# Patient Record
Sex: MALE | Race: WHITE | NOT HISPANIC OR LATINO | Employment: OTHER | URBAN - METROPOLITAN AREA
[De-identification: names, ages, dates, MRNs, and addresses within clinical notes are randomized per-mention and may not be internally consistent; named-entity substitution may affect disease eponyms.]

---

## 2019-05-06 LAB — HBA1C MFR BLD HPLC: 14 %

## 2019-05-13 LAB — HBA1C MFR BLD HPLC: >14 %

## 2019-05-30 ENCOUNTER — CONSULT (OUTPATIENT)
Dept: NEPHROLOGY | Facility: CLINIC | Age: 74
End: 2019-05-30
Payer: MEDICARE

## 2019-05-30 VITALS
WEIGHT: 228 LBS | SYSTOLIC BLOOD PRESSURE: 134 MMHG | DIASTOLIC BLOOD PRESSURE: 70 MMHG | BODY MASS INDEX: 31.92 KG/M2 | HEIGHT: 71 IN

## 2019-05-30 DIAGNOSIS — I12.9 BENIGN HYPERTENSION WITH CHRONIC KIDNEY DISEASE, STAGE III (HCC): ICD-10-CM

## 2019-05-30 DIAGNOSIS — E83.9 CHRONIC KIDNEY DISEASE-MINERAL AND BONE DISORDER: ICD-10-CM

## 2019-05-30 DIAGNOSIS — N18.30 CKD (CHRONIC KIDNEY DISEASE), STAGE III (HCC): Primary | ICD-10-CM

## 2019-05-30 DIAGNOSIS — N18.30 BENIGN HYPERTENSION WITH CHRONIC KIDNEY DISEASE, STAGE III (HCC): ICD-10-CM

## 2019-05-30 DIAGNOSIS — N18.9 CHRONIC KIDNEY DISEASE-MINERAL AND BONE DISORDER: ICD-10-CM

## 2019-05-30 DIAGNOSIS — M89.9 CHRONIC KIDNEY DISEASE-MINERAL AND BONE DISORDER: ICD-10-CM

## 2019-05-30 PROCEDURE — 99204 OFFICE O/P NEW MOD 45 MIN: CPT | Performed by: INTERNAL MEDICINE

## 2019-05-30 RX ORDER — FENOFIBRIC ACID 135 MG/1
135 CAPSULE, DELAYED RELEASE ORAL DAILY
COMMUNITY
End: 2019-10-21

## 2019-05-30 RX ORDER — MULTIVIT WITH MINERALS/LUTEIN
1000 TABLET ORAL DAILY
COMMUNITY

## 2019-05-30 RX ORDER — CANAGLIFLOZIN 300 MG/1
300 TABLET, FILM COATED ORAL DAILY
COMMUNITY
End: 2019-06-25 | Stop reason: HOSPADM

## 2019-05-30 RX ORDER — DOXEPIN HYDROCHLORIDE 6 MG/1
10 TABLET ORAL AS NEEDED
COMMUNITY
End: 2021-03-22 | Stop reason: ALTCHOICE

## 2019-05-30 RX ORDER — GINKGO BILOBA 60 MG
60 TABLET ORAL
COMMUNITY
End: 2019-06-25 | Stop reason: HOSPADM

## 2019-05-30 RX ORDER — PIOGLITAZONEHYDROCHLORIDE 45 MG/1
45 TABLET ORAL DAILY
COMMUNITY
End: 2019-06-25 | Stop reason: HOSPADM

## 2019-05-30 RX ORDER — DIPHENOXYLATE HYDROCHLORIDE AND ATROPINE SULFATE 2.5; .025 MG/1; MG/1
1 TABLET ORAL DAILY
COMMUNITY
End: 2019-06-25 | Stop reason: HOSPADM

## 2019-05-30 RX ORDER — AMLODIPINE BESYLATE 10 MG/1
10 TABLET ORAL DAILY
Qty: 90 TABLET | Refills: 0 | Status: SHIPPED | OUTPATIENT
Start: 2019-05-30 | End: 2019-06-25 | Stop reason: HOSPADM

## 2019-05-30 RX ORDER — SIMVASTATIN 20 MG
20 TABLET ORAL
COMMUNITY
End: 2019-06-25 | Stop reason: HOSPADM

## 2019-05-30 RX ORDER — FINASTERIDE 5 MG/1
5 TABLET, FILM COATED ORAL DAILY
COMMUNITY
End: 2020-06-08 | Stop reason: SDUPTHER

## 2019-05-30 RX ORDER — LOSARTAN POTASSIUM AND HYDROCHLOROTHIAZIDE 25; 100 MG/1; MG/1
1 TABLET ORAL DAILY
COMMUNITY
End: 2019-05-30

## 2019-05-30 RX ORDER — CHOLECALCIFEROL (VITAMIN D3) 125 MCG
500 CAPSULE ORAL DAILY
COMMUNITY

## 2019-05-31 ENCOUNTER — DOCUMENTATION (OUTPATIENT)
Dept: NEPHROLOGY | Facility: CLINIC | Age: 74
End: 2019-05-31

## 2019-05-31 RX ORDER — OMEPRAZOLE 40 MG/1
40 CAPSULE, DELAYED RELEASE ORAL DAILY
COMMUNITY
End: 2021-12-21 | Stop reason: SDUPTHER

## 2019-06-03 ENCOUNTER — HOSPITAL ENCOUNTER (OUTPATIENT)
Dept: ULTRASOUND IMAGING | Facility: HOSPITAL | Age: 74
Discharge: HOME | End: 2019-06-03
Attending: INTERNAL MEDICINE
Payer: MEDICARE

## 2019-06-03 DIAGNOSIS — N18.30 CKD (CHRONIC KIDNEY DISEASE), STAGE III (HCC): ICD-10-CM

## 2019-06-03 PROCEDURE — 51798 US URINE CAPACITY MEASURE: CPT

## 2019-06-12 LAB
APPEARANCE UR: CLEAR
BACTERIA UR QL AUTO: ABNORMAL /HPF
BILIRUB UR QL STRIP: NEGATIVE
COLOR UR: YELLOW
CREAT UR-MCNC: 88 MG/DL (ref 20–320)
GLUCOSE UR QL STRIP: NEGATIVE
HGB UR QL STRIP: NEGATIVE
HYALINE CASTS #/AREA URNS LPF: ABNORMAL /LPF
KETONES UR QL STRIP: NEGATIVE
LEUKOCYTE ESTERASE UR QL STRIP: NEGATIVE
NITRITE UR QL STRIP: NEGATIVE
PH UR STRIP: 6 [PH] (ref 5–8)
PROT UR QL STRIP: ABNORMAL
PROT UR-MCNC: 77 MG/DL (ref 5–25)
PROT/CREAT UR: 875 MG/G CREAT (ref 22–128)
RBC #/AREA URNS HPF: ABNORMAL /HPF
SP GR UR STRIP: 1.02 (ref 1–1.03)
SQUAMOUS #/AREA URNS HPF: ABNORMAL /HPF
WBC #/AREA URNS HPF: ABNORMAL /HPF

## 2019-06-15 ENCOUNTER — APPOINTMENT (EMERGENCY)
Dept: RADIOLOGY | Facility: HOSPITAL | Age: 74
DRG: 280 | End: 2019-06-15
Payer: MEDICARE

## 2019-06-15 ENCOUNTER — HOSPITAL ENCOUNTER (INPATIENT)
Facility: HOSPITAL | Age: 74
LOS: 2 days | DRG: 280 | End: 2019-06-17
Attending: EMERGENCY MEDICINE | Admitting: FAMILY MEDICINE
Payer: MEDICARE

## 2019-06-15 DIAGNOSIS — R77.8 ELEVATED TROPONIN: ICD-10-CM

## 2019-06-15 DIAGNOSIS — N18.9 CHRONIC KIDNEY DISEASE, UNSPECIFIED CKD STAGE: ICD-10-CM

## 2019-06-15 DIAGNOSIS — I50.9 ACUTE CONGESTIVE HEART FAILURE, UNSPECIFIED HEART FAILURE TYPE (HCC): Primary | ICD-10-CM

## 2019-06-15 PROBLEM — Z79.4 TYPE 2 DIABETES MELLITUS WITH CHRONIC KIDNEY DISEASE, WITH LONG-TERM CURRENT USE OF INSULIN (HCC): Status: ACTIVE | Noted: 2019-06-15

## 2019-06-15 PROBLEM — R79.89 ELEVATED TROPONIN: Status: ACTIVE | Noted: 2019-06-15

## 2019-06-15 PROBLEM — J96.01 ACUTE RESPIRATORY FAILURE WITH HYPOXIA (HCC): Status: ACTIVE | Noted: 2019-06-15

## 2019-06-15 PROBLEM — E11.22 TYPE 2 DIABETES MELLITUS WITH CHRONIC KIDNEY DISEASE, WITH LONG-TERM CURRENT USE OF INSULIN (HCC): Status: ACTIVE | Noted: 2019-06-15

## 2019-06-15 PROBLEM — R60.0 BILATERAL LOWER EXTREMITY EDEMA: Status: ACTIVE | Noted: 2019-06-15

## 2019-06-15 PROBLEM — G47.33 OSA (OBSTRUCTIVE SLEEP APNEA): Status: ACTIVE | Noted: 2019-06-15

## 2019-06-15 LAB
ALBUMIN SERPL BCP-MCNC: 3.8 G/DL (ref 3.5–5)
ALP SERPL-CCNC: 54 U/L (ref 46–116)
ALT SERPL W P-5'-P-CCNC: 37 U/L (ref 12–78)
ANION GAP SERPL CALCULATED.3IONS-SCNC: 9 MMOL/L (ref 4–13)
APTT PPP: 27 SECONDS (ref 26–38)
AST SERPL W P-5'-P-CCNC: 29 U/L (ref 5–45)
BASOPHILS # BLD AUTO: 0.03 THOUSANDS/ΜL (ref 0–0.1)
BASOPHILS NFR BLD AUTO: 1 % (ref 0–1)
BILIRUB SERPL-MCNC: 0.4 MG/DL (ref 0.2–1)
BUN SERPL-MCNC: 34 MG/DL (ref 5–25)
CALCIUM SERPL-MCNC: 9.8 MG/DL (ref 8.3–10.1)
CHLORIDE SERPL-SCNC: 105 MMOL/L (ref 100–108)
CO2 SERPL-SCNC: 28 MMOL/L (ref 21–32)
CREAT SERPL-MCNC: 2.39 MG/DL (ref 0.6–1.3)
DEPRECATED D DIMER PPP: 1171 NG/ML (FEU)
EOSINOPHIL # BLD AUTO: 0.32 THOUSAND/ΜL (ref 0–0.61)
EOSINOPHIL NFR BLD AUTO: 5 % (ref 0–6)
ERYTHROCYTE [DISTWIDTH] IN BLOOD BY AUTOMATED COUNT: 14.5 % (ref 11.6–15.1)
ERYTHROCYTE [DISTWIDTH] IN BLOOD BY AUTOMATED COUNT: 14.6 % (ref 11.6–15.1)
GFR SERPL CREATININE-BSD FRML MDRD: 26 ML/MIN/1.73SQ M
GLUCOSE SERPL-MCNC: 215 MG/DL (ref 65–140)
GLUCOSE SERPL-MCNC: 269 MG/DL (ref 65–140)
GLUCOSE SERPL-MCNC: 305 MG/DL (ref 65–140)
HCT VFR BLD AUTO: 39.5 % (ref 36.5–49.3)
HCT VFR BLD AUTO: 39.5 % (ref 36.5–49.3)
HGB BLD-MCNC: 12.2 G/DL (ref 12–17)
HGB BLD-MCNC: 12.3 G/DL (ref 12–17)
IMM GRANULOCYTES # BLD AUTO: 0.03 THOUSAND/UL (ref 0–0.2)
IMM GRANULOCYTES NFR BLD AUTO: 1 % (ref 0–2)
INR PPP: 1.07 (ref 0.86–1.17)
LYMPHOCYTES # BLD AUTO: 0.67 THOUSANDS/ΜL (ref 0.6–4.47)
LYMPHOCYTES NFR BLD AUTO: 10 % (ref 14–44)
MCH RBC QN AUTO: 29.2 PG (ref 26.8–34.3)
MCH RBC QN AUTO: 29.6 PG (ref 26.8–34.3)
MCHC RBC AUTO-ENTMCNC: 30.9 G/DL (ref 31.4–37.4)
MCHC RBC AUTO-ENTMCNC: 31.1 G/DL (ref 31.4–37.4)
MCV RBC AUTO: 95 FL (ref 82–98)
MCV RBC AUTO: 95 FL (ref 82–98)
MONOCYTES # BLD AUTO: 0.58 THOUSAND/ΜL (ref 0.17–1.22)
MONOCYTES NFR BLD AUTO: 9 % (ref 4–12)
NEUTROPHILS # BLD AUTO: 4.92 THOUSANDS/ΜL (ref 1.85–7.62)
NEUTS SEG NFR BLD AUTO: 74 % (ref 43–75)
NRBC BLD AUTO-RTO: 0 /100 WBCS
NT-PROBNP SERPL-MCNC: 818 PG/ML
PLATELET # BLD AUTO: 304 THOUSANDS/UL (ref 149–390)
PLATELET # BLD AUTO: 311 THOUSANDS/UL (ref 149–390)
PMV BLD AUTO: 10.4 FL (ref 8.9–12.7)
PMV BLD AUTO: 10.4 FL (ref 8.9–12.7)
POTASSIUM SERPL-SCNC: 4.7 MMOL/L (ref 3.5–5.3)
PROT SERPL-MCNC: 7.9 G/DL (ref 6.4–8.2)
PROTHROMBIN TIME: 13.6 SECONDS (ref 11.8–14.2)
RBC # BLD AUTO: 4.15 MILLION/UL (ref 3.88–5.62)
RBC # BLD AUTO: 4.18 MILLION/UL (ref 3.88–5.62)
SODIUM SERPL-SCNC: 142 MMOL/L (ref 136–145)
TROPONIN I SERPL-MCNC: 0.8 NG/ML
TROPONIN I SERPL-MCNC: 9.09 NG/ML
WBC # BLD AUTO: 6.55 THOUSAND/UL (ref 4.31–10.16)
WBC # BLD AUTO: 8 THOUSAND/UL (ref 4.31–10.16)

## 2019-06-15 PROCEDURE — 80053 COMPREHEN METABOLIC PANEL: CPT | Performed by: EMERGENCY MEDICINE

## 2019-06-15 PROCEDURE — 85027 COMPLETE CBC AUTOMATED: CPT | Performed by: PHYSICIAN ASSISTANT

## 2019-06-15 PROCEDURE — 82948 REAGENT STRIP/BLOOD GLUCOSE: CPT

## 2019-06-15 PROCEDURE — 84484 ASSAY OF TROPONIN QUANT: CPT | Performed by: PHYSICIAN ASSISTANT

## 2019-06-15 PROCEDURE — 99223 1ST HOSP IP/OBS HIGH 75: CPT | Performed by: PHYSICIAN ASSISTANT

## 2019-06-15 PROCEDURE — 85379 FIBRIN DEGRADATION QUANT: CPT | Performed by: PHYSICIAN ASSISTANT

## 2019-06-15 PROCEDURE — 84484 ASSAY OF TROPONIN QUANT: CPT | Performed by: EMERGENCY MEDICINE

## 2019-06-15 PROCEDURE — 85610 PROTHROMBIN TIME: CPT | Performed by: PHYSICIAN ASSISTANT

## 2019-06-15 PROCEDURE — 99285 EMERGENCY DEPT VISIT HI MDM: CPT | Performed by: EMERGENCY MEDICINE

## 2019-06-15 PROCEDURE — 84484 ASSAY OF TROPONIN QUANT: CPT | Performed by: FAMILY MEDICINE

## 2019-06-15 PROCEDURE — 93005 ELECTROCARDIOGRAM TRACING: CPT

## 2019-06-15 PROCEDURE — 85025 COMPLETE CBC W/AUTO DIFF WBC: CPT | Performed by: EMERGENCY MEDICINE

## 2019-06-15 PROCEDURE — 83880 ASSAY OF NATRIURETIC PEPTIDE: CPT | Performed by: EMERGENCY MEDICINE

## 2019-06-15 PROCEDURE — 96374 THER/PROPH/DIAG INJ IV PUSH: CPT

## 2019-06-15 PROCEDURE — 36415 COLL VENOUS BLD VENIPUNCTURE: CPT | Performed by: EMERGENCY MEDICINE

## 2019-06-15 PROCEDURE — 85730 THROMBOPLASTIN TIME PARTIAL: CPT | Performed by: PHYSICIAN ASSISTANT

## 2019-06-15 PROCEDURE — 71046 X-RAY EXAM CHEST 2 VIEWS: CPT

## 2019-06-15 PROCEDURE — 99285 EMERGENCY DEPT VISIT HI MDM: CPT

## 2019-06-15 RX ORDER — ASCORBIC ACID 500 MG
1000 TABLET ORAL DAILY
Status: DISCONTINUED | OUTPATIENT
Start: 2019-06-16 | End: 2019-06-17 | Stop reason: HOSPADM

## 2019-06-15 RX ORDER — HEPARIN SODIUM 1000 [USP'U]/ML
8400 INJECTION, SOLUTION INTRAVENOUS; SUBCUTANEOUS ONCE
Status: COMPLETED | OUTPATIENT
Start: 2019-06-15 | End: 2019-06-15

## 2019-06-15 RX ORDER — POLYETHYLENE GLYCOL 3350 17 G/17G
17 POWDER, FOR SOLUTION ORAL DAILY PRN
Status: DISCONTINUED | OUTPATIENT
Start: 2019-06-15 | End: 2019-06-17 | Stop reason: HOSPADM

## 2019-06-15 RX ORDER — FUROSEMIDE 10 MG/ML
40 INJECTION INTRAMUSCULAR; INTRAVENOUS
Status: DISCONTINUED | OUTPATIENT
Start: 2019-06-15 | End: 2019-06-16

## 2019-06-15 RX ORDER — HEPARIN SODIUM 10000 [USP'U]/100ML
3-30 INJECTION, SOLUTION INTRAVENOUS
Status: DISCONTINUED | OUTPATIENT
Start: 2019-06-15 | End: 2019-06-17 | Stop reason: HOSPADM

## 2019-06-15 RX ORDER — INSULIN GLARGINE 100 [IU]/ML
75 INJECTION, SOLUTION SUBCUTANEOUS EVERY MORNING
Status: DISCONTINUED | OUTPATIENT
Start: 2019-06-16 | End: 2019-06-17 | Stop reason: HOSPADM

## 2019-06-15 RX ORDER — FUROSEMIDE 10 MG/ML
40 INJECTION INTRAMUSCULAR; INTRAVENOUS ONCE
Status: COMPLETED | OUTPATIENT
Start: 2019-06-15 | End: 2019-06-15

## 2019-06-15 RX ORDER — ASPIRIN 325 MG
325 TABLET ORAL ONCE
Status: COMPLETED | OUTPATIENT
Start: 2019-06-15 | End: 2019-06-15

## 2019-06-15 RX ORDER — FINASTERIDE 5 MG/1
5 TABLET, FILM COATED ORAL DAILY
Status: DISCONTINUED | OUTPATIENT
Start: 2019-06-16 | End: 2019-06-17 | Stop reason: HOSPADM

## 2019-06-15 RX ORDER — AMLODIPINE BESYLATE 10 MG/1
10 TABLET ORAL DAILY
Status: DISCONTINUED | OUTPATIENT
Start: 2019-06-16 | End: 2019-06-17 | Stop reason: HOSPADM

## 2019-06-15 RX ORDER — PANTOPRAZOLE SODIUM 40 MG/1
40 TABLET, DELAYED RELEASE ORAL
Status: DISCONTINUED | OUTPATIENT
Start: 2019-06-16 | End: 2019-06-17 | Stop reason: HOSPADM

## 2019-06-15 RX ORDER — CALCIUM CARBONATE 200(500)MG
1000 TABLET,CHEWABLE ORAL DAILY PRN
Status: DISCONTINUED | OUTPATIENT
Start: 2019-06-15 | End: 2019-06-17 | Stop reason: HOSPADM

## 2019-06-15 RX ORDER — LATANOPROST 50 UG/ML
1 SOLUTION/ DROPS OPHTHALMIC
Status: DISCONTINUED | OUTPATIENT
Start: 2019-06-15 | End: 2019-06-17 | Stop reason: HOSPADM

## 2019-06-15 RX ORDER — HEPARIN SODIUM 1000 [USP'U]/ML
8400 INJECTION, SOLUTION INTRAVENOUS; SUBCUTANEOUS AS NEEDED
Status: DISCONTINUED | OUTPATIENT
Start: 2019-06-15 | End: 2019-06-17 | Stop reason: HOSPADM

## 2019-06-15 RX ORDER — HEPARIN SODIUM 1000 [USP'U]/ML
4200 INJECTION, SOLUTION INTRAVENOUS; SUBCUTANEOUS AS NEEDED
Status: DISCONTINUED | OUTPATIENT
Start: 2019-06-15 | End: 2019-06-17 | Stop reason: HOSPADM

## 2019-06-15 RX ORDER — DOXEPIN HYDROCHLORIDE 6 MG/1
6 TABLET ORAL DAILY
Status: DISCONTINUED | OUTPATIENT
Start: 2019-06-16 | End: 2019-06-17 | Stop reason: HOSPADM

## 2019-06-15 RX ORDER — ONDANSETRON 2 MG/ML
4 INJECTION INTRAMUSCULAR; INTRAVENOUS EVERY 6 HOURS PRN
Status: DISCONTINUED | OUTPATIENT
Start: 2019-06-15 | End: 2019-06-17 | Stop reason: HOSPADM

## 2019-06-15 RX ORDER — ACETAMINOPHEN 325 MG/1
650 TABLET ORAL EVERY 6 HOURS PRN
Status: DISCONTINUED | OUTPATIENT
Start: 2019-06-15 | End: 2019-06-17 | Stop reason: HOSPADM

## 2019-06-15 RX ORDER — PRAVASTATIN SODIUM 40 MG
40 TABLET ORAL
Status: DISCONTINUED | OUTPATIENT
Start: 2019-06-16 | End: 2019-06-17 | Stop reason: HOSPADM

## 2019-06-15 RX ADMIN — FUROSEMIDE 40 MG: 10 INJECTION, SOLUTION INTRAMUSCULAR; INTRAVENOUS at 21:09

## 2019-06-15 RX ADMIN — FUROSEMIDE 40 MG: 10 INJECTION, SOLUTION INTRAMUSCULAR; INTRAVENOUS at 17:12

## 2019-06-15 RX ADMIN — HEPARIN SODIUM 8400 UNITS: 1000 INJECTION, SOLUTION INTRAVENOUS; SUBCUTANEOUS at 20:40

## 2019-06-15 RX ADMIN — HEPARIN SODIUM AND DEXTROSE 18 UNITS/KG/HR: 10000; 5 INJECTION INTRAVENOUS at 20:44

## 2019-06-15 RX ADMIN — INSULIN LISPRO 1 UNITS: 100 INJECTION, SOLUTION INTRAVENOUS; SUBCUTANEOUS at 22:26

## 2019-06-15 RX ADMIN — INSULIN LISPRO 8 UNITS: 100 INJECTION, SOLUTION INTRAVENOUS; SUBCUTANEOUS at 20:35

## 2019-06-15 RX ADMIN — ASPIRIN 325 MG ORAL TABLET 325 MG: 325 PILL ORAL at 17:32

## 2019-06-16 ENCOUNTER — APPOINTMENT (INPATIENT)
Dept: NON INVASIVE DIAGNOSTICS | Facility: HOSPITAL | Age: 74
DRG: 280 | End: 2019-06-16
Payer: MEDICARE

## 2019-06-16 LAB
ANION GAP SERPL CALCULATED.3IONS-SCNC: 10 MMOL/L (ref 4–13)
APTT PPP: 64 SECONDS (ref 26–38)
APTT PPP: 86 SECONDS (ref 26–38)
BASOPHILS # BLD AUTO: 0.04 THOUSANDS/ΜL (ref 0–0.1)
BASOPHILS NFR BLD AUTO: 1 % (ref 0–1)
BUN SERPL-MCNC: 35 MG/DL (ref 5–25)
CALCIUM SERPL-MCNC: 9.2 MG/DL (ref 8.3–10.1)
CHLORIDE SERPL-SCNC: 104 MMOL/L (ref 100–108)
CHOLEST SERPL-MCNC: 139 MG/DL (ref 50–200)
CO2 SERPL-SCNC: 28 MMOL/L (ref 21–32)
CREAT SERPL-MCNC: 2.45 MG/DL (ref 0.6–1.3)
EOSINOPHIL # BLD AUTO: 0.37 THOUSAND/ΜL (ref 0–0.61)
EOSINOPHIL NFR BLD AUTO: 5 % (ref 0–6)
ERYTHROCYTE [DISTWIDTH] IN BLOOD BY AUTOMATED COUNT: 14.6 % (ref 11.6–15.1)
GFR SERPL CREATININE-BSD FRML MDRD: 25 ML/MIN/1.73SQ M
GLUCOSE SERPL-MCNC: 131 MG/DL (ref 65–140)
GLUCOSE SERPL-MCNC: 143 MG/DL (ref 65–140)
GLUCOSE SERPL-MCNC: 156 MG/DL (ref 65–140)
GLUCOSE SERPL-MCNC: 177 MG/DL (ref 65–140)
GLUCOSE SERPL-MCNC: 199 MG/DL (ref 65–140)
HCT VFR BLD AUTO: 36.4 % (ref 36.5–49.3)
HDLC SERPL-MCNC: 84 MG/DL (ref 40–60)
HGB BLD-MCNC: 11.3 G/DL (ref 12–17)
IMM GRANULOCYTES # BLD AUTO: 0.02 THOUSAND/UL (ref 0–0.2)
IMM GRANULOCYTES NFR BLD AUTO: 0 % (ref 0–2)
LDLC SERPL CALC-MCNC: 49 MG/DL (ref 0–100)
LYMPHOCYTES # BLD AUTO: 1.24 THOUSANDS/ΜL (ref 0.6–4.47)
LYMPHOCYTES NFR BLD AUTO: 18 % (ref 14–44)
MCH RBC QN AUTO: 29 PG (ref 26.8–34.3)
MCHC RBC AUTO-ENTMCNC: 31 G/DL (ref 31.4–37.4)
MCV RBC AUTO: 94 FL (ref 82–98)
MONOCYTES # BLD AUTO: 0.8 THOUSAND/ΜL (ref 0.17–1.22)
MONOCYTES NFR BLD AUTO: 12 % (ref 4–12)
NEUTROPHILS # BLD AUTO: 4.39 THOUSANDS/ΜL (ref 1.85–7.62)
NEUTS SEG NFR BLD AUTO: 64 % (ref 43–75)
NRBC BLD AUTO-RTO: 0 /100 WBCS
PLATELET # BLD AUTO: 275 THOUSANDS/UL (ref 149–390)
PMV BLD AUTO: 10.4 FL (ref 8.9–12.7)
POTASSIUM SERPL-SCNC: 3.8 MMOL/L (ref 3.5–5.3)
RBC # BLD AUTO: 3.89 MILLION/UL (ref 3.88–5.62)
SODIUM SERPL-SCNC: 142 MMOL/L (ref 136–145)
TRIGL SERPL-MCNC: 30 MG/DL
TROPONIN I SERPL-MCNC: 10.77 NG/ML
TROPONIN I SERPL-MCNC: 13.19 NG/ML
TROPONIN I SERPL-MCNC: 14.44 NG/ML
TROPONIN I SERPL-MCNC: 17.09 NG/ML
WBC # BLD AUTO: 6.86 THOUSAND/UL (ref 4.31–10.16)

## 2019-06-16 PROCEDURE — 84484 ASSAY OF TROPONIN QUANT: CPT | Performed by: NURSE PRACTITIONER

## 2019-06-16 PROCEDURE — 80061 LIPID PANEL: CPT | Performed by: PHYSICIAN ASSISTANT

## 2019-06-16 PROCEDURE — 80048 BASIC METABOLIC PNL TOTAL CA: CPT | Performed by: PHYSICIAN ASSISTANT

## 2019-06-16 PROCEDURE — 99232 SBSQ HOSP IP/OBS MODERATE 35: CPT | Performed by: INTERNAL MEDICINE

## 2019-06-16 PROCEDURE — 85025 COMPLETE CBC W/AUTO DIFF WBC: CPT | Performed by: PHYSICIAN ASSISTANT

## 2019-06-16 PROCEDURE — 85730 THROMBOPLASTIN TIME PARTIAL: CPT | Performed by: FAMILY MEDICINE

## 2019-06-16 PROCEDURE — 93306 TTE W/DOPPLER COMPLETE: CPT

## 2019-06-16 PROCEDURE — 99222 1ST HOSP IP/OBS MODERATE 55: CPT | Performed by: INTERNAL MEDICINE

## 2019-06-16 PROCEDURE — 84484 ASSAY OF TROPONIN QUANT: CPT | Performed by: PHYSICIAN ASSISTANT

## 2019-06-16 PROCEDURE — 93306 TTE W/DOPPLER COMPLETE: CPT | Performed by: INTERNAL MEDICINE

## 2019-06-16 PROCEDURE — 93005 ELECTROCARDIOGRAM TRACING: CPT

## 2019-06-16 PROCEDURE — 99223 1ST HOSP IP/OBS HIGH 75: CPT | Performed by: INTERNAL MEDICINE

## 2019-06-16 PROCEDURE — 82948 REAGENT STRIP/BLOOD GLUCOSE: CPT

## 2019-06-16 RX ORDER — TAMSULOSIN HYDROCHLORIDE 0.4 MG/1
0.4 CAPSULE ORAL
Status: DISCONTINUED | OUTPATIENT
Start: 2019-06-16 | End: 2019-06-17 | Stop reason: HOSPADM

## 2019-06-16 RX ORDER — CLOPIDOGREL BISULFATE 75 MG/1
300 TABLET ORAL ONCE
Status: COMPLETED | OUTPATIENT
Start: 2019-06-16 | End: 2019-06-16

## 2019-06-16 RX ORDER — SODIUM CHLORIDE 9 MG/ML
60 INJECTION, SOLUTION INTRAVENOUS CONTINUOUS
Status: DISCONTINUED | OUTPATIENT
Start: 2019-06-16 | End: 2019-06-17

## 2019-06-16 RX ORDER — ASPIRIN 81 MG/1
81 TABLET ORAL DAILY
Status: DISCONTINUED | OUTPATIENT
Start: 2019-06-16 | End: 2019-06-17 | Stop reason: HOSPADM

## 2019-06-16 RX ADMIN — OXYCODONE HYDROCHLORIDE AND ACETAMINOPHEN 1000 MG: 500 TABLET ORAL at 09:51

## 2019-06-16 RX ADMIN — HEPARIN SODIUM AND DEXTROSE 18 UNITS/KG/HR: 10000; 5 INJECTION INTRAVENOUS at 23:27

## 2019-06-16 RX ADMIN — LATANOPROST 1 DROP: 50 SOLUTION OPHTHALMIC at 21:28

## 2019-06-16 RX ADMIN — AMLODIPINE BESYLATE 10 MG: 10 TABLET ORAL at 09:51

## 2019-06-16 RX ADMIN — INSULIN GLARGINE 75 UNITS: 100 INJECTION, SOLUTION SUBCUTANEOUS at 09:53

## 2019-06-16 RX ADMIN — PRAVASTATIN SODIUM 40 MG: 40 TABLET ORAL at 17:08

## 2019-06-16 RX ADMIN — SODIUM CHLORIDE 60 ML/HR: 0.9 INJECTION, SOLUTION INTRAVENOUS at 21:28

## 2019-06-16 RX ADMIN — METOPROLOL TARTRATE 25 MG: 25 TABLET ORAL at 21:27

## 2019-06-16 RX ADMIN — METOPROLOL TARTRATE 25 MG: 25 TABLET ORAL at 11:37

## 2019-06-16 RX ADMIN — HEPARIN SODIUM AND DEXTROSE 18 UNITS/KG/HR: 10000; 5 INJECTION INTRAVENOUS at 09:49

## 2019-06-16 RX ADMIN — INSULIN LISPRO 1 UNITS: 100 INJECTION, SOLUTION INTRAVENOUS; SUBCUTANEOUS at 11:41

## 2019-06-16 RX ADMIN — FUROSEMIDE 40 MG: 10 INJECTION, SOLUTION INTRAMUSCULAR; INTRAVENOUS at 09:51

## 2019-06-16 RX ADMIN — CLOPIDOGREL BISULFATE 300 MG: 75 TABLET ORAL at 09:51

## 2019-06-16 RX ADMIN — ASPIRIN 81 MG: 81 TABLET, COATED ORAL at 11:37

## 2019-06-16 RX ADMIN — PANTOPRAZOLE SODIUM 40 MG: 40 TABLET, DELAYED RELEASE ORAL at 05:50

## 2019-06-16 RX ADMIN — INSULIN LISPRO 8 UNITS: 100 INJECTION, SOLUTION INTRAVENOUS; SUBCUTANEOUS at 11:41

## 2019-06-16 RX ADMIN — FINASTERIDE 5 MG: 5 TABLET, FILM COATED ORAL at 09:51

## 2019-06-16 RX ADMIN — TAMSULOSIN HYDROCHLORIDE 0.4 MG: 0.4 CAPSULE ORAL at 17:08

## 2019-06-16 RX ADMIN — LATANOPROST 1 DROP: 50 SOLUTION OPHTHALMIC at 00:48

## 2019-06-17 ENCOUNTER — HOSPITAL ENCOUNTER (INPATIENT)
Facility: HOSPITAL | Age: 74
LOS: 8 days | Discharge: HOME HEALTH | DRG: 235 | End: 2019-06-25
Attending: INTERNAL MEDICINE | Admitting: THORACIC SURGERY (CARDIOTHORACIC VASCULAR SURGERY)
Payer: MEDICARE

## 2019-06-17 ENCOUNTER — APPOINTMENT (INPATIENT)
Dept: NON INVASIVE DIAGNOSTICS | Facility: HOSPITAL | Age: 74
DRG: 280 | End: 2019-06-17
Payer: MEDICARE

## 2019-06-17 VITALS
BODY MASS INDEX: 33.33 KG/M2 | WEIGHT: 238.1 LBS | DIASTOLIC BLOOD PRESSURE: 69 MMHG | HEIGHT: 71 IN | RESPIRATION RATE: 16 BRPM | SYSTOLIC BLOOD PRESSURE: 138 MMHG | HEART RATE: 76 BPM | TEMPERATURE: 97.7 F | OXYGEN SATURATION: 98 %

## 2019-06-17 DIAGNOSIS — E11.22 TYPE 2 DIABETES MELLITUS WITH CHRONIC KIDNEY DISEASE, WITH LONG-TERM CURRENT USE OF INSULIN, UNSPECIFIED CKD STAGE (HCC): ICD-10-CM

## 2019-06-17 DIAGNOSIS — E11.22 TYPE 2 DIABETES MELLITUS WITH STAGE 3 CHRONIC KIDNEY DISEASE, WITH LONG-TERM CURRENT USE OF INSULIN (HCC): ICD-10-CM

## 2019-06-17 DIAGNOSIS — I25.10 TRIPLE VESSEL CORONARY ARTERY DISEASE: Primary | ICD-10-CM

## 2019-06-17 DIAGNOSIS — N18.30 CKD (CHRONIC KIDNEY DISEASE), STAGE III (HCC): ICD-10-CM

## 2019-06-17 DIAGNOSIS — Z01.818 PRE-OP EVALUATION: ICD-10-CM

## 2019-06-17 DIAGNOSIS — Z79.4 TYPE 2 DIABETES MELLITUS WITH STAGE 3 CHRONIC KIDNEY DISEASE, WITH LONG-TERM CURRENT USE OF INSULIN (HCC): ICD-10-CM

## 2019-06-17 DIAGNOSIS — Z95.1 S/P CABG (CORONARY ARTERY BYPASS GRAFT): ICD-10-CM

## 2019-06-17 DIAGNOSIS — N18.30 TYPE 2 DIABETES MELLITUS WITH STAGE 3 CHRONIC KIDNEY DISEASE, WITH LONG-TERM CURRENT USE OF INSULIN (HCC): ICD-10-CM

## 2019-06-17 DIAGNOSIS — Z79.4 TYPE 2 DIABETES MELLITUS WITH CHRONIC KIDNEY DISEASE, WITH LONG-TERM CURRENT USE OF INSULIN, UNSPECIFIED CKD STAGE (HCC): ICD-10-CM

## 2019-06-17 PROBLEM — J96.01 ACUTE RESPIRATORY FAILURE WITH HYPOXIA (HCC): Status: RESOLVED | Noted: 2019-06-15 | Resolved: 2019-06-17

## 2019-06-17 PROBLEM — R07.9 CHEST PAIN, CARDIAC: Status: ACTIVE | Noted: 2019-06-17

## 2019-06-17 PROBLEM — E78.5 HYPERLIPIDEMIA: Status: ACTIVE | Noted: 2019-06-17

## 2019-06-17 LAB
ALBUMIN SERPL BCP-MCNC: 3.3 G/DL (ref 3.5–5)
ALP SERPL-CCNC: 43 U/L (ref 46–116)
ALT SERPL W P-5'-P-CCNC: 32 U/L (ref 12–78)
ANION GAP SERPL CALCULATED.3IONS-SCNC: 7 MMOL/L (ref 4–13)
ANION GAP SERPL CALCULATED.3IONS-SCNC: 7 MMOL/L (ref 4–13)
APTT PPP: 63 SECONDS (ref 26–38)
AST SERPL W P-5'-P-CCNC: 31 U/L (ref 5–45)
BACTERIA UR QL AUTO: ABNORMAL /HPF
BASOPHILS # BLD AUTO: 0.03 THOUSANDS/ΜL (ref 0–0.1)
BASOPHILS NFR BLD AUTO: 1 % (ref 0–1)
BILIRUB SERPL-MCNC: 0.3 MG/DL (ref 0.2–1)
BILIRUB UR QL STRIP: NEGATIVE
BUN SERPL-MCNC: 35 MG/DL (ref 5–25)
BUN SERPL-MCNC: 35 MG/DL (ref 5–25)
CALCIUM SERPL-MCNC: 9.2 MG/DL (ref 8.3–10.1)
CALCIUM SERPL-MCNC: 9.2 MG/DL (ref 8.3–10.1)
CHLORIDE SERPL-SCNC: 102 MMOL/L (ref 100–108)
CHLORIDE SERPL-SCNC: 102 MMOL/L (ref 100–108)
CLARITY UR: CLEAR
CO2 SERPL-SCNC: 29 MMOL/L (ref 21–32)
CO2 SERPL-SCNC: 29 MMOL/L (ref 21–32)
COLOR UR: YELLOW
CREAT SERPL-MCNC: 2.43 MG/DL (ref 0.6–1.3)
CREAT SERPL-MCNC: 2.43 MG/DL (ref 0.6–1.3)
CREAT UR-MCNC: 51.1 MG/DL
EOSINOPHIL # BLD AUTO: 0.34 THOUSAND/ΜL (ref 0–0.61)
EOSINOPHIL NFR BLD AUTO: 6 % (ref 0–6)
ERYTHROCYTE [DISTWIDTH] IN BLOOD BY AUTOMATED COUNT: 14.5 % (ref 11.6–15.1)
ERYTHROCYTE [DISTWIDTH] IN BLOOD BY AUTOMATED COUNT: 14.5 % (ref 11.6–15.1)
EST. AVERAGE GLUCOSE BLD GHB EST-MCNC: 249 MG/DL
GFR SERPL CREATININE-BSD FRML MDRD: 26 ML/MIN/1.73SQ M
GFR SERPL CREATININE-BSD FRML MDRD: 26 ML/MIN/1.73SQ M
GLUCOSE SERPL-MCNC: 128 MG/DL (ref 65–140)
GLUCOSE SERPL-MCNC: 145 MG/DL (ref 65–140)
GLUCOSE SERPL-MCNC: 145 MG/DL (ref 65–140)
GLUCOSE SERPL-MCNC: 182 MG/DL (ref 65–140)
GLUCOSE SERPL-MCNC: 195 MG/DL (ref 65–140)
GLUCOSE SERPL-MCNC: 75 MG/DL (ref 65–140)
GLUCOSE UR STRIP-MCNC: NEGATIVE MG/DL
HBA1C MFR BLD: 10.3 % (ref 4.2–6.3)
HCT VFR BLD AUTO: 37.1 % (ref 36.5–49.3)
HCT VFR BLD AUTO: 37.1 % (ref 36.5–49.3)
HGB BLD-MCNC: 11.8 G/DL (ref 12–17)
HGB BLD-MCNC: 11.8 G/DL (ref 12–17)
HGB UR QL STRIP.AUTO: NEGATIVE
IMM GRANULOCYTES # BLD AUTO: 0.02 THOUSAND/UL (ref 0–0.2)
IMM GRANULOCYTES NFR BLD AUTO: 0 % (ref 0–2)
KETONES UR STRIP-MCNC: NEGATIVE MG/DL
LEUKOCYTE ESTERASE UR QL STRIP: NEGATIVE
LYMPHOCYTES # BLD AUTO: 1.64 THOUSANDS/ΜL (ref 0.6–4.47)
LYMPHOCYTES NFR BLD AUTO: 29 % (ref 14–44)
MCH RBC QN AUTO: 29.6 PG (ref 26.8–34.3)
MCH RBC QN AUTO: 29.6 PG (ref 26.8–34.3)
MCHC RBC AUTO-ENTMCNC: 31.8 G/DL (ref 31.4–37.4)
MCHC RBC AUTO-ENTMCNC: 31.8 G/DL (ref 31.4–37.4)
MCV RBC AUTO: 93 FL (ref 82–98)
MCV RBC AUTO: 93 FL (ref 82–98)
MONOCYTES # BLD AUTO: 0.67 THOUSAND/ΜL (ref 0.17–1.22)
MONOCYTES NFR BLD AUTO: 12 % (ref 4–12)
NEUTROPHILS # BLD AUTO: 2.9 THOUSANDS/ΜL (ref 1.85–7.62)
NEUTS SEG NFR BLD AUTO: 52 % (ref 43–75)
NITRITE UR QL STRIP: NEGATIVE
NON-SQ EPI CELLS URNS QL MICRO: ABNORMAL /HPF
NRBC BLD AUTO-RTO: 0 /100 WBCS
PH UR STRIP.AUTO: 6 [PH]
PLATELET # BLD AUTO: 268 THOUSANDS/UL (ref 149–390)
PLATELET # BLD AUTO: 268 THOUSANDS/UL (ref 149–390)
PMV BLD AUTO: 10.4 FL (ref 8.9–12.7)
PMV BLD AUTO: 10.4 FL (ref 8.9–12.7)
POTASSIUM SERPL-SCNC: 3.7 MMOL/L (ref 3.5–5.3)
POTASSIUM SERPL-SCNC: 3.7 MMOL/L (ref 3.5–5.3)
PROT SERPL-MCNC: 7.1 G/DL (ref 6.4–8.2)
PROT UR STRIP-MCNC: NEGATIVE MG/DL
PROT UR-MCNC: 22 MG/DL
PROT/CREAT UR: 0.43 MG/G{CREAT} (ref 0–0.1)
RBC # BLD AUTO: 3.99 MILLION/UL (ref 3.88–5.62)
RBC # BLD AUTO: 3.99 MILLION/UL (ref 3.88–5.62)
RBC #/AREA URNS AUTO: ABNORMAL /HPF
SODIUM SERPL-SCNC: 138 MMOL/L (ref 136–145)
SODIUM SERPL-SCNC: 138 MMOL/L (ref 136–145)
SP GR UR STRIP.AUTO: <=1.005 (ref 1–1.03)
UROBILINOGEN UR QL STRIP.AUTO: 0.2 E.U./DL
WBC # BLD AUTO: 5.6 THOUSAND/UL (ref 4.31–10.16)
WBC # BLD AUTO: 5.6 THOUSAND/UL (ref 4.31–10.16)
WBC #/AREA URNS AUTO: ABNORMAL /HPF

## 2019-06-17 PROCEDURE — C1769 GUIDE WIRE: HCPCS | Performed by: NURSE PRACTITIONER

## 2019-06-17 PROCEDURE — 85027 COMPLETE CBC AUTOMATED: CPT | Performed by: NURSE PRACTITIONER

## 2019-06-17 PROCEDURE — B2111ZZ FLUOROSCOPY OF MULTIPLE CORONARY ARTERIES USING LOW OSMOLAR CONTRAST: ICD-10-PCS | Performed by: INTERNAL MEDICINE

## 2019-06-17 PROCEDURE — 93454 CORONARY ARTERY ANGIO S&I: CPT | Performed by: INTERNAL MEDICINE

## 2019-06-17 PROCEDURE — 80053 COMPREHEN METABOLIC PANEL: CPT | Performed by: INTERNAL MEDICINE

## 2019-06-17 PROCEDURE — 99152 MOD SED SAME PHYS/QHP 5/>YRS: CPT | Performed by: NURSE PRACTITIONER

## 2019-06-17 PROCEDURE — 99223 1ST HOSP IP/OBS HIGH 75: CPT | Performed by: PHYSICIAN ASSISTANT

## 2019-06-17 PROCEDURE — 82948 REAGENT STRIP/BLOOD GLUCOSE: CPT

## 2019-06-17 PROCEDURE — 93005 ELECTROCARDIOGRAM TRACING: CPT

## 2019-06-17 PROCEDURE — 85025 COMPLETE CBC W/AUTO DIFF WBC: CPT | Performed by: INTERNAL MEDICINE

## 2019-06-17 PROCEDURE — 99232 SBSQ HOSP IP/OBS MODERATE 35: CPT | Performed by: INTERNAL MEDICINE

## 2019-06-17 PROCEDURE — 80048 BASIC METABOLIC PNL TOTAL CA: CPT | Performed by: NURSE PRACTITIONER

## 2019-06-17 PROCEDURE — 84156 ASSAY OF PROTEIN URINE: CPT | Performed by: INTERNAL MEDICINE

## 2019-06-17 PROCEDURE — B2161ZZ FLUOROSCOPY OF RIGHT AND LEFT HEART USING LOW OSMOLAR CONTRAST: ICD-10-PCS | Performed by: INTERNAL MEDICINE

## 2019-06-17 PROCEDURE — 99239 HOSP IP/OBS DSCHRG MGMT >30: CPT | Performed by: INTERNAL MEDICINE

## 2019-06-17 PROCEDURE — 4A023N7 MEASUREMENT OF CARDIAC SAMPLING AND PRESSURE, LEFT HEART, PERCUTANEOUS APPROACH: ICD-10-PCS | Performed by: INTERNAL MEDICINE

## 2019-06-17 PROCEDURE — C1894 INTRO/SHEATH, NON-LASER: HCPCS | Performed by: NURSE PRACTITIONER

## 2019-06-17 PROCEDURE — 93458 L HRT ARTERY/VENTRICLE ANGIO: CPT | Performed by: NURSE PRACTITIONER

## 2019-06-17 PROCEDURE — 81001 URINALYSIS AUTO W/SCOPE: CPT | Performed by: INTERNAL MEDICINE

## 2019-06-17 PROCEDURE — 99152 MOD SED SAME PHYS/QHP 5/>YRS: CPT | Performed by: INTERNAL MEDICINE

## 2019-06-17 PROCEDURE — 83036 HEMOGLOBIN GLYCOSYLATED A1C: CPT | Performed by: PHYSICIAN ASSISTANT

## 2019-06-17 PROCEDURE — 85730 THROMBOPLASTIN TIME PARTIAL: CPT | Performed by: INTERNAL MEDICINE

## 2019-06-17 PROCEDURE — 82570 ASSAY OF URINE CREATININE: CPT | Performed by: INTERNAL MEDICINE

## 2019-06-17 RX ORDER — PANTOPRAZOLE SODIUM 40 MG/1
40 TABLET, DELAYED RELEASE ORAL
Status: DISCONTINUED | OUTPATIENT
Start: 2019-06-18 | End: 2019-06-21 | Stop reason: HOSPADM

## 2019-06-17 RX ORDER — ONDANSETRON 2 MG/ML
4 INJECTION INTRAMUSCULAR; INTRAVENOUS EVERY 6 HOURS PRN
Status: CANCELLED | OUTPATIENT
Start: 2019-06-17

## 2019-06-17 RX ORDER — ACETAMINOPHEN 325 MG/1
650 TABLET ORAL EVERY 6 HOURS PRN
Status: DISCONTINUED | OUTPATIENT
Start: 2019-06-17 | End: 2019-06-21 | Stop reason: HOSPADM

## 2019-06-17 RX ORDER — HEPARIN SODIUM 1000 [USP'U]/ML
8400 INJECTION, SOLUTION INTRAVENOUS; SUBCUTANEOUS AS NEEDED
Status: CANCELLED | OUTPATIENT
Start: 2019-06-17

## 2019-06-17 RX ORDER — LIDOCAINE HYDROCHLORIDE 10 MG/ML
INJECTION, SOLUTION INFILTRATION; PERINEURAL CODE/TRAUMA/SEDATION MEDICATION
Status: COMPLETED | OUTPATIENT
Start: 2019-06-17 | End: 2019-06-17

## 2019-06-17 RX ORDER — MIDAZOLAM HYDROCHLORIDE 1 MG/ML
INJECTION INTRAMUSCULAR; INTRAVENOUS CODE/TRAUMA/SEDATION MEDICATION
Status: COMPLETED | OUTPATIENT
Start: 2019-06-17 | End: 2019-06-17

## 2019-06-17 RX ORDER — ACETAMINOPHEN 325 MG/1
650 TABLET ORAL EVERY 6 HOURS PRN
Status: CANCELLED | OUTPATIENT
Start: 2019-06-17

## 2019-06-17 RX ORDER — AMLODIPINE BESYLATE 10 MG/1
10 TABLET ORAL DAILY
Status: CANCELLED | OUTPATIENT
Start: 2019-06-18

## 2019-06-17 RX ORDER — ASPIRIN 81 MG/1
81 TABLET ORAL DAILY
Status: CANCELLED | OUTPATIENT
Start: 2019-06-18

## 2019-06-17 RX ORDER — SODIUM CHLORIDE 9 MG/ML
125 INJECTION, SOLUTION INTRAVENOUS CONTINUOUS
Status: DISCONTINUED | OUTPATIENT
Start: 2019-06-17 | End: 2019-06-17

## 2019-06-17 RX ORDER — HEPARIN SODIUM 1000 [USP'U]/ML
4200 INJECTION, SOLUTION INTRAVENOUS; SUBCUTANEOUS AS NEEDED
Status: DISCONTINUED | OUTPATIENT
Start: 2019-06-17 | End: 2019-06-18

## 2019-06-17 RX ORDER — LATANOPROST 50 UG/ML
1 SOLUTION/ DROPS OPHTHALMIC
COMMUNITY

## 2019-06-17 RX ORDER — INSULIN GLARGINE 100 [IU]/ML
75 INJECTION, SOLUTION SUBCUTANEOUS EVERY MORNING
Status: DISCONTINUED | OUTPATIENT
Start: 2019-06-18 | End: 2019-06-21 | Stop reason: HOSPADM

## 2019-06-17 RX ORDER — SODIUM CHLORIDE 9 MG/ML
100 INJECTION, SOLUTION INTRAVENOUS CONTINUOUS
Status: DISCONTINUED | OUTPATIENT
Start: 2019-06-17 | End: 2019-06-18

## 2019-06-17 RX ORDER — PRAVASTATIN SODIUM 40 MG
40 TABLET ORAL
Status: DISCONTINUED | OUTPATIENT
Start: 2019-06-18 | End: 2019-06-21 | Stop reason: HOSPADM

## 2019-06-17 RX ORDER — HEPARIN SODIUM 1000 [USP'U]/ML
8400 INJECTION, SOLUTION INTRAVENOUS; SUBCUTANEOUS AS NEEDED
Status: DISCONTINUED | OUTPATIENT
Start: 2019-06-17 | End: 2019-06-18

## 2019-06-17 RX ORDER — SODIUM CHLORIDE 9 MG/ML
100 INJECTION, SOLUTION INTRAVENOUS CONTINUOUS
Status: DISCONTINUED | OUTPATIENT
Start: 2019-06-17 | End: 2019-06-17 | Stop reason: HOSPADM

## 2019-06-17 RX ORDER — TAMSULOSIN HYDROCHLORIDE 0.4 MG/1
0.4 CAPSULE ORAL
Status: CANCELLED | OUTPATIENT
Start: 2019-06-17

## 2019-06-17 RX ORDER — TAMSULOSIN HYDROCHLORIDE 0.4 MG/1
0.4 CAPSULE ORAL
Status: DISCONTINUED | OUTPATIENT
Start: 2019-06-18 | End: 2019-06-25 | Stop reason: HOSPADM

## 2019-06-17 RX ORDER — LATANOPROST 50 UG/ML
1 SOLUTION/ DROPS OPHTHALMIC
Status: DISCONTINUED | OUTPATIENT
Start: 2019-06-17 | End: 2019-06-25 | Stop reason: HOSPADM

## 2019-06-17 RX ORDER — PRAVASTATIN SODIUM 40 MG
40 TABLET ORAL
Status: CANCELLED | OUTPATIENT
Start: 2019-06-17

## 2019-06-17 RX ORDER — FINASTERIDE 5 MG/1
5 TABLET, FILM COATED ORAL DAILY
Status: DISCONTINUED | OUTPATIENT
Start: 2019-06-18 | End: 2019-06-25 | Stop reason: HOSPADM

## 2019-06-17 RX ORDER — SODIUM CHLORIDE 9 MG/ML
125 INJECTION, SOLUTION INTRAVENOUS CONTINUOUS
Status: DISCONTINUED | OUTPATIENT
Start: 2019-06-17 | End: 2019-06-17 | Stop reason: HOSPADM

## 2019-06-17 RX ORDER — SODIUM CHLORIDE 9 MG/ML
100 INJECTION, SOLUTION INTRAVENOUS CONTINUOUS
Status: CANCELLED | OUTPATIENT
Start: 2019-06-17

## 2019-06-17 RX ORDER — CALCIUM CARBONATE 200(500)MG
1000 TABLET,CHEWABLE ORAL DAILY PRN
Status: CANCELLED | OUTPATIENT
Start: 2019-06-17

## 2019-06-17 RX ORDER — DOXEPIN HYDROCHLORIDE 6 MG/1
6 TABLET ORAL DAILY
Status: DISCONTINUED | OUTPATIENT
Start: 2019-06-18 | End: 2019-06-17

## 2019-06-17 RX ORDER — CALCIUM CARBONATE 200(500)MG
1000 TABLET,CHEWABLE ORAL DAILY PRN
Status: DISCONTINUED | OUTPATIENT
Start: 2019-06-17 | End: 2019-06-21 | Stop reason: HOSPADM

## 2019-06-17 RX ORDER — HEPARIN SODIUM 10000 [USP'U]/100ML
3-30 INJECTION, SOLUTION INTRAVENOUS
Status: CANCELLED | OUTPATIENT
Start: 2019-06-17

## 2019-06-17 RX ORDER — POLYETHYLENE GLYCOL 3350 17 G/17G
17 POWDER, FOR SOLUTION ORAL DAILY PRN
Status: CANCELLED | OUTPATIENT
Start: 2019-06-17

## 2019-06-17 RX ORDER — POLYETHYLENE GLYCOL 3350 17 G/17G
17 POWDER, FOR SOLUTION ORAL DAILY PRN
Status: DISCONTINUED | OUTPATIENT
Start: 2019-06-17 | End: 2019-06-21 | Stop reason: HOSPADM

## 2019-06-17 RX ORDER — CLOPIDOGREL BISULFATE 75 MG/1
75 TABLET ORAL ONCE
Status: COMPLETED | OUTPATIENT
Start: 2019-06-17 | End: 2019-06-17

## 2019-06-17 RX ORDER — HEPARIN SODIUM 1000 [USP'U]/ML
INJECTION, SOLUTION INTRAVENOUS; SUBCUTANEOUS CODE/TRAUMA/SEDATION MEDICATION
Status: COMPLETED | OUTPATIENT
Start: 2019-06-17 | End: 2019-06-17

## 2019-06-17 RX ORDER — DOXEPIN HYDROCHLORIDE 6 MG/1
6 TABLET ORAL DAILY
Status: CANCELLED | OUTPATIENT
Start: 2019-06-18

## 2019-06-17 RX ORDER — HEPARIN SODIUM 1000 [USP'U]/ML
4200 INJECTION, SOLUTION INTRAVENOUS; SUBCUTANEOUS AS NEEDED
Status: CANCELLED | OUTPATIENT
Start: 2019-06-17

## 2019-06-17 RX ORDER — INSULIN GLARGINE 100 [IU]/ML
75 INJECTION, SOLUTION SUBCUTANEOUS EVERY MORNING
Status: CANCELLED | OUTPATIENT
Start: 2019-06-18

## 2019-06-17 RX ORDER — FINASTERIDE 5 MG/1
5 TABLET, FILM COATED ORAL DAILY
Status: CANCELLED | OUTPATIENT
Start: 2019-06-18

## 2019-06-17 RX ORDER — LATANOPROST 50 UG/ML
1 SOLUTION/ DROPS OPHTHALMIC
Status: CANCELLED | OUTPATIENT
Start: 2019-06-17

## 2019-06-17 RX ORDER — PANTOPRAZOLE SODIUM 40 MG/1
40 TABLET, DELAYED RELEASE ORAL
Status: CANCELLED | OUTPATIENT
Start: 2019-06-18

## 2019-06-17 RX ORDER — ASCORBIC ACID 500 MG
1000 TABLET ORAL DAILY
Status: CANCELLED | OUTPATIENT
Start: 2019-06-18

## 2019-06-17 RX ORDER — NITROGLYCERIN 20 MG/100ML
INJECTION INTRAVENOUS CODE/TRAUMA/SEDATION MEDICATION
Status: COMPLETED | OUTPATIENT
Start: 2019-06-17 | End: 2019-06-17

## 2019-06-17 RX ORDER — SODIUM CHLORIDE 9 MG/ML
125 INJECTION, SOLUTION INTRAVENOUS CONTINUOUS
Status: CANCELLED | OUTPATIENT
Start: 2019-06-17 | End: 2019-06-22

## 2019-06-17 RX ORDER — FENTANYL CITRATE 50 UG/ML
INJECTION, SOLUTION INTRAMUSCULAR; INTRAVENOUS CODE/TRAUMA/SEDATION MEDICATION
Status: COMPLETED | OUTPATIENT
Start: 2019-06-17 | End: 2019-06-17

## 2019-06-17 RX ORDER — HEPARIN SODIUM 10000 [USP'U]/100ML
3-30 INJECTION, SOLUTION INTRAVENOUS
Status: DISCONTINUED | OUTPATIENT
Start: 2019-06-17 | End: 2019-06-18

## 2019-06-17 RX ORDER — VERAPAMIL HYDROCHLORIDE 2.5 MG/ML
INJECTION, SOLUTION INTRAVENOUS CODE/TRAUMA/SEDATION MEDICATION
Status: COMPLETED | OUTPATIENT
Start: 2019-06-17 | End: 2019-06-17

## 2019-06-17 RX ORDER — ASCORBIC ACID 500 MG
1000 TABLET ORAL DAILY
Status: DISCONTINUED | OUTPATIENT
Start: 2019-06-18 | End: 2019-06-25 | Stop reason: HOSPADM

## 2019-06-17 RX ORDER — ASPIRIN 81 MG/1
81 TABLET ORAL DAILY
Status: DISCONTINUED | OUTPATIENT
Start: 2019-06-18 | End: 2019-06-21 | Stop reason: HOSPADM

## 2019-06-17 RX ORDER — AMLODIPINE BESYLATE 10 MG/1
10 TABLET ORAL DAILY
Status: DISCONTINUED | OUTPATIENT
Start: 2019-06-18 | End: 2019-06-20

## 2019-06-17 RX ORDER — ONDANSETRON 2 MG/ML
4 INJECTION INTRAMUSCULAR; INTRAVENOUS EVERY 6 HOURS PRN
Status: DISCONTINUED | OUTPATIENT
Start: 2019-06-17 | End: 2019-06-21 | Stop reason: HOSPADM

## 2019-06-17 RX ORDER — ASPIRIN 81 MG/1
324 TABLET, CHEWABLE ORAL ONCE
Status: COMPLETED | OUTPATIENT
Start: 2019-06-17 | End: 2019-06-17

## 2019-06-17 RX ADMIN — PRAVASTATIN SODIUM 40 MG: 40 TABLET ORAL at 18:26

## 2019-06-17 RX ADMIN — HEPARIN SODIUM AND DEXTROSE 18 UNITS/KG/HR: 10000; 5 INJECTION INTRAVENOUS at 18:31

## 2019-06-17 RX ADMIN — TAMSULOSIN HYDROCHLORIDE 0.4 MG: 0.4 CAPSULE ORAL at 18:28

## 2019-06-17 RX ADMIN — LATANOPROST 1 DROP: 50 SOLUTION OPHTHALMIC at 22:19

## 2019-06-17 RX ADMIN — METOPROLOL TARTRATE 25 MG: 25 TABLET ORAL at 22:19

## 2019-06-17 RX ADMIN — ASPIRIN 81 MG 324 MG: 81 TABLET ORAL at 08:22

## 2019-06-17 RX ADMIN — MIDAZOLAM HYDROCHLORIDE 2 MG: 1 INJECTION, SOLUTION INTRAMUSCULAR; INTRAVENOUS at 11:30

## 2019-06-17 RX ADMIN — NITROGLYCERIN 200 MCG: 20 INJECTION INTRAVENOUS at 11:39

## 2019-06-17 RX ADMIN — HEPARIN SODIUM 4000 UNITS: 1000 INJECTION INTRAVENOUS; SUBCUTANEOUS at 11:40

## 2019-06-17 RX ADMIN — INSULIN LISPRO 1 UNITS: 100 INJECTION, SOLUTION INTRAVENOUS; SUBCUTANEOUS at 18:25

## 2019-06-17 RX ADMIN — FENTANYL CITRATE 50 MCG: 50 INJECTION INTRAMUSCULAR; INTRAVENOUS at 11:30

## 2019-06-17 RX ADMIN — IODIXANOL 30 ML: 320 INJECTION, SOLUTION INTRAVASCULAR at 11:56

## 2019-06-17 RX ADMIN — INSULIN GLARGINE 75 UNITS: 100 INJECTION, SOLUTION SUBCUTANEOUS at 08:22

## 2019-06-17 RX ADMIN — SODIUM CHLORIDE 100 ML/HR: 0.9 INJECTION, SOLUTION INTRAVENOUS at 21:57

## 2019-06-17 RX ADMIN — SODIUM CHLORIDE 125 ML/HR: 0.9 INJECTION, SOLUTION INTRAVENOUS at 18:31

## 2019-06-17 RX ADMIN — OXYCODONE HYDROCHLORIDE AND ACETAMINOPHEN 1000 MG: 500 TABLET ORAL at 08:24

## 2019-06-17 RX ADMIN — ASPIRIN 81 MG: 81 TABLET, COATED ORAL at 08:23

## 2019-06-17 RX ADMIN — LIDOCAINE HYDROCHLORIDE ANHYDROUS 1 ML: 10 INJECTION, SOLUTION INFILTRATION at 11:37

## 2019-06-17 RX ADMIN — HEPARIN SODIUM 18 UNITS/KG/HR: 10000 INJECTION, SOLUTION INTRAVENOUS at 21:41

## 2019-06-17 RX ADMIN — FINASTERIDE 5 MG: 5 TABLET, FILM COATED ORAL at 08:22

## 2019-06-17 RX ADMIN — CLOPIDOGREL BISULFATE 75 MG: 75 TABLET ORAL at 08:23

## 2019-06-17 RX ADMIN — METOPROLOL TARTRATE 25 MG: 25 TABLET ORAL at 08:24

## 2019-06-17 RX ADMIN — AMLODIPINE BESYLATE 10 MG: 10 TABLET ORAL at 08:23

## 2019-06-17 RX ADMIN — PANTOPRAZOLE SODIUM 40 MG: 40 TABLET, DELAYED RELEASE ORAL at 05:40

## 2019-06-17 RX ADMIN — VERAPAMIL HYDROCHLORIDE 2.5 MG: 2.5 INJECTION, SOLUTION INTRAVENOUS at 11:40

## 2019-06-17 RX ADMIN — SODIUM CHLORIDE 125 ML/HR: 0.9 INJECTION, SOLUTION INTRAVENOUS at 12:22

## 2019-06-17 RX ADMIN — INSULIN LISPRO 1 UNITS: 100 INJECTION, SOLUTION INTRAVENOUS; SUBCUTANEOUS at 22:19

## 2019-06-18 ENCOUNTER — APPOINTMENT (INPATIENT)
Dept: RADIOLOGY | Facility: HOSPITAL | Age: 74
DRG: 235 | End: 2019-06-18
Payer: MEDICARE

## 2019-06-18 ENCOUNTER — APPOINTMENT (INPATIENT)
Dept: NON INVASIVE DIAGNOSTICS | Facility: HOSPITAL | Age: 74
DRG: 235 | End: 2019-06-18
Payer: MEDICARE

## 2019-06-18 LAB
ANION GAP SERPL CALCULATED.3IONS-SCNC: 3 MMOL/L (ref 4–13)
APTT PPP: 118 SECONDS (ref 26–38)
APTT PPP: 57 SECONDS (ref 26–38)
APTT PPP: 57 SECONDS (ref 26–38)
APTT PPP: 59 SECONDS (ref 26–38)
ATRIAL RATE: 100 BPM
ATRIAL RATE: 69 BPM
ATRIAL RATE: 86 BPM
ATRIAL RATE: 89 BPM
ATRIAL RATE: 92 BPM
BUN SERPL-MCNC: 29 MG/DL (ref 5–25)
CALCIUM SERPL-MCNC: 8.7 MG/DL (ref 8.3–10.1)
CHLORIDE SERPL-SCNC: 107 MMOL/L (ref 100–108)
CO2 SERPL-SCNC: 29 MMOL/L (ref 21–32)
CREAT SERPL-MCNC: 2.14 MG/DL (ref 0.6–1.3)
GFR SERPL CREATININE-BSD FRML MDRD: 30 ML/MIN/1.73SQ M
GLUCOSE SERPL-MCNC: 101 MG/DL (ref 65–140)
GLUCOSE SERPL-MCNC: 161 MG/DL (ref 65–140)
GLUCOSE SERPL-MCNC: 191 MG/DL (ref 65–140)
GLUCOSE SERPL-MCNC: 68 MG/DL (ref 65–140)
GLUCOSE SERPL-MCNC: 84 MG/DL (ref 65–140)
GLUCOSE SERPL-MCNC: 88 MG/DL (ref 65–140)
INR PPP: 1.15 (ref 0.86–1.17)
P AXIS: 38 DEGREES
P AXIS: 52 DEGREES
P AXIS: 55 DEGREES
P AXIS: 61 DEGREES
P AXIS: 61 DEGREES
PHOSPHATE SERPL-MCNC: 3.2 MG/DL (ref 2.3–4.1)
POTASSIUM SERPL-SCNC: 3.7 MMOL/L (ref 3.5–5.3)
PR INTERVAL: 196 MS
PR INTERVAL: 210 MS
PR INTERVAL: 214 MS
PROTHROMBIN TIME: 14.9 SECONDS (ref 11.8–14.2)
QRS AXIS: 10 DEGREES
QRS AXIS: 12 DEGREES
QRS AXIS: 20 DEGREES
QRS AXIS: 21 DEGREES
QRS AXIS: 9 DEGREES
QRSD INTERVAL: 110 MS
QRSD INTERVAL: 110 MS
QRSD INTERVAL: 112 MS
QRSD INTERVAL: 112 MS
QRSD INTERVAL: 114 MS
QT INTERVAL: 358 MS
QT INTERVAL: 386 MS
QT INTERVAL: 388 MS
QT INTERVAL: 422 MS
QT INTERVAL: 448 MS
QTC INTERVAL: 461 MS
QTC INTERVAL: 472 MS
QTC INTERVAL: 477 MS
QTC INTERVAL: 480 MS
QTC INTERVAL: 504 MS
SODIUM SERPL-SCNC: 139 MMOL/L (ref 136–145)
T WAVE AXIS: 111 DEGREES
T WAVE AXIS: 72 DEGREES
T WAVE AXIS: 77 DEGREES
T WAVE AXIS: 79 DEGREES
T WAVE AXIS: 90 DEGREES
VENTRICULAR RATE: 100 BPM
VENTRICULAR RATE: 69 BPM
VENTRICULAR RATE: 86 BPM
VENTRICULAR RATE: 89 BPM
VENTRICULAR RATE: 92 BPM

## 2019-06-18 PROCEDURE — 85730 THROMBOPLASTIN TIME PARTIAL: CPT | Performed by: INTERNAL MEDICINE

## 2019-06-18 PROCEDURE — 71250 CT THORAX DX C-: CPT

## 2019-06-18 PROCEDURE — 99222 1ST HOSP IP/OBS MODERATE 55: CPT | Performed by: INTERNAL MEDICINE

## 2019-06-18 PROCEDURE — 99232 SBSQ HOSP IP/OBS MODERATE 35: CPT | Performed by: PHYSICIAN ASSISTANT

## 2019-06-18 PROCEDURE — 82948 REAGENT STRIP/BLOOD GLUCOSE: CPT

## 2019-06-18 PROCEDURE — 99232 SBSQ HOSP IP/OBS MODERATE 35: CPT | Performed by: INTERNAL MEDICINE

## 2019-06-18 PROCEDURE — 93971 EXTREMITY STUDY: CPT

## 2019-06-18 PROCEDURE — 87081 CULTURE SCREEN ONLY: CPT | Performed by: PHYSICIAN ASSISTANT

## 2019-06-18 PROCEDURE — 84100 ASSAY OF PHOSPHORUS: CPT | Performed by: INTERNAL MEDICINE

## 2019-06-18 PROCEDURE — 85730 THROMBOPLASTIN TIME PARTIAL: CPT | Performed by: PHYSICIAN ASSISTANT

## 2019-06-18 PROCEDURE — 93880 EXTRACRANIAL BILAT STUDY: CPT | Performed by: SURGERY

## 2019-06-18 PROCEDURE — 93880 EXTRACRANIAL BILAT STUDY: CPT

## 2019-06-18 PROCEDURE — 93010 ELECTROCARDIOGRAM REPORT: CPT | Performed by: INTERNAL MEDICINE

## 2019-06-18 PROCEDURE — 99223 1ST HOSP IP/OBS HIGH 75: CPT | Performed by: PHYSICIAN ASSISTANT

## 2019-06-18 PROCEDURE — 80048 BASIC METABOLIC PNL TOTAL CA: CPT | Performed by: INTERNAL MEDICINE

## 2019-06-18 PROCEDURE — 85610 PROTHROMBIN TIME: CPT | Performed by: PHYSICIAN ASSISTANT

## 2019-06-18 RX ORDER — CHLORHEXIDINE GLUCONATE 0.12 MG/ML
15 RINSE ORAL ONCE
Status: DISCONTINUED | OUTPATIENT
Start: 2019-06-18 | End: 2019-06-19

## 2019-06-18 RX ORDER — HEPARIN SODIUM 10000 [USP'U]/100ML
3-20 INJECTION, SOLUTION INTRAVENOUS
Status: DISCONTINUED | OUTPATIENT
Start: 2019-06-18 | End: 2019-06-21 | Stop reason: HOSPADM

## 2019-06-18 RX ORDER — SODIUM CHLORIDE, SODIUM GLUCONATE, SODIUM ACETATE, POTASSIUM CHLORIDE, MAGNESIUM CHLORIDE, SODIUM PHOSPHATE, DIBASIC, AND POTASSIUM PHOSPHATE .53; .5; .37; .037; .03; .012; .00082 G/100ML; G/100ML; G/100ML; G/100ML; G/100ML; G/100ML; G/100ML
50 INJECTION, SOLUTION INTRAVENOUS CONTINUOUS
Status: DISCONTINUED | OUTPATIENT
Start: 2019-06-18 | End: 2019-06-19

## 2019-06-18 RX ORDER — HEPARIN SODIUM 1000 [USP'U]/ML
2000 INJECTION, SOLUTION INTRAVENOUS; SUBCUTANEOUS AS NEEDED
Status: DISCONTINUED | OUTPATIENT
Start: 2019-06-18 | End: 2019-06-21 | Stop reason: HOSPADM

## 2019-06-18 RX ORDER — HEPARIN SODIUM 1000 [USP'U]/ML
4000 INJECTION, SOLUTION INTRAVENOUS; SUBCUTANEOUS AS NEEDED
Status: DISCONTINUED | OUTPATIENT
Start: 2019-06-18 | End: 2019-06-21 | Stop reason: HOSPADM

## 2019-06-18 RX ORDER — HEPARIN SODIUM 10000 [USP'U]/100ML
3-20 INJECTION, SOLUTION INTRAVENOUS
Status: DISCONTINUED | OUTPATIENT
Start: 2019-06-18 | End: 2019-06-18

## 2019-06-18 RX ADMIN — HEPARIN SODIUM 4200 UNITS: 1000 INJECTION INTRAVENOUS; SUBCUTANEOUS at 01:10

## 2019-06-18 RX ADMIN — TAMSULOSIN HYDROCHLORIDE 0.4 MG: 0.4 CAPSULE ORAL at 16:39

## 2019-06-18 RX ADMIN — PRAVASTATIN SODIUM 40 MG: 40 TABLET ORAL at 16:39

## 2019-06-18 RX ADMIN — INSULIN LISPRO 1 UNITS: 100 INJECTION, SOLUTION INTRAVENOUS; SUBCUTANEOUS at 21:00

## 2019-06-18 RX ADMIN — ASPIRIN 81 MG: 81 TABLET, COATED ORAL at 08:24

## 2019-06-18 RX ADMIN — METOPROLOL TARTRATE 25 MG: 25 TABLET ORAL at 08:25

## 2019-06-18 RX ADMIN — INSULIN LISPRO 8 UNITS: 100 INJECTION, SOLUTION INTRAVENOUS; SUBCUTANEOUS at 11:46

## 2019-06-18 RX ADMIN — PANTOPRAZOLE SODIUM 40 MG: 40 TABLET, DELAYED RELEASE ORAL at 06:54

## 2019-06-18 RX ADMIN — METOPROLOL TARTRATE 25 MG: 25 TABLET ORAL at 20:58

## 2019-06-18 RX ADMIN — AMLODIPINE BESYLATE 10 MG: 10 TABLET ORAL at 08:24

## 2019-06-18 RX ADMIN — POLYETHYLENE GLYCOL 3350 17 G: 17 POWDER, FOR SOLUTION ORAL at 19:54

## 2019-06-18 RX ADMIN — OXYCODONE HYDROCHLORIDE AND ACETAMINOPHEN 1000 MG: 500 TABLET ORAL at 08:24

## 2019-06-18 RX ADMIN — SODIUM CHLORIDE, SODIUM GLUCONATE, SODIUM ACETATE, POTASSIUM CHLORIDE, MAGNESIUM CHLORIDE, SODIUM PHOSPHATE, DIBASIC, AND POTASSIUM PHOSPHATE 50 ML/HR: .53; .5; .37; .037; .03; .012; .00082 INJECTION, SOLUTION INTRAVENOUS at 13:36

## 2019-06-18 RX ADMIN — HEPARIN SODIUM 20 UNITS/KG/HR: 10000 INJECTION, SOLUTION INTRAVENOUS at 08:24

## 2019-06-18 RX ADMIN — HEPARIN SODIUM 2000 UNITS: 1000 INJECTION INTRAVENOUS; SUBCUTANEOUS at 20:58

## 2019-06-18 RX ADMIN — INSULIN LISPRO 1 UNITS: 100 INJECTION, SOLUTION INTRAVENOUS; SUBCUTANEOUS at 11:46

## 2019-06-18 RX ADMIN — HEPARIN SODIUM 2000 UNITS: 1000 INJECTION INTRAVENOUS; SUBCUTANEOUS at 13:50

## 2019-06-18 RX ADMIN — FINASTERIDE 5 MG: 5 TABLET, FILM COATED ORAL at 08:24

## 2019-06-18 RX ADMIN — LATANOPROST 1 DROP: 50 SOLUTION OPHTHALMIC at 21:00

## 2019-06-18 RX ADMIN — INSULIN GLARGINE 75 UNITS: 100 INJECTION, SOLUTION SUBCUTANEOUS at 08:24

## 2019-06-19 PROBLEM — I25.10 DISEASE OF CARDIOVASCULAR SYSTEM: Status: ACTIVE | Noted: 2019-06-17

## 2019-06-19 LAB
ANION GAP SERPL CALCULATED.3IONS-SCNC: 6 MMOL/L (ref 4–13)
APTT PPP: 70 SECONDS (ref 26–38)
APTT PPP: 86 SECONDS (ref 26–38)
APTT PPP: 93 SECONDS (ref 26–38)
BUN SERPL-MCNC: 31 MG/DL (ref 5–25)
CALCIUM SERPL-MCNC: 8.6 MG/DL (ref 8.3–10.1)
CHLORIDE SERPL-SCNC: 108 MMOL/L (ref 100–108)
CO2 SERPL-SCNC: 27 MMOL/L (ref 21–32)
CREAT SERPL-MCNC: 2.12 MG/DL (ref 0.6–1.3)
ERYTHROCYTE [DISTWIDTH] IN BLOOD BY AUTOMATED COUNT: 14.6 % (ref 11.6–15.1)
GFR SERPL CREATININE-BSD FRML MDRD: 30 ML/MIN/1.73SQ M
GLUCOSE SERPL-MCNC: 130 MG/DL (ref 65–140)
GLUCOSE SERPL-MCNC: 162 MG/DL (ref 65–140)
GLUCOSE SERPL-MCNC: 174 MG/DL (ref 65–140)
GLUCOSE SERPL-MCNC: 210 MG/DL (ref 65–140)
GLUCOSE SERPL-MCNC: 216 MG/DL (ref 65–140)
HCT VFR BLD AUTO: 33.2 % (ref 36.5–49.3)
HGB BLD-MCNC: 10.3 G/DL (ref 12–17)
MCH RBC QN AUTO: 29.3 PG (ref 26.8–34.3)
MCHC RBC AUTO-ENTMCNC: 31 G/DL (ref 31.4–37.4)
MCV RBC AUTO: 95 FL (ref 82–98)
MRSA NOSE QL CULT: NORMAL
PLATELET # BLD AUTO: 242 THOUSANDS/UL (ref 149–390)
PMV BLD AUTO: 10.7 FL (ref 8.9–12.7)
POTASSIUM SERPL-SCNC: 3.9 MMOL/L (ref 3.5–5.3)
RBC # BLD AUTO: 3.51 MILLION/UL (ref 3.88–5.62)
SODIUM SERPL-SCNC: 141 MMOL/L (ref 136–145)
WBC # BLD AUTO: 6.04 THOUSAND/UL (ref 4.31–10.16)

## 2019-06-19 PROCEDURE — 85730 THROMBOPLASTIN TIME PARTIAL: CPT | Performed by: INTERNAL MEDICINE

## 2019-06-19 PROCEDURE — 99232 SBSQ HOSP IP/OBS MODERATE 35: CPT | Performed by: INTERNAL MEDICINE

## 2019-06-19 PROCEDURE — 82948 REAGENT STRIP/BLOOD GLUCOSE: CPT

## 2019-06-19 PROCEDURE — 93971 EXTREMITY STUDY: CPT | Performed by: SURGERY

## 2019-06-19 PROCEDURE — 85027 COMPLETE CBC AUTOMATED: CPT | Performed by: INTERNAL MEDICINE

## 2019-06-19 PROCEDURE — 99231 SBSQ HOSP IP/OBS SF/LOW 25: CPT | Performed by: PHYSICIAN ASSISTANT

## 2019-06-19 PROCEDURE — 80048 BASIC METABOLIC PNL TOTAL CA: CPT | Performed by: INTERNAL MEDICINE

## 2019-06-19 RX ORDER — CHLORHEXIDINE GLUCONATE 0.12 MG/ML
15 RINSE ORAL EVERY 12 HOURS SCHEDULED
Status: DISCONTINUED | OUTPATIENT
Start: 2019-06-19 | End: 2019-06-21 | Stop reason: HOSPADM

## 2019-06-19 RX ADMIN — HEPARIN SODIUM 21 UNITS/KG/HR: 10000 INJECTION, SOLUTION INTRAVENOUS at 00:30

## 2019-06-19 RX ADMIN — CHLORHEXIDINE GLUCONATE 0.12% ORAL RINSE 15 ML: 1.2 LIQUID ORAL at 21:21

## 2019-06-19 RX ADMIN — OXYCODONE HYDROCHLORIDE AND ACETAMINOPHEN 1000 MG: 500 TABLET ORAL at 08:26

## 2019-06-19 RX ADMIN — LATANOPROST 1 DROP: 50 SOLUTION OPHTHALMIC at 21:21

## 2019-06-19 RX ADMIN — INSULIN LISPRO 1 UNITS: 100 INJECTION, SOLUTION INTRAVENOUS; SUBCUTANEOUS at 21:20

## 2019-06-19 RX ADMIN — METOPROLOL TARTRATE 25 MG: 25 TABLET ORAL at 08:26

## 2019-06-19 RX ADMIN — PANTOPRAZOLE SODIUM 40 MG: 40 TABLET, DELAYED RELEASE ORAL at 06:25

## 2019-06-19 RX ADMIN — Medication: at 21:19

## 2019-06-19 RX ADMIN — CHLORHEXIDINE GLUCONATE 0.12% ORAL RINSE 15 ML: 1.2 LIQUID ORAL at 12:04

## 2019-06-19 RX ADMIN — Medication 1 APPLICATION: at 12:04

## 2019-06-19 RX ADMIN — FINASTERIDE 5 MG: 5 TABLET, FILM COATED ORAL at 08:26

## 2019-06-19 RX ADMIN — METOPROLOL TARTRATE 25 MG: 25 TABLET ORAL at 21:18

## 2019-06-19 RX ADMIN — INSULIN LISPRO 2 UNITS: 100 INJECTION, SOLUTION INTRAVENOUS; SUBCUTANEOUS at 16:18

## 2019-06-19 RX ADMIN — PRAVASTATIN SODIUM 40 MG: 40 TABLET ORAL at 16:18

## 2019-06-19 RX ADMIN — ASPIRIN 81 MG: 81 TABLET, COATED ORAL at 08:26

## 2019-06-19 RX ADMIN — AMLODIPINE BESYLATE 10 MG: 10 TABLET ORAL at 08:27

## 2019-06-19 RX ADMIN — INSULIN LISPRO 1 UNITS: 100 INJECTION, SOLUTION INTRAVENOUS; SUBCUTANEOUS at 06:26

## 2019-06-19 RX ADMIN — TAMSULOSIN HYDROCHLORIDE 0.4 MG: 0.4 CAPSULE ORAL at 16:18

## 2019-06-19 RX ADMIN — HEPARIN SODIUM 19 UNITS/KG/HR: 10000 INJECTION, SOLUTION INTRAVENOUS at 14:35

## 2019-06-19 RX ADMIN — INSULIN GLARGINE 75 UNITS: 100 INJECTION, SOLUTION SUBCUTANEOUS at 09:24

## 2019-06-20 ENCOUNTER — APPOINTMENT (OUTPATIENT)
Dept: PULMONOLOGY | Facility: HOSPITAL | Age: 74
DRG: 235 | End: 2019-06-20
Payer: MEDICARE

## 2019-06-20 ENCOUNTER — ANESTHESIA EVENT (INPATIENT)
Dept: PERIOP | Facility: HOSPITAL | Age: 74
DRG: 235 | End: 2019-06-20
Payer: MEDICARE

## 2019-06-20 PROBLEM — J96.01 ACUTE RESPIRATORY FAILURE WITH HYPOXIA (HCC): Status: RESOLVED | Noted: 2019-06-15 | Resolved: 2019-06-20

## 2019-06-20 LAB
ABO GROUP BLD: NORMAL
APTT PPP: 64 SECONDS (ref 26–38)
APTT PPP: 64 SECONDS (ref 26–38)
BLD GP AB SCN SERPL QL: NEGATIVE
GLUCOSE SERPL-MCNC: 162 MG/DL (ref 65–140)
GLUCOSE SERPL-MCNC: 172 MG/DL (ref 65–140)
GLUCOSE SERPL-MCNC: 229 MG/DL (ref 65–140)
GLUCOSE SERPL-MCNC: 258 MG/DL (ref 65–140)
RH BLD: POSITIVE
SPECIMEN EXPIRATION DATE: NORMAL

## 2019-06-20 PROCEDURE — 86850 RBC ANTIBODY SCREEN: CPT | Performed by: THORACIC SURGERY (CARDIOTHORACIC VASCULAR SURGERY)

## 2019-06-20 PROCEDURE — 82948 REAGENT STRIP/BLOOD GLUCOSE: CPT

## 2019-06-20 PROCEDURE — 94010 BREATHING CAPACITY TEST: CPT | Performed by: INTERNAL MEDICINE

## 2019-06-20 PROCEDURE — NC001 PR NO CHARGE: Performed by: PHYSICIAN ASSISTANT

## 2019-06-20 PROCEDURE — 99232 SBSQ HOSP IP/OBS MODERATE 35: CPT | Performed by: INTERNAL MEDICINE

## 2019-06-20 PROCEDURE — 94726 PLETHYSMOGRAPHY LUNG VOLUMES: CPT

## 2019-06-20 PROCEDURE — 86920 COMPATIBILITY TEST SPIN: CPT

## 2019-06-20 PROCEDURE — 85730 THROMBOPLASTIN TIME PARTIAL: CPT | Performed by: INTERNAL MEDICINE

## 2019-06-20 PROCEDURE — 94726 PLETHYSMOGRAPHY LUNG VOLUMES: CPT | Performed by: INTERNAL MEDICINE

## 2019-06-20 PROCEDURE — 94729 DIFFUSING CAPACITY: CPT

## 2019-06-20 PROCEDURE — 86900 BLOOD TYPING SEROLOGIC ABO: CPT | Performed by: THORACIC SURGERY (CARDIOTHORACIC VASCULAR SURGERY)

## 2019-06-20 PROCEDURE — 94760 N-INVAS EAR/PLS OXIMETRY 1: CPT

## 2019-06-20 PROCEDURE — 86901 BLOOD TYPING SEROLOGIC RH(D): CPT | Performed by: THORACIC SURGERY (CARDIOTHORACIC VASCULAR SURGERY)

## 2019-06-20 PROCEDURE — 94729 DIFFUSING CAPACITY: CPT | Performed by: INTERNAL MEDICINE

## 2019-06-20 PROCEDURE — 94010 BREATHING CAPACITY TEST: CPT

## 2019-06-20 RX ORDER — SODIUM CHLORIDE, SODIUM GLUCONATE, SODIUM ACETATE, POTASSIUM CHLORIDE, MAGNESIUM CHLORIDE, SODIUM PHOSPHATE, DIBASIC, AND POTASSIUM PHOSPHATE .53; .5; .37; .037; .03; .012; .00082 G/100ML; G/100ML; G/100ML; G/100ML; G/100ML; G/100ML; G/100ML
50 INJECTION, SOLUTION INTRAVENOUS CONTINUOUS
Status: DISCONTINUED | OUTPATIENT
Start: 2019-06-20 | End: 2019-06-21 | Stop reason: HOSPADM

## 2019-06-20 RX ORDER — HEPARIN SODIUM 1000 [USP'U]/ML
400 INJECTION, SOLUTION INTRAVENOUS; SUBCUTANEOUS ONCE
Status: CANCELLED | OUTPATIENT
Start: 2019-06-21

## 2019-06-20 RX ORDER — HEPARIN SODIUM 1000 [USP'U]/ML
10000 INJECTION, SOLUTION INTRAVENOUS; SUBCUTANEOUS ONCE
Status: CANCELLED | OUTPATIENT
Start: 2019-06-21

## 2019-06-20 RX ORDER — AMLODIPINE BESYLATE 10 MG/1
10 TABLET ORAL DAILY
Status: DISCONTINUED | OUTPATIENT
Start: 2019-06-21 | End: 2019-06-21 | Stop reason: HOSPADM

## 2019-06-20 RX ORDER — CHLORHEXIDINE GLUCONATE 0.12 MG/ML
15 RINSE ORAL ONCE
Status: DISCONTINUED | OUTPATIENT
Start: 2019-06-20 | End: 2019-06-21

## 2019-06-20 RX ORDER — CHLORHEXIDINE GLUCONATE 0.12 MG/ML
15 RINSE ORAL ONCE
Status: CANCELLED | OUTPATIENT
Start: 2019-06-21

## 2019-06-20 RX ADMIN — INSULIN LISPRO 1 UNITS: 100 INJECTION, SOLUTION INTRAVENOUS; SUBCUTANEOUS at 11:53

## 2019-06-20 RX ADMIN — CHLORHEXIDINE GLUCONATE 0.12% ORAL RINSE 15 ML: 1.2 LIQUID ORAL at 21:45

## 2019-06-20 RX ADMIN — INSULIN GLARGINE 75 UNITS: 100 INJECTION, SOLUTION SUBCUTANEOUS at 08:45

## 2019-06-20 RX ADMIN — Medication 1 APPLICATION: at 21:46

## 2019-06-20 RX ADMIN — OXYCODONE HYDROCHLORIDE AND ACETAMINOPHEN 1000 MG: 500 TABLET ORAL at 08:42

## 2019-06-20 RX ADMIN — HEPARIN SODIUM 19 UNITS/KG/HR: 10000 INJECTION, SOLUTION INTRAVENOUS at 21:00

## 2019-06-20 RX ADMIN — Medication 1 APPLICATION: at 08:43

## 2019-06-20 RX ADMIN — TAMSULOSIN HYDROCHLORIDE 0.4 MG: 0.4 CAPSULE ORAL at 16:31

## 2019-06-20 RX ADMIN — INSULIN LISPRO 1 UNITS: 100 INJECTION, SOLUTION INTRAVENOUS; SUBCUTANEOUS at 06:02

## 2019-06-20 RX ADMIN — PANTOPRAZOLE SODIUM 40 MG: 40 TABLET, DELAYED RELEASE ORAL at 05:16

## 2019-06-20 RX ADMIN — METOPROLOL TARTRATE 25 MG: 25 TABLET ORAL at 21:46

## 2019-06-20 RX ADMIN — ASPIRIN 81 MG: 81 TABLET, COATED ORAL at 08:42

## 2019-06-20 RX ADMIN — CHLORHEXIDINE GLUCONATE 0.12% ORAL RINSE 15 ML: 1.2 LIQUID ORAL at 08:43

## 2019-06-20 RX ADMIN — LATANOPROST 1 DROP: 50 SOLUTION OPHTHALMIC at 21:48

## 2019-06-20 RX ADMIN — FINASTERIDE 5 MG: 5 TABLET, FILM COATED ORAL at 08:42

## 2019-06-20 RX ADMIN — INSULIN LISPRO 2 UNITS: 100 INJECTION, SOLUTION INTRAVENOUS; SUBCUTANEOUS at 16:31

## 2019-06-20 RX ADMIN — SODIUM CHLORIDE, SODIUM GLUCONATE, SODIUM ACETATE, POTASSIUM CHLORIDE, MAGNESIUM CHLORIDE, SODIUM PHOSPHATE, DIBASIC, AND POTASSIUM PHOSPHATE 50 ML/HR: .53; .5; .37; .037; .03; .012; .00082 INJECTION, SOLUTION INTRAVENOUS at 23:59

## 2019-06-20 RX ADMIN — INSULIN LISPRO 2 UNITS: 100 INJECTION, SOLUTION INTRAVENOUS; SUBCUTANEOUS at 21:48

## 2019-06-20 RX ADMIN — PRAVASTATIN SODIUM 40 MG: 40 TABLET ORAL at 16:31

## 2019-06-20 RX ADMIN — METOPROLOL TARTRATE 25 MG: 25 TABLET ORAL at 08:42

## 2019-06-20 RX ADMIN — HEPARIN SODIUM 19 UNITS/KG/HR: 10000 INJECTION, SOLUTION INTRAVENOUS at 05:14

## 2019-06-20 RX ADMIN — AMLODIPINE BESYLATE 10 MG: 10 TABLET ORAL at 08:42

## 2019-06-21 ENCOUNTER — APPOINTMENT (INPATIENT)
Dept: RADIOLOGY | Facility: HOSPITAL | Age: 74
DRG: 235 | End: 2019-06-21
Payer: MEDICARE

## 2019-06-21 ENCOUNTER — APPOINTMENT (INPATIENT)
Dept: NON INVASIVE DIAGNOSTICS | Facility: HOSPITAL | Age: 74
DRG: 235 | End: 2019-06-21
Payer: MEDICARE

## 2019-06-21 ENCOUNTER — ANESTHESIA (INPATIENT)
Dept: PERIOP | Facility: HOSPITAL | Age: 74
DRG: 235 | End: 2019-06-21
Payer: MEDICARE

## 2019-06-21 PROBLEM — R07.9 CHEST PAIN, CARDIAC: Status: RESOLVED | Noted: 2019-06-17 | Resolved: 2019-06-21

## 2019-06-21 PROBLEM — J95.2 ACUTE POSTOPERATIVE PULMONARY INSUFFICIENCY (HCC): Status: ACTIVE | Noted: 2019-06-21

## 2019-06-21 PROBLEM — R77.8 ELEVATED TROPONIN: Status: RESOLVED | Noted: 2019-06-15 | Resolved: 2019-06-21

## 2019-06-21 PROBLEM — Z95.1 S/P CABG (CORONARY ARTERY BYPASS GRAFT): Status: ACTIVE | Noted: 2019-06-21

## 2019-06-21 PROBLEM — E87.8 HYPERCHLOREMIA: Status: ACTIVE | Noted: 2019-06-21

## 2019-06-21 PROBLEM — R79.89 ELEVATED TROPONIN: Status: RESOLVED | Noted: 2019-06-15 | Resolved: 2019-06-21

## 2019-06-21 PROBLEM — D50.0 BLOOD LOSS ANEMIA: Status: ACTIVE | Noted: 2019-06-21

## 2019-06-21 LAB
ANCILLARY VALUES: ABNORMAL
ANION GAP SERPL CALCULATED.3IONS-SCNC: 4 MMOL/L (ref 4–13)
ANION GAP SERPL CALCULATED.3IONS-SCNC: 6 MMOL/L (ref 4–13)
APTT PPP: 28 SECONDS (ref 26–38)
APTT PPP: 64 SECONDS (ref 26–38)
BASE EXCESS BLDA CALC-SCNC: -1 MMOL/L (ref -2–3)
BASE EXCESS BLDA CALC-SCNC: -2 MMOL/L (ref -2–3)
BASE EXCESS BLDA CALC-SCNC: -4 MMOL/L (ref -2–3)
BASE EXCESS BLDA CALC-SCNC: 0 MMOL/L (ref -2–3)
BASE EXCESS BLDA CALC-SCNC: 2 MMOL/L (ref -2–3)
BASE EXCESS BLDA CALC-SCNC: 2 MMOL/L (ref -2–3)
BASE EXCESS BLDA CALC-SCNC: 3 MMOL/L (ref -2–3)
BASE EXCESS BLDA CALC-SCNC: 4 MMOL/L (ref -2–3)
BUN SERPL-MCNC: 30 MG/DL (ref 5–25)
BUN SERPL-MCNC: 35 MG/DL (ref 5–25)
CA-I BLD-SCNC: 1.08 MMOL/L (ref 1.12–1.32)
CA-I BLD-SCNC: 1.11 MMOL/L (ref 1.12–1.32)
CA-I BLD-SCNC: 1.12 MMOL/L (ref 1.12–1.32)
CA-I BLD-SCNC: 1.13 MMOL/L (ref 1.12–1.32)
CA-I BLD-SCNC: 1.17 MMOL/L (ref 1.12–1.32)
CA-I BLD-SCNC: 1.18 MMOL/L (ref 1.12–1.32)
CA-I BLD-SCNC: 1.22 MMOL/L (ref 1.12–1.32)
CA-I BLD-SCNC: 1.26 MMOL/L (ref 1.12–1.32)
CALCIUM SERPL-MCNC: 7.9 MG/DL (ref 8.3–10.1)
CALCIUM SERPL-MCNC: 9.3 MG/DL (ref 8.3–10.1)
CHLORIDE SERPL-SCNC: 107 MMOL/L (ref 100–108)
CHLORIDE SERPL-SCNC: 111 MMOL/L (ref 100–108)
CO2 SERPL-SCNC: 24 MMOL/L (ref 21–32)
CO2 SERPL-SCNC: 27 MMOL/L (ref 21–32)
CREAT SERPL-MCNC: 2 MG/DL (ref 0.6–1.3)
CREAT SERPL-MCNC: 2.01 MG/DL (ref 0.6–1.3)
DS:DELIVERY SYSTEM: AC
ERYTHROCYTE [DISTWIDTH] IN BLOOD BY AUTOMATED COUNT: 14.5 % (ref 11.6–15.1)
FIBRINOGEN PPP-MCNC: 245 MG/DL (ref 227–495)
FIO2 GAS DIL.REBREATH: 100 L
GFR SERPL CREATININE-BSD FRML MDRD: 32 ML/MIN/1.73SQ M
GFR SERPL CREATININE-BSD FRML MDRD: 32 ML/MIN/1.73SQ M
GLUCOSE SERPL-MCNC: 118 MG/DL (ref 65–140)
GLUCOSE SERPL-MCNC: 120 MG/DL (ref 65–140)
GLUCOSE SERPL-MCNC: 122 MG/DL (ref 65–140)
GLUCOSE SERPL-MCNC: 141 MG/DL (ref 65–140)
GLUCOSE SERPL-MCNC: 142 MG/DL (ref 65–140)
GLUCOSE SERPL-MCNC: 147 MG/DL (ref 65–140)
GLUCOSE SERPL-MCNC: 150 MG/DL (ref 65–140)
GLUCOSE SERPL-MCNC: 152 MG/DL (ref 65–140)
GLUCOSE SERPL-MCNC: 154 MG/DL (ref 65–140)
GLUCOSE SERPL-MCNC: 155 MG/DL (ref 65–140)
GLUCOSE SERPL-MCNC: 171 MG/DL (ref 65–140)
GLUCOSE SERPL-MCNC: 176 MG/DL (ref 65–140)
GLUCOSE SERPL-MCNC: 200 MG/DL (ref 65–140)
GLUCOSE SERPL-MCNC: 202 MG/DL (ref 65–140)
GLUCOSE SERPL-MCNC: 210 MG/DL (ref 65–140)
HCO3 BLDA-SCNC: 20.7 MMOL/L (ref 22–28)
HCO3 BLDA-SCNC: 22.9 MMOL/L (ref 22–28)
HCO3 BLDA-SCNC: 23.7 MMOL/L (ref 22–28)
HCO3 BLDA-SCNC: 25.2 MMOL/L (ref 22–28)
HCO3 BLDA-SCNC: 26.8 MMOL/L (ref 22–28)
HCO3 BLDA-SCNC: 26.8 MMOL/L (ref 24–30)
HCO3 BLDA-SCNC: 28.5 MMOL/L (ref 22–28)
HCO3 BLDA-SCNC: 29.7 MMOL/L (ref 22–28)
HCT VFR BLD AUTO: 28.7 % (ref 36.5–49.3)
HCT VFR BLD AUTO: 30.2 % (ref 36.5–49.3)
HCT VFR BLD CALC: 26 % (ref 36.5–49.3)
HCT VFR BLD CALC: 27 % (ref 36.5–49.3)
HCT VFR BLD CALC: 28 % (ref 36.5–49.3)
HCT VFR BLD CALC: 29 % (ref 36.5–49.3)
HGB BLD-MCNC: 8.9 G/DL (ref 12–17)
HGB BLD-MCNC: 9.5 G/DL (ref 12–17)
HGB BLDA-MCNC: 8.8 G/DL (ref 12–17)
HGB BLDA-MCNC: 9.2 G/DL (ref 12–17)
HGB BLDA-MCNC: 9.5 G/DL (ref 12–17)
HGB BLDA-MCNC: 9.9 G/DL (ref 12–17)
INR PPP: 1.48 (ref 0.86–1.17)
KCT BLD-ACNC: 104 SEC (ref 89–137)
KCT BLD-ACNC: 104 SEC (ref 89–137)
KCT BLD-ACNC: 115 SEC (ref 89–137)
KCT BLD-ACNC: 412 SEC (ref 89–137)
KCT BLD-ACNC: 437 SEC (ref 89–137)
KCT BLD-ACNC: 491 SEC (ref 89–137)
KCT BLD-ACNC: 497 SEC (ref 89–137)
KCT BLD-ACNC: 520 SEC (ref 89–137)
KCT BLD-ACNC: 537 SEC (ref 89–137)
MCH RBC QN AUTO: 29.5 PG (ref 26.8–34.3)
MCHC RBC AUTO-ENTMCNC: 31 G/DL (ref 31.4–37.4)
MCV RBC AUTO: 95 FL (ref 82–98)
PCO2 BLD: 22 MMOL/L (ref 21–32)
PCO2 BLD: 24 MMOL/L (ref 21–32)
PCO2 BLD: 25 MMOL/L (ref 21–32)
PCO2 BLD: 26 MMOL/L (ref 21–32)
PCO2 BLD: 28 MMOL/L (ref 21–32)
PCO2 BLD: 28 MMOL/L (ref 21–32)
PCO2 BLD: 30 MMOL/L (ref 21–32)
PCO2 BLD: 31 MMOL/L (ref 21–32)
PCO2 BLD: 35.8 MM HG (ref 36–44)
PCO2 BLD: 37 MM HG (ref 36–44)
PCO2 BLD: 38.5 MM HG (ref 36–44)
PCO2 BLD: 38.8 MM HG (ref 36–44)
PCO2 BLD: 40.7 MM HG (ref 36–44)
PCO2 BLD: 44.6 MM HG (ref 42–50)
PCO2 BLD: 50.3 MM HG (ref 36–44)
PCO2 BLD: 52 MM HG (ref 36–44)
PH BLD: 7.35 [PH] (ref 7.35–7.45)
PH BLD: 7.37 [PH] (ref 7.35–7.45)
PH BLD: 7.38 [PH] (ref 7.35–7.45)
PH BLD: 7.38 [PH] (ref 7.35–7.45)
PH BLD: 7.39 [PH] (ref 7.3–7.4)
PH BLD: 7.4 [PH] (ref 7.35–7.45)
PH BLD: 7.42 [PH] (ref 7.35–7.45)
PH BLD: 7.45 [PH] (ref 7.35–7.45)
PLATELET # BLD AUTO: 153 THOUSANDS/UL (ref 149–390)
PLATELET # BLD AUTO: 160 THOUSANDS/UL (ref 149–390)
PLATELET # BLD AUTO: 199 THOUSANDS/UL (ref 149–390)
PMV BLD AUTO: 10.7 FL (ref 8.9–12.7)
PMV BLD AUTO: 11.1 FL (ref 8.9–12.7)
PMV BLD AUTO: 11.2 FL (ref 8.9–12.7)
PO2 BLD: 273 MM HG (ref 75–129)
PO2 BLD: 295 MM HG (ref 75–129)
PO2 BLD: 41 MM HG (ref 35–45)
PO2 BLD: 77 MM HG (ref 75–129)
PO2 BLD: 79 MM HG (ref 75–129)
PO2 BLD: 90 MM HG (ref 75–129)
PO2 BLD: >400 MM HG (ref 75–129)
PO2 BLD: >400 MM HG (ref 75–129)
POTASSIUM BLD-SCNC: 3.7 MMOL/L (ref 3.5–5.3)
POTASSIUM BLD-SCNC: 3.7 MMOL/L (ref 3.5–5.3)
POTASSIUM BLD-SCNC: 3.9 MMOL/L (ref 3.5–5.3)
POTASSIUM BLD-SCNC: 4.3 MMOL/L (ref 3.5–5.3)
POTASSIUM BLD-SCNC: 4.3 MMOL/L (ref 3.5–5.3)
POTASSIUM BLD-SCNC: 4.4 MMOL/L (ref 3.5–5.3)
POTASSIUM BLD-SCNC: 4.6 MMOL/L (ref 3.5–5.3)
POTASSIUM BLD-SCNC: 4.8 MMOL/L (ref 3.5–5.3)
POTASSIUM SERPL-SCNC: 3.7 MMOL/L (ref 3.5–5.3)
POTASSIUM SERPL-SCNC: 4.1 MMOL/L (ref 3.5–5.3)
POTASSIUM SERPL-SCNC: 4.3 MMOL/L (ref 3.5–5.3)
PRESSURE SETTING: 5
PROTHROMBIN TIME: 18 SECONDS (ref 11.8–14.2)
RBC # BLD AUTO: 3.02 MILLION/UL (ref 3.88–5.62)
RESPIRATORY RATE: 16
SAO2 % BLD FROM PO2: 100 % (ref 95–98)
SAO2 % BLD FROM PO2: 100 % (ref 95–98)
SAO2 % BLD FROM PO2: 75 % (ref 95–98)
SAO2 % BLD FROM PO2: 95 % (ref 95–98)
SAO2 % BLD FROM PO2: 96 % (ref 95–98)
SAO2 % BLD FROM PO2: 97 % (ref 95–98)
SODIUM BLD-SCNC: 138 MMOL/L (ref 136–145)
SODIUM BLD-SCNC: 139 MMOL/L (ref 136–145)
SODIUM BLD-SCNC: 140 MMOL/L (ref 136–145)
SODIUM BLD-SCNC: 141 MMOL/L (ref 136–145)
SODIUM BLD-SCNC: 141 MMOL/L (ref 136–145)
SODIUM BLD-SCNC: 142 MMOL/L (ref 136–145)
SODIUM SERPL-SCNC: 138 MMOL/L (ref 136–145)
SODIUM SERPL-SCNC: 141 MMOL/L (ref 136–145)
SPECIMEN SOURCE: ABNORMAL
SPECIMEN SOURCE: NORMAL
VENT TYPE: ABNORMAL
VENTILATION VALUE: 550
WBC # BLD AUTO: 14.46 THOUSAND/UL (ref 4.31–10.16)

## 2019-06-21 PROCEDURE — 82948 REAGENT STRIP/BLOOD GLUCOSE: CPT

## 2019-06-21 PROCEDURE — 84132 ASSAY OF SERUM POTASSIUM: CPT | Performed by: PHYSICIAN ASSISTANT

## 2019-06-21 PROCEDURE — 93005 ELECTROCARDIOGRAM TRACING: CPT

## 2019-06-21 PROCEDURE — 94002 VENT MGMT INPAT INIT DAY: CPT

## 2019-06-21 PROCEDURE — 06BP4ZZ EXCISION OF RIGHT SAPHENOUS VEIN, PERCUTANEOUS ENDOSCOPIC APPROACH: ICD-10-PCS | Performed by: THORACIC SURGERY (CARDIOTHORACIC VASCULAR SURGERY)

## 2019-06-21 PROCEDURE — 85384 FIBRINOGEN ACTIVITY: CPT | Performed by: THORACIC SURGERY (CARDIOTHORACIC VASCULAR SURGERY)

## 2019-06-21 PROCEDURE — 85730 THROMBOPLASTIN TIME PARTIAL: CPT | Performed by: INTERNAL MEDICINE

## 2019-06-21 PROCEDURE — 85347 COAGULATION TIME ACTIVATED: CPT

## 2019-06-21 PROCEDURE — 85018 HEMOGLOBIN: CPT | Performed by: PHYSICIAN ASSISTANT

## 2019-06-21 PROCEDURE — 33519 CABG ARTERY-VEIN THREE: CPT | Performed by: THORACIC SURGERY (CARDIOTHORACIC VASCULAR SURGERY)

## 2019-06-21 PROCEDURE — 94760 N-INVAS EAR/PLS OXIMETRY 1: CPT

## 2019-06-21 PROCEDURE — 85027 COMPLETE CBC AUTOMATED: CPT | Performed by: THORACIC SURGERY (CARDIOTHORACIC VASCULAR SURGERY)

## 2019-06-21 PROCEDURE — 85014 HEMATOCRIT: CPT

## 2019-06-21 PROCEDURE — 33519 CABG ARTERY-VEIN THREE: CPT | Performed by: PHYSICIAN ASSISTANT

## 2019-06-21 PROCEDURE — 33533 CABG ARTERIAL SINGLE: CPT | Performed by: PHYSICIAN ASSISTANT

## 2019-06-21 PROCEDURE — 80048 BASIC METABOLIC PNL TOTAL CA: CPT | Performed by: INTERNAL MEDICINE

## 2019-06-21 PROCEDURE — 02HV33Z INSERTION OF INFUSION DEVICE INTO SUPERIOR VENA CAVA, PERCUTANEOUS APPROACH: ICD-10-PCS | Performed by: ANESTHESIOLOGY

## 2019-06-21 PROCEDURE — 33508 ENDOSCOPIC VEIN HARVEST: CPT | Performed by: THORACIC SURGERY (CARDIOTHORACIC VASCULAR SURGERY)

## 2019-06-21 PROCEDURE — 85610 PROTHROMBIN TIME: CPT | Performed by: THORACIC SURGERY (CARDIOTHORACIC VASCULAR SURGERY)

## 2019-06-21 PROCEDURE — 84132 ASSAY OF SERUM POTASSIUM: CPT

## 2019-06-21 PROCEDURE — 80048 BASIC METABOLIC PNL TOTAL CA: CPT | Performed by: PHYSICIAN ASSISTANT

## 2019-06-21 PROCEDURE — 82330 ASSAY OF CALCIUM: CPT

## 2019-06-21 PROCEDURE — 33533 CABG ARTERIAL SINGLE: CPT | Performed by: THORACIC SURGERY (CARDIOTHORACIC VASCULAR SURGERY)

## 2019-06-21 PROCEDURE — 93312 ECHO TRANSESOPHAGEAL: CPT

## 2019-06-21 PROCEDURE — 82803 BLOOD GASES ANY COMBINATION: CPT

## 2019-06-21 PROCEDURE — 71045 X-RAY EXAM CHEST 1 VIEW: CPT

## 2019-06-21 PROCEDURE — 85049 AUTOMATED PLATELET COUNT: CPT | Performed by: THORACIC SURGERY (CARDIOTHORACIC VASCULAR SURGERY)

## 2019-06-21 PROCEDURE — 82947 ASSAY GLUCOSE BLOOD QUANT: CPT

## 2019-06-21 PROCEDURE — 99233 SBSQ HOSP IP/OBS HIGH 50: CPT | Performed by: ANESTHESIOLOGY

## 2019-06-21 PROCEDURE — 85730 THROMBOPLASTIN TIME PARTIAL: CPT | Performed by: THORACIC SURGERY (CARDIOTHORACIC VASCULAR SURGERY)

## 2019-06-21 PROCEDURE — 85014 HEMATOCRIT: CPT | Performed by: PHYSICIAN ASSISTANT

## 2019-06-21 PROCEDURE — 5A1221Z PERFORMANCE OF CARDIAC OUTPUT, CONTINUOUS: ICD-10-PCS | Performed by: THORACIC SURGERY (CARDIOTHORACIC VASCULAR SURGERY)

## 2019-06-21 PROCEDURE — 33508 ENDOSCOPIC VEIN HARVEST: CPT | Performed by: PHYSICIAN ASSISTANT

## 2019-06-21 PROCEDURE — 76376 3D RENDER W/INTRP POSTPROCES: CPT

## 2019-06-21 PROCEDURE — 021209W BYPASS CORONARY ARTERY, THREE ARTERIES FROM AORTA WITH AUTOLOGOUS VENOUS TISSUE, OPEN APPROACH: ICD-10-PCS | Performed by: THORACIC SURGERY (CARDIOTHORACIC VASCULAR SURGERY)

## 2019-06-21 PROCEDURE — 30233N0 TRANSFUSION OF AUTOLOGOUS RED BLOOD CELLS INTO PERIPHERAL VEIN, PERCUTANEOUS APPROACH: ICD-10-PCS | Performed by: THORACIC SURGERY (CARDIOTHORACIC VASCULAR SURGERY)

## 2019-06-21 PROCEDURE — 02100Z9 BYPASS CORONARY ARTERY, ONE ARTERY FROM LEFT INTERNAL MAMMARY, OPEN APPROACH: ICD-10-PCS | Performed by: THORACIC SURGERY (CARDIOTHORACIC VASCULAR SURGERY)

## 2019-06-21 PROCEDURE — 84295 ASSAY OF SERUM SODIUM: CPT

## 2019-06-21 PROCEDURE — 5A1223Z PERFORMANCE OF CARDIAC PACING, CONTINUOUS: ICD-10-PCS | Performed by: THORACIC SURGERY (CARDIOTHORACIC VASCULAR SURGERY)

## 2019-06-21 PROCEDURE — 85049 AUTOMATED PLATELET COUNT: CPT | Performed by: PHYSICIAN ASSISTANT

## 2019-06-21 DEVICE — MARKER CORONARY BYPASS VOSS GRAFT: Type: IMPLANTABLE DEVICE | Site: AORTA | Status: FUNCTIONAL

## 2019-06-21 RX ORDER — ROCURONIUM BROMIDE 10 MG/ML
INJECTION, SOLUTION INTRAVENOUS AS NEEDED
Status: DISCONTINUED | OUTPATIENT
Start: 2019-06-21 | End: 2019-06-21 | Stop reason: SURG

## 2019-06-21 RX ORDER — PROPOFOL 10 MG/ML
INJECTION, EMULSION INTRAVENOUS AS NEEDED
Status: DISCONTINUED | OUTPATIENT
Start: 2019-06-21 | End: 2019-06-21 | Stop reason: SURG

## 2019-06-21 RX ORDER — PANTOPRAZOLE SODIUM 40 MG/1
40 TABLET, DELAYED RELEASE ORAL
Status: DISCONTINUED | OUTPATIENT
Start: 2019-06-22 | End: 2019-06-25 | Stop reason: HOSPADM

## 2019-06-21 RX ORDER — HEPARIN SODIUM 1000 [USP'U]/ML
INJECTION, SOLUTION INTRAVENOUS; SUBCUTANEOUS AS NEEDED
Status: DISCONTINUED | OUTPATIENT
Start: 2019-06-21 | End: 2019-06-21 | Stop reason: SURG

## 2019-06-21 RX ORDER — AMIODARONE HYDROCHLORIDE 200 MG/1
200 TABLET ORAL EVERY 8 HOURS SCHEDULED
Status: DISCONTINUED | OUTPATIENT
Start: 2019-06-21 | End: 2019-06-25 | Stop reason: HOSPADM

## 2019-06-21 RX ORDER — HYDROMORPHONE HCL/PF 1 MG/ML
1 SYRINGE (ML) INJECTION
Status: DISCONTINUED | OUTPATIENT
Start: 2019-06-21 | End: 2019-06-22

## 2019-06-21 RX ORDER — ASPIRIN 325 MG
325 TABLET ORAL DAILY
Status: DISCONTINUED | OUTPATIENT
Start: 2019-06-21 | End: 2019-06-25 | Stop reason: HOSPADM

## 2019-06-21 RX ORDER — MAGNESIUM SULFATE HEPTAHYDRATE 40 MG/ML
2 INJECTION, SOLUTION INTRAVENOUS ONCE
Status: COMPLETED | OUTPATIENT
Start: 2019-06-21 | End: 2019-06-21

## 2019-06-21 RX ORDER — MILRINONE LACTATE 0.2 MG/ML
0.25 INJECTION, SOLUTION INTRAVENOUS CONTINUOUS
Status: DISCONTINUED | OUTPATIENT
Start: 2019-06-21 | End: 2019-06-22

## 2019-06-21 RX ORDER — BISACODYL 10 MG
10 SUPPOSITORY, RECTAL RECTAL DAILY PRN
Status: DISCONTINUED | OUTPATIENT
Start: 2019-06-21 | End: 2019-06-25 | Stop reason: HOSPADM

## 2019-06-21 RX ORDER — SODIUM CHLORIDE, SODIUM LACTATE, POTASSIUM CHLORIDE, CALCIUM CHLORIDE 600; 310; 30; 20 MG/100ML; MG/100ML; MG/100ML; MG/100ML
INJECTION, SOLUTION INTRAVENOUS CONTINUOUS PRN
Status: DISCONTINUED | OUTPATIENT
Start: 2019-06-21 | End: 2019-06-21 | Stop reason: SURG

## 2019-06-21 RX ORDER — FENTANYL CITRATE 50 UG/ML
50 INJECTION, SOLUTION INTRAMUSCULAR; INTRAVENOUS ONCE
Status: DISCONTINUED | OUTPATIENT
Start: 2019-06-21 | End: 2019-06-22

## 2019-06-21 RX ORDER — ACETAMINOPHEN 325 MG/1
650 TABLET ORAL EVERY 4 HOURS PRN
Status: DISCONTINUED | OUTPATIENT
Start: 2019-06-21 | End: 2019-06-25 | Stop reason: HOSPADM

## 2019-06-21 RX ORDER — GLYCOPYRROLATE 0.2 MG/ML
INJECTION INTRAMUSCULAR; INTRAVENOUS AS NEEDED
Status: DISCONTINUED | OUTPATIENT
Start: 2019-06-21 | End: 2019-06-21 | Stop reason: SURG

## 2019-06-21 RX ORDER — VANCOMYCIN HYDROCHLORIDE 1 G/20ML
INJECTION, POWDER, LYOPHILIZED, FOR SOLUTION INTRAVENOUS AS NEEDED
Status: DISCONTINUED | OUTPATIENT
Start: 2019-06-21 | End: 2019-06-21 | Stop reason: HOSPADM

## 2019-06-21 RX ORDER — CALCIUM CHLORIDE 100 MG/ML
1 INJECTION INTRAVENOUS; INTRAVENTRICULAR ONCE
Status: DISCONTINUED | OUTPATIENT
Start: 2019-06-21 | End: 2019-06-22

## 2019-06-21 RX ORDER — ATORVASTATIN CALCIUM 80 MG/1
80 TABLET, FILM COATED ORAL
Status: DISCONTINUED | OUTPATIENT
Start: 2019-06-21 | End: 2019-06-25 | Stop reason: HOSPADM

## 2019-06-21 RX ORDER — FENTANYL CITRATE 50 UG/ML
50 INJECTION, SOLUTION INTRAMUSCULAR; INTRAVENOUS
Status: DISCONTINUED | OUTPATIENT
Start: 2019-06-21 | End: 2019-06-22

## 2019-06-21 RX ORDER — POTASSIUM CHLORIDE 14.9 MG/ML
20 INJECTION INTRAVENOUS
Status: DISCONTINUED | OUTPATIENT
Start: 2019-06-21 | End: 2019-06-22

## 2019-06-21 RX ORDER — FUROSEMIDE 10 MG/ML
40 INJECTION INTRAMUSCULAR; INTRAVENOUS EVERY 6 HOURS PRN
Status: DISCONTINUED | OUTPATIENT
Start: 2019-06-21 | End: 2019-06-22

## 2019-06-21 RX ORDER — CHLORHEXIDINE GLUCONATE 0.12 MG/ML
15 RINSE ORAL 2 TIMES DAILY
Status: DISCONTINUED | OUTPATIENT
Start: 2019-06-21 | End: 2019-06-22

## 2019-06-21 RX ORDER — ONDANSETRON 2 MG/ML
4 INJECTION INTRAMUSCULAR; INTRAVENOUS EVERY 6 HOURS PRN
Status: DISCONTINUED | OUTPATIENT
Start: 2019-06-21 | End: 2019-06-25 | Stop reason: HOSPADM

## 2019-06-21 RX ORDER — SODIUM CHLORIDE 9 MG/ML
INJECTION, SOLUTION INTRAVENOUS CONTINUOUS PRN
Status: DISCONTINUED | OUTPATIENT
Start: 2019-06-21 | End: 2019-06-21 | Stop reason: SURG

## 2019-06-21 RX ORDER — OXYCODONE HYDROCHLORIDE AND ACETAMINOPHEN 5; 325 MG/1; MG/1
1 TABLET ORAL EVERY 4 HOURS PRN
Status: DISCONTINUED | OUTPATIENT
Start: 2019-06-21 | End: 2019-06-25 | Stop reason: HOSPADM

## 2019-06-21 RX ORDER — MIDAZOLAM HYDROCHLORIDE 1 MG/ML
INJECTION INTRAMUSCULAR; INTRAVENOUS AS NEEDED
Status: DISCONTINUED | OUTPATIENT
Start: 2019-06-21 | End: 2019-06-21 | Stop reason: SURG

## 2019-06-21 RX ORDER — OXYCODONE HYDROCHLORIDE AND ACETAMINOPHEN 5; 325 MG/1; MG/1
2 TABLET ORAL EVERY 6 HOURS PRN
Status: DISCONTINUED | OUTPATIENT
Start: 2019-06-21 | End: 2019-06-25 | Stop reason: HOSPADM

## 2019-06-21 RX ORDER — NEOSTIGMINE METHYLSULFATE 1 MG/ML
INJECTION INTRAVENOUS AS NEEDED
Status: DISCONTINUED | OUTPATIENT
Start: 2019-06-21 | End: 2019-06-21 | Stop reason: SURG

## 2019-06-21 RX ORDER — POLYETHYLENE GLYCOL 3350 17 G/17G
17 POWDER, FOR SOLUTION ORAL DAILY
Status: DISCONTINUED | OUTPATIENT
Start: 2019-06-21 | End: 2019-06-25 | Stop reason: HOSPADM

## 2019-06-21 RX ORDER — PROTAMINE SULFATE 10 MG/ML
INJECTION, SOLUTION INTRAVENOUS AS NEEDED
Status: DISCONTINUED | OUTPATIENT
Start: 2019-06-21 | End: 2019-06-21 | Stop reason: SURG

## 2019-06-21 RX ORDER — CEFAZOLIN SODIUM 2 G/50ML
2000 SOLUTION INTRAVENOUS EVERY 8 HOURS
Status: COMPLETED | OUTPATIENT
Start: 2019-06-21 | End: 2019-06-22

## 2019-06-21 RX ORDER — FONDAPARINUX SODIUM 2.5 MG/.5ML
2.5 INJECTION SUBCUTANEOUS DAILY
Status: DISCONTINUED | OUTPATIENT
Start: 2019-06-22 | End: 2019-06-22

## 2019-06-21 RX ORDER — MILRINONE LACTATE 0.2 MG/ML
INJECTION, SOLUTION INTRAVENOUS CONTINUOUS PRN
Status: DISCONTINUED | OUTPATIENT
Start: 2019-06-21 | End: 2019-06-21 | Stop reason: SURG

## 2019-06-21 RX ORDER — MAGNESIUM HYDROXIDE 1200 MG/15ML
LIQUID ORAL AS NEEDED
Status: DISCONTINUED | OUTPATIENT
Start: 2019-06-21 | End: 2019-06-21 | Stop reason: HOSPADM

## 2019-06-21 RX ORDER — CHLORHEXIDINE GLUCONATE 0.12 MG/ML
15 RINSE ORAL ONCE
Status: DISCONTINUED | OUTPATIENT
Start: 2019-06-21 | End: 2019-06-21

## 2019-06-21 RX ORDER — FENTANYL CITRATE 50 UG/ML
INJECTION, SOLUTION INTRAMUSCULAR; INTRAVENOUS AS NEEDED
Status: DISCONTINUED | OUTPATIENT
Start: 2019-06-21 | End: 2019-06-21 | Stop reason: SURG

## 2019-06-21 RX ORDER — SODIUM CHLORIDE 450 MG/100ML
20 INJECTION, SOLUTION INTRAVENOUS CONTINUOUS
Status: DISCONTINUED | OUTPATIENT
Start: 2019-06-21 | End: 2019-06-24

## 2019-06-21 RX ORDER — ALBUMIN, HUMAN INJ 5% 5 %
SOLUTION INTRAVENOUS CONTINUOUS PRN
Status: DISCONTINUED | OUTPATIENT
Start: 2019-06-21 | End: 2019-06-21 | Stop reason: SURG

## 2019-06-21 RX ORDER — POTASSIUM CHLORIDE 14.9 MG/ML
20 INJECTION INTRAVENOUS ONCE AS NEEDED
Status: DISCONTINUED | OUTPATIENT
Start: 2019-06-21 | End: 2019-06-22

## 2019-06-21 RX ORDER — CEFAZOLIN SODIUM 2 G/50ML
2000 SOLUTION INTRAVENOUS ONCE
Status: COMPLETED | OUTPATIENT
Start: 2019-06-21 | End: 2019-06-21

## 2019-06-21 RX ORDER — HYDROMORPHONE HCL/PF 1 MG/ML
0.5 SYRINGE (ML) INJECTION
Status: DISCONTINUED | OUTPATIENT
Start: 2019-06-21 | End: 2019-06-22

## 2019-06-21 RX ADMIN — EPINEPHRINE 2 MCG/MIN: 1 INJECTION, SOLUTION, CONCENTRATE INTRAVENOUS at 15:30

## 2019-06-21 RX ADMIN — AMINOCAPROIC ACID 5 G: 250 INJECTION, SOLUTION INTRAVENOUS at 11:06

## 2019-06-21 RX ADMIN — VASOPRESSIN 0.04 UNITS/MIN: 20 INJECTION INTRAVENOUS at 14:25

## 2019-06-21 RX ADMIN — AMIODARONE HYDROCHLORIDE 200 MG: 200 TABLET ORAL at 22:29

## 2019-06-21 RX ADMIN — HEPARIN SODIUM 10000 UNITS: 1000 INJECTION INTRAVENOUS; SUBCUTANEOUS at 12:30

## 2019-06-21 RX ADMIN — PROPOFOL 50 MG: 10 INJECTION, EMULSION INTRAVENOUS at 11:29

## 2019-06-21 RX ADMIN — MAGNESIUM SULFATE HEPTAHYDRATE 2 G: 40 INJECTION, SOLUTION INTRAVENOUS at 16:00

## 2019-06-21 RX ADMIN — SODIUM CHLORIDE 5 UNITS/HR: 9 INJECTION, SOLUTION INTRAVENOUS at 13:25

## 2019-06-21 RX ADMIN — PHENYLEPHRINE HYDROCHLORIDE 30 MCG/MIN: 10 INJECTION INTRAVENOUS at 22:00

## 2019-06-21 RX ADMIN — AMINOCAPROIC ACID 1000 MG/HR: 250 INJECTION, SOLUTION INTRAVENOUS at 11:06

## 2019-06-21 RX ADMIN — FENTANYL CITRATE 250 MCG: 50 INJECTION, SOLUTION INTRAMUSCULAR; INTRAVENOUS at 11:28

## 2019-06-21 RX ADMIN — HEPARIN SODIUM 5000 UNITS: 1000 INJECTION INTRAVENOUS; SUBCUTANEOUS at 12:07

## 2019-06-21 RX ADMIN — OXYCODONE HYDROCHLORIDE AND ACETAMINOPHEN 2 TABLET: 5; 325 TABLET ORAL at 16:45

## 2019-06-21 RX ADMIN — ROCURONIUM BROMIDE 100 MG: 10 INJECTION, SOLUTION INTRAVENOUS at 12:36

## 2019-06-21 RX ADMIN — FENTANYL CITRATE 500 MCG: 50 INJECTION, SOLUTION INTRAMUSCULAR; INTRAVENOUS at 10:36

## 2019-06-21 RX ADMIN — SODIUM CHLORIDE 3 UNITS/HR: 9 INJECTION, SOLUTION INTRAVENOUS at 15:30

## 2019-06-21 RX ADMIN — HEPARIN SODIUM 37800 UNITS: 1000 INJECTION INTRAVENOUS; SUBCUTANEOUS at 12:20

## 2019-06-21 RX ADMIN — FENTANYL CITRATE 250 MCG: 50 INJECTION, SOLUTION INTRAMUSCULAR; INTRAVENOUS at 12:37

## 2019-06-21 RX ADMIN — ATORVASTATIN CALCIUM 80 MG: 80 TABLET, FILM COATED ORAL at 22:33

## 2019-06-21 RX ADMIN — NEOSTIGMINE METHYLSULFATE 4 MG: 1 INJECTION, SOLUTION INTRAVENOUS at 15:19

## 2019-06-21 RX ADMIN — LATANOPROST 1 DROP: 50 SOLUTION OPHTHALMIC at 22:30

## 2019-06-21 RX ADMIN — Medication 1 APPLICATION: at 20:04

## 2019-06-21 RX ADMIN — Medication 2 MCG/MIN: at 14:00

## 2019-06-21 RX ADMIN — Medication 1 APPLICATION: at 09:21

## 2019-06-21 RX ADMIN — PHENYLEPHRINE HYDROCHLORIDE 50 MCG/MIN: 10 INJECTION INTRAVENOUS at 14:26

## 2019-06-21 RX ADMIN — SODIUM CHLORIDE, SODIUM LACTATE, POTASSIUM CHLORIDE, AND CALCIUM CHLORIDE: .6; .31; .03; .02 INJECTION, SOLUTION INTRAVENOUS at 10:49

## 2019-06-21 RX ADMIN — CEFAZOLIN SODIUM 2000 MG: 2 SOLUTION INTRAVENOUS at 22:30

## 2019-06-21 RX ADMIN — CEFAZOLIN SODIUM 2000 MG: 2 SOLUTION INTRAVENOUS at 11:00

## 2019-06-21 RX ADMIN — SODIUM CHLORIDE 10 MG/HR: 0.9 INJECTION, SOLUTION INTRAVENOUS at 11:29

## 2019-06-21 RX ADMIN — SODIUM BICARBONATE 50 MEQ: 84 INJECTION, SOLUTION INTRAVENOUS at 15:18

## 2019-06-21 RX ADMIN — GLYCOPYRROLATE 0.8 MG: 0.2 INJECTION, SOLUTION INTRAMUSCULAR; INTRAVENOUS at 15:19

## 2019-06-21 RX ADMIN — POTASSIUM CHLORIDE 20 MEQ: 200 INJECTION, SOLUTION INTRAVENOUS at 16:18

## 2019-06-21 RX ADMIN — ALBUMIN (HUMAN): 12.5 SOLUTION INTRAVENOUS at 14:29

## 2019-06-21 RX ADMIN — HYDROMORPHONE HYDROCHLORIDE 1 MG: 1 INJECTION, SOLUTION INTRAMUSCULAR; INTRAVENOUS; SUBCUTANEOUS at 20:06

## 2019-06-21 RX ADMIN — INSULIN LISPRO 2 UNITS: 100 INJECTION, SOLUTION INTRAVENOUS; SUBCUTANEOUS at 06:25

## 2019-06-21 RX ADMIN — SODIUM CHLORIDE: 0.9 INJECTION, SOLUTION INTRAVENOUS at 10:20

## 2019-06-21 RX ADMIN — CHLORHEXIDINE GLUCONATE 0.12% ORAL RINSE 15 ML: 1.2 LIQUID ORAL at 17:44

## 2019-06-21 RX ADMIN — PHENYLEPHRINE HYDROCHLORIDE 120 MCG/MIN: 10 INJECTION INTRAVENOUS at 15:30

## 2019-06-21 RX ADMIN — PANTOPRAZOLE SODIUM 40 MG: 40 TABLET, DELAYED RELEASE ORAL at 06:24

## 2019-06-21 RX ADMIN — CEFAZOLIN SODIUM 2000 MG: 2 SOLUTION INTRAVENOUS at 14:20

## 2019-06-21 RX ADMIN — ONDANSETRON 4 MG: 2 INJECTION INTRAMUSCULAR; INTRAVENOUS at 20:16

## 2019-06-21 RX ADMIN — MILRINONE LACTATE IN DEXTROSE 0.13 MCG/KG/MIN: 200 INJECTION, SOLUTION INTRAVENOUS at 15:45

## 2019-06-21 RX ADMIN — VASOPRESSIN 0.04 UNITS/MIN: 20 INJECTION INTRAVENOUS at 15:30

## 2019-06-21 RX ADMIN — METOPROLOL TARTRATE 12.5 MG: 25 TABLET, FILM COATED ORAL at 09:21

## 2019-06-21 RX ADMIN — PROTAMINE SULFATE 520 MG: 10 INJECTION, SOLUTION INTRAVENOUS at 14:13

## 2019-06-21 RX ADMIN — CHLORHEXIDINE GLUCONATE 0.12% ORAL RINSE 15 ML: 1.2 LIQUID ORAL at 09:22

## 2019-06-21 RX ADMIN — ROCURONIUM BROMIDE 100 MG: 10 INJECTION, SOLUTION INTRAVENOUS at 10:31

## 2019-06-21 RX ADMIN — MIDAZOLAM 8 MG: 1 INJECTION INTRAMUSCULAR; INTRAVENOUS at 10:36

## 2019-06-21 RX ADMIN — VASOPRESSIN 0.04 UNITS/MIN: 20 INJECTION INTRAVENOUS at 19:30

## 2019-06-21 RX ADMIN — MILRINONE LACTATE IN DEXTROSE 0.25 MCG/KG/MIN: 200 INJECTION, SOLUTION INTRAVENOUS at 14:00

## 2019-06-21 RX ADMIN — SODIUM CHLORIDE 20 ML/HR: 0.45 INJECTION, SOLUTION INTRAVENOUS at 16:01

## 2019-06-21 RX ADMIN — MIDAZOLAM 2 MG: 1 INJECTION INTRAMUSCULAR; INTRAVENOUS at 09:59

## 2019-06-22 ENCOUNTER — APPOINTMENT (INPATIENT)
Dept: RADIOLOGY | Facility: HOSPITAL | Age: 74
DRG: 235 | End: 2019-06-22
Payer: MEDICARE

## 2019-06-22 LAB
ABO GROUP BLD BPU: NORMAL
ANION GAP SERPL CALCULATED.3IONS-SCNC: 4 MMOL/L (ref 4–13)
BPU ID: NORMAL
BUN SERPL-MCNC: 36 MG/DL (ref 5–25)
CALCIUM SERPL-MCNC: 8.3 MG/DL (ref 8.3–10.1)
CHLORIDE SERPL-SCNC: 111 MMOL/L (ref 100–108)
CO2 SERPL-SCNC: 25 MMOL/L (ref 21–32)
CREAT SERPL-MCNC: 2.13 MG/DL (ref 0.6–1.3)
CROSSMATCH: NORMAL
ERYTHROCYTE [DISTWIDTH] IN BLOOD BY AUTOMATED COUNT: 14.6 % (ref 11.6–15.1)
GFR SERPL CREATININE-BSD FRML MDRD: 30 ML/MIN/1.73SQ M
GLUCOSE SERPL-MCNC: 116 MG/DL (ref 65–140)
GLUCOSE SERPL-MCNC: 134 MG/DL (ref 65–140)
GLUCOSE SERPL-MCNC: 139 MG/DL (ref 65–140)
GLUCOSE SERPL-MCNC: 147 MG/DL (ref 65–140)
GLUCOSE SERPL-MCNC: 155 MG/DL (ref 65–140)
GLUCOSE SERPL-MCNC: 155 MG/DL (ref 65–140)
GLUCOSE SERPL-MCNC: 166 MG/DL (ref 65–140)
GLUCOSE SERPL-MCNC: 166 MG/DL (ref 65–140)
GLUCOSE SERPL-MCNC: 170 MG/DL (ref 65–140)
GLUCOSE SERPL-MCNC: 182 MG/DL (ref 65–140)
GLUCOSE SERPL-MCNC: 185 MG/DL (ref 65–140)
GLUCOSE SERPL-MCNC: 203 MG/DL (ref 65–140)
GLUCOSE SERPL-MCNC: 228 MG/DL (ref 65–140)
HCT VFR BLD AUTO: 30 % (ref 36.5–49.3)
HCT VFR BLD AUTO: 30.1 % (ref 36.5–49.3)
HGB BLD-MCNC: 9.4 G/DL (ref 12–17)
HGB BLD-MCNC: 9.6 G/DL (ref 12–17)
MAGNESIUM SERPL-MCNC: 3.2 MG/DL (ref 1.6–2.6)
MCH RBC QN AUTO: 29.5 PG (ref 26.8–34.3)
MCHC RBC AUTO-ENTMCNC: 31.3 G/DL (ref 31.4–37.4)
MCV RBC AUTO: 94 FL (ref 82–98)
PLATELET # BLD AUTO: 168 THOUSANDS/UL (ref 149–390)
PMV BLD AUTO: 11 FL (ref 8.9–12.7)
POTASSIUM SERPL-SCNC: 5.1 MMOL/L (ref 3.5–5.3)
POTASSIUM SERPL-SCNC: 5.3 MMOL/L (ref 3.5–5.3)
RBC # BLD AUTO: 3.19 MILLION/UL (ref 3.88–5.62)
SODIUM SERPL-SCNC: 140 MMOL/L (ref 136–145)
UNIT DISPENSE STATUS: NORMAL
UNIT PRODUCT CODE: NORMAL
UNIT RH: NORMAL
WBC # BLD AUTO: 9.95 THOUSAND/UL (ref 4.31–10.16)

## 2019-06-22 PROCEDURE — 83735 ASSAY OF MAGNESIUM: CPT | Performed by: THORACIC SURGERY (CARDIOTHORACIC VASCULAR SURGERY)

## 2019-06-22 PROCEDURE — 99233 SBSQ HOSP IP/OBS HIGH 50: CPT | Performed by: ANESTHESIOLOGY

## 2019-06-22 PROCEDURE — 85027 COMPLETE CBC AUTOMATED: CPT | Performed by: THORACIC SURGERY (CARDIOTHORACIC VASCULAR SURGERY)

## 2019-06-22 PROCEDURE — 99232 SBSQ HOSP IP/OBS MODERATE 35: CPT | Performed by: INTERNAL MEDICINE

## 2019-06-22 PROCEDURE — 82948 REAGENT STRIP/BLOOD GLUCOSE: CPT

## 2019-06-22 PROCEDURE — 94760 N-INVAS EAR/PLS OXIMETRY 1: CPT

## 2019-06-22 PROCEDURE — 93005 ELECTROCARDIOGRAM TRACING: CPT

## 2019-06-22 PROCEDURE — 99223 1ST HOSP IP/OBS HIGH 75: CPT | Performed by: INTERNAL MEDICINE

## 2019-06-22 PROCEDURE — 80048 BASIC METABOLIC PNL TOTAL CA: CPT | Performed by: THORACIC SURGERY (CARDIOTHORACIC VASCULAR SURGERY)

## 2019-06-22 PROCEDURE — 71045 X-RAY EXAM CHEST 1 VIEW: CPT

## 2019-06-22 RX ORDER — HEPARIN SODIUM 5000 [USP'U]/ML
5000 INJECTION, SOLUTION INTRAVENOUS; SUBCUTANEOUS EVERY 8 HOURS SCHEDULED
Status: DISCONTINUED | OUTPATIENT
Start: 2019-06-22 | End: 2019-06-25 | Stop reason: HOSPADM

## 2019-06-22 RX ORDER — POTASSIUM CHLORIDE 20 MEQ/1
20 TABLET, EXTENDED RELEASE ORAL DAILY
Status: DISCONTINUED | OUTPATIENT
Start: 2019-06-22 | End: 2019-06-22

## 2019-06-22 RX ORDER — TEMAZEPAM 15 MG/1
15 CAPSULE ORAL
Status: DISCONTINUED | OUTPATIENT
Start: 2019-06-22 | End: 2019-06-25 | Stop reason: HOSPADM

## 2019-06-22 RX ORDER — FUROSEMIDE 10 MG/ML
40 INJECTION INTRAMUSCULAR; INTRAVENOUS DAILY
Status: COMPLETED | OUTPATIENT
Start: 2019-06-22 | End: 2019-06-23

## 2019-06-22 RX ORDER — DOCUSATE SODIUM 100 MG/1
100 CAPSULE, LIQUID FILLED ORAL 2 TIMES DAILY
Status: DISCONTINUED | OUTPATIENT
Start: 2019-06-22 | End: 2019-06-24

## 2019-06-22 RX ADMIN — ATORVASTATIN CALCIUM 80 MG: 80 TABLET, FILM COATED ORAL at 16:24

## 2019-06-22 RX ADMIN — FINASTERIDE 5 MG: 5 TABLET, FILM COATED ORAL at 08:44

## 2019-06-22 RX ADMIN — FUROSEMIDE 40 MG: 10 INJECTION, SOLUTION INTRAMUSCULAR; INTRAVENOUS at 08:40

## 2019-06-22 RX ADMIN — AMIODARONE HYDROCHLORIDE 200 MG: 200 TABLET ORAL at 14:06

## 2019-06-22 RX ADMIN — SODIUM CHLORIDE 2 UNITS/HR: 9 INJECTION, SOLUTION INTRAVENOUS at 11:36

## 2019-06-22 RX ADMIN — DOCUSATE SODIUM 100 MG: 100 CAPSULE, LIQUID FILLED ORAL at 18:00

## 2019-06-22 RX ADMIN — PANTOPRAZOLE SODIUM 40 MG: 40 TABLET, DELAYED RELEASE ORAL at 05:45

## 2019-06-22 RX ADMIN — HEPARIN SODIUM 5000 UNITS: 5000 INJECTION INTRAVENOUS; SUBCUTANEOUS at 05:45

## 2019-06-22 RX ADMIN — HEPARIN SODIUM 5000 UNITS: 5000 INJECTION INTRAVENOUS; SUBCUTANEOUS at 14:07

## 2019-06-22 RX ADMIN — DOCUSATE SODIUM 100 MG: 100 CAPSULE, LIQUID FILLED ORAL at 08:40

## 2019-06-22 RX ADMIN — TAMSULOSIN HYDROCHLORIDE 0.4 MG: 0.4 CAPSULE ORAL at 16:24

## 2019-06-22 RX ADMIN — OXYCODONE HYDROCHLORIDE AND ACETAMINOPHEN 1000 MG: 500 TABLET ORAL at 08:40

## 2019-06-22 RX ADMIN — ASPIRIN 325 MG ORAL TABLET 325 MG: 325 PILL ORAL at 08:40

## 2019-06-22 RX ADMIN — OXYCODONE HYDROCHLORIDE AND ACETAMINOPHEN 2 TABLET: 5; 325 TABLET ORAL at 13:39

## 2019-06-22 RX ADMIN — OXYCODONE HYDROCHLORIDE AND ACETAMINOPHEN 2 TABLET: 5; 325 TABLET ORAL at 04:10

## 2019-06-22 RX ADMIN — Medication 1 APPLICATION: at 22:26

## 2019-06-22 RX ADMIN — CEFAZOLIN SODIUM 2000 MG: 2 SOLUTION INTRAVENOUS at 14:07

## 2019-06-22 RX ADMIN — POLYETHYLENE GLYCOL 3350 17 G: 17 POWDER, FOR SOLUTION ORAL at 08:40

## 2019-06-22 RX ADMIN — Medication 1 APPLICATION: at 08:40

## 2019-06-22 RX ADMIN — AMIODARONE HYDROCHLORIDE 200 MG: 200 TABLET ORAL at 05:45

## 2019-06-22 RX ADMIN — LATANOPROST 1 DROP: 50 SOLUTION OPHTHALMIC at 22:27

## 2019-06-22 RX ADMIN — CEFAZOLIN SODIUM 2000 MG: 2 SOLUTION INTRAVENOUS at 05:52

## 2019-06-22 RX ADMIN — AMIODARONE HYDROCHLORIDE 200 MG: 200 TABLET ORAL at 22:26

## 2019-06-22 RX ADMIN — HEPARIN SODIUM 5000 UNITS: 5000 INJECTION INTRAVENOUS; SUBCUTANEOUS at 22:26

## 2019-06-22 RX ADMIN — METOPROLOL TARTRATE 25 MG: 25 TABLET ORAL at 08:40

## 2019-06-23 PROBLEM — D62 POSTOPERATIVE ANEMIA DUE TO ACUTE BLOOD LOSS: Status: ACTIVE | Noted: 2019-06-23

## 2019-06-23 LAB
ANION GAP SERPL CALCULATED.3IONS-SCNC: 5 MMOL/L (ref 4–13)
ATRIAL RATE: 91 BPM
BUN SERPL-MCNC: 40 MG/DL (ref 5–25)
CALCIUM SERPL-MCNC: 8.5 MG/DL (ref 8.3–10.1)
CHLORIDE SERPL-SCNC: 105 MMOL/L (ref 100–108)
CO2 SERPL-SCNC: 27 MMOL/L (ref 21–32)
CREAT SERPL-MCNC: 2.21 MG/DL (ref 0.6–1.3)
ERYTHROCYTE [DISTWIDTH] IN BLOOD BY AUTOMATED COUNT: 14.8 % (ref 11.6–15.1)
GFR SERPL CREATININE-BSD FRML MDRD: 29 ML/MIN/1.73SQ M
GLUCOSE SERPL-MCNC: 102 MG/DL (ref 65–140)
GLUCOSE SERPL-MCNC: 121 MG/DL (ref 65–140)
GLUCOSE SERPL-MCNC: 123 MG/DL (ref 65–140)
GLUCOSE SERPL-MCNC: 138 MG/DL (ref 65–140)
GLUCOSE SERPL-MCNC: 144 MG/DL (ref 65–140)
GLUCOSE SERPL-MCNC: 145 MG/DL (ref 65–140)
GLUCOSE SERPL-MCNC: 146 MG/DL (ref 65–140)
GLUCOSE SERPL-MCNC: 147 MG/DL (ref 65–140)
GLUCOSE SERPL-MCNC: 154 MG/DL (ref 65–140)
GLUCOSE SERPL-MCNC: 154 MG/DL (ref 65–140)
GLUCOSE SERPL-MCNC: 163 MG/DL (ref 65–140)
GLUCOSE SERPL-MCNC: 168 MG/DL (ref 65–140)
GLUCOSE SERPL-MCNC: 179 MG/DL (ref 65–140)
GLUCOSE SERPL-MCNC: 71 MG/DL (ref 65–140)
GLUCOSE SERPL-MCNC: 77 MG/DL (ref 65–140)
HCT VFR BLD AUTO: 27.2 % (ref 36.5–49.3)
HGB BLD-MCNC: 8.5 G/DL (ref 12–17)
MAGNESIUM SERPL-MCNC: 2.6 MG/DL (ref 1.6–2.6)
MCH RBC QN AUTO: 29.7 PG (ref 26.8–34.3)
MCHC RBC AUTO-ENTMCNC: 31.3 G/DL (ref 31.4–37.4)
MCV RBC AUTO: 95 FL (ref 82–98)
P AXIS: 55 DEGREES
PLATELET # BLD AUTO: 181 THOUSANDS/UL (ref 149–390)
PMV BLD AUTO: 12.2 FL (ref 8.9–12.7)
POTASSIUM SERPL-SCNC: 4.5 MMOL/L (ref 3.5–5.3)
PR INTERVAL: 210 MS
QRS AXIS: 15 DEGREES
QRSD INTERVAL: 110 MS
QT INTERVAL: 380 MS
QTC INTERVAL: 467 MS
RBC # BLD AUTO: 2.86 MILLION/UL (ref 3.88–5.62)
SODIUM SERPL-SCNC: 137 MMOL/L (ref 136–145)
T WAVE AXIS: 56 DEGREES
VENTRICULAR RATE: 91 BPM
WBC # BLD AUTO: 11.59 THOUSAND/UL (ref 4.31–10.16)

## 2019-06-23 PROCEDURE — G8987 SELF CARE CURRENT STATUS: HCPCS

## 2019-06-23 PROCEDURE — 93005 ELECTROCARDIOGRAM TRACING: CPT

## 2019-06-23 PROCEDURE — G8988 SELF CARE GOAL STATUS: HCPCS

## 2019-06-23 PROCEDURE — 93010 ELECTROCARDIOGRAM REPORT: CPT | Performed by: INTERNAL MEDICINE

## 2019-06-23 PROCEDURE — 97167 OT EVAL HIGH COMPLEX 60 MIN: CPT

## 2019-06-23 PROCEDURE — 80048 BASIC METABOLIC PNL TOTAL CA: CPT | Performed by: NURSE PRACTITIONER

## 2019-06-23 PROCEDURE — 83735 ASSAY OF MAGNESIUM: CPT | Performed by: NURSE PRACTITIONER

## 2019-06-23 PROCEDURE — 99232 SBSQ HOSP IP/OBS MODERATE 35: CPT | Performed by: INTERNAL MEDICINE

## 2019-06-23 PROCEDURE — 85027 COMPLETE CBC AUTOMATED: CPT | Performed by: NURSE PRACTITIONER

## 2019-06-23 PROCEDURE — 82948 REAGENT STRIP/BLOOD GLUCOSE: CPT

## 2019-06-23 PROCEDURE — 97535 SELF CARE MNGMENT TRAINING: CPT

## 2019-06-23 PROCEDURE — 94760 N-INVAS EAR/PLS OXIMETRY 1: CPT

## 2019-06-23 PROCEDURE — 99024 POSTOP FOLLOW-UP VISIT: CPT | Performed by: PHYSICIAN ASSISTANT

## 2019-06-23 RX ORDER — INSULIN GLARGINE 100 [IU]/ML
60 INJECTION, SOLUTION SUBCUTANEOUS
Status: DISCONTINUED | OUTPATIENT
Start: 2019-06-23 | End: 2019-06-25 | Stop reason: HOSPADM

## 2019-06-23 RX ORDER — FUROSEMIDE 10 MG/ML
40 INJECTION INTRAMUSCULAR; INTRAVENOUS
Status: DISCONTINUED | OUTPATIENT
Start: 2019-06-23 | End: 2019-06-24

## 2019-06-23 RX ORDER — FERROUS SULFATE 325(65) MG
325 TABLET ORAL
Status: DISCONTINUED | OUTPATIENT
Start: 2019-06-23 | End: 2019-06-25 | Stop reason: HOSPADM

## 2019-06-23 RX ORDER — TORSEMIDE 20 MG/1
20 TABLET ORAL DAILY
Status: DISCONTINUED | OUTPATIENT
Start: 2019-06-24 | End: 2019-06-23

## 2019-06-23 RX ADMIN — METOPROLOL TARTRATE 25 MG: 25 TABLET ORAL at 20:59

## 2019-06-23 RX ADMIN — HEPARIN SODIUM 5000 UNITS: 5000 INJECTION INTRAVENOUS; SUBCUTANEOUS at 05:44

## 2019-06-23 RX ADMIN — POLYETHYLENE GLYCOL 3350 17 G: 17 POWDER, FOR SOLUTION ORAL at 08:55

## 2019-06-23 RX ADMIN — ASPIRIN 325 MG ORAL TABLET 325 MG: 325 PILL ORAL at 08:56

## 2019-06-23 RX ADMIN — LATANOPROST 1 DROP: 50 SOLUTION OPHTHALMIC at 22:07

## 2019-06-23 RX ADMIN — OXYCODONE HYDROCHLORIDE AND ACETAMINOPHEN 2 TABLET: 5; 325 TABLET ORAL at 01:13

## 2019-06-23 RX ADMIN — Medication 1 APPLICATION: at 20:59

## 2019-06-23 RX ADMIN — FUROSEMIDE 40 MG: 10 INJECTION, SOLUTION INTRAMUSCULAR; INTRAVENOUS at 08:56

## 2019-06-23 RX ADMIN — DOCUSATE SODIUM 100 MG: 100 CAPSULE, LIQUID FILLED ORAL at 08:56

## 2019-06-23 RX ADMIN — TAMSULOSIN HYDROCHLORIDE 0.4 MG: 0.4 CAPSULE ORAL at 17:11

## 2019-06-23 RX ADMIN — FUROSEMIDE 40 MG: 10 INJECTION, SOLUTION INTRAMUSCULAR; INTRAVENOUS at 17:11

## 2019-06-23 RX ADMIN — HEPARIN SODIUM 5000 UNITS: 5000 INJECTION INTRAVENOUS; SUBCUTANEOUS at 21:02

## 2019-06-23 RX ADMIN — METOPROLOL TARTRATE 25 MG: 25 TABLET ORAL at 08:56

## 2019-06-23 RX ADMIN — PANTOPRAZOLE SODIUM 40 MG: 40 TABLET, DELAYED RELEASE ORAL at 05:44

## 2019-06-23 RX ADMIN — OXYCODONE HYDROCHLORIDE AND ACETAMINOPHEN 2 TABLET: 5; 325 TABLET ORAL at 20:52

## 2019-06-23 RX ADMIN — OXYCODONE HYDROCHLORIDE AND ACETAMINOPHEN 1000 MG: 500 TABLET ORAL at 08:56

## 2019-06-23 RX ADMIN — HEPARIN SODIUM 5000 UNITS: 5000 INJECTION INTRAVENOUS; SUBCUTANEOUS at 14:20

## 2019-06-23 RX ADMIN — AMIODARONE HYDROCHLORIDE 200 MG: 200 TABLET ORAL at 05:44

## 2019-06-23 RX ADMIN — SODIUM CHLORIDE 3 UNITS/HR: 9 INJECTION, SOLUTION INTRAVENOUS at 05:44

## 2019-06-23 RX ADMIN — FERROUS SULFATE TAB 325 MG (65 MG ELEMENTAL FE) 325 MG: 325 (65 FE) TAB at 08:56

## 2019-06-23 RX ADMIN — Medication 1 APPLICATION: at 08:56

## 2019-06-23 RX ADMIN — AMIODARONE HYDROCHLORIDE 200 MG: 200 TABLET ORAL at 21:01

## 2019-06-23 RX ADMIN — INSULIN GLARGINE 60 UNITS: 100 INJECTION, SOLUTION SUBCUTANEOUS at 21:10

## 2019-06-23 RX ADMIN — ATORVASTATIN CALCIUM 80 MG: 80 TABLET, FILM COATED ORAL at 17:11

## 2019-06-23 RX ADMIN — AMIODARONE HYDROCHLORIDE 200 MG: 200 TABLET ORAL at 14:21

## 2019-06-23 RX ADMIN — FINASTERIDE 5 MG: 5 TABLET, FILM COATED ORAL at 08:56

## 2019-06-23 RX ADMIN — DOCUSATE SODIUM 100 MG: 100 CAPSULE, LIQUID FILLED ORAL at 17:11

## 2019-06-24 LAB
ANION GAP SERPL CALCULATED.3IONS-SCNC: 6 MMOL/L (ref 4–13)
ATRIAL RATE: 84 BPM
ATRIAL RATE: 85 BPM
BUN SERPL-MCNC: 48 MG/DL (ref 5–25)
CALCIUM SERPL-MCNC: 8.6 MG/DL (ref 8.3–10.1)
CHLORIDE SERPL-SCNC: 101 MMOL/L (ref 100–108)
CO2 SERPL-SCNC: 27 MMOL/L (ref 21–32)
CREAT SERPL-MCNC: 2.33 MG/DL (ref 0.6–1.3)
ERYTHROCYTE [DISTWIDTH] IN BLOOD BY AUTOMATED COUNT: 14.6 % (ref 11.6–15.1)
GFR SERPL CREATININE-BSD FRML MDRD: 27 ML/MIN/1.73SQ M
GLUCOSE SERPL-MCNC: 138 MG/DL (ref 65–140)
GLUCOSE SERPL-MCNC: 184 MG/DL (ref 65–140)
GLUCOSE SERPL-MCNC: 184 MG/DL (ref 65–140)
GLUCOSE SERPL-MCNC: 201 MG/DL (ref 65–140)
GLUCOSE SERPL-MCNC: 278 MG/DL (ref 65–140)
HCT VFR BLD AUTO: 26.3 % (ref 36.5–49.3)
HGB BLD-MCNC: 8.4 G/DL (ref 12–17)
MCH RBC QN AUTO: 30 PG (ref 26.8–34.3)
MCHC RBC AUTO-ENTMCNC: 31.9 G/DL (ref 31.4–37.4)
MCV RBC AUTO: 94 FL (ref 82–98)
P AXIS: 63 DEGREES
P AXIS: 73 DEGREES
PLATELET # BLD AUTO: 171 THOUSANDS/UL (ref 149–390)
PMV BLD AUTO: 11.7 FL (ref 8.9–12.7)
POTASSIUM SERPL-SCNC: 4.5 MMOL/L (ref 3.5–5.3)
PR INTERVAL: 233 MS
PR INTERVAL: 233 MS
QRS AXIS: 15 DEGREES
QRS AXIS: 54 DEGREES
QRSD INTERVAL: 113 MS
QRSD INTERVAL: 142 MS
QT INTERVAL: 400 MS
QT INTERVAL: 463 MS
QTC INTERVAL: 476 MS
QTC INTERVAL: 548 MS
RBC # BLD AUTO: 2.8 MILLION/UL (ref 3.88–5.62)
SODIUM SERPL-SCNC: 134 MMOL/L (ref 136–145)
T WAVE AXIS: 44 DEGREES
T WAVE AXIS: 46 DEGREES
VENTRICULAR RATE: 84 BPM
VENTRICULAR RATE: 85 BPM
WBC # BLD AUTO: 9.1 THOUSAND/UL (ref 4.31–10.16)

## 2019-06-24 PROCEDURE — 97163 PT EVAL HIGH COMPLEX 45 MIN: CPT

## 2019-06-24 PROCEDURE — G8979 MOBILITY GOAL STATUS: HCPCS

## 2019-06-24 PROCEDURE — 85027 COMPLETE CBC AUTOMATED: CPT | Performed by: PHYSICIAN ASSISTANT

## 2019-06-24 PROCEDURE — G8978 MOBILITY CURRENT STATUS: HCPCS

## 2019-06-24 PROCEDURE — 82948 REAGENT STRIP/BLOOD GLUCOSE: CPT

## 2019-06-24 PROCEDURE — 93010 ELECTROCARDIOGRAM REPORT: CPT | Performed by: INTERNAL MEDICINE

## 2019-06-24 PROCEDURE — 80048 BASIC METABOLIC PNL TOTAL CA: CPT | Performed by: PHYSICIAN ASSISTANT

## 2019-06-24 PROCEDURE — 99232 SBSQ HOSP IP/OBS MODERATE 35: CPT | Performed by: INTERNAL MEDICINE

## 2019-06-24 PROCEDURE — 99024 POSTOP FOLLOW-UP VISIT: CPT | Performed by: THORACIC SURGERY (CARDIOTHORACIC VASCULAR SURGERY)

## 2019-06-24 PROCEDURE — 97535 SELF CARE MNGMENT TRAINING: CPT

## 2019-06-24 RX ORDER — AMOXICILLIN 250 MG
1 CAPSULE ORAL 2 TIMES DAILY
Status: DISCONTINUED | OUTPATIENT
Start: 2019-06-24 | End: 2019-06-25 | Stop reason: HOSPADM

## 2019-06-24 RX ORDER — TORSEMIDE 20 MG/1
20 TABLET ORAL 2 TIMES DAILY
Status: DISCONTINUED | OUTPATIENT
Start: 2019-06-24 | End: 2019-06-25 | Stop reason: HOSPADM

## 2019-06-24 RX ADMIN — DOCUSATE SODIUM 100 MG: 100 CAPSULE, LIQUID FILLED ORAL at 08:17

## 2019-06-24 RX ADMIN — PANTOPRAZOLE SODIUM 40 MG: 40 TABLET, DELAYED RELEASE ORAL at 05:11

## 2019-06-24 RX ADMIN — INSULIN LISPRO 6 UNITS: 100 INJECTION, SOLUTION INTRAVENOUS; SUBCUTANEOUS at 11:53

## 2019-06-24 RX ADMIN — HEPARIN SODIUM 5000 UNITS: 5000 INJECTION INTRAVENOUS; SUBCUTANEOUS at 21:40

## 2019-06-24 RX ADMIN — FINASTERIDE 5 MG: 5 TABLET, FILM COATED ORAL at 08:16

## 2019-06-24 RX ADMIN — TORSEMIDE 20 MG: 20 TABLET ORAL at 18:00

## 2019-06-24 RX ADMIN — METOPROLOL TARTRATE 25 MG: 25 TABLET ORAL at 21:41

## 2019-06-24 RX ADMIN — INSULIN GLARGINE 60 UNITS: 100 INJECTION, SOLUTION SUBCUTANEOUS at 21:41

## 2019-06-24 RX ADMIN — INSULIN LISPRO 20 UNITS: 100 INJECTION, SOLUTION INTRAVENOUS; SUBCUTANEOUS at 11:54

## 2019-06-24 RX ADMIN — FERROUS SULFATE TAB 325 MG (65 MG ELEMENTAL FE) 325 MG: 325 (65 FE) TAB at 08:17

## 2019-06-24 RX ADMIN — INSULIN LISPRO 20 UNITS: 100 INJECTION, SOLUTION INTRAVENOUS; SUBCUTANEOUS at 18:01

## 2019-06-24 RX ADMIN — SENNOSIDES AND DOCUSATE SODIUM 1 TABLET: 8.6; 5 TABLET ORAL at 18:00

## 2019-06-24 RX ADMIN — INSULIN LISPRO 4 UNITS: 100 INJECTION, SOLUTION INTRAVENOUS; SUBCUTANEOUS at 08:17

## 2019-06-24 RX ADMIN — HEPARIN SODIUM 5000 UNITS: 5000 INJECTION INTRAVENOUS; SUBCUTANEOUS at 05:11

## 2019-06-24 RX ADMIN — Medication 1 APPLICATION: at 21:41

## 2019-06-24 RX ADMIN — HEPARIN SODIUM 5000 UNITS: 5000 INJECTION INTRAVENOUS; SUBCUTANEOUS at 14:39

## 2019-06-24 RX ADMIN — METOPROLOL TARTRATE 25 MG: 25 TABLET ORAL at 08:16

## 2019-06-24 RX ADMIN — AMIODARONE HYDROCHLORIDE 200 MG: 200 TABLET ORAL at 05:11

## 2019-06-24 RX ADMIN — INSULIN LISPRO 2 UNITS: 100 INJECTION, SOLUTION INTRAVENOUS; SUBCUTANEOUS at 18:01

## 2019-06-24 RX ADMIN — OXYCODONE HYDROCHLORIDE AND ACETAMINOPHEN 1000 MG: 500 TABLET ORAL at 08:16

## 2019-06-24 RX ADMIN — FUROSEMIDE 40 MG: 10 INJECTION, SOLUTION INTRAMUSCULAR; INTRAVENOUS at 08:17

## 2019-06-24 RX ADMIN — POLYETHYLENE GLYCOL 3350 17 G: 17 POWDER, FOR SOLUTION ORAL at 08:17

## 2019-06-24 RX ADMIN — TAMSULOSIN HYDROCHLORIDE 0.4 MG: 0.4 CAPSULE ORAL at 18:00

## 2019-06-24 RX ADMIN — INSULIN LISPRO 20 UNITS: 100 INJECTION, SOLUTION INTRAVENOUS; SUBCUTANEOUS at 08:17

## 2019-06-24 RX ADMIN — ASPIRIN 325 MG ORAL TABLET 325 MG: 325 PILL ORAL at 08:17

## 2019-06-24 RX ADMIN — ATORVASTATIN CALCIUM 80 MG: 80 TABLET, FILM COATED ORAL at 18:00

## 2019-06-24 RX ADMIN — SENNOSIDES AND DOCUSATE SODIUM 1 TABLET: 8.6; 5 TABLET ORAL at 11:54

## 2019-06-24 RX ADMIN — AMIODARONE HYDROCHLORIDE 200 MG: 200 TABLET ORAL at 21:41

## 2019-06-24 RX ADMIN — LATANOPROST 1 DROP: 50 SOLUTION OPHTHALMIC at 21:41

## 2019-06-24 RX ADMIN — Medication 1 APPLICATION: at 08:17

## 2019-06-24 RX ADMIN — AMIODARONE HYDROCHLORIDE 200 MG: 200 TABLET ORAL at 14:39

## 2019-06-25 ENCOUNTER — EPISODE CHANGES (OUTPATIENT)
Dept: CASE MANAGEMENT | Facility: HOSPITAL | Age: 74
End: 2019-06-25

## 2019-06-25 VITALS
OXYGEN SATURATION: 97 % | TEMPERATURE: 98.1 F | RESPIRATION RATE: 18 BRPM | BODY MASS INDEX: 32.38 KG/M2 | HEIGHT: 71 IN | WEIGHT: 231.26 LBS | DIASTOLIC BLOOD PRESSURE: 59 MMHG | SYSTOLIC BLOOD PRESSURE: 108 MMHG | HEART RATE: 67 BPM

## 2019-06-25 LAB
ANION GAP SERPL CALCULATED.3IONS-SCNC: 7 MMOL/L (ref 4–13)
BUN SERPL-MCNC: 55 MG/DL (ref 5–25)
CALCIUM SERPL-MCNC: 9 MG/DL (ref 8.3–10.1)
CHLORIDE SERPL-SCNC: 101 MMOL/L (ref 100–108)
CO2 SERPL-SCNC: 30 MMOL/L (ref 21–32)
CREAT SERPL-MCNC: 2.53 MG/DL (ref 0.6–1.3)
GFR SERPL CREATININE-BSD FRML MDRD: 24 ML/MIN/1.73SQ M
GLUCOSE SERPL-MCNC: 144 MG/DL (ref 65–140)
GLUCOSE SERPL-MCNC: 149 MG/DL (ref 65–140)
GLUCOSE SERPL-MCNC: 190 MG/DL (ref 65–140)
POTASSIUM SERPL-SCNC: 4 MMOL/L (ref 3.5–5.3)
SODIUM SERPL-SCNC: 138 MMOL/L (ref 136–145)

## 2019-06-25 PROCEDURE — 99024 POSTOP FOLLOW-UP VISIT: CPT | Performed by: THORACIC SURGERY (CARDIOTHORACIC VASCULAR SURGERY)

## 2019-06-25 PROCEDURE — 82948 REAGENT STRIP/BLOOD GLUCOSE: CPT

## 2019-06-25 PROCEDURE — 97116 GAIT TRAINING THERAPY: CPT

## 2019-06-25 PROCEDURE — NC001 PR NO CHARGE: Performed by: PHYSICIAN ASSISTANT

## 2019-06-25 PROCEDURE — 80048 BASIC METABOLIC PNL TOTAL CA: CPT | Performed by: PHYSICIAN ASSISTANT

## 2019-06-25 PROCEDURE — 99232 SBSQ HOSP IP/OBS MODERATE 35: CPT | Performed by: INTERNAL MEDICINE

## 2019-06-25 RX ORDER — FERROUS SULFATE 325(65) MG
325 TABLET ORAL
Qty: 90 TABLET | Refills: 0 | Status: SHIPPED | OUTPATIENT
Start: 2019-06-26 | End: 2020-12-29

## 2019-06-25 RX ORDER — OXYCODONE HYDROCHLORIDE AND ACETAMINOPHEN 5; 325 MG/1; MG/1
TABLET ORAL
Qty: 30 TABLET | Refills: 0 | Status: SHIPPED | OUTPATIENT
Start: 2019-06-25 | End: 2019-09-05

## 2019-06-25 RX ORDER — ATORVASTATIN CALCIUM 80 MG/1
80 TABLET, FILM COATED ORAL
Qty: 30 TABLET | Refills: 2 | Status: SHIPPED | OUTPATIENT
Start: 2019-06-25 | End: 2019-09-05 | Stop reason: SDUPTHER

## 2019-06-25 RX ORDER — POLYETHYLENE GLYCOL 3350 17 G/17G
17 POWDER, FOR SOLUTION ORAL DAILY PRN
Qty: 14 EACH | Refills: 0 | Status: SHIPPED | OUTPATIENT
Start: 2019-06-25 | End: 2019-10-23 | Stop reason: HOSPADM

## 2019-06-25 RX ORDER — TAMSULOSIN HYDROCHLORIDE 0.4 MG/1
0.4 CAPSULE ORAL
Qty: 30 CAPSULE | Refills: 1 | Status: SHIPPED | OUTPATIENT
Start: 2019-06-25 | End: 2019-08-19 | Stop reason: SDUPTHER

## 2019-06-25 RX ORDER — TORSEMIDE 20 MG/1
20 TABLET ORAL 2 TIMES DAILY
Qty: 60 TABLET | Refills: 1 | Status: SHIPPED | OUTPATIENT
Start: 2019-06-25 | End: 2019-07-10 | Stop reason: SDUPTHER

## 2019-06-25 RX ORDER — ACETAMINOPHEN 325 MG/1
650 TABLET ORAL EVERY 6 HOURS PRN
Qty: 30 TABLET | Refills: 0 | Status: SHIPPED | OUTPATIENT
Start: 2019-06-25 | End: 2019-10-23 | Stop reason: HOSPADM

## 2019-06-25 RX ORDER — ASPIRIN 325 MG
325 TABLET ORAL DAILY
Qty: 100 TABLET | Refills: 0 | Status: SHIPPED | OUTPATIENT
Start: 2019-06-26 | End: 2021-03-22 | Stop reason: ALTCHOICE

## 2019-06-25 RX ADMIN — OXYCODONE HYDROCHLORIDE AND ACETAMINOPHEN 1000 MG: 500 TABLET ORAL at 08:36

## 2019-06-25 RX ADMIN — INSULIN LISPRO 2 UNITS: 100 INJECTION, SOLUTION INTRAVENOUS; SUBCUTANEOUS at 11:57

## 2019-06-25 RX ADMIN — TORSEMIDE 20 MG: 20 TABLET ORAL at 08:36

## 2019-06-25 RX ADMIN — METOPROLOL TARTRATE 25 MG: 25 TABLET ORAL at 08:36

## 2019-06-25 RX ADMIN — FINASTERIDE 5 MG: 5 TABLET, FILM COATED ORAL at 08:36

## 2019-06-25 RX ADMIN — HEPARIN SODIUM 5000 UNITS: 5000 INJECTION INTRAVENOUS; SUBCUTANEOUS at 05:35

## 2019-06-25 RX ADMIN — SENNOSIDES AND DOCUSATE SODIUM 1 TABLET: 8.6; 5 TABLET ORAL at 08:36

## 2019-06-25 RX ADMIN — Medication 1 APPLICATION: at 08:37

## 2019-06-25 RX ADMIN — AMIODARONE HYDROCHLORIDE 200 MG: 200 TABLET ORAL at 13:48

## 2019-06-25 RX ADMIN — HEPARIN SODIUM 5000 UNITS: 5000 INJECTION INTRAVENOUS; SUBCUTANEOUS at 13:48

## 2019-06-25 RX ADMIN — INSULIN LISPRO 20 UNITS: 100 INJECTION, SOLUTION INTRAVENOUS; SUBCUTANEOUS at 08:36

## 2019-06-25 RX ADMIN — INSULIN LISPRO 20 UNITS: 100 INJECTION, SOLUTION INTRAVENOUS; SUBCUTANEOUS at 11:57

## 2019-06-25 RX ADMIN — OXYCODONE HYDROCHLORIDE AND ACETAMINOPHEN 2 TABLET: 5; 325 TABLET ORAL at 15:05

## 2019-06-25 RX ADMIN — PANTOPRAZOLE SODIUM 40 MG: 40 TABLET, DELAYED RELEASE ORAL at 05:35

## 2019-06-25 RX ADMIN — FERROUS SULFATE TAB 325 MG (65 MG ELEMENTAL FE) 325 MG: 325 (65 FE) TAB at 08:36

## 2019-06-25 RX ADMIN — AMIODARONE HYDROCHLORIDE 200 MG: 200 TABLET ORAL at 05:35

## 2019-06-25 RX ADMIN — ASPIRIN 325 MG ORAL TABLET 325 MG: 325 PILL ORAL at 08:36

## 2019-06-25 RX ADMIN — POLYETHYLENE GLYCOL 3350 17 G: 17 POWDER, FOR SOLUTION ORAL at 08:36

## 2019-06-26 ENCOUNTER — TELEPHONE (OUTPATIENT)
Dept: CARDIAC SURGERY | Facility: CLINIC | Age: 74
End: 2019-06-26

## 2019-06-26 ENCOUNTER — EPISODE CHANGES (OUTPATIENT)
Dept: CASE MANAGEMENT | Facility: OTHER | Age: 74
End: 2019-06-26

## 2019-06-26 DIAGNOSIS — K59.09 OTHER CONSTIPATION: Primary | ICD-10-CM

## 2019-06-26 RX ORDER — DOCUSATE SODIUM 100 MG/1
100 CAPSULE, LIQUID FILLED ORAL 2 TIMES DAILY
Qty: 60 CAPSULE | Refills: 1 | Status: SHIPPED | OUTPATIENT
Start: 2019-06-26 | End: 2021-09-21

## 2019-06-28 ENCOUNTER — LAB REQUISITION (OUTPATIENT)
Dept: LAB | Facility: HOSPITAL | Age: 74
End: 2019-06-28
Payer: MEDICARE

## 2019-06-28 ENCOUNTER — TELEPHONE (OUTPATIENT)
Dept: CARDIAC SURGERY | Facility: CLINIC | Age: 74
End: 2019-06-28

## 2019-06-28 ENCOUNTER — TELEPHONE (OUTPATIENT)
Dept: NEPHROLOGY | Facility: CLINIC | Age: 74
End: 2019-06-28

## 2019-06-28 DIAGNOSIS — I12.9 HYPERTENSIVE CHRONIC KIDNEY DISEASE WITH STAGE 1 THROUGH STAGE 4 CHRONIC KIDNEY DISEASE, OR UNSPECIFIED CHRONIC KIDNEY DISEASE: ICD-10-CM

## 2019-06-28 DIAGNOSIS — N18.30 BENIGN HYPERTENSION WITH CHRONIC KIDNEY DISEASE, STAGE III (HCC): ICD-10-CM

## 2019-06-28 DIAGNOSIS — N18.9 CHRONIC KIDNEY DISEASE-MINERAL AND BONE DISORDER: ICD-10-CM

## 2019-06-28 DIAGNOSIS — N18.30 CHRONIC KIDNEY DISEASE, STAGE III (MODERATE) (HCC): ICD-10-CM

## 2019-06-28 DIAGNOSIS — I12.9 BENIGN HYPERTENSION WITH CHRONIC KIDNEY DISEASE, STAGE III (HCC): ICD-10-CM

## 2019-06-28 DIAGNOSIS — M89.9 CHRONIC KIDNEY DISEASE-MINERAL AND BONE DISORDER: ICD-10-CM

## 2019-06-28 DIAGNOSIS — E83.9 CHRONIC KIDNEY DISEASE-MINERAL AND BONE DISORDER: ICD-10-CM

## 2019-06-28 DIAGNOSIS — N18.30 CKD (CHRONIC KIDNEY DISEASE), STAGE III (HCC): Primary | ICD-10-CM

## 2019-06-28 LAB
ANION GAP SERPL CALCULATED.3IONS-SCNC: 8 MMOL/L (ref 4–13)
BUN SERPL-MCNC: 47 MG/DL (ref 5–25)
CALCIUM SERPL-MCNC: 8.6 MG/DL (ref 8.3–10.1)
CHLORIDE SERPL-SCNC: 101 MMOL/L (ref 100–108)
CO2 SERPL-SCNC: 32 MMOL/L (ref 21–32)
CREAT SERPL-MCNC: 2.56 MG/DL (ref 0.6–1.3)
ERYTHROCYTE [DISTWIDTH] IN BLOOD BY AUTOMATED COUNT: 14.3 % (ref 11.6–15.1)
GFR SERPL CREATININE-BSD FRML MDRD: 24 ML/MIN/1.73SQ M
GLUCOSE SERPL-MCNC: 157 MG/DL (ref 65–140)
HCT VFR BLD AUTO: 27.4 % (ref 36.5–49.3)
HGB BLD-MCNC: 8.7 G/DL (ref 12–17)
MCH RBC QN AUTO: 29.7 PG (ref 26.8–34.3)
MCHC RBC AUTO-ENTMCNC: 31.8 G/DL (ref 31.4–37.4)
MCV RBC AUTO: 94 FL (ref 82–98)
PLATELET # BLD AUTO: 340 THOUSANDS/UL (ref 149–390)
PMV BLD AUTO: 10.7 FL (ref 8.9–12.7)
POTASSIUM SERPL-SCNC: 4.1 MMOL/L (ref 3.5–5.3)
RBC # BLD AUTO: 2.93 MILLION/UL (ref 3.88–5.62)
SODIUM SERPL-SCNC: 141 MMOL/L (ref 136–145)
WBC # BLD AUTO: 7.07 THOUSAND/UL (ref 4.31–10.16)

## 2019-06-28 PROCEDURE — 85027 COMPLETE CBC AUTOMATED: CPT | Performed by: INTERNAL MEDICINE

## 2019-06-28 PROCEDURE — 80048 BASIC METABOLIC PNL TOTAL CA: CPT | Performed by: INTERNAL MEDICINE

## 2019-06-28 NOTE — TELEPHONE ENCOUNTER
I left a message for pt stating that hemoglobin remained low but stable  Dr Martin would like the patient to go for iron studies and occult blood x3 I will place orders in system and mail out     ----- Message from Praveena Shea MD sent at 6/28/2019  9:57 AM EDT -----  The patient's hemoglobin remains low but stable  He needs iron studies at this time  If he has not done so in the recent past he needs stool for occult blood x3 as well

## 2019-06-28 NOTE — TELEPHONE ENCOUNTER
----- Message from Julio Alfaro MD sent at 6/28/2019 11:51 AM EDT -----  Please obtain a basic metabolic profile in 1 week to reassure stability nonfasting

## 2019-07-03 ENCOUNTER — OFFICE VISIT (OUTPATIENT)
Dept: ENDOCRINOLOGY | Facility: CLINIC | Age: 74
End: 2019-07-03
Payer: MEDICARE

## 2019-07-03 VITALS
SYSTOLIC BLOOD PRESSURE: 118 MMHG | BODY MASS INDEX: 31.22 KG/M2 | WEIGHT: 223 LBS | DIASTOLIC BLOOD PRESSURE: 60 MMHG | HEIGHT: 71 IN

## 2019-07-03 DIAGNOSIS — I25.10 TRIPLE VESSEL CORONARY ARTERY DISEASE: ICD-10-CM

## 2019-07-03 DIAGNOSIS — Z95.1 S/P CABG (CORONARY ARTERY BYPASS GRAFT): ICD-10-CM

## 2019-07-03 DIAGNOSIS — E11.22 TYPE 2 DIABETES MELLITUS WITH STAGE 4 CHRONIC KIDNEY DISEASE, WITH LONG-TERM CURRENT USE OF INSULIN (HCC): Primary | ICD-10-CM

## 2019-07-03 DIAGNOSIS — N18.4 BENIGN HYPERTENSION WITH CHRONIC KIDNEY DISEASE, STAGE IV (HCC): ICD-10-CM

## 2019-07-03 DIAGNOSIS — N18.4 TYPE 2 DIABETES MELLITUS WITH STAGE 4 CHRONIC KIDNEY DISEASE, WITH LONG-TERM CURRENT USE OF INSULIN (HCC): Primary | ICD-10-CM

## 2019-07-03 DIAGNOSIS — Z79.4 TYPE 2 DIABETES MELLITUS WITH STAGE 4 CHRONIC KIDNEY DISEASE, WITH LONG-TERM CURRENT USE OF INSULIN (HCC): Primary | ICD-10-CM

## 2019-07-03 DIAGNOSIS — E78.2 MIXED HYPERLIPIDEMIA: ICD-10-CM

## 2019-07-03 DIAGNOSIS — Z79.4 TYPE 2 DIABETES MELLITUS WITH CHRONIC KIDNEY DISEASE, WITH LONG-TERM CURRENT USE OF INSULIN, UNSPECIFIED CKD STAGE (HCC): ICD-10-CM

## 2019-07-03 DIAGNOSIS — I12.9 BENIGN HYPERTENSION WITH CHRONIC KIDNEY DISEASE, STAGE IV (HCC): ICD-10-CM

## 2019-07-03 DIAGNOSIS — E11.22 TYPE 2 DIABETES MELLITUS WITH CHRONIC KIDNEY DISEASE, WITH LONG-TERM CURRENT USE OF INSULIN, UNSPECIFIED CKD STAGE (HCC): ICD-10-CM

## 2019-07-03 PROCEDURE — 99495 TRANSJ CARE MGMT MOD F2F 14D: CPT | Performed by: PHYSICIAN ASSISTANT

## 2019-07-03 RX ORDER — FLASH GLUCOSE SENSOR
KIT MISCELLANEOUS
Qty: 1 DEVICE | Refills: 0 | Status: SHIPPED | OUTPATIENT
Start: 2019-07-03 | End: 2021-12-06

## 2019-07-03 RX ORDER — FLASH GLUCOSE SENSOR
KIT MISCELLANEOUS
Qty: 6 EACH | Refills: 1 | Status: SHIPPED | OUTPATIENT
Start: 2019-07-03 | End: 2019-10-22 | Stop reason: SDUPTHER

## 2019-07-03 NOTE — PROGRESS NOTES
Patient Progress Note      CC: DM2, hospital follow-up      Referring Provider  Jesse Jain Do  [address] [address] [facility], [address] [place] [address]     History of Present Illness:   Anabela Hernandez is a 68 y o  male with a history of type 2 diabetes with long term use of insulin  Diabetes course has been stable  Complications of DM: CAD, CKD  Recently hospitalized for CABG  Denies recent severe hypoglycemic or severe hyperglycemic episodes  Denies any issues with his current regimen  Home glucose monitoring: are performed regularly    Home blood glucose readings:   Before breakfast: 122-251 mg/dl, most readings over 160 mg/dl  Before lunch: 117-286 mg/dl, most readings elevated  Before dinner:  mg/dl, variable readings  Bedtime: 132-211 mg/dl, most readings in 100s range    Current regimen: Toujeo 60 units QHS, Humalog 20 units TID with meals   compliant most of the time, denies any side effects from medications  Injects in: abdomen  Rotates sites: Yes  Hypoglycemic episodes: No, rare  H/o of hypoglycemia causing hospitalization or Intervention such as glucagon injection  or ambulance call :  No  Hypoglycemia symptoms: jitteriness  Treatment of hypoglycemia: orange juice     Medic alert tag: recommended: Yes    Diabetes education: No  Diet: 3 meals per day, 0-1 snacks per day  Timing of meals is predictable  Diabetic diet compliance:  compliant most of the time  Activity: Daily activity is predictable: Yes  The patient engages in little, if any physical activity  Further diabetic ROS: no polyuria or polydipsia, no chest pain, dyspnea or TIAs    Ophthamology: has appointment in 2 weeks  Podiatry: had appointment last week    Not on ACE inhibitor/ARB  Has hyperlipidemia: on statin - tolerating well, no myalgias  compliant most of the time, denies any side effects from medications    Thyroid disorders: No  History of pancreatitis: No    Patient Active Problem List   Diagnosis    CKD (chronic kidney disease), stage III (HCC)    Benign hypertension with chronic kidney disease, stage IV (HCC)    Chronic kidney disease-mineral and bone disorder    Type 2 diabetes mellitus with chronic kidney disease, with long-term current use of insulin (HCC)    Bilateral lower extremity edema    HARJIT (obstructive sleep apnea)    Chronic kidney disease    Triple vessel coronary artery disease    Hyperlipidemia    S/P CABG (coronary artery bypass graft)    Acute postoperative pulmonary insufficiency (HCC)    Blood loss anemia    Hyperchloremia    Postoperative anemia due to acute blood loss    Other constipation      Past Medical History:   Diagnosis Date    Diabetes mellitus ([facility] [address] )     Hyperlipidemia     Hypertension     Renal disorder       Past Surgical History:   Procedure Laterality Date    BACK SURGERY      GALLBLADDER SURGERY      HERNIA REPAIR      MO CABG, ARTERY-VEIN, FOUR N/A 6/21/2019    Procedure: CORONARY ARTERY BYPASS GRAFT (CABG) 4 VESSELS WITH SVG TO PDA, OM, DIAGONAL AND LIMA TO LAD; RIGHT LEG EVH;  Surgeon: Herman Louis MD;  Location: [facility];  Service: Cardiac Surgery      Family History   Problem Relation Age of Onset    Cancer Mother     Cancer Father      Social History     Tobacco Use    Smoking status: Former Smoker     Packs/day: 3 00     Years: 30 00     Pack years: 90 00    Smokeless tobacco: Never Used    Tobacco comment: Quit 1990   Substance Use Topics    Alcohol use: Not Currently     Comment: 1 drink every 6 months       No Known Allergies      Current Outpatient Medications:     acetaminophen (TYLENOL) 325 mg tablet, Take 2 tablets (650 mg total) by mouth every 6 (six) hours as needed for mild pain, headaches or fever, Disp: 30 tablet, Rfl: 0    Ascorbic Acid (VITAMIN C) 1000 MG tablet, Take 1,000 mg by mouth daily, Disp: , Rfl:     aspirin 325 mg tablet, Take 1 tablet (325 mg total) by mouth daily, Disp: 100 tablet, Rfl: 0    atorvastatin (LIPITOR) 80 mg tablet, Take 1 tablet (80 mg total) by mouth daily with dinner, Disp: 30 tablet, Rfl: 2    Choline Fenofibrate (FENOFIBRIC ACID) 135 MG CPDR, Take 135 mg by mouth daily, Disp: , Rfl:     cyanocobalamin (VITAMIN B-12) 500 mcg tablet, Take 500 mcg by mouth daily, Disp: , Rfl:     docusate sodium (COLACE) 100 mg capsule, Take 1 capsule (100 mg total) by mouth 2 (two) times a day, Disp: 60 capsule, Rfl: 1    Doxepin HCl (SILENOR) 6 MG TABS, Take 6 mg by mouth daily, Disp: , Rfl:     ferrous sulfate 325 (65 Fe) mg tablet, Take 1 tablet (325 mg total) by mouth daily with breakfast for 90 days, Disp: 90 tablet, Rfl: 0    finasteride (PROSCAR) 5 mg tablet, Take 5 mg by mouth daily, Disp: , Rfl:     Insulin Glargine (TOUJEO MAX SOLOSTAR) 300 units/mL CONCETRATED U-300 injection pen, Inject 64 Units under the skin daily at bedtime, Disp: 3 pen, Rfl: 0    insulin lispro (HUMALOG KWIKPEN) 100 units/mL injection pen, Inject 22-20-20 units TID with meals plus scale (using up to 80 units a day), Disp: 30 pen, Rfl: 1    latanoprost (XALATAN) 0 005 % ophthalmic solution, 1 drop daily at bedtime, Disp: , Rfl:     metoprolol tartrate (LOPRESSOR) 25 mg tablet, Take 1 tablet (25 mg total) by mouth every 12 (twelve) hours, Disp: 60 tablet, Rfl: 2    omeprazole (PriLOSEC) 40 MG capsule, Take 40 mg by mouth daily, Disp: , Rfl:     polyethylene glycol (MIRALAX) 17 g packet, Take 17 g by mouth daily as needed (as needed for constipation), Disp: 14 each, Rfl: 0    tamsulosin (FLOMAX) 0 4 mg, Take 1 capsule (0 4 mg total) by mouth daily with dinner, Disp: 30 capsule, Rfl: 1    torsemide (DEMADEX) 20 mg tablet, Take 1 tablet (20 mg total) by mouth 2 (two) times a day, Disp: 60 tablet, Rfl: 1    Continuous Blood Gluc  (FREESTYLE JOHANNE READER) MAMI, Use device to scan blood glucose at least 4 times a day, Disp: 1 Device, Rfl: 0    Continuous Blood Gluc Sensor (FREESTYLE JOHANNE SENSOR SYSTEM) MISC, Apply sensor every 14 days to check blood glucose at least 4 times a day, Disp: 6 each, Rfl: 1    oxyCODONE-acetaminophen (PERCOCET) 5-325 mg per tablet, Take 1 tablet every 4 hours as needed for moderate pain or take 2 tablets every 6 hours as needed for severe pain  (Patient not taking: Reported on 7/3/2019), Disp: 30 tablet, Rfl: 0  Review of Systems   Constitutional: Negative for activity change, appetite change, fatigue (improving daily) and unexpected weight change  HENT: Negative for trouble swallowing  Eyes: Negative for visual disturbance  Respiratory: Negative for shortness of breath  Cardiovascular: Negative for chest pain and palpitations  Gastrointestinal: Negative for constipation and diarrhea  Endocrine: Negative for polydipsia and polyuria  Musculoskeletal: Negative  Skin: Negative for wound  Neurological: Negative for numbness  Psychiatric/Behavioral: Negative  Physical Exam:  Body mass index is 31 1 kg/m²  /60   Ht 5' 11" (1 803 m)   Wt 101 kg (223 lb)   BMI 31 10 kg/m²    Wt Readings from Last 3 Encounters:   07/03/19 101 kg (223 lb)   06/25/19 105 kg (231 lb 4 2 oz)   06/17/19 108 kg (238 lb 1 6 oz)       Physical Exam   Constitutional: He appears well-developed and well-nourished  HENT:   Head: Normocephalic  Eyes: Pupils are equal, round, and reactive to light  EOM are normal  No scleral icterus  Neck: Neck supple  No thyromegaly present  Cardiovascular: Normal rate and regular rhythm  No murmur heard  Pulses:       Radial pulses are 2+ on the right side, and 2+ on the left side  Pulmonary/Chest: Effort normal and breath sounds normal  No respiratory distress  He has no wheezes  Neurological: He is alert  He has normal reflexes  Skin: Skin is warm and dry  Psychiatric: He has a normal mood and affect  Nursing note and vitals reviewed  Patient's shoes and socks were not removed  Labs:    Ref   Range 6/28/2019 09:00   Sodium Latest Ref Range: 136 - 145 mmol/L 141   Potassium Latest Ref Range: 3 5 - 5 3 mmol/L 4 1   Chloride Latest Ref Range: 100 - 108 mmol/L 101   CO2 Latest Ref Range: 21 - 32 mmol/L 32   Anion Gap Latest Ref Range: 4 - 13 mmol/L 8   BUN Latest Ref Range: 5 - 25 mg/dL 47 (H)   Creatinine Latest Ref Range: 0 60 - 1 30 mg/dL 2 56 (H)   Glucose, Random Latest Ref Range: 65 - 140 mg/dL 157 (H)   Calcium Latest Ref Range: 8 3 - 10 1 mg/dL 8 6   eGFR Latest Units: ml/min/1 73sq m 24      Ref  Range 6/16/2019 06:03   Cholesterol Latest Ref Range: 50 - 200 mg/dL 139   Triglycerides Latest Ref Range: <=150 mg/dL 30   HDL Latest Ref Range: 40 - 60 mg/dL 84 (H)   LDL Direct Latest Ref Range: 0 - 100 mg/dL 49      Ref  Range 5/6/2019 00:00 5/13/2019 00:00 6/17/2019 05:57   Hemoglobin A1C Latest Ref Range: 4 2 - 6 3 % 14 0 >14 0 10 3 (H)      Ref  Range 6/17/2019 11:32   EXT Creatinine Urine Latest Units: mg/dL 51 1   Protein Urine Random Latest Units: mg/dL 22   Prot/Creat Ratio, Ur Latest Ref Range: 0 00 - 0 10  0 43 (H)       Plan:    Diagnoses and all orders for this visit:    Type 2 diabetes mellitus with stage 4 chronic kidney disease, with long-term current use of insulin ([facility])  HGA1C 10 3%  Improved  Previously over 14%  Treatment regimen: Increase Humalog at breakfast to 22 units as blood glucose at lunch is often elevated  Increase Toujeo to 64 units QHS as morning blood glucose is elevated  Start scale  Send log in 1 week for review and further adjustments  Discussed intensive insulin regimen does increase risk for hypoglycemia  Episodes of hypoglycemia can lead to permanent disability and death  Discussed risks/complications associated with uncontrolled diabetes  Advised to adhere to diabetic diet  Keep carbohydrates consistent to limit blood glucose fluctuations  Advised to call if blood sugars less than 70 mg/dl or over 300 mg/dl  Check blood glucose 4+ times a day  Discussed symptoms and treatment of hypoglycemia  Discussed use of CGM to collect additional blood glucose data to reveal trends and patterns that can be used to optimize treatment plan  Prescribed Freestyle Celia  Discussed Celia vs  Dexcom  Referred to diabetes/nutrition education  Recommended routine follow-up with podiatry and ophthalmology  Send log/download sensor in 1 week  Ordered blood work to complete prior to next visit  -     Hemoglobin A1C; Future  -     Basic metabolic panel; Future  -     Ambulatory referral to medical nutrition therapy for diabetes; Future  -     Ambulatory referral to Diabetic Education; Future  -     insulin lispro (HUMALOG KWIKPEN) 100 units/mL injection pen; Inject 22-20-20 units TID with meals plus scale (using up to 80 units a day)  -     Continuous Blood Gluc  (FREESTYLE CELIA READER) MAMI; Use device to scan blood glucose at least 4 times a day  -     Continuous Blood Gluc Sensor ([address] [place] [address]) MISC; Apply sensor every 14 days to check blood glucose at least 4 times a day    Benign hypertension with chronic kidney disease, stage IV (HCC)  Blood pressure well controlled, continue current treatment  Managed by nephrology  -     Basic metabolic panel; Future    Mixed hyperlipidemia  LDL 49, at goal  Continue statin therapy  Managed by cardiology      Discussed with the patient diagnosis and treatment and all questions fully answered  He will call me if any problems arise  Counseled patient on diagnostic results, prognosis, risk and benefit of treatment options, instruction for management, importance of treatment compliance, risk factor reduction and impressions        Farnaz Crooks PA-C

## 2019-07-03 NOTE — PATIENT INSTRUCTIONS
INSULIN DOSAGE INSTRUCTIONS    Name: Tao Redding                        : 1945  MRN #: [id number]    Your Current Insulin  and dose is: Before Breakfast Before Lunch Before Evening Meal Bedtime     Humalog Insulin     22 units     20 units   20 units    Regular, Apidra, Humalog orNovolog Sliding Scale:   <80              151-200 +2  +2 +2    201-250 +4 +4 +4 +   251-300 +6 +6 +6 +   301-350 +8 +8 +8 +   >350 +10 +10 +10 +     Toujeo    64 units     Additional Instructions:   Please test your blood sugar:  _4_ Times per day  X_ Before Breakfast                _ Alternate Testing  X_ Before Lunch                _ 2 Hours After  Meal  X_ Before Evening Meal               _ 3 a m   X_ Before Bedtime Snack     Target Blood sugar range _80_to _180__  Call if your blood sugar is less than _70_ or greater than _350__  Today's Date: 7/3/2019    Hypoglycemia instructions   Tao Redding  7/3/2019  [id number]    Low Blood Sugar    Steps to treat low blood sugar  1  Test blood sugar if you have symptoms of low blood sugar:   Low Blood Sugar Symptoms:  o Sweaty  o Dizzy  o Rapid heartbeat  o Shaky  o Bad mood  o Hungry      2  Treat blood sugar less than 70 with 15 grams of fast-acting carbohydrate:   Examples of 15 grams Fast-Acting Carbohydrate:  o 4 oz juice  o 4 oz regular soda  o 3-4 glucose tablets (chew)  o 3-4 hard candies (chew)          3  Wait 15 minutes and test your blood sugar again     4   If blood sugar is less than 100, repeat steps 2-3     5  When your blood sugar is 100 or more, eat a snack if it will be longer than one hour until your next meal  The snack should be 15 grams of carbohydrate and a protein:   Examples of snacks:  o ½ sandwich  o 6 crackers with cheese  o Piece of fruit with cheese or peanut butter  o 6 crackers with peanut butter

## 2019-07-05 NOTE — PROGRESS NOTES
Hospital Follow Up Office Visit Note -     Stefan Eduardo   68 y o    male   MRN: [id number]  [address]  [address]  [place] [address] [place] [id number] [phone]    PCP: Corry Anand DO  Cardiologist :  Will be Dr Cyndi Graham        Impression/plan  CAD ,S/P CABG x 4, LIMA-->LAD, SVG--> D1,OM1, rPDA 6/21/19;   adm 6/16-- 6/25/19  NSTEMI, peak troponin 17 619/19  --EKG today: NSR  Chronic diastolic HF  --diuretic:  Torsemide decreased to 20 daily July 9, per Nephrology  Previously 20 b i d   --beta-blocker:  Metoprolol tartrate 25 b i d  Hypertension  /74 On BB/ loop diuretic  Hyperlipidemia  On atorvastatin 40 mg/d in fenofibrate 135 mg daily  Labs 6/16/19:  dLDL 49, total cholesterol 139, HDL 84, triglycerides 30  DM2, uncontrolled  HgA1c 10 2, improved from 14  --Consider adding an SGLT2 inhibitor-Empagliflozin or Canagliflozin with proven CV benefits ( PAULA et al   2018 [facility] expert consensus decision pathway on novel therapies for cardiovascular risk reduction in patients with type 2 diabetes and atherosclerotic cardiovascular disease: a report of the [employer] of Cardiology)   HARJIT, Rx with CPAP  CKDlll, previous baseline 1 6-1 8, now 2 3--2 7, per nephr  Obesity   PVD  --Left carotid artery stenosis   Cardiac testing  --TTE 6/16/19:  Ejection fraction 60%  NO RWMA  Grade 1 DD  Moderate concentric hypertrophy  GERRI; L > R  --cardiac cath 6/17: pLAD 75% stenosis, 1st diag 80% stenosis, pLCx 100% , pRCA 100%            Summary of Recommendations  Continue current cardiac medications  A heart healthy diet recommended  He has an appointment with a diabetic dietitian in the near future  A BMP has been requested in a week by Nephrology  Follow-up with CT surgery 7/25  Follow up will be scheduled with Dr Cyndi Graham            HPI:   Stefan Eduardo is a 68y o  year old male who has a history of uncontrolled type 2 diabetes, hypertension, CKD, and obstructive sleep apnea; no previously known coronary artery disease heart failure nor arrhythmias  He has a prior 60 pack year tobacco habit quitting in 1990 after 3 packs per day for 30 years  Recently, he presented to [facility] room June 15th with shortness of breath and difficulty swallowing  He was found to have a type 1 non STEMI; cardiac catheterization showed multivessel disease  He was transferred to [facility] and underwent coronary bypass grafting x4 June 21st   He is being followed closely for his chronic kidney disease    7/11/19 He presents for cardiology follow-up  He feels well, no chest pain or shortness of breath  No lightheadedness no dizziness  Compliant with medications                Assessment  Diagnoses and all orders for this visit:    S/P CABG (coronary artery bypass graft)    Mixed hyperlipidemia    CKD (chronic kidney disease), stage III ([facility] )    Benign hypertension with chronic kidney disease, stage IV ([facility])    Type 2 diabetes mellitus with stage 4 chronic kidney disease, with long-term current use of insulin ([facility])    HARJIT (obstructive sleep apnea)        Past Medical History:   Diagnosis Date    Diabetes mellitus ([place] [address] )     Hyperlipidemia     Hypertension     Renal disorder        Review of Systems   Constitution: Negative for chills  Cardiovascular: Negative for chest pain, claudication, cyanosis, dyspnea on exertion, irregular heartbeat, leg swelling, near-syncope, orthopnea, palpitations, paroxysmal nocturnal dyspnea and syncope  Respiratory: Negative for cough and shortness of breath  Gastrointestinal: Negative for heartburn and nausea  Neurological: Negative for dizziness, focal weakness, headaches, light-headedness and weakness  All other systems reviewed and are negative  No Known Allergies        Current Outpatient Medications:     acetaminophen (TYLENOL) 325 mg tablet, Take 2 tablets (650 mg total) by mouth every 6 (six) hours as needed for mild pain, headaches or fever, Disp: 30 tablet, Rfl: 0    Ascorbic Acid (VITAMIN C) 1000 MG tablet, Take 1,000 mg by mouth daily, Disp: , Rfl:     aspirin 325 mg tablet, Take 1 tablet (325 mg total) by mouth daily, Disp: 100 tablet, Rfl: 0    atorvastatin (LIPITOR) 80 mg tablet, Take 1 tablet (80 mg total) by mouth daily with dinner, Disp: 30 tablet, Rfl: 2    Choline Fenofibrate (FENOFIBRIC ACID) 135 MG CPDR, Take 135 mg by mouth daily, Disp: , Rfl:     Continuous Blood Gluc  (FREESTYLE JOHANNE READER) MAMI, Use device to scan blood glucose at least 4 times a day, Disp: 1 Device, Rfl: 0    Continuous Blood Gluc Sensor ([address] [place] [address]) [facility], Apply sensor every 14 days to check blood glucose at least 4 times a day, Disp: 6 each, Rfl: 1    cyanocobalamin (VITAMIN B-12) 500 mcg tablet, Take 500 mcg by mouth daily, Disp: , Rfl:     docusate sodium (COLACE) 100 mg capsule, Take 1 capsule (100 mg total) by mouth 2 (two) times a day, Disp: 60 capsule, Rfl: 1    Doxepin HCl (SILENOR) 6 MG TABS, Take 6 mg by mouth daily, Disp: , Rfl:     ferrous sulfate 325 (65 Fe) mg tablet, Take 1 tablet (325 mg total) by mouth daily with breakfast for 90 days, Disp: 90 tablet, Rfl: 0    finasteride (PROSCAR) 5 mg tablet, Take 5 mg by mouth daily, Disp: , Rfl:     Insulin Glargine (TOUJEO MAX SOLOSTAR) 300 units/mL CONCETRATED U-300 injection pen, Inject 54 Units under the skin daily at bedtime, Disp: 3 pen, Rfl: 0    insulin lispro (HUMALOG KWIKPEN) 100 units/mL injection pen, Inject 18 Units under the skin 3 (three) times a day with meals plus scale (using up to 80 units a day), Disp: 30 pen, Rfl: 1    latanoprost (XALATAN) 0 005 % ophthalmic solution, 1 drop daily at bedtime, Disp: , Rfl:     metoprolol tartrate (LOPRESSOR) 25 mg tablet, Take 1 tablet (25 mg total) by mouth every 12 (twelve) hours, Disp: 60 tablet, Rfl: 2    omeprazole (PriLOSEC) 40 MG capsule, Take 40 mg by mouth daily, Disp: , Rfl:     oxyCODONE-acetaminophen (PERCOCET) 5-325 mg per tablet, Take 1 tablet every 4 hours as needed for moderate pain or take 2 tablets every 6 hours as needed for severe pain  (Patient not taking: Reported on 7/3/2019), Disp: 30 tablet, Rfl: 0    polyethylene glycol (MIRALAX) 17 g packet, Take 17 g by mouth daily as needed (as needed for constipation), Disp: 14 each, Rfl: 0    tamsulosin (FLOMAX) 0 4 mg, Take 1 capsule (0 4 mg total) by mouth daily with dinner, Disp: 30 capsule, Rfl: 1    torsemide (DEMADEX) 20 mg tablet, Take 1 tablet (20 mg total) by mouth 2 (two) times a day, Disp: 60 tablet, Rfl: 1    Social History     Socioeconomic History    Marital status: /Civil Union     Spouse name: Not on file    Number of children: Not on file    Years of education: Not on file    Highest education level: Not on file   Occupational History    Not on file   Social Needs    Financial resource strain: Not on file    Food insecurity:     Worry: Not on file     Inability: Not on file    Transportation needs:     Medical: Not on file     Non-medical: Not on file   Tobacco Use    Smoking status: Former Smoker     Packs/day: 3 00     Years: 30 00     Pack years: 90 00    Smokeless tobacco: Never Used    Tobacco comment: Quit 1990   Substance and Sexual Activity    Alcohol use: Not Currently     Comment: 1 drink every 6 months      Drug use: Never    Sexual activity: Not on file   Lifestyle    Physical activity:     Days per week: Not on file     Minutes per session: Not on file    Stress: Not on file   Relationships    Social connections:     Talks on phone: Not on file     Gets together: Not on file     Attends Gnosticism service: Not on file     Active member of club or organization: Not on file     Attends meetings of clubs or organizations: Not on file     Relationship status: Not on file    Intimate partner violence:     Fear of current or ex partner: Not on file Emotionally abused: Not on file     Physically abused: Not on file     Forced sexual activity: Not on file   Other Topics Concern    Not on file   Social History Narrative    Not on file       Family History   Problem Relation Age of Onset    Cancer Mother     Cancer Father        Physical Exam   Constitutional: He is oriented to person, place, and time  No distress  HENT:   Head: Normocephalic and atraumatic  Eyes: Conjunctivae and EOM are normal    Neck: Normal range of motion  Neck supple  Cardiovascular: Normal rate, regular rhythm, normal heart sounds and intact distal pulses  Pulmonary/Chest: Effort normal and breath sounds normal    Abdominal: Soft  Bowel sounds are normal    Musculoskeletal: Normal range of motion  Neurological: He is alert and oriented to person, place, and time  Skin: Skin is warm and dry  Incisions clean dry with skin edges well-approximated  No erythema or drainage   Psychiatric: He has a normal mood and affect  Nursing note and vitals reviewed  Vitals: There were no vitals taken for this visit     Wt Readings from Last 3 Encounters:   19 101 kg (222 lb 12 8 oz)   19 101 kg (223 lb)   19 105 kg (231 lb 4 2 oz)         Labs & Results:  Lab Results   Component Value Date    WBC 7 07 2019    HGB 8 7 (L) 2019    HCT 27 4 (L) 2019    MCV 94 2019     2019     No results found for: BNP  No components found for: CHEM    Results for orders placed during the hospital encounter of 06/15/19   Echo complete with contrast if indicated    Narrative [facility], [address] [facility]  [phone]    Transthoracic Echocardiogram  2D, M-mode, Doppler, and Color Doppler    Study date:  2019    Patient: Joao Olson  MR number: [id number]  Account number: [de-identified]  : 1945  Age: 68 years  Gender: Male  Status: Inpatient  Location: Bedside  Height: 71 in  Weight: 237 6 lb  BP: 119/ 63 mmHg    Indications: Pulmonary embolism  Diagnoses: I26 99 - Other pulmonary embolism without acute cor pulmonale    Sonographer:  Kilo Mohan RDCS  Primary Physician:  Herman Bob DO  Referring Physician:  Josefina Mcknight PA-C  Group:  [id number]  Interpreting Physician:  Silvia Duenas MD    SUMMARY    LEFT VENTRICLE:  Systolic function was normal  Ejection fraction was estimated to be 60 %  There were no regional wall motion abnormalities  Wall thickness was moderately increased  There was moderate concentric hypertrophy  Doppler parameters were consistent with abnormal left ventricular relaxation (grade 1 diastolic dysfunction)  Doppler parameters were consistent with high ventricular filling pressure  RIGHT VENTRICLE:  The size was normal   Systolic function was normal     LEFT ATRIUM:  The atrium was moderately dilated  RIGHT ATRIUM:  The atrium was mildly dilated  HISTORY: PRIOR HISTORY: CKD3  Hypertension  DM2  HARJIT  PROCEDURE: The procedure was performed at the bedside  This was a routine study  The transthoracic approach was used  The study included complete 2D imaging, M-mode, complete spectral Doppler, and color Doppler  The heart rate was 71 bpm,  at the start of the study  Image quality was adequate  LEFT VENTRICLE: Size was normal  Systolic function was normal  Ejection fraction was estimated to be 60 %  There were no regional wall motion abnormalities  Wall thickness was moderately increased  There was moderate concentric  hypertrophy  DOPPLER: Doppler parameters were consistent with abnormal left ventricular relaxation (grade 1 diastolic dysfunction)  Doppler parameters were consistent with high ventricular filling pressure  RIGHT VENTRICLE: The size was normal  Systolic function was normal  Wall thickness was normal     LEFT ATRIUM: The atrium was moderately dilated  RIGHT ATRIUM: The atrium was mildly dilated      MITRAL VALVE: Valve structure was normal  There was normal leaflet separation  DOPPLER: The transmitral velocity was within the normal range  There was no evidence for stenosis  There was no significant regurgitation  AORTIC VALVE: The valve was trileaflet  Leaflets exhibited mildly increased thickness, normal cuspal separation, and sclerosis  DOPPLER: Transaortic velocity was within the normal range  There was no evidence for stenosis  There was no  significant regurgitation  TRICUSPID VALVE: The valve structure was normal  There was normal leaflet separation  DOPPLER: The transtricuspid velocity was within the normal range  There was no evidence for stenosis  There was no significant regurgitation  PULMONIC VALVE: Leaflets exhibited normal thickness, no calcification, and normal cuspal separation  DOPPLER: The transpulmonic velocity was within the normal range  There was no significant regurgitation  PERICARDIUM: There was no pericardial effusion  The pericardium was normal in appearance  AORTA: The root exhibited normal size  SYSTEMIC VEINS: IVC: The inferior vena cava was normal in size   Respirophasic changes were normal     MEASUREMENT TABLES    OTHER ECHO MEASUREMENTS  (Reference normals)  Estimated CVP   5 mmHg   (--)    SYSTEM MEASUREMENT TABLES    2D  %FS: 28 52 %  Ao Diam: 3 53 cm  EDV(Teich): 94 27 ml  EF(Teich): 55 1 %  ESV(Teich): 42 33 ml  IVSd: 1 37 cm  LA Area: 25 6 cm2  LA Diam: 3 77 cm  LVEDV MOD A4C: 79 22 ml  LVEF MOD A4C: 58 43 %  LVESV MOD A4C: 32 93 ml  LVIDd: 4 54 cm  LVIDs: 3 24 cm  LVLd A4C: 9 47 cm  LVLs A4C: 8 61 cm  LVOT Diam: 2 13 cm  LVPWd: 1 29 cm  RA Area: 18 18 cm2  RVIDd: 3 18 cm  SV MOD A4C: 46 29 ml  SV(Teich): 51 94 ml    MM  TAPSE: 2 13 cm    PW  E': 0 05 m/s  E/E': 18 25  MV A Nick: 1 19 m/s  MV Dec [place]: 4 16 m/s2  MV DecT: 240 11 ms  MV E Nick: 1 m/s  MV E/A Ratio: 0 84  MV PHT: 69 63 ms  MVA By PHT: 3 16 cm2    [facility]    Prepared and electronically signed by    Avril East MD  Signed 17-Jun-2019 14:56:09       No results found for this or any previous visit  This note was completed in part utilizing m-[employer]ftware fluency direct voice recognition software  Grammatical errors, random word insertion, spelling mistakes, and incomplete sentences may be an occasional consequence of the system secondary to software limitations, ambient noise and hardware issues  At the time of dictation, efforts were made to edit, clarify and /or correct errors  Please read the chart carefully and recognize, using context, where substitutions have occurred    If you have any questions or concerns about the context, text or information contained within the body of this dictation, please contact myself, the provider, for further clarification

## 2019-07-06 ENCOUNTER — LAB REQUISITION (OUTPATIENT)
Dept: LAB | Facility: HOSPITAL | Age: 74
End: 2019-07-06
Payer: MEDICARE

## 2019-07-06 DIAGNOSIS — I12.9 HYPERTENSIVE CHRONIC KIDNEY DISEASE WITH STAGE 1 THROUGH STAGE 4 CHRONIC KIDNEY DISEASE, OR UNSPECIFIED CHRONIC KIDNEY DISEASE: ICD-10-CM

## 2019-07-06 LAB
ANION GAP SERPL CALCULATED.3IONS-SCNC: 10 MMOL/L (ref 4–13)
BUN SERPL-MCNC: 46 MG/DL (ref 5–25)
CALCIUM SERPL-MCNC: 9 MG/DL (ref 8.3–10.1)
CHLORIDE SERPL-SCNC: 102 MMOL/L (ref 100–108)
CO2 SERPL-SCNC: 31 MMOL/L (ref 21–32)
CREAT SERPL-MCNC: 2.78 MG/DL (ref 0.6–1.3)
FERRITIN SERPL-MCNC: 183 NG/ML (ref 8–388)
GFR SERPL CREATININE-BSD FRML MDRD: 22 ML/MIN/1.73SQ M
GLUCOSE SERPL-MCNC: 76 MG/DL (ref 65–140)
IRON SATN MFR SERPL: 10 %
IRON SERPL-MCNC: 34 UG/DL (ref 65–175)
IRON SERPL-MCNC: 34 UG/DL (ref 65–175)
POTASSIUM SERPL-SCNC: 3.8 MMOL/L (ref 3.5–5.3)
SODIUM SERPL-SCNC: 143 MMOL/L (ref 136–145)
TIBC SERPL-MCNC: 335 UG/DL (ref 250–450)

## 2019-07-06 PROCEDURE — 83540 ASSAY OF IRON: CPT | Performed by: THORACIC SURGERY (CARDIOTHORACIC VASCULAR SURGERY)

## 2019-07-06 PROCEDURE — 82728 ASSAY OF FERRITIN: CPT | Performed by: THORACIC SURGERY (CARDIOTHORACIC VASCULAR SURGERY)

## 2019-07-06 PROCEDURE — 83550 IRON BINDING TEST: CPT | Performed by: THORACIC SURGERY (CARDIOTHORACIC VASCULAR SURGERY)

## 2019-07-06 PROCEDURE — 87086 URINE CULTURE/COLONY COUNT: CPT | Performed by: INTERNAL MEDICINE

## 2019-07-06 PROCEDURE — 80048 BASIC METABOLIC PNL TOTAL CA: CPT | Performed by: THORACIC SURGERY (CARDIOTHORACIC VASCULAR SURGERY)

## 2019-07-07 LAB — BACTERIA UR CULT: NORMAL

## 2019-07-08 ENCOUNTER — TRANSCRIBE ORDERS (OUTPATIENT)
Dept: LAB | Facility: CLINIC | Age: 74
End: 2019-07-08

## 2019-07-08 ENCOUNTER — APPOINTMENT (OUTPATIENT)
Dept: LAB | Facility: CLINIC | Age: 74
End: 2019-07-08
Payer: MEDICARE

## 2019-07-08 DIAGNOSIS — I12.9 BENIGN HYPERTENSION WITH CHRONIC KIDNEY DISEASE, STAGE III (HCC): ICD-10-CM

## 2019-07-08 DIAGNOSIS — N18.30 CKD (CHRONIC KIDNEY DISEASE), STAGE III (HCC): ICD-10-CM

## 2019-07-08 DIAGNOSIS — M89.9 CHRONIC KIDNEY DISEASE-MINERAL AND BONE DISORDER: ICD-10-CM

## 2019-07-08 DIAGNOSIS — N18.9 CHRONIC KIDNEY DISEASE-MINERAL AND BONE DISORDER: ICD-10-CM

## 2019-07-08 DIAGNOSIS — E83.9 CHRONIC KIDNEY DISEASE-MINERAL AND BONE DISORDER: ICD-10-CM

## 2019-07-08 DIAGNOSIS — N18.30 BENIGN HYPERTENSION WITH CHRONIC KIDNEY DISEASE, STAGE III (HCC): ICD-10-CM

## 2019-07-08 LAB
DATE SPECIMEN #1: NORMAL
DATE SPECIMEN #2: NORMAL
DATE SPECIMEN #3: NORMAL
HEMOCCULT SP1 STL QL: NEGATIVE
HEMOCCULT SP2 STL QL: NEGATIVE
HEMOCCULT SP3 STL QL: NEGATIVE

## 2019-07-08 PROCEDURE — 82270 OCCULT BLOOD FECES: CPT

## 2019-07-09 ENCOUNTER — OFFICE VISIT (OUTPATIENT)
Dept: NEPHROLOGY | Facility: CLINIC | Age: 74
End: 2019-07-09
Payer: MEDICARE

## 2019-07-09 VITALS
WEIGHT: 222.8 LBS | BODY MASS INDEX: 31.19 KG/M2 | HEIGHT: 71 IN | SYSTOLIC BLOOD PRESSURE: 100 MMHG | DIASTOLIC BLOOD PRESSURE: 56 MMHG

## 2019-07-09 DIAGNOSIS — N18.9 CHRONIC KIDNEY DISEASE-MINERAL AND BONE DISORDER: ICD-10-CM

## 2019-07-09 DIAGNOSIS — N18.4 BENIGN HYPERTENSION WITH CHRONIC KIDNEY DISEASE, STAGE IV (HCC): ICD-10-CM

## 2019-07-09 DIAGNOSIS — E11.22 TYPE 2 DIABETES MELLITUS WITH STAGE 4 CHRONIC KIDNEY DISEASE, WITH LONG-TERM CURRENT USE OF INSULIN (HCC): ICD-10-CM

## 2019-07-09 DIAGNOSIS — E83.9 CHRONIC KIDNEY DISEASE-MINERAL AND BONE DISORDER: ICD-10-CM

## 2019-07-09 DIAGNOSIS — E11.22 TYPE 2 DIABETES MELLITUS WITH CHRONIC KIDNEY DISEASE, WITH LONG-TERM CURRENT USE OF INSULIN, UNSPECIFIED CKD STAGE (HCC): ICD-10-CM

## 2019-07-09 DIAGNOSIS — M89.9 CHRONIC KIDNEY DISEASE-MINERAL AND BONE DISORDER: ICD-10-CM

## 2019-07-09 DIAGNOSIS — Z79.4 TYPE 2 DIABETES MELLITUS WITH STAGE 4 CHRONIC KIDNEY DISEASE, WITH LONG-TERM CURRENT USE OF INSULIN (HCC): ICD-10-CM

## 2019-07-09 DIAGNOSIS — I12.9 BENIGN HYPERTENSION WITH CHRONIC KIDNEY DISEASE, STAGE IV (HCC): ICD-10-CM

## 2019-07-09 DIAGNOSIS — N18.30 CKD (CHRONIC KIDNEY DISEASE), STAGE III (HCC): Primary | ICD-10-CM

## 2019-07-09 DIAGNOSIS — I25.10 TRIPLE VESSEL CORONARY ARTERY DISEASE: ICD-10-CM

## 2019-07-09 DIAGNOSIS — Z79.4 TYPE 2 DIABETES MELLITUS WITH CHRONIC KIDNEY DISEASE, WITH LONG-TERM CURRENT USE OF INSULIN, UNSPECIFIED CKD STAGE (HCC): ICD-10-CM

## 2019-07-09 DIAGNOSIS — N18.4 TYPE 2 DIABETES MELLITUS WITH STAGE 4 CHRONIC KIDNEY DISEASE, WITH LONG-TERM CURRENT USE OF INSULIN (HCC): ICD-10-CM

## 2019-07-09 DIAGNOSIS — Z95.1 S/P CABG (CORONARY ARTERY BYPASS GRAFT): ICD-10-CM

## 2019-07-09 PROBLEM — D50.0 IRON DEFICIENCY ANEMIA DUE TO CHRONIC BLOOD LOSS: Status: ACTIVE | Noted: 2019-07-09

## 2019-07-09 PROCEDURE — 99214 OFFICE O/P EST MOD 30 MIN: CPT | Performed by: INTERNAL MEDICINE

## 2019-07-09 RX ORDER — SODIUM CHLORIDE 9 MG/ML
20 INJECTION, SOLUTION INTRAVENOUS ONCE
Status: CANCELLED | OUTPATIENT
Start: 2019-07-25

## 2019-07-09 NOTE — LETTER
July 9, 2019     Raven Stoner, [address]    Patient: Kisha Perera   YOB: 1945   Date of Visit: 7/9/2019       Dear Dr Jonn Goodell: Thank you for referring Sahil Hayes to me for evaluation  Below are my notes for this consultation  If you have questions, please do not hesitate to call me  I look forward to following your patient along with you  Sincerely,        Tyler Liriano MD        CC: No Recipients  Tyler Liriano MD  7/9/2019  1:33 PM  Sign at close encounter  [address]   Viacor Iss[place]ena 68 y o  male MRN: [id number]  DATE: 07/09/19  Reason for visit: Continued evaluation of CKD    ASSESSMENT & PLAN:  1  Chronic kidney disease, stage III  · Baseline creatinine was 1 6 to 1 8 since 2017 but appears to have settled in the mid 2s  · New baseline creatinine possibly around 2 3 to 2 7  · For now, no symptoms or signs of fluid overload  Will decrease Torsemide to 20 mg daily and repeat BMP in 2 weeks       3  Hypertension  · BP is controlled with Torsemide and Metoprolol 25 mg BID  4  Mineral bone disease  · Check PTH, phos, Vitamin D levels       5  Rising serum sodium: Relax fluid restriction to 2000 cc/24 hours  6  Urinary retention: on Flomax  7  Anemia:  · Iron deficient by labs - Will arrange for Feraheme 510 mg IV x 2 doses  Patient Instructions   Decrease torsemide to 20 mg once a day  Relax fluid restriction to 2000 cc per 24 hours  Check BMP in 14 days  We will arrange for IV iron infusions x 2 doses  Follow up in 3 months with repeat labs  SUBJECTIVE / INTERVAL HISTORY:  Konrad Velasco was seen in the office on initial consult on 5/30/19  Unfortunately, he was admitted to [facility] between Sweta 15 and June 25, 2019   He had some shortness of breath and difficulty swallowing and presented to [facility] where a cardiac work up was done and he was found to have a NSTEMI  On 6/17/19, he had a cardiac catheterization done at [facility] which revealed multivessel disease and he was transferred to [facility] and underwent a CABG on 6/21/19  Since being discharged, he has been feeling well  He is following up with Endocrinology and cardiothoracic surgery  Home BP 120s/60s  Glycemic control is better  No new complaints  No edema  PMH/PSH: HTN, DM, HLP, CKD, CAD s/p CABG, HARJIT, BPH, obesity, DDD s/p fusion surgery, cholecystectomy, hernia repair, tonsillectomy  Previous work up:   6/3/19 Renal US: R 10 6 cm, L 11 cm,  cc    ALLERGIES: No Known Allergies    REVIEW OF SYSTEMS:  Review of Systems   Constitutional: Negative for appetite change, fatigue and fever  Respiratory: Negative for cough and shortness of breath  Cardiovascular: Negative for chest pain and leg swelling  Gastrointestinal: Negative for abdominal pain, nausea and vomiting  Genitourinary: Negative for dysuria and hematuria  Musculoskeletal: Negative for arthralgias  Allergic/Immunologic: Negative for immunocompromised state  Neurological: Negative for dizziness and light-headedness  OBJECTIVE:  /56   Ht 5' 11" (1 803 m)   Wt 101 kg (222 lb 12 8 oz)   BMI 31 07 kg/m²    Current Weight:   Body mass index is 31 1 kg/m²  Physical Exam   Constitutional: He is oriented to person, place, and time  He appears well-developed and well-nourished  No distress  HENT:   Head: Normocephalic and atraumatic  Mouth/Throat: Mucous membranes are normal    Eyes: No scleral icterus  Neck: Neck supple  No JVD present  Cardiovascular: Normal rate, regular rhythm and normal heart sounds  Pulmonary/Chest: Effort normal and breath sounds normal  No respiratory distress  Abdominal: Soft  Bowel sounds are normal    Musculoskeletal: He exhibits no edema  Neurological: He is alert and oriented to person, place, and time  Skin: Skin is warm and dry     Psychiatric: He has a normal mood and affect   His behavior is normal      Medications:  Current Outpatient Medications:     acetaminophen (TYLENOL) 325 mg tablet, Take 2 tablets (650 mg total) by mouth every 6 (six) hours as needed for mild pain, headaches or fever, Disp: 30 tablet, Rfl: 0    Ascorbic Acid (VITAMIN C) 1000 MG tablet, Take 1,000 mg by mouth daily, Disp: , Rfl:     aspirin 325 mg tablet, Take 1 tablet (325 mg total) by mouth daily, Disp: 100 tablet, Rfl: 0    atorvastatin (LIPITOR) 80 mg tablet, Take 1 tablet (80 mg total) by mouth daily with dinner, Disp: 30 tablet, Rfl: 2    Choline Fenofibrate (FENOFIBRIC ACID) 135 MG CPDR, Take 135 mg by mouth daily, Disp: , Rfl:     Continuous Blood Gluc  (FREESTYLE JOHANNE READER) MAMI, Use device to scan blood glucose at least 4 times a day, Disp: 1 Device, Rfl: 0    Continuous Blood Gluc Sensor ([address] [place] [address]) [facility], Apply sensor every 14 days to check blood glucose at least 4 times a day, Disp: 6 each, Rfl: 1    cyanocobalamin (VITAMIN B-12) 500 mcg tablet, Take 500 mcg by mouth daily, Disp: , Rfl:     docusate sodium (COLACE) 100 mg capsule, Take 1 capsule (100 mg total) by mouth 2 (two) times a day, Disp: 60 capsule, Rfl: 1    Doxepin HCl (SILENOR) 6 MG TABS, Take 6 mg by mouth daily, Disp: , Rfl:     ferrous sulfate 325 (65 Fe) mg tablet, Take 1 tablet (325 mg total) by mouth daily with breakfast for 90 days, Disp: 90 tablet, Rfl: 0    finasteride (PROSCAR) 5 mg tablet, Take 5 mg by mouth daily, Disp: , Rfl:     Insulin Glargine (TOUJEO MAX SOLOSTAR) 300 units/mL CONCETRATED U-300 injection pen, Inject 64 Units under the skin daily at bedtime, Disp: 3 pen, Rfl: 0    insulin lispro (HUMALOG KWIKPEN) 100 units/mL injection pen, Inject 22-20-20 units TID with meals plus scale (using up to 80 units a day), Disp: 30 pen, Rfl: 1    latanoprost (XALATAN) 0 005 % ophthalmic solution, 1 drop daily at bedtime, Disp: , Rfl:     metoprolol tartrate (LOPRESSOR) 25 mg tablet, Take 1 tablet (25 mg total) by mouth every 12 (twelve) hours, Disp: 60 tablet, Rfl: 2    omeprazole (PriLOSEC) 40 MG capsule, Take 40 mg by mouth daily, Disp: , Rfl:     oxyCODONE-acetaminophen (PERCOCET) 5-325 mg per tablet, Take 1 tablet every 4 hours as needed for moderate pain or take 2 tablets every 6 hours as needed for severe pain   (Patient not taking: Reported on 7/3/2019), Disp: 30 tablet, Rfl: 0    polyethylene glycol (MIRALAX) 17 g packet, Take 17 g by mouth daily as needed (as needed for constipation), Disp: 14 each, Rfl: 0    tamsulosin (FLOMAX) 0 4 mg, Take 1 capsule (0 4 mg total) by mouth daily with dinner, Disp: 30 capsule, Rfl: 1    torsemide (DEMADEX) 20 mg tablet, Take 1 tablet (20 mg total) by mouth 2 (two) times a day, Disp: 60 tablet, Rfl: 1    Laboratory Results:  Lab Results   Component Value Date    SODIUM 143 07/06/2019    K 3 8 07/06/2019     07/06/2019    CO2 31 07/06/2019    BUN 46 (H) 07/06/2019    CREATININE 2 78 (H) 07/06/2019    GLUC 76 07/06/2019    CALCIUM 9 0 07/06/2019     Lab Results   Component Value Date    WBC 7 07 06/28/2019    HGB 8 7 (L) 06/28/2019    HCT 27 4 (L) 06/28/2019    MCV 94 06/28/2019     06/28/2019     Lab Results   Component Value Date    IRON 34 (L) 07/06/2019    IRON 34 (L) 07/06/2019    TIBC 335 07/06/2019    FERRITIN 183 07/06/2019

## 2019-07-09 NOTE — TELEPHONE ENCOUNTER
Reviewed blood glucose log   Fasting glucose -  mg/dl   During the day blood sugars 88- 160 mg/dl   Current meds     Humalog 22-20-20 units   Toujeo 64 units at bedtime     Please inform pt to reduce toujeo to 54 units at bedtime   Reduce Humalog to 18 units with meals     Thanks     Ernesto Isaac MD

## 2019-07-09 NOTE — PROGRESS NOTES
NEPHROLOGY OFFICE PROGRESS NOTE   Nicola Sharp 68 y o  male MRN: [id number]  DATE: 07/09/19  Reason for visit: Continued evaluation of CKD    ASSESSMENT & PLAN:  1  Chronic kidney disease, stage III  · Baseline creatinine was 1 6 to 1 8 since 2017 but appears to have settled in the mid 2s  · New baseline creatinine possibly around 2 3 to 2 7  · For now, no symptoms or signs of fluid overload  Will decrease Torsemide to 20 mg daily and repeat BMP in 2 weeks       3  Hypertension  · BP is controlled with Torsemide and Metoprolol 25 mg BID  4  Mineral bone disease  · Check PTH, phos, Vitamin D levels       5  Rising serum sodium: Relax fluid restriction to 2000 cc/24 hours  6  Urinary retention: on Flomax  7  Anemia:  · Iron deficient by labs - Will arrange for Feraheme 510 mg IV x 2 doses  Patient Instructions   Decrease torsemide to 20 mg once a day  Relax fluid restriction to 2000 cc per 24 hours  Check BMP in 14 days  We will arrange for IV iron infusions x 2 doses  Follow up in 3 months with repeat labs  SUBJECTIVE / INTERVAL HISTORY:  Edmundo Gavin was seen in the office on initial consult on 5/30/19  Unfortunately, he was admitted to [facility] between Sweta 15 and June 25, 2019  He had some shortness of breath and difficulty swallowing and presented to [address] [place] [address] where a cardiac work up was done and he was found to have a NSTEMI  On 6/17/19, he had a cardiac catheterization done at [facility] which revealed multivessel disease and he was transferred to [facility] and underwent a CABG on 6/21/19  Since being discharged, he has been feeling well  He is following up with Endocrinology and cardiothoracic surgery  Home BP 120s/60s  Glycemic control is better  No new complaints  No edema  PMH/PSH: HTN, DM, HLP, CKD, CAD s/p CABG, HARJIT, BPH, obesity, DDD s/p fusion surgery, cholecystectomy, hernia repair, tonsillectomy       Previous work up:   6/3/19 Renal US: R 10 6 cm, L 11 cm,  cc    ALLERGIES: No Known Allergies    REVIEW OF SYSTEMS:  Review of Systems   Constitutional: Negative for appetite change, fatigue and fever  Respiratory: Negative for cough and shortness of breath  Cardiovascular: Negative for chest pain and leg swelling  Gastrointestinal: Negative for abdominal pain, nausea and vomiting  Genitourinary: Negative for dysuria and hematuria  Musculoskeletal: Negative for arthralgias  Allergic/Immunologic: Negative for immunocompromised state  Neurological: Negative for dizziness and light-headedness  OBJECTIVE:  /56   Ht 5' 11" (1 803 m)   Wt 101 kg (222 lb 12 8 oz)   BMI 31 07 kg/m²   Current Weight:   Body mass index is 31 1 kg/m²  Physical Exam   Constitutional: He is oriented to person, place, and time  He appears well-developed and well-nourished  No distress  HENT:   Head: Normocephalic and atraumatic  Mouth/Throat: Mucous membranes are normal    Eyes: No scleral icterus  Neck: Neck supple  No JVD present  Cardiovascular: Normal rate, regular rhythm and normal heart sounds  Pulmonary/Chest: Effort normal and breath sounds normal  No respiratory distress  Abdominal: Soft  Bowel sounds are normal    Musculoskeletal: He exhibits no edema  Neurological: He is alert and oriented to person, place, and time  Skin: Skin is warm and dry  Psychiatric: He has a normal mood and affect   His behavior is normal      Medications:  Current Outpatient Medications:     acetaminophen (TYLENOL) 325 mg tablet, Take 2 tablets (650 mg total) by mouth every 6 (six) hours as needed for mild pain, headaches or fever, Disp: 30 tablet, Rfl: 0    Ascorbic Acid (VITAMIN C) 1000 MG tablet, Take 1,000 mg by mouth daily, Disp: , Rfl:     aspirin 325 mg tablet, Take 1 tablet (325 mg total) by mouth daily, Disp: 100 tablet, Rfl: 0    atorvastatin (LIPITOR) 80 mg tablet, Take 1 tablet (80 mg total) by mouth daily with dinner, Disp: 30 tablet, Rfl: 2    Choline Fenofibrate (FENOFIBRIC ACID) 135 MG CPDR, Take 135 mg by mouth daily, Disp: , Rfl:     Continuous Blood Gluc  (FREESTYLE JOHANNE READER) MAMI, Use device to scan blood glucose at least 4 times a day, Disp: 1 Device, Rfl: 0    Continuous Blood Gluc Sensor ([address] [place] [address]) [facility], Apply sensor every 14 days to check blood glucose at least 4 times a day, Disp: 6 each, Rfl: 1    cyanocobalamin (VITAMIN B-12) 500 mcg tablet, Take 500 mcg by mouth daily, Disp: , Rfl:     docusate sodium (COLACE) 100 mg capsule, Take 1 capsule (100 mg total) by mouth 2 (two) times a day, Disp: 60 capsule, Rfl: 1    Doxepin HCl (SILENOR) 6 MG TABS, Take 6 mg by mouth daily, Disp: , Rfl:     ferrous sulfate 325 (65 Fe) mg tablet, Take 1 tablet (325 mg total) by mouth daily with breakfast for 90 days, Disp: 90 tablet, Rfl: 0    finasteride (PROSCAR) 5 mg tablet, Take 5 mg by mouth daily, Disp: , Rfl:     Insulin Glargine (TOUJEO MAX SOLOSTAR) 300 units/mL CONCETRATED U-300 injection pen, Inject 64 Units under the skin daily at bedtime, Disp: 3 pen, Rfl: 0    insulin lispro (HUMALOG KWIKPEN) 100 units/mL injection pen, Inject 22-20-20 units TID with meals plus scale (using up to 80 units a day), Disp: 30 pen, Rfl: 1    latanoprost (XALATAN) 0 005 % ophthalmic solution, 1 drop daily at bedtime, Disp: , Rfl:     metoprolol tartrate (LOPRESSOR) 25 mg tablet, Take 1 tablet (25 mg total) by mouth every 12 (twelve) hours, Disp: 60 tablet, Rfl: 2    omeprazole (PriLOSEC) 40 MG capsule, Take 40 mg by mouth daily, Disp: , Rfl:     oxyCODONE-acetaminophen (PERCOCET) 5-325 mg per tablet, Take 1 tablet every 4 hours as needed for moderate pain or take 2 tablets every 6 hours as needed for severe pain   (Patient not taking: Reported on 7/3/2019), Disp: 30 tablet, Rfl: 0    polyethylene glycol (MIRALAX) 17 g packet, Take 17 g by mouth daily as needed (as needed for constipation), Disp: 14 each, Rfl: 0    tamsulosin (FLOMAX) 0 4 mg, Take 1 capsule (0 4 mg total) by mouth daily with dinner, Disp: 30 capsule, Rfl: 1    torsemide (DEMADEX) 20 mg tablet, Take 1 tablet (20 mg total) by mouth 2 (two) times a day, Disp: 60 tablet, Rfl: 1    Laboratory Results:  Lab Results   Component Value Date    SODIUM 143 07/06/2019    K 3 8 07/06/2019     07/06/2019    CO2 31 07/06/2019    BUN 46 (H) 07/06/2019    CREATININE 2 78 (H) 07/06/2019    GLUC 76 07/06/2019    CALCIUM 9 0 07/06/2019     Lab Results   Component Value Date    WBC 7 07 06/28/2019    HGB 8 7 (L) 06/28/2019    HCT 27 4 (L) 06/28/2019    MCV 94 06/28/2019     06/28/2019     Lab Results   Component Value Date    IRON 34 (L) 07/06/2019    IRON 34 (L) 07/06/2019    TIBC 335 07/06/2019    FERRITIN 183 07/06/2019

## 2019-07-09 NOTE — TELEPHONE ENCOUNTER
Zahida Alcocer from [facility] called said pts BS was low this morning- 65  Dose was change 7 3 19 and started 7-4  humalog 22-20-20+scale  toujeo 64u qhs

## 2019-07-09 NOTE — PATIENT INSTRUCTIONS
Decrease torsemide to 20 mg once a day  Relax fluid restriction to 2000 cc per 24 hours  Check BMP in 14 days  We will arrange for IV iron infusions x 2 doses  Follow up in 3 months with repeat labs

## 2019-07-10 ENCOUNTER — OFFICE VISIT (OUTPATIENT)
Dept: CARDIOLOGY CLINIC | Facility: CLINIC | Age: 74
End: 2019-07-10
Payer: MEDICARE

## 2019-07-10 VITALS
HEART RATE: 64 BPM | BODY MASS INDEX: 31.5 KG/M2 | OXYGEN SATURATION: 98 % | HEIGHT: 71 IN | WEIGHT: 225 LBS | SYSTOLIC BLOOD PRESSURE: 134 MMHG | DIASTOLIC BLOOD PRESSURE: 74 MMHG

## 2019-07-10 DIAGNOSIS — N18.4 TYPE 2 DIABETES MELLITUS WITH STAGE 4 CHRONIC KIDNEY DISEASE, WITH LONG-TERM CURRENT USE OF INSULIN (HCC): ICD-10-CM

## 2019-07-10 DIAGNOSIS — Z95.1 S/P CABG (CORONARY ARTERY BYPASS GRAFT): Primary | ICD-10-CM

## 2019-07-10 DIAGNOSIS — N18.4 BENIGN HYPERTENSION WITH CHRONIC KIDNEY DISEASE, STAGE IV (HCC): ICD-10-CM

## 2019-07-10 DIAGNOSIS — N18.30 CKD (CHRONIC KIDNEY DISEASE), STAGE III (HCC): ICD-10-CM

## 2019-07-10 DIAGNOSIS — Z79.4 TYPE 2 DIABETES MELLITUS WITH STAGE 4 CHRONIC KIDNEY DISEASE, WITH LONG-TERM CURRENT USE OF INSULIN (HCC): ICD-10-CM

## 2019-07-10 DIAGNOSIS — G47.33 OSA (OBSTRUCTIVE SLEEP APNEA): ICD-10-CM

## 2019-07-10 DIAGNOSIS — E78.2 MIXED HYPERLIPIDEMIA: ICD-10-CM

## 2019-07-10 DIAGNOSIS — I25.10 TRIPLE VESSEL CORONARY ARTERY DISEASE: ICD-10-CM

## 2019-07-10 DIAGNOSIS — E11.22 TYPE 2 DIABETES MELLITUS WITH STAGE 4 CHRONIC KIDNEY DISEASE, WITH LONG-TERM CURRENT USE OF INSULIN (HCC): ICD-10-CM

## 2019-07-10 DIAGNOSIS — I12.9 BENIGN HYPERTENSION WITH CHRONIC KIDNEY DISEASE, STAGE IV (HCC): ICD-10-CM

## 2019-07-10 PROCEDURE — 93000 ELECTROCARDIOGRAM COMPLETE: CPT | Performed by: NURSE PRACTITIONER

## 2019-07-10 PROCEDURE — 99214 OFFICE O/P EST MOD 30 MIN: CPT | Performed by: NURSE PRACTITIONER

## 2019-07-10 RX ORDER — TORSEMIDE 20 MG/1
20 TABLET ORAL DAILY
Qty: 60 TABLET | Refills: 1
Start: 2019-07-10 | End: 2019-09-05 | Stop reason: SDUPTHER

## 2019-07-17 ENCOUNTER — TELEPHONE (OUTPATIENT)
Dept: ENDOCRINOLOGY | Facility: CLINIC | Age: 74
End: 2019-07-17

## 2019-07-17 NOTE — TELEPHONE ENCOUNTER
Blood glucose levels in variable range, but improved  Average glucose 150 mg/dl with SD of 49 mg/dl  In range 70%, above range 28%, below range 2%    Continue current treatment  Keep carb intake consistent to limit variations in blood glucose  Call if blood glucose persistently less than 70 over 300 mg/dl       Farnaz Crooks PA-C

## 2019-07-25 ENCOUNTER — HOSPITAL ENCOUNTER (OUTPATIENT)
Dept: INFUSION CENTER | Facility: CLINIC | Age: 74
Discharge: HOME | End: 2019-07-25
Payer: MEDICARE

## 2019-07-25 ENCOUNTER — OFFICE VISIT (OUTPATIENT)
Dept: CARDIAC SURGERY | Facility: CLINIC | Age: 74
End: 2019-07-25

## 2019-07-25 VITALS
TEMPERATURE: 97.9 F | DIASTOLIC BLOOD PRESSURE: 70 MMHG | RESPIRATION RATE: 18 BRPM | HEIGHT: 71 IN | WEIGHT: 228 LBS | HEART RATE: 68 BPM | SYSTOLIC BLOOD PRESSURE: 112 MMHG | BODY MASS INDEX: 31.92 KG/M2

## 2019-07-25 VITALS
SYSTOLIC BLOOD PRESSURE: 108 MMHG | DIASTOLIC BLOOD PRESSURE: 58 MMHG | TEMPERATURE: 97.1 F | OXYGEN SATURATION: 98 % | RESPIRATION RATE: 18 BRPM | HEART RATE: 73 BPM

## 2019-07-25 DIAGNOSIS — Z48.89 ENCOUNTER FOR POST SURGICAL WOUND CHECK: Primary | ICD-10-CM

## 2019-07-25 DIAGNOSIS — D50.0 IRON DEFICIENCY ANEMIA DUE TO CHRONIC BLOOD LOSS: Primary | ICD-10-CM

## 2019-07-25 PROCEDURE — 99024 POSTOP FOLLOW-UP VISIT: CPT | Performed by: PHYSICIAN ASSISTANT

## 2019-07-25 PROCEDURE — 96365 THER/PROPH/DIAG IV INF INIT: CPT

## 2019-07-25 RX ORDER — SODIUM CHLORIDE 9 MG/ML
20 INJECTION, SOLUTION INTRAVENOUS ONCE
Status: CANCELLED | OUTPATIENT
Start: 2019-08-01

## 2019-07-25 RX ORDER — SODIUM CHLORIDE 9 MG/ML
20 INJECTION, SOLUTION INTRAVENOUS ONCE
Status: COMPLETED | OUTPATIENT
Start: 2019-07-25 | End: 2019-07-25

## 2019-07-25 RX ADMIN — SODIUM CHLORIDE 20 ML/HR: 0.9 INJECTION, SOLUTION INTRAVENOUS at 10:49

## 2019-07-25 RX ADMIN — FERUMOXYTOL 510 MG: 510 INJECTION INTRAVENOUS at 11:11

## 2019-07-25 NOTE — PLAN OF CARE
Problem: Potential for Falls  Goal: Patient will remain free of falls  Description  INTERVENTIONS:  - Assess patient frequently for physical needs  -  Identify cognitive and physical deficits and behaviors that affect risk of falls  -  Burna fall precautions as indicated by assessment   - Educate patient/family on patient safety including physical limitations  - Instruct patient to call for assistance with activity based on assessment  - Modify environment to reduce risk of injury  - Consider OT/PT consult to assist with strengthening/mobility  Outcome: Progressing     Problem: Knowledge Deficit  Goal: Patient/family/caregiver demonstrates understanding of disease process, treatment plan, medications, and discharge instructions  Description  Complete learning assessment and assess knowledge base    Interventions:  - Provide teaching at level of understanding  - Provide teaching via preferred learning methods  Outcome: Progressing

## 2019-07-25 NOTE — PROGRESS NOTES
Pt to clinic for first dose of feraheme infusion, pt tolerated infusion without complications, pt and wife aware of next appointment next week, declined avs

## 2019-07-25 NOTE — PROGRESS NOTES
Procedure: S/P coronary artery bypass grafting, performed on 6/21    History: Margarita Jackman is a 68y o  year old male who presents to our office today for routine follow up care from coronary artery bypass grafting  He was discharged on 6/25  He had a post-operative period complicated by acute on chronic kidney disease  He has a history of CKD III with a baseline Cr of 1 6-1 8  Post-operatively, Cr new baseline is 2 3-2 7  He is asymptomatic in that regard and has followed up with his nephrologist  He suffered from uncontrolled diabetes, pre-operative HA1c was 14, then 10  He has been following with Endocrinology post-op and has kept his blood sugars at an acceptable level  He complains of muscular chest wall soreness  He denies CP, SOB, or sternal instability  He is compliant with his medication  He has not yet made an appointment to see a PCP  He remains actively throughout the day, participating in house hold chores  Vital Signs:   Vitals:    07/25/19 1600   Weight: 103 kg (228 lb)       Home Medications:   Prior to Admission medications    Medication Sig Start Date End Date Taking?  Authorizing Provider   acetaminophen (TYLENOL) 325 mg tablet Take 2 tablets (650 mg total) by mouth every 6 (six) hours as needed for mild pain, headaches or fever 6/25/19   Rhesa Cesia, PA-C   Ascorbic Acid (VITAMIN C) 1000 MG tablet Take 1,000 mg by mouth daily    Historical Provider, MD   aspirin 325 mg tablet Take 1 tablet (325 mg total) by mouth daily 6/26/19   Alexa Cesia PA-C   atorvastatin (LIPITOR) 80 mg tablet Take 1 tablet (80 mg total) by mouth daily with dinner 6/25/19   Alexa Callaway PA-C   Choline Fenofibrate (FENOFIBRIC ACID) 135 MG CPDR Take 135 mg by mouth daily    Historical Provider, MD   Continuous Blood Gluc  (FREESTYLE JOHANNE READER) MAMI Use device to scan blood glucose at least 4 times a day 7/3/19   Farnaz Crooks PA-C   Continuous Blood Gluc Sensor ([address] [place] [address]) [facility]ISC Apply sensor every 14 days to check blood glucose at least 4 times a day 7/3/19   Farnaz Crooks PA-C   cyanocobalamin (VITAMIN B-12) 500 mcg tablet Take 500 mcg by mouth daily    Historical Provider, MD   docusate sodium (COLACE) 100 mg capsule Take 1 capsule (100 mg total) by mouth 2 (two) times a day 6/26/19   ELEAZAR Ramos   Doxepin HCl (SILENOR) 6 MG TABS Take 6 mg by mouth daily    Historical Provider, MD   ferrous sulfate 325 (65 Fe) mg tablet Take 1 tablet (325 mg total) by mouth daily with breakfast for 90 days 6/26/19 9/24/19  Wilton Cosby PA-C   finasteride (PROSCAR) 5 mg tablet Take 5 mg by mouth daily    Historical Provider, MD   Insulin Glargine (TOUJEO MAX SOLOSTAR) 300 units/mL CONCETRATED U-300 injection pen Inject 54 Units under the skin daily at bedtime 7/9/19   Janet Baldwin MD   insulin lispro (HUMALOG KWIKPEN) 100 units/mL injection pen Inject 18 Units under the skin 3 (three) times a day with meals plus scale (using up to 80 units a day) 7/9/19   Janet Baldwin MD   latanoprost (XALATAN) 0 005 % ophthalmic solution 1 drop daily at bedtime    Historical Provider, MD   metoprolol tartrate (LOPRESSOR) 25 mg tablet Take 1 tablet (25 mg total) by mouth every 12 (twelve) hours 6/25/19   Wilton Cosby PA-C   omeprazole (PriLOSEC) 40 MG capsule Take 40 mg by mouth daily    Historical Provider, MD   oxyCODONE-acetaminophen (PERCOCET) 5-325 mg per tablet Take 1 tablet every 4 hours as needed for moderate pain or take 2 tablets every 6 hours as needed for severe pain   6/25/19   Wilton Cosby PA-C   polyethylene glycol (MIRALAX) 17 g packet Take 17 g by mouth daily as needed (as needed for constipation) 6/25/19   Wilton Cosby PA-C   tamsulosin [facility]) 0 4 mg Take 1 capsule (0 4 mg total) by mouth daily with dinner 6/25/19   Wilton Cosby PA-C   torsemide [facility]) 20 mg tablet Take 1 tablet (20 mg total) by mouth daily 7/10/19   ELEAZAR Orosco       Physical Exam:    HEENT/NECK:  PERRLA  No jugular venous distention  Cardiac:Regular rate and rhythm, No Murmur  Pulmonary:Breath sounds clear bilaterally  Abdomen:  Non-tender, Non-distended  Positive bowel sounds  Lower extremities: Extremities warm/dry  Radial/PT/DP pules 2+ bilaterally  No edema B/L  Incisions: Sternum is stable  Incision is clean, dry, and intact  Saphenectomy incision is clean, dry, and intact  Neuro: Alert and oriented X 3  Sensation is grossly intact  No focal deficits  Skin: Warm/Dry, without rashes or lesions  Lab Results:               Invalid input(s): LABGLOM      Lab Results   Component Value Date    HGBA1C 10 3 (H) 06/17/2019     Lab Results   Component Value Date    TROPONINI 10 77 (H) 06/16/2019           Assessment: Coronary artery disease  S/P coronary artery bypass grafting;    Plan:     Dusty Granados continues to recover well following coronary artery bypass grafting  To date they have made progressive improvements physical rehabilitation  At this point I have cleared them to begin outpatient cardiac rehabilitation and have encouraged them to to do so  Dusty Granados has also been cleared to resume driving at this point  I asked that them do so in progressive increments  I did remind them of their ongoing lifting restrictions of 25 pounds for an additional 2 months  Dusty Granados has already been evaluated by their primary care physician cardiologist for ongoing medical care  At this point I have not scheduled them for routine followup care with our office  I have advised them to call with any new concerns that may arise  Dusty Granados was comfortable with our recommendations and their questions were answered to their satisfaction  The patient was not referred to GI for colonoscopy screening because he had a colonoscopy performed this year      Manjinder Siddiqui PA-C  07/25/19

## 2019-07-25 NOTE — LETTER
July 25, 2019     Gwen Zuñiga, [address]    Patient: Wojciech Murguia   YOB: 1945   Date of Visit: 7/25/2019       Dear Dr Jody Benites: Thank you for referring Radha Jordan to me for evaluation  Below are my notes for this consultation  If you have questions, please do not hesitate to call me  I look forward to following your patient along with you  Sincerely,        Eneida Martin MD        CC: No Recipients  Samantha Colon  7/25/2019  4:27 PM  Attested  Procedure: S/P coronary artery bypass grafting, performed on 6/21    History: Wojciech Murguia is a 68y o  year old male who presents to our office today for routine follow up care from coronary artery bypass grafting  He was discharged on 6/25  He had a post-operative period complicated by acute on chronic kidney disease  He has a history of CKD III with a baseline Cr of 1 6-1 8  Post-operatively, Cr new baseline is 2 3-2 7  He is asymptomatic in that regard and has followed up with his nephrologist  He suffered from uncontrolled diabetes, pre-operative HA1c was 14, then 10  He has been following with Endocrinology post-op and has kept his blood sugars at an acceptable level  He complains of muscular chest wall soreness  He denies CP, SOB, or sternal instability  He is compliant with his medication  He has not yet made an appointment to see a PCP  He remains actively throughout the day, participating in house hold chores  Vital Signs:   Vitals:    07/25/19 1600   Weight: 103 kg (228 lb)       Home Medications:   Prior to Admission medications    Medication Sig Start Date End Date Taking?  Authorizing Provider   acetaminophen (TYLENOL) 325 mg tablet Take 2 tablets (650 mg total) by mouth every 6 (six) hours as needed for mild pain, headaches or fever 6/25/19   Earnestine Gimenez PA-C   Ascorbic Acid (VITAMIN C) 1000 MG tablet Take 1,000 mg by mouth daily Historical Provider, MD   aspirin 325 mg tablet Take 1 tablet (325 mg total) by mouth daily 6/26/19   Priyanka Phillips PA-C   atorvastatin (LIPITOR) 80 mg tablet Take 1 tablet (80 mg total) by mouth daily with dinner 6/25/19   Priyanka Phillips PA-C   Choline Fenofibrate (FENOFIBRIC ACID) 135 MG CPDR Take 135 mg by mouth daily    Historical Provider, MD   Continuous Blood Gluc  (FREESTYLE JOHANNE READER) MAMI Use device to scan blood glucose at least 4 times a day 7/3/19   Farnaz Crooks PA-C   Continuous Blood Gluc Sensor ([address] [place] [address]) [facility] Apply sensor every 14 days to check blood glucose at least 4 times a day 7/3/19   Farnaz Crooks PA-C   cyanocobalamin (VITAMIN B-12) 500 mcg tablet Take 500 mcg by mouth daily    Historical Provider, MD   docusate sodium (COLACE) 100 mg capsule Take 1 capsule (100 mg total) by mouth 2 (two) times a day 6/26/19   ELEAZAR Ro   Doxepin HCl (SILENOR) 6 MG TABS Take 6 mg by mouth daily    Historical Provider, MD   ferrous sulfate 325 (65 Fe) mg tablet Take 1 tablet (325 mg total) by mouth daily with breakfast for 90 days 6/26/19 9/24/19  Priyanka Phillips PA-C   finasteride (PROSCAR) 5 mg tablet Take 5 mg by mouth daily    Historical Provider, MD   Insulin Glargine (TOUJEO MAX SOLOSTAR) 300 units/mL CONCETRATED U-300 injection pen Inject 54 Units under the skin daily at bedtime 7/9/19   Silke Kraus MD   insulin lispro (HUMALOG KWIKPEN) 100 units/mL injection pen Inject 18 Units under the skin 3 (three) times a day with meals plus scale (using up to 80 units a day) 7/9/19   Silke Kraus MD   latanoprost (XALATAN) 0 005 % ophthalmic solution 1 drop daily at bedtime    Historical Provider, MD   metoprolol tartrate (LOPRESSOR) 25 mg tablet Take 1 tablet (25 mg total) by mouth every 12 (twelve) hours 6/25/19   Priyanka Phillips PA-C   omeprazole (PriLOSEC) 40 MG capsule Take 40 mg by mouth daily    Historical Provider, MD oxyCODONE-acetaminophen (PERCOCET) 5-325 mg per tablet Take 1 tablet every 4 hours as needed for moderate pain or take 2 tablets every 6 hours as needed for severe pain  6/25/19   Manuel Marsh PA-C   polyethylene glycol (MIRALAX) 17 g packet Take 17 g by mouth daily as needed (as needed for constipation) 6/25/19   Manuel Marsh PA-C   tamsulosin (FLOMAX) 0 4 mg Take 1 capsule (0 4 mg total) by mouth daily with dinner 6/25/19   Manuel Marsh PA-C   torsemide [facility]) 20 mg tablet Take 1 tablet (20 mg total) by mouth daily 7/10/19   ELEAZAR Espinoza       Physical Exam:    HEENT/NECK:  PERRLA  No jugular venous distention  Cardiac:Regular rate and rhythm, No Murmur  Pulmonary:Breath sounds clear bilaterally  Abdomen:  Non-tender, Non-distended  Positive bowel sounds  Lower extremities: Extremities warm/dry  Radial/PT/DP pules 2+ bilaterally  No edema B/L  Incisions: Sternum is stable  Incision is clean, dry, and intact  Saphenectomy incision is clean, dry, and intact  Neuro: Alert and oriented X 3  Sensation is grossly intact  No focal deficits  Skin: Warm/Dry, without rashes or lesions  Lab Results:               Invalid input(s): LABGLOM      Lab Results   Component Value Date    HGBA1C 10 3 (H) 06/17/2019     Lab Results   Component Value Date    TROPONINI 10 77 (H) 06/16/2019           Assessment: Coronary artery disease  S/P coronary artery bypass grafting;    Plan:     Viridiana Grimm continues to recover well following coronary artery bypass grafting  To date they have made progressive improvements physical rehabilitation  At this point I have cleared them to begin outpatient cardiac rehabilitation and have encouraged them to to do so  Viridiana Grimm has also been cleared to resume driving at this point  I asked that them do so in progressive increments  I did remind them of their ongoing lifting restrictions of 25 pounds for an additional 2 months      Viridiana Grimm has already been evaluated by their primary care physician cardiologist for ongoing medical care  At this point I have not scheduled them for routine followup care with our office  I have advised them to call with any new concerns that may arise  Alex Brizuela was comfortable with our recommendations and their questions were answered to their satisfaction  The patient was not referred to GI for colonoscopy screening because he had a colonoscopy performed this year  Valeria Dickerson PA-C  07/25/19  Attestation signed by Seth Hood MD at 7/25/2019  4:35 PM:  Patient seen and evaluated with [place] / KG  I agree with the above assessment and plan with the following additions       No complications  Normal postop    Plan:  Follow-up with cardiology and PCP  Cardiac rehab

## 2019-07-30 ENCOUNTER — OFFICE VISIT (OUTPATIENT)
Dept: DIABETES SERVICES | Facility: CLINIC | Age: 74
End: 2019-07-30
Payer: MEDICARE

## 2019-07-30 VITALS — WEIGHT: 229.6 LBS | BODY MASS INDEX: 32.02 KG/M2

## 2019-07-30 DIAGNOSIS — N18.4 TYPE 2 DIABETES MELLITUS WITH STAGE 4 CHRONIC KIDNEY DISEASE, WITH LONG-TERM CURRENT USE OF INSULIN (HCC): Primary | ICD-10-CM

## 2019-07-30 DIAGNOSIS — Z79.4 TYPE 2 DIABETES MELLITUS WITH STAGE 4 CHRONIC KIDNEY DISEASE, WITH LONG-TERM CURRENT USE OF INSULIN (HCC): Primary | ICD-10-CM

## 2019-07-30 DIAGNOSIS — Z95.1 S/P CABG (CORONARY ARTERY BYPASS GRAFT): ICD-10-CM

## 2019-07-30 DIAGNOSIS — E11.22 TYPE 2 DIABETES MELLITUS WITH STAGE 4 CHRONIC KIDNEY DISEASE, WITH LONG-TERM CURRENT USE OF INSULIN (HCC): Primary | ICD-10-CM

## 2019-07-30 DIAGNOSIS — I25.10 TRIPLE VESSEL CORONARY ARTERY DISEASE: ICD-10-CM

## 2019-07-30 DIAGNOSIS — Z79.4 TYPE 2 DIABETES MELLITUS WITH CHRONIC KIDNEY DISEASE, WITH LONG-TERM CURRENT USE OF INSULIN, UNSPECIFIED CKD STAGE (HCC): ICD-10-CM

## 2019-07-30 DIAGNOSIS — E11.22 TYPE 2 DIABETES MELLITUS WITH CHRONIC KIDNEY DISEASE, WITH LONG-TERM CURRENT USE OF INSULIN, UNSPECIFIED CKD STAGE (HCC): ICD-10-CM

## 2019-07-30 PROCEDURE — 97802 MEDICAL NUTRITION INDIV IN: CPT | Performed by: DIETITIAN, REGISTERED

## 2019-07-30 NOTE — TELEPHONE ENCOUNTER
Celia download shows occasional low blood sugars in the morning  Decrease dose of Toujeo to 52 units       Call if blood sugar persistently less than 70 or over 300 mg/dL    Farnaz Crooks PA-C

## 2019-07-30 NOTE — PROGRESS NOTES
Medical Nutrition Therapy        Assessment    Visit Type: Initial visit    Chief complaint T2DM    HPI: Tom Calle has type 2 diabetes, most recent HbA1c has improved to 10 3%  Tom Calle had CABG surgery approximately a month and a half ago and also has stage 3 - 4  CKD  His wife accompanied him today and states that she has been doing a lot of cooking since he's been home from the hospital but last couple of weeks he has been going out more to eat on his own and making unhealthy choices  Yonas diet history reveals inconsistent carbohydrate intake, excess saturated fat intake, and inadequate intake of fruit and low-fat dairy  Currently meals range from 2 to 125 grams of carbohydrate  Explained basic pathophysiology of diabetes and impact of diet on blood glucose levels  Together we discussed what foods contain CHO, reading a food label, timing of meals, serving sizes, and importance of consistent carbohydrate intake  Used the portion booklet to teach Tom Calle and his wife more about food groups and basic carbohydrate counting  Created an individualized meal plan for Tom Calle with 3 meals and 2 snacks providing 45 - 60  g carb per meal and 15 g carb per snack  Put together sample meals for Michael's reference  During this visit today Michael's wife appeared to be frustrated that Tom Calle is not taking more of an interest in managing diabetes  She made statements such as " You need to understand this too" in response to Bjornlin Coffee routinely referring to her to answer questions during the diet recall  We discussed Michael's motivation level and both short and long-term effects of uncontrolled diabetes  Tom Coffee demonstrated fair understanding and will call with any questions prior to follow-up appointment in 3 months       Ht Readings from Last 1 Encounters:   07/25/19 5' 11" (1 803 m)     Wt Readings from Last 2 Encounters:   07/30/19 104 kg (229 lb 9 6 oz)   07/25/19 103 kg (228 lb)     Weight Change: Yes gained about 8 lbs since June 25th    Medical Diagnosis/ICD10: E11 22, N18 4, Z79 4 (ICD-10-CM) - Type 2 diabetes mellitus with stage 4 chronic kidney disease, with long-term current use of insulin    Barriers to Learning: no barriers, but states that if he is given a lot of paperwork then he has trouble    Do you follow any special diet presently?: No, wife states that she has been doing a lot of cooking since he's been home from the hospital but last couple of weeks he has been going out more to eat on his own  Who shops: spouse  Who cooks: spouse    Food Log: Completed via the method of food recall    Breakfast:wakes 6 - 9 am  Eats half an hour after waking  Scrambled eggs (2), turkey sausage links (2) Coffee 3 8 oz cups ( a little milk "whatever kind is there") wife says "not skim"  OR Raisin Bran 1 5 cups with "skim plus" (fat free) milk 1/2 cup sometime might have low sodium tomato juice 4 - 8 oz  Morning Snack:n/a   Lunch:12 pm  Jaffrey on whole wheat lite [employer], lettuce tomato and lite lozada tuna fish or turkey with swiss, low sodium  With fruit (1 small mandarin 4 oz)  Water or unsweetened iced tea  Afternoon Snack:3 pm  occasionally  Nahid crackers 2 rectangles with peanut butter  Dinner:6 pm  Cheeseburger from the [facility] (says bun was normal grocery store size) white american cheese and a strawberry milkshake (12 oz)  Evening Snack:  9 pm 2 cupspopcorn or a dairy queen dilly bar (sugar free)  Beverages: water, unsweetened iced tea tomato juice, coffee  Eating out/Take out:twice per week  Exercise most days has exercises from the visiting nurse to do at home akes him 10 minutes, supposed to go to cardiac rehab evaluation is august 14th      Calorie needs 2,000 kcals/day Carbs: 45 - 60 g/meal, 15 g/snack         Nutrition Diagnosis:  Inconsistent carbohydrate intake  intake related to Food and nutrition related knowledge deficit concerning appropriate amount and timing of carbohydrate intake as evidenced by  Estimated carbohydrate intake that is different from recommended types or ingested on an irregular basis    Intervention: plate method, increased fiber intake, label reading, carbohydrate counting, increased plant based foods, meal planning, individualized meal plan, monitoring portion control and food diary     Treatment Goals: Patient understands education and recommendations, Patient will monitor food intake daily with tracker, Patient will monitor portion control, Patient will increase their intake of plant based foods and Patient will count carbohydrates    Monitoring and evaluation:    Term code indicator  FH 1 6 3 Carbohydrate Intake Criteria: 45 - 60 grams of carbohydrate per meal, 15 grams of carbohydrate per snack  Term code indicator  FH 4 4 Mealtime Behavior Criteria: 3 meals per day, 4 - 5 hours apart  2 snacks per day, at least a 2 hour buffer away from meals if the snacks contain carbohydrates  Patients Response to Instruction:  Samara Duenas  Expected Compliancefair    Thank you for coming to the [facility] for education today  Please feel free to call with any questions or concerns      Kathryn Evans, [address]  [address] [facility] [id number]

## 2019-07-30 NOTE — PATIENT INSTRUCTIONS
3 meals per day, about 4 - 5 hours apart  2 snacks per day, at least a 2 hour buffer away from meals if the snacks contain carbohydrates    45 - 60 grams of carbohydrates per meal  15 grams of carbohydrate per snack

## 2019-08-01 ENCOUNTER — HOSPITAL ENCOUNTER (OUTPATIENT)
Dept: INFUSION CENTER | Facility: CLINIC | Age: 74
Discharge: HOME | End: 2019-08-01
Payer: MEDICARE

## 2019-08-01 VITALS
OXYGEN SATURATION: 96 % | DIASTOLIC BLOOD PRESSURE: 66 MMHG | HEART RATE: 66 BPM | RESPIRATION RATE: 18 BRPM | SYSTOLIC BLOOD PRESSURE: 122 MMHG | TEMPERATURE: 97.9 F

## 2019-08-01 DIAGNOSIS — D50.0 IRON DEFICIENCY ANEMIA DUE TO CHRONIC BLOOD LOSS: Primary | ICD-10-CM

## 2019-08-01 PROCEDURE — 96365 THER/PROPH/DIAG IV INF INIT: CPT

## 2019-08-01 RX ORDER — SODIUM CHLORIDE 9 MG/ML
20 INJECTION, SOLUTION INTRAVENOUS ONCE
Status: CANCELLED | OUTPATIENT
Start: 2019-08-01

## 2019-08-01 RX ORDER — SODIUM CHLORIDE 9 MG/ML
20 INJECTION, SOLUTION INTRAVENOUS ONCE
Status: COMPLETED | OUTPATIENT
Start: 2019-08-01 | End: 2019-08-01

## 2019-08-01 RX ADMIN — SODIUM CHLORIDE 20 ML/HR: 0.9 INJECTION, SOLUTION INTRAVENOUS at 11:22

## 2019-08-01 RX ADMIN — FERUMOXYTOL 510 MG: 510 INJECTION INTRAVENOUS at 11:40

## 2019-08-12 ENCOUNTER — TELEPHONE (OUTPATIENT)
Dept: NEPHROLOGY | Facility: CLINIC | Age: 74
End: 2019-08-12

## 2019-08-12 NOTE — TELEPHONE ENCOUNTER
I called and left message for patient to call back to schedule October follow up appt with Dr Amrita Brothers

## 2019-08-14 ENCOUNTER — CLINICAL SUPPORT (OUTPATIENT)
Dept: CARDIAC REHAB | Facility: CLINIC | Age: 74
End: 2019-08-14
Payer: MEDICARE

## 2019-08-14 DIAGNOSIS — I25.10 TRIPLE VESSEL CORONARY ARTERY DISEASE: ICD-10-CM

## 2019-08-14 DIAGNOSIS — Z95.1 S/P CABG (CORONARY ARTERY BYPASS GRAFT): ICD-10-CM

## 2019-08-14 PROCEDURE — 93797 PHYS/QHP OP CAR RHAB WO ECG: CPT

## 2019-08-14 NOTE — PROGRESS NOTES
CARDIAC REHAB ASSESSMENT    Today's date: 2019  Patient name: Zi Parkinson     : 1945       MRN: [id number]  PCP: Hermann Proctor DO  Referring Physician: Gordo Thomas MD  Cardiologist: Rosy Ochoa MD  Surgeon:   Dx:   Encounter Diagnoses   Name Primary?  Triple vessel coronary artery disease     S/P CABG (coronary artery bypass graft)        Date of onset: 19  Cultural needs:     Height:    Wt Readings from Last 1 Encounters:   19 104 kg (229 lb 9 6 oz)      Weight:   Ht Readings from Last 1 Encounters:   19 5' 11" (1 803 m)     Medical History:   Past Medical History:   Diagnosis Date    Diabetes mellitus ([facility] [address] )     Hyperlipidemia     Hypertension     Renal disorder          Physical Limitations: back (he cant bend over)    Risk Factors   Cholesterol: Yes  Smoking: Former user  HTN: Yes  DM: Type 2   average am BG Over 200s  Obesity: Yes   Inactivity: Yes  Stress:  perceived  stress: 4/10   Stressors:frusterated with no being able to do things    Goals for Stress Management:get on structured exercise program and improve his QOL    Family History:  Family History   Problem Relation Age of Onset    Cancer Mother     Cancer Father        Allergies: Patient has no known allergies  ETOH:   Social History     Substance and Sexual Activity   Alcohol Use Not Currently    Comment: 1 drink every 6 months           Current Medications:   Current Outpatient Medications   Medication Sig Dispense Refill    acetaminophen (TYLENOL) 325 mg tablet Take 2 tablets (650 mg total) by mouth every 6 (six) hours as needed for mild pain, headaches or fever (Patient not taking: Reported on 2019) 30 tablet 0    Ascorbic Acid (VITAMIN C) 1000 MG tablet Take 1,000 mg by mouth daily      aspirin 325 mg tablet Take 1 tablet (325 mg total) by mouth daily 100 tablet 0    atorvastatin (LIPITOR) 80 mg tablet Take 1 tablet (80 mg total) by mouth daily with dinner 30 tablet 2    Choline Fenofibrate (FENOFIBRIC ACID) 135 MG CPDR Take 135 mg by mouth daily      Continuous Blood Gluc  (FREESTYLE JOHANNE READER) MAMI Use device to scan blood glucose at least 4 times a day 1 Device 0    Continuous Blood Gluc Sensor ([address] [place] [address]) [facility] sensor every 14 days to check blood glucose at least 4 times a day 6 each 1    cyanocobalamin (VITAMIN B-12) 500 mcg tablet Take 500 mcg by mouth daily      docusate sodium (COLACE) 100 mg capsule Take 1 capsule (100 mg total) by mouth 2 (two) times a day (Patient taking differently: Take 100 mg by mouth daily ) 60 capsule 1    Doxepin HCl (SILENOR) 6 MG TABS Take 6 mg by mouth as needed       ferrous sulfate 325 (65 Fe) mg tablet Take 1 tablet (325 mg total) by mouth daily with breakfast for 90 days 90 tablet 0    finasteride (PROSCAR) 5 mg tablet Take 5 mg by mouth daily      Insulin Glargine (TOUJEO MAX SOLOSTAR) 300 units/mL CONCETRATED U-300 injection pen Inject 52 Units under the skin daily at bedtime 3 pen 0    insulin lispro (HUMALOG KWIKPEN) 100 units/mL injection pen Inject 18 Units under the skin 3 (three) times a day with meals plus scale (using up to 80 units a day) 30 pen 1    latanoprost (XALATAN) 0 005 % ophthalmic solution 1 drop daily at bedtime      metoprolol tartrate (LOPRESSOR) 25 mg tablet Take 1 tablet (25 mg total) by mouth every 12 (twelve) hours 60 tablet 2    omeprazole (PriLOSEC) 40 MG capsule Take 40 mg by mouth daily      oxyCODONE-acetaminophen (PERCOCET) 5-325 mg per tablet Take 1 tablet every 4 hours as needed for moderate pain or take 2 tablets every 6 hours as needed for severe pain   (Patient not taking: Reported on 7/25/2019) 30 tablet 0    polyethylene glycol (MIRALAX) 17 g packet Take 17 g by mouth daily as needed (as needed for constipation) (Patient not taking: Reported on 7/25/2019) 14 each 0    tamsulosin (FLOMAX) 0 4 mg Take 1 capsule (0 4 mg total) by mouth daily with dinner 30 capsule 1    torsemide (DEMADEX) 20 mg tablet Take 1 tablet (20 mg total) by mouth daily 60 tablet 1     No current facility-administered medications for this visit  Functional Status Prior to Diagnosis for Treatment   Occupation: retired  Recreation: antique cars and toys  ADLs: resumed all ADLs  [place]: No limitations  Exercise: none  Other:     Current Functional Status  Occupation: retired  Recreation:  antique cars and toys  ADLs:resumed all ADLs within sternal precautions  [place]: No limitations  Exercise: only doing his physical therapy exercises  Other:     Patient Specific Goals:  Get rid of pain in his chest     Short Term Program Goals: dietary modifications increased strength improved energy/stamina with ADLs exercise 120-150 mins/wk improved BG control wt loss 1-2 ppw    Long Term Goals: increased maximal walking duration  increased intial training workload  Improved [facility] Activity Status score  Improved functional capacity  Improved Quality of Life - [facility] score reduced  Improved lipid profile  Reduced body fat%  Reduced waist circumference  Improved A1c  Improved fasting glucose  improved Rate Your Plate Score    Ability to reach goals/rehabilitation potential:  Very Good     Projected return to function: 12 weeks  Objective tests: sub-max TM ETT      Nutritional   Reviewed details of Rate your Plate  Discussed key elements of heart healthy eating  Reviewed patient goals for dietary modifications and their clinical implications  Reviewed most recent lipid profile       Goals for dietary modification: choose lean cuts of meat  poultry without the skin  low fat ground meat and poultry  reduce portions of meat to 3 oz  more meatless meals  low fat dairy   reduced fat cheese  increase fruits and vegetables  eliminate butter  low sodium  improved snack choices  heathier choices while dining out      Emotional/Social  Patient reports he/she is coping well with good social support and denies depression or anxiety    SOCIAL SUPPORT NETWORK    Marital status:     Rate 1-5:    Marriage: 5   Family: 5   Financial: 5   Relationships: 5   Spirituality: 5   Intellectual: 3      Domestic Violence Screening: No    Comments: Pt reports that [place] [address] when he started to profusely sweet and couldn't catch his breath, he pulled over wiped his head with a towel felt better and start driving again  Pt then continued to sweat ended up going to get gas and spoke to his wife about getting to the hospital  He arrived at the hospital to find out that he did a heart attack and would need to get transferred to [facility] to get CABG x4 6/21/19  Pt reports that he is a Type 2 diabetic and it it uncontrolled  Pt also reports to have some limitations due to his back and can not bend forward anymore  Pt will begin cardiac rehab 8/22

## 2019-08-14 NOTE — PROGRESS NOTES
Cardiac Rehabilitation Plan of Care   Care Plan       Today's date: 2019   Visits: 1  Patient name: Nicola Sharp      : 1945  Age: 68 y o  MRN: [id number]  Referring Physician: Mark Berry MD  Provider: [facility]   Clinician: Karen Diaz MS, CEP     Dx:   Encounter Diagnoses   Name Primary?  Triple vessel coronary artery disease     S/P CABG (coronary artery bypass graft)      Date of onset: 19      SUMMARY OF PROGRESS:  Today was Michael's initial evaluation for cardiac rehab  Sandra Hackett completed his initial sub-maximal treadmill test walking a total of 6 minutes at 3 1 METs  Sandra Hackett had normal hemodynamic response to exercise and his telemetry read NSR with ST depression and occ PVCs  Moisesmomo aHckett reports to not have any cardiac complications during exercise  Moisesmomo Hackett is a Type 2 diabetic and reports that it is uncontrolled, he will be monitoring his blood sugar here in rehab  Jesúsmadan Hackett reports that he is not following the heart healthy diet and does not watch his sugar for his diabetics  Sandra Hackett is worried about not having his diabetes under control and would like to learn more about diet  Moisesmomo Hackett also reports that he still feels like there is a 5 lb weight on his chest all the time an dis hoping that rehab can help strengthen his muscles  Moisesmomo Hackett reports to have excellent social support from friends and family  Moisesmomo Hackett reports to have some added stress with the frustration that this had happened to him and now he is not able to do everything that he use to be able too  I expressed to Jesúsmadan Hackett that if he comes consistently to rehab he will feel a lot better and be able to start to do more  Jesúsmadan Hackett is excited to start rehab and will begin          Medication compliance: Yes   Comments:   Fall Risk: Low   Comments:     EKG changes: NSR with ST depression and occ PVCs      EXERCISE ASSESSMENT and PLAN    Current Exercise Program in Rehab:       Frequency: 3 days/week        Minutes: 30-40         METS: 3-5            HR:    RPE: 4-6         Modalities: Treadmill, UBE, NuStep and Recumbent bike      Exercise Progression 30 Day Goals :    Frequency: 4 days/week   Minutes: 35-45   METS: 3 5-5 5   HR:     RPE: 4-6   Modalities: Treadmill, UBE and Recumbent bike    Strength training: Will be added following at least 8 weeks post surgery and 8-10 monitored sessions   Modalities: Leg Press, Chest Press, Pull Downs, Arm Curl and Seated Row    Progressing: In Progress will begin     Home Exercise: Currently only doing his physical therapy exercises at home    Goals: 10% improvement in functional capacity, improved DASI score by 10%, Increase in peak CR METs by 40%, Resume ADLs with increased strength and Exercise 5 days/wk, >150mins/wk  Education: Benefit of exercise for CAD risk factors, home exercise guidelines, signs and sxs, RPE scale and class: Risk Factors for Heart Disease   Plan:home exercise 30+ mins 2 days opposite CR  Readiness to change: Preparation      NUTRITION ASSESSMENT AND PLAN    Weight control:    Starting weight: 231 2 lbs   Current weight:     Waist circumference:    Startin in   Current:    Diabetes: Patient reported fasting BS over 200s (Type 2)  Lipid management: Discussed diet and lipid management and Last lipid profile 19  Chol 139  TRG 30  HDL 84  LDL 49, A1c%: 10 3, Fasting B  Goals:reduced BMI to < 25, decreased body fat% <25%   , reduced waist circumference <40 inches, fasting BG , improved A1c  < 6 5%, Improved Rate Your Plate score  >61 and 2 5-5%  wt loss  Education: heart healthy eating  low sodium diet  hydration  diet and lipid management  diabetes management and exercise  wt  loss  healthy choices while dining out  portion control  class: Heart Healthy Eating  class:  Label Reading  Progressing: In Progress will begin   Plan: Increase PUFA and MUFA, Decrease SFA, Increase whole grains, increase fruits/vegs, eliminate processed meats, reduce red meat 1x/wk, swtich to low fat dairy and Reduce added sugars <25g/day  Readiness to change: Preparation      PSYCHOSOCIAL ASSESSMENT AND PLAN    Emotional: Patient reports he/she is coping well with good social support and denies depression or anxiety  PHQ-9 =  0    0 =No Depression  Self-reported stress level: 4   Social support: Excellent  Goals:  Physical Fitness in [facility] Score < 3, Social Support in [place] Score < 3 and Pain in [facility] Score < 3  Education: class:  Stress and Your Health  and class:  Relaxation  Progressing: In Progress will begin   Plan: Practice relaxation techniques  Readiness to change: Preparation      OTHER CORE COMPONENTS     Tobacco:   Social History     Tobacco Use   Smoking Status Former Smoker    Packs/day: 3 00    Years: 30 00    Pack years: 90 00   Smokeless Tobacco Never Used   Tobacco Comment    Quit        Tobacco Use Intervention: Referral to tobacco expert:   Brief counseling by cardiac rehab professional  Date: 19    Blood pressure:    Restin/82   Exercise: 148/60    Goals: consistent BP < 130/80, reduced dietary sodium <2300mg and consistent exercise >150 mins/wk  Education:  understanding HTN and CAD, low sodium diet and HTN, Education class:  Common Heart Medications and Education class: Understanding Heart Disease  Progressing: In Progress will begin   Plan: Class: Understanding Heart Disease, Class: Common Heart Medications and Monitor BP daily  Readiness to change: Preparation

## 2019-08-19 DIAGNOSIS — Z95.1 S/P CABG (CORONARY ARTERY BYPASS GRAFT): ICD-10-CM

## 2019-08-19 DIAGNOSIS — I25.10 TRIPLE VESSEL CORONARY ARTERY DISEASE: ICD-10-CM

## 2019-08-19 RX ORDER — TAMSULOSIN HYDROCHLORIDE 0.4 MG/1
0.4 CAPSULE ORAL
Qty: 30 CAPSULE | Refills: 1 | Status: SHIPPED | OUTPATIENT
Start: 2019-08-19 | End: 2019-09-12 | Stop reason: SDUPTHER

## 2019-08-22 ENCOUNTER — CLINICAL SUPPORT (OUTPATIENT)
Dept: CARDIAC REHAB | Facility: CLINIC | Age: 74
End: 2019-08-22
Payer: MEDICARE

## 2019-08-22 DIAGNOSIS — Z95.1 S/P CABG (CORONARY ARTERY BYPASS GRAFT): ICD-10-CM

## 2019-08-22 DIAGNOSIS — E11.22 TYPE 2 DIABETES MELLITUS WITH CHRONIC KIDNEY DISEASE, WITH LONG-TERM CURRENT USE OF INSULIN, UNSPECIFIED CKD STAGE (HCC): ICD-10-CM

## 2019-08-22 DIAGNOSIS — I25.10 TRIPLE VESSEL CORONARY ARTERY DISEASE: ICD-10-CM

## 2019-08-22 DIAGNOSIS — N18.4 TYPE 2 DIABETES MELLITUS WITH STAGE 4 CHRONIC KIDNEY DISEASE, WITH LONG-TERM CURRENT USE OF INSULIN (HCC): ICD-10-CM

## 2019-08-22 DIAGNOSIS — Z79.4 TYPE 2 DIABETES MELLITUS WITH CHRONIC KIDNEY DISEASE, WITH LONG-TERM CURRENT USE OF INSULIN, UNSPECIFIED CKD STAGE (HCC): ICD-10-CM

## 2019-08-22 DIAGNOSIS — E11.22 TYPE 2 DIABETES MELLITUS WITH STAGE 4 CHRONIC KIDNEY DISEASE, WITH LONG-TERM CURRENT USE OF INSULIN (HCC): ICD-10-CM

## 2019-08-22 DIAGNOSIS — Z79.4 TYPE 2 DIABETES MELLITUS WITH STAGE 4 CHRONIC KIDNEY DISEASE, WITH LONG-TERM CURRENT USE OF INSULIN (HCC): ICD-10-CM

## 2019-08-22 PROCEDURE — 93798 PHYS/QHP OP CAR RHAB W/ECG: CPT

## 2019-08-23 ENCOUNTER — APPOINTMENT (OUTPATIENT)
Dept: CARDIAC REHAB | Facility: CLINIC | Age: 74
End: 2019-08-23
Payer: MEDICARE

## 2019-08-23 NOTE — TELEPHONE ENCOUNTER
bs log     toujeo 52u qhs  humalog 18u tid +scale    Fasting blood sugars is 130-200  During the day blood sugars are 100-240 range  Please inform patient to increase toujeo to 56 units   And increase Humalog to 20 units with meals +Scale     Kimberley Krishna MD

## 2019-08-25 DIAGNOSIS — N18.30 BENIGN HYPERTENSION WITH CHRONIC KIDNEY DISEASE, STAGE III (HCC): ICD-10-CM

## 2019-08-25 DIAGNOSIS — I12.9 BENIGN HYPERTENSION WITH CHRONIC KIDNEY DISEASE, STAGE III (HCC): ICD-10-CM

## 2019-08-26 ENCOUNTER — TELEPHONE (OUTPATIENT)
Dept: ENDOCRINOLOGY | Facility: CLINIC | Age: 74
End: 2019-08-26

## 2019-08-26 RX ORDER — AMLODIPINE BESYLATE 10 MG/1
TABLET ORAL
Qty: 90 TABLET | Refills: 0 | OUTPATIENT
Start: 2019-08-26

## 2019-08-27 ENCOUNTER — OFFICE VISIT (OUTPATIENT)
Dept: URGENT CARE | Facility: CLINIC | Age: 74
End: 2019-08-27
Payer: MEDICARE

## 2019-08-27 ENCOUNTER — CLINICAL SUPPORT (OUTPATIENT)
Dept: CARDIAC REHAB | Facility: CLINIC | Age: 74
End: 2019-08-27
Payer: MEDICARE

## 2019-08-27 VITALS
OXYGEN SATURATION: 98 % | TEMPERATURE: 97.9 F | DIASTOLIC BLOOD PRESSURE: 59 MMHG | SYSTOLIC BLOOD PRESSURE: 120 MMHG | RESPIRATION RATE: 18 BRPM | HEART RATE: 74 BPM | BODY MASS INDEX: 33.18 KG/M2 | WEIGHT: 237 LBS | HEIGHT: 71 IN

## 2019-08-27 DIAGNOSIS — G58.8 INTERCOSTAL NEURALGIA: Primary | ICD-10-CM

## 2019-08-27 DIAGNOSIS — Z95.1 S/P CABG (CORONARY ARTERY BYPASS GRAFT): ICD-10-CM

## 2019-08-27 PROCEDURE — 99213 OFFICE O/P EST LOW 20 MIN: CPT | Performed by: PHYSICIAN ASSISTANT

## 2019-08-27 PROCEDURE — G0463 HOSPITAL OUTPT CLINIC VISIT: HCPCS | Performed by: PHYSICIAN ASSISTANT

## 2019-08-27 PROCEDURE — 93798 PHYS/QHP OP CAR RHAB W/ECG: CPT

## 2019-08-27 NOTE — PROGRESS NOTES
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]  1  Intercostal neuralgia       Discussed that pt's symptoms are likely nerve related  Less likely it is shingles as sx have been ongoing for over 1 week with no rash present  Advised to return if he does see a rash  He may try Benadryl to see if it offers relief, he is unable to take NSAIDs as he is post CABG  Instructed to f/u with his PCP for continued sx  Patient Instructions     May try Benadryl for itching  Follow up with PCP in 3-5 days  Proceed to  ER if symptoms worsen  Chief Complaint     Chief Complaint   Patient presents with    Rash     Pt states that he has had this for about 1 5 weeks and he says it is very irritating and itchy however there is no visible rash present but her feels it  History of Present Illness       Pt is a 68 yr old male presenting for itching under his right arm x 1 5 weeks  He reports the skin feels itchy and sensitive to touch, especially clothes touching it  He denies ever having a rash to the area  He denies any trauma or it feeling muscular  He has been applying hydrocortisone cream which only offers a cooling effect  Review of Systems   Review of Systems   Constitutional: Negative for chills and fever  Respiratory: Negative for shortness of breath  Skin: Negative for rash          itching         Current Medications       Current Outpatient Medications:     acetaminophen (TYLENOL) 325 mg tablet, Take 2 tablets (650 mg total) by mouth every 6 (six) hours as needed for mild pain, headaches or fever, Disp: 30 tablet, Rfl: 0    Ascorbic Acid (VITAMIN C) 1000 MG tablet, Take 1,000 mg by mouth daily, Disp: , Rfl:     aspirin 325 mg tablet, Take 1 tablet (325 mg total) by mouth daily, Disp: 100 tablet, Rfl: 0    atorvastatin (LIPITOR) 80 mg tablet, Take 1 tablet (80 mg total) by mouth daily with dinner, Disp: 30 tablet, Rfl: 2    Choline Fenofibrate (FENOFIBRIC ACID) 135 MG CPDR, Take 135 mg by mouth daily, Disp: , Rfl:     Continuous Blood Gluc  (FREESTYLE JOHANNE READER) MAMI, Use device to scan blood glucose at least 4 times a day, Disp: 1 Device, Rfl: 0    Continuous Blood Gluc Sensor ([address] [place] [address]) [facility] [place], Apply sensor every 14 days to check blood glucose at least 4 times a day, Disp: 6 each, Rfl: 1    cyanocobalamin (VITAMIN B-12) 500 mcg tablet, Take 500 mcg by mouth daily, Disp: , Rfl:     docusate sodium (COLACE) 100 mg capsule, Take 1 capsule (100 mg total) by mouth 2 (two) times a day (Patient taking differently: Take 100 mg by mouth daily ), Disp: 60 capsule, Rfl: 1    Doxepin HCl (SILENOR) 6 MG TABS, Take 6 mg by mouth as needed , Disp: , Rfl:     ferrous sulfate 325 (65 Fe) mg tablet, Take 1 tablet (325 mg total) by mouth daily with breakfast for 90 days, Disp: 90 tablet, Rfl: 0    finasteride (PROSCAR) 5 mg tablet, Take 5 mg by mouth daily, Disp: , Rfl:     Insulin Glargine (TOUJEO MAX SOLOSTAR) 300 units/mL CONCETRATED U-300 injection pen, Inject 56 Units under the skin daily at bedtime, Disp: 3 pen, Rfl: 0    insulin lispro (HUMALOG KWIKPEN) 100 units/mL injection pen, Inject 20 Units under the skin 3 (three) times a day with meals plus scale (using up to 80 units a day), Disp: 30 pen, Rfl: 1    latanoprost (XALATAN) 0 005 % ophthalmic solution, 1 drop daily at bedtime, Disp: , Rfl:     metoprolol tartrate (LOPRESSOR) 25 mg tablet, Take 1 tablet (25 mg total) by mouth every 12 (twelve) hours, Disp: 60 tablet, Rfl: 2    omeprazole (PriLOSEC) 40 MG capsule, Take 40 mg by mouth daily, Disp: , Rfl:     oxyCODONE-acetaminophen (PERCOCET) 5-325 mg per tablet, Take 1 tablet every 4 hours as needed for moderate pain or take 2 tablets every 6 hours as needed for severe pain , Disp: 30 tablet, Rfl: 0    polyethylene glycol (MIRALAX) 17 g packet, Take 17 g by mouth daily as needed (as needed for constipation), Disp: 14 each, Rfl: 0    tamsulosin (FLOMAX) 0 4 mg, Take 1 capsule (0 4 mg total) by mouth daily with dinner, Disp: 30 capsule, Rfl: 1    torsemide (DEMADEX) 20 mg tablet, Take 1 tablet (20 mg total) by mouth daily, Disp: 60 tablet, Rfl: 1    Current Allergies     Allergies as of 08/27/2019    (No Known Allergies)            The following portions of the patient's history were reviewed and updated as appropriate: allergies, current medications, past family history, past medical history, past social history, past surgical history and problem list      Past Medical History:   Diagnosis Date    Diabetes mellitus ([address] )     Hyperlipidemia     Hypertension     Renal disorder        Past Surgical History:   Procedure Laterality Date    BACK SURGERY      CORONARY ARTERY BYPASS GRAFT      quad    GALLBLADDER SURGERY      HERNIA REPAIR      [place] CABG, ARTERY-VEIN, FOUR N/A 6/21/2019    Procedure: CORONARY ARTERY BYPASS GRAFT (CABG) 4 VESSELS WITH SVG TO PDA, OM, DIAGONAL AND LIMA TO LAD; RIGHT LEG EVH;  Surgeon: Geo Costa MD;  [facility];  Service: Cardiac Surgery       Family History   Problem Relation Age of Onset    Cancer Mother     Cancer Father          Medications have been verified  Objective   /59   Pulse 74   Temp 97 9 °F (36 6 °C)   Resp 18   Ht 5' 11" (1 803 m)   Wt 108 kg (237 lb)   SpO2 98%   BMI 33 05 kg/m²        Physical Exam     Physical Exam   Constitutional: He is oriented to person, place, and time  He appears well-developed and well-nourished  No distress  HENT:   Head: Normocephalic and atraumatic  Eyes: Conjunctivae are normal    Pulmonary/Chest: Effort normal    Neurological: He is oriented to person, place, and time  Skin: He is not diaphoretic  Itching and sensitivity present to right side of upper thorax and extends slightly to the front and the back  There is no visible rash, redness/dryness, swelling, bruising  Area is sensitive and tender to touch  Psychiatric: He has a normal mood and affect

## 2019-08-29 ENCOUNTER — CLINICAL SUPPORT (OUTPATIENT)
Dept: CARDIAC REHAB | Facility: CLINIC | Age: 74
End: 2019-08-29
Payer: MEDICARE

## 2019-08-29 DIAGNOSIS — Z95.1 S/P CABG (CORONARY ARTERY BYPASS GRAFT): ICD-10-CM

## 2019-08-29 PROCEDURE — 93798 PHYS/QHP OP CAR RHAB W/ECG: CPT

## 2019-08-30 ENCOUNTER — CLINICAL SUPPORT (OUTPATIENT)
Dept: CARDIAC REHAB | Facility: CLINIC | Age: 74
End: 2019-08-30
Payer: MEDICARE

## 2019-08-30 DIAGNOSIS — Z95.1 S/P CABG (CORONARY ARTERY BYPASS GRAFT): ICD-10-CM

## 2019-08-30 PROCEDURE — 93798 PHYS/QHP OP CAR RHAB W/ECG: CPT

## 2019-09-03 ENCOUNTER — CLINICAL SUPPORT (OUTPATIENT)
Dept: CARDIAC REHAB | Facility: CLINIC | Age: 74
End: 2019-09-03
Payer: MEDICARE

## 2019-09-03 DIAGNOSIS — Z95.1 S/P CABG (CORONARY ARTERY BYPASS GRAFT): ICD-10-CM

## 2019-09-03 PROCEDURE — 93798 PHYS/QHP OP CAR RHAB W/ECG: CPT

## 2019-09-05 ENCOUNTER — OFFICE VISIT (OUTPATIENT)
Dept: CARDIOLOGY CLINIC | Facility: CLINIC | Age: 74
End: 2019-09-05
Payer: MEDICARE

## 2019-09-05 ENCOUNTER — OFFICE VISIT (OUTPATIENT)
Dept: FAMILY MEDICINE CLINIC | Facility: OTHER | Age: 74
End: 2019-09-05
Payer: MEDICARE

## 2019-09-05 ENCOUNTER — CLINICAL SUPPORT (OUTPATIENT)
Dept: CARDIAC REHAB | Facility: CLINIC | Age: 74
End: 2019-09-05
Payer: MEDICARE

## 2019-09-05 VITALS
HEIGHT: 70 IN | WEIGHT: 237.5 LBS | TEMPERATURE: 98.6 F | BODY MASS INDEX: 34 KG/M2 | OXYGEN SATURATION: 95 % | HEART RATE: 76 BPM

## 2019-09-05 VITALS
SYSTOLIC BLOOD PRESSURE: 106 MMHG | OXYGEN SATURATION: 98 % | BODY MASS INDEX: 33.64 KG/M2 | HEART RATE: 73 BPM | HEIGHT: 70 IN | WEIGHT: 235 LBS | DIASTOLIC BLOOD PRESSURE: 66 MMHG

## 2019-09-05 DIAGNOSIS — Z11.59 NEED FOR HEPATITIS C SCREENING TEST: ICD-10-CM

## 2019-09-05 DIAGNOSIS — E11.22 TYPE 2 DIABETES MELLITUS WITH STAGE 4 CHRONIC KIDNEY DISEASE, WITH LONG-TERM CURRENT USE OF INSULIN (HCC): Primary | ICD-10-CM

## 2019-09-05 DIAGNOSIS — N18.4 TYPE 2 DIABETES MELLITUS WITH STAGE 4 CHRONIC KIDNEY DISEASE, WITH LONG-TERM CURRENT USE OF INSULIN (HCC): Primary | ICD-10-CM

## 2019-09-05 DIAGNOSIS — Z95.1 S/P CABG (CORONARY ARTERY BYPASS GRAFT): ICD-10-CM

## 2019-09-05 DIAGNOSIS — I25.10 TRIPLE VESSEL CORONARY ARTERY DISEASE: ICD-10-CM

## 2019-09-05 DIAGNOSIS — L20.89 FLEXURAL ATOPIC DERMATITIS: ICD-10-CM

## 2019-09-05 DIAGNOSIS — Z79.4 TYPE 2 DIABETES MELLITUS WITH STAGE 4 CHRONIC KIDNEY DISEASE, WITH LONG-TERM CURRENT USE OF INSULIN (HCC): Primary | ICD-10-CM

## 2019-09-05 DIAGNOSIS — Z23 ENCOUNTER FOR IMMUNIZATION: ICD-10-CM

## 2019-09-05 PROBLEM — E66.9 OBESITY, UNSPECIFIED: Status: ACTIVE | Noted: 2019-09-05

## 2019-09-05 PROBLEM — E83.9 CHRONIC KIDNEY DISEASE-MINERAL AND BONE DISORDER: Status: RESOLVED | Noted: 2019-05-30 | Resolved: 2019-09-05

## 2019-09-05 PROBLEM — Z86.010 HISTORY OF COLONIC POLYPS: Status: ACTIVE | Noted: 2019-02-14

## 2019-09-05 PROBLEM — IMO0001 TYPE II OR UNSPECIFIED TYPE DIABETES MELLITUS WITHOUT MENTION OF COMPLICATION, UNCONTROLLED: Status: ACTIVE | Noted: 2019-06-15

## 2019-09-05 PROBLEM — N25.0 RENAL OSTEODYSTROPHY: Status: ACTIVE | Noted: 2017-08-18

## 2019-09-05 PROBLEM — M65.30 TRIGGER FINGER OF LEFT HAND: Status: ACTIVE | Noted: 2017-04-05

## 2019-09-05 PROBLEM — G47.30 SLEEP APNEA: Status: ACTIVE | Noted: 2019-06-15

## 2019-09-05 PROBLEM — I10 ESSENTIAL HYPERTENSION: Status: ACTIVE | Noted: 2019-05-30

## 2019-09-05 PROBLEM — N18.9 CHRONIC KIDNEY DISEASE-MINERAL AND BONE DISORDER: Status: RESOLVED | Noted: 2019-05-30 | Resolved: 2019-09-05

## 2019-09-05 PROBLEM — N18.30 CKD (CHRONIC KIDNEY DISEASE), STAGE III (HCC): Status: ACTIVE | Noted: 2017-08-18

## 2019-09-05 PROBLEM — M89.9 CHRONIC KIDNEY DISEASE-MINERAL AND BONE DISORDER: Status: RESOLVED | Noted: 2019-05-30 | Resolved: 2019-09-05

## 2019-09-05 PROBLEM — R13.19 ESOPHAGEAL DYSPHAGIA: Status: ACTIVE | Noted: 2019-02-14

## 2019-09-05 PROBLEM — R80.9 ALBUMINURIA: Status: ACTIVE | Noted: 2017-08-18

## 2019-09-05 LAB — SL AMB POCT HEMOGLOBIN AIC: 7.9 (ref ?–6.5)

## 2019-09-05 PROCEDURE — 1124F ACP DISCUSS-NO DSCNMKR DOCD: CPT | Performed by: FAMILY MEDICINE

## 2019-09-05 PROCEDURE — 82570 ASSAY OF URINE CREATININE: CPT | Performed by: FAMILY MEDICINE

## 2019-09-05 PROCEDURE — 90670 PCV13 VACCINE IM: CPT | Performed by: FAMILY MEDICINE

## 2019-09-05 PROCEDURE — 99214 OFFICE O/P EST MOD 30 MIN: CPT | Performed by: INTERNAL MEDICINE

## 2019-09-05 PROCEDURE — 99213 OFFICE O/P EST LOW 20 MIN: CPT | Performed by: FAMILY MEDICINE

## 2019-09-05 PROCEDURE — 82043 UR ALBUMIN QUANTITATIVE: CPT | Performed by: FAMILY MEDICINE

## 2019-09-05 PROCEDURE — 83036 HEMOGLOBIN GLYCOSYLATED A1C: CPT | Performed by: FAMILY MEDICINE

## 2019-09-05 PROCEDURE — G0009 ADMIN PNEUMOCOCCAL VACCINE: HCPCS | Performed by: FAMILY MEDICINE

## 2019-09-05 PROCEDURE — 93798 PHYS/QHP OP CAR RHAB W/ECG: CPT

## 2019-09-05 RX ORDER — TORSEMIDE 20 MG/1
20 TABLET ORAL DAILY
Qty: 90 TABLET | Refills: 3
Start: 2019-09-05 | End: 2019-10-09 | Stop reason: SDUPTHER

## 2019-09-05 RX ORDER — ATORVASTATIN CALCIUM 80 MG/1
80 TABLET, FILM COATED ORAL
Qty: 90 TABLET | Refills: 3 | Status: SHIPPED | OUTPATIENT
Start: 2019-09-05 | End: 2020-03-24 | Stop reason: SDUPTHER

## 2019-09-05 RX ORDER — TRIAMCINOLONE ACETONIDE 0.25 MG/G
CREAM TOPICAL 2 TIMES DAILY
Qty: 80 G | Refills: 0 | Status: SHIPPED | OUTPATIENT
Start: 2019-09-05 | End: 2019-10-23 | Stop reason: HOSPADM

## 2019-09-05 NOTE — PROGRESS NOTES
Cardiology   Zi Parkinson 68 y o  male MRN: [id number]        Impression:  1  Diastolic heart failure - compensated at this time  2  CAD s/p CABG x 4 - 6/19  3  HTN - controlled  4  Dyslipidemia - on statin  5  CKD - follows with nephrology  6  Chest pain - unclear etiology  No cardiac etiology  Possibly shingles vs fungal vs neurologic  Seeing PCP today  Recommendations:  1  Continue current medications  2  Follow up in 6 months  HPI: Zi Parkinson is a 68y o  year old male with a history of coronary artery disease s/p CABG 6/19, hypertension, and dyslipidemia who returns for follow up  Does have a tightness on chest - usually occurs later in the day  Resolves in morning  No issues with cardiac rehab  No palpitations  Review of Systems   Constitutional: Negative  HENT: Negative  Eyes: Negative  Respiratory: Positive for chest tightness  Negative for shortness of breath  Cardiovascular: Positive for chest pain  Negative for palpitations and leg swelling  Gastrointestinal: Negative  Endocrine: Negative  Genitourinary: Negative  Musculoskeletal: Negative  Skin: Negative  Allergic/Immunologic: Negative  Neurological: Negative  Hematological: Negative  Psychiatric/Behavioral: Negative  All other systems reviewed and are negative          Past Medical History:   Diagnosis Date    Diabetes mellitus ([address] )     Hyperlipidemia     Hypertension     Renal disorder      Past Surgical History:   Procedure Laterality Date    BACK SURGERY      CORONARY ARTERY BYPASS GRAFT      quad    GALLBLADDER SURGERY      HERNIA REPAIR      [place] CABG, ARTERY-VEIN, FOUR N/A 6/21/2019    Procedure: CORONARY ARTERY BYPASS GRAFT (CABG) 4 VESSELS WITH SVG TO PDA, OM, DIAGONAL AND LIMA TO LAD; RIGHT LEG EVH;  Surgeon: Khurram Arzola MD;  [facility];  Service: Cardiac Surgery     Social History     Substance and Sexual Activity   Alcohol Use Not Currently Comment: 1 drink every 6 months       Social History     Substance and Sexual Activity   Drug Use Never     Social History     Tobacco Use   Smoking Status Former Smoker    Packs/day: 3 00    Years: 30 00    Pack years: 90 00   Smokeless Tobacco Never Used   Tobacco Comment    Quit 1991     Family History   Problem Relation Age of Onset    Cancer Mother     Cancer Father        Allergies:  No Known Allergies    Medications:     Current Outpatient Medications:     Ascorbic Acid (VITAMIN C) 1000 MG tablet, Take 1,000 mg by mouth daily, Disp: , Rfl:     aspirin 325 mg tablet, Take 1 tablet (325 mg total) by mouth daily, Disp: 100 tablet, Rfl: 0    atorvastatin (LIPITOR) 80 mg tablet, Take 1 tablet (80 mg total) by mouth daily with dinner, Disp: 30 tablet, Rfl: 2    Choline Fenofibrate (FENOFIBRIC ACID) 135 MG CPDR, Take 135 mg by mouth daily, Disp: , Rfl:     Continuous Blood Gluc  (FREESTYLE JOHANNE READER) MAMI, Use device to scan blood glucose at least 4 times a day, Disp: 1 Device, Rfl: 0    Continuous Blood Gluc Sensor ([address] [place] [address]) [facility] [place], Apply sensor every 14 days to check blood glucose at least 4 times a day, Disp: 6 each, Rfl: 1    cyanocobalamin (VITAMIN B-12) 500 mcg tablet, Take 500 mcg by mouth daily, Disp: , Rfl:     docusate sodium (COLACE) 100 mg capsule, Take 1 capsule (100 mg total) by mouth 2 (two) times a day (Patient taking differently: Take 100 mg by mouth daily ), Disp: 60 capsule, Rfl: 1    Doxepin HCl (SILENOR) 6 MG TABS, Take 6 mg by mouth as needed , Disp: , Rfl:     ferrous sulfate 325 (65 Fe) mg tablet, Take 1 tablet (325 mg total) by mouth daily with breakfast for 90 days, Disp: 90 tablet, Rfl: 0    finasteride (PROSCAR) 5 mg tablet, Take 5 mg by mouth daily, Disp: , Rfl:     Insulin Glargine (TOUJEO MAX SOLOSTAR) 300 units/mL CONCETRATED U-300 injection pen, Inject 56 Units under the skin daily at bedtime, Disp: 3 pen, Rfl: 0    insulin lispro (HUMALOG KWIKPEN) 100 units/mL injection pen, Inject 20 Units under the skin 3 (three) times a day with meals plus scale (using up to 80 units a day), Disp: 30 pen, Rfl: 1    latanoprost (XALATAN) 0 005 % ophthalmic solution, 1 drop daily at bedtime, Disp: , Rfl:     metoprolol tartrate (LOPRESSOR) 25 mg tablet, Take 1 tablet (25 mg total) by mouth every 12 (twelve) hours, Disp: 60 tablet, Rfl: 2    omeprazole (PriLOSEC) 40 MG capsule, Take 40 mg by mouth daily, Disp: , Rfl:     tamsulosin (FLOMAX) 0 4 mg, Take 1 capsule (0 4 mg total) by mouth daily with dinner, Disp: 30 capsule, Rfl: 1    torsemide (DEMADEX) 20 mg tablet, Take 1 tablet (20 mg total) by mouth daily, Disp: 60 tablet, Rfl: 1    acetaminophen (TYLENOL) 325 mg tablet, Take 2 tablets (650 mg total) by mouth every 6 (six) hours as needed for mild pain, headaches or fever (Patient not taking: Reported on 9/5/2019), Disp: 30 tablet, Rfl: 0    oxyCODONE-acetaminophen (PERCOCET) 5-325 mg per tablet, Take 1 tablet every 4 hours as needed for moderate pain or take 2 tablets every 6 hours as needed for severe pain  (Patient not taking: Reported on 9/5/2019), Disp: 30 tablet, Rfl: 0    polyethylene glycol (MIRALAX) 17 g packet, Take 17 g by mouth daily as needed (as needed for constipation) (Patient not taking: Reported on 9/5/2019), Disp: 14 each, Rfl: 0      Wt Readings from Last 3 Encounters:   09/05/19 107 kg (235 lb)   08/27/19 108 kg (237 lb)   07/30/19 104 kg (229 lb 9 6 oz)     Temp Readings from Last 3 Encounters:   08/27/19 97 9 °F (36 6 °C)   08/01/19 97 9 °F (36 6 °C) (Oral)   07/25/19 (!) 97 1 °F (36 2 °C) (Temporal)     BP Readings from Last 3 Encounters:   09/05/19 106/66   08/27/19 120/59   08/01/19 122/66     Pulse Readings from Last 3 Encounters:   09/05/19 73   08/27/19 74   08/01/19 66         Physical Exam   Constitutional: He is oriented to person, place, and time  He appears well-developed  HENT:   Head: Atraumatic     Eyes: EOM are normal    Neck: Normal range of motion  Cardiovascular: Normal rate, regular rhythm and normal heart sounds  Exam reveals no gallop and no friction rub  No murmur heard  Pulmonary/Chest: Effort normal and breath sounds normal  No respiratory distress  Abdominal: Soft  Musculoskeletal: Normal range of motion  Neurological: He is alert and oriented to person, place, and time  Skin: Skin is warm and dry  Psychiatric: He has a normal mood and affect  Laboratory Studies:  CMP:  Lab Results   Component Value Date    K 3 8 2019     2019    CO2 31 2019    BUN 46 (H) 2019    CREATININE 2 78 (H) 2019    GLUCOSE 141 (H) 2019    AST 31 2019    ALT 32 2019    EGFR 22 2019       Lipid Profile:   No results found for: CHOL  Lab Results   Component Value Date    HDL 84 (H) 2019     Lab Results   Component Value Date    LDLCALC 49 2019     Lab Results   Component Value Date    TRIG 30 2019       Cardiac testing:     Results for orders placed during the hospital encounter of 06/15/19   Echo complete with contrast if indicated    [address]  [phone]    Transthoracic Echocardiogram  2D, M-mode, Doppler, and Color Doppler    Study date:  2019    Patient: Jacques Santos  MR number: [id number]  Account number: [de-identified]  : 1945  Age: 68 years  Gender: Male  Status: Inpatient  Location: Bedside  Height: 71 in  Weight: 237 6 lb  BP: 119/ 63 mmHg    Indications: Pulmonary embolism      Diagnoses: I26 99 - Other pulmonary embolism without acute cor pulmonale    Sonographer:  Kilo Mohan RDCS  Primary Physician:  Herman Bob DO  Referring Physician:  Josefina Mcknight PA-C  Group:  [id number]  Interpreting Physician:  Silvia Duenas MD    SUMMARY    LEFT VENTRICLE:  Systolic function was normal  Ejection fraction was estimated to be 60 %  There were no regional wall motion abnormalities  Wall thickness was moderately increased  There was moderate concentric hypertrophy  Doppler parameters were consistent with abnormal left ventricular relaxation (grade 1 diastolic dysfunction)  Doppler parameters were consistent with high ventricular filling pressure  RIGHT VENTRICLE:  The size was normal   Systolic function was normal     LEFT ATRIUM:  The atrium was moderately dilated  RIGHT ATRIUM:  The atrium was mildly dilated  HISTORY: PRIOR HISTORY: CKD3  Hypertension  DM2  HARJIT  PROCEDURE: The procedure was performed at the bedside  This was a routine study  The transthoracic approach was used  The study included complete 2D imaging, M-mode, complete spectral Doppler, and color Doppler  The heart rate was 71 bpm,  at the start of the study  Image quality was adequate  LEFT VENTRICLE: Size was normal  Systolic function was normal  Ejection fraction was estimated to be 60 %  There were no regional wall motion abnormalities  Wall thickness was moderately increased  There was moderate concentric  hypertrophy  DOPPLER: Doppler parameters were consistent with abnormal left ventricular relaxation (grade 1 diastolic dysfunction)  Doppler parameters were consistent with high ventricular filling pressure  RIGHT VENTRICLE: The size was normal  Systolic function was normal  Wall thickness was normal     LEFT ATRIUM: The atrium was moderately dilated  RIGHT ATRIUM: The atrium was mildly dilated  MITRAL VALVE: Valve structure was normal  There was normal leaflet separation  DOPPLER: The transmitral velocity was within the normal range  There was no evidence for stenosis  There was no significant regurgitation  AORTIC VALVE: The valve was trileaflet  Leaflets exhibited mildly increased thickness, normal cuspal separation, and sclerosis  DOPPLER: Transaortic velocity was within the normal range  There was no evidence for stenosis  There was no  significant regurgitation  TRICUSPID VALVE: The valve structure was normal  There was normal leaflet separation  DOPPLER: The transtricuspid velocity was within the normal range  There was no evidence for stenosis  There was no significant regurgitation  PULMONIC VALVE: Leaflets exhibited normal thickness, no calcification, and normal cuspal separation  DOPPLER: The transpulmonic velocity was within the normal range  There was no significant regurgitation  PERICARDIUM: There was no pericardial effusion  The pericardium was normal in appearance  AORTA: The root exhibited normal size  SYSTEMIC VEINS: IVC: The inferior vena cava was normal in size  Respirophasic changes were normal     MEASUREMENT TABLES    OTHER ECHO MEASUREMENTS  (Reference normals)  Estimated CVP   5 mmHg   (--)    SYSTEM MEASUREMENT TABLES    2D  %FS: 28 52 %  Ao Diam: 3 53 cm  EDV(Teich): 94 27 ml  EF(Teich): 55 1 %  ESV(Teich): 42 33 ml  IVSd: 1 37 cm  LA Area: 25 6 cm2  LA Diam: 3 77 cm  LVEDV MOD A4C: 79 22 ml  LVEF MOD A4C: 58 43 %  LVESV MOD A4C: 32 93 ml  LVIDd: 4 54 cm  LVIDs: 3 24 cm  LVLd A4C: 9 47 cm  LVLs A4C: 8 61 cm  LVOT Diam: 2 13 cm  LVPWd: 1 29 cm  RA Area: 18 18 cm2  RVIDd: 3 18 cm  SV MOD A4C: 46 29 ml  SV(Teich): 51 94 ml    MM  TAPSE: 2 13 cm    PW  E': 0 05 m/s  E/E': 18 25  MV A Nick: 1 19 m/s  MV Dec Medina: 4 16 m/s2  MV DecT: 240 11 ms  MV E Nick: 1 m/s  MV E/A Ratio: 0 84  MV PHT: 69 63 ms  MVA By PHT: 3 16 cm2    [employer] Accredited Echocardiography Laboratory    Prepared and electronically signed by    Fernanda Torres MD  Signed 17-Jun-2019 14:56:09       No results found for this or any previous visit    Results for orders placed during the hospital encounter of 06/15/19   Cardiac catheterization    Narrative [address] [facility]  [phone]    [facility]    Invasive Cardiovascular Lab Complete Report    Patient: Ravi WILKINS number: [id number]  Account number: [de-identified]  Study date: 2019  Gender: Male  : 1945  Height: 70 9 in  Weight: 237 6 lb  BSA: 2 27 mï¾²    Allergies: NO KNOWN ALLERGIES    Diagnostic Cardiologist:  Della Montelongo MD  Primary Physician:  DO SEVERINO Tobin    CORONARY CIRCULATION:  Proximal LAD: There was a diffuse 75 % stenosis  It appears amenable to percutaneous intervention  1st diagonal: There was a 80 % stenosis  Proximal circumflex: There was a 100 % stenosis  This lesion is a chronic total occlusion  Proximal RCA: There was a 100 % stenosis  There was JEFFRY grade 1 flow through the vessel (slow flow without perfusion)  This lesion is a chronic total occlusion  INDICATIONS:  --  Possible CAD: myocardial infarction without ST elevation (NSTEMI)  --  Congestive heart failure with dyspnea  PROCEDURES PERFORMED    --  Left coronary angiography  --  Right coronary angiography  --  Inpatient  --  Mod Sedation Same Physician Initial 15min  --  Coronary Catheterization (w/ LHC)  PROCEDURE: The risks and alternatives of the procedures and conscious sedation were explained to the patient and informed consent was obtained  The patient was brought to the cath lab and placed on the table  The planned puncture sites  were prepped and draped in the usual sterile fashion  --  Right radial artery access  After performing an Clint's test to verify adequate ulnar artery supply to the hand, the radial site was prepped  The puncture site was infiltrated with local anesthetic  The vessel was accessed using the  modified Seldinger technique, a wire was advanced into the vessel, and a sheath was advanced over the wire into the vessel  --  Left coronary artery angiography  A catheter was advanced over a guidewire into the aorta and positioned in the left coronary artery ostium under fluoroscopic guidance  Angiography was performed  --  Right coronary artery angiography   A catheter was advanced over a guidewire into the aorta and positioned in the right coronary artery ostium under fluoroscopic guidance  Angiography was performed  --  Inpatient  --  Mod Sedation Same Physician Initial 15min  --  Coronary Catheterization (/ [facility])  PROCEDURE COMPLETION: The patient tolerated the procedure well and was discharged from the cath lab  TIMING: Test started at 11:31  Test concluded at 11:52  HEMOSTASIS: The sheath was removed  The site was compressed with a Hemoband  device  Hemostasis was obtained  MEDICATIONS GIVEN: Fentanyl (1OOmcg/2 ml), 50 mcg, IV, at 11:37  Versed (2mg/2ml), 2 mg, IV, at 11:37  1% Lidocaine, 1 ml, subcutaneously, at 11:37  Verapamil (5mg/2ml), 2 5 mg, IV, at 11:39  Heparin 1000  units/ml, 4,000 units, IV, at 11:39  Nitroglycerin (200mcg/ml), 200 mcg, at 11:39  CONTRAST GIVEN: 30 ml Visipaque 320mgl/ml  RADIATION EXPOSURE: Fluoroscopy time: 2 4 min  CORONARY VESSELS:   --  The coronary circulation is right dominant  --  Left main: The vessel was medium to large sized and heavily calcified  Angiography showed moderate atherosclerosis  --  LAD: The vessel was medium to large sized and heavily calcified  Angiography showed the vessel to wrap around the cardiac apex and moderate atherosclerosis  There was one major diagonal branch  --  Proximal LAD: There was a diffuse 75 % stenosis  It appears amenable to percutaneous intervention  --  1st diagonal: The vessel was small to medium sized  Angiography showed moderate atherosclerosis  There was a 80 % stenosis  --  Proximal circumflex: There was a 100 % stenosis  This lesion is a chronic total occlusion  --  1st obtuse marginal: The vessel was small to medium sized  The artery was supplied by collaterals  --  RCA: The vessel was medium sized and heavily calcified  Angiography showed severe atherosclerosis  --  Proximal RCA: There was a 100 % stenosis   There was JEFFRY grade 1 flow through the vessel (slow flow without perfusion)  This lesion is a chronic total occlusion  --  Right PDA: The vessel was medium sized  The artery was supplied by collaterals  IMPRESSIONS:  There is significant triple vessel coronary artery disease  RECOMMENDATIONS  Consultation be obtained for coronary artery bypass grafting  DISPOSITION:  The patient left the catheterization laboratory in stable condition  Prepared and signed by    Melissa Flynn MD  Signed 2019 11:55:29    Study diagram    Angiographic findings  Native coronary lesions:  ï¾·Proximal LAD: Lesion 1: diffuse, 75 % stenosis  ï¾·D1: Lesion 1: 80 % stenosis  ï¾·Proximal circumflex: Lesion 1: 100 % stenosis  ï¾·Proximal RCA: Lesion 1: 100 % stenosis      Hemodynamic tables    Pressures:  Baseline  Pressures:  - HR: 53  Pressures:  - Rhythm:  Pressures:  -- Aortic Pressure (S/D/M): 111/58/71  Pressures:  -- Left Ventricle (s/edp): 97/17/--    Outputs:  Baseline  Outputs:  -- CALCULATIONS: [age]  Outputs:  -- CALCULATIONS: Body Surface Area: 2 27  Outputs:  -- CALCULATIONS: Height in cm: 180 00  Outputs:  -- CALCULATIONS: Sex: Male  Outputs:  -- CALCULATIONS: Weight in k 00

## 2019-09-05 NOTE — PATIENT INSTRUCTIONS
Eczema   WHAT YOU NEED TO KNOW:   Eczema, or atopic dermatitis, is an itchy, red skin rash  It is a long-term condition that may cause flare-ups for the rest of your life  DISCHARGE INSTRUCTIONS:   Return to the emergency department if:   · You develop a fever or have red streaks going up your arm or leg  · Your rash gets more swollen, red, or hot  Contact your healthcare provider if:   · Most of your skin is red, swollen, painful, and covered with scales  · You develop bloody, red, painful crusts  · Your skin blisters and oozes white or yellow pus  · You have questions about your condition or care  Medicines:   · Medicines , such as immunosuppressants, help reduce itching, redness, pain, and swelling  They may be given as a cream or pill  You may also receive antihistamines to reduce itching, or antibiotics if you have a skin infection  · Take your medicine as directed  Contact your healthcare provider if you think your medicine is not helping or if you have side effects  Tell him of her if you are allergic to any medicine  Keep a list of the medicines, vitamins, and herbs you take  Include the amounts, and when and why you take them  Bring the list or the pill bottles to follow-up visits  Carry your medicine list with you in case of an emergency  Manage eczema:   · Do not scratch  Pat or press on your skin for relief from itching  Your symptoms will get worse if you scratch  Keep your fingernails short so you do not tear your skin if you do scratch  · Keep your skin moist   Rub lotion, cream or ointment into your skin right after a bath or shower when your skin is still damp  Ask your healthcare provider what to use and how often to use it  · Take baths or showers  with warm water for 10 minutes or less  Use mild bar soap  Ask your healthcare provider for the best soap for you to use  · Wear cotton clothes  Wear loose-fitting clothes made from cotton or cotton blends  Avoid wool  · Use a humidifier  to add moisture to the air in your home  · Avoid changes in temperature , especially activities that cause you to sweat a lot because this can cause itching  Remove blankets from your bed if you get hot while you sleep  · Avoid allergens, dust, and skin irritants  Do not let pets inside your home  Do not use perfume, fabric softener, or makeup that burns or itches  Follow up with your healthcare provider as directed:  Write down your questions so you remember to ask them during your visits  © 2017 [employer] Information is for End User's use only and may not be sold, redistributed or otherwise used for commercial purposes  All illustrations and images included in CareNotes® are the copyrighted property of [employer] , [employer]  or Micah Perez  The above information is an  only  It is not intended as medical advice for individual conditions or treatments  Talk to your doctor, nurse or pharmacist before following any medical regimen to see if it is safe and effective for you

## 2019-09-05 NOTE — PROGRESS NOTES
BMI Counseling: Body mass index is 34 08 kg/m²  Discussed the patient's BMI with him  The BMI is above average  BMI counseling and education was provided to the patient  Nutrition recommendations include 3-5 servings of fruits/vegetables daily, reducing fast food intake, decreasing soda and/or juice intake, moderation in carbohydrate intake, reducing intake of saturated fat and trans fat and reducing intake of cholesterol  Assessment/Plan:    No problem-specific Assessment & Plan notes found for this encounter  Problem List Items Addressed This Visit        Endocrine    Type II or unspecified type diabetes mellitus without mention of complication, uncontrolled - Primary  He was advised to follow with endo and myself regularly  He doesn't want labs ordered today  Has labs to do for endo  Will order after his follow up if needed  continue with medication and low carb diet and regular exercise  Other Visit Diagnoses     Need for hepatitis C screening test        Relevant Orders    Hepatitis C antibody    Flexural atopic dermatitis        Relevant Medications    triamcinolone (KENALOG) 0 025 % cream  Advised to use with precautions bid prn and avoid using for more than a week at a time  Keep skin clean and can continue to use the barrier cream  Fu prn     Encounter for immunization        Relevant Orders    PNEUMOCOCCAL CONJUGATE VACCINE 13-VALENT GREATER THAN 6 MONTHS (Completed)            Subjective:      Patient ID: Avinash Xavier is a 68 y o  male  Patient is here today to establish care with new PCP  Past medical history is significant for HTN DM type 2 hyperlipidemia and anemia along with CKD stage 3  He feels ok today and doesn't have any complaints or symptoms  He hasn't had a recent A1C or microalbumin and agrees to get this done in the office today    Foot exam was conducted today and was normal   He was advised to take medication and wear protective foot wear as well as keep routine visits with endo and PCP  He will see me in 3 months for routine follow up  Labs are ordered by endo he states and he doesn't want to have labs ordered today  He has been strictly on insulin regimen and it seems to be getting his sugars in better control  He has a rash of right axilla that has been there for 2 weeks and its sore and itchy and appears as small nodules  No pustules or abscess noted  He has no fever or chills  He does have sesaonal allergies that are worse in winter usually  He is also due to have the Prevnar vaccine today  Discussed this and he agrees to get it updated  Last vaccine was pneumovax when he was 73  He also is due to get the flu shot but will get it at the pharmacy  The following portions of the patient's history were reviewed and updated as appropriate:   He  has a past medical history of Diabetes mellitus ([address] ), Hyperlipidemia, Hypertension, and Renal disorder    He   Patient Active Problem List    Diagnosis Date Noted    Obesity, unspecified 09/05/2019    Iron deficiency anemia due to chronic blood loss 07/09/2019    Other constipation 06/26/2019    Postoperative anemia due to acute blood loss 06/23/2019    S/P CABG (coronary artery bypass graft) 06/21/2019    Acute postoperative pulmonary insufficiency ([address] ) 06/21/2019    Blood loss anemia 06/21/2019    Hyperchloremia 06/21/2019    Triple vessel coronary artery disease 06/17/2019    Hyperlipidemia 06/17/2019    Type II or unspecified type diabetes mellitus without mention of complication, uncontrolled 06/15/2019    Bilateral lower extremity edema 06/15/2019    Sleep apnea 06/15/2019    Essential hypertension 05/30/2019    Esophageal dysphagia 02/14/2019    History of colonic polyps 02/14/2019    CKD (chronic kidney disease), stage III ([address] ) 08/18/2017    Albuminuria 08/18/2017    Renal osteodystrophy 08/18/2017    Trigger finger of left hand 04/05/2017    Lumbar back pain 02/07/2012    Lumbar discogenic pain syndrome 02/07/2012    Pain of lower extremity 02/07/2012    Spondylolisthesis 02/07/2012    Spinal stenosis 02/07/2012     He  has a past surgical history that includes Gallbladder surgery; Back surgery; Hernia repair; pr cabg, artery-vein, four (N/A, 6/21/2019); and Coronary artery bypass graft  His family history includes Cancer in his father and mother  He  reports that he has quit smoking  He has a 90 00 pack-year smoking history  He has never used smokeless tobacco  He reports that he drank alcohol  He reports that he does not use drugs    Current Outpatient Medications   Medication Sig Dispense Refill    acetaminophen (TYLENOL) 325 mg tablet Take 2 tablets (650 mg total) by mouth every 6 (six) hours as needed for mild pain, headaches or fever 30 tablet 0    Ascorbic Acid (VITAMIN C) 1000 MG tablet Take 1,000 mg by mouth daily      aspirin 325 mg tablet Take 1 tablet (325 mg total) by mouth daily 100 tablet 0    atorvastatin (LIPITOR) 80 mg tablet Take 1 tablet (80 mg total) by mouth daily with dinner 90 tablet 3    Choline Fenofibrate (FENOFIBRIC ACID) 135 MG CPDR Take 135 mg by mouth daily      Continuous Blood Gluc  (FREESTYLE JOHANNE READER) MAMI Use device to scan blood glucose at least 4 times a day 1 Device 0    Continuous Blood Gluc Sensor ([address] [place] [address]) MISC Apply sensor every 14 days to check blood glucose at least 4 times a day 6 each 1    cyanocobalamin (VITAMIN B-12) 500 mcg tablet Take 500 mcg by mouth daily      docusate sodium (COLACE) 100 mg capsule Take 1 capsule (100 mg total) by mouth 2 (two) times a day (Patient taking differently: Take 100 mg by mouth daily ) 60 capsule 1    Doxepin HCl (SILENOR) 6 MG TABS Take 6 mg by mouth as needed       ferrous sulfate 325 (65 Fe) mg tablet Take 1 tablet (325 mg total) by mouth daily with breakfast for 90 days 90 tablet 0    finasteride (PROSCAR) 5 mg tablet Take 5 mg by mouth daily      Insulin Glargine (TOUJEO MAX SOLOSTAR) 300 units/mL CONCETRATED U-300 injection pen Inject 56 Units under the skin daily at bedtime 3 pen 0    insulin lispro (HUMALOG KWIKPEN) 100 units/mL injection pen Inject 20 Units under the skin 3 (three) times a day with meals plus scale (using up to 80 units a day) 30 pen 1    latanoprost (XALATAN) 0 005 % ophthalmic solution 1 drop daily at bedtime      metoprolol tartrate (LOPRESSOR) 25 mg tablet Take 1 tablet (25 mg total) by mouth every 12 (twelve) hours 180 tablet 3    omeprazole (PriLOSEC) 40 MG capsule Take 40 mg by mouth daily      polyethylene glycol (MIRALAX) 17 g packet Take 17 g by mouth daily as needed (as needed for constipation) 14 each 0    tamsulosin (FLOMAX) 0 4 mg Take 1 capsule (0 4 mg total) by mouth daily with dinner 30 capsule 1    torsemide (DEMADEX) 20 mg tablet Take 1 tablet (20 mg total) by mouth daily 90 tablet 3    triamcinolone (KENALOG) 0 025 % cream Apply topically 2 (two) times a day 80 g 0     No current facility-administered medications for this visit        Current Outpatient Medications on File Prior to Visit   Medication Sig    acetaminophen (TYLENOL) 325 mg tablet Take 2 tablets (650 mg total) by mouth every 6 (six) hours as needed for mild pain, headaches or fever    Ascorbic Acid (VITAMIN C) 1000 MG tablet Take 1,000 mg by mouth daily    aspirin 325 mg tablet Take 1 tablet (325 mg total) by mouth daily    atorvastatin (LIPITOR) 80 mg tablet Take 1 tablet (80 mg total) by mouth daily with dinner    Choline Fenofibrate (FENOFIBRIC ACID) 135 MG CPDR Take 135 mg by mouth daily    Continuous Blood Gluc  (FREESTYLE JOHANNE READER) MAMI Use device to scan blood glucose at least 4 times a day    Continuous Blood Gluc Sensor (FREESTYLE JOHANNE SENSOR SYSTEM) MISC Apply sensor every 14 days to check blood glucose at least 4 times a day    cyanocobalamin (VITAMIN B-12) 500 mcg tablet Take 500 mcg by mouth daily    docusate sodium (COLACE) 100 mg capsule Take 1 capsule (100 mg total) by mouth 2 (two) times a day (Patient taking differently: Take 100 mg by mouth daily )    Doxepin HCl (SILENOR) 6 MG TABS Take 6 mg by mouth as needed     ferrous sulfate 325 (65 Fe) mg tablet Take 1 tablet (325 mg total) by mouth daily with breakfast for 90 days    finasteride (PROSCAR) 5 mg tablet Take 5 mg by mouth daily    Insulin Glargine (TOUJEO MAX SOLOSTAR) 300 units/mL CONCETRATED U-300 injection pen Inject 56 Units under the skin daily at bedtime    insulin lispro (HUMALOG KWIKPEN) 100 units/mL injection pen Inject 20 Units under the skin 3 (three) times a day with meals plus scale (using up to 80 units a day)    latanoprost (XALATAN) 0 005 % ophthalmic solution 1 drop daily at bedtime    metoprolol tartrate (LOPRESSOR) 25 mg tablet Take 1 tablet (25 mg total) by mouth every 12 (twelve) hours    omeprazole (PriLOSEC) 40 MG capsule Take 40 mg by mouth daily    polyethylene glycol (MIRALAX) 17 g packet Take 17 g by mouth daily as needed (as needed for constipation)    tamsulosin (FLOMAX) 0 4 mg Take 1 capsule (0 4 mg total) by mouth daily with dinner    torsemide (DEMADEX) 20 mg tablet Take 1 tablet (20 mg total) by mouth daily     No current facility-administered medications on file prior to visit  He has No Known Allergies       Review of Systems   Constitutional: Negative for appetite change, fatigue and fever  HENT: Negative for congestion  Respiratory: Negative for cough and shortness of breath  Cardiovascular: Negative for chest pain  Gastrointestinal: Negative for abdominal pain, nausea and vomiting  Skin: Positive for rash  Negative for pallor  Neurological: Negative for headaches  Objective:      Pulse 76   Temp 98 6 °F (37 °C) (Tympanic)   Ht 5' 10" (1 778 m)   Wt 108 kg (237 lb 8 oz)   SpO2 95%   BMI 34 08 kg/m²          Physical Exam   Constitutional: He is oriented to person, place, and time  He appears well-developed and well-nourished  No distress  HENT:   Head: Normocephalic and atraumatic  Neck: Normal range of motion  Neck supple  Cardiovascular: Normal rate, regular rhythm, normal heart sounds and intact distal pulses  Pulses are no weak pulses  No murmur heard  Pulses:       Dorsalis pedis pulses are 2+ on the right side, and 2+ on the left side  Posterior tibial pulses are 2+ on the right side, and 2+ on the left side  Pulmonary/Chest: Effort normal and breath sounds normal  No respiratory distress  He has no wheezes  Abdominal: Soft  Bowel sounds are normal  He exhibits no distension  There is no tenderness  Feet:   Right Foot:   Skin Integrity: Negative for ulcer, skin breakdown, erythema, warmth, callus or dry skin  Left Foot:   Skin Integrity: Negative for ulcer, skin breakdown, erythema, warmth, callus or dry skin  Neurological: He is alert and oriented to person, place, and time  Skin: Skin is warm and dry  Rash noted  He is not diaphoretic  No erythema  No pallor  Right axilla area with very mild flesh color papules with no pustules or vesicles  No erythema noted  He is using Desitin cream    Psychiatric: He has a normal mood and affect  His behavior is normal    Nursing note and vitals reviewed  Patient's shoes and socks removed  Right Foot/Ankle   Right Foot Inspection  Skin Exam: skin normal and skin intact no dry skin, no warmth, no callus, no erythema, no maceration, no abnormal color, no pre-ulcer, no ulcer and no callus                          Toe Exam: ROM and strength within normal limitsno swelling  Sensory       Monofilament testing: intact  Vascular  Capillary refills: < 3 seconds  The right DP pulse is 2+  The right PT pulse is 2+       Left Foot/Ankle  Left Foot Inspection  Skin Exam: skin normal and skin intactno dry skin, no warmth, no erythema, no maceration, normal color, no pre-ulcer, no ulcer and no callus                         Toe Exam: ROM and strength within normal limitsno swelling                   Sensory       Monofilament: intact  Vascular  Capillary refills: < 3 seconds  The left DP pulse is 2+  The left PT pulse is 2+  Assign Risk Category:  No deformity present; No loss of protective sensation;  No weak pulses       Risk: 0

## 2019-09-06 ENCOUNTER — CLINICAL SUPPORT (OUTPATIENT)
Dept: CARDIAC REHAB | Facility: CLINIC | Age: 74
End: 2019-09-06
Payer: MEDICARE

## 2019-09-06 DIAGNOSIS — Z95.1 S/P CABG (CORONARY ARTERY BYPASS GRAFT): ICD-10-CM

## 2019-09-06 LAB
CREAT UR-MCNC: 35.7 MG/DL
MICROALBUMIN UR-MCNC: 115 MG/L (ref 0–20)
MICROALBUMIN/CREAT 24H UR: 322 MG/G CREATININE (ref 0–30)

## 2019-09-06 PROCEDURE — 93798 PHYS/QHP OP CAR RHAB W/ECG: CPT

## 2019-09-10 ENCOUNTER — CLINICAL SUPPORT (OUTPATIENT)
Dept: CARDIAC REHAB | Facility: CLINIC | Age: 74
End: 2019-09-10
Payer: MEDICARE

## 2019-09-10 DIAGNOSIS — I25.10 TRIPLE VESSEL CORONARY ARTERY DISEASE: ICD-10-CM

## 2019-09-10 DIAGNOSIS — Z95.1 S/P CABG (CORONARY ARTERY BYPASS GRAFT): ICD-10-CM

## 2019-09-10 DIAGNOSIS — Z79.4 TYPE 2 DIABETES MELLITUS WITH CHRONIC KIDNEY DISEASE, WITH LONG-TERM CURRENT USE OF INSULIN, UNSPECIFIED CKD STAGE (HCC): ICD-10-CM

## 2019-09-10 DIAGNOSIS — E11.22 TYPE 2 DIABETES MELLITUS WITH CHRONIC KIDNEY DISEASE, WITH LONG-TERM CURRENT USE OF INSULIN, UNSPECIFIED CKD STAGE (HCC): ICD-10-CM

## 2019-09-10 PROCEDURE — 93798 PHYS/QHP OP CAR RHAB W/ECG: CPT

## 2019-09-10 NOTE — TELEPHONE ENCOUNTER
Morning blood sugars often elevated  Increase Toujeo to 60 units QHS  Continue Humalog 20 units TID with meals  Keep carbohydrate intake consistent to limit fluctuations in blood glucose    Insulin is unlikely to cause a rash  However, he should continue the treatment that was prescribed by his PCP for rash       Farnaz Crooks PA-C

## 2019-09-10 NOTE — TELEPHONE ENCOUNTER
bs log    toujeo 56u daily  humalog 20u tid    Pt said he is having itchy skin under arms   pcp thinks it may be his insulin, on cream for a week

## 2019-09-10 NOTE — PROGRESS NOTES
Cardiac Rehabilitation Plan of Care   30 day       Today's date: 9/10/2019   Visits: 9  Patient name: Tao Redding      : 1945  Age: 68 y o  MRN: [id number]  Referring Physician: Gabriel Jenkins MD  Provider: [facility]   Clinician: Ruthie Sandhoff, MS, [facility] [place]    Dx:   Encounter Diagnosis   Name Primary?  S/P CABG (coronary artery bypass graft)      Date of onset: 19      SUMMARY OF PROGRESS:  This is Michael's 30 day note for cardiac rehab, he attended 9 sessions including his initial evaluation  Lilly Jara has progressed to 40-45 min of cardio at 2 5-3 METs  He has normal hemodynamic response to exercise and has complained of no cardiac complications  He is currently doing PT exercises at home daily but no added cardio  On telemetry he is NSR with occ PVCs and ventricular couplets  Lilly Jara is diabetic and reports his BG to range from 100-200  He is not consistent with his BG letting himself get off his diet frequently  We enforced that he needs to stay on a track to get his BG under control  Lilly Jara reports to have excellent social support from friends and family  He reports to have some added stress with the frustration with his health and taking care of his rentals  Lilly Jara will continue to progress as tolerable  Medication compliance: Yes   Comments:   Fall Risk: Low   Comments:     EKG changes: NSR with ST depression and occ PVCs      EXERCISE ASSESSMENT and PLAN    Current Exercise Program in Rehab:       Frequency: 3 days/week        Minutes: 40-45        METS: 2 5-3            HR:    RPE: 4-6         Modalities: Treadmill, UBE, NuStep and Recumbent bike      Exercise Progression 60 Day Goals :    Frequency: 4 days/week   Minutes: 40-45   METS: 3-4   HR:     RPE: 4-6   Modalities: Treadmill, UBE, NuStep and Recumbent bike    Strength training:   Will be added following at least 8 weeks post surgery and 8-10 monitored sessions   Modalities: Leg Press, Chest Press, Pull Downs, Arm Curl and Seated Row    Progressing: Yes - increased duration, was not able to keep up intensities with what was programmed       Home Exercise: Currently only doing his physical therapy exercises at home    Goals: 10% improvement in functional capacity, improved DASI score by 10%, Increase in peak CR METs by 40%, Resume ADLs with increased strength and Exercise 5 days/wk, >150mins/wk  Education: Benefit of exercise for CAD risk factors, home exercise guidelines, signs and sxs, RPE scale and class: Risk Factors for Heart Disease   Plan:home exercise 30+ mins 2 days opposite CR  Readiness to change: Action      NUTRITION ASSESSMENT AND PLAN    Weight control:    Starting weight: 231 2 lbs   Current weight:   239 0 lbs   Waist circumference:    Startin in   Current:    Diabetes: Patient reported fasting BS over 200s (Type 2)  Lipid management: Discussed diet and lipid management and Last lipid profile 19  Chol 139  TRG 30  HDL 84  LDL 49, A1c%: 10 3, Fasting B  Goals:reduced BMI to < 25, decreased body fat% <25%   , reduced waist circumference <40 inches, fasting BG , improved A1c  < 6 5%, Improved Rate Your Plate score  >65 and 2 5-5%  wt loss  Education: heart healthy eating  low sodium diet  hydration  diet and lipid management  diabetes management and exercise  wt  loss  healthy choices while dining out  portion control  class: Heart Healthy Eating  class:  Label Reading  Progressing: No - not following a heart healthy diet or a diet beneficial for a diabetic   Plan: Increase PUFA and MUFA, Decrease SFA, Increase whole grains, increase fruits/vegs, eliminate processed meats, reduce red meat 1x/wk, swtich to low fat dairy and Reduce added sugars <25g/day  Readiness to change: Action      PSYCHOSOCIAL ASSESSMENT AND PLAN    Emotional: Patient reports he/she is coping well with good social support and denies depression or anxiety  PHQ-9 =  0    0 =No Depression  Self-reported stress level: 7   Social support: Excellent  Goals:  Physical Fitness in [facility] Score < 3, Social Support in [place] Score < 3 and Pain in [place] Score < 3  Education: class:  Stress and Your Health  and class:  Relaxation  Progressing: No - stress level increased   Plan: Practice relaxation techniques  Readiness to change: Action      OTHER CORE COMPONENTS     Tobacco:   Social History     Tobacco Use   Smoking Status Former Smoker    Packs/day: 3 00    Years: 30 00    Pack years: 90 00   Smokeless Tobacco Never Used   Tobacco Comment    Quit        Tobacco Use Intervention: Referral to tobacco expert:   Brief counseling by cardiac rehab professional  Date: 19    Blood pressure:    Restin/70   Exercise: 136/68    Goals: consistent BP < 130/80, reduced dietary sodium <2300mg and consistent exercise >150 mins/wk  Education:  understanding HTN and CAD, low sodium diet and HTN, Education class:  Common Heart Medications and Education class: Understanding Heart Disease  Progressing: Yes- BP under control, will monitor at home   Plan: Class: Understanding Heart Disease, Class: Common Heart Medications and Monitor BP daily  Readiness to change: Action

## 2019-09-11 NOTE — PROGRESS NOTES
There are no diagnoses linked to this encounter  Assessment and plan:       1  Urinary retention with continued incomplete bladder emptying  - Patient is treated on maximum medical therapy including tamsulosin and finasteride at this time  - We discussed potential risks with continued to have incomplete bladder emptying including urinary tract infections, bladder stones, and kidney damage   - We discussed possible procedural treatment options for bladder obstruction including a transurethral resection of the prostate and Urolift  Patient is provided with patient education regarding the Urolift procedure  We discussed the risks and benefits of both of these procedures  He is not currently interested in procedural treatment  - We will plan to follow up for symptom reassessment, uroflow, and repeat PVR in approximately 6 months  2  History of elevated PSA  - Patient reports history of elevated PSA for which he previously underwent biopsy which was negative  He reports he has not had a PSA in quite some time  - He should have PSA drawn prior to follow-up visit  Digital rectal exam will be performed at that time  Hailey Hanna PA-C      Chief Complaint     Chief Complaint   Patient presents with    Urinary Retention         History of Present Illness     Dylan Scott is a 68 y o  male patient establishing care with [address] [place] [address] for Urology for urinary retention  Patient underwent history coronary artery bypass grafting in June of this year  At that time he suffered from postoperative urinary retention  He was started on tamsulosin and subsequently passed a voiding trial   He reports today for followup for this  A copy of today's note be sent to the patient's primary care provider in the name of continuity of care  Patient reports that he previously saw an outside urologist, he is unsure of who, many years ago    He has a history of elevated PSA for which he underwent a prostate biopsy which thankfully had negative result  He also takes finasteride daily and we have to start on this in March of this year  AUA symptom score today is 9 with an overall bother score of 4  Primary complaint is the feeling of incomplete bladder emptying  Demonstrates incomplete bladder emptying with a PVR of 160 mL at today's visit  Laboratory     Lab Results   Component Value Date    CREATININE 2 78 (H) 07/06/2019       No results found for: PSA    Recent Results (from the past 1 hour(s))   POCT Measure PVR    Collection Time: 09/12/19 11:14 AM   Result Value Ref Range    POST-VOID RESIDUAL VOLUME, ML  mL         Review of Systems     Review of Systems   Constitutional: Negative for chills and fever  HENT: Negative  Eyes: Negative  Respiratory: Negative for shortness of breath  Cardiovascular: Negative for chest pain  Currently in cardiac rehab  He was unable to complete his rehab exercises today due to hypoglycemia checked prior to visit  Gastrointestinal: Negative for constipation, diarrhea, nausea and vomiting  Genitourinary: Negative for difficulty urinating, dysuria, enuresis, flank pain, frequency, hematuria and urgency  Sensation of incomplete bladder emptying   Musculoskeletal: Positive for back pain (Reports that this is due to sleeping last night  Does have prior history of back surgery  )  AUA SYMPTOM SCORE      Most Recent Value   AUA SYMPTOM SCORE   How often have you had a sensation of not emptying your bladder completely after you finished urinating? 3   How often have you had to urinate again less than two hours after you finished urinating? 2   How often have you found you stopped and started again several times when you urinate? 1   How often have you found it difficult to postpone urination? 1   How often have you had a weak urinary stream?  0   How often have you had to push or strain to begin urination?   0   How many times did you most typically get up to urinate from the time you went to bed at night until the time you got up in the morning? 2   Quality of Life: If you were to spend the rest of your life with your urinary condition just the way it is now, how would you feel about that?  4   AUA SYMPTOM SCORE  9                Allergies     No Known Allergies    Physical Exam     Physical Exam   Constitutional: He is oriented to person, place, and time  He appears well-developed and well-nourished  No distress  HENT:   Head: Normocephalic and atraumatic  Right Ear: External ear normal    Left Ear: External ear normal    Nose: Nose normal    Eyes: Right eye exhibits no discharge  Left eye exhibits no discharge  No scleral icterus  Cardiovascular: Normal rate  Pulmonary/Chest: Effort normal    Genitourinary:   Genitourinary Comments: Negative for CVA tenderness bilaterally   Musculoskeletal:   Ambulate independently  Does have significant difficulty moving from sitting on the exam table to a standing  Also appears to have pain with ambulation  Neurological: He is alert and oriented to person, place, and time  Skin: Skin is warm and dry  He is not diaphoretic  Psychiatric: He has a normal mood and affect  His behavior is normal  Judgment and thought content normal    Nursing note and vitals reviewed          Vital Signs     Vitals:    09/12/19 1106   BP: 134/62   BP Location: Left arm   Patient Position: Sitting   Cuff Size: Standard   Weight: 108 kg (237 lb)   Height: 5' 10" (1 778 m)         Current Medications       Current Outpatient Medications:     Ascorbic Acid (VITAMIN C) 1000 MG tablet, Take 1,000 mg by mouth daily, Disp: , Rfl:     aspirin 325 mg tablet, Take 1 tablet (325 mg total) by mouth daily, Disp: 100 tablet, Rfl: 0    atorvastatin (LIPITOR) 80 mg tablet, Take 1 tablet (80 mg total) by mouth daily with dinner, Disp: 90 tablet, Rfl: 3    Choline Fenofibrate (FENOFIBRIC ACID) 135 MG CPDR, Take 135 mg by mouth daily, Disp: , Rfl:     Continuous Blood Gluc  (FREESTYLE JOHANNE READER) MAMI, Use device to scan blood glucose at least 4 times a day, Disp: 1 Device, Rfl: 0    Continuous Blood Gluc Sensor ([address] [place] [address]) [facility], Apply sensor every 14 days to check blood glucose at least 4 times a day, Disp: 6 each, Rfl: 1    cyanocobalamin (VITAMIN B-12) 500 mcg tablet, Take 500 mcg by mouth daily, Disp: , Rfl:     docusate sodium (COLACE) 100 mg capsule, Take 1 capsule (100 mg total) by mouth 2 (two) times a day, Disp: 60 capsule, Rfl: 1    Doxepin HCl (SILENOR) 6 MG TABS, Take 6 mg by mouth as needed , Disp: , Rfl:     ferrous sulfate 325 (65 Fe) mg tablet, Take 1 tablet (325 mg total) by mouth daily with breakfast for 90 days, Disp: 90 tablet, Rfl: 0    finasteride (PROSCAR) 5 mg tablet, Take 5 mg by mouth daily, Disp: , Rfl:     Insulin Glargine (TOUJEO MAX SOLOSTAR) 300 units/mL CONCETRATED U-300 injection pen, Inject 60 Units under the skin daily at bedtime, Disp: 3 pen, Rfl: 0    insulin lispro (HUMALOG KWIKPEN) 100 units/mL injection pen, Inject 20 Units under the skin 3 (three) times a day with meals plus scale (using up to 80 units a day), Disp: 30 pen, Rfl: 1    latanoprost (XALATAN) 0 005 % ophthalmic solution, 1 drop daily at bedtime, Disp: , Rfl:     metoprolol tartrate (LOPRESSOR) 25 mg tablet, Take 1 tablet (25 mg total) by mouth every 12 (twelve) hours, Disp: 180 tablet, Rfl: 3    omeprazole (PriLOSEC) 40 MG capsule, Take 40 mg by mouth daily, Disp: , Rfl:     tamsulosin (FLOMAX) 0 4 mg, Take 1 capsule (0 4 mg total) by mouth daily with dinner, Disp: 30 capsule, Rfl: 1    torsemide (DEMADEX) 20 mg tablet, Take 1 tablet (20 mg total) by mouth daily, Disp: 90 tablet, Rfl: 3    triamcinolone (KENALOG) 0 025 % cream, Apply topically 2 (two) times a day, Disp: 80 g, Rfl: 0    acetaminophen (TYLENOL) 325 mg tablet, Take 2 tablets (650 mg total) by mouth every 6 (six) hours as needed for mild pain, headaches or fever (Patient not taking: Reported on 9/12/2019), Disp: 30 tablet, Rfl: 0    polyethylene glycol (MIRALAX) 17 g packet, Take 17 g by mouth daily as needed (as needed for constipation) (Patient not taking: Reported on 9/12/2019), Disp: 14 each, Rfl: 0      Active Problems     Patient Active Problem List   Diagnosis    CKD (chronic kidney disease), stage III ([facility] )    Essential hypertension    Type II or unspecified type diabetes mellitus without mention of complication, uncontrolled    Bilateral lower extremity edema    Sleep apnea    Triple vessel coronary artery disease    Hyperlipidemia    S/P CABG (coronary artery bypass graft)    Acute postoperative pulmonary insufficiency (HCC)    Blood loss anemia    Hyperchloremia    Postoperative anemia due to acute blood loss    Other constipation    Iron deficiency anemia due to chronic blood loss    Albuminuria    Esophageal dysphagia    History of colonic polyps    Lumbar back pain    Lumbar discogenic pain syndrome    Obesity, unspecified    Pain of lower extremity    Trigger finger of left hand    Spondylolisthesis    Spinal stenosis    Renal osteodystrophy         Past Medical History     Past Medical History:   Diagnosis Date    Diabetes mellitus ([facility] )     Hyperlipidemia     Hypertension     Renal disorder          Surgical History     Past Surgical History:   Procedure Laterality Date    BACK SURGERY      CORONARY ARTERY BYPASS GRAFT      quad    GALLBLADDER SURGERY      HERNIA REPAIR      [place] CABG, ARTERY-VEIN, FOUR N/A 6/21/2019    Procedure: CORONARY ARTERY BYPASS GRAFT (CABG) 4 VESSELS WITH SVG TO PDA, OM, DIAGONAL AND LIMA TO LAD; RIGHT LEG EVH;  Surgeon: Seth Hood MD;  [facility];  Service: Cardiac Surgery         Family History     Family History   Problem Relation Age of Onset    Cancer Mother     Cancer Father          Social History     Social History       Radiology

## 2019-09-12 ENCOUNTER — APPOINTMENT (OUTPATIENT)
Dept: CARDIAC REHAB | Facility: CLINIC | Age: 74
End: 2019-09-12
Payer: MEDICARE

## 2019-09-12 ENCOUNTER — TELEPHONE (OUTPATIENT)
Dept: UROLOGY | Facility: CLINIC | Age: 74
End: 2019-09-12

## 2019-09-12 ENCOUNTER — OFFICE VISIT (OUTPATIENT)
Dept: UROLOGY | Facility: CLINIC | Age: 74
End: 2019-09-12
Payer: MEDICARE

## 2019-09-12 ENCOUNTER — CLINICAL SUPPORT (OUTPATIENT)
Dept: CARDIAC REHAB | Facility: CLINIC | Age: 74
End: 2019-09-12
Payer: MEDICARE

## 2019-09-12 VITALS
DIASTOLIC BLOOD PRESSURE: 62 MMHG | WEIGHT: 237 LBS | HEIGHT: 70 IN | SYSTOLIC BLOOD PRESSURE: 134 MMHG | BODY MASS INDEX: 33.93 KG/M2

## 2019-09-12 DIAGNOSIS — Z87.898 HISTORY OF ELEVATED PSA: ICD-10-CM

## 2019-09-12 DIAGNOSIS — Z95.1 S/P CABG (CORONARY ARTERY BYPASS GRAFT): Primary | ICD-10-CM

## 2019-09-12 DIAGNOSIS — R33.9 URINARY RETENTION: Primary | ICD-10-CM

## 2019-09-12 DIAGNOSIS — Z95.1 S/P CABG (CORONARY ARTERY BYPASS GRAFT): ICD-10-CM

## 2019-09-12 DIAGNOSIS — I25.10 TRIPLE VESSEL CORONARY ARTERY DISEASE: ICD-10-CM

## 2019-09-12 LAB — POST-VOID RESIDUAL VOLUME, ML POC: 160 ML

## 2019-09-12 PROCEDURE — 51798 US URINE CAPACITY MEASURE: CPT | Performed by: PHYSICIAN ASSISTANT

## 2019-09-12 PROCEDURE — 99204 OFFICE O/P NEW MOD 45 MIN: CPT | Performed by: PHYSICIAN ASSISTANT

## 2019-09-12 RX ORDER — TAMSULOSIN HYDROCHLORIDE 0.4 MG/1
0.4 CAPSULE ORAL
Qty: 90 CAPSULE | Refills: 3 | Status: SHIPPED | OUTPATIENT
Start: 2019-09-12 | End: 2020-06-08 | Stop reason: SDUPTHER

## 2019-09-12 NOTE — PROGRESS NOTES
Exercise Session Detail    Pt was unable to exercise at cardiac rehab today due to his sugar being low  He will not be charged for this sessions and will complete 37 total sessions

## 2019-09-12 NOTE — TELEPHONE ENCOUNTER
He please let the patient know that he should have a PSA drawn prior to his follow-up appointment  I have entered orders for this into our system  He has a history of elevated PSA and subsequently underwent a prostate biopsy which had negative result but was unable to recall when the last time he had his PSA checked  Will plan for a prostate exam at his follow-up appointment as well

## 2019-09-12 NOTE — TELEPHONE ENCOUNTER
Pt called back in and is aware of the psa to get done prior to their follow up appt  They would also like a refill for the Tamsulosin [facility] will run out before next appt

## 2019-09-13 ENCOUNTER — CLINICAL SUPPORT (OUTPATIENT)
Dept: CARDIAC REHAB | Facility: CLINIC | Age: 74
End: 2019-09-13
Payer: MEDICARE

## 2019-09-13 ENCOUNTER — TELEPHONE (OUTPATIENT)
Dept: FAMILY MEDICINE CLINIC | Facility: OTHER | Age: 74
End: 2019-09-13

## 2019-09-13 ENCOUNTER — OFFICE VISIT (OUTPATIENT)
Dept: URGENT CARE | Facility: CLINIC | Age: 74
End: 2019-09-13
Payer: MEDICARE

## 2019-09-13 VITALS
TEMPERATURE: 98.7 F | BODY MASS INDEX: 33.88 KG/M2 | HEART RATE: 75 BPM | DIASTOLIC BLOOD PRESSURE: 84 MMHG | WEIGHT: 242 LBS | RESPIRATION RATE: 20 BRPM | HEIGHT: 71 IN | SYSTOLIC BLOOD PRESSURE: 147 MMHG

## 2019-09-13 DIAGNOSIS — Z95.1 S/P CABG (CORONARY ARTERY BYPASS GRAFT): ICD-10-CM

## 2019-09-13 DIAGNOSIS — L29.9 PRURITUS: Primary | ICD-10-CM

## 2019-09-13 DIAGNOSIS — R21 RASH: Primary | ICD-10-CM

## 2019-09-13 PROCEDURE — 93798 PHYS/QHP OP CAR RHAB W/ECG: CPT

## 2019-09-13 PROCEDURE — 99213 OFFICE O/P EST LOW 20 MIN: CPT | Performed by: PHYSICIAN ASSISTANT

## 2019-09-13 PROCEDURE — G0463 HOSPITAL OUTPT CLINIC VISIT: HCPCS | Performed by: PHYSICIAN ASSISTANT

## 2019-09-13 RX ORDER — HYDROXYZINE HYDROCHLORIDE 10 MG/1
10 TABLET, FILM COATED ORAL EVERY 6 HOURS PRN
Qty: 30 TABLET | Refills: 0 | Status: SHIPPED | OUTPATIENT
Start: 2019-09-13 | End: 2019-10-23 | Stop reason: HOSPADM

## 2019-09-13 RX ORDER — METHYLPREDNISOLONE 4 MG/1
TABLET ORAL
Qty: 21 TABLET | Refills: 0 | Status: SHIPPED | OUTPATIENT
Start: 2019-09-13 | End: 2019-10-23 | Stop reason: HOSPADM

## 2019-09-13 NOTE — PROGRESS NOTES
[address] [facility]        NAME: Kisha Perera is a 68 y o  male  : 1945    MRN: [id number]  DATE: 2019  TIME: 4:30 PM    Assessment and Plan   Pruritus [L29 9]  1  Pruritus  methylPREDNISolone 4 MG tablet therapy pack    hydrOXYzine HCL (ATARAX) 10 mg tablet         Patient Instructions     Steroid as directed  Hydroxyzine as directed for itching  This may make you drowsy  Follow up with Dermatology  Proceed to  ER if symptoms worsen  Chief Complaint     Chief Complaint   Patient presents with    Itching     started with itching in both axillascouple weeks ago  went to PCP and given triamcinolone  Not helping  Went back to PCP who would not see him because he had no appointment  History of Present Illness       Patient is a 80-year-old male presenting for itching and sensitivity to right axilla  This has been ongoing for several weeks  He was seen by his PCP last week who prescribed him Kenalog ointment  He has been using it as directed for 1 week but has not offered any relief  He went back to PCP office today to see what he can do and they suggested he follow up in urgent care as he does not have an appointment  Review of Systems   Review of Systems   Constitutional: Negative for chills and fever  Skin: Negative for rash          itching         Current Medications       Current Outpatient Medications:     Ascorbic Acid (VITAMIN C) 1000 MG tablet, Take 1,000 mg by mouth daily, Disp: , Rfl:     aspirin 325 mg tablet, Take 1 tablet (325 mg total) by mouth daily, Disp: 100 tablet, Rfl: 0    atorvastatin (LIPITOR) 80 mg tablet, Take 1 tablet (80 mg total) by mouth daily with dinner, Disp: 90 tablet, Rfl: 3    Choline Fenofibrate (FENOFIBRIC ACID) 135 MG CPDR, Take 135 mg by mouth daily, Disp: , Rfl:     Continuous Blood Gluc  (FREESTYLE JOHANNE READER) MAMI, Use device to scan blood glucose at least 4 times a day, Disp: 1 Device, Rfl: 0    Continuous Blood Gluc Sensor ([address] [place] [address]) [facility] [place], Apply sensor every 14 days to check blood glucose at least 4 times a day, Disp: 6 each, Rfl: 1    cyanocobalamin (VITAMIN B-12) 500 mcg tablet, Take 500 mcg by mouth daily, Disp: , Rfl:     docusate sodium (COLACE) 100 mg capsule, Take 1 capsule (100 mg total) by mouth 2 (two) times a day, Disp: 60 capsule, Rfl: 1    Doxepin HCl (SILENOR) 6 MG TABS, Take 6 mg by mouth as needed , Disp: , Rfl:     ferrous sulfate 325 (65 Fe) mg tablet, Take 1 tablet (325 mg total) by mouth daily with breakfast for 90 days, Disp: 90 tablet, Rfl: 0    finasteride (PROSCAR) 5 mg tablet, Take 5 mg by mouth daily, Disp: , Rfl:     Insulin Glargine (TOUJEO MAX SOLOSTAR) 300 units/mL CONCETRATED U-300 injection pen, Inject 60 Units under the skin daily at bedtime, Disp: 3 pen, Rfl: 0    insulin lispro (HUMALOG KWIKPEN) 100 units/mL injection pen, Inject 20 Units under the skin 3 (three) times a day with meals plus scale (using up to 80 units a day), Disp: 30 pen, Rfl: 1    latanoprost (XALATAN) 0 005 % ophthalmic solution, 1 drop daily at bedtime, Disp: , Rfl:     metoprolol tartrate (LOPRESSOR) 25 mg tablet, Take 1 tablet (25 mg total) by mouth every 12 (twelve) hours, Disp: 180 tablet, Rfl: 3    omeprazole (PriLOSEC) 40 MG capsule, Take 40 mg by mouth daily, Disp: , Rfl:     polyethylene glycol (MIRALAX) 17 g packet, Take 17 g by mouth daily as needed (as needed for constipation), Disp: 14 each, Rfl: 0    tamsulosin (FLOMAX) 0 4 mg, Take 1 capsule (0 4 mg total) by mouth daily with dinner, Disp: 90 capsule, Rfl: 3    torsemide (DEMADEX) 20 mg tablet, Take 1 tablet (20 mg total) by mouth daily, Disp: 90 tablet, Rfl: 3    triamcinolone (KENALOG) 0 025 % cream, Apply topically 2 (two) times a day, Disp: 80 g, Rfl: 0    acetaminophen (TYLENOL) 325 mg tablet, Take 2 tablets (650 mg total) by mouth every 6 (six) hours as needed for mild pain, headaches or fever (Patient not taking: Reported on 9/12/2019), Disp: 30 tablet, Rfl: 0    hydrOXYzine HCL (ATARAX) 10 mg tablet, Take 1 tablet (10 mg total) by mouth every 6 (six) hours as needed for itching, Disp: 30 tablet, Rfl: 0    methylPREDNISolone 4 MG tablet therapy pack, Use as directed on package, Disp: 21 tablet, Rfl: 0    Current Allergies     Allergies as of 09/13/2019    (No Known Allergies)            The following portions of the patient's history were reviewed and updated as appropriate: allergies, current medications, past family history, past medical history, past social history, past surgical history and problem list      Past Medical History:   Diagnosis Date    Diabetes mellitus ([address] )     Hyperlipidemia     Hypertension     Renal disorder        Past Surgical History:   Procedure Laterality Date    BACK SURGERY      CORONARY ARTERY BYPASS GRAFT      quad    GALLBLADDER SURGERY      HERNIA REPAIR      [place] CABG, ARTERY-VEIN, FOUR N/A 6/21/2019    Procedure: CORONARY ARTERY BYPASS GRAFT (CABG) 4 VESSELS WITH SVG TO PDA, OM, DIAGONAL AND LIMA TO LAD; RIGHT LEG EVH;  Surgeon: Kaci Ramirez MD;  [facility];  Service: Cardiac Surgery       Family History   Problem Relation Age of Onset    Cancer Mother     Cancer Father          Medications have been verified  Objective   /84 (Patient Position: Sitting)   Pulse 75   Temp 98 7 °F (37 1 °C) (Temporal)   Resp 20   Ht 5' 11" (1 803 m)   Wt 110 kg (242 lb)   BMI 33 75 kg/m²        Physical Exam     Physical Exam   Constitutional: He is oriented to person, place, and time  He appears well-developed and well-nourished  No distress  HENT:   Head: Normocephalic and atraumatic  Eyes: Conjunctivae are normal    Pulmonary/Chest: Effort normal    Neurological: He is alert and oriented to person, place, and time  Skin: Skin is warm and dry  He is not diaphoretic  Itching and sensitivity present to around right axilla  Rash is not visible   No erythema, swelling, bruising

## 2019-09-13 NOTE — TELEPHONE ENCOUNTER
He can see me next week if he prefers or can see dermatology  Referral is made today so please provide him with the contact for dermatology  Thanks

## 2019-09-13 NOTE — TELEPHONE ENCOUNTER
Patient walked into our office this afternoon stating that the cream that Dr Emiliana Ricardo put him on is not helping  he was seen in our office on Sept 5 for a rash and was given Kenalog  He was told that he is going to be referred to see a Dermatologist and to continue the creme  He is not satisfied with that and is going to [facility] urgent care in [facility] and will decide over the weekend on who he will go see for a dermatologist   I gave him a list of doctors

## 2019-09-16 ENCOUNTER — CLINICAL SUPPORT (OUTPATIENT)
Dept: CARDIAC REHAB | Facility: CLINIC | Age: 74
End: 2019-09-16
Payer: MEDICARE

## 2019-09-16 DIAGNOSIS — Z95.1 S/P CABG (CORONARY ARTERY BYPASS GRAFT): ICD-10-CM

## 2019-09-16 PROCEDURE — 93798 PHYS/QHP OP CAR RHAB W/ECG: CPT

## 2019-09-17 ENCOUNTER — CLINICAL SUPPORT (OUTPATIENT)
Dept: CARDIAC REHAB | Facility: CLINIC | Age: 74
End: 2019-09-17
Payer: MEDICARE

## 2019-09-17 DIAGNOSIS — Z95.1 S/P CABG (CORONARY ARTERY BYPASS GRAFT): ICD-10-CM

## 2019-09-17 PROCEDURE — 93798 PHYS/QHP OP CAR RHAB W/ECG: CPT

## 2019-09-20 ENCOUNTER — CLINICAL SUPPORT (OUTPATIENT)
Dept: CARDIAC REHAB | Facility: CLINIC | Age: 74
End: 2019-09-20
Payer: MEDICARE

## 2019-09-20 DIAGNOSIS — Z95.1 S/P CABG (CORONARY ARTERY BYPASS GRAFT): ICD-10-CM

## 2019-09-20 PROCEDURE — 93798 PHYS/QHP OP CAR RHAB W/ECG: CPT

## 2019-09-24 ENCOUNTER — CLINICAL SUPPORT (OUTPATIENT)
Dept: CARDIAC REHAB | Facility: CLINIC | Age: 74
End: 2019-09-24
Payer: MEDICARE

## 2019-09-24 DIAGNOSIS — Z95.1 S/P CABG (CORONARY ARTERY BYPASS GRAFT): ICD-10-CM

## 2019-09-24 PROCEDURE — 93798 PHYS/QHP OP CAR RHAB W/ECG: CPT

## 2019-09-26 ENCOUNTER — CLINICAL SUPPORT (OUTPATIENT)
Dept: CARDIAC REHAB | Facility: CLINIC | Age: 74
End: 2019-09-26
Payer: MEDICARE

## 2019-09-26 DIAGNOSIS — Z95.1 S/P CABG (CORONARY ARTERY BYPASS GRAFT): ICD-10-CM

## 2019-09-26 PROCEDURE — 93798 PHYS/QHP OP CAR RHAB W/ECG: CPT

## 2019-09-27 ENCOUNTER — CLINICAL SUPPORT (OUTPATIENT)
Dept: CARDIAC REHAB | Facility: CLINIC | Age: 74
End: 2019-09-27
Payer: MEDICARE

## 2019-09-27 DIAGNOSIS — Z95.1 S/P CABG (CORONARY ARTERY BYPASS GRAFT): ICD-10-CM

## 2019-09-27 PROCEDURE — 93798 PHYS/QHP OP CAR RHAB W/ECG: CPT

## 2019-10-01 ENCOUNTER — CLINICAL SUPPORT (OUTPATIENT)
Dept: CARDIAC REHAB | Facility: CLINIC | Age: 74
End: 2019-10-01
Payer: MEDICARE

## 2019-10-01 DIAGNOSIS — Z95.1 S/P CABG (CORONARY ARTERY BYPASS GRAFT): ICD-10-CM

## 2019-10-01 PROCEDURE — 93798 PHYS/QHP OP CAR RHAB W/ECG: CPT

## 2019-10-03 ENCOUNTER — CLINICAL SUPPORT (OUTPATIENT)
Dept: CARDIAC REHAB | Facility: CLINIC | Age: 74
End: 2019-10-03
Payer: MEDICARE

## 2019-10-03 DIAGNOSIS — Z95.1 S/P CABG (CORONARY ARTERY BYPASS GRAFT): ICD-10-CM

## 2019-10-03 PROCEDURE — 93798 PHYS/QHP OP CAR RHAB W/ECG: CPT

## 2019-10-04 ENCOUNTER — APPOINTMENT (OUTPATIENT)
Dept: CARDIAC REHAB | Facility: CLINIC | Age: 74
End: 2019-10-04
Payer: MEDICARE

## 2019-10-08 ENCOUNTER — TELEPHONE (OUTPATIENT)
Dept: NEPHROLOGY | Facility: CLINIC | Age: 74
End: 2019-10-08

## 2019-10-08 ENCOUNTER — CLINICAL SUPPORT (OUTPATIENT)
Dept: CARDIAC REHAB | Facility: CLINIC | Age: 74
End: 2019-10-08
Payer: MEDICARE

## 2019-10-08 DIAGNOSIS — Z95.1 S/P CABG (CORONARY ARTERY BYPASS GRAFT): ICD-10-CM

## 2019-10-08 LAB
25(OH)D3 SERPL-MCNC: 26 NG/ML (ref 30–100)
ALBUMIN SERPL-MCNC: 4 G/DL (ref 3.6–5.1)
BASOPHILS # BLD AUTO: 32 CELLS/UL (ref 0–200)
BASOPHILS NFR BLD AUTO: 0.5 %
BUN SERPL-MCNC: 48 MG/DL (ref 7–25)
BUN/CREAT SERPL: 18 (CALC) (ref 6–22)
CALCIUM SERPL-MCNC: 9.7 MG/DL (ref 8.6–10.3)
CALCIUM SERPL-MCNC: 9.7 MG/DL (ref 8.6–10.3)
CHLORIDE SERPL-SCNC: 104 MMOL/L (ref 98–110)
CO2 SERPL-SCNC: 30 MMOL/L (ref 20–32)
CREAT SERPL-MCNC: 2.7 MG/DL (ref 0.7–1.18)
CREAT UR-MCNC: 68 MG/DL (ref 20–320)
EOSINOPHIL # BLD AUTO: 296 CELLS/UL (ref 15–500)
EOSINOPHIL NFR BLD AUTO: 4.7 %
ERYTHROCYTE [DISTWIDTH] IN BLOOD BY AUTOMATED COUNT: 15.1 % (ref 11–15)
GLUCOSE SERPL-MCNC: 132 MG/DL (ref 65–99)
HCT VFR BLD AUTO: 36.4 % (ref 38.5–50)
HGB BLD-MCNC: 11.8 G/DL (ref 13.2–17.1)
LYMPHOCYTES # BLD AUTO: 1065 CELLS/UL (ref 850–3900)
LYMPHOCYTES NFR BLD AUTO: 16.9 %
MAGNESIUM SERPL-MCNC: 2.1 MG/DL (ref 1.5–2.5)
MCH RBC QN AUTO: 28.9 PG (ref 27–33)
MCHC RBC AUTO-ENTMCNC: 32.4 G/DL (ref 32–36)
MCV RBC AUTO: 89.2 FL (ref 80–100)
MONOCYTES # BLD AUTO: 510 CELLS/UL (ref 200–950)
MONOCYTES NFR BLD AUTO: 8.1 %
NEUTROPHILS # BLD AUTO: 4397 CELLS/UL (ref 1500–7800)
NEUTROPHILS NFR BLD AUTO: 69.8 %
PHOSPHATE SERPL-MCNC: 4.2 MG/DL (ref 2.1–4.3)
PLATELET # BLD AUTO: 296 THOUSAND/UL (ref 140–400)
PMV BLD REES-ECKER: 11.5 FL (ref 7.5–12.5)
POTASSIUM SERPL-SCNC: 4.2 MMOL/L (ref 3.5–5.3)
PROT UR-MCNC: 24 MG/DL (ref 5–25)
PROT/CREAT UR: 353 MG/G CREAT (ref 22–128)
PTH-INTACT SERPL-MCNC: 57 PG/ML (ref 14–64)
RBC # BLD AUTO: 4.08 MILLION/UL (ref 4.2–5.8)
SL AMB EGFR AFRICAN AMERICAN: 26 ML/MIN/1.73M2
SL AMB EGFR NON AFRICAN AMERICAN: 23 ML/MIN/1.73M2
SODIUM SERPL-SCNC: 142 MMOL/L (ref 135–146)
WBC # BLD AUTO: 6.3 THOUSAND/UL (ref 3.8–10.8)

## 2019-10-08 PROCEDURE — 93798 PHYS/QHP OP CAR RHAB W/ECG: CPT

## 2019-10-08 NOTE — TELEPHONE ENCOUNTER
----- Message from Sandra Woo MD sent at 10/8/2019  3:11 PM EDT -----  Please call patient to inform him that kidney function is stable

## 2019-10-09 DIAGNOSIS — R60.0 BILATERAL LOWER EXTREMITY EDEMA: Primary | ICD-10-CM

## 2019-10-09 DIAGNOSIS — Z95.1 S/P CABG (CORONARY ARTERY BYPASS GRAFT): ICD-10-CM

## 2019-10-09 DIAGNOSIS — I25.10 TRIPLE VESSEL CORONARY ARTERY DISEASE: ICD-10-CM

## 2019-10-09 RX ORDER — TORSEMIDE 20 MG/1
20 TABLET ORAL DAILY
Qty: 90 TABLET | Refills: 3 | Status: SHIPPED | OUTPATIENT
Start: 2019-10-09 | End: 2020-10-12

## 2019-10-09 RX ORDER — TORSEMIDE 20 MG/1
20 TABLET ORAL DAILY
Qty: 15 TABLET | Refills: 0 | Status: SHIPPED | OUTPATIENT
Start: 2019-10-09 | End: 2019-10-21 | Stop reason: SDUPTHER

## 2019-10-09 NOTE — TELEPHONE ENCOUNTER
Patient is currently on Torsemide 20 mg daily and the wife wanted to know if you would like for him to stay on Torsemide? He recently had blood work on 10/7/19  Please advise          Emilio Woods MA

## 2019-10-09 NOTE — TELEPHONE ENCOUNTER
Spoke with the patient's wife and she is aware for him to stay on same dose of Torsemide          Varsha Monsalve MA

## 2019-10-10 ENCOUNTER — CLINICAL SUPPORT (OUTPATIENT)
Dept: CARDIAC REHAB | Facility: CLINIC | Age: 74
End: 2019-10-10
Payer: MEDICARE

## 2019-10-10 DIAGNOSIS — Z95.1 S/P CABG (CORONARY ARTERY BYPASS GRAFT): ICD-10-CM

## 2019-10-10 PROCEDURE — 93798 PHYS/QHP OP CAR RHAB W/ECG: CPT

## 2019-10-10 NOTE — PROGRESS NOTES
Cardiac Rehabilitation Plan of Care   60 day      Today's date: 10/10/2019   Visits: 21  Patient name: Tiago Montano      : 1945  Age: 68 y o  MRN: [id number]  Referring Physician: Alfonso Jenkins MD   Cardiologist: Ag Singh MD   Provider: [facility]   Clinician: Gonzalo Morales, MS, [facility], [facility]    Dx:   Encounter Diagnosis   Name Primary?  S/P CABG (coronary artery bypass graft)      Date of onset: 19      SUMMARY OF PROGRESS:  This is Michael's 60 day note for cardiac rehab, he attended 21 sessions including his initial evaluation  Moi Kline has progressed to 40-45 min of cardio at 2 5-3 METs plus weight lifting  He has normal hemodynamic response to exercise however his BP swings often, there is no consistency in it on a day to day basis  Michael's  SBP runs  and DBP 50-80  He does express some chest tightness 5/10 when he finishes up exercise at CR however no other complications aside from that  He is currently doing PT exercises at home daily but no added cardio  On telemetry he is NSR with frequent PVCs, occ ventricular couplets, and some runs of PVC bigeminy/trigeminy  Moi Kline is diabetic however he also does not have this controlled, his fasting BP is anywhere from  and drops with exercise  Moi Kline has also been sent home prior to exercise due to his BG being too low  After exercise his BG does drop although not consistent  We enforced that he needs to stay on a track to get his BG under control and work on getting his diet under control as well  Moi Kline states he has been cutting back on his foods but still needs to work on eating less food rich in cholesterol, watch his sodium intake, and work on having less high glycemic foods  Moi Kline reports to have excellent social support from friends and family however 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  This added stress may also be affecting Michael's BG control   Moi Kline will continue to progress as tolerable          Medication compliance: Yes   Comments:   Fall Risk: Low   Comments:     EKG changes: NSR with ST depression, frequent PVCs, noted Bigeminy/trigeminy       EXERCISE ASSESSMENT and PLAN    Current Exercise Program in Rehab:       Frequency: 3 days/week        Minutes: 40-45        METS: 2 5-3            HR:    RPE: 4-6         Modalities: Treadmill, UBE, NuStep and Recumbent bike      Exercise Progression 60 Day Goals :    Frequency: 4 days/week   Minutes: 40-45   METS: 3-4   HR:     RPE: 4-6   Modalities: Treadmill, UBE, NuStep and Recumbent bike    Strength trainin-3 days / week  12-15 repetitions  1-2 sets per modality    Modalities: Chest Press, Pull Downs, Arm Curl, Seated Row and Sit to [employer]    Progressing: No - requires constant reinforcement to maintain speeds and has not increased intensity levels due to lack of drive    Home Exercise: Currently only doing his physical therapy exercises at home    Goals: 10% improvement in functional capacity, improved DASI score by 10%, Increase in peak CR METs by 40%, Resume ADLs with increased strength and Exercise 5 days/wk, >150mins/wk  Education: Benefit of exercise for CAD risk factors, home exercise guidelines, signs and sxs, RPE scale and class: Risk Factors for Heart Disease   Plan:home exercise 30+ mins 2 days opposite CR  Readiness to change: Action      NUTRITION ASSESSMENT AND PLAN    Weight control:    Starting weight: 231 2 lbs   Current weight:   240 0 lbs   Waist circumference:    Startin in   Current:    Diabetes: Patient reported fasting BS   Lipid management: Discussed diet and lipid management and Last lipid profile 19  Chol 139  TRG 30  HDL 84  LDL 49, A1c%: 10 3, Fasting B  Goals:reduced BMI to < 25, decreased body fat% <25%   , reduced waist circumference <40 inches, fasting BG , improved A1c  < 6 5%, Improved Rate Your Plate score  >14 and 2 5-5%  wt loss  Education: heart healthy eating  low sodium diet  hydration  diet and lipid management  diabetes management and exercise  wt  loss  healthy choices while dining out  portion control  class: Heart Healthy Eating  class:  Label Reading  Progressing: No - not following a heart healthy diet or a diet beneficial for a diabetic   Plan: Increase PUFA and MUFA, Decrease SFA, Increase whole grains, increase fruits/vegs, eliminate processed meats, reduce red meat 1x/wk, swtich to low fat dairy and Reduce added sugars <25g/day  Readiness to change: Action      PSYCHOSOCIAL ASSESSMENT AND PLAN    Emotional: Patient reports he/she is coping well with good social support and denies depression or anxiety  PHQ-9 =  0    0 =No Depression  Self-reported stress level: 5   Social support: Excellent  Goals:  Physical Fitness in [place] Score < 3, Social Support in [place] Score < 3 and Pain in [place] Score < 3  Education: class:  Stress and Your Health  and class:  Relaxation  Progressing: No - stress not managed well     Plan: Practice relaxation techniques  Readiness to change: Action      OTHER CORE COMPONENTS     Tobacco:   Social History     Tobacco Use   Smoking Status Former Smoker    Packs/day: 3 00    Years: 30 00    Pack years: 90 00   Smokeless Tobacco Never Used   Tobacco Comment    Quit        Tobacco Use Intervention: Referral to tobacco expert:   Brief counseling by cardiac rehab professional  Date: 19    Blood pressure:    Restin/62  -  134/62   Exercise: 130/74  -  150/62 ranges frequently     Goals: consistent BP < 130/80, reduced dietary sodium <2300mg and consistent exercise >150 mins/wk  Education:  understanding HTN and CAD, low sodium diet and HTN, Education class:  Common Heart Medications and Education class: Understanding Heart Disease  Progressing: No - BP not well controlled, still swings   Plan: Class: Understanding Heart Disease, Class: Common Heart Medications and Monitor BP daily  Readiness to change: Action

## 2019-10-11 ENCOUNTER — CLINICAL SUPPORT (OUTPATIENT)
Dept: CARDIAC REHAB | Facility: CLINIC | Age: 74
End: 2019-10-11
Payer: MEDICARE

## 2019-10-11 DIAGNOSIS — Z95.1 S/P CABG (CORONARY ARTERY BYPASS GRAFT): ICD-10-CM

## 2019-10-11 PROCEDURE — 93798 PHYS/QHP OP CAR RHAB W/ECG: CPT

## 2019-10-15 ENCOUNTER — CLINICAL SUPPORT (OUTPATIENT)
Dept: CARDIAC REHAB | Facility: CLINIC | Age: 74
End: 2019-10-15
Payer: MEDICARE

## 2019-10-15 ENCOUNTER — TELEPHONE (OUTPATIENT)
Dept: FAMILY MEDICINE CLINIC | Facility: OTHER | Age: 74
End: 2019-10-15

## 2019-10-15 ENCOUNTER — TELEPHONE (OUTPATIENT)
Dept: SURGICAL ONCOLOGY | Facility: CLINIC | Age: 74
End: 2019-10-15

## 2019-10-15 DIAGNOSIS — Z95.1 S/P CABG (CORONARY ARTERY BYPASS GRAFT): ICD-10-CM

## 2019-10-15 DIAGNOSIS — I12.9 BENIGN HYPERTENSION WITH CHRONIC KIDNEY DISEASE, STAGE IV (HCC): ICD-10-CM

## 2019-10-15 DIAGNOSIS — N18.4 BENIGN HYPERTENSION WITH CHRONIC KIDNEY DISEASE, STAGE IV (HCC): ICD-10-CM

## 2019-10-15 DIAGNOSIS — R59.1 LYMPHADENOPATHY: ICD-10-CM

## 2019-10-15 DIAGNOSIS — E55.9 VITAMIN D DEFICIENCY: ICD-10-CM

## 2019-10-15 DIAGNOSIS — N18.4 CKD (CHRONIC KIDNEY DISEASE), STAGE IV (HCC): Primary | ICD-10-CM

## 2019-10-15 DIAGNOSIS — R21 RASH AND NONSPECIFIC SKIN ERUPTION: Primary | ICD-10-CM

## 2019-10-15 LAB
BUN SERPL-MCNC: 50 MG/DL (ref 7–25)
BUN/CREAT SERPL: 18 (CALC) (ref 6–22)
CALCIUM SERPL-MCNC: 9.7 MG/DL (ref 8.6–10.3)
CHLORIDE SERPL-SCNC: 104 MMOL/L (ref 98–110)
CO2 SERPL-SCNC: 29 MMOL/L (ref 20–32)
CREAT SERPL-MCNC: 2.71 MG/DL (ref 0.7–1.18)
GLUCOSE SERPL-MCNC: 159 MG/DL (ref 65–99)
HBA1C MFR BLD: 8.8 % OF TOTAL HGB
POTASSIUM SERPL-SCNC: 4 MMOL/L (ref 3.5–5.3)
SL AMB EGFR AFRICAN AMERICAN: 26 ML/MIN/1.73M2
SL AMB EGFR NON AFRICAN AMERICAN: 22 ML/MIN/1.73M2
SODIUM SERPL-SCNC: 142 MMOL/L (ref 135–146)

## 2019-10-15 PROCEDURE — 93798 PHYS/QHP OP CAR RHAB W/ECG: CPT

## 2019-10-15 NOTE — TELEPHONE ENCOUNTER
I noticed he is due to have a medicare wellness and overdue  Please check on this and schedule the patient in 2-4 weeks  Thanks

## 2019-10-15 NOTE — TELEPHONE ENCOUNTER
New Patient Encounter    New Patient Intake Form   Patient Details:  Basilia Hanson  1945  [id number]    Background Information:  [address] starts by opening a telephone encounter and gathering the following information   Who is calling to schedule? If not self, relationship to patient? Self   Referring Provider Seth Prince   What is the diagnosis? Lymphadenopathy   When was the diagnosis? 9/2019   Is patient aware of diagnosis? Yes   Reason for visit? NP DX   Have you had any testing done? If so: when, where? No   Are records in EPIC? yes   Was the patient told to bring a disk? no   Scheduling Information:   Preferred [place]:  Any     Requesting Specific Provider? no   Are there any dates/time the patient cannot be seen? no   Counseling Pre-Screen:  If the patient answers YES to any of the below questions, please route to the appropriate location specific counselor    Have you felt anxious or worried about cancer and the treatment you are receiving? No   Has your diagnosis caused physical, emotional, or financial hardship for you? No   Note: Do not ask the patient about transportation issues/needs  Please notate if the patient brings it up and the counselor will schedule accordingly  Miscellaneous: na    After completing the above information, please route to Financial Counselor and the appropriate Nurse Navigator for review

## 2019-10-15 NOTE — TELEPHONE ENCOUNTER
Hayden Doctor from [facility] called and to stated that patient may have possible Lymphoma  She is suggesting that patient may need to see Hematology/Oncology for a biopsy    She is also asking if we could fax over lab results ([phone])

## 2019-10-17 ENCOUNTER — APPOINTMENT (OUTPATIENT)
Dept: CARDIAC REHAB | Facility: CLINIC | Age: 74
End: 2019-10-17
Payer: MEDICARE

## 2019-10-18 ENCOUNTER — TELEPHONE (OUTPATIENT)
Dept: ENDOCRINOLOGY | Facility: CLINIC | Age: 74
End: 2019-10-18

## 2019-10-18 ENCOUNTER — CLINICAL SUPPORT (OUTPATIENT)
Dept: CARDIAC REHAB | Facility: CLINIC | Age: 74
End: 2019-10-18
Payer: MEDICARE

## 2019-10-18 DIAGNOSIS — Z95.1 S/P CABG (CORONARY ARTERY BYPASS GRAFT): ICD-10-CM

## 2019-10-18 PROCEDURE — 93798 PHYS/QHP OP CAR RHAB W/ECG: CPT

## 2019-10-18 NOTE — TELEPHONE ENCOUNTER
bs log to review                meds-  humalog- 20u TID  Toujeo- 60u    Reviewed blood sugar log of blood sugars are elevated in the morning and also they are elevated, sometimes at bedtime  Overall blood sugars are variable in 100-300 range  Please inform patient to keep carbohydrate consistent with meals, he has not seen dietician recently we can refer him to nutrition therapy for consistent carbs,  He should increase toujeo to 65 units at bedtime     Sedrick Powell MD

## 2019-10-21 ENCOUNTER — OFFICE VISIT (OUTPATIENT)
Dept: NEPHROLOGY | Facility: CLINIC | Age: 74
End: 2019-10-21
Payer: MEDICARE

## 2019-10-21 VITALS
HEIGHT: 71 IN | BODY MASS INDEX: 33.88 KG/M2 | SYSTOLIC BLOOD PRESSURE: 110 MMHG | DIASTOLIC BLOOD PRESSURE: 62 MMHG | WEIGHT: 242 LBS

## 2019-10-21 DIAGNOSIS — M89.9 CHRONIC KIDNEY DISEASE-MINERAL AND BONE DISORDER: ICD-10-CM

## 2019-10-21 DIAGNOSIS — E83.9 CHRONIC KIDNEY DISEASE-MINERAL AND BONE DISORDER: ICD-10-CM

## 2019-10-21 DIAGNOSIS — N18.4 BENIGN HYPERTENSION WITH CHRONIC KIDNEY DISEASE, STAGE IV (HCC): ICD-10-CM

## 2019-10-21 DIAGNOSIS — E55.9 VITAMIN D DEFICIENCY: ICD-10-CM

## 2019-10-21 DIAGNOSIS — I12.9 BENIGN HYPERTENSION WITH CHRONIC KIDNEY DISEASE, STAGE IV (HCC): ICD-10-CM

## 2019-10-21 DIAGNOSIS — N18.9 CHRONIC KIDNEY DISEASE-MINERAL AND BONE DISORDER: ICD-10-CM

## 2019-10-21 DIAGNOSIS — N18.4 CKD (CHRONIC KIDNEY DISEASE), STAGE IV (HCC): Primary | ICD-10-CM

## 2019-10-21 PROBLEM — E87.8 HYPERCHLOREMIA: Status: RESOLVED | Noted: 2019-06-21 | Resolved: 2019-10-21

## 2019-10-21 PROCEDURE — 99214 OFFICE O/P EST MOD 30 MIN: CPT | Performed by: INTERNAL MEDICINE

## 2019-10-21 RX ORDER — MELATONIN
1000 DAILY
Qty: 1 TABLET | Refills: 1
Start: 2019-10-21

## 2019-10-21 NOTE — LETTER
October 21, 2019     Benson Scott [address]    Patient: Shellie Matt   YOB: 1945   Date of Visit: 10/21/2019       Dear Dr Aron Manuel: Thank you for referring Doy Ours to me for evaluation  Below are my notes for this consultation  If you have questions, please do not hesitate to call me  I look forward to following your patient along with you  Sincerely,        Jose J Newton MD        CC: Sg Lyman, [address] [place] [address], MD  10/21/2019 11:04 AM  Sign at close encounter  Adair Matt 68 y o  male MRN: [id number]  DATE: 10/21/19  Reason for visit: Continued evaluation of CKD    ASSESSMENT & PLAN:  1  Chronic kidney disease, stage III  · Johan's new baseline creatinine is probably around 2 5 to 2 7  · The etiology of his renal disease is likely due to diabetic nephropathy + sequelae from post op ATN from CABG  · His renal function is stable  SCr 2 71       3  Hypertension  · BP is controlled with Torsemide 20 mg daily and Metoprolol 25 mg BID  · No changes  4  Mineral bone disease  · PTH is at goal - 57 on 10/7/19  · Ca and phos are at goal    · Vitamin D level is low - 26 on 10/7/19 - start Vitamin D3 1000 units daily  5  Proteinuria:  · Stable at this time  · If renal function stable over the following months, will plan to add back ARB  6  Urinary retention: on Flomax  7  Medications: Stop Fenofibric acid given CKD IV  8  Anemia:  · Was iron deficient and received Feraheme x 2    · Hgb improved and now >11  Patient Instructions   Start Vitamin D3 1000 units daily  Do not take Fenofibric acid  Follow up in 3 months  SUBJECTIVE / INTERVAL HISTORY:  Britt Wheeler was last seen in the office back in July 2019  Since then, he has been continuing with cardiac rehab  He reports not liking it but is compliant with it  He is not compliant with dietary restrictions and he is gaining weight  "I don't want to live until I am 22 years old"  He has been found to have possible adenopathy on the R axilla and is seeing Dr Nikkie Roth in 2 days  He has chest discomfort which is unchanged since surgery  No SOB  PMH/PSH: HTN, DM, HLP, CKD, CAD s/p CABG, HARJIT, BPH, obesity, DDD s/p fusion surgery, cholecystectomy, hernia repair, tonsillectomy  Previous work up:   6/3/19 Renal US: R 10 6 cm, L 11 cm,  cc    ALLERGIES: No Known Allergies    REVIEW OF SYSTEMS:  Review of Systems   Constitutional: Positive for unexpected weight change  Negative for appetite change, chills, fatigue and fever  Respiratory: Negative for cough and shortness of breath  Cardiovascular: Negative for chest pain and leg swelling  Chest discomfort (unchanged since CABG)   Gastrointestinal: Negative for abdominal pain, diarrhea, nausea and vomiting  Genitourinary: Negative for dysuria and hematuria  Musculoskeletal: Negative for arthralgias  Allergic/Immunologic: Negative for immunocompromised state  Neurological: Negative for dizziness and light-headedness  Hematological: Positive for adenopathy  OBJECTIVE:  /62   Ht 5' 11" (1 803 m)   Wt 110 kg (242 lb)   BMI 33 75 kg/m²    Current Weight:   Body mass index is 33 75 kg/m²  Physical Exam   Constitutional: He is oriented to person, place, and time  He appears well-developed and well-nourished  No distress  HENT:   Head: Normocephalic and atraumatic  Mouth/Throat: Mucous membranes are normal    Eyes: Conjunctivae are normal  No scleral icterus  Neck: Neck supple  No JVD present  Cardiovascular: Normal rate and regular rhythm  Murmur heard  Systolic murmur is present with a grade of 2/6  Pulmonary/Chest: Effort normal and breath sounds normal    Abdominal: Soft  Bowel sounds are normal    Musculoskeletal: He exhibits edema (mild edema of L leg (unchanged))  Neurological: He is alert and oriented to person, place, and time  Skin: Skin is warm and dry  Psychiatric: He has a normal mood and affect   His behavior is normal  Judgment normal      Medications:  Current Outpatient Medications:     acetaminophen (TYLENOL) 325 mg tablet, Take 2 tablets (650 mg total) by mouth every 6 (six) hours as needed for mild pain, headaches or fever (Patient not taking: Reported on 9/12/2019), Disp: 30 tablet, Rfl: 0    Ascorbic Acid (VITAMIN C) 1000 MG tablet, Take 1,000 mg by mouth daily, Disp: , Rfl:     aspirin 325 mg tablet, Take 1 tablet (325 mg total) by mouth daily, Disp: 100 tablet, Rfl: 0    atorvastatin (LIPITOR) 80 mg tablet, Take 1 tablet (80 mg total) by mouth daily with dinner, Disp: 90 tablet, Rfl: 3    Choline Fenofibrate (FENOFIBRIC ACID) 135 MG CPDR, Take 135 mg by mouth daily, Disp: , Rfl:     Continuous Blood Gluc  (FREESTYLE JOHANNE READER) MAMI, Use device to scan blood glucose at least 4 times a day, Disp: 1 Device, Rfl: 0    Continuous Blood Gluc Sensor ([address] [place] [address]) [facility], Apply sensor every 14 days to check blood glucose at least 4 times a day, Disp: 6 each, Rfl: 1    cyanocobalamin (VITAMIN B-12) 500 mcg tablet, Take 500 mcg by mouth daily, Disp: , Rfl:     docusate sodium (COLACE) 100 mg capsule, Take 1 capsule (100 mg total) by mouth 2 (two) times a day, Disp: 60 capsule, Rfl: 1    Doxepin HCl (SILENOR) 6 MG TABS, Take 6 mg by mouth as needed , Disp: , Rfl:     ferrous sulfate 325 (65 Fe) mg tablet, Take 1 tablet (325 mg total) by mouth daily with breakfast for 90 days, Disp: 90 tablet, Rfl: 0    finasteride (PROSCAR) 5 mg tablet, Take 5 mg by mouth daily, Disp: , Rfl:     hydrOXYzine HCL (ATARAX) 10 mg tablet, Take 1 tablet (10 mg total) by mouth every 6 (six) hours as needed for itching, Disp: 30 tablet, Rfl: 0    Insulin Glargine (TOUJEO MAX SOLOSTAR) 300 units/mL CONCETRATED U-300 injection pen, Inject 60 Units under the skin daily at bedtime, Disp: 3 pen, Rfl: 0    insulin lispro (HUMALOG KWIKPEN) 100 units/mL injection pen, Inject 20 Units under the skin 3 (three) times a day with meals plus scale (using up to 80 units a day), Disp: 30 pen, Rfl: 1    latanoprost (XALATAN) 0 005 % ophthalmic solution, 1 drop daily at bedtime, Disp: , Rfl:     methylPREDNISolone 4 MG tablet therapy pack, Use as directed on package, Disp: 21 tablet, Rfl: 0    metoprolol tartrate (LOPRESSOR) 25 mg tablet, Take 1 tablet (25 mg total) by mouth every 12 (twelve) hours, Disp: 180 tablet, Rfl: 3    omeprazole (PriLOSEC) 40 MG capsule, Take 40 mg by mouth daily, Disp: , Rfl:     polyethylene glycol (MIRALAX) 17 g packet, Take 17 g by mouth daily as needed (as needed for constipation), Disp: 14 each, Rfl: 0    tamsulosin (FLOMAX) 0 4 mg, Take 1 capsule (0 4 mg total) by mouth daily with dinner, Disp: 90 capsule, Rfl: 3    torsemide (DEMADEX) 20 mg tablet, Take 1 tablet (20 mg total) by mouth daily, Disp: 15 tablet, Rfl: 0    torsemide (DEMADEX) 20 mg tablet, Take 1 tablet (20 mg total) by mouth daily, Disp: 90 tablet, Rfl: 3    triamcinolone (KENALOG) 0 025 % cream, Apply topically 2 (two) times a day, Disp: 80 g, Rfl: 0    Laboratory Results:  Results for orders placed or performed in visit on 17/17/38   Basic metabolic panel   Result Value Ref Range    Glucose, Random 159 (H) 65 - 99 mg/dL    BUN 50 (H) 7 - 25 mg/dL    Creatinine 2 71 (H) 0 70 - 1 18 mg/dL    eGFR Non  22 (L) > OR = 60 mL/min/1 73m2    eGFR  26 (L) > OR = 60 mL/min/1 73m2    SL AMB BUN/CREATININE RATIO 18 6 - 22 (calc)    Sodium 142 135 - 146 mmol/L    Potassium 4 0 3 5 - 5 3 mmol/L    Chloride 104 98 - 110 mmol/L    CO2 29 20 - 32 mmol/L    SL AMB CALCIUM 9 7 8 6 - 10 3 mg/dL   Hemoglobin A1c (w/out EAG)   Result Value Ref Range    Hemoglobin A1C 8 8 (H) <5 7 % of total Hgb     Lab Results   Component Value Date    WBC 6 3 10/07/2019    HGB 11 8 (L) 10/07/2019    HCT 36 4 (L) 10/07/2019    MCV 89 2 10/07/2019     10/07/2019

## 2019-10-21 NOTE — PROGRESS NOTES
NEPHROLOGY OFFICE PROGRESS NOTE   Lisa Cortez 68 y o  male MRN: [id number]  DATE: 10/21/19  Reason for visit: Continued evaluation of CKD    ASSESSMENT & PLAN:  1  Chronic kidney disease, stage III  · Johan's new baseline creatinine is probably around 2 5 to 2 7  · The etiology of his renal disease is likely due to diabetic nephropathy + sequelae from post op ATN from CABG  · His renal function is stable  SCr 2 71       3  Hypertension  · BP is controlled with Torsemide 20 mg daily and Metoprolol 25 mg BID  · No changes  4  Mineral bone disease  · PTH is at goal - 57 on 10/7/19  · Ca and phos are at goal    · Vitamin D level is low - 26 on 10/7/19 - start Vitamin D3 1000 units daily  5  Proteinuria:  · Stable at this time  · If renal function stable over the following months, will plan to add back ARB  6  Urinary retention: on Flomax  7  Medications: Stop Fenofibric acid given CKD IV  8  Anemia:  · Was iron deficient and received Feraheme x 2    · Hgb improved and now >11  Patient Instructions   Start Vitamin D3 1000 units daily  Do not take Fenofibric acid  Follow up in 3 months  SUBJECTIVE / INTERVAL HISTORY:  Tyler Hamilton was last seen in the office back in July 2019  Since then, he has been continuing with cardiac rehab  He reports not liking it but is compliant with it  He is not compliant with dietary restrictions and he is gaining weight    "I don't want to live until I am 22 years old"  He has been found to have possible adenopathy on the R axilla and is seeing Dr Tanya Carter in 2 days  He has chest discomfort which is unchanged since surgery  No SOB  PMH/PSH: HTN, DM, HLP, CKD, CAD s/p CABG, HARJIT, BPH, obesity, DDD s/p fusion surgery, cholecystectomy, hernia repair, tonsillectomy       Previous work up:   6/3/19 Renal US: R 10 6 cm, L 11 cm,  cc    ALLERGIES: No Known Allergies    REVIEW OF SYSTEMS:  Review of Systems   Constitutional: Positive for unexpected weight change  Negative for appetite change, chills, fatigue and fever  Respiratory: Negative for cough and shortness of breath  Cardiovascular: Negative for chest pain and leg swelling  Chest discomfort (unchanged since CABG)   Gastrointestinal: Negative for abdominal pain, diarrhea, nausea and vomiting  Genitourinary: Negative for dysuria and hematuria  Musculoskeletal: Negative for arthralgias  Allergic/Immunologic: Negative for immunocompromised state  Neurological: Negative for dizziness and light-headedness  Hematological: Positive for adenopathy  OBJECTIVE:  /62   Ht 5' 11" (1 803 m)   Wt 110 kg (242 lb)   BMI 33 75 kg/m²   Current Weight:   Body mass index is 33 75 kg/m²  Physical Exam   Constitutional: He is oriented to person, place, and time  He appears well-developed and well-nourished  No distress  HENT:   Head: Normocephalic and atraumatic  Mouth/Throat: Mucous membranes are normal    Eyes: Conjunctivae are normal  No scleral icterus  Neck: Neck supple  No JVD present  Cardiovascular: Normal rate and regular rhythm  Murmur heard  Systolic murmur is present with a grade of 2/6  Pulmonary/Chest: Effort normal and breath sounds normal    Abdominal: Soft  Bowel sounds are normal    Musculoskeletal: He exhibits edema (mild edema of L leg (unchanged))  Neurological: He is alert and oriented to person, place, and time  Skin: Skin is warm and dry  Psychiatric: He has a normal mood and affect   His behavior is normal  Judgment normal      Medications:  Current Outpatient Medications:     acetaminophen (TYLENOL) 325 mg tablet, Take 2 tablets (650 mg total) by mouth every 6 (six) hours as needed for mild pain, headaches or fever (Patient not taking: Reported on 9/12/2019), Disp: 30 tablet, Rfl: 0    Ascorbic Acid (VITAMIN C) 1000 MG tablet, Take 1,000 mg by mouth daily, Disp: , Rfl:     aspirin 325 mg tablet, Take 1 tablet (325 mg total) by mouth daily, Disp: 100 tablet, Rfl: 0    atorvastatin (LIPITOR) 80 mg tablet, Take 1 tablet (80 mg total) by mouth daily with dinner, Disp: 90 tablet, Rfl: 3    Choline Fenofibrate (FENOFIBRIC ACID) 135 MG CPDR, Take 135 mg by mouth daily, Disp: , Rfl:     Continuous Blood Gluc  (FREESTYLE JOHANNE READER) MAMI, Use device to scan blood glucose at least 4 times a day, Disp: 1 Device, Rfl: 0    Continuous Blood Gluc Sensor ([address] [place] [address]) [facility], Apply sensor every 14 days to check blood glucose at least 4 times a day, Disp: 6 each, Rfl: 1    cyanocobalamin (VITAMIN B-12) 500 mcg tablet, Take 500 mcg by mouth daily, Disp: , Rfl:     docusate sodium (COLACE) 100 mg capsule, Take 1 capsule (100 mg total) by mouth 2 (two) times a day, Disp: 60 capsule, Rfl: 1    Doxepin HCl (SILENOR) 6 MG TABS, Take 6 mg by mouth as needed , Disp: , Rfl:     ferrous sulfate 325 (65 Fe) mg tablet, Take 1 tablet (325 mg total) by mouth daily with breakfast for 90 days, Disp: 90 tablet, Rfl: 0    finasteride (PROSCAR) 5 mg tablet, Take 5 mg by mouth daily, Disp: , Rfl:     hydrOXYzine HCL (ATARAX) 10 mg tablet, Take 1 tablet (10 mg total) by mouth every 6 (six) hours as needed for itching, Disp: 30 tablet, Rfl: 0    Insulin Glargine (TOUJEO MAX SOLOSTAR) 300 units/mL CONCETRATED U-300 injection pen, Inject 60 Units under the skin daily at bedtime, Disp: 3 pen, Rfl: 0    insulin lispro (HUMALOG KWIKPEN) 100 units/mL injection pen, Inject 20 Units under the skin 3 (three) times a day with meals plus scale (using up to 80 units a day), Disp: 30 pen, Rfl: 1    latanoprost (XALATAN) 0 005 % ophthalmic solution, 1 drop daily at bedtime, Disp: , Rfl:     methylPREDNISolone 4 MG tablet therapy pack, Use as directed on package, Disp: 21 tablet, Rfl: 0    metoprolol tartrate (LOPRESSOR) 25 mg tablet, Take 1 tablet (25 mg total) by mouth every 12 (twelve) hours, Disp: 180 tablet, Rfl: 3    omeprazole (PriLOSEC) 40 MG capsule, Take 40 mg by mouth daily, Disp: , Rfl:     polyethylene glycol (MIRALAX) 17 g packet, Take 17 g by mouth daily as needed (as needed for constipation), Disp: 14 each, Rfl: 0    tamsulosin (FLOMAX) 0 4 mg, Take 1 capsule (0 4 mg total) by mouth daily with dinner, Disp: 90 capsule, Rfl: 3    torsemide (DEMADEX) 20 mg tablet, Take 1 tablet (20 mg total) by mouth daily, Disp: 15 tablet, Rfl: 0    torsemide (DEMADEX) 20 mg tablet, Take 1 tablet (20 mg total) by mouth daily, Disp: 90 tablet, Rfl: 3    triamcinolone (KENALOG) 0 025 % cream, Apply topically 2 (two) times a day, Disp: 80 g, Rfl: 0    Laboratory Results:  Results for orders placed or performed in visit on 60/21/79   Basic metabolic panel   Result Value Ref Range    Glucose, Random 159 (H) 65 - 99 mg/dL    BUN 50 (H) 7 - 25 mg/dL    Creatinine 2 71 (H) 0 70 - 1 18 mg/dL    eGFR Non  22 (L) > OR = 60 mL/min/1 73m2    eGFR  26 (L) > OR = 60 mL/min/1 73m2    SL AMB BUN/CREATININE RATIO 18 6 - 22 (calc)    Sodium 142 135 - 146 mmol/L    Potassium 4 0 3 5 - 5 3 mmol/L    Chloride 104 98 - 110 mmol/L    CO2 29 20 - 32 mmol/L    SL AMB CALCIUM 9 7 8 6 - 10 3 mg/dL   Hemoglobin A1c (w/out EAG)   Result Value Ref Range    Hemoglobin A1C 8 8 (H) <5 7 % of total Hgb     Lab Results   Component Value Date    WBC 6 3 10/07/2019    HGB 11 8 (L) 10/07/2019    HCT 36 4 (L) 10/07/2019    MCV 89 2 10/07/2019     10/07/2019

## 2019-10-22 ENCOUNTER — OFFICE VISIT (OUTPATIENT)
Dept: ENDOCRINOLOGY | Facility: CLINIC | Age: 74
End: 2019-10-22
Payer: MEDICARE

## 2019-10-22 ENCOUNTER — CLINICAL SUPPORT (OUTPATIENT)
Dept: CARDIAC REHAB | Facility: CLINIC | Age: 74
End: 2019-10-22
Payer: MEDICARE

## 2019-10-22 VITALS
HEART RATE: 68 BPM | SYSTOLIC BLOOD PRESSURE: 108 MMHG | HEIGHT: 71 IN | BODY MASS INDEX: 33.74 KG/M2 | WEIGHT: 241 LBS | DIASTOLIC BLOOD PRESSURE: 72 MMHG

## 2019-10-22 DIAGNOSIS — Z79.4 TYPE 2 DIABETES MELLITUS WITH STAGE 4 CHRONIC KIDNEY DISEASE, WITH LONG-TERM CURRENT USE OF INSULIN (HCC): ICD-10-CM

## 2019-10-22 DIAGNOSIS — I12.9 BENIGN HYPERTENSION WITH CHRONIC KIDNEY DISEASE, STAGE IV (HCC): ICD-10-CM

## 2019-10-22 DIAGNOSIS — N18.4 BENIGN HYPERTENSION WITH CHRONIC KIDNEY DISEASE, STAGE IV (HCC): ICD-10-CM

## 2019-10-22 DIAGNOSIS — E11.22 TYPE 2 DIABETES MELLITUS WITH STAGE 4 CHRONIC KIDNEY DISEASE, WITH LONG-TERM CURRENT USE OF INSULIN (HCC): ICD-10-CM

## 2019-10-22 DIAGNOSIS — E11.22 TYPE 2 DIABETES MELLITUS WITH CHRONIC KIDNEY DISEASE, WITH LONG-TERM CURRENT USE OF INSULIN, UNSPECIFIED CKD STAGE (HCC): ICD-10-CM

## 2019-10-22 DIAGNOSIS — Z79.4 TYPE 2 DIABETES MELLITUS WITH CHRONIC KIDNEY DISEASE, WITH LONG-TERM CURRENT USE OF INSULIN, UNSPECIFIED CKD STAGE (HCC): ICD-10-CM

## 2019-10-22 DIAGNOSIS — Z95.1 S/P CABG (CORONARY ARTERY BYPASS GRAFT): ICD-10-CM

## 2019-10-22 DIAGNOSIS — E55.9 VITAMIN D DEFICIENCY: ICD-10-CM

## 2019-10-22 DIAGNOSIS — N18.4 TYPE 2 DIABETES MELLITUS WITH STAGE 4 CHRONIC KIDNEY DISEASE, WITH LONG-TERM CURRENT USE OF INSULIN (HCC): ICD-10-CM

## 2019-10-22 DIAGNOSIS — E78.2 MIXED HYPERLIPIDEMIA: ICD-10-CM

## 2019-10-22 DIAGNOSIS — I25.10 TRIPLE VESSEL CORONARY ARTERY DISEASE: ICD-10-CM

## 2019-10-22 DIAGNOSIS — Z79.4 TYPE 2 DIABETES MELLITUS WITH HYPERGLYCEMIA, WITH LONG-TERM CURRENT USE OF INSULIN (HCC): Primary | ICD-10-CM

## 2019-10-22 DIAGNOSIS — E11.65 TYPE 2 DIABETES MELLITUS WITH HYPERGLYCEMIA, WITH LONG-TERM CURRENT USE OF INSULIN (HCC): Primary | ICD-10-CM

## 2019-10-22 PROBLEM — R59.1 LYMPHADENOPATHY: Status: ACTIVE | Noted: 2019-10-22

## 2019-10-22 PROCEDURE — 99214 OFFICE O/P EST MOD 30 MIN: CPT | Performed by: INTERNAL MEDICINE

## 2019-10-22 PROCEDURE — 93798 PHYS/QHP OP CAR RHAB W/ECG: CPT

## 2019-10-22 RX ORDER — FLASH GLUCOSE SENSOR
KIT MISCELLANEOUS
Qty: 6 EACH | Refills: 1 | Status: SHIPPED | OUTPATIENT
Start: 2019-10-22 | End: 2020-05-06 | Stop reason: SDUPTHER

## 2019-10-22 NOTE — PROGRESS NOTES
Charis Le 68 y o  male MRN: [id number]    Encounter: [id number]      Assessment/Plan     Assessment:   This is a 68y o -year-old male with   Patient Active Problem List   Diagnosis    CKD (chronic kidney disease), stage IV ([facility] [address] )    Benign hypertension with chronic kidney disease, stage IV ([facility]    Chronic kidney disease-mineral and bone disorder    Type II or unspecified type diabetes mellitus without mention of complication, uncontrolled    Bilateral lower extremity edema    Sleep apnea    Triple vessel coronary artery disease    Hyperlipidemia    S/P CABG (coronary artery bypass graft)    Acute postoperative pulmonary insufficiency ([facility])    Blood loss anemia    Postoperative anemia due to acute blood loss    Other constipation    Iron deficiency anemia due to chronic blood loss    Albuminuria    Esophageal dysphagia    History of colonic polyps    Lumbar back pain    Lumbar discogenic pain syndrome    Obesity, unspecified    Pain of lower extremity    Trigger finger of left hand    Spondylolisthesis    Spinal stenosis    Vitamin D deficiency    Lymphadenopathy       Plan:  Diagnoses and all orders for this visit:    Type 2 diabetes mellitus with hyperglycemia, with long-term current use of insulin ([facility])      Lab Results   Component Value Date    HGBA1C 8 8 (H) 10/14/2019    A1c is uncontrolled 8 8%  Goal for A1c is 7-7 5%  Based on his sensor download he is having lot of fluctuation in blood sugars which could be related from inconsistency in carbohydrate intake, and fixed dose of Humalog  Will refer to nutrition therapy for flexible insulin therapy, if he is not able to follow flexible insulin therapy then will suggest carbohydrate consistent diet to prevent hyperglycemia and hypoglycemia  Will put the new order in   Also discussed to download continues glucose monitor sensor every 2 weeks, to make adjustment  Discussed importance of controlling diabetes at goal to prevent worsening of coronary artery disease, risk of stroke and worsening of CKD  Patient needs continues glucose monitor sensor as he is injecting 4 times daily, and risk of fluctuation in blood sugars causing hyperglycemia and hypoglycemia, he definitely benefits from sensor use, will do prior authorization for the [facility] PADILL[facility] sensor       Type 2 diabetes mellitus with stage 4 chronic kidney disease, with long-term current use of insulin ([address] )  Considering patient has CKD and risk of hypoglycemia, with multiple daily insulin injection, he would benefit from continues glucose monitor sensor   Please fill out the paper work for [employer]   -     Continuous Blood Gluc Sensor ([address] [facility]) [address] [facility]; Apply sensor every 14 days to check blood glucose at least 4 times a day  -     Basic metabolic panel; Future  -     Hemoglobin A1C; Future    S/P CABG (coronary artery bypass graft)   followed by Cardiology  Currently stable  -     Insulin Glargine (TOUJEO MAX SOLOSTAR) 300 units/mL CONCETRATED U-300 injection pen; Inject 64 Units under the skin daily at bedtime    Benign hypertension with chronic kidney disease, stage IV (HCC)  BP Readings from Last 3 Encounters:   10/22/19 108/72   10/21/19 110/62   09/13/19 147/84    Blood pressure controlled  Continue current management  Mixed hyperlipidemia  Continue statins    Triple vessel coronary artery disease  -     Insulin Glargine (TOUJEO MAX SOLOSTAR) 300 units/mL CONCETRATED U-300 injection pen; Inject 64 Units under the skin daily at bedtime    Type 2 diabetes mellitus with chronic kidney disease, with long-term current use of insulin, unspecified CKD stage (HCC)  Continue to follow up with Nephrology  Discussed importance of diabetes control to prevent worsening of CKD  -     Insulin Glargine (TOUJEO MAX SOLOSTAR) 300 units/mL CONCETRATED U-300 injection pen;  Inject 64 Units under the skin daily at bedtime    Vitamin D deficiency  Take vitamin D3 supplementation 2000 International Units daily  Repeat vitamin-D before next appointment  -     Vitamin D 25 hydroxy; Future        CC: Diabetes    History of Present Illness     HPI:  Yan Simeon is 69-year-old gentleman with past medical history of type 2 diabetes with long-term use of insulin, with unstable Diabetes course is here for follow-up  He has complication of diabetes such as coronary artery disease, CKD stage 4 followed by Nephrology  He was recently admitted in the hospital for CABG  He checks blood sugars 4 times daily, currently is using continues glucose monitor sensor Pola Slate), he is paying out of pocket  His also checking blood sugar 2-3 times daily with glucometer, in order to ensure the blood sugars from [place]  are matching with fingersticks  He has blood sugars in the range of   Blood sugars are variable, there is no clear-cut pattern, which is suggestive of inconsistent carbohydrate intake  He has seen dietician, however his not following consistent carbohydrates with meals  As per wife he is noncompliant to diet, and goes out with his friends for breakfast and lunch every day      His current insulin regimen is toujeo 64 units at bedtime, Humalog 20 units with meals plus scale  He admits compliance with medication  He is rotating his injection sites  He eats 3 meals per day however admits noncompliance to carbohydrate intake    He has seen Ophthalmology  He follows with Podiatry regularly    He has history of hyperlipidemia and currently taking statin, fenofibrate was stopped by Nephrology because of CKD  He also has history of hypertension for which she is taking Lopressor 25 mg twice a day        Lab Results   Component Value Date    HGBA1C 8 8 (H) 10/14/2019     Wt Readings from Last 3 Encounters:   10/22/19 109 kg (241 lb)   10/21/19 110 kg (242 lb)   09/13/19 110 kg (242 lb)        Review of Systems   Constitutional: Negative for activity change, diaphoresis, fatigue, fever and unexpected weight change  HENT: Negative  Eyes: Negative for visual disturbance  Respiratory: Negative for cough, chest tightness and shortness of breath  Cardiovascular: Negative for chest pain, palpitations and leg swelling  Gastrointestinal: Negative for abdominal pain, blood in stool, constipation, diarrhea, nausea and vomiting  Endocrine: Negative for cold intolerance, heat intolerance, polydipsia, polyphagia and polyuria  Genitourinary: Negative for dysuria, enuresis, frequency and urgency  Musculoskeletal: Negative for arthralgias and myalgias  Skin: Negative for pallor, rash and wound  Allergic/Immunologic: Negative  Neurological: Negative for dizziness, tremors, weakness and numbness  Hematological: Negative  Psychiatric/Behavioral: Negative  Historical Information   Past Medical History:   Diagnosis Date    Diabetes mellitus ([facility] [address] )     Hyperlipidemia     Hypertension     Renal disorder      Past Surgical History:   Procedure Laterality Date    BACK SURGERY      CORONARY ARTERY BYPASS GRAFT      quad    GALLBLADDER SURGERY      HERNIA REPAIR      HI CABG, ARTERY-VEIN, FOUR N/A 6/21/2019    Procedure: CORONARY ARTERY BYPASS GRAFT (CABG) 4 VESSELS WITH SVG TO PDA, OM, DIAGONAL AND LIMA TO LAD; RIGHT LEG EVH;  Surgeon: Caden Wagner MD;  Location: [facility];  Service: Cardiac Surgery     Social History   Social History     Substance and Sexual Activity   Alcohol Use Not Currently    Comment: 1 drink every 6 months       Social History     Substance and Sexual Activity   Drug Use Never     Social History     Tobacco Use   Smoking Status Former Smoker    Packs/day: 3 00    Years: 30 00    Pack years: 90 00   Smokeless Tobacco Never Used   Tobacco Comment    Quit 1991     Family History:   Family History   Problem Relation Age of Onset    Cancer Mother     Cancer Father        Meds/Allergies   Current Outpatient Medications   Medication Sig Dispense Refill    Ascorbic Acid (VITAMIN C) 1000 MG tablet Take 1,000 mg by mouth daily      aspirin 325 mg tablet Take 1 tablet (325 mg total) by mouth daily 100 tablet 0    atorvastatin (LIPITOR) 80 mg tablet Take 1 tablet (80 mg total) by mouth daily with dinner 90 tablet 3    cholecalciferol (VITAMIN D3) 1,000 units tablet Take 1 tablet (1,000 Units total) by mouth daily 1 tablet 1    Continuous Blood Gluc  (FREESTYLE JOHANNE READER) MAIM Use device to scan blood glucose at least 4 times a day 1 Device 0    Continuous Blood Gluc Sensor ([address] [place] [address]) [facility] Apply sensor every 14 days to check blood glucose at least 4 times a day 6 each 1    cyanocobalamin (VITAMIN B-12) 500 mcg tablet Take 500 mcg by mouth daily      docusate sodium (COLACE) 100 mg capsule Take 1 capsule (100 mg total) by mouth 2 (two) times a day 60 capsule 1    Doxepin HCl (SILENOR) 6 MG TABS Take 6 mg by mouth as needed       finasteride (PROSCAR) 5 mg tablet Take 5 mg by mouth daily      Insulin Glargine (TOUJEO MAX SOLOSTAR) 300 units/mL CONCETRATED U-300 injection pen Inject 64 Units under the skin daily at bedtime      insulin lispro (HUMALOG KWIKPEN) 100 units/mL injection pen Inject 20 Units under the skin 3 (three) times a day with meals plus scale (using up to 80 units a day) 30 pen 1    latanoprost (XALATAN) 0 005 % ophthalmic solution 1 drop daily at bedtime      metoprolol tartrate (LOPRESSOR) 25 mg tablet Take 1 tablet (25 mg total) by mouth every 12 (twelve) hours 180 tablet 3    omeprazole (PriLOSEC) 40 MG capsule Take 40 mg by mouth daily      tamsulosin (FLOMAX) 0 4 mg Take 1 capsule (0 4 mg total) by mouth daily with dinner 90 capsule 3    torsemide (DEMADEX) 20 mg tablet Take 1 tablet (20 mg total) by mouth daily 90 tablet 3    acetaminophen (TYLENOL) 325 mg tablet Take 2 tablets (650 mg total) by mouth every 6 (six) hours as needed for mild pain, headaches or fever (Patient not taking: Reported on 9/12/2019) 30 tablet 0    ferrous sulfate 325 (65 Fe) mg tablet Take 1 tablet (325 mg total) by mouth daily with breakfast for 90 days (Patient not taking: Reported on 10/21/2019) 90 tablet 0    hydrOXYzine HCL (ATARAX) 10 mg tablet Take 1 tablet (10 mg total) by mouth every 6 (six) hours as needed for itching (Patient not taking: Reported on 10/21/2019) 30 tablet 0    methylPREDNISolone 4 MG tablet therapy pack Use as directed on package (Patient not taking: Reported on 10/21/2019) 21 tablet 0    polyethylene glycol (MIRALAX) 17 g packet Take 17 g by mouth daily as needed (as needed for constipation) (Patient not taking: Reported on 10/22/2019) 14 each 0    triamcinolone (KENALOG) 0 025 % cream Apply topically 2 (two) times a day (Patient not taking: Reported on 10/22/2019) 80 g 0     No current facility-administered medications for this visit  No Known Allergies    Objective   Vitals: Blood pressure 108/72, pulse 68, height 5' 11" (1 803 m), weight 109 kg (241 lb)  Physical Exam   Constitutional: He is oriented to person, place, and time  He appears well-developed and well-nourished  No distress  HENT:   Head: Normocephalic and atraumatic  Eyes: Pupils are equal, round, and reactive to light  EOM are normal    Neck: Normal range of motion  Neck supple  No thyromegaly present  Cardiovascular: Normal rate, regular rhythm and normal heart sounds  Pulmonary/Chest: Effort normal and breath sounds normal  No respiratory distress  Musculoskeletal: Normal range of motion  He exhibits no edema or deformity  Neurological: He is alert and oriented to person, place, and time  Skin: Skin is warm and dry  No erythema  Psychiatric: He has a normal mood and affect  Vitals reviewed  The history was obtained from the review of the chart, patient      Lab Results:   Lab Results   Component Value Date/Time    Hemoglobin A1C 8 8 (H) 10/14/2019 07:27 AM    Hemoglobin A1C 7 9 (A) 09/05/2019 01:16 PM    Hemoglobin A1C 10 3 (H) 06/17/2019 05:57 AM    Hemoglobin A1C >14 0 05/13/2019    Hemoglobin A1C 14 0 05/06/2019    WBC 7 07 06/28/2019 09:00 AM    WBC 9 10 06/24/2019 05:10 AM    WBC 11 59 (H) 06/23/2019 04:50 AM    White Blood Cell Count 6 3 10/07/2019 06:47 AM    Hemoglobin 11 8 (L) 10/07/2019 06:47 AM    Hemoglobin 8 7 (L) 06/28/2019 09:00 AM    Hemoglobin 8 4 (L) 06/24/2019 05:10 AM    Hemoglobin 8 5 (L) 06/23/2019 04:50 AM    HCT 36 4 (L) 10/07/2019 06:47 AM    Hematocrit 27 4 (L) 06/28/2019 09:00 AM    Hematocrit 26 3 (L) 06/24/2019 05:10 AM    Hematocrit 27 2 (L) 06/23/2019 04:50 AM    MCV 89 2 10/07/2019 06:47 AM    MCV 94 06/28/2019 09:00 AM    MCV 94 06/24/2019 05:10 AM    MCV 95 06/23/2019 04:50 AM    Platelet Count 329 22/59/1247 06:47 AM    Platelets 529 57/46/5619 09:00 AM    Platelets 593 24/26/7461 05:10 AM    Platelets 906 07/18/9297 04:50 AM    BUN 50 (H) 10/14/2019 07:27 AM    BUN 48 (H) 10/07/2019 06:47 AM    BUN 46 (H) 07/06/2019 08:40 AM    BUN 47 (H) 06/28/2019 09:00 AM    BUN 55 (H) 06/25/2019 05:36 AM    Potassium 4 0 10/14/2019 07:27 AM    Potassium 4 2 10/07/2019 06:47 AM    Potassium 3 8 07/06/2019 08:40 AM    Potassium 4 1 06/28/2019 09:00 AM    Potassium 4 0 06/25/2019 05:36 AM    Chloride 104 10/14/2019 07:27 AM    Chloride 104 10/07/2019 06:47 AM    Chloride 102 07/06/2019 08:40 AM    Chloride 101 06/28/2019 09:00 AM    Chloride 101 06/25/2019 05:36 AM    CO2 29 10/14/2019 07:27 AM    CO2 30 10/07/2019 06:47 AM    CO2 31 07/06/2019 08:40 AM    CO2 32 06/28/2019 09:00 AM    CO2 30 06/25/2019 05:36 AM    CO2, i-STAT 24 06/21/2019 03:59 PM    CO2, i-STAT 22 06/21/2019 03:14 PM    CO2, i-STAT 25 06/21/2019 02:20 PM    Creatinine 2 71 (H) 10/14/2019 07:27 AM    Creatinine 2 70 (H) 10/07/2019 06:47 AM    Creatinine 2 78 (H) 07/06/2019 08:40 AM    Creatinine 2 56 (H) 06/28/2019 09:00 AM    Creatinine 2 53 (H) 06/25/2019 05:36 AM    AST 31 06/17/2019 05:57 AM    AST 29 06/15/2019 04:49 PM    ALT 32 06/17/2019 05:57 AM    ALT 37 06/15/2019 04:49 PM    Albumin 4 0 10/07/2019 06:47 AM    Albumin 3 3 (L) 06/17/2019 05:57 AM    Albumin 3 8 06/15/2019 04:49 PM    HDL, Direct 84 (H) 06/16/2019 06:03 AM    Triglycerides 30 06/16/2019 06:03 AM           Imaging Studies: I have personally reviewed pertinent reports  Portions of the record may have been created with voice recognition software  Occasional wrong word or "sound a like" substitutions may have occurred due to the inherent limitations of voice recognition software  Read the chart carefully and recognize, using context, where substitutions have occurred

## 2019-10-22 NOTE — PATIENT INSTRUCTIONS
INSULIN DOSAGE INSTRUCTIONS    Name: Marco Justin                        : 1945  MRN #: [id number]    Your Current Insulin  and dose is: Before Breakfast Before Lunch Before Evening Meal Bedtime     Novolog Insulin   20 units      20 units    20 units     Regular, Apidra, Humalog orNovolog Sliding Scale:   <80              151-200 + 2 +2 +2    201-250 + 4 +4 +4 +   251-300 + 6 +6 +6 +   301-350 +8 +8 +8 +   >350 +10 +10 +10 +              NPH Insulin         toujeo         64  units      Additional Instructions:   Please test your blood sugar:  _4_ Times per day  X_ Before Breakfast                _ Alternate Testing  X_ Before Lunch                _ 2 Hours After  Meal  X_ Before Evening Meal               _ 3 a m   x_ Before Bedtime Snack     Target Blood sugar range _80- 140 mg/dl __  Call if your   blood sugar is less than _60_ or greater than _400__  Today's Date: 10/22/2019      Hypoglycemia instructions   [place] Look  10/22/2019  [id number]    Low Blood Sugar    Steps to treat low blood sugar  1  Test blood sugar if you have symptoms of low blood sugar:   Low Blood Sugar Symptoms:  o Sweaty  o Dizzy  o Rapid heartbeat  o Shaky    o Bad mood  o Hungry      2  Treat blood sugar less than 70 with 15 grams of fast-acting carbohydrate:   Examples of 15 grams Fast-Acting Carbohydrate:  o 4 oz juice  o 4 oz regular soda  o 3-4 glucose tablets (chew)  o 3-4 hard candies (chew)              3    Wait 15 minutes and test your blood sugar again           4   If blood sugar is less than 100, repeat steps 2-3       5  When your blood sugar is 100 or more, eat a snack if it will be longer than one hour until your next meal  The snack should be 15 grams of carbohydrate and a protein:   Examples of snacks:  o ½ sandwich  o 6 crackers with cheese  o Piece of fruit with cheese or peanut butter  o 6 crackers with peanut butter

## 2019-10-23 ENCOUNTER — CONSULT (OUTPATIENT)
Dept: SURGICAL ONCOLOGY | Facility: CLINIC | Age: 74
End: 2019-10-23
Payer: MEDICARE

## 2019-10-23 ENCOUNTER — DOCUMENTATION (OUTPATIENT)
Dept: RADIATION ONCOLOGY | Facility: HOSPITAL | Age: 74
End: 2019-10-23

## 2019-10-23 VITALS
BODY MASS INDEX: 33.88 KG/M2 | HEIGHT: 71 IN | WEIGHT: 242 LBS | RESPIRATION RATE: 16 BRPM | HEART RATE: 60 BPM | SYSTOLIC BLOOD PRESSURE: 138 MMHG | DIASTOLIC BLOOD PRESSURE: 80 MMHG | TEMPERATURE: 98 F

## 2019-10-23 DIAGNOSIS — R59.1 LYMPHADENOPATHY: Primary | ICD-10-CM

## 2019-10-23 PROCEDURE — 99203 OFFICE O/P NEW LOW 30 MIN: CPT | Performed by: SURGERY

## 2019-10-23 NOTE — PROGRESS NOTES
Met with the pt and his wife Farheen Linder at their appt to see Dr Fransisco Hahn for right axilla lymphadenopathy  Pt seemed a bit frustrated at times which he tried to mask with an offbeat sense of humor  He also tried to minimize some of the recent happenings though he did admit the itching he experiences have been problematic in terms of not receiving lasting relief of the itching  Farheen Linder accepted my contact info and indicated they would be in contact depending upon the results of today's visit  Offered ongoing support to the pt and spouse

## 2019-10-23 NOTE — LETTER
October 23, 2019     B[address]    Patient: Parminder Da Silva   YOB: 1945   Date of Visit: 10/23/2019       Dear Dr Yemi Humphreys: Thank you for referring Cat Soto to me for evaluation  Below are my notes for this consultation  If you have questions, please do not hesitate to call me  I look forward to following your patient along with you  Sincerely,        Daniele Multani MD        CC: MD Alejandra Stuart MD Tura Dear, DO  [facility]  ELEAZAR Jules MD Rich Gamma, MD  10/23/2019  2:13 PM  Sign at close encounter               Surgical Oncology consult       [address] [address] [address] [place] [address] [facility]  1945  [id number]  [address] [place] [address],[address]  [facility]  [address] [place] [address]    Chief Complaint   Patient presents with    Consult     Lymphadenopathy of right axilla  Assessment/Plan:    No problem-specific Assessment & Plan notes found for this encounter  Diagnoses and all orders for this visit:    Lymphadenopathy  -     US extremity soft tissue; Future  -     US head neck lymph node mapping; Future        Advance Care Planning/Advance Directives:  Discussed disease status, cancer treatment plans and/or cancer treatment goals with the patient  No history exists  History of Present Illness:  Patient is a 71-year-old man who over last 4 weeks has reported bilateral axillary vault pruritus and what he reports as swelling at the site  He has no fever, chills, unintended weight loss, or other B type symptoms  He has taken steroid cream as well as Benadryl for this, though this affords him only temporary relief  Review of Systems   Constitutional: Negative  Negative for fever  HENT: Negative      Eyes: Negative  Respiratory: Negative  Cardiovascular: Negative  Gastrointestinal: Negative  Endocrine: Negative  Genitourinary: Negative  Musculoskeletal: Negative  Skin:        Pruritus, bilateral axillary vaults   Allergic/Immunologic: Negative  Neurological: Negative  Hematological: Positive for adenopathy  Psychiatric/Behavioral: Negative            Patient Active Problem List   Diagnosis    CKD (chronic kidney disease), stage IV (HCC)    Benign hypertension with chronic kidney disease, stage IV (HCC)    Chronic kidney disease-mineral and bone disorder    Type II or unspecified type diabetes mellitus without mention of complication, uncontrolled    Bilateral lower extremity edema    Sleep apnea    Triple vessel coronary artery disease    Hyperlipidemia    S/P CABG (coronary artery bypass graft)    Acute postoperative pulmonary insufficiency (HCC)    Blood loss anemia    Postoperative anemia due to acute blood loss    Other constipation    Iron deficiency anemia due to chronic blood loss    Albuminuria    Esophageal dysphagia    History of colonic polyps    Lumbar back pain    Lumbar discogenic pain syndrome    Obesity, unspecified    Pain of lower extremity    Trigger finger of left hand    Spondylolisthesis    Spinal stenosis    Vitamin D deficiency    Lymphadenopathy     Past Medical History:   Diagnosis Date    Diabetes mellitus ([facility] [address] )     Hyperlipidemia     Hypertension     Renal disorder      Past Surgical History:   Procedure Laterality Date    BACK SURGERY      CORONARY ARTERY BYPASS GRAFT      quad    GALLBLADDER SURGERY      HERNIA REPAIR      [place] CABG, ARTERY-VEIN, FOUR N/A 6/21/2019    Procedure: CORONARY ARTERY BYPASS GRAFT (CABG) 4 VESSELS WITH SVG TO PDA, OM, DIAGONAL AND LIMA TO LAD; RIGHT LEG EVH;  Surgeon: Kelvin Smith MD;  [facility];  Service: Cardiac Surgery     Family History   Problem Relation Age of Onset    Cancer Mother     Cancer Father      Social History     Socioeconomic History    Marital status: /Civil Union     Spouse name: Not on file    Number of children: Not on file    Years of education: Not on file    Highest education level: Not on file   Occupational History    Not on file   Social Needs    Financial resource strain: Not on file    Food insecurity:     Worry: Not on file     Inability: Not on file    Transportation needs:     Medical: Not on file     Non-medical: Not on file   Tobacco Use    Smoking status: Former Smoker     Packs/day: 3 00     Years: 30 00     Pack years: 90 00    Smokeless tobacco: Never Used    Tobacco comment: Quit 1991   Substance and Sexual Activity    Alcohol use: Not Currently     Comment: 1 drink every 6 months      Drug use: Never    Sexual activity: Not on file   Lifestyle    Physical activity:     Days per week: Not on file     Minutes per session: Not on file    Stress: Not on file   Relationships    Social connections:     Talks on phone: Not on file     Gets together: Not on file     Attends Mandaen service: Not on file     Active member of club or organization: Not on file     Attends meetings of clubs or organizations: Not on file     Relationship status: Not on file    Intimate partner violence:     Fear of current or ex partner: Not on file     Emotionally abused: Not on file     Physically abused: Not on file     Forced sexual activity: Not on file   Other Topics Concern    Not on file   Social History Narrative    Not on file       Current Outpatient Medications:     Ascorbic Acid (VITAMIN C) 1000 MG tablet, Take 1,000 mg by mouth daily, Disp: , Rfl:     aspirin 325 mg tablet, Take 1 tablet (325 mg total) by mouth daily, Disp: 100 tablet, Rfl: 0    atorvastatin (LIPITOR) 80 mg tablet, Take 1 tablet (80 mg total) by mouth daily with dinner, Disp: 90 tablet, Rfl: 3    cholecalciferol (VITAMIN D3) 1,000 units tablet, Take 1 tablet (1,000 Units total) by mouth daily, Disp: 1 tablet, Rfl: 1    Continuous Blood Gluc  (FREESTYLE JOHANNE READER) MAMI, Use device to scan blood glucose at least 4 times a day, Disp: 1 Device, Rfl: 0    Continuous Blood Gluc Sensor ([address] [place] [address]) [facility], Apply sensor every 14 days to check blood glucose at least 4 times a day, Disp: 6 each, Rfl: 1    cyanocobalamin (VITAMIN B-12) 500 mcg tablet, Take 500 mcg by mouth daily, Disp: , Rfl:     docusate sodium (COLACE) 100 mg capsule, Take 1 capsule (100 mg total) by mouth 2 (two) times a day, Disp: 60 capsule, Rfl: 1    Doxepin HCl (SILENOR) 6 MG TABS, Take 6 mg by mouth as needed , Disp: , Rfl:     finasteride (PROSCAR) 5 mg tablet, Take 5 mg by mouth daily, Disp: , Rfl:     Insulin Glargine (TOUJEO MAX SOLOSTAR) 300 units/mL CONCETRATED U-300 injection pen, Inject 64 Units under the skin daily at bedtime, Disp: , Rfl:     insulin lispro (HUMALOG KWIKPEN) 100 units/mL injection pen, Inject 20 Units under the skin 3 (three) times a day with meals plus scale (using up to 80 units a day), Disp: 30 pen, Rfl: 1    latanoprost (XALATAN) 0 005 % ophthalmic solution, 1 drop daily at bedtime, Disp: , Rfl:     metoprolol tartrate (LOPRESSOR) 25 mg tablet, Take 1 tablet (25 mg total) by mouth every 12 (twelve) hours, Disp: 180 tablet, Rfl: 3    omeprazole (PriLOSEC) 40 MG capsule, Take 40 mg by mouth daily, Disp: , Rfl:     tamsulosin (FLOMAX) 0 4 mg, Take 1 capsule (0 4 mg total) by mouth daily with dinner, Disp: 90 capsule, Rfl: 3    torsemide (DEMADEX) 20 mg tablet, Take 1 tablet (20 mg total) by mouth daily, Disp: 90 tablet, Rfl: 3    ferrous sulfate 325 (65 Fe) mg tablet, Take 1 tablet (325 mg total) by mouth daily with breakfast for 90 days (Patient not taking: Reported on 10/21/2019), Disp: 90 tablet, Rfl: 0  No Known Allergies  Vitals:    10/23/19 1348   BP: 138/80   Pulse: 60   Resp: 16   Temp: 98 °F (36 7 °C)       Physical Exam   Constitutional: He is oriented to person, place, and time  He appears well-developed and well-nourished  HENT:   Head: Normocephalic and atraumatic  Right Ear: External ear normal    Left Ear: External ear normal    Eyes: EOM are normal    Neck: Normal range of motion  Neck supple  Bilateral palpable jugular chain nodes  Palpable left posterior chain lymph nodes mid neck   Cardiovascular: Normal rate, regular rhythm and normal heart sounds  Pulmonary/Chest: Effort normal and breath sounds normal    Abdominal: Soft  Bowel sounds are normal  He exhibits no distension and no mass  There is no tenderness  There is no rebound and no guarding  Lymphadenopathy:     He has cervical adenopathy  Neurological: He is alert and oriented to person, place, and time  Skin: Skin is warm and dry  No obvious skin abnormalities are rash bilateral axillae    Psychiatric: He has a normal mood and affect  His behavior is normal  Judgment and thought content normal        Pathology:  none    Labs:  Lab Results   Component Value Date    SODIUM 142 10/14/2019    K 4 0 10/14/2019     10/14/2019    CO2 29 10/14/2019    AGAP 10 07/06/2019    BUN 50 (H) 10/14/2019    CREATININE 2 71 (H) 10/14/2019    GLUC 159 (H) 10/14/2019    CALCIUM 9 7 10/14/2019    AST 31 06/17/2019    ALT 32 06/17/2019    ALKPHOS 43 (L) 06/17/2019    TP 7 1 06/17/2019    TBILI 0 30 06/17/2019    EGFR 22 07/06/2019         Imaging  No results found  I reviewed the above laboratory and imaging data  Discussion/Summary:  Palpable cervical lymph nodes, axillary pruritus  Will obtain imaging studies of axillary and cervical lymph nodes look for enlargement/abnormalities  If the near found, would consider biopsy either via needle versus excision

## 2019-10-23 NOTE — PROGRESS NOTES
Surgical Oncology consult       [address] [place] [address]  [facility]  [address]  [place] [address]    Reanna Mahoney [facility]  1945  [id number]  [facility]  [facility]  [address] [place] [address]    Chief Complaint   Patient presents with    Consult     Lymphadenopathy of right axilla  Assessment/Plan:    No problem-specific Assessment & Plan notes found for this encounter  Diagnoses and all orders for this visit:    Lymphadenopathy  -     US extremity soft tissue; Future  -     US head neck lymph node mapping; Future        Advance Care Planning/Advance Directives:  Discussed disease status, cancer treatment plans and/or cancer treatment goals with the patient  No history exists  History of Present Illness:  Patient is a 76-year-old man who over last 4 weeks has reported bilateral axillary vault pruritus and what he reports as swelling at the site  He has no fever, chills, unintended weight loss, or other B type symptoms  He has taken steroid cream as well as Benadryl for this, though this affords him only temporary relief  Review of Systems   Constitutional: Negative  Negative for fever  HENT: Negative  Eyes: Negative  Respiratory: Negative  Cardiovascular: Negative  Gastrointestinal: Negative  Endocrine: Negative  Genitourinary: Negative  Musculoskeletal: Negative  Skin:        Pruritus, bilateral axillary vaults   Allergic/Immunologic: Negative  Neurological: Negative  Hematological: Positive for adenopathy  Psychiatric/Behavioral: Negative            Patient Active Problem List   Diagnosis    CKD (chronic kidney disease), stage IV (HCC)    Benign hypertension with chronic kidney disease, stage IV (HCC)    Chronic kidney disease-mineral and bone disorder    Type II or unspecified type diabetes mellitus without mention of complication, uncontrolled    Bilateral lower extremity edema    Sleep apnea    Triple vessel coronary artery disease    Hyperlipidemia    S/P CABG (coronary artery bypass graft)    Acute postoperative pulmonary insufficiency (HCC)    Blood loss anemia    Postoperative anemia due to acute blood loss    Other constipation    Iron deficiency anemia due to chronic blood loss    Albuminuria    Esophageal dysphagia    History of colonic polyps    Lumbar back pain    Lumbar discogenic pain syndrome    Obesity, unspecified    Pain of lower extremity    Trigger finger of left hand    Spondylolisthesis    Spinal stenosis    Vitamin D deficiency    Lymphadenopathy     Past Medical History:   Diagnosis Date    Diabetes mellitus ([address] )     Hyperlipidemia     Hypertension     Renal disorder      Past Surgical History:   Procedure Laterality Date    BACK SURGERY      CORONARY ARTERY BYPASS GRAFT      quad    GALLBLADDER SURGERY      HERNIA REPAIR      [place] CABG, ARTERY-VEIN, FOUR N/A 6/21/2019    Procedure: CORONARY ARTERY BYPASS GRAFT (CABG) 4 VESSELS WITH SVG TO PDA, OM, DIAGONAL AND LIMA TO LAD; RIGHT LEG EVH;  Surgeon: Ari Santos MD;  [facility];  Service: Cardiac Surgery     Family History   Problem Relation Age of Onset    Cancer Mother     Cancer Father      Social History     Socioeconomic History    Marital status: /Civil Union     Spouse name: Not on file    Number of children: Not on file    Years of education: Not on file    Highest education level: Not on file   Occupational History    Not on file   Social Needs    Financial resource strain: Not on file    Food insecurity:     Worry: Not on file     Inability: Not on file    Transportation needs:     Medical: Not on file     Non-medical: Not on file   Tobacco Use    Smoking status: Former Smoker     Packs/day: 3 00     Years: 30 00     Pack years: 90 00    Smokeless tobacco: Never Used    Tobacco comment: Quit 1991   Substance and Sexual Activity    Alcohol use: Not Currently     Comment: 1 drink every 6 months      Drug use: Never    Sexual activity: Not on file   Lifestyle    Physical activity:     Days per week: Not on file     Minutes per session: Not on file    Stress: Not on file   Relationships    Social connections:     Talks on phone: Not on file     Gets together: Not on file     Attends Sikh service: Not on file     Active member of club or organization: Not on file     Attends meetings of clubs or organizations: Not on file     Relationship status: Not on file    Intimate partner violence:     Fear of current or ex partner: Not on file     Emotionally abused: Not on file     Physically abused: Not on file     Forced sexual activity: Not on file   Other Topics Concern    Not on file   Social History Narrative    Not on file       Current Outpatient Medications:     Ascorbic Acid (VITAMIN C) 1000 MG tablet, Take 1,000 mg by mouth daily, Disp: , Rfl:     aspirin 325 mg tablet, Take 1 tablet (325 mg total) by mouth daily, Disp: 100 tablet, Rfl: 0    atorvastatin (LIPITOR) 80 mg tablet, Take 1 tablet (80 mg total) by mouth daily with dinner, Disp: 90 tablet, Rfl: 3    cholecalciferol (VITAMIN D3) 1,000 units tablet, Take 1 tablet (1,000 Units total) by mouth daily, Disp: 1 tablet, Rfl: 1    Continuous Blood Gluc  (FREESTYLE JOHANNE READER) MAMI, Use device to scan blood glucose at least 4 times a day, Disp: 1 Device, Rfl: 0    Continuous Blood Gluc Sensor ([facility]) [facility], Apply sensor every 14 days to check blood glucose at least 4 times a day, Disp: 6 each, Rfl: 1    cyanocobalamin (VITAMIN B-12) 500 mcg tablet, Take 500 mcg by mouth daily, Disp: , Rfl:     docusate sodium (COLACE) 100 mg capsule, Take 1 capsule (100 mg total) by mouth 2 (two) times a day, Disp: 60 capsule, Rfl: 1    Doxepin HCl (SILENOR) 6 MG TABS, Take 6 mg by mouth as needed , Disp: , Rfl:   finasteride (PROSCAR) 5 mg tablet, Take 5 mg by mouth daily, Disp: , Rfl:     Insulin Glargine (TOUJEO MAX SOLOSTAR) 300 units/mL CONCETRATED U-300 injection pen, Inject 64 Units under the skin daily at bedtime, Disp: , Rfl:     insulin lispro (HUMALOG KWIKPEN) 100 units/mL injection pen, Inject 20 Units under the skin 3 (three) times a day with meals plus scale (using up to 80 units a day), Disp: 30 pen, Rfl: 1    latanoprost (XALATAN) 0 005 % ophthalmic solution, 1 drop daily at bedtime, Disp: , Rfl:     metoprolol tartrate (LOPRESSOR) 25 mg tablet, Take 1 tablet (25 mg total) by mouth every 12 (twelve) hours, Disp: 180 tablet, Rfl: 3    omeprazole (PriLOSEC) 40 MG capsule, Take 40 mg by mouth daily, Disp: , Rfl:     tamsulosin (FLOMAX) 0 4 mg, Take 1 capsule (0 4 mg total) by mouth daily with dinner, Disp: 90 capsule, Rfl: 3    torsemide (DEMADEX) 20 mg tablet, Take 1 tablet (20 mg total) by mouth daily, Disp: 90 tablet, Rfl: 3    ferrous sulfate 325 (65 Fe) mg tablet, Take 1 tablet (325 mg total) by mouth daily with breakfast for 90 days (Patient not taking: Reported on 10/21/2019), Disp: 90 tablet, Rfl: 0  No Known Allergies  Vitals:    10/23/19 1348   BP: 138/80   Pulse: 60   Resp: 16   Temp: 98 °F (36 7 °C)       Physical Exam   Constitutional: He is oriented to person, place, and time  He appears well-developed and well-nourished  HENT:   Head: Normocephalic and atraumatic  Right Ear: External ear normal    Left Ear: External ear normal    Eyes: EOM are normal    Neck: Normal range of motion  Neck supple  Bilateral palpable jugular chain nodes  Palpable left posterior chain lymph nodes mid neck   Cardiovascular: Normal rate, regular rhythm and normal heart sounds  Pulmonary/Chest: Effort normal and breath sounds normal    Abdominal: Soft  Bowel sounds are normal  He exhibits no distension and no mass  There is no tenderness  There is no rebound and no guarding     Lymphadenopathy:     He has cervical adenopathy  Neurological: He is alert and oriented to person, place, and time  Skin: Skin is warm and dry  No obvious skin abnormalities are rash bilateral axillae    Psychiatric: He has a normal mood and affect  His behavior is normal  Judgment and thought content normal        Pathology:  none    Labs:  Lab Results   Component Value Date    SODIUM 142 10/14/2019    K 4 0 10/14/2019     10/14/2019    CO2 29 10/14/2019    AGAP 10 07/06/2019    BUN 50 (H) 10/14/2019    CREATININE 2 71 (H) 10/14/2019    GLUC 159 (H) 10/14/2019    CALCIUM 9 7 10/14/2019    AST 31 06/17/2019    ALT 32 06/17/2019    ALKPHOS 43 (L) 06/17/2019    TP 7 1 06/17/2019    TBILI 0 30 06/17/2019    EGFR 22 07/06/2019         Imaging  No results found  I reviewed the above laboratory and imaging data  Discussion/Summary:  Palpable cervical lymph nodes, axillary pruritus  Will obtain imaging studies of axillary and cervical lymph nodes look for enlargement/abnormalities  If the near found, would consider biopsy either via needle versus excision

## 2019-10-24 ENCOUNTER — CLINICAL SUPPORT (OUTPATIENT)
Dept: CARDIAC REHAB | Facility: CLINIC | Age: 74
End: 2019-10-24
Payer: MEDICARE

## 2019-10-24 DIAGNOSIS — Z95.1 S/P CABG (CORONARY ARTERY BYPASS GRAFT): ICD-10-CM

## 2019-10-24 PROCEDURE — 93798 PHYS/QHP OP CAR RHAB W/ECG: CPT

## 2019-10-25 ENCOUNTER — CLINICAL SUPPORT (OUTPATIENT)
Dept: CARDIAC REHAB | Facility: CLINIC | Age: 74
End: 2019-10-25
Payer: MEDICARE

## 2019-10-25 DIAGNOSIS — Z95.1 S/P CABG (CORONARY ARTERY BYPASS GRAFT): ICD-10-CM

## 2019-10-25 PROCEDURE — 93798 PHYS/QHP OP CAR RHAB W/ECG: CPT

## 2019-10-29 ENCOUNTER — CLINICAL SUPPORT (OUTPATIENT)
Dept: CARDIAC REHAB | Facility: CLINIC | Age: 74
End: 2019-10-29
Payer: MEDICARE

## 2019-10-29 DIAGNOSIS — Z95.1 S/P CABG (CORONARY ARTERY BYPASS GRAFT): ICD-10-CM

## 2019-10-29 PROCEDURE — 93798 PHYS/QHP OP CAR RHAB W/ECG: CPT

## 2019-10-31 ENCOUNTER — CLINICAL SUPPORT (OUTPATIENT)
Dept: CARDIAC REHAB | Facility: CLINIC | Age: 74
End: 2019-10-31
Payer: MEDICARE

## 2019-10-31 ENCOUNTER — HOSPITAL ENCOUNTER (OUTPATIENT)
Dept: ULTRASOUND IMAGING | Facility: HOSPITAL | Age: 74
Discharge: HOME | End: 2019-10-31
Attending: SURGERY
Payer: MEDICARE

## 2019-10-31 DIAGNOSIS — R59.1 LYMPHADENOPATHY: ICD-10-CM

## 2019-10-31 DIAGNOSIS — Z95.1 S/P CABG (CORONARY ARTERY BYPASS GRAFT): ICD-10-CM

## 2019-10-31 PROCEDURE — 76882 US LMTD JT/FCL EVL NVASC XTR: CPT

## 2019-10-31 PROCEDURE — 93798 PHYS/QHP OP CAR RHAB W/ECG: CPT

## 2019-10-31 PROCEDURE — 76536 US EXAM OF HEAD AND NECK: CPT

## 2019-11-01 ENCOUNTER — OFFICE VISIT (OUTPATIENT)
Dept: DIABETES SERVICES | Facility: CLINIC | Age: 74
End: 2019-11-01
Payer: MEDICARE

## 2019-11-01 ENCOUNTER — CLINICAL SUPPORT (OUTPATIENT)
Dept: CARDIAC REHAB | Facility: CLINIC | Age: 74
End: 2019-11-01
Payer: MEDICARE

## 2019-11-01 VITALS — BODY MASS INDEX: 33.81 KG/M2 | WEIGHT: 242.4 LBS

## 2019-11-01 DIAGNOSIS — E11.22 TYPE 2 DIABETES MELLITUS WITH STAGE 4 CHRONIC KIDNEY DISEASE, WITH LONG-TERM CURRENT USE OF INSULIN (HCC): Primary | ICD-10-CM

## 2019-11-01 DIAGNOSIS — Z79.4 TYPE 2 DIABETES MELLITUS WITH STAGE 4 CHRONIC KIDNEY DISEASE, WITH LONG-TERM CURRENT USE OF INSULIN (HCC): Primary | ICD-10-CM

## 2019-11-01 DIAGNOSIS — Z95.1 S/P CABG (CORONARY ARTERY BYPASS GRAFT): ICD-10-CM

## 2019-11-01 DIAGNOSIS — N18.4 TYPE 2 DIABETES MELLITUS WITH STAGE 4 CHRONIC KIDNEY DISEASE, WITH LONG-TERM CURRENT USE OF INSULIN (HCC): Primary | ICD-10-CM

## 2019-11-01 PROCEDURE — G0108 DIAB MANAGE TRN  PER INDIV: HCPCS | Performed by: DIETITIAN, REGISTERED

## 2019-11-01 PROCEDURE — 93798 PHYS/QHP OP CAR RHAB W/ECG: CPT

## 2019-11-01 NOTE — PROGRESS NOTES
Carbohydrate Counting Instruction    Met with Wen Smith for carbohydrate counting  Wen Smith is currently on the following insulin regimen: Toujeo 64 units qhs; Humalog 20 units before meals plus scale  Present at Session: patient     After this visit began, it was explained to Wen Jordanamadan that Dr Reema Wade MD thought it would be beneficial for him to transition to a flexible insulin regimen if he was able to count carbohydrates accurately and that the purpose of today's visit was to help him develop the skills to count carbohydrates accurately  Explained to Wen Smith the premise of flexible insulin and the benefits of better blood glucose control when carbohydrates are counted accurately and flexible insulin is calculated correctly  Wen Luis responded by stating "That means that I'm going to be sitting at home counting carbohydrates and taking my insulin at a certain time before I eat, and I don't want to do that " Explained to Wen Smith that Humalog should always be taken 15 minutes before eating meals regardless of being on a flexible insulin regimen or not and that carbohydrate counting becomes easier the more it is practiced  Discussed means of counting carbohydrates while dining out such as using [employer] juan jose or looking at [employer] nutrition information online or asking   Wen Smith reports that he dines out often and does not want to bring his meter, insulin, pen, and log with him every time he goes out  Zulma Bloom states that when dining out he will either inject Humalog before he leaves the house or when he gets home after eating  Reiterated to Wen Smith that Humalog is to be injected 15 minutes before meals and that longer than 15 minutes increases risk of hypoglycemia and that driving after injecting without eating endangers the lives of others as well  Further explained that taking Humalog when he gets home after eating is also dangerous due to the timing and action of fast acting insulins   Wen Smith responded "So I have to inject myself in front of everyone in the entire restaurant?"  Explained to Alexis Melgar that going to the restroom to inject Humalog is an option or going out to the car then coming back in  When asked if he he wishes to proceed with carbohydrate counting appointment Alexis Melgar answered "I think it's going to be difficult for me because of the way it has to go down " Alexis Melgar did acknowledge that he does not want to endanger others and that he will no longer inject Humalog and then drive without eating  When asked if he still has his meal plan and Portion Book from his medical nutrition therapy appointment Alexis Melgar answered that he still has his Portion Book but in regards to his meal plan "I probably do but I don't know where it is and I don't carry it with me " Reprinted Michael's original meal plan for him and encouraged him to utilize his Portion Book to help follow his original meal plan of 45-60 grams of carbohydrate per meal Appointment today concluded by letting Alexis Melgar know that we are happy to help him with any changes that he wishes to make in his lifestyle and management of diabetes when he is ready  At this time, Alexis Melgar does not demonstrate the math skills necessary for successful carbohydrate counting and following a flexible insulin regimen  Diabetes Education Record  Alexis Melgar received the following handouts: original meal plan from 7/30/2019      Patient response to instruction    Comprehensionfair  Motivationpoor  Expected Compliancefair     Start- Stop: 2:15 pm - 2:45 pm  Total Minutes: 30 Minutes  Group or Individual Instruction: DSMT - I  Other: Seven Meek MD    Thank you for referring your patient to [facility], it was a pleasure working with them today  Please feel free to call with any questions or concerns      Jan Portillo, Sabrina Cancino [address]  [place] [phone]

## 2019-11-01 NOTE — PATIENT INSTRUCTIONS
Take Humalog 15 minutes before meals  Waiting longer than 15 minutes increases risk of low blood sugar  Per original meal plan, each meal should contain 45-60 grams of carbohydrate

## 2019-11-04 ENCOUNTER — OFFICE VISIT (OUTPATIENT)
Dept: SURGICAL ONCOLOGY | Facility: CLINIC | Age: 74
End: 2019-11-04
Payer: MEDICARE

## 2019-11-04 VITALS
WEIGHT: 243 LBS | HEART RATE: 68 BPM | BODY MASS INDEX: 34.02 KG/M2 | RESPIRATION RATE: 16 BRPM | DIASTOLIC BLOOD PRESSURE: 70 MMHG | SYSTOLIC BLOOD PRESSURE: 118 MMHG | TEMPERATURE: 97.9 F | HEIGHT: 71 IN

## 2019-11-04 DIAGNOSIS — R59.1 LYMPHADENOPATHY: Primary | ICD-10-CM

## 2019-11-04 PROCEDURE — 99213 OFFICE O/P EST LOW 20 MIN: CPT | Performed by: SURGERY

## 2019-11-04 NOTE — PROGRESS NOTES
Surgical Oncology Follow Up       [address] [facility] [place]  Tammie Robles [address]  [facility] [id number] [phone]    Nikkie Archer  1945  [id number]  [address] [facility] [address] [address] [phone]    Chief Complaint   Patient presents with    Follow-up     F/U after ultrasound  Assessment/Plan:    No problem-specific Assessment & Plan notes found for this encounter  Diagnoses and all orders for this visit:    Lymphadenopathy        Advance Care Planning/Advance Directives:  Discussed disease status, cancer treatment plans and/or cancer treatment goals with the patient  No history exists  History of Present Illness:  Patient is here for follow-up status post ultrasound of the neck and axilla for itchy axilla  -Interval History:Still complains of itchiness of the axillae  Review of Systems:  Review of Systems   Constitutional: Negative  HENT: Negative  Eyes: Negative  Respiratory: Negative  Cardiovascular: Negative  Gastrointestinal: Negative  Endocrine: Negative  Genitourinary: Negative  Musculoskeletal: Negative  Skin: Negative  Itchy bilateral armpits   Allergic/Immunologic: Negative  Neurological: Negative  Hematological: Negative  Psychiatric/Behavioral: Negative          Patient Active Problem List   Diagnosis    CKD (chronic kidney disease), stage IV (HCC)    Benign hypertension with chronic kidney disease, stage IV (HCC)    Chronic kidney disease-mineral and bone disorder    Type II or unspecified type diabetes mellitus without mention of complication, uncontrolled    Bilateral lower extremity edema    Sleep apnea    Triple vessel coronary artery disease    Hyperlipidemia    S/P CABG (coronary artery bypass graft)    Acute postoperative pulmonary insufficiency ([address] )    Blood loss anemia    Postoperative anemia due to acute blood loss    Other constipation    Iron deficiency anemia due to chronic blood loss    Albuminuria    Esophageal dysphagia    History of colonic polyps    Lumbar back pain    Lumbar discogenic pain syndrome    Obesity, unspecified    Pain of lower extremity    Trigger finger of left hand    Spondylolisthesis    Spinal stenosis    Vitamin D deficiency    Lymphadenopathy     Past Medical History:   Diagnosis Date    Diabetes mellitus ([address] )     Hyperlipidemia     Hypertension     Renal disorder      Past Surgical History:   Procedure Laterality Date    BACK SURGERY      CORONARY ARTERY BYPASS GRAFT      quad    GALLBLADDER SURGERY      HERNIA REPAIR      [place] CABG, ARTERY-VEIN, FOUR N/A 6/21/2019    Procedure: CORONARY ARTERY BYPASS GRAFT (CABG) 4 VESSELS WITH SVG TO PDA, OM, DIAGONAL AND LIMA TO LAD; RIGHT LEG EVH;  Surgeon: Caridad Mota MD;  [facility];  Service: Cardiac Surgery     Family History   Problem Relation Age of Onset    Cancer Mother     Cancer Father      Social History     Socioeconomic History    Marital status: /Civil Union     Spouse name: Not on file    Number of children: Not on file    Years of education: Not on file    Highest education level: Not on file   Occupational History    Not on file   Social Needs    Financial resource strain: Not on file    Food insecurity:     Worry: Not on file     Inability: Not on file    Transportation needs:     Medical: Not on file     Non-medical: Not on file   Tobacco Use    Smoking status: Former Smoker     Packs/day: 3 00     Years: 30 00     Pack years: 90 00    Smokeless tobacco: Never Used    Tobacco comment: Quit 1991   Substance and Sexual Activity    Alcohol use: Not Currently     Comment: 1 drink every 6 months      Drug use: Never    Sexual activity: Not on file   Lifestyle    Physical activity:     Days per week: Not on file     Minutes per session: Not on file    Stress: Not on file   Relationships    Social connections:     Talks on phone: Not on file     Gets together: Not on file     Attends Protestant service: Not on file     Active member of club or organization: Not on file     Attends meetings of clubs or organizations: Not on file     Relationship status: Not on file    Intimate partner violence:     Fear of current or ex partner: Not on file     Emotionally abused: Not on file     Physically abused: Not on file     Forced sexual activity: Not on file   Other Topics Concern    Not on file   Social History Narrative    Not on file       Current Outpatient Medications:     Ascorbic Acid (VITAMIN C) 1000 MG tablet, Take 1,000 mg by mouth daily, Disp: , Rfl:     aspirin 325 mg tablet, Take 1 tablet (325 mg total) by mouth daily, Disp: 100 tablet, Rfl: 0    atorvastatin (LIPITOR) 80 mg tablet, Take 1 tablet (80 mg total) by mouth daily with dinner, Disp: 90 tablet, Rfl: 3    cholecalciferol (VITAMIN D3) 1,000 units tablet, Take 1 tablet (1,000 Units total) by mouth daily, Disp: 1 tablet, Rfl: 1    Continuous Blood Gluc  (FREESTYLE JOHANNE READER) [facility], Use device to scan blood glucose at least 4 times a day, Disp: 1 Device, Rfl: 0    Continuous Blood Gluc Sensor ([address] [place] [address]) [facility], Apply sensor every 14 days to check blood glucose at least 4 times a day, Disp: 6 each, Rfl: 1    cyanocobalamin (VITAMIN B-12) 500 mcg tablet, Take 500 mcg by mouth daily, Disp: , Rfl:     docusate sodium (COLACE) 100 mg capsule, Take 1 capsule (100 mg total) by mouth 2 (two) times a day, Disp: 60 capsule, Rfl: 1    Doxepin HCl (SILENOR) 6 MG TABS, Take 6 mg by mouth as needed , Disp: , Rfl:     finasteride (PROSCAR) 5 mg tablet, Take 5 mg by mouth daily, Disp: , Rfl:     Insulin Glargine (TOUJEO MAX SOLOSTAR) 300 units/mL CONCETRATED U-300 injection pen, Inject 64 Units under the skin daily at bedtime, Disp: , Rfl:    insulin lispro (HUMALOG KWIKPEN) 100 units/mL injection pen, Inject 20 Units under the skin 3 (three) times a day with meals plus scale (using up to 80 units a day), Disp: 30 pen, Rfl: 1    latanoprost (XALATAN) 0 005 % ophthalmic solution, 1 drop daily at bedtime, Disp: , Rfl:     metoprolol tartrate (LOPRESSOR) 25 mg tablet, Take 1 tablet (25 mg total) by mouth every 12 (twelve) hours, Disp: 180 tablet, Rfl: 3    omeprazole (PriLOSEC) 40 MG capsule, Take 40 mg by mouth daily, Disp: , Rfl:     tamsulosin (FLOMAX) 0 4 mg, Take 1 capsule (0 4 mg total) by mouth daily with dinner, Disp: 90 capsule, Rfl: 3    torsemide (DEMADEX) 20 mg tablet, Take 1 tablet (20 mg total) by mouth daily, Disp: 90 tablet, Rfl: 3    ferrous sulfate 325 (65 Fe) mg tablet, Take 1 tablet (325 mg total) by mouth daily with breakfast for 90 days (Patient not taking: Reported on 10/21/2019), Disp: 90 tablet, Rfl: 0  No Known Allergies  Vitals:    11/04/19 1138   BP: 118/70   Pulse: 68   Resp: 16   Temp: 97 9 °F (36 6 °C)       Physical Exam   Constitutional: He appears well-developed and well-nourished  HENT:   Head: Normocephalic and atraumatic  Right Ear: External ear normal    Left Ear: External ear normal    Eyes: Pupils are equal, round, and reactive to light  Conjunctivae and EOM are normal    Neck: Normal range of motion  Cardiovascular: Normal rate, regular rhythm and normal heart sounds  Abdominal: Soft  Bowel sounds are normal    Lymphadenopathy:     He has no cervical adenopathy  Skin: Skin is warm and dry  No rash noted  No erythema  No pallor  Bilateral axilla by normal to inspection and palpation  No obvious masses felt  Psychiatric: He has a normal mood and affect  His behavior is normal          Results:  Labs:  none    Imaging  Us Head Neck Lymph Node Mapping    Result Date: 10/31/2019  Narrative: NECK ULTRASOUND INDICATION:  R59 1: Generalized enlarged lymph nodes   COMPARISON: None FINDINGS: Ultrasound of the bilateral cervical lymph node chains was performed with a high frequency linear transducer  Please note the thyroid was not imaged as part of this exam  There is a mildly prominent left anterior inferior jugular chain lymph node measuring 2 2 x 0 9 x 1 9 cm  It otherwise demonstrates a normal appearance with preservation of normal fatty hilum  Remaining visualized lymph nodes maintain normal morphologic contour, echogenicity and short axis dimensions of less than 0 7 cm  No evidence for microcalcification or focal nodularity  Impression: Mildly prominent left anterior inferior jugular chain lymph node as above with normal intrinsic appearance  Otherwise, no pathologic adenopathy detected  Follow-up if any may be based on clinical grounds  Workstation performed: [id number]     Us Extremity Soft Tissue    Result Date: 10/31/2019  Narrative: AXILLARY ULTRASOUND INDICATION:  R59 1: Generalized enlarged lymph nodes  Soft lump in axilla  COMPARISON:  CT chest 6/18/2019 TECHNIQUE:   Real-time ultrasound of the right axilla was performed with a linear transducer with both volumetric sweeps and still imaging techniques  Survey images of the left axilla were also provided for comparison  FINDINGS:  No solid or cystic masses or pathologic adenopathy seen  There is no skin thickening  Impression: Unremarkable study  Workstation performed: [id number]     I reviewed the above laboratory and imaging data  Discussion/Summary:Bilateral axilla I and cervical node basins clinically negative  No obvious rash  I suspect his symptoms may be due to some sort of irritation or allergy  Alternatively, he has neuropathy of the lower extremities due to his diabetes  His possible that his symptoms may be related to blood sugar control  I will follow him on a p r n  Basis  I recommended that he go see an allergist for further workup

## 2019-11-04 NOTE — LETTER
November 4, 2019     [address]    Patient: Seth Servin   YOB: 1945   Date of Visit: 11/4/2019       Dear Dr Thalia Matthews: Thank you for referring Dania Villa to me for evaluation  Below are my notes for this consultation  If you have questions, please do not hesitate to call me  I look forward to following your patient along with you  Sincerely,        Jon Fontanez MD        CC: MD Mary Brandt MD Harvis Proctor, DO  [facility]  MD Jon Connelly MD  11/4/2019 12:02 PM  Sign at close encounter     Surgical Oncology Follow Up       [address] [address] [phone]    Seth Servin  1945  [id number]  [address] [facility] [address]  [phone]    Chief Complaint   Patient presents with    Follow-up     F/U after ultrasound  Assessment/Plan:    No problem-specific Assessment & Plan notes found for this encounter  Diagnoses and all orders for this visit:    Lymphadenopathy        Advance Care Planning/Advance Directives:  Discussed disease status, cancer treatment plans and/or cancer treatment goals with the patient  No history exists  History of Present Illness:  Patient is here for follow-up status post ultrasound of the neck and axilla for itchy axilla  -Interval History:***    Review of Systems:  Review of Systems   Constitutional: Negative  HENT: Negative  Eyes: Negative  Respiratory: Negative  Cardiovascular: Negative  Gastrointestinal: Negative  Endocrine: Negative  Genitourinary: Negative  Musculoskeletal: Negative  Skin: Negative  Itchy bilateral armpits   Allergic/Immunologic: Negative  Neurological: Negative  Hematological: Negative  Psychiatric/Behavioral: Negative          Patient Active Problem List   Diagnosis    CKD (chronic kidney disease), stage IV (HCC)    Benign hypertension with chronic kidney disease, stage IV (HCC)    Chronic kidney disease-mineral and bone disorder    Type II or unspecified type diabetes mellitus without mention of complication, uncontrolled    Bilateral lower extremity edema    Sleep apnea    Triple vessel coronary artery disease    Hyperlipidemia    S/P CABG (coronary artery bypass graft)    Acute postoperative pulmonary insufficiency (HCC)    Blood loss anemia    Postoperative anemia due to acute blood loss    Other constipation    Iron deficiency anemia due to chronic blood loss    Albuminuria    Esophageal dysphagia    History of colonic polyps    Lumbar back pain    Lumbar discogenic pain syndrome    Obesity, unspecified    Pain of lower extremity    Trigger finger of left hand    Spondylolisthesis    Spinal stenosis    Vitamin D deficiency    Lymphadenopathy     Past Medical History:   Diagnosis Date    Diabetes mellitus [address]     Hyperlipidemia     Hypertension     Renal disorder      Past Surgical History:   Procedure Laterality Date    BACK SURGERY      CORONARY ARTERY BYPASS GRAFT      quad    GALLBLADDER SURGERY      HERNIA REPAIR      [place] CABG, ARTERY-VEIN, FOUR N/A 6/21/2019    Procedure: CORONARY ARTERY BYPASS GRAFT (CABG) 4 VESSELS WITH SVG TO PDA, OM, DIAGONAL AND LIMA TO LAD; RIGHT LEG EVH;  Surgeon: Franci Iniguez MD;  [facility];  Service: Cardiac Surgery     Family History   Problem Relation Age of Onset    Cancer Mother     Cancer Father      Social History     Socioeconomic History    Marital status: /Civil Union     Spouse name: Not on file    Number of children: Not on file    Years of education: Not on file    Highest education level: Not on file   Occupational History    Not on file   Social Needs    Financial resource strain: Not on file    Food insecurity:     Worry: Not on file     Inability: Not on file    Transportation needs:     Medical: Not on file     Non-medical: Not on file   Tobacco Use    Smoking status: Former Smoker     Packs/day: 3 00     Years: 30 00     Pack years: 90 00    Smokeless tobacco: Never Used    Tobacco comment: Quit 1991   Substance and Sexual Activity    Alcohol use: Not Currently     Comment: 1 drink every 6 months      Drug use: Never    Sexual activity: Not on file   Lifestyle    Physical activity:     Days per week: Not on file     Minutes per session: Not on file    Stress: Not on file   Relationships    Social connections:     Talks on phone: Not on file     Gets together: Not on file     Attends Sabianist service: Not on file     Active member of club or organization: Not on file     Attends meetings of clubs or organizations: Not on file     Relationship status: Not on file    Intimate partner violence:     Fear of current or ex partner: Not on file     Emotionally abused: Not on file     Physically abused: Not on file     Forced sexual activity: Not on file   Other Topics Concern    Not on file   Social History Narrative    Not on file       Current Outpatient Medications:     Ascorbic Acid (VITAMIN C) 1000 MG tablet, Take 1,000 mg by mouth daily, Disp: , Rfl:     aspirin 325 mg tablet, Take 1 tablet (325 mg total) by mouth daily, Disp: 100 tablet, Rfl: 0    atorvastatin (LIPITOR) 80 mg tablet, Take 1 tablet (80 mg total) by mouth daily with dinner, Disp: 90 tablet, Rfl: 3    cholecalciferol (VITAMIN D3) 1,000 units tablet, Take 1 tablet (1,000 Units total) by mouth daily, Disp: 1 tablet, Rfl: 1    Continuous Blood Gluc  (FREESTYLE JOHANNE READER) [facility], Use device to scan blood glucose at least 4 times a day, Disp: 1 Device, Rfl: 0    Continuous Blood Gluc Sensor ([facility]) [facility], Apply sensor every 14 days to check blood glucose at least 4 times a day, Disp: 6 each, Rfl: 1    cyanocobalamin (VITAMIN B-12) 500 mcg tablet, Take 500 mcg by mouth daily, Disp: , Rfl:     docusate sodium (COLACE) 100 mg capsule, Take 1 capsule (100 mg total) by mouth 2 (two) times a day, Disp: 60 capsule, Rfl: 1    Doxepin HCl (SILENOR) 6 MG TABS, Take 6 mg by mouth as needed , Disp: , Rfl:     finasteride (PROSCAR) 5 mg tablet, Take 5 mg by mouth daily, Disp: , Rfl:     Insulin Glargine (TOUJEO MAX SOLOSTAR) 300 units/mL CONCETRATED U-300 injection pen, Inject 64 Units under the skin daily at bedtime, Disp: , Rfl:     insulin lispro (HUMALOG KWIKPEN) 100 units/mL injection pen, Inject 20 Units under the skin 3 (three) times a day with meals plus scale (using up to 80 units a day), Disp: 30 pen, Rfl: 1    latanoprost (XALATAN) 0 005 % ophthalmic solution, 1 drop daily at bedtime, Disp: , Rfl:     metoprolol tartrate (LOPRESSOR) 25 mg tablet, Take 1 tablet (25 mg total) by mouth every 12 (twelve) hours, Disp: 180 tablet, Rfl: 3    omeprazole (PriLOSEC) 40 MG capsule, Take 40 mg by mouth daily, Disp: , Rfl:     tamsulosin (FLOMAX) 0 4 mg, Take 1 capsule (0 4 mg total) by mouth daily with dinner, Disp: 90 capsule, Rfl: 3    torsemide (DEMADEX) 20 mg tablet, Take 1 tablet (20 mg total) by mouth daily, Disp: 90 tablet, Rfl: 3    ferrous sulfate 325 (65 Fe) mg tablet, Take 1 tablet (325 mg total) by mouth daily with breakfast for 90 days (Patient not taking: Reported on 10/21/2019), Disp: 90 tablet, Rfl: 0  No Known Allergies  Vitals:    11/04/19 1138   BP: 118/70   Pulse: 68   Resp: 16   Temp: 97 9 °F (36 6 °C)       Physical Exam   Constitutional: He appears well-developed and well-nourished  HENT:   Head: Normocephalic and atraumatic  Right Ear: External ear normal    Left Ear: External ear normal    Eyes: Pupils are equal, round, and reactive to light  Conjunctivae and EOM are normal    Neck: Normal range of motion  Cardiovascular: Normal rate, regular rhythm and normal heart sounds  Abdominal: Soft  Bowel sounds are normal    Lymphadenopathy:     He has no cervical adenopathy  Skin: Skin is warm and dry  No rash noted  No erythema  No pallor  Bilateral axilla by normal to inspection and palpation  No obvious masses felt  Psychiatric: He has a normal mood and affect  His behavior is normal          Results:  Labs:  none    Imaging  Us Head Neck Lymph Node Mapping    Result Date: 10/31/2019  Narrative: NECK ULTRASOUND INDICATION:  R59 1: Generalized enlarged lymph nodes  COMPARISON: None FINDINGS: Ultrasound of the bilateral cervical lymph node chains was performed with a high frequency linear transducer  Please note the thyroid was not imaged as part of this exam  There is a mildly prominent left anterior inferior jugular chain lymph node measuring 2 2 x 0 9 x 1 9 cm  It otherwise demonstrates a normal appearance with preservation of normal fatty hilum  Remaining visualized lymph nodes maintain normal morphologic contour, echogenicity and short axis dimensions of less than 0 7 cm  No evidence for microcalcification or focal nodularity  Impression: Mildly prominent left anterior inferior jugular chain lymph node as above with normal intrinsic appearance  Otherwise, no pathologic adenopathy detected  Follow-up if any may be based on clinical grounds  Workstation performed: [id number]     Us Extremity Soft Tissue    Result Date: 10/31/2019  Narrative: AXILLARY ULTRASOUND INDICATION:  R59 1: Generalized enlarged lymph nodes  Soft lump in axilla  COMPARISON:  CT chest 6/18/2019 TECHNIQUE:   Real-time ultrasound of the right axilla was performed with a linear transducer with both volumetric sweeps and still imaging techniques  Survey images of the left axilla were also provided for comparison  FINDINGS:  No solid or cystic masses or pathologic adenopathy seen  There is no skin thickening          Impression: Unremarkable study  Workstation performed: [id number]     I reviewed the above laboratory and imaging data  Discussion/Summary:Bilateral axilla I and cervical node basins clinically negative  No obvious rash  I suspect his symptoms may be due to some sort of irritation or allergy  Alternatively, he has neuropathy of the lower extremities due to his diabetes  His possible that his symptoms may be related to blood sugar control  I will follow him on a p r n  Basis  I recommended that he go see an allergist for further workup

## 2019-11-04 NOTE — LETTER
November 4, 2019     Benson Overton 12    Patient: Manus Look   YOB: 1945   Date of Visit: 11/4/2019       Dear Dr Johnnie Pike: Thank you for referring Ken Ca to me for evaluation  Below are my notes for this consultation  If you have questions, please do not hesitate to call me  I look forward to following your patient along with you  Sincerely,        Royal Tania MD        CC: MD Mark May MD Alben Cullens, DO  [facility]  MD Royal Tania Lauren MD  11/4/2019 12:04 PM  Sign at close encounter     Surgical Oncology Follow Up       [address] [id number] [phone]    Manus Look  1945  [id number]  [address] [facility] SURGICAL ONCOLOGY [address]  [phone]    Chief Complaint   Patient presents with    Follow-up     F/U after ultrasound  Assessment/Plan:    No problem-specific Assessment & Plan notes found for this encounter  Diagnoses and all orders for this visit:    Lymphadenopathy        Advance Care Planning/Advance Directives:  Discussed disease status, cancer treatment plans and/or cancer treatment goals with the patient  No history exists  History of Present Illness:  Patient is here for follow-up status post ultrasound of the neck and axilla for itchy axilla  -Interval History:Still complains of itchiness of the axillae  Review of Systems:  Review of Systems   Constitutional: Negative  HENT: Negative  Eyes: Negative  Respiratory: Negative  Cardiovascular: Negative  Gastrointestinal: Negative  Endocrine: Negative  Genitourinary: Negative  Musculoskeletal: Negative  Skin: Negative           Itchy bilateral armpits   Allergic/Immunologic: Negative  Neurological: Negative  Hematological: Negative  Psychiatric/Behavioral: Negative          Patient Active Problem List   Diagnosis    CKD (chronic kidney disease), stage IV (HCC)    Benign hypertension with chronic kidney disease, stage IV (HCC)    Chronic kidney disease-mineral and bone disorder    Type II or unspecified type diabetes mellitus without mention of complication, uncontrolled    Bilateral lower extremity edema    Sleep apnea    Triple vessel coronary artery disease    Hyperlipidemia    S/P CABG (coronary artery bypass graft)    Acute postoperative pulmonary insufficiency (HCC)    Blood loss anemia    Postoperative anemia due to acute blood loss    Other constipation    Iron deficiency anemia due to chronic blood loss    Albuminuria    Esophageal dysphagia    History of colonic polyps    Lumbar back pain    Lumbar discogenic pain syndrome    Obesity, unspecified    Pain of lower extremity    Trigger finger of left hand    Spondylolisthesis    Spinal stenosis    Vitamin D deficiency    Lymphadenopathy     Past Medical History:   Diagnosis Date    [facility]     Hyperlipidemia     Hypertension     Renal disorder      Past Surgical History:   Procedure Laterality Date    BACK SURGERY      CORONARY ARTERY BYPASS GRAFT      quad    GALLBLADDER SURGERY      HERNIA REPAIR      CO CABG, ARTERY-VEIN, FOUR N/A 6/21/2019    Procedure: CORONARY ARTERY BYPASS GRAFT (CABG) 4 VESSELS WITH SVG TO PDA, OM, DIAGONAL AND LIMA TO LAD; RIGHT LEG EVH;  Surgeon: Caridad Mota MD;  Location: [facility];  Service: Cardiac Surgery     Family History   Problem Relation Age of Onset    Cancer Mother     Cancer Father      Social History     Socioeconomic History    Marital status: /Civil Union     Spouse name: Not on file    Number of children: Not on file    Years of education: Not on file    Highest education level: Not on file   Occupational History    Not on file   Social Needs    Financial resource strain: Not on file    Food insecurity:     Worry: Not on file     Inability: Not on file    Transportation needs:     Medical: Not on file     Non-medical: Not on file   Tobacco Use    Smoking status: Former Smoker     Packs/day: 3 00     Years: 30 00     Pack years: 90 00    Smokeless tobacco: Never Used    Tobacco comment: Quit 1991   Substance and Sexual Activity    Alcohol use: Not Currently     Comment: 1 drink every 6 months      Drug use: Never    Sexual activity: Not on file   Lifestyle    Physical activity:     Days per week: Not on file     Minutes per session: Not on file    Stress: Not on file   Relationships    Social connections:     Talks on phone: Not on file     Gets together: Not on file     Attends [employer] service: Not on file     Active member of club or organization: Not on file     Attends meetings of clubs or organizations: Not on file     Relationship status: Not on file    Intimate partner violence:     Fear of current or ex partner: Not on file     Emotionally abused: Not on file     Physically abused: Not on file     Forced sexual activity: Not on file   Other Topics Concern    Not on file   Social History Narrative    Not on file       Current Outpatient Medications:     Ascorbic Acid (VITAMIN C) 1000 MG tablet, Take 1,000 mg by mouth daily, Disp: , Rfl:     aspirin 325 mg tablet, Take 1 tablet (325 mg total) by mouth daily, Disp: 100 tablet, Rfl: 0    atorvastatin (LIPITOR) 80 mg tablet, Take 1 tablet (80 mg total) by mouth daily with dinner, Disp: 90 tablet, Rfl: 3    cholecalciferol (VITAMIN D3) 1,000 units tablet, Take 1 tablet (1,000 Units total) by mouth daily, Disp: 1 tablet, Rfl: 1    Continuous Blood Gluc  (FREESTYLE JOHANNE READER) MAMI, Use device to scan blood glucose at least 4 times a day, Disp: 1 Device, Rfl: 0    Continuous Blood Gluc Sensor (FREESTYLE JOHANNE SENSOR SYSTEM) MISC, Apply sensor every 14 days to check blood glucose at least 4 times a day, Disp: 6 each, Rfl: 1    cyanocobalamin (VITAMIN B-12) 500 mcg tablet, Take 500 mcg by mouth daily, Disp: , Rfl:     docusate sodium (COLACE) 100 mg capsule, Take 1 capsule (100 mg total) by mouth 2 (two) times a day, Disp: 60 capsule, Rfl: 1    Doxepin HCl (SILENOR) 6 MG TABS, Take 6 mg by mouth as needed , Disp: , Rfl:     finasteride (PROSCAR) 5 mg tablet, Take 5 mg by mouth daily, Disp: , Rfl:     Insulin Glargine (TOUJEO MAX SOLOSTAR) 300 units/mL CONCETRATED U-300 injection pen, Inject 64 Units under the skin daily at bedtime, Disp: , Rfl:     insulin lispro (HUMALOG KWIKPEN) 100 units/mL injection pen, Inject 20 Units under the skin 3 (three) times a day with meals plus scale (using up to 80 units a day), Disp: 30 pen, Rfl: 1    latanoprost (XALATAN) 0 005 % ophthalmic solution, 1 drop daily at bedtime, Disp: , Rfl:     metoprolol tartrate (LOPRESSOR) 25 mg tablet, Take 1 tablet (25 mg total) by mouth every 12 (twelve) hours, Disp: 180 tablet, Rfl: 3    omeprazole (PriLOSEC) 40 MG capsule, Take 40 mg by mouth daily, Disp: , Rfl:     tamsulosin (FLOMAX) 0 4 mg, Take 1 capsule (0 4 mg total) by mouth daily with dinner, Disp: 90 capsule, Rfl: 3    torsemide (DEMADEX) 20 mg tablet, Take 1 tablet (20 mg total) by mouth daily, Disp: 90 tablet, Rfl: 3    ferrous sulfate 325 (65 Fe) mg tablet, Take 1 tablet (325 mg total) by mouth daily with breakfast for 90 days (Patient not taking: Reported on 10/21/2019), Disp: 90 tablet, Rfl: 0  No Known Allergies  Vitals:    11/04/19 1138   BP: 118/70   Pulse: 68   Resp: 16   Temp: 97 9 °F (36 6 °C)       Physical Exam   Constitutional: He appears well-developed and well-nourished  HENT:   Head: Normocephalic and atraumatic  Right Ear: External ear normal    Left Ear: External ear normal    Eyes: Pupils are equal, round, and reactive to light   Conjunctivae and EOM are normal    Neck: Normal range of motion  Cardiovascular: Normal rate, regular rhythm and normal heart sounds  Abdominal: Soft  Bowel sounds are normal    Lymphadenopathy:     He has no cervical adenopathy  Skin: Skin is warm and dry  No rash noted  No erythema  No pallor  Bilateral axilla by normal to inspection and palpation  No obvious masses felt  Psychiatric: He has a normal mood and affect  His behavior is normal          Results:  Labs:  none    Imaging  Us Head Neck Lymph Node Mapping    Result Date: 10/31/2019  Narrative: NECK ULTRASOUND INDICATION:  R59 1: Generalized enlarged lymph nodes  COMPARISON: None FINDINGS: Ultrasound of the bilateral cervical lymph node chains was performed with a high frequency linear transducer  Please note the thyroid was not imaged as part of this exam  There is a mildly prominent left anterior inferior jugular chain lymph node measuring 2 2 x 0 9 x 1 9 cm  It otherwise demonstrates a normal appearance with preservation of normal fatty hilum  Remaining visualized lymph nodes maintain normal morphologic contour, echogenicity and short axis dimensions of less than 0 7 cm  No evidence for microcalcification or focal nodularity  Impression: Mildly prominent left anterior inferior jugular chain lymph node as above with normal intrinsic appearance  Otherwise, no pathologic adenopathy detected  Follow-up if any may be based on clinical grounds  Workstation performed: [id number]     Us Extremity Soft Tissue    Result Date: 10/31/2019  Narrative: AXILLARY ULTRASOUND INDICATION:  R59 1: Generalized enlarged lymph nodes  Soft lump in axilla  COMPARISON:  CT chest 6/18/2019 TECHNIQUE:   Real-time ultrasound of the right axilla was performed with a linear transducer with both volumetric sweeps and still imaging techniques  Survey images of the left axilla were also provided for comparison  FINDINGS:  No solid or cystic masses or pathologic adenopathy seen    There is no skin thickening  Impression: Unremarkable study  Workstation performed: [id number]     I reviewed the above laboratory and imaging data  Discussion/Summary:Bilateral axilla I and cervical node basins clinically negative  No obvious rash  I suspect his symptoms may be due to some sort of irritation or allergy  Alternatively, he has neuropathy of the lower extremities due to his diabetes  His possible that his symptoms may be related to blood sugar control  I will follow him on a p r n  Basis  I recommended that he go see an allergist for further workup

## 2019-11-05 ENCOUNTER — CLINICAL SUPPORT (OUTPATIENT)
Dept: CARDIAC REHAB | Facility: CLINIC | Age: 74
End: 2019-11-05
Payer: MEDICARE

## 2019-11-05 DIAGNOSIS — Z95.1 S/P CABG (CORONARY ARTERY BYPASS GRAFT): ICD-10-CM

## 2019-11-05 PROCEDURE — 93798 PHYS/QHP OP CAR RHAB W/ECG: CPT

## 2019-11-07 ENCOUNTER — CLINICAL SUPPORT (OUTPATIENT)
Dept: CARDIAC REHAB | Facility: CLINIC | Age: 74
End: 2019-11-07
Payer: MEDICARE

## 2019-11-07 DIAGNOSIS — Z95.1 S/P CABG (CORONARY ARTERY BYPASS GRAFT): ICD-10-CM

## 2019-11-07 PROCEDURE — 93798 PHYS/QHP OP CAR RHAB W/ECG: CPT

## 2019-11-07 NOTE — PROGRESS NOTES
Cardiac Rehabilitation Plan of Care   90 day       Today's date: 2019   Visits: 32  Patient name: iMkey Brantley      : 1945  Age: 68 y o  MRN: [id number]  Referring Physician: Paola Devine MD   Cardiologist: Venkatesh Abreu MD   Provider: [facility]   Clinician: Deana Chávez MS, CEP    Dx:   Encounter Diagnosis   Name Primary?  S/P CABG (coronary artery bypass graft)      Date of onset: 19    SUMMARY OF PROGRESS: Marianne Knowles has completed 32 exercise sessions in cardiac rehab with 4 to go  He completes 40-45 min of exercise at 2 6-3 METs plus weight training without cardiac complaints  Babitas resting BP is typically normal with variable response to exercise  NSR on telemetry with frequent PVCs at rest that become less frequent with exercise; ventricular couplets noted, as well as bigeminy and trigeminy PVCs  Marianne Knowles has not started any home exercise and does not seem interested in continuing exercise after cardiac rehab; but  he is doing his daily PT exercises  Babitas blood sugar continues to be widely varied with reports of  pre-exercise although his blood sugar does tend to drop with exercise  He states that he has been cutting back on the refined sugar that he eats despite his uncontrolled BS  Marianne Knowles reports to have excellent social support from friends and family, although 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  This stress may contribute to Michael's varied blood sugar and blood pressure  We will continue to monitor Babitas blood sugar, blood pressure, and ectopy as he finishes his cardiac rehab sessions and try to motivate him to continue his exercise at least 3 days/week          Medication compliance: Yes   Comments:   Fall Risk: Low   Comments:     EKG changes: NSR, PACs, frequent PVCs, noted Bigeminy/trigeminy       EXERCISE ASSESSMENT and PLAN    Current Exercise Program in Rehab:       Frequency: 3 days/week        Minutes: 40-45        METS: 2 6-3            HR:    RPE: 5-6         Modalities: Treadmill, UBE and Recumbent bike      Exercise Progression 60 Day Goals :    Frequency: 3 days/week + home exercise on opposite CR days   Minutes: 40-45   METS: 3-3 5   HR:     RPE: 4-6   Modalities: Treadmill, UBE, NuStep and Recumbent bike    Strength trainin-3 days / week  12-15 repetitions  1-2 sets per modality    Modalities: Chest Press, Pull Downs, Arm Curl, Seated Row and Sit to [employer]    Progressing: No - requires constant reinforcement to maintain speeds and has not increased intensity levels due to lack of drive    Home Exercise: Currently only doing his physical therapy exercises at home    Goals: 10% improvement in functional capacity, improved DASI score by 10%, Increase in peak CR METs by 40%, Resume ADLs with increased strength and Exercise 5 days/wk, >150mins/wk  Education: Benefit of exercise for CAD risk factors, home exercise guidelines, signs and sxs, RPE scale and class: Risk Factors for Heart Disease   Plan:home exercise 30+ mins 2 days opposite CR  Readiness to change: Action      NUTRITION ASSESSMENT AND PLAN    Weight control:    Starting weight: 231 2 lbs   Current weight:   244 0 lbs   Waist circumference:    Startin in   Current:    Diabetes: Patient reported fasting BS  - wide range  Lipid management: Discussed diet and lipid management and Last lipid profile 19  Chol 139  TRG 30  HDL 84  LDL 49, A1c%: 8 8, Fasting B  Goals:reduced BMI to < 25, decreased body fat% <25%   , reduced waist circumference <40 inches, fasting BG , improved A1c  < 6 5%, Improved Rate Your Plate score  >02 and 2 5-5%  wt loss  Education: heart healthy eating  low sodium diet  hydration  diet and lipid management  diabetes management and exercise  wt  loss  healthy choices while dining out  portion control  class: Heart Healthy Eating  class:  Label Reading  Progressing: No - gained 4 lbs; Yes - decreased his A1c to 8 8% from 10 3% in   Plan: Increase PUFA and MUFA, Decrease SFA, Increase whole grains, increase fruits/vegs, eliminate processed meats, reduce red meat 1x/wk, swtich to low fat dairy and Reduce added sugars <25g/day  Readiness to change: Action      PSYCHOSOCIAL ASSESSMENT AND PLAN    Emotional: Patient reports he/she is coping well with good social support and denies depression or anxiety  PHQ-9 =  0    0 =No Depression  Self-reported stress level: 7 - family stress   Social support: Excellent  Goals:  Physical Fitness in [facility] Score < 3, Social Support in [place] Score < 3 and Pain in [place] Score < 3  Education: class:  Stress and Your Health  and class:  Relaxation  Progressing: No - stress not managed well     Plan: Practice relaxation techniques  Readiness to change: Action      OTHER CORE COMPONENTS     Tobacco:   Social History     Tobacco Use   Smoking Status Former Smoker    Packs/day: 3 00    Years: 30 00    Pack years: 90 00   Smokeless Tobacco Never Used   Tobacco Comment    Quit        Tobacco Use Intervention: Referral to tobacco expert:   Brief counseling by cardiac rehab professional  Date: 19    Blood pressure:    Restin/72 - 144/80   Exercise: 128/66 - 190/70 -variable response to exercise    Goals: consistent BP < 130/80, reduced dietary sodium <2300mg and consistent exercise >150 mins/wk  Education:  understanding HTN and CAD, low sodium diet and HTN, Education class:  Common Heart Medications and Education class: Understanding Heart Disease  Progressing: No - BP not well controlled, still swings   Plan: Monitor BP daily  Readiness to change: Action

## 2019-11-08 ENCOUNTER — CLINICAL SUPPORT (OUTPATIENT)
Dept: CARDIAC REHAB | Facility: CLINIC | Age: 74
End: 2019-11-08
Payer: MEDICARE

## 2019-11-08 DIAGNOSIS — Z95.1 S/P CABG (CORONARY ARTERY BYPASS GRAFT): ICD-10-CM

## 2019-11-08 PROCEDURE — 93798 PHYS/QHP OP CAR RHAB W/ECG: CPT

## 2019-11-11 ENCOUNTER — TELEPHONE (OUTPATIENT)
Dept: FAMILY MEDICINE CLINIC | Facility: OTHER | Age: 74
End: 2019-11-11

## 2019-11-11 NOTE — TELEPHONE ENCOUNTER
Patient saw Dr Mary Jimenez and he wants patient to go on Gabapentin to help relieve the burning itch of his chest and axillae  Patient would like it sent in today due to the itching and pain he has going on with it    Letter is scanned in from Dr Mary Jimenez regarding this     Please advise   Thank you

## 2019-11-12 ENCOUNTER — CLINICAL SUPPORT (OUTPATIENT)
Dept: CARDIAC REHAB | Facility: CLINIC | Age: 74
End: 2019-11-12
Payer: MEDICARE

## 2019-11-12 DIAGNOSIS — G62.9 NEUROPATHY: Primary | ICD-10-CM

## 2019-11-12 DIAGNOSIS — Z95.1 S/P CABG (CORONARY ARTERY BYPASS GRAFT): ICD-10-CM

## 2019-11-12 PROCEDURE — 93798 PHYS/QHP OP CAR RHAB W/ECG: CPT

## 2019-11-12 RX ORDER — GABAPENTIN 100 MG/1
100 CAPSULE ORAL
Qty: 30 CAPSULE | Refills: 0 | Status: SHIPPED | OUTPATIENT
Start: 2019-11-12 | End: 2019-12-04 | Stop reason: SDUPTHER

## 2019-11-12 NOTE — TELEPHONE ENCOUNTER
Please call and inform the patient I have faxed the Rx for Neurontin to take at bedtime daily  I will see him in 4 weeks as scheduled to see how he is doing  Thanks

## 2019-11-13 ENCOUNTER — TELEPHONE (OUTPATIENT)
Dept: FAMILY MEDICINE CLINIC | Facility: OTHER | Age: 74
End: 2019-11-13

## 2019-11-14 ENCOUNTER — CLINICAL SUPPORT (OUTPATIENT)
Dept: CARDIAC REHAB | Facility: CLINIC | Age: 74
End: 2019-11-14
Payer: MEDICARE

## 2019-11-14 DIAGNOSIS — Z95.1 S/P CABG (CORONARY ARTERY BYPASS GRAFT): ICD-10-CM

## 2019-11-14 PROCEDURE — 93798 PHYS/QHP OP CAR RHAB W/ECG: CPT

## 2019-11-15 ENCOUNTER — CLINICAL SUPPORT (OUTPATIENT)
Dept: CARDIAC REHAB | Facility: CLINIC | Age: 74
End: 2019-11-15
Payer: MEDICARE

## 2019-11-15 DIAGNOSIS — Z95.1 S/P CABG (CORONARY ARTERY BYPASS GRAFT): ICD-10-CM

## 2019-11-15 PROCEDURE — 93798 PHYS/QHP OP CAR RHAB W/ECG: CPT

## 2019-11-19 ENCOUNTER — APPOINTMENT (OUTPATIENT)
Dept: CARDIAC REHAB | Facility: CLINIC | Age: 74
End: 2019-11-19
Payer: MEDICARE

## 2019-11-19 NOTE — PROGRESS NOTES
Cardiac Rehabilitation Plan of Care   Dsicharge Note      Today's date: 2019   Visits: 36  Patient name: Marga Ramirez      : 1945  Age: 68 y o  MRN: [id number]  Referring Physician: Adeline Alegria MD   Cardiologist: Nilson Palacio MD   Provider: [facility]   Clinician: Jaquelin Murphy, MS, CEP     Dx:   Encounter Diagnosis   Name Primary?  S/P CABG (coronary artery bypass graft)      Date of onset: 19    SUMMARY OF PROGRESS: Today is Michael's final note for cardiac rehab and he has completed all 36 session of cardiac rehab  Preston Carter had normal hemodynamic responses to exercise and his telemetry read NSR with frequent PVCs, occ ventricular couplets, bigeminy, trigeminy  Preston Carter completed all the educated required for cardiac rehab  Preston Cartre completed his final sub-maximal treadmill test doing the exact same as his initial testing 3 1 METs for 6 minutes  Preston Carter reports that he did not have any cardiac complications  Preston Carter did well in cardiac rehab but was unable to progress to where we wanted him due to lack of motivation in exercise  Preston Carter does state that he is going to continue exercise at home either walking 2 miles per a day, riding his recumbent bike for 30-40 minutes or walking on his treadmill  I expressed to Preston Carter that it would be in his best interest to complete >150 minutes of cardiovascular exercise a week plus continuing with some weight lifting  Preston Carter reports his understanding  Preston Carter is able to complete 40-50 minutes of cardio at 2 9-3 5 METs plus weight training  Preston Carter has been monitoring his blood sugar here in cardiac rehab and it has been extremely uncontrolled, Preston Carter was even sent home a couple of time for having a sugar over 300 and had to also sit for 30-40 minutes eating snacks due to having a sugar under 100 before exercise   Preston Carter reports that he tries to follow the heart healthy diet but states that him and his wife eat out almost every day  Andrew Jeronimo reports that when he goes out to eat he tries to get healthier choices but he has not been doing well lately  Andrew Jeronimo did gain 18 lbs since starting rehab and his BMI and body fat percentage both increased by 2  I expressed to Andrew Jeronimo the importance of trying to keep his weight down for his diabetic control and following the heart healthy diet for his heart  Andrew Jeronimo reports his understanding and states to be doing the best he can  Andrew Jeronimo reports that he does have excellent social support from his friends  Andrew Jeronimo also reports that 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  Andrew Jeronimo denies any depression or anxiety  I educated Andrew Jeronimo on ways to try to cope with stress and went over stress management and CAD  Andrew Jeronimo is hoping that he can continue on his own and get back on track with his diet         Medication compliance: Yes   Comments:   Fall Risk: Low   Comments:     EKG changes: NSR, PACs, frequent PVCs, Bigeminy/trigeminy       EXERCISE ASSESSMENT and PLAN    Current Exercise Program in Rehab:       Frequency: 3 days/week        Minutes: 40-50       METS: 2 9-3 5            HR:    RPE: 5-6         Modalities: Treadmill, UBE and Recumbent bike      Exercise Progression 60 Day Goals :    Frequency: 5 days/week    Minutes: 40-45   METS: 3-3 5   HR:     RPE: 4-6   Modalities: Treadmill, UBE, NuStep and Recumbent bike    Strength trainin-3 days / week  12-15 repetitions  1-2 sets per modality    Modalities: Chest Press, Pull Downs, Arm Curl, Seated Row and Sit to [employer]    Progressing: No - requires constant reinforcement to maintain speeds and has not increased intensity levels due to lack of drive    Home Exercise: Currently only doing his physical therapy exercises at home    Goals: 10% improvement in functional capacity, improved DASI score by 10%, Increase in peak CR METs by 40% and Exercise 5 days/wk, >150mins/wk  Education: Benefit of exercise for CAD risk factors, home exercise guidelines, signs and sxs, RPE scale and class: Risk Factors for Heart Disease   Plan:education on home exercise guidelines, Education class: Risk Factors for Heart Disease and Continue to exercise 3-5 days per a week at home  Readiness to change: Maintenance      NUTRITION ASSESSMENT AND PLAN    Weight control:    Starting weight: 231 2 lbs   Current weight:   249 2 lbs   Waist circumference:    Startin in   Current: 45 5 in  Diabetes: Patient reported fasting BS  - wide range and extremely uncontrolled   Lipid management: Discussed diet and lipid management and Last lipid profile 19  Chol 139  TRG 30  HDL 84  LDL 49, A1c%: 8 8, Fasting B  Goals:reduced BMI to < 25, decreased body fat% <25%   , reduced waist circumference <40 inches, fasting BG , improved A1c  < 6 5%, Improved Rate Your Plate score  >90 and 2 5-5%  wt loss  Education: heart healthy eating  low sodium diet  hydration  diet and lipid management  diabetes management and exercise  wt  loss  healthy choices while dining out  portion control  class: Heart Healthy Eating  class:  Label Reading  Progressing: No - gained 18 lbs; Yes - decreased his A1c to 8 8% from 10 3% in   Plan: Increase PUFA and MUFA, Decrease SFA, Increase whole grains, increase fruits/vegs, eliminate processed meats, reduce red meat 1x/wk, swtich to low fat dairy and Reduce added sugars <25g/day  Readiness to change: Preparation      PSYCHOSOCIAL ASSESSMENT AND PLAN    Emotional: Patient reports he/she is coping well with good social support and denies depression or anxiety  PHQ-9 =  2    1-4 =Minimal  Depression  Self-reported stress level: 7 - family stress and work stress   Social support: Excellent  Goals:  Physical Fitness in [place] Score < 3, Social Support in [facility] Score < 3 and Pain in [facility] Score < 3  Education: class:  Stress and Your Health  and class:  Relaxation  Progressing: No - stress not managed well     Plan: Practice relaxation techniques  Readiness to change: Preparation      OTHER CORE COMPONENTS     Tobacco:   Social History     Tobacco Use   Smoking Status Former Smoker    Packs/day: 3 00    Years: 30 00    Pack years: 90 00   Smokeless Tobacco Never Used   Tobacco Comment    Quit        Tobacco Use Intervention: Referral to tobacco expert:   Brief counseling by cardiac rehab professional  Date: 19    Blood pressure:    Restin/72 - 144/80   Exercise: 128/66 - 190/70 -variable response to exercise    Goals: consistent BP < 130/80, reduced dietary sodium <2300mg and consistent exercise >150 mins/wk  Education:  understanding HTN and CAD, low sodium diet and HTN, Education class:  Common Heart Medications and Education class: Understanding Heart Disease  Progressing: No - BP not well controlled, still swings   Plan: Monitor BP daily  Readiness to change: Preparation

## 2019-11-21 ENCOUNTER — APPOINTMENT (OUTPATIENT)
Dept: CARDIAC REHAB | Facility: CLINIC | Age: 74
End: 2019-11-21
Payer: MEDICARE

## 2019-11-29 DIAGNOSIS — Z79.4 TYPE 2 DIABETES MELLITUS WITH CHRONIC KIDNEY DISEASE, WITH LONG-TERM CURRENT USE OF INSULIN, UNSPECIFIED CKD STAGE (HCC): ICD-10-CM

## 2019-11-29 DIAGNOSIS — Z95.1 S/P CABG (CORONARY ARTERY BYPASS GRAFT): ICD-10-CM

## 2019-11-29 DIAGNOSIS — E11.22 TYPE 2 DIABETES MELLITUS WITH CHRONIC KIDNEY DISEASE, WITH LONG-TERM CURRENT USE OF INSULIN, UNSPECIFIED CKD STAGE (HCC): ICD-10-CM

## 2019-11-29 DIAGNOSIS — I25.10 TRIPLE VESSEL CORONARY ARTERY DISEASE: ICD-10-CM

## 2019-11-29 NOTE — TELEPHONE ENCOUNTER
Reviewed blood sugar log, blood sugars are in usually 150-200 range  Please inform patient to increase toujeo to 66 units   Continue Humalog 20 units with meals +Scale  He should keep carbohydrate consistent with meals  For itching he should make appointment with his primary care physician    Andrew Gavin MD

## 2019-11-29 NOTE — TELEPHONE ENCOUNTER
Patient stopped by to drop off Bs log and to let you know he has had some itching across his chest for last 3 weeks  Asked if he had started any new meds? He said no   I referred him to PCP but said I would let you know     Blood sugar log    toujeo 64u qhs  humalog 20u tid +scale

## 2019-12-03 PROBLEM — D62 POSTOPERATIVE ANEMIA DUE TO ACUTE BLOOD LOSS: Status: RESOLVED | Noted: 2019-06-23 | Resolved: 2019-12-03

## 2019-12-04 ENCOUNTER — OFFICE VISIT (OUTPATIENT)
Dept: FAMILY MEDICINE CLINIC | Facility: OTHER | Age: 74
End: 2019-12-04
Payer: MEDICARE

## 2019-12-04 VITALS
HEIGHT: 71 IN | TEMPERATURE: 98.6 F | SYSTOLIC BLOOD PRESSURE: 128 MMHG | WEIGHT: 246.5 LBS | OXYGEN SATURATION: 98 % | BODY MASS INDEX: 34.51 KG/M2 | DIASTOLIC BLOOD PRESSURE: 68 MMHG | HEART RATE: 67 BPM

## 2019-12-04 DIAGNOSIS — R13.19 ESOPHAGEAL DYSPHAGIA: ICD-10-CM

## 2019-12-04 DIAGNOSIS — Z79.4 TYPE 2 DIABETES MELLITUS WITH STAGE 4 CHRONIC KIDNEY DISEASE, WITH LONG-TERM CURRENT USE OF INSULIN (HCC): ICD-10-CM

## 2019-12-04 DIAGNOSIS — N18.4 TYPE 2 DIABETES MELLITUS WITH STAGE 4 CHRONIC KIDNEY DISEASE, WITH LONG-TERM CURRENT USE OF INSULIN (HCC): ICD-10-CM

## 2019-12-04 DIAGNOSIS — G62.9 NEUROPATHY: Primary | ICD-10-CM

## 2019-12-04 DIAGNOSIS — E11.22 TYPE 2 DIABETES MELLITUS WITH STAGE 4 CHRONIC KIDNEY DISEASE, WITH LONG-TERM CURRENT USE OF INSULIN (HCC): ICD-10-CM

## 2019-12-04 PROCEDURE — 99213 OFFICE O/P EST LOW 20 MIN: CPT | Performed by: FAMILY MEDICINE

## 2019-12-04 RX ORDER — GABAPENTIN 100 MG/1
300 CAPSULE ORAL
Qty: 90 CAPSULE | Refills: 0 | Status: SHIPPED | OUTPATIENT
Start: 2019-12-04 | End: 2020-01-02 | Stop reason: SDUPTHER

## 2019-12-04 NOTE — PROGRESS NOTES
Assessment/Plan:     Diagnoses and all orders for this visit:    Type 2 diabetes mellitus with stage 4 chronic kidney disease, with long-term current use of insulin ([facility])  -      Diabetes Eye Exam    Neuropathy  -     gabapentin (NEURONTIN) 100 mg capsule; Take 3 capsules (300 mg total) by mouth daily at bedtime  -     CK (with reflex to MB); Future  -     Sedimentation rate, automated; Future  -     Ambulatory referral to Neurology; Future        -     Discussed with patient that pain may be neuropathic, hence will increase gabapentin to 200mg x 1 week and then up to 300mg  Will obtain further lab work to r/o myopathy  Will refer to Neurology, for further management of symptoms  Subjective:      Patient ID: Ely Peñaloza is a 68 y o  male  Patient notes sharps pain in the precordial area, which has been present for the past 4 months  Patient describes the pain as an "internal burning under his skin" and rates the pain a  9/10  The patient notes the pain radiates up the axilla bilaterally, and down to just above the umbilicus  The patient states the pain episodes sometimes last up to an hour but does eventually subside  Patient has been evaluated extensively by different specialties such as Oncology, Dermatology and Allergy/Immunology  Patient was prescribed gabapentin 100mg however notes that after 1 week of using this medication, he has not experienced any relief  Of note, patient underwent a quadruple bypass surgery in 06/2019  Consulting specialists noting that patient may be experiencing paresthesias secondary to recent surgery  The following portions of the patient's history were reviewed and updated as appropriate: allergies, current medications, past family history, past medical history, past social history, past surgical history and problem list     Review of Systems   Constitutional: Negative for chills and fever  Eyes: Negative for visual disturbance     Respiratory: Negative for chest tightness and shortness of breath  Gastrointestinal: Negative for nausea and vomiting  Musculoskeletal: Negative for myalgias  Skin: Negative for rash  Allergic/Immunologic: Negative for environmental allergies and food allergies  Objective:      /68 (BP Location: Left arm, Patient Position: Sitting, Cuff Size: Large)   Pulse 67   Temp 98 6 °F (37 °C) (Tympanic)   Ht 5' 11" (1 803 m)   Wt 112 kg (246 lb 8 oz)   SpO2 98%   BMI 34 38 kg/m²          Physical Exam   Constitutional: He is oriented to person, place, and time  He appears well-developed and well-nourished  HENT:   Head: Normocephalic and atraumatic  Nose: Nose normal    Eyes: Conjunctivae are normal    Neck: Normal range of motion  Neck supple  Cardiovascular: Normal rate, regular rhythm and normal heart sounds  Pulmonary/Chest: Effort normal and breath sounds normal    Abdominal: Soft  Bowel sounds are normal    Musculoskeletal: Normal range of motion  Arms:  Neurological: He is alert and oriented to person, place, and time  Skin: Skin is warm and dry  Psychiatric: He has a normal mood and affect  The patient indicates understanding of these issues and agrees with the plan    Clemente Da Silva MD

## 2019-12-06 ENCOUNTER — TRANSCRIBE ORDERS (OUTPATIENT)
Dept: LAB | Facility: CLINIC | Age: 74
End: 2019-12-06

## 2019-12-06 ENCOUNTER — APPOINTMENT (OUTPATIENT)
Dept: LAB | Facility: CLINIC | Age: 74
End: 2019-12-06
Payer: MEDICARE

## 2019-12-06 DIAGNOSIS — E11.22 TYPE 2 DIABETES MELLITUS WITH STAGE 4 CHRONIC KIDNEY DISEASE, WITH LONG-TERM CURRENT USE OF INSULIN (HCC): Primary | ICD-10-CM

## 2019-12-06 DIAGNOSIS — Z79.4 TYPE 2 DIABETES MELLITUS WITH STAGE 4 CHRONIC KIDNEY DISEASE, WITH LONG-TERM CURRENT USE OF INSULIN (HCC): Primary | ICD-10-CM

## 2019-12-06 DIAGNOSIS — I10 ESSENTIAL HYPERTENSION: ICD-10-CM

## 2019-12-06 DIAGNOSIS — N18.4 TYPE 2 DIABETES MELLITUS WITH STAGE 4 CHRONIC KIDNEY DISEASE, WITH LONG-TERM CURRENT USE OF INSULIN (HCC): ICD-10-CM

## 2019-12-06 DIAGNOSIS — G62.9 NEUROPATHY: ICD-10-CM

## 2019-12-06 DIAGNOSIS — Z11.59 NEED FOR HEPATITIS C SCREENING TEST: ICD-10-CM

## 2019-12-06 DIAGNOSIS — N18.30 CHRONIC KIDNEY DISEASE, STAGE III (MODERATE) (HCC): ICD-10-CM

## 2019-12-06 DIAGNOSIS — E78.2 MIXED HYPERLIPIDEMIA: ICD-10-CM

## 2019-12-06 DIAGNOSIS — N18.4 TYPE 2 DIABETES MELLITUS WITH STAGE 4 CHRONIC KIDNEY DISEASE, WITH LONG-TERM CURRENT USE OF INSULIN (HCC): Primary | ICD-10-CM

## 2019-12-06 DIAGNOSIS — E11.22 TYPE 2 DIABETES MELLITUS WITH STAGE 4 CHRONIC KIDNEY DISEASE, WITH LONG-TERM CURRENT USE OF INSULIN (HCC): ICD-10-CM

## 2019-12-06 DIAGNOSIS — Z79.4 TYPE 2 DIABETES MELLITUS WITH STAGE 4 CHRONIC KIDNEY DISEASE, WITH LONG-TERM CURRENT USE OF INSULIN (HCC): ICD-10-CM

## 2019-12-06 LAB
ALBUMIN SERPL BCP-MCNC: 3.3 G/DL (ref 3.5–5)
ALP SERPL-CCNC: 97 U/L (ref 46–116)
ALT SERPL W P-5'-P-CCNC: 21 U/L (ref 12–78)
ANION GAP SERPL CALCULATED.3IONS-SCNC: 8 MMOL/L (ref 4–13)
AST SERPL W P-5'-P-CCNC: 9 U/L (ref 5–45)
BASOPHILS # BLD AUTO: 0.04 THOUSANDS/ΜL (ref 0–0.1)
BASOPHILS NFR BLD AUTO: 1 % (ref 0–1)
BILIRUB SERPL-MCNC: 0.55 MG/DL (ref 0.2–1)
BUN SERPL-MCNC: 41 MG/DL (ref 5–25)
CALCIUM SERPL-MCNC: 8.8 MG/DL (ref 8.3–10.1)
CHLORIDE SERPL-SCNC: 104 MMOL/L (ref 100–108)
CHOLEST SERPL-MCNC: 132 MG/DL (ref 50–200)
CK MB SERPL-MCNC: 1.6 % (ref 0–2.5)
CK MB SERPL-MCNC: 3.7 NG/ML (ref 0–5)
CK SERPL-CCNC: 234 U/L (ref 39–308)
CO2 SERPL-SCNC: 28 MMOL/L (ref 21–32)
CREAT SERPL-MCNC: 1.87 MG/DL (ref 0.6–1.3)
EOSINOPHIL # BLD AUTO: 0.34 THOUSAND/ΜL (ref 0–0.61)
EOSINOPHIL NFR BLD AUTO: 4 % (ref 0–6)
ERYTHROCYTE [DISTWIDTH] IN BLOOD BY AUTOMATED COUNT: 13.7 % (ref 11.6–15.1)
ERYTHROCYTE [SEDIMENTATION RATE] IN BLOOD: 20 MM/HOUR (ref 0–10)
GFR SERPL CREATININE-BSD FRML MDRD: 35 ML/MIN/1.73SQ M
GLUCOSE P FAST SERPL-MCNC: 187 MG/DL (ref 65–99)
HCT VFR BLD AUTO: 37 % (ref 36.5–49.3)
HCV AB SER QL: NORMAL
HDLC SERPL-MCNC: 48 MG/DL
HGB BLD-MCNC: 11.8 G/DL (ref 12–17)
IMM GRANULOCYTES # BLD AUTO: 0.04 THOUSAND/UL (ref 0–0.2)
IMM GRANULOCYTES NFR BLD AUTO: 1 % (ref 0–2)
LDLC SERPL CALC-MCNC: 69 MG/DL (ref 0–100)
LYMPHOCYTES # BLD AUTO: 1.09 THOUSANDS/ΜL (ref 0.6–4.47)
LYMPHOCYTES NFR BLD AUTO: 14 % (ref 14–44)
MCH RBC QN AUTO: 29.5 PG (ref 26.8–34.3)
MCHC RBC AUTO-ENTMCNC: 31.9 G/DL (ref 31.4–37.4)
MCV RBC AUTO: 93 FL (ref 82–98)
MONOCYTES # BLD AUTO: 0.73 THOUSAND/ΜL (ref 0.17–1.22)
MONOCYTES NFR BLD AUTO: 9 % (ref 4–12)
NEUTROPHILS # BLD AUTO: 5.73 THOUSANDS/ΜL (ref 1.85–7.62)
NEUTS SEG NFR BLD AUTO: 71 % (ref 43–75)
NONHDLC SERPL-MCNC: 84 MG/DL
NRBC BLD AUTO-RTO: 0 /100 WBCS
PLATELET # BLD AUTO: 209 THOUSANDS/UL (ref 149–390)
PMV BLD AUTO: 11.2 FL (ref 8.9–12.7)
POTASSIUM SERPL-SCNC: 3.9 MMOL/L (ref 3.5–5.3)
PROT SERPL-MCNC: 7 G/DL (ref 6.4–8.2)
RBC # BLD AUTO: 4 MILLION/UL (ref 3.88–5.62)
SODIUM SERPL-SCNC: 140 MMOL/L (ref 136–145)
T4 FREE SERPL-MCNC: 0.91 NG/DL (ref 0.76–1.46)
TRIGL SERPL-MCNC: 75 MG/DL
TSH SERPL DL<=0.05 MIU/L-ACNC: 3.95 UIU/ML (ref 0.36–3.74)
WBC # BLD AUTO: 7.97 THOUSAND/UL (ref 4.31–10.16)

## 2019-12-06 PROCEDURE — 84443 ASSAY THYROID STIM HORMONE: CPT

## 2019-12-06 PROCEDURE — 85025 COMPLETE CBC W/AUTO DIFF WBC: CPT

## 2019-12-06 PROCEDURE — 85652 RBC SED RATE AUTOMATED: CPT

## 2019-12-06 PROCEDURE — 80061 LIPID PANEL: CPT

## 2019-12-06 PROCEDURE — 84439 ASSAY OF FREE THYROXINE: CPT

## 2019-12-06 PROCEDURE — 36415 COLL VENOUS BLD VENIPUNCTURE: CPT

## 2019-12-06 PROCEDURE — 80053 COMPREHEN METABOLIC PANEL: CPT

## 2019-12-06 PROCEDURE — 82553 CREATINE MB FRACTION: CPT

## 2019-12-06 PROCEDURE — 82550 ASSAY OF CK (CPK): CPT

## 2019-12-06 PROCEDURE — 86803 HEPATITIS C AB TEST: CPT

## 2019-12-09 LAB
LEFT EYE DIABETIC RETINOPATHY: NORMAL
RIGHT EYE DIABETIC RETINOPATHY: NORMAL

## 2019-12-10 ENCOUNTER — OFFICE VISIT (OUTPATIENT)
Dept: FAMILY MEDICINE CLINIC | Facility: OTHER | Age: 74
End: 2019-12-10
Payer: MEDICARE

## 2019-12-10 VITALS
TEMPERATURE: 99 F | WEIGHT: 251 LBS | OXYGEN SATURATION: 98 % | HEIGHT: 71 IN | DIASTOLIC BLOOD PRESSURE: 64 MMHG | SYSTOLIC BLOOD PRESSURE: 112 MMHG | BODY MASS INDEX: 35.14 KG/M2 | HEART RATE: 64 BPM

## 2019-12-10 DIAGNOSIS — Z11.4 SCREENING FOR HIV (HUMAN IMMUNODEFICIENCY VIRUS): ICD-10-CM

## 2019-12-10 DIAGNOSIS — Z12.5 SCREENING FOR PROSTATE CANCER: ICD-10-CM

## 2019-12-10 DIAGNOSIS — R79.89 ABNORMAL THYROID BLOOD TEST: Primary | ICD-10-CM

## 2019-12-10 DIAGNOSIS — E08.21 DIABETES MELLITUS DUE TO UNDERLYING CONDITION WITH DIABETIC NEPHROPATHY, WITH LONG-TERM CURRENT USE OF INSULIN (HCC): ICD-10-CM

## 2019-12-10 DIAGNOSIS — Z13.6 SCREENING FOR AAA (ABDOMINAL AORTIC ANEURYSM): ICD-10-CM

## 2019-12-10 DIAGNOSIS — Z00.00 MEDICARE ANNUAL WELLNESS VISIT, SUBSEQUENT: ICD-10-CM

## 2019-12-10 DIAGNOSIS — Z79.4 DIABETES MELLITUS DUE TO UNDERLYING CONDITION WITH DIABETIC NEPHROPATHY, WITH LONG-TERM CURRENT USE OF INSULIN (HCC): ICD-10-CM

## 2019-12-10 PROCEDURE — G0439 PPPS, SUBSEQ VISIT: HCPCS | Performed by: FAMILY MEDICINE

## 2019-12-10 NOTE — PROGRESS NOTES
Assessment and Plan:     Problem List Items Addressed This Visit     None      Visit Diagnoses     Abnormal thyroid blood test    -  Primary    Relevant Orders: Will recheck labs and determine the course of therapy  Follow up in 3 months  TSH, 3rd generation with Free T4 reflex    Comprehensive metabolic panel    CBC and differential    Medicare annual wellness visit, subsequent        Screening for HIV (human immunodeficiency virus)        Screening for prostate cancer        Relevant Orders    PSA, Total Screen    Screening for AAA (abdominal aortic aneurysm)        Relevant Orders    US abdominal aorta screening aaa    Diabetes mellitus due to underlying condition with diabetic nephropathy, with long-term current use of insulin ([address] )         Relevant Orders :  Labs in chart for A1C and diabetes related to be done in few months  Medication is changed to 20 units novolog + ISS with meals and Toujeo 66 units at bedtime  He is advised to cut down on simple carbs and to eat more fresh vegetables and exercise regularly  FU routine      TSH, 3rd generation with Free T4 reflex           Preventive health issues were discussed with patient, and age appropriate screening tests were ordered as noted in patient's After Visit Summary  Personalized health advice and appropriate referrals for health education or preventive services given if needed, as noted in patient's After Visit Summary       History of Present Illness:     Patient presents for Medicare Annual Wellness visit    Patient Care Team:  Reva Dick MD as PCP - General (Family Medicine)  Levy Valentin MD as PCP - Endocrinology (Endocrinology)  Lacey Bassett MD (Nephrology)  Jeffery Contreras DO (Internal Medicine)  [facility] (Dermatology)  ELEAZAR Caballero (Dermatology)  Jamila Fraire MD as Surgeon (Surgical Oncology)  Reuben Curtis MD (Cardiology)     Problem List:     Patient Active Problem List   Diagnosis    CKD (chronic kidney disease), stage IV (HCC)    Benign hypertension with chronic kidney disease, stage IV (HCC)    Chronic kidney disease-mineral and bone disorder    Type II or unspecified type diabetes mellitus without mention of complication, uncontrolled    Bilateral lower extremity edema    Sleep apnea    Triple vessel coronary artery disease    Hyperlipidemia    S/P CABG (coronary artery bypass graft)    Acute postoperative pulmonary insufficiency (HCC)    Blood loss anemia    Other constipation    Iron deficiency anemia due to chronic blood loss    Albuminuria    Esophageal dysphagia    History of colonic polyps    Lumbar back pain    Lumbar discogenic pain syndrome    Obesity, unspecified    Pain of lower extremity    Trigger finger of left hand    Spondylolisthesis    Spinal stenosis    Vitamin D deficiency    Lymphadenopathy      Past Medical and Surgical History:     Past Medical History:   Diagnosis Date    Diabetes mellitus ([facility] [address] )     Hyperlipidemia     Hypertension     Renal disorder      Past Surgical History:   Procedure Laterality Date    BACK SURGERY      CORONARY ARTERY BYPASS GRAFT      quad    GALLBLADDER SURGERY      HERNIA REPAIR      [place] CABG, ARTERY-VEIN, FOUR N/A 6/21/2019    Procedure: CORONARY ARTERY BYPASS GRAFT (CABG) 4 VESSELS WITH SVG TO PDA, OM, DIAGONAL AND LIMA TO LAD; RIGHT LEG EVH;  Surgeon: Asim Acuna MD;  Location: [facility];  Service: Cardiac Surgery      Family History:     Family History   Problem Relation Age of Onset    Cancer Mother     Cancer Father       Social History:     Social History     Socioeconomic History    Marital status: /Civil Union     Spouse name: None    Number of children: None    Years of education: None    Highest education level: None   Occupational History    None   Social Needs    Financial resource strain: None    Food insecurity:     Worry: None     Inability: None    Transportation needs: Medical: None     Non-medical: None   Tobacco Use    Smoking status: Former Smoker     Packs/day: 3 00     Years: 30 00     Pack years: 90 00    Smokeless tobacco: Never Used    Tobacco comment: Quit 1991   Substance and Sexual Activity    Alcohol use: Not Currently     Comment: 1 drink every 6 months      Drug use: Never    Sexual activity: None   Lifestyle    Physical activity:     Days per week: None     Minutes per session: None    Stress: None   Relationships    Social connections:     Talks on phone: None     Gets together: None     Attends Christian service: None     Active member of club or organization: None     Attends meetings of clubs or organizations: None     Relationship status: None    Intimate partner violence:     Fear of current or ex partner: None     Emotionally abused: None     Physically abused: None     Forced sexual activity: None   Other Topics Concern    None   Social History Narrative    None       Medications and Allergies:     Current Outpatient Medications   Medication Sig Dispense Refill    Ascorbic Acid (VITAMIN C) 1000 MG tablet Take 1,000 mg by mouth daily      aspirin 325 mg tablet Take 1 tablet (325 mg total) by mouth daily 100 tablet 0    atorvastatin (LIPITOR) 80 mg tablet Take 1 tablet (80 mg total) by mouth daily with dinner 90 tablet 3    cholecalciferol (VITAMIN D3) 1,000 units tablet Take 1 tablet (1,000 Units total) by mouth daily 1 tablet 1    Continuous Blood Gluc  (FREESTYLE JOHANNE READER) MAMI Use device to scan blood glucose at least 4 times a day 1 Device 0    Continuous Blood Gluc Sensor (FREESTYLE JOHANNE SENSOR SYSTEM) MISC Apply sensor every 14 days to check blood glucose at least 4 times a day 6 each 1    cyanocobalamin (VITAMIN B-12) 500 mcg tablet Take 500 mcg by mouth daily      docusate sodium (COLACE) 100 mg capsule Take 1 capsule (100 mg total) by mouth 2 (two) times a day 60 capsule 1    Doxepin HCl (SILENOR) 6 MG TABS Take 6 mg by mouth as needed       finasteride (PROSCAR) 5 mg tablet Take 5 mg by mouth daily      gabapentin (NEURONTIN) 100 mg capsule Take 3 capsules (300 mg total) by mouth daily at bedtime 90 capsule 0    Insulin Glargine (TOUJEO) 300 units/mL CONCETRATED U-300 injection pen Inject 66 Units under the skin daily at bedtime 3 pen     insulin lispro (HUMALOG KWIKPEN) 100 units/mL injection pen Inject 20 Units under the skin 3 (three) times a day with meals plus scale (using up to 80 units a day) 30 pen 1    latanoprost (XALATAN) 0 005 % ophthalmic solution 1 drop daily at bedtime      metoprolol tartrate (LOPRESSOR) 25 mg tablet Take 1 tablet (25 mg total) by mouth every 12 (twelve) hours 180 tablet 3    omeprazole (PriLOSEC) 40 MG capsule Take 40 mg by mouth daily      tamsulosin (FLOMAX) 0 4 mg Take 1 capsule (0 4 mg total) by mouth daily with dinner 90 capsule 3    torsemide (DEMADEX) 20 mg tablet Take 1 tablet (20 mg total) by mouth daily 90 tablet 3    ferrous sulfate 325 (65 Fe) mg tablet Take 1 tablet (325 mg total) by mouth daily with breakfast for 90 days (Patient not taking: Reported on 10/21/2019) 90 tablet 0     No current facility-administered medications for this visit  No Known Allergies   Immunizations:     Immunization History   Administered Date(s) Administered    INFLUENZA 09/18/2018    Influenza Split High Dose Preservative Free IM 09/18/2018, 10/18/2019    Pneumococcal Conjugate 13-Valent 09/05/2019, 10/18/2019    Pneumococcal Polysaccharide PPV23 09/27/2012    Tdap 05/29/2013      Health Maintenance:         Topic Date Due    CRC Screening: Colonoscopy  03/12/2029    Hepatitis C Screening  Completed     There are no preventive care reminders to display for this patient     Medicare Health Risk Assessment:     /64 (BP Location: Left arm, Patient Position: Sitting, Cuff Size: Large)   Pulse 64   Temp 99 °F (37 2 °C) (Tympanic)   Ht 5' 11" (1 803 m)   Wt 114 kg (251 lb) SpO2 98%   BMI 35 01 kg/m²      Vamshi Davis is here for his Subsequent Wellness visit  Health Risk Assessment:   Patient rates overall health as very good  Patient feels that their physical health rating is much better  Eyesight was rated as same  Hearing was rated as same  Patient feels that their emotional and mental health rating is slightly better  Pain experienced in the last 7 days has been some  Patient's pain rating has been 6/10  Depression Screening:   PHQ-2 Score: 0      Fall Risk Screening: In the past year, patient has experienced: no history of falling in past year      Home Safety:  Patient does not have trouble with stairs inside or outside of their home  Patient has working smoke alarms and has working carbon monoxide detector  Home safety hazards include: none  Nutrition:   Current diet is Diabetic and No Added Salt  Medications:   Patient is currently taking over-the-counter supplements  OTC medications include: see medication list  Patient is able to manage medications  Activities of Daily Living (ADLs)/Instrumental Activities of Daily Living (IADLs):   Walk and transfer into and out of bed and chair?: Yes  Dress and groom yourself?: Yes    Bathe or shower yourself?: Yes    Feed yourself? Yes  Do your laundry/housekeeping?: Yes  Manage your money, pay your bills and track your expenses?: Yes  Make your own meals?: Yes    Do your own shopping?: Yes    Previous Hospitalizations:   Any hospitalizations or ED visits within the last 12 months?: Yes    How many hospitalizations have you had in the last year?: 1-2    Advance Care Planning:   Living will: Yes    Durable POA for healthcare:  Yes    Advanced directive: Yes      PREVENTIVE SCREENINGS      Cardiovascular Screening:    General: Screening Not Indicated and History Lipid Disorder      Diabetes Screening:     General: Screening Not Indicated and History Diabetes      Colorectal Cancer Screening:     General: Screening Current Abdominal Aortic Aneurysm (AAA) Screening:    Risk factors include: age between 73-69 yo and tobacco use        Lung Cancer Screening:     General: Screening Current      Hepatitis C Screening:    General: Screening Current  Physical Exam   Constitutional: he  is oriented to person, place, and time  he   appears well-developed and well-nourished  No distress  HENT:   Head: Normocephalic and atraumatic  Neck: Normal range of motion  Neck supple  Cardiovascular: Normal rate, regular rhythm and normal heart sounds  No murmur heard  Pulmonary/Chest: Effort normal and breath sounds normal  No respiratory distress  She has no wheezes  Abdominal: Soft  Bowel sounds are normal  he  exhibits no distension  There is no tenderness  Musculoskeletal: Normal range of motion  he exhibits no edema or tenderness  Neurological: he   is alert and oriented to person, place, and time  he  has normal reflexes  Skin: Skin is warm and dry  No rash noted  he  is not diaphoretic  No pallor  Psychiatric: he  has a normal mood and affect  him  behavior is normal    Nursing note and vitals reviewed    Lab Results   Component Value Date    WBC 7 97 12/06/2019    HGB 11 8 (L) 12/06/2019    HCT 37 0 12/06/2019     12/06/2019    TRIG 75 12/06/2019    HDL 48 12/06/2019    ALT 21 12/06/2019    AST 9 12/06/2019    K 3 9 12/06/2019     12/06/2019    CREATININE 1 87 (H) 12/06/2019    BUN 41 (H) 12/06/2019    CO2 28 12/06/2019    INR 1 48 (H) 06/21/2019    GLUF 187 (H) 12/06/2019    HGBA1C 8 8 (H) 10/14/2019     Lab Results   Component Value Date    FNS4MPEUQGMN 3 946 (H) 12/06/2019           Magdalena Smiley MD

## 2019-12-10 NOTE — PATIENT INSTRUCTIONS
Medicare Preventive Visit Patient Instructions  Thank you for completing your Welcome to Medicare Visit or Medicare Annual Wellness Visit today  Your next wellness visit will be due in one year (12/10/2020)  The screening/preventive services that you may require over the next 5-10 years are detailed below  Some tests may not apply to you based off risk factors and/or age  Screening tests ordered at today's visit but not completed yet may show as past due  Also, please note that scanned in results may not display below  Preventive Screenings:  Service Recommendations Previous Testing/Comments   Colorectal Cancer Screening  · Colonoscopy    · Fecal Occult Blood Test (FOBT)/Fecal Immunochemical Test (FIT)  · Fecal DNA/Cologuard Test  · Flexible Sigmoidoscopy Age: 54-65 years old   Colonoscopy: every 10 years (May be performed more frequently if at higher risk)  OR  FOBT/FIT: every 1 year  OR  Cologuard: every 3 years  OR  Sigmoidoscopy: every 5 years  Screening may be recommended earlier than age 48 if at higher risk for colorectal cancer  Also, an individualized decision between you and your healthcare provider will decide whether screening between the ages of 74-80 would be appropriate   Colonoscopy: 03/12/2019  FOBT/FIT: 07/08/2019  Cologuard: Not on file  Sigmoidoscopy: Not on file    Screening Current     Prostate Cancer Screening Individualized decision between patient and health care provider in men between ages of 53-78   Medicare will cover every 12 months beginning on the day after your 50th birthday PSA: No results in last 5 years          Hepatitis C Screening Once for adults born between 1945 and 1965  More frequently in patients at high risk for Hepatitis C Hep C Antibody: 12/06/2019    Screening Current   Diabetes Screening 1-2 times per year if you're at risk for diabetes or have pre-diabetes Fasting glucose: 187 mg/dL   A1C: 8 8 % of total Hgb    Screening Not Indicated  History Diabetes Cholesterol Screening Once every 5 years if you don't have a lipid disorder  May order more often based on risk factors  Lipid panel: 12/06/2019    Screening Not Indicated  History Lipid Disorder      Other Preventive Screenings Covered by Medicare:  1  Abdominal Aortic Aneurysm (AAA) Screening: covered once if your at risk  You're considered to be at risk if you have a family history of AAA or a male between the age of 73-68 who smoking at least 100 cigarettes in your lifetime  2  Lung Cancer Screening: covers low dose CT scan once per year if you meet all of the following conditions: (1) Age 50-69; (2) No signs or symptoms of lung cancer; (3) Current smoker or have quit smoking within the last 15 years; (4) You have a tobacco smoking history of at least 30 pack years (packs per day x number of years you smoked); (5) You get a written order from a healthcare provider  3  Glaucoma Screening: covered annually if you're considered high risk: (1) You have diabetes OR (2) Family history of glaucoma OR (3)  aged 48 and older OR (3)  American aged 72 and older  3  Osteoporosis Screening: covered every 2 years if you meet one of the following conditions: (1) Have a vertebral abnormality; (2) On glucocorticoid therapy for more than 3 months; (3) Have primary hyperparathyroidism; (4) On osteoporosis medications and need to assess response to drug therapy  5  HIV Screening: covered annually if you're between the age of 12-76  Also covered annually if you are younger than 13 and older than 72 with risk factors for HIV infection  For pregnant patients, it is covered up to 3 times per pregnancy      Immunizations:  Immunization Recommendations   Influenza Vaccine Annual influenza vaccination during flu season is recommended for all persons aged >= 6 months who do not have contraindications   Pneumococcal Vaccine (Prevnar and Pneumovax)  * Prevnar = PCV13  * Pneumovax = PPSV23 Adults 25-60 years old: 1-3 doses may be recommended based on certain risk factors  Adults 72 years old: Prevnar (PCV13) vaccine recommended followed by Pneumovax (PPSV23) vaccine  If already received PPSV23 since turning 65, then PCV13 recommended at least one year after PPSV23 dose  Hepatitis B Vaccine 3 dose series if at intermediate or high risk (ex: diabetes, end stage renal disease, liver disease)   Tetanus (Td) Vaccine - COST NOT COVERED BY MEDICARE PART B Following completion of primary series, a booster dose should be given every 10 years to maintain immunity against tetanus  Td may also be given as tetanus wound prophylaxis  Tdap Vaccine - COST NOT COVERED BY MEDICARE PART B Recommended at least once for all adults  For pregnant patients, recommended with each pregnancy  Shingles Vaccine (Shingrix) - COST NOT COVERED BY MEDICARE PART B  2 shot series recommended in those aged 48 and above     Health Maintenance Due:      Topic Date Due    CRC Screening: Colonoscopy  03/12/2029    Hepatitis C Screening  Completed     Immunizations Due:  There are no preventive care reminders to display for this patient  Advance Directives   What are advance directives? Advance directives are legal documents that state your wishes and plans for medical care  These plans are made ahead of time in case you lose your ability to make decisions for yourself  Advance directives can apply to any medical decision, such as the treatments you want, and if you want to donate organs  What are the types of advance directives? There are many types of advance directives, and each state has rules about how to use them  You may choose a combination of any of the following:  · Living will: This is a written record of the treatment you want  You can also choose which treatments you do not want, which to limit, and which to stop at a certain time  This includes surgery, medicine, IV fluid, and tube feedings     · Durable power of  for healthcare [facility]): This is a written record that states who you want to make healthcare choices for you when you are unable to make them for yourself  This person, called a proxy, is usually a family member or a friend  You may choose more than 1 proxy  · Do not resuscitate (DNR) order:  A DNR order is used in case your heart stops beating or you stop breathing  It is a request not to have certain forms of treatment, such as CPR  A DNR order may be included in other types of advance directives  · Medical directive: This covers the care that you want if you are in a coma, near death, or unable to make decisions for yourself  You can list the treatments you want for each condition  Treatment may include pain medicine, surgery, blood transfusions, dialysis, IV or tube feedings, and a ventilator (breathing machine)  · Values history: This document has questions about your views, beliefs, and how you feel and think about life  This information can help others choose the care that you would choose  Why are advance directives important? An advance directive helps you control your care  Although spoken wishes may be used, it is better to have your wishes written down  Spoken wishes can be misunderstood, or not followed  Treatments may be given even if you do not want them  An advance directive may make it easier for your family to make difficult choices about your care  Weight Management   Why it is important to manage your weight:  Being overweight increases your risk of health conditions such as heart disease, high blood pressure, type 2 diabetes, and certain types of cancer  It can also increase your risk for osteoarthritis, sleep apnea, and other respiratory problems  Aim for a slow, steady weight loss  Even a small amount of weight loss can lower your risk of health problems  How to lose weight safely:  A safe and healthy way to lose weight is to eat fewer calories and get regular exercise   You can lose up about 1 pound a week by decreasing the number of calories you eat by 500 calories each day  Healthy meal plan for weight management:  A healthy meal plan includes a variety of foods, contains fewer calories, and helps you stay healthy  A healthy meal plan includes the following:  · Eat whole-grain foods more often  A healthy meal plan should contain fiber  Fiber is the part of grains, fruits, and vegetables that is not broken down by your body  Whole-grain foods are healthy and provide extra fiber in your diet  Some examples of whole-grain foods are whole-wheat breads and pastas, oatmeal, brown rice, and bulgur  · Eat a variety of vegetables every day  Include dark, leafy greens such as spinach, kale, saturnino greens, and mustard greens  Eat yellow and orange vegetables such as carrots, sweet potatoes, and winter squash  · Eat a variety of fruits every day  Choose fresh or canned fruit (canned in its own juice or light syrup) instead of juice  Fruit juice has very little or no fiber  · Eat low-fat dairy foods  Drink fat-free (skim) milk or 1% milk  Eat fat-free yogurt and low-fat cottage cheese  Try low-fat cheeses such as mozzarella and other reduced-fat cheeses  · Choose meat and other protein foods that are low in fat  Choose beans or other legumes such as split peas or lentils  Choose fish, skinless poultry (chicken or turkey), or lean cuts of red meat (beef or pork)  Before you cook meat or poultry, cut off any visible fat  · Use less fat and oil  Try baking foods instead of frying them  Add less fat, such as margarine, sour cream, regular salad dressing and mayonnaise to foods  Eat fewer high-fat foods  Some examples of high-fat foods include french fries, doughnuts, ice cream, and cakes  · Eat fewer sweets  Limit foods and drinks that are high in sugar  This includes candy, cookies, regular soda, and sweetened drinks  Exercise:  Exercise at least 30 minutes per day on most days of the week   Some examples of exercise include walking, biking, dancing, and swimming  You can also fit in more physical activity by taking the stairs instead of the elevator or parking farther away from stores  Ask your healthcare provider about the best exercise plan for you  © Copyright [employer] 2018 Information is for End User's use only and may not be sold, redistributed or otherwise used for commercial purposes  All illustrations and images included in CareNotes® are the copyrighted property of [employer]  or [facility] Preventive Visit Patient Instructions  Thank you for completing your Welcome to Medicare Visit or Medicare Annual Wellness Visit today  Your next wellness visit will be due in one year (12/10/2020)  The screening/preventive services that you may require over the next 5-10 years are detailed below  Some tests may not apply to you based off risk factors and/or age  Screening tests ordered at today's visit but not completed yet may show as past due  Also, please note that scanned in results may not display below  Preventive Screenings:  Service Recommendations Previous Testing/Comments   Colorectal Cancer Screening  · Colonoscopy    · Fecal Occult Blood Test (FOBT)/Fecal Immunochemical Test (FIT)  · Fecal DNA/Cologuard Test  · Flexible Sigmoidoscopy Age: 54-65 years old   Colonoscopy: every 10 years (May be performed more frequently if at higher risk)  OR  FOBT/FIT: every 1 year  OR  Cologuard: every 3 years  OR  Sigmoidoscopy: every 5 years  Screening may be recommended earlier than age 48 if at higher risk for colorectal cancer  Also, an individualized decision between you and your healthcare provider will decide whether screening between the ages of 74-80 would be appropriate   Colonoscopy: 03/12/2019  FOBT/FIT: 07/08/2019  Cologuard: Not on file  Sigmoidoscopy: Not on file    Screening Current     Prostate Cancer Screening Individualized decision between patient and health care provider in men between ages of 53-78   Medicare will cover every 12 months beginning on the day after your 50th birthday PSA: No results in last 5 years          Hepatitis C Screening Once for adults born between 1945 and 1965  More frequently in patients at high risk for Hepatitis C Hep C Antibody: 12/06/2019    Screening Current   Diabetes Screening 1-2 times per year if you're at risk for diabetes or have pre-diabetes Fasting glucose: 187 mg/dL   A1C: 8 8 % of total Hgb    Screening Not Indicated  History Diabetes   Cholesterol Screening Once every 5 years if you don't have a lipid disorder  May order more often based on risk factors  Lipid panel: 12/06/2019    Screening Not Indicated  History Lipid Disorder      Other Preventive Screenings Covered by Medicare:  6  Abdominal Aortic Aneurysm (AAA) Screening: covered once if your at risk  You're considered to be at risk if you have a family history of AAA or a male between the age of 73-68 who smoking at least 100 cigarettes in your lifetime  7  Lung Cancer Screening: covers low dose CT scan once per year if you meet all of the following conditions: (1) Age 50-69; (2) No signs or symptoms of lung cancer; (3) Current smoker or have quit smoking within the last 15 years; (4) You have a tobacco smoking history of at least 30 pack years (packs per day x number of years you smoked); (5) You get a written order from a healthcare provider  8  Glaucoma Screening: covered annually if you're considered high risk: (1) You have diabetes OR (2) Family history of glaucoma OR (3)  aged 48 and older OR (3)  American aged 72 and older  5  Osteoporosis Screening: covered every 2 years if you meet one of the following conditions: (1) Have a vertebral abnormality; (2) On glucocorticoid therapy for more than 3 months; (3) Have primary hyperparathyroidism; (4) On osteoporosis medications and need to assess response to drug therapy    10  HIV Screening: covered annually if you're between the age of 12-76  Also covered annually if you are younger than 13 and older than 72 with risk factors for HIV infection  For pregnant patients, it is covered up to 3 times per pregnancy  Immunizations:  Immunization Recommendations   Influenza Vaccine Annual influenza vaccination during flu season is recommended for all persons aged >= 6 months who do not have contraindications   Pneumococcal Vaccine (Prevnar and Pneumovax)  * Prevnar = PCV13  * Pneumovax = PPSV23 Adults 25-60 years old: 1-3 doses may be recommended based on certain risk factors  Adults 72 years old: Prevnar (PCV13) vaccine recommended followed by Pneumovax (PPSV23) vaccine  If already received PPSV23 since turning 65, then PCV13 recommended at least one year after PPSV23 dose  Hepatitis B Vaccine 3 dose series if at intermediate or high risk (ex: diabetes, end stage renal disease, liver disease)   Tetanus (Td) Vaccine - COST NOT COVERED BY MEDICARE PART B Following completion of primary series, a booster dose should be given every 10 years to maintain immunity against tetanus  Td may also be given as tetanus wound prophylaxis  Tdap Vaccine - COST NOT COVERED BY MEDICARE PART B Recommended at least once for all adults  For pregnant patients, recommended with each pregnancy  Shingles Vaccine (Shingrix) - COST NOT COVERED BY MEDICARE PART B  2 shot series recommended in those aged 48 and above     Health Maintenance Due:      Topic Date Due    CRC Screening: Colonoscopy  03/12/2029    Hepatitis C Screening  Completed     Immunizations Due:  There are no preventive care reminders to display for this patient  Advance Directives   What are advance directives? Advance directives are legal documents that state your wishes and plans for medical care  These plans are made ahead of time in case you lose your ability to make decisions for yourself   Advance directives can apply to any medical decision, such as the treatments you want, and if you want to donate organs  What are the types of advance directives? There are many types of advance directives, and each state has rules about how to use them  You may choose a combination of any of the following:  · Living will: This is a written record of the treatment you want  You can also choose which treatments you do not want, which to limit, and which to stop at a certain time  This includes surgery, medicine, IV fluid, and tube feedings  · Durable power of  for healthcare [facility] [place]): This is a written record that states who you want to make healthcare choices for you when you are unable to make them for yourself  This person, called a proxy, is usually a family member or a friend  You may choose more than 1 proxy  · Do not resuscitate (DNR) order:  A DNR order is used in case your heart stops beating or you stop breathing  It is a request not to have certain forms of treatment, such as CPR  A DNR order may be included in other types of advance directives  · Medical directive: This covers the care that you want if you are in a coma, near death, or unable to make decisions for yourself  You can list the treatments you want for each condition  Treatment may include pain medicine, surgery, blood transfusions, dialysis, IV or tube feedings, and a ventilator (breathing machine)  · Values history: This document has questions about your views, beliefs, and how you feel and think about life  This information can help others choose the care that you would choose  Why are advance directives important? An advance directive helps you control your care  Although spoken wishes may be used, it is better to have your wishes written down  Spoken wishes can be misunderstood, or not followed  Treatments may be given even if you do not want them  An advance directive may make it easier for your family to make difficult choices about your care     Weight Management   Why it is important to manage your weight:  Being overweight increases your risk of health conditions such as heart disease, high blood pressure, type 2 diabetes, and certain types of cancer  It can also increase your risk for osteoarthritis, sleep apnea, and other respiratory problems  Aim for a slow, steady weight loss  Even a small amount of weight loss can lower your risk of health problems  How to lose weight safely:  A safe and healthy way to lose weight is to eat fewer calories and get regular exercise  You can lose up about 1 pound a week by decreasing the number of calories you eat by 500 calories each day  Healthy meal plan for weight management:  A healthy meal plan includes a variety of foods, contains fewer calories, and helps you stay healthy  A healthy meal plan includes the following:  · Eat whole-grain foods more often  A healthy meal plan should contain fiber  Fiber is the part of grains, fruits, and vegetables that is not broken down by your body  Whole-grain foods are healthy and provide extra fiber in your diet  Some examples of whole-grain foods are whole-wheat breads and pastas, oatmeal, brown rice, and bulgur  · Eat a variety of vegetables every day  Include dark, leafy greens such as spinach, kale, saturnino greens, and mustard greens  Eat yellow and orange vegetables such as carrots, sweet potatoes, and winter squash  · Eat a variety of fruits every day  Choose fresh or canned fruit (canned in its own juice or light syrup) instead of juice  Fruit juice has very little or no fiber  · Eat low-fat dairy foods  Drink fat-free (skim) milk or 1% milk  Eat fat-free yogurt and low-fat cottage cheese  Try low-fat cheeses such as mozzarella and other reduced-fat cheeses  · Choose meat and other protein foods that are low in fat  Choose beans or other legumes such as split peas or lentils  Choose fish, skinless poultry (chicken or turkey), or lean cuts of red meat (beef or pork)   Before you cook meat or poultry, cut off any visible fat  · Use less fat and oil  Try baking foods instead of frying them  Add less fat, such as margarine, sour cream, regular salad dressing and mayonnaise to foods  Eat fewer high-fat foods  Some examples of high-fat foods include french fries, doughnuts, ice cream, and cakes  · Eat fewer sweets  Limit foods and drinks that are high in sugar  This includes candy, cookies, regular soda, and sweetened drinks  Exercise:  Exercise at least 30 minutes per day on most days of the week  Some examples of exercise include walking, biking, dancing, and swimming  You can also fit in more physical activity by taking the stairs instead of the elevator or parking farther away from stores  Ask your healthcare provider about the best exercise plan for you  © Copyright [employer] 2018 Information is for End User's use only and may not be sold, redistributed or otherwise used for commercial purposes  All illustrations and images included in CareNotes® are the copyrighted property of [employer]  or [facility] Preventive Visit Patient Instructions  Thank you for completing your Welcome to Medicare Visit or Medicare Annual Wellness Visit today  Your next wellness visit will be due in one year (12/10/2020)  The screening/preventive services that you may require over the next 5-10 years are detailed below  Some tests may not apply to you based off risk factors and/or age  Screening tests ordered at today's visit but not completed yet may show as past due  Also, please note that scanned in results may not display below    Preventive Screenings:  Service Recommendations Previous Testing/Comments   Colorectal Cancer Screening  · Colonoscopy    · Fecal Occult Blood Test (FOBT)/Fecal Immunochemical Test (FIT)  · Fecal DNA/Cologuard Test  · Flexible Sigmoidoscopy Age: 54-65 years old   Colonoscopy: every 10 years (May be performed more frequently if at higher risk)  OR  FOBT/FIT: every 1 year  OR  Cologuard: every 3 years  OR  Sigmoidoscopy: every 5 years  Screening may be recommended earlier than age 48 if at higher risk for colorectal cancer  Also, an individualized decision between you and your healthcare provider will decide whether screening between the ages of 74-80 would be appropriate  Colonoscopy: 03/12/2019  FOBT/FIT: 07/08/2019  Cologuard: Not on file  Sigmoidoscopy: Not on file    Screening Current     Prostate Cancer Screening Individualized decision between patient and health care provider in men between ages of 53-78   Medicare will cover every 12 months beginning on the day after your 50th birthday PSA: No results in last 5 years          Hepatitis C Screening Once for adults born between 1945 and 1965  More frequently in patients at high risk for Hepatitis C Hep C Antibody: 12/06/2019    Screening Current   Diabetes Screening 1-2 times per year if you're at risk for diabetes or have pre-diabetes Fasting glucose: 187 mg/dL   A1C: 8 8 % of total Hgb    Screening Not Indicated  History Diabetes   Cholesterol Screening Once every 5 years if you don't have a lipid disorder  May order more often based on risk factors  Lipid panel: 12/06/2019    Screening Not Indicated  History Lipid Disorder      Other Preventive Screenings Covered by Medicare:  11  Abdominal Aortic Aneurysm (AAA) Screening: covered once if your at risk  You're considered to be at risk if you have a family history of AAA or a male between the age of 73-68 who smoking at least 100 cigarettes in your lifetime  12  Lung Cancer Screening: covers low dose CT scan once per year if you meet all of the following conditions: (1) Age 50-69; (2) No signs or symptoms of lung cancer; (3) Current smoker or have quit smoking within the last 15 years; (4) You have a tobacco smoking history of at least 30 pack years (packs per day x number of years you smoked); (5) You get a written order from a healthcare provider    13  Glaucoma Screening: covered annually if you're considered high risk: (1) You have diabetes OR (2) Family history of glaucoma OR (3)  aged 48 and older OR (3)  American aged 72 and older  15  Osteoporosis Screening: covered every 2 years if you meet one of the following conditions: (1) Have a vertebral abnormality; (2) On glucocorticoid therapy for more than 3 months; (3) Have primary hyperparathyroidism; (4) On osteoporosis medications and need to assess response to drug therapy  15  HIV Screening: covered annually if you're between the age of 12-76  Also covered annually if you are younger than 13 and older than 72 with risk factors for HIV infection  For pregnant patients, it is covered up to 3 times per pregnancy  Immunizations:  Immunization Recommendations   Influenza Vaccine Annual influenza vaccination during flu season is recommended for all persons aged >= 6 months who do not have contraindications   Pneumococcal Vaccine (Prevnar and Pneumovax)  * Prevnar = PCV13  * Pneumovax = PPSV23 Adults 25-60 years old: 1-3 doses may be recommended based on certain risk factors  Adults 72 years old: Prevnar (PCV13) vaccine recommended followed by Pneumovax (PPSV23) vaccine  If already received PPSV23 since turning 65, then PCV13 recommended at least one year after PPSV23 dose  Hepatitis B Vaccine 3 dose series if at intermediate or high risk (ex: diabetes, end stage renal disease, liver disease)   Tetanus (Td) Vaccine - COST NOT COVERED BY MEDICARE PART B Following completion of primary series, a booster dose should be given every 10 years to maintain immunity against tetanus  Td may also be given as tetanus wound prophylaxis  Tdap Vaccine - COST NOT COVERED BY MEDICARE PART B Recommended at least once for all adults  For pregnant patients, recommended with each pregnancy     Shingles Vaccine (Shingrix) - COST NOT COVERED BY MEDICARE PART B  2 shot series recommended in those aged 48 and above Health Maintenance Due:      Topic Date Due    CRC Screening: Colonoscopy  03/12/2029    Hepatitis C Screening  Completed     Immunizations Due:  There are no preventive care reminders to display for this patient  Advance Directives   What are advance directives? Advance directives are legal documents that state your wishes and plans for medical care  These plans are made ahead of time in case you lose your ability to make decisions for yourself  Advance directives can apply to any medical decision, such as the treatments you want, and if you want to donate organs  What are the types of advance directives? There are many types of advance directives, and each state has rules about how to use them  You may choose a combination of any of the following:  · Living will: This is a written record of the treatment you want  You can also choose which treatments you do not want, which to limit, and which to stop at a certain time  This includes surgery, medicine, IV fluid, and tube feedings  · Durable power of  for healthcare [facility]): This is a written record that states who you want to make healthcare choices for you when you are unable to make them for yourself  This person, called a proxy, is usually a family member or a friend  You may choose more than 1 proxy  · Do not resuscitate (DNR) order:  A DNR order is used in case your heart stops beating or you stop breathing  It is a request not to have certain forms of treatment, such as CPR  A DNR order may be included in other types of advance directives  · Medical directive: This covers the care that you want if you are in a coma, near death, or unable to make decisions for yourself  You can list the treatments you want for each condition  Treatment may include pain medicine, surgery, blood transfusions, dialysis, IV or tube feedings, and a ventilator (breathing machine)  · Values history:   This document has questions about your views, beliefs, and how you feel and think about life  This information can help others choose the care that you would choose  Why are advance directives important? An advance directive helps you control your care  Although spoken wishes may be used, it is better to have your wishes written down  Spoken wishes can be misunderstood, or not followed  Treatments may be given even if you do not want them  An advance directive may make it easier for your family to make difficult choices about your care  Weight Management   Why it is important to manage your weight:  Being overweight increases your risk of health conditions such as heart disease, high blood pressure, type 2 diabetes, and certain types of cancer  It can also increase your risk for osteoarthritis, sleep apnea, and other respiratory problems  Aim for a slow, steady weight loss  Even a small amount of weight loss can lower your risk of health problems  How to lose weight safely:  A safe and healthy way to lose weight is to eat fewer calories and get regular exercise  You can lose up about 1 pound a week by decreasing the number of calories you eat by 500 calories each day  Healthy meal plan for weight management:  A healthy meal plan includes a variety of foods, contains fewer calories, and helps you stay healthy  A healthy meal plan includes the following:  · Eat whole-grain foods more often  A healthy meal plan should contain fiber  Fiber is the part of grains, fruits, and vegetables that is not broken down by your body  Whole-grain foods are healthy and provide extra fiber in your diet  Some examples of whole-grain foods are whole-wheat breads and pastas, oatmeal, brown rice, and bulgur  · Eat a variety of vegetables every day  Include dark, leafy greens such as spinach, kale, saturnino greens, and mustard greens  Eat yellow and orange vegetables such as carrots, sweet potatoes, and winter squash  · Eat a variety of fruits every day    Choose fresh or canned fruit (canned in its own juice or light syrup) instead of juice  Fruit juice has very little or no fiber  · Eat low-fat dairy foods  Drink fat-free (skim) milk or 1% milk  Eat fat-free yogurt and low-fat cottage cheese  Try low-fat cheeses such as mozzarella and other reduced-fat cheeses  · Choose meat and other protein foods that are low in fat  Choose beans or other legumes such as split peas or lentils  Choose fish, skinless poultry (chicken or turkey), or lean cuts of red meat (beef or pork)  Before you cook meat or poultry, cut off any visible fat  · Use less fat and oil  Try baking foods instead of frying them  Add less fat, such as margarine, sour cream, regular salad dressing and mayonnaise to foods  Eat fewer high-fat foods  Some examples of high-fat foods include french fries, doughnuts, ice cream, and cakes  · Eat fewer sweets  Limit foods and drinks that are high in sugar  This includes candy, cookies, regular soda, and sweetened drinks  Exercise:  Exercise at least 30 minutes per day on most days of the week  Some examples of exercise include walking, biking, dancing, and swimming  You can also fit in more physical activity by taking the stairs instead of the elevator or parking farther away from stores  Ask your healthcare provider about the best exercise plan for you  © Copyright [employer] 2018 Information is for End User's use only and may not be sold, redistributed or otherwise used for commercial purposes   All illustrations and images included in CareNotes® are the copyrighted property of A D A M , Inc  or [address] [place] [address] [facility]

## 2019-12-30 DIAGNOSIS — E11.65 UNCONTROLLED TYPE 2 DIABETES MELLITUS WITH HYPERGLYCEMIA (HCC): Primary | ICD-10-CM

## 2019-12-30 RX ORDER — INSULIN LISPRO 100 [IU]/ML
INJECTION, SOLUTION INTRAVENOUS; SUBCUTANEOUS
Qty: 25 PEN | Refills: 1 | Status: SHIPPED | OUTPATIENT
Start: 2019-12-30 | End: 2020-01-09 | Stop reason: DRUGHIGH

## 2019-12-30 NOTE — TELEPHONE ENCOUNTER
Pt called he is running low on his humalog pens uses 20 and the sliding scale( he said) and also his tradjenta using 66 units  Pt needs a 90 day and [facility]

## 2020-01-02 DIAGNOSIS — G62.9 NEUROPATHY: ICD-10-CM

## 2020-01-02 RX ORDER — GABAPENTIN 100 MG/1
300 CAPSULE ORAL
Qty: 90 CAPSULE | Refills: 0 | Status: SHIPPED | OUTPATIENT
Start: 2020-01-02 | End: 2020-01-02 | Stop reason: SDUPTHER

## 2020-01-03 RX ORDER — GABAPENTIN 100 MG/1
300 CAPSULE ORAL
Qty: 90 CAPSULE | Refills: 1 | Status: SHIPPED | OUTPATIENT
Start: 2020-01-03 | End: 2020-04-30 | Stop reason: SDUPTHER

## 2020-01-08 DIAGNOSIS — E11.65 UNCONTROLLED TYPE 2 DIABETES MELLITUS WITH HYPERGLYCEMIA (HCC): ICD-10-CM

## 2020-01-08 NOTE — TELEPHONE ENCOUNTER
Blood sugars are overall in 180-250 range  meds-  toujeo 64u HS  Humalog 20u TID + scale      Blood sugars are elevated after lunch and after dinner usually 75% times  Please inform patient to increase toujeo to 66 units   Increase Humalog to 24 units with lunch and 24 units with dinner plus scale  Continue Humalog 20 units with breakfast    Dmitriy Lentz MD

## 2020-01-09 RX ORDER — INSULIN LISPRO 100 [IU]/ML
INJECTION, SOLUTION INTRAVENOUS; SUBCUTANEOUS
Qty: 25 PEN | Refills: 1
Start: 2020-01-09 | End: 2020-01-30 | Stop reason: SDUPTHER

## 2020-01-09 NOTE — TELEPHONE ENCOUNTER
Spoke to pt  Pt said he is on toujeo 66u, not 64  Per Dr Whitten Slot he is to increase it to 68u     Humalog 20-24-24+scale  Med list updated  Pt understood changes

## 2020-01-14 DIAGNOSIS — E66.9 DIABETES MELLITUS TYPE 2 IN OBESE (HCC): Primary | ICD-10-CM

## 2020-01-14 DIAGNOSIS — E11.69 DIABETES MELLITUS TYPE 2 IN OBESE (HCC): Primary | ICD-10-CM

## 2020-01-14 NOTE — TELEPHONE ENCOUNTER
Pt called   He needs a new rx sent to [address] for his BD ultra fine  Short pen needles  Pt using 6 daily  And needs a 90 day rx

## 2020-01-21 ENCOUNTER — LAB (OUTPATIENT)
Dept: LAB | Facility: CLINIC | Age: 75
End: 2020-01-21
Payer: MEDICARE

## 2020-01-21 DIAGNOSIS — E55.9 VITAMIN D DEFICIENCY: ICD-10-CM

## 2020-01-21 DIAGNOSIS — N18.4 TYPE 2 DIABETES MELLITUS WITH STAGE 4 CHRONIC KIDNEY DISEASE, WITH LONG-TERM CURRENT USE OF INSULIN (HCC): ICD-10-CM

## 2020-01-21 DIAGNOSIS — Z79.4 TYPE 2 DIABETES MELLITUS WITH STAGE 4 CHRONIC KIDNEY DISEASE, WITH LONG-TERM CURRENT USE OF INSULIN (HCC): ICD-10-CM

## 2020-01-21 DIAGNOSIS — E11.22 TYPE 2 DIABETES MELLITUS WITH STAGE 4 CHRONIC KIDNEY DISEASE, WITH LONG-TERM CURRENT USE OF INSULIN (HCC): ICD-10-CM

## 2020-01-21 LAB
25(OH)D3 SERPL-MCNC: 23.6 NG/ML (ref 30–100)
ANION GAP SERPL CALCULATED.3IONS-SCNC: 9 MMOL/L (ref 4–13)
BUN SERPL-MCNC: 37 MG/DL (ref 5–25)
CALCIUM SERPL-MCNC: 9.3 MG/DL (ref 8.3–10.1)
CHLORIDE SERPL-SCNC: 105 MMOL/L (ref 100–108)
CO2 SERPL-SCNC: 29 MMOL/L (ref 21–32)
CREAT SERPL-MCNC: 1.83 MG/DL (ref 0.6–1.3)
EST. AVERAGE GLUCOSE BLD GHB EST-MCNC: 212 MG/DL
GFR SERPL CREATININE-BSD FRML MDRD: 36 ML/MIN/1.73SQ M
GLUCOSE P FAST SERPL-MCNC: 174 MG/DL (ref 65–99)
HBA1C MFR BLD: 9 % (ref 4.2–6.3)
POTASSIUM SERPL-SCNC: 4.3 MMOL/L (ref 3.5–5.3)
SODIUM SERPL-SCNC: 143 MMOL/L (ref 136–145)

## 2020-01-21 PROCEDURE — 82306 VITAMIN D 25 HYDROXY: CPT

## 2020-01-21 PROCEDURE — 80048 BASIC METABOLIC PNL TOTAL CA: CPT

## 2020-01-21 PROCEDURE — 83036 HEMOGLOBIN GLYCOSYLATED A1C: CPT

## 2020-01-21 PROCEDURE — 36415 COLL VENOUS BLD VENIPUNCTURE: CPT

## 2020-01-22 ENCOUNTER — TELEPHONE (OUTPATIENT)
Dept: NEUROLOGY | Facility: CLINIC | Age: 75
End: 2020-01-22

## 2020-01-27 ENCOUNTER — CONSULT (OUTPATIENT)
Dept: NEUROLOGY | Facility: CLINIC | Age: 75
End: 2020-01-27
Payer: MEDICARE

## 2020-01-27 VITALS
DIASTOLIC BLOOD PRESSURE: 62 MMHG | BODY MASS INDEX: 35.7 KG/M2 | HEIGHT: 71 IN | WEIGHT: 255 LBS | SYSTOLIC BLOOD PRESSURE: 138 MMHG

## 2020-01-27 DIAGNOSIS — M51.26 LUMBAR DISCOGENIC PAIN SYNDROME: ICD-10-CM

## 2020-01-27 DIAGNOSIS — N18.9 CHRONIC KIDNEY DISEASE-MINERAL AND BONE DISORDER: ICD-10-CM

## 2020-01-27 DIAGNOSIS — E11.59 TYPE 2 DIABETES MELLITUS WITH OTHER CIRCULATORY COMPLICATION, UNSPECIFIED WHETHER LONG TERM INSULIN USE (HCC): ICD-10-CM

## 2020-01-27 DIAGNOSIS — G47.30 SLEEP APNEA, UNSPECIFIED TYPE: ICD-10-CM

## 2020-01-27 DIAGNOSIS — E83.9 CHRONIC KIDNEY DISEASE-MINERAL AND BONE DISORDER: ICD-10-CM

## 2020-01-27 DIAGNOSIS — M79.606 PAIN OF LOWER EXTREMITY, UNSPECIFIED LATERALITY: ICD-10-CM

## 2020-01-27 DIAGNOSIS — E11.42 DIABETIC POLYNEUROPATHY ASSOCIATED WITH TYPE 2 DIABETES MELLITUS (HCC): ICD-10-CM

## 2020-01-27 DIAGNOSIS — G62.9 NEUROPATHY: ICD-10-CM

## 2020-01-27 DIAGNOSIS — R20.0 NUMBNESS: ICD-10-CM

## 2020-01-27 DIAGNOSIS — J95.2 ACUTE POSTOPERATIVE PULMONARY INSUFFICIENCY (HCC): Primary | ICD-10-CM

## 2020-01-27 DIAGNOSIS — N18.4 BENIGN HYPERTENSION WITH CHRONIC KIDNEY DISEASE, STAGE IV (HCC): ICD-10-CM

## 2020-01-27 DIAGNOSIS — I12.9 BENIGN HYPERTENSION WITH CHRONIC KIDNEY DISEASE, STAGE IV (HCC): ICD-10-CM

## 2020-01-27 DIAGNOSIS — M89.9 CHRONIC KIDNEY DISEASE-MINERAL AND BONE DISORDER: ICD-10-CM

## 2020-01-27 PROBLEM — E11.9 DIABETES MELLITUS (HCC): Status: ACTIVE | Noted: 2020-01-27

## 2020-01-27 PROCEDURE — 99215 OFFICE O/P EST HI 40 MIN: CPT | Performed by: PSYCHIATRY & NEUROLOGY

## 2020-01-27 RX ORDER — LORAZEPAM 1 MG/1
TABLET ORAL
Qty: 2 TABLET | Refills: 0 | Status: SHIPPED | OUTPATIENT
Start: 2020-01-27 | End: 2020-04-30 | Stop reason: ALTCHOICE

## 2020-01-27 RX ORDER — GABAPENTIN 100 MG/1
CAPSULE ORAL
Qty: 1080 CAPSULE | Refills: 2 | Status: SHIPPED | OUTPATIENT
Start: 2020-01-27 | End: 2020-03-16

## 2020-01-27 NOTE — PROGRESS NOTES
[facility] MULTIPLE [facility]CLER[facility]  PATIENT:  Ely Peñaloza  MRN:  [id number]  :  1945  DATE OF SERVICE:  2020    Assessment/Plan:     Problem List Items Addressed This Visit        Endocrine    Diabetes mellitus ([facility] )    Relevant Medications    gabapentin (NEURONTIN) 100 mg capsule       Respiratory    Sleep apnea    Acute postoperative pulmonary insufficiency ([facility] ) - Primary       Cardiovascular and Mediastinum    Benign hypertension with chronic kidney disease, stage IV (HCC)       Genitourinary    Chronic kidney disease-mineral and bone disorder       Other    Lumbar discogenic pain syndrome    Pain of lower extremity      Other Visit Diagnoses     Neuropathy        Relevant Medications    LORazepam (ATIVAN) 1 mg tablet    Other Relevant Orders    KASSIE and PE,Serum    MRI brain without contrast    MRI thoracic spine without contrast    EMG 1 Upper/1 Lower Neuropathy    Diabetic polyneuropathy associated with type 2 diabetes mellitus ([facility] )        Relevant Orders    EMG 1 Upper/1 Lower Neuropathy    Numbness        Relevant Orders    MRI brain without contrast    MRI thoracic spine without contrast           Mr  Ifeoma Nvaarro has presented for evaluation of sensory dysfunction around his chest  Patient has known CAD with CKD in the setting of uncontrolled diabetes for sometime now  We extensively discussed pathophysiology of peripheral nerve and vessels damage by diabetes, including large vessels in his heart  Kidneys and brain  Patient has diabetic polyneuropathy for at least 10 years, with additional uremic neuropathy might be considered due to Uremia  Patient was advised to gradually titrate gabapentin up to Gabapentin 300 mg am and noon with 600 mg HS- patient will titrate by 1 capsule every 3rd day as tolerated       Patient complaint was numbness and stiffness across his chest 1 months after his CABG - we discussed that his sensory dysfunction may not related to surgery at all, due to timing of his symptoms, by spinal cord stroke may be seeing in postoperative due to anterior artery thrombosis, less likely thrombosis of  artery of [place] as it supplies T9-T12 area, as patient reports numbness at T4-T9 distribution, based on his exam today  MRI brain and MRI thoracic spine with NO contrast will be offered for stroke work up  Patient has longstanding neuropathy with muscle wasting noted in his hands - RUE/RLE EMG was advised  Patient is to follow with neuromuscular team to discuss his findings  KASSIE was added to evaluation - multiple family members with cancers  Greater than 50% of the 60 minutes evaluation was a face-to-face discussion regarding  the pathophysiology of his current symptoms and further plan, as well as counseling, educating, and coordinating the patient's care  Subjective:numbness in the chest, itchiness    HPI History of Present Illness  Mr Anne Braun is a 69years old male was referred to [address] [place] [address] for evaluation of sensory disturbances  No prior history of Multiple sclerosis or other CNS demyelination described  Patient has known uncontrolled DM type 2, thyroid nodule, CAD s/p CABG, lymphadenopathy, DM with CKD stage 4 and long term insulin use, HTN, peripheral polyneuropathy  Patient was seeing 5 different physicians- itchiness and numbness in his chest has been reported; No new focal weakness in upper or lower extremities, no facial asymmetry, no double vision or vision loss, no vertigo noted  Patient has longstanding sensory ataxia, which is not new for the patient; Patient describing tight ness in his chest and itchiness only during the day when he is moving, no sesnory dysfunction at night time, may bring concern for  nature of his thoracic spine nerves irritation  No lower back pain; Patient has known diabetic neuropathy and patient has been using homeopathic cream for foot     Colonoscopy and X-ray lung normal             The following portions of the patient's history were reviewed and updated as appropriate: He  has a past medical history of Diabetes mellitus ([facility] ), Hyperlipidemia, Hypertension, and Renal disorder  He   Patient Active Problem List    Diagnosis Date Noted    Diabetes mellitus ([facility] ) 01/27/2020    Lymphadenopathy 10/22/2019    Vitamin D deficiency 10/21/2019    Obesity, unspecified 09/05/2019    Iron deficiency anemia due to chronic blood loss 07/09/2019    Other constipation 06/26/2019    S/P CABG (coronary artery bypass graft) 06/21/2019    Acute postoperative pulmonary insufficiency ([facility] ) 06/21/2019    Blood loss anemia 06/21/2019    Triple vessel coronary artery disease 06/17/2019    Hyperlipidemia 06/17/2019    Type II or unspecified type diabetes mellitus without mention of complication, uncontrolled 06/15/2019    Bilateral lower extremity edema 06/15/2019    Sleep apnea 06/15/2019    Benign hypertension with chronic kidney disease, [facility] ) 05/30/2019    Chronic kidney disease-mineral and bone disorder 05/30/2019    Esophageal dysphagia 02/14/2019    History of colonic polyps 02/14/2019    CKD (chronic [facility] disease[facility] IV [facility] ) 08/18/2017    Albuminuria 08/18/2017    Trigger finger of left hand 04/05/2017    Lumbar back pain 02/07/2012    Lumbar discogenic pain syndrome 02/07/2012    Pain of lower extremity 02/07/2012    Spondylolisthesis 02/07/2012    Spinal stenosis 02/07/2012     He  has a past surgical history that includes Gallbladder surgery; Back surgery; Hernia repair; pr cabg, artery-vein, four (N/A, 6/21/2019); and Coronary artery bypass graft  His family history includes Cancer in his father and mother  He  reports that he has quit smoking  He has a 90 00 pack-year smoking history  He has never used smokeless tobacco  He reports that he drank alcohol  He reports that he does not use drugs    Current Outpatient Medications   Medication Sig Dispense Refill    Ascorbic Acid (VITAMIN C) 1000 MG tablet Take 1,000 mg by mouth daily      aspirin 325 mg tablet Take 1 tablet (325 mg total) by mouth daily 100 tablet 0    atorvastatin (LIPITOR) 80 mg tablet Take 1 tablet (80 mg total) by mouth daily with dinner 90 tablet 3    cholecalciferol (VITAMIN D3) 1,000 units tablet Take 1 tablet (1,000 Units total) by mouth daily 1 tablet 1    Continuous Blood Gluc  (FREESTYLE JOHANNE READER) MAMI Use device to scan blood glucose at least 4 times a day 1 Device 0    Continuous Blood Gluc Sensor ([address] [place] [address]) [facility] Apply sensor every 14 days to check blood glucose at least 4 times a day 6 each 1    cyanocobalamin (VITAMIN B-12) 500 mcg tablet Take 500 mcg by mouth daily      docusate sodium (COLACE) 100 mg capsule Take 1 capsule (100 mg total) by mouth 2 (two) times a day 60 capsule 1    Doxepin HCl (SILENOR) 6 MG TABS Take 6 mg by mouth as needed       finasteride (PROSCAR) 5 mg tablet Take 5 mg by mouth daily      gabapentin (NEURONTIN) 100 mg capsule Take 3 capsules (300 mg total) by mouth daily at bedtime 90 capsule 1    insulin glargine (TOUJEO SOLOSTAR) 300 units/mL CONCETRATED U-300 injection pen (1-unit dial) Inject 68 Units under the skin daily at bedtime 15 pen 1    insulin lispro (HUMALOG KWIKPEN) 100 units/mL injection pen Inject 20-24-24 with meals plus scale (using up to 90 units a day) 25 pen 1    Insulin Pen Needle (BD PEN NEEDLE RON U/F) 32G X 4 MM MISC Use to inject insulin up to 6 times daily 600 each 1    latanoprost (XALATAN) 0 005 % ophthalmic solution 1 drop daily at bedtime      metoprolol tartrate (LOPRESSOR) 25 mg tablet Take 1 tablet (25 mg total) by mouth every 12 (twelve) hours 180 tablet 3    omeprazole (PriLOSEC) 40 MG capsule Take 40 mg by mouth daily      tamsulosin (FLOMAX) 0 4 mg Take 1 capsule (0 4 mg total) by mouth daily with dinner 90 capsule 3    torsemide (DEMADEX) 20 mg tablet Take 1 tablet (20 mg total) by mouth daily 90 tablet 3    ferrous sulfate 325 (65 Fe) mg tablet Take 1 tablet (325 mg total) by mouth daily with breakfast for 90 days (Patient not taking: Reported on 10/21/2019) 90 tablet 0    gabapentin (NEURONTIN) 100 mg capsule Take 3 capsule in am and noon and 6 capsules evening  1080 capsule 2    LORazepam (ATIVAN) 1 mg tablet Take 1 tab 40 min prior to MRI with a second tab 10 min prior to imaging 2 tablet 0     No current facility-administered medications for this visit        Current Outpatient Medications on File Prior to Visit   Medication Sig    Ascorbic Acid (VITAMIN C) 1000 MG tablet Take 1,000 mg by mouth daily    aspirin 325 mg tablet Take 1 tablet (325 mg total) by mouth daily    atorvastatin (LIPITOR) 80 mg tablet Take 1 tablet (80 mg total) by mouth daily with dinner    cholecalciferol (VITAMIN D3) 1,000 units tablet Take 1 tablet (1,000 Units total) by mouth daily    Continuous Blood Gluc  (FREESTYLE JOHANNE READER) MAMI Use device to scan blood glucose at least 4 times a day    Continuous Blood Gluc Sensor ([address] [place] [address]) [facility] Apply sensor every 14 days to check blood glucose at least 4 times a day    cyanocobalamin (VITAMIN B-12) 500 mcg tablet Take 500 mcg by mouth daily    docusate sodium (COLACE) 100 mg capsule Take 1 capsule (100 mg total) by mouth 2 (two) times a day    Doxepin HCl (SILENOR) 6 MG TABS Take 6 mg by mouth as needed     finasteride (PROSCAR) 5 mg tablet Take 5 mg by mouth daily    gabapentin (NEURONTIN) 100 mg capsule Take 3 capsules (300 mg total) by mouth daily at bedtime    insulin glargine (TOUJEO SOLOSTAR) 300 units/mL CONCETRATED U-300 injection pen (1-unit dial) Inject 68 Units under the skin daily at bedtime    insulin lispro (HUMALOG KWIKPEN) 100 units/mL injection pen Inject 20-24-24 with meals plus scale (using up to 90 units a day)    Insulin Pen Needle (BD PEN NEEDLE RON U/F) 32G X 4 MM MISC Use to inject insulin up to 6 times daily  latanoprost (XALATAN) 0 005 % ophthalmic solution 1 drop daily at bedtime    metoprolol tartrate (LOPRESSOR) 25 mg tablet Take 1 tablet (25 mg total) by mouth every 12 (twelve) hours    omeprazole (PriLOSEC) 40 MG capsule Take 40 mg by mouth daily    tamsulosin (FLOMAX) 0 4 mg Take 1 capsule (0 4 mg total) by mouth daily with dinner    torsemide (DEMADEX) 20 mg tablet Take 1 tablet (20 mg total) by mouth daily    ferrous sulfate 325 (65 Fe) mg tablet Take 1 tablet (325 mg total) by mouth daily with breakfast for 90 days (Patient not taking: Reported on 10/21/2019)     No current facility-administered medications on file prior to visit  He has No Known Allergies            Objective:    Blood pressure 138/62, height 5' 11" (1 803 m), weight 116 kg (255 lb)  Physical Exam/Neurological Exam  CONSTITUTIONAL: NAD, pleasant  NECK: supple, no lymphadenopathy, no thyromegaly, no JVD  CARDIOVASCULAR: RRR, normal S1S2, no murmurs, no rubs  RESP: clear to auscultation bilaterally, no wheezes/rhonchi/rales  ABDOMEN: soft, non tender, non distended  SKIN: no rash or skin lesions  EXTREMITIES: no edema, pulses 2+bilaterally  PSYCH: appropriate mood and affect  NEUROLOGIC COMPREHENSIVE EXAM: Patient is oriented to person, place and time, NAD; appropriate affect  CN II, III, IV, V, VI, VII,VIII,IX,X,XI-XII intact with EOMI, PERRLA, OKN intact, VF grossly intact, fundi poorly visualized secondary to pupillary constriction; symmetric face noted  Motor: 5/5 UE/LE bilateral symmetric; Sensory: decreased to light touch and pinprick feet bilaterally, sensory level T4-T9 to pinprick; diminished vibration sensation feet bilaterally, right worse than left; Coordination within normal limits on FTN and FRAN testing; DTR: 1/4 through, no Babinski, no clonus  Tandem gait is abnormal  Romberg: negative        ROS:  12 points of review of system was reviewed with the patient and was unremarkable with exception: see HPI  Review of Systems   Constitutional: Negative  Negative for appetite change and fever  HENT: Negative  Negative for hearing loss, tinnitus, trouble swallowing and voice change  Eyes: Negative  Negative for photophobia and pain  Respiratory: Negative  Negative for shortness of breath  Cardiovascular: Negative  Negative for palpitations  Gastrointestinal: Negative  Negative for nausea and vomiting  Endocrine: Negative  Negative for cold intolerance and heat intolerance  Genitourinary: Negative  Negative for dysuria, frequency and urgency  Musculoskeletal: Negative  Negative for myalgias and neck pain  Skin: Negative  Negative for rash  Neurological: Positive for numbness (chest )  Negative for dizziness, tremors, seizures, syncope, facial asymmetry, speech difficulty, weakness, light-headedness and headaches  Serve itching    Hematological: Negative  Does not bruise/bleed easily  Psychiatric/Behavioral: Negative  Negative for confusion, hallucinations and sleep disturbance

## 2020-01-28 ENCOUNTER — TELEPHONE (OUTPATIENT)
Dept: NEUROLOGY | Facility: CLINIC | Age: 75
End: 2020-01-28

## 2020-01-28 DIAGNOSIS — G62.9 NEUROPATHY: Primary | ICD-10-CM

## 2020-01-28 RX ORDER — GABAPENTIN 100 MG/1
100 CAPSULE ORAL 3 TIMES DAILY
Qty: 120 CAPSULE | Refills: 0 | Status: SHIPPED | OUTPATIENT
Start: 2020-01-28 | End: 2020-03-16

## 2020-01-28 NOTE — TELEPHONE ENCOUNTER
pt's wife called and states that they will not receive gabapentin for 7-10 days and they were advised to titrate up on dose  they will be running out of meds this week end   she is requesting that a 10 day supply be sent to local pharm   currently taking 100mg taking 3 tabs hs and per chart-Patient was advised to gradually titrate gabapentin up to Gabapentin 300 mg am and noon with 600 mg HS- patient will titrate by 1 capsule every 3rd day as tolerated     please send 7 day refill as appropriate

## 2020-01-28 NOTE — TELEPHONE ENCOUNTER
Gabapentin 10 days supply was sent to [facility] pharmacy in [place]    Please let the patient to  the medication

## 2020-01-28 NOTE — TELEPHONE ENCOUNTER
Received PA request for l[facility]azepam from [facility]  Lorazepam is prn for mri  Form completed and emailed to you  Please have dr miranda sign and please [facility] back to [facility] and please scan into chart

## 2020-01-29 ENCOUNTER — HOSPITAL ENCOUNTER (OUTPATIENT)
Dept: ULTRASOUND IMAGING | Facility: HOSPITAL | Age: 75
Discharge: HOME | End: 2020-01-29
Attending: FAMILY MEDICINE
Payer: MEDICARE

## 2020-01-29 DIAGNOSIS — Z13.6 SCREENING FOR AAA (ABDOMINAL AORTIC ANEURYSM): ICD-10-CM

## 2020-01-29 PROCEDURE — 76706 US ABDL AORTA SCREEN AAA: CPT

## 2020-01-30 ENCOUNTER — OFFICE VISIT (OUTPATIENT)
Dept: ENDOCRINOLOGY | Facility: CLINIC | Age: 75
End: 2020-01-30
Payer: MEDICARE

## 2020-01-30 ENCOUNTER — TELEPHONE (OUTPATIENT)
Dept: FAMILY MEDICINE CLINIC | Facility: OTHER | Age: 75
End: 2020-01-30

## 2020-01-30 VITALS
SYSTOLIC BLOOD PRESSURE: 124 MMHG | WEIGHT: 259 LBS | DIASTOLIC BLOOD PRESSURE: 84 MMHG | HEIGHT: 71 IN | BODY MASS INDEX: 36.26 KG/M2 | HEART RATE: 74 BPM

## 2020-01-30 DIAGNOSIS — Z95.1 S/P CABG (CORONARY ARTERY BYPASS GRAFT): ICD-10-CM

## 2020-01-30 DIAGNOSIS — E55.9 VITAMIN D DEFICIENCY: ICD-10-CM

## 2020-01-30 DIAGNOSIS — E11.65 UNCONTROLLED TYPE 2 DIABETES MELLITUS WITH HYPERGLYCEMIA (HCC): Primary | ICD-10-CM

## 2020-01-30 DIAGNOSIS — I12.9 BENIGN HYPERTENSION WITH CHRONIC KIDNEY DISEASE, STAGE IV (HCC): ICD-10-CM

## 2020-01-30 DIAGNOSIS — N18.4 BENIGN HYPERTENSION WITH CHRONIC KIDNEY DISEASE, STAGE IV (HCC): ICD-10-CM

## 2020-01-30 DIAGNOSIS — E78.2 MIXED HYPERLIPIDEMIA: ICD-10-CM

## 2020-01-30 PROCEDURE — 1036F TOBACCO NON-USER: CPT | Performed by: INTERNAL MEDICINE

## 2020-01-30 PROCEDURE — 3079F DIAST BP 80-89 MM HG: CPT | Performed by: INTERNAL MEDICINE

## 2020-01-30 PROCEDURE — 99214 OFFICE O/P EST MOD 30 MIN: CPT | Performed by: INTERNAL MEDICINE

## 2020-01-30 PROCEDURE — 4040F PNEUMOC VAC/ADMIN/RCVD: CPT | Performed by: INTERNAL MEDICINE

## 2020-01-30 PROCEDURE — 1160F RVW MEDS BY RX/DR IN RCRD: CPT | Performed by: INTERNAL MEDICINE

## 2020-01-30 PROCEDURE — 3074F SYST BP LT 130 MM HG: CPT | Performed by: INTERNAL MEDICINE

## 2020-01-30 PROCEDURE — 95251 CONT GLUC MNTR ANALYSIS I&R: CPT | Performed by: INTERNAL MEDICINE

## 2020-01-30 RX ORDER — INSULIN LISPRO 100 [IU]/ML
INJECTION, SOLUTION INTRAVENOUS; SUBCUTANEOUS
Qty: 25 PEN | Refills: 1
Start: 2020-01-30 | End: 2020-02-13 | Stop reason: DRUGHIGH

## 2020-01-30 NOTE — PATIENT INSTRUCTIONS
Hypoglycemia instructions   Anjum Michael  1/30/2020  [id number]    Low Blood Sugar    Steps to treat low blood sugar  1  Test blood sugar if you have symptoms of low blood sugar:   Low Blood Sugar Symptoms:  o Sweaty  o Dizzy  o Rapid heartbeat  o Shaky    o Bad mood  o Hungry      2  Treat blood sugar less than 70 with 15 grams of fast-acting carbohydrate:   Examples of 15 grams Fast-Acting Carbohydrate:  o 4 oz juice  o 4 oz regular soda  o 3-4 glucose tablets (chew)  o 3-4 hard candies (chew)              3    Wait 15 minutes and test your blood sugar again           4   If blood sugar is less than 100, repeat steps 2-3       5  When your blood sugar is 100 or more, eat a snack if it will be longer than one hour until your next meal  The snack should be 15 grams of carbohydrate and a protein:   Examples of snacks:  o ½ sandwich  o 6 crackers with cheese  o Piece of fruit with cheese or peanut butter  o 6 crackers with peanut butter

## 2020-01-30 NOTE — PROGRESS NOTES
Tran Alicia 69 y o  male MRN: [id number]    Encounter: [id number]      Assessment/Plan     Assessment: This is a 69y o -year-old male with diabetes with hyperglycemia  Plan:    Diagnoses and all orders for this visit:    Uncontrolled type 2 diabetes mellitus with hyperglycemia ([address] )  Lab Results   Component Value Date    HGBA1C 9 0 (H) 01/21/2020    A1c is uncontrolled suggestive of severe hyperglycemia  Fasting blood sugars are elevated  Will increase toujeo to 72 units at bedtime   Increase Humalog to 28 units with breakfast 25 units with lunch and 25 units with dinner plus scale  Continue Victoza injection daily  Discussed to download the continues glucose monitor sensor in 2 weeks to make further adjustment  Discussed long-term complication of uncontrolled diabetes      -     insulin glargine (TOUJEO SOLOSTAR) 300 units/mL CONCETRATED U-300 injection pen (1-unit dial); Inject 72  Units under the skin daily at bedtime  -     insulin lispro (HUMALOG KWIKPEN) 100 units/mL injection pen;  Inject 28 units with breakfast, 25 units with lunch and dinner +scale ( approx daily dose 100 unit)    S/P CABG (coronary artery bypass graft)  Discussed the need of controlling diabetes to 7%      Mixed hyperlipidemia  Continue statins    Benign hypertension with chronic kidney disease, stage IV (HCC)  Continue current management blood pressure well controlled  Vitamin D deficiency  Continue vitamin-D supplementation 2000 International Units daily    CC: Diabetes    History of Present Illness     HPI:    Tran Alicia is 30-year-old gentleman with past medical history of insulin-dependent diabetes, obesity, hypertension, hyperlipidemia is here for follow-up  He uses continues glucose monitor sensor by Joe   His overall blood sugars are in 180-250 range  Blood sugars are elevated after lunch and after dinner 75% times  87% time Diaz was worn   Average glucose Glucose 194 mg/dL   60% times blood sugars are above 180 mg/dL  40% time blood sugars are in  mg/dL range  0% time blood sugars are below 70 mg/dL  Co-efficient of variation is 25 7%  Standard deviation  50    Insulin regimen - Toujeo 68 units at bedtime ,Humalog 20 units with breakfast 24 units with lunch and dinner   Checking blood sugars 4 times daily   Overall blood sugars are in 200-300 range   Fasting blood sugars - 170-260 mg/dl      For hyperlipidemia his currently taking Lipitor 80 mg daily with dinner  For hypertension his taking Lopressor 25 mg twice a day    Eye appt - Dec 2019 ( no retinopathy as per pt )   Podiatry - last week , every 9 weeks   Component      Latest Ref Rng & Units 12/6/2019 1/21/2020   Sodium      136 - 145 mmol/L 140 143   Potassium      3 5 - 5 3 mmol/L 3 9 4 3   Chloride      100 - 108 mmol/L 104 105   CO2      21 - 32 mmol/L 28 29   Anion Gap      4 - 13 mmol/L 8 9   BUN      5 - 25 mg/dL 41 (H) 37 (H)   Creatinine      0 60 - 1 30 mg/dL 1 87 (H) 1 83 (H)   GLUCOSE FASTING      65 - 99 mg/dL 187 (H) 174 (H)   Calcium      8 3 - 10 1 mg/dL 8 8 9 3   AST      5 - 45 U/L 9    ALT      12 - 78 U/L 21    Alkaline Phosphatase      46 - 116 U/L 97    Total Protein      6 4 - 8 2 g/dL 7 0    Albumin      3 5 - 5 0 g/dL 3 3 (L)    TOTAL BILIRUBIN      0 20 - 1 00 mg/dL 0 55    eGFR      ml/min/1 73sq m 35 36   Cholesterol      50 - 200 mg/dL 132    Triglycerides      <=150 mg/dL 75    HDL      >=40 mg/dL 48    LDL Direct      0 - 100 mg/dL 69    Non-HDL Cholesterol      mg/dl 84    Hemoglobin A1C      4 2 - 6 3 %  9 0 (H)   EAG      mg/dl  212   TSH 3RD GENERATON      0 358 - 3 740 uIU/mL 3 946 (H)    Vit D, 25-Hydroxy      30 0 - 100 0 ng/mL  23 6 (L)     Review of Systems   Constitutional: Negative for activity change, diaphoresis, fatigue, fever and unexpected weight change  HENT: Negative  Eyes: Negative for visual disturbance  Respiratory: Negative for cough, chest tightness and shortness of breath      Cardiovascular: Negative for chest pain, palpitations and leg swelling  Gastrointestinal: Negative for abdominal pain, blood in stool, constipation, diarrhea, nausea and vomiting  Endocrine: Negative for cold intolerance, heat intolerance, polydipsia, polyphagia and polyuria  Genitourinary: Negative for dysuria, enuresis, frequency and urgency  Musculoskeletal: Negative for arthralgias and myalgias  Skin: Negative for pallor, rash and wound  Allergic/Immunologic: Negative  Neurological: Negative for dizziness, tremors, weakness and numbness  Hematological: Negative  Psychiatric/Behavioral: Negative  Historical Information   Past Medical History:   Diagnosis Date    Diabetes mellitus ([address] )     Hyperlipidemia     Hypertension     Renal disorder      Past Surgical History:   Procedure Laterality Date    BACK SURGERY      CORONARY ARTERY BYPASS GRAFT      quad    GALLBLADDER SURGERY      HERNIA REPAIR      DE CABG, ARTERY-VEIN, FOUR N/A 6/21/2019    Procedure: CORONARY ARTERY BYPASS GRAFT (CABG) 4 VESSELS WITH SVG TO PDA, OM, DIAGONAL AND LIMA TO LAD; RIGHT LEG EVH;  Surgeon: Cam Brown MD;  Location: [facility];  Service: Cardiac Surgery     Social History   Social History     Substance and Sexual Activity   Alcohol Use Not Currently    Comment: 1 drink every 6 months       Social History     Substance and Sexual Activity   Drug Use Never     Social History     Tobacco Use   Smoking Status Former Smoker    Packs/day: 3 00    Years: 30 00    Pack years: 90 00   Smokeless Tobacco Never Used   Tobacco Comment    Quit 1991     Family History:   Family History   Problem Relation Age of Onset    Cancer Mother     Cancer Father        Meds/Allergies   Current Outpatient Medications   Medication Sig Dispense Refill    Ascorbic Acid (VITAMIN C) 1000 MG tablet Take 1,000 mg by mouth daily      aspirin 325 mg tablet Take 1 tablet (325 mg total) by mouth daily 100 tablet 0    atorvastatin (LIPITOR) 80 mg tablet Take 1 tablet (80 mg total) by mouth daily with dinner 90 tablet 3    cholecalciferol (VITAMIN D3) 1,000 units tablet Take 1 tablet (1,000 Units total) by mouth daily 1 tablet 1    Continuous Blood Gluc  (FREESTYLE JOHANNE READER) MAMI Use device to scan blood glucose at least 4 times a day 1 Device 0    Continuous Blood Gluc Sensor ([address] [place] [address]) [facility] Apply sensor every 14 days to check blood glucose at least 4 times a day 6 each 1    cyanocobalamin (VITAMIN B-12) 500 mcg tablet Take 500 mcg by mouth daily      docusate sodium (COLACE) 100 mg capsule Take 1 capsule (100 mg total) by mouth 2 (two) times a day 60 capsule 1    Doxepin HCl (SILENOR) 6 MG TABS Take 6 mg by mouth as needed       finasteride (PROSCAR) 5 mg tablet Take 5 mg by mouth daily      gabapentin (NEURONTIN) 100 mg capsule Take 3 capsules (300 mg total) by mouth daily at bedtime 90 capsule 1    insulin glargine (TOUJEO SOLOSTAR) 300 units/mL CONCETRATED U-300 injection pen (1-unit dial) Inject 72  Units under the skin daily at bedtime 15 pen 1    insulin lispro (HUMALOG KWIKPEN) 100 units/mL injection pen Inject 28 units with breakfast, 25 units with lunch and dinner +scale ( approx daily dose 100 unit) 25 pen 1    Insulin Pen Needle (BD PEN NEEDLE RON U/F) 32G X 4 MM MISC Use to inject insulin up to 6 times daily 600 each 1    latanoprost (XALATAN) 0 005 % ophthalmic solution 1 drop daily at bedtime      LORazepam (ATIVAN) 1 mg tablet Take 1 tab 40 min prior to MRI with a second tab 10 min prior to imaging 2 tablet 0    metoprolol tartrate (LOPRESSOR) 25 mg tablet Take 1 tablet (25 mg total) by mouth every 12 (twelve) hours 180 tablet 3    omeprazole (PriLOSEC) 40 MG capsule Take 40 mg by mouth daily      tamsulosin (FLOMAX) 0 4 mg Take 1 capsule (0 4 mg total) by mouth daily with dinner 90 capsule 3    torsemide (DEMADEX) 20 mg tablet Take 1 tablet (20 mg total) by mouth daily 90 tablet 3    ferrous sulfate 325 (65 Fe) mg tablet Take 1 tablet (325 mg total) by mouth daily with breakfast for 90 days (Patient not taking: Reported on 10/21/2019) 90 tablet 0    gabapentin (NEURONTIN) 100 mg capsule Take 3 capsule in am and noon and 6 capsules evening  (Patient not taking: Reported on 1/30/2020) 1080 capsule 2    gabapentin (NEURONTIN) 100 mg capsule Take 1 capsule (100 mg total) by mouth 3 (three) times a day (Patient not taking: Reported on 1/30/2020) 120 capsule 0     No current facility-administered medications for this visit  No Known Allergies    Objective   Vitals: Blood pressure 124/84, pulse 74, height 5' 11" (1 803 m), weight 117 kg (259 lb)  Physical Exam   Constitutional: He is oriented to person, place, and time  He appears well-developed and well-nourished  No distress  HENT:   Head: Normocephalic and atraumatic  Eyes: Pupils are equal, round, and reactive to light  EOM are normal    Neck: Normal range of motion  Neck supple  No thyromegaly present  Cardiovascular: Normal rate, regular rhythm and normal heart sounds  Pulmonary/Chest: Effort normal and breath sounds normal  No respiratory distress  Musculoskeletal: Normal range of motion  He exhibits no edema or deformity  Neurological: He is alert and oriented to person, place, and time  He displays normal reflexes  Skin: Skin is warm and dry  No erythema  Psychiatric: He has a normal mood and affect  Vitals reviewed  The history was obtained from the review of the chart, patient      Lab Results:   Lab Results   Component Value Date/Time    Hemoglobin A1C 9 0 (H) 01/21/2020 08:34 AM    Hemoglobin A1C 8 8 (H) 10/14/2019 07:27 AM    Hemoglobin A1C 7 9 (A) 09/05/2019 01:16 PM    Hemoglobin A1C 10 3 (H) 06/17/2019 05:57 AM    Hemoglobin A1C >14 0 05/13/2019    Hemoglobin A1C 14 0 05/06/2019    WBC 7 97 12/06/2019 08:28 AM    WBC 7 07 06/28/2019 09:00 AM    WBC 9 10 06/24/2019 05:10 AM    White Blood Cell Count 6 3 10/07/2019 06:47 AM    Hemoglobin 11 8 (L) 12/06/2019 08:28 AM    Hemoglobin 11 8 (L) 10/07/2019 06:47 AM    Hemoglobin 8 7 (L) 06/28/2019 09:00 AM    Hemoglobin 8 4 (L) 06/24/2019 05:10 AM    HCT 36 4 (L) 10/07/2019 06:47 AM    Hematocrit 37 0 12/06/2019 08:28 AM    Hematocrit 27 4 (L) 06/28/2019 09:00 AM    Hematocrit 26 3 (L) 06/24/2019 05:10 AM    MCV 93 12/06/2019 08:28 AM    MCV 89 2 10/07/2019 06:47 AM    MCV 94 06/28/2019 09:00 AM    MCV 94 06/24/2019 05:10 AM    Platelet Count 799 83/43/6049 06:47 AM    Platelets 956 92/69/9343 08:28 AM    Platelets 025 98/69/0892 09:00 AM    Platelets 479 45/81/5479 05:10 AM    BUN 37 (H) 01/21/2020 08:34 AM    BUN 41 (H) 12/06/2019 08:29 AM    BUN 50 (H) 10/14/2019 07:27 AM    BUN 48 (H) 10/07/2019 06:47 AM    BUN 46 (H) 07/06/2019 08:40 AM    Potassium 4 3 01/21/2020 08:34 AM    Potassium 3 9 12/06/2019 08:29 AM    Potassium 4 0 10/14/2019 07:27 AM    Potassium 4 2 10/07/2019 06:47 AM    Potassium 3 8 07/06/2019 08:40 AM    Chloride 105 01/21/2020 08:34 AM    Chloride 104 12/06/2019 08:29 AM    Chloride 104 10/14/2019 07:27 AM    Chloride 104 10/07/2019 06:47 AM    Chloride 102 07/06/2019 08:40 AM    CO2 29 01/21/2020 08:34 AM    CO2 28 12/06/2019 08:29 AM    CO2 29 10/14/2019 07:27 AM    CO2 30 10/07/2019 06:47 AM    CO2 31 07/06/2019 08:40 AM    CO2, i-STAT 24 06/21/2019 03:59 PM    CO2, i-STAT 22 06/21/2019 03:14 PM    CO2, i-STAT 25 06/21/2019 02:20 PM    Creatinine 1 83 (H) 01/21/2020 08:34 AM    Creatinine 1 87 (H) 12/06/2019 08:29 AM    Creatinine 2 71 (H) 10/14/2019 07:27 AM    Creatinine 2 70 (H) 10/07/2019 06:47 AM    Creatinine 2 78 (H) 07/06/2019 08:40 AM    AST 9 12/06/2019 08:29 AM    AST 31 06/17/2019 05:57 AM    AST 29 06/15/2019 04:49 PM    ALT 21 12/06/2019 08:29 AM    ALT 32 06/17/2019 05:57 AM    ALT 37 06/15/2019 04:49 PM    Albumin 3 3 (L) 12/06/2019 08:29 AM    Albumin 4 0 10/07/2019 06:47 AM    Albumin 3 3 (L) 06/17/2019 05:57 AM    Albumin 3 8 06/15/2019 04:49 PM    HDL, Direct 48 12/06/2019 08:29 AM    HDL, Direct 84 (H) 06/16/2019 06:03 AM    Triglycerides 75 12/06/2019 08:29 AM    Triglycerides 30 06/16/2019 06:03 AM           Imaging Studies: I have personally reviewed pertinent reports  Portions of the record may have been created with voice recognition software  Occasional wrong word or "sound a like" substitutions may have occurred due to the inherent limitations of voice recognition software  Read the chart carefully and recognize, using context, where substitutions have occurred

## 2020-01-30 NOTE — TELEPHONE ENCOUNTER
-Left message to call office back     ---- Message from Rod Blanca MD sent at 1/29/2020 11:21 PM EST -----  The US of abdomen shows no aortic aneurysm

## 2020-02-11 ENCOUNTER — APPOINTMENT (OUTPATIENT)
Dept: LAB | Facility: CLINIC | Age: 75
End: 2020-02-11
Payer: MEDICARE

## 2020-02-11 ENCOUNTER — HOSPITAL ENCOUNTER (OUTPATIENT)
Dept: MRI IMAGING | Facility: HOSPITAL | Age: 75
Discharge: HOME | End: 2020-02-11
Attending: PSYCHIATRY & NEUROLOGY
Payer: MEDICARE

## 2020-02-11 DIAGNOSIS — R20.0 NUMBNESS: ICD-10-CM

## 2020-02-11 DIAGNOSIS — G62.9 NEUROPATHY: ICD-10-CM

## 2020-02-11 DIAGNOSIS — N18.4 CKD (CHRONIC KIDNEY DISEASE), STAGE IV (HCC): ICD-10-CM

## 2020-02-11 LAB
ALBUMIN SERPL BCP-MCNC: 3.2 G/DL (ref 3.5–5)
ANION GAP SERPL CALCULATED.3IONS-SCNC: 9 MMOL/L (ref 4–13)
BUN SERPL-MCNC: 36 MG/DL (ref 5–25)
CALCIUM SERPL-MCNC: 8.7 MG/DL (ref 8.3–10.1)
CHLORIDE SERPL-SCNC: 106 MMOL/L (ref 100–108)
CO2 SERPL-SCNC: 28 MMOL/L (ref 21–32)
CREAT SERPL-MCNC: 2.01 MG/DL (ref 0.6–1.3)
CREAT UR-MCNC: 58 MG/DL
GFR SERPL CREATININE-BSD FRML MDRD: 32 ML/MIN/1.73SQ M
GLUCOSE P FAST SERPL-MCNC: 185 MG/DL (ref 65–99)
MAGNESIUM SERPL-MCNC: 1.9 MG/DL (ref 1.6–2.6)
PHOSPHATE SERPL-MCNC: 4.1 MG/DL (ref 2.3–4.1)
POTASSIUM SERPL-SCNC: 3.8 MMOL/L (ref 3.5–5.3)
PROT UR-MCNC: 33 MG/DL
PROT/CREAT UR: 0.57 MG/G{CREAT} (ref 0–0.1)
SODIUM SERPL-SCNC: 143 MMOL/L (ref 136–145)

## 2020-02-11 PROCEDURE — 36415 COLL VENOUS BLD VENIPUNCTURE: CPT

## 2020-02-11 PROCEDURE — 82570 ASSAY OF URINE CREATININE: CPT

## 2020-02-11 PROCEDURE — 72146 MRI CHEST SPINE W/O DYE: CPT

## 2020-02-11 PROCEDURE — 83735 ASSAY OF MAGNESIUM: CPT

## 2020-02-11 PROCEDURE — 80069 RENAL FUNCTION PANEL: CPT

## 2020-02-11 PROCEDURE — 84156 ASSAY OF PROTEIN URINE: CPT

## 2020-02-11 PROCEDURE — 70551 MRI BRAIN STEM W/O DYE: CPT

## 2020-02-12 ENCOUNTER — TELEPHONE (OUTPATIENT)
Dept: NEUROLOGY | Facility: CLINIC | Age: 75
End: 2020-02-12

## 2020-02-12 DIAGNOSIS — I65.22 CAROTID ARTERY STENOSIS, ASYMPTOMATIC, LEFT: Primary | ICD-10-CM

## 2020-02-12 DIAGNOSIS — G45.0 VERTEBRAL ARTERY SYNDROME: ICD-10-CM

## 2020-02-12 NOTE — TELEPHONE ENCOUNTER
[address] [facility] Radiology calling with significant findings for patient for MRI Brain wo contrast     IMPRESSION:     White matter changes suggestive of chronic microangiopathy  No acute intracranial pathology      Lack of appropriate flow void within the left internal carotid artery, series 6 image 8 and right vertebral artery, images 1 and 5  This may be related to slow flow or atherosclerotic stenosis  Consider further evaluation of the vasculature with MR   angiography or CT angiography  Please advise

## 2020-02-12 NOTE — TELEPHONE ENCOUNTER
Patient has known left ICA stenosis, please offer repeating Carotid Doppler US ( last one done in June 2019)  Patient has right vertebral a   Stenosis - please consider MRA brain WO  - patient is to continue aspirin 325 mg PO    Please help with scheduling and PA

## 2020-02-13 ENCOUNTER — OFFICE VISIT (OUTPATIENT)
Dept: DIABETES SERVICES | Facility: CLINIC | Age: 75
End: 2020-02-13
Payer: MEDICARE

## 2020-02-13 DIAGNOSIS — E11.65 UNCONTROLLED TYPE 2 DIABETES MELLITUS WITH HYPERGLYCEMIA (HCC): Primary | ICD-10-CM

## 2020-02-13 DIAGNOSIS — E11.65 UNCONTROLLED TYPE 2 DIABETES MELLITUS WITH HYPERGLYCEMIA (HCC): ICD-10-CM

## 2020-02-13 PROCEDURE — G0108 DIAB MANAGE TRN  PER INDIV: HCPCS | Performed by: DIETITIAN, REGISTERED

## 2020-02-13 RX ORDER — INSULIN LISPRO 100 [IU]/ML
INJECTION, SOLUTION INTRAVENOUS; SUBCUTANEOUS
Qty: 25 PEN | Refills: 1
Start: 2020-02-13 | End: 2020-03-19 | Stop reason: SDUPTHER

## 2020-02-13 NOTE — PROGRESS NOTES
Insulin Instruction  Met with Pernell Parikh for initial insulin start education  Alan Barfield is currently taking the following diabetes medications: Toujeo 72 units q h s ; Humalog 28-25-20 5+ scale; Victoza 0 6 mg daily  Prescribed Insulin: Toujeo 72 units q h s ; Humalog 28-25-20 5+ scale; Victoza 0 6 mg daily  Patient provided a blood glucose log at today's visit  The log includes his blood sugar readings for fasting, before lunch, before dinner, and bedtime  The log also includes the units of each type of insulin that he injected as well as Victoza injections  Review of log reveals the patient does appear to be utilizing sliding scale correctly and falling prescribed doses  Patient instructed on: Insulin type; timing of insulin; site selection and rotation; proper technique of injection and insulin administration, safe needle disposal; medication storage; side effects and precautions of insulin  Comments:  Prior to this appointment Alan Barfield had not been been checking expiration date or performing priming step for each injection and states that he has noticed a lot of air bubbles in his pens  He also states that occasionally he will notice a bent needle after injection, but followed-up by stating that this is rare  When he was asked to demonstrate proper injection technique with demo pens and injection pillow, it was observed that the pen was at an angle and the needle did become bent  Emphasis was placed on making sure that the pad is level and the needle goes into his abdomen straight  Emphasis was also placed on counting to 10 after dial reaches 0 in the injection window  Alan Barfield has good understanding of insulin usage and demonstrated use of injection technique in the office  Patient instruction completed on Hypoglycemia: definition/risk, causes/symptoms, treatment, prevention of hypoglycemia, when to notify physician and EMS    Comments: Alan Barfield has good understanding of hypoglycemia and it's treatment  Patient instruction completed on Hyperglycemia: definition, causes/symptoms, treatment, prevention of hyperglycemia, when to notify physician and EMS  Comments: David Parr has good understanding of hyperglycemia and treatment  Diabetes Education Record: David Parr was given the following education material: Fast 15 List, Hypoglycemia Fact Sheet, Hyperglycemia Fact Sheet, Giving a Pen Shot      Patient response to instruction  Comprehension: good  Motivation: good  Expected Compliance: good  Readiness: action  Barriers to Learning: none known     Begin Time:  9:26 a m  End Time:  10:06 a m  Referring Provider: Valeria Guerrero MD    Thank you for referring your patient to [facility], it was a pleasure working with them today  Please feel free to call with any questions or concerns      Tomás Schaffer, [address]  [address] [facility] [place] [id number]

## 2020-02-13 NOTE — PROGRESS NOTES
NEPHROLOGY OFFICE PROGRESS NOTE   Yan Simeon 69 y o  male MRN: [id number]  DATE: 02/13/20  Reason for visit: Continued evaluation of CKD    ASSESSMENT & PLAN:  1  Chronic kidney disease, stage III  · Johan's creatinine was around 2 5 to 2 7 back in June to October 2019  · It has now settled at around 1 8 to 2 0 the past 3 months  · The etiology of his renal disease is likely due to diabetic nephropathy + sequelae from post op ATN from CABG  · Renal function is stable - SCr 2 01    · Prior baseline was 1 6 to 1 8 before May 2019  3  Hypertension  · BP is controlled with Torsemide 20 mg daily and Metoprolol 25 mg BID  · Check BP at home x 1 week prior to next visit  4  Mineral bone disease  · PTH is at goal - 57 on 10/7/19  · Ca and phos are at goal    · Vitamin D level remains low - 23 6 on 1/21/20 - increase Vitamin D3 to 2000 units daily  5  Proteinuria:  · Stable at this time  UPC is 0 57  · Will plan to start ARB sometime this year if renal function remains stable  6  Obesity, weight gain:  · I advised him on the hazards of gaining weight  · I encouraged him to do more physical activity in order to lose weight  7  Anemia:  · Hgb stable in Dec 2019  · Was iron deficient and received Feraheme x 2  Patient Instructions   Increase Vitamin D3 to 2000 units daily  No changes to other medications  Please try to exercise at least 30-60 mins a day, 4-5 days a week  Follow up in 6 months  SUBJECTIVE / INTERVAL HISTORY:  Kristy Pleitez was last seen in the office by me in October 2019  He is accompanied by his friend, Chery Bismark  He has been doing well overall  Unfortunately, he has been eating more and has remained quite sedentary  He has gained at least 20 lbs since his last office visit with me  He denies any acute complaints except for itching across his chest    He has no CP, SOB, orthopnea  His leg swelling is the same from last visit     Creatinine readings have improved compared to readings in October 2019  PMH/PSH: HTN, DM, HLP, CKD, CAD s/p CABG, HARJIT, BPH, obesity, DDD s/p fusion surgery, cholecystectomy, hernia repair, tonsillectomy, urinary retention    Previous work up:   6/3/19 Renal US: R 10 6 cm, L 11 cm,  cc    ALLERGIES: No Known Allergies    REVIEW OF SYSTEMS:  Review of Systems   Constitutional: Positive for unexpected weight change  Negative for appetite change, chills, fatigue and fever  Respiratory: Negative for cough and shortness of breath  Cardiovascular: Positive for leg swelling  Negative for chest pain  Gastrointestinal: Negative for abdominal pain, diarrhea, nausea and vomiting  Genitourinary: Negative for dysuria and hematuria  Musculoskeletal: Negative for arthralgias and back pain  Neurological: Negative for dizziness and light-headedness  OBJECTIVE:  /74   Ht 5' 11" (1 803 m)   Wt 119 kg (263 lb)   BMI 36 68 kg/m²   Current Weight:   Body mass index is 36 68 kg/m²  Physical Exam   Constitutional: He is oriented to person, place, and time  He appears well-developed and well-nourished  No distress  HENT:   Head: Normocephalic and atraumatic  Mouth/Throat: Mucous membranes are normal    Eyes: Conjunctivae are normal  No scleral icterus  Neck: Neck supple  No JVD present  Cardiovascular: Normal rate, regular rhythm and normal heart sounds  Pulmonary/Chest: Effort normal and breath sounds normal  No respiratory distress  Abdominal: Soft  Bowel sounds are normal    Musculoskeletal: He exhibits edema (mild)  Neurological: He is alert and oriented to person, place, and time  Skin: Skin is warm and dry  Psychiatric: He has a normal mood and affect   His behavior is normal      Medications:  Current Outpatient Medications:     Ascorbic Acid (VITAMIN C) 1000 MG tablet, Take 1,000 mg by mouth daily, Disp: , Rfl:     aspirin 325 mg tablet, Take 1 tablet (325 mg total) by mouth daily, Disp: 100 tablet, Rfl: 0    atorvastatin (LIPITOR) 80 mg tablet, Take 1 tablet (80 mg total) by mouth daily with dinner, Disp: 90 tablet, Rfl: 3    cholecalciferol (VITAMIN D3) 1,000 units tablet, Take 1 tablet (1,000 Units total) by mouth daily, Disp: 1 tablet, Rfl: 1    Continuous Blood Gluc  (FREESTYLE JOHANNE READER) MAMI, Use device to scan blood glucose at least 4 times a day, Disp: 1 Device, Rfl: 0    Continuous Blood Gluc Sensor ([address] [place] [address]) [facility], Apply sensor every 14 days to check blood glucose at least 4 times a day, Disp: 6 each, Rfl: 1    cyanocobalamin (VITAMIN B-12) 500 mcg tablet, Take 500 mcg by mouth daily, Disp: , Rfl:     docusate sodium (COLACE) 100 mg capsule, Take 1 capsule (100 mg total) by mouth 2 (two) times a day, Disp: 60 capsule, Rfl: 1    Doxepin HCl (SILENOR) 6 MG TABS, Take 6 mg by mouth as needed , Disp: , Rfl:     ferrous sulfate 325 (65 Fe) mg tablet, Take 1 tablet (325 mg total) by mouth daily with breakfast for 90 days (Patient not taking: Reported on 10/21/2019), Disp: 90 tablet, Rfl: 0    finasteride (PROSCAR) 5 mg tablet, Take 5 mg by mouth daily, Disp: , Rfl:     gabapentin (NEURONTIN) 100 mg capsule, Take 3 capsules (300 mg total) by mouth daily at bedtime, Disp: 90 capsule, Rfl: 1    gabapentin (NEURONTIN) 100 mg capsule, Take 3 capsule in am and noon and 6 capsules evening   (Patient not taking: Reported on 1/30/2020), Disp: 1080 capsule, Rfl: 2    gabapentin (NEURONTIN) 100 mg capsule, Take 1 capsule (100 mg total) by mouth 3 (three) times a day (Patient not taking: Reported on 1/30/2020), Disp: 120 capsule, Rfl: 0    insulin glargine (TOUJEO SOLOSTAR) 300 units/mL CONCETRATED U-300 injection pen (1-unit dial), Inject 75  Units under the skin daily at bedtime, Disp: 15 pen, Rfl: 1    insulin lispro (HUMALOG KWIKPEN) 100 units/mL injection pen, 30-30-32 +scale ( approx daily dose 100 unit), Disp: 25 pen, Rfl: 1    Insulin Pen Needle (BD PEN NEEDLE RON U/F) 32G X 4 MM MISC, Use to inject insulin up to 6 times daily, Disp: 600 each, Rfl: 1    latanoprost (XALATAN) 0 005 % ophthalmic solution, 1 drop daily at bedtime, Disp: , Rfl:     liraglutide (VICTOZA) injection, Inject 0 6 mg daily into skin, Disp: 5 pen, Rfl: 3    LORazepam (ATIVAN) 1 mg tablet, Take 1 tab 40 min prior to MRI with a second tab 10 min prior to imaging, Disp: 2 tablet, Rfl: 0    metoprolol tartrate (LOPRESSOR) 25 mg tablet, Take 1 tablet (25 mg total) by mouth every 12 (twelve) hours, Disp: 180 tablet, Rfl: 3    omeprazole (PriLOSEC) 40 MG capsule, Take 40 mg by mouth daily, Disp: , Rfl:     tamsulosin (FLOMAX) 0 4 mg, Take 1 capsule (0 4 mg total) by mouth daily with dinner, Disp: 90 capsule, Rfl: 3    torsemide (DEMADEX) 20 mg tablet, Take 1 tablet (20 mg total) by mouth daily, Disp: 90 tablet, Rfl: 3    Laboratory Results:  Results for orders placed or performed in visit on 02/11/20   Magnesium   Result Value Ref Range    Magnesium 1 9 1 6 - 2 6 mg/dL   Renal function panel   Result Value Ref Range    Albumin 3 2 (L) 3 5 - 5 0 g/dL    Calcium 8 7 8 3 - 10 1 mg/dL    Phosphorus 4 1 2 3 - 4 1 mg/dL    BUN 36 (H) 5 - 25 mg/dL    Creatinine 2 01 (H) 0 60 - 1 30 mg/dL    Sodium 143 136 - 145 mmol/L    Potassium 3 8 3 5 - 5 3 mmol/L    Chloride 106 100 - 108 mmol/L    CO2 28 21 - 32 mmol/L    ANION GAP 9 4 - 13 mmol/L    eGFR 32 ml/min/1 73sq m    Glucose, Fasting 185 (H) 65 - 99 mg/dL   Protein / creatinine ratio, urine   Result Value Ref Range    Creatinine, Ur 58 0 mg/dL    Protein Urine Random 33 mg/dL    Prot/Creat Ratio, Ur 0 57 (H) 0 00 - 0 10

## 2020-02-13 NOTE — TELEPHONE ENCOUNTER
Spoke with wife Ángela Hodges, (on communication consent) and advised of test results  Patient amiable to testing and they will schedule tests  Advised to provide 2 weeks time in order for prior auth team to get referrals if necessary  Provided with scheduling number

## 2020-02-13 NOTE — PATIENT INSTRUCTIONS
Follow injection instructions for Victoza, Hiumalog, and Toujeo  If signs and symptoms of hypoglycemia occur, check blood sugar  If blood sugar is below 70 mg/dL use 15/15 rule for treatment of hypoglycemia

## 2020-02-13 NOTE — TELEPHONE ENCOUNTER
Reviewed blood sugar log  He has appt with Tati Garcia today    his blood sugars are always elevated at bedtime, 200-300 range   fasting blood sugars are also elevated in  200-250 range most of the time   please inform patient to Increase toujeo at 75 units at bedtime   increase Humalog, 30 units with breakfast,  30 units with lunch,  32 units with dinner plus scale,  Scale - 150-200 -2 units, 201-250-4 units, 251-300 -6 units, 301-350-8 units, > 350- 10 units     Sedrick Powell MD

## 2020-02-14 ENCOUNTER — OFFICE VISIT (OUTPATIENT)
Dept: NEPHROLOGY | Facility: CLINIC | Age: 75
End: 2020-02-14
Payer: MEDICARE

## 2020-02-14 VITALS
HEIGHT: 71 IN | SYSTOLIC BLOOD PRESSURE: 134 MMHG | WEIGHT: 263 LBS | DIASTOLIC BLOOD PRESSURE: 74 MMHG | BODY MASS INDEX: 36.82 KG/M2

## 2020-02-14 DIAGNOSIS — R80.9 ALBUMINURIA: ICD-10-CM

## 2020-02-14 DIAGNOSIS — E83.9 CHRONIC KIDNEY DISEASE-MINERAL AND BONE DISORDER: ICD-10-CM

## 2020-02-14 DIAGNOSIS — N18.9 CHRONIC KIDNEY DISEASE-MINERAL AND BONE DISORDER: ICD-10-CM

## 2020-02-14 DIAGNOSIS — M89.9 CHRONIC KIDNEY DISEASE-MINERAL AND BONE DISORDER: ICD-10-CM

## 2020-02-14 DIAGNOSIS — N18.4 CKD (CHRONIC KIDNEY DISEASE), STAGE IV (HCC): Primary | ICD-10-CM

## 2020-02-14 DIAGNOSIS — E55.9 VITAMIN D DEFICIENCY: ICD-10-CM

## 2020-02-14 DIAGNOSIS — N18.30 CKD (CHRONIC KIDNEY DISEASE), STAGE III (HCC): ICD-10-CM

## 2020-02-14 DIAGNOSIS — I12.9 BENIGN HYPERTENSION WITH CHRONIC KIDNEY DISEASE, STAGE III (HCC): ICD-10-CM

## 2020-02-14 DIAGNOSIS — N18.30 BENIGN HYPERTENSION WITH CHRONIC KIDNEY DISEASE, STAGE III (HCC): ICD-10-CM

## 2020-02-14 PROCEDURE — 4040F PNEUMOC VAC/ADMIN/RCVD: CPT | Performed by: INTERNAL MEDICINE

## 2020-02-14 PROCEDURE — 1036F TOBACCO NON-USER: CPT | Performed by: INTERNAL MEDICINE

## 2020-02-14 PROCEDURE — 3008F BODY MASS INDEX DOCD: CPT | Performed by: INTERNAL MEDICINE

## 2020-02-14 PROCEDURE — 1160F RVW MEDS BY RX/DR IN RCRD: CPT | Performed by: INTERNAL MEDICINE

## 2020-02-14 PROCEDURE — 99214 OFFICE O/P EST MOD 30 MIN: CPT | Performed by: INTERNAL MEDICINE

## 2020-02-14 PROCEDURE — 3075F SYST BP GE 130 - 139MM HG: CPT | Performed by: INTERNAL MEDICINE

## 2020-02-14 PROCEDURE — 3078F DIAST BP <80 MM HG: CPT | Performed by: INTERNAL MEDICINE

## 2020-02-14 NOTE — LETTER
February 14, 2020     [address]    Patient: Boom Huynh   YOB: 1945   Date of Visit: 2/14/2020       Dear Dr Fatou Al: Thank you for referring Pee Hanson to me for evaluation  Below are my notes for this consultation  If you have questions, please do not hesitate to call me  I look forward to following your patient along with you  Sincerely,        Marcelina Stanford MD        CC: No Recipients  Marcelina Stanford MD  2/14/2020 10:40 AM  Sign at close encounter  Taurus Kent Schomp 69 y o  male MRN: [id number]  DATE: 02/13/20  Reason for visit: Continued evaluation of CKD    ASSESSMENT & PLAN:  1  Chronic kidney disease, stage III  · Johan's creatinine was around 2 5 to 2 7 back in June to October 2019  · It has now settled at around 1 8 to 2 0 the past 3 months  · The etiology of his renal disease is likely due to diabetic nephropathy + sequelae from post op ATN from CABG  · Renal function is stable - SCr 2 01    · Prior baseline was 1 6 to 1 8 before May 2019  3  Hypertension  · BP is controlled with Torsemide 20 mg daily and Metoprolol 25 mg BID  · Check BP at home x 1 week prior to next visit  4  Mineral bone disease  · PTH is at goal - 57 on 10/7/19  · Ca and phos are at goal    · Vitamin D level remains low - 23 6 on 1/21/20 - increase Vitamin D3 to 2000 units daily  5  Proteinuria:  · Stable at this time  UPC is 0 57  · Will plan to start ARB sometime this year if renal function remains stable  6  Obesity, weight gain:  · I advised him on the hazards of gaining weight  · I encouraged him to do more physical activity in order to lose weight  7  Anemia:  · Hgb stable in Dec 2019  · Was iron deficient and received Feraheme x 2  Patient Instructions   Increase Vitamin D3 to 2000 units daily  No changes to other medications     Please try to exercise at least 30-60 mins a day, 4-5 days a week  Follow up in 6 months  SUBJECTIVE / INTERVAL HISTORY:  Pee Moody was last seen in the office by me in October 2019  He is accompanied by his friend, Gisela Reed  He has been doing well overall  Unfortunately, he has been eating more and has remained quite sedentary  He has gained at least 20 lbs since his last office visit with me  He denies any acute complaints except for itching across his chest    He has no CP, SOB, orthopnea  His leg swelling is the same from last visit  Creatinine readings have improved compared to readings in October 2019  PMH/PSH: HTN, DM, HLP, CKD, CAD s/p CABG, HARJIT, BPH, obesity, DDD s/p fusion surgery, cholecystectomy, hernia repair, tonsillectomy, urinary retention    Previous work up:   6/3/19 Renal US: R 10 6 cm, L 11 cm,  cc    ALLERGIES: No Known Allergies    REVIEW OF SYSTEMS:  Review of Systems   Constitutional: Positive for unexpected weight change  Negative for appetite change, chills, fatigue and fever  Respiratory: Negative for cough and shortness of breath  Cardiovascular: Positive for leg swelling  Negative for chest pain  Gastrointestinal: Negative for abdominal pain, diarrhea, nausea and vomiting  Genitourinary: Negative for dysuria and hematuria  Musculoskeletal: Negative for arthralgias and back pain  Neurological: Negative for dizziness and light-headedness  OBJECTIVE:  /74   Ht 5' 11" (1 803 m)   Wt 119 kg (263 lb)   BMI 36 68 kg/m²    Current Weight:   Body mass index is 36 68 kg/m²  Physical Exam   Constitutional: He is oriented to person, place, and time  He appears well-developed and well-nourished  No distress  HENT:   Head: Normocephalic and atraumatic  Mouth/Throat: Mucous membranes are normal    Eyes: Conjunctivae are normal  No scleral icterus  Neck: Neck supple  No JVD present  Cardiovascular: Normal rate, regular rhythm and normal heart sounds     Pulmonary/Chest: Effort normal and breath sounds normal  No respiratory distress  Abdominal: Soft  Bowel sounds are normal    Musculoskeletal: He exhibits edema (mild)  Neurological: He is alert and oriented to person, place, and time  Skin: Skin is warm and dry  Psychiatric: He has a normal mood and affect  His behavior is normal      Medications:  Current Outpatient Medications:     Ascorbic Acid (VITAMIN C) 1000 MG tablet, Take 1,000 mg by mouth daily, Disp: , Rfl:     aspirin 325 mg tablet, Take 1 tablet (325 mg total) by mouth daily, Disp: 100 tablet, Rfl: 0    atorvastatin (LIPITOR) 80 mg tablet, Take 1 tablet (80 mg total) by mouth daily with dinner, Disp: 90 tablet, Rfl: 3    cholecalciferol (VITAMIN D3) 1,000 units tablet, Take 1 tablet (1,000 Units total) by mouth daily, Disp: 1 tablet, Rfl: 1    Continuous Blood Gluc  (FREESTYLE JOHANNE READER) MAMI, Use device to scan blood glucose at least 4 times a day, Disp: 1 Device, Rfl: 0    Continuous Blood Gluc Sensor ([address] [place] [address]) [facility], Apply sensor every 14 days to check blood glucose at least 4 times a day, Disp: 6 each, Rfl: 1    cyanocobalamin (VITAMIN B-12) 500 mcg tablet, Take 500 mcg by mouth daily, Disp: , Rfl:     docusate sodium (COLACE) 100 mg capsule, Take 1 capsule (100 mg total) by mouth 2 (two) times a day, Disp: 60 capsule, Rfl: 1    Doxepin HCl (SILENOR) 6 MG TABS, Take 6 mg by mouth as needed , Disp: , Rfl:     ferrous sulfate 325 (65 Fe) mg tablet, Take 1 tablet (325 mg total) by mouth daily with breakfast for 90 days (Patient not taking: Reported on 10/21/2019), Disp: 90 tablet, Rfl: 0    finasteride (PROSCAR) 5 mg tablet, Take 5 mg by mouth daily, Disp: , Rfl:     gabapentin (NEURONTIN) 100 mg capsule, Take 3 capsules (300 mg total) by mouth daily at bedtime, Disp: 90 capsule, Rfl: 1    gabapentin (NEURONTIN) 100 mg capsule, Take 3 capsule in am and noon and 6 capsules evening   (Patient not taking: Reported on 1/30/2020), Disp: 1080 capsule, Rfl: 2    gabapentin (NEURONTIN) 100 mg capsule, Take 1 capsule (100 mg total) by mouth 3 (three) times a day (Patient not taking: Reported on 1/30/2020), Disp: 120 capsule, Rfl: 0    insulin glargine (TOUJEO SOLOSTAR) 300 units/mL CONCETRATED U-300 injection pen (1-unit dial), Inject 75  Units under the skin daily at bedtime, Disp: 15 pen, Rfl: 1    insulin lispro (HUMALOG KWIKPEN) 100 units/mL injection pen, 30-30-32 +scale ( approx daily dose 100 unit), Disp: 25 pen, Rfl: 1    Insulin Pen Needle (BD PEN NEEDLE RON U/F) 32G X 4 MM MISC, Use to inject insulin up to 6 times daily, Disp: 600 each, Rfl: 1    latanoprost (XALATAN) 0 005 % ophthalmic solution, 1 drop daily at bedtime, Disp: , Rfl:     liraglutide (VICTOZA) injection, Inject 0 6 mg daily into skin, Disp: 5 pen, Rfl: 3    LORazepam (ATIVAN) 1 mg tablet, Take 1 tab 40 min prior to MRI with a second tab 10 min prior to imaging, Disp: 2 tablet, Rfl: 0    metoprolol tartrate (LOPRESSOR) 25 mg tablet, Take 1 tablet (25 mg total) by mouth every 12 (twelve) hours, Disp: 180 tablet, Rfl: 3    omeprazole (PriLOSEC) 40 MG capsule, Take 40 mg by mouth daily, Disp: , Rfl:     tamsulosin (FLOMAX) 0 4 mg, Take 1 capsule (0 4 mg total) by mouth daily with dinner, Disp: 90 capsule, Rfl: 3    torsemide (DEMADEX) 20 mg tablet, Take 1 tablet (20 mg total) by mouth daily, Disp: 90 tablet, Rfl: 3    Laboratory Results:  Results for orders placed or performed in visit on 02/11/20   Magnesium   Result Value Ref Range    Magnesium 1 9 1 6 - 2 6 mg/dL   Renal function panel   Result Value Ref Range    Albumin 3 2 (L) 3 5 - 5 0 g/dL    Calcium 8 7 8 3 - 10 1 mg/dL    Phosphorus 4 1 2 3 - 4 1 mg/dL    BUN 36 (H) 5 - 25 mg/dL    Creatinine 2 01 (H) 0 60 - 1 30 mg/dL    Sodium 143 136 - 145 mmol/L    Potassium 3 8 3 5 - 5 3 mmol/L    Chloride 106 100 - 108 mmol/L    CO2 28 21 - 32 mmol/L    ANION GAP 9 4 - 13 mmol/L    eGFR 32 ml/min/1 73sq m    Glucose, Fasting 185 (H) 65 - 99 mg/dL   Protein / creatinine ratio, urine   Result Value Ref Range    Creatinine, Ur 58 0 mg/dL    Protein Urine Random 33 mg/dL    Prot/Creat Ratio, Ur 0 57 (H) 0 00 - 0 10

## 2020-02-14 NOTE — PATIENT INSTRUCTIONS
Increase Vitamin D3 to 2000 units daily  No changes to other medications  Please try to exercise at least 30-60 mins a day, 4-5 days a week  Follow up in 6 months

## 2020-02-26 DIAGNOSIS — G62.9 NEUROPATHY: Primary | ICD-10-CM

## 2020-02-26 RX ORDER — GABAPENTIN 300 MG/1
CAPSULE ORAL
Qty: 360 CAPSULE | Refills: 3 | Status: SHIPPED | OUTPATIENT
Start: 2020-02-26 | End: 2020-12-29 | Stop reason: SDUPTHER

## 2020-02-26 NOTE — TELEPHONE ENCOUNTER
Received fax from [address] stating patient's current Gabapentin script exceeds the plans limit of 9 capsules per day  Per 1/27 office note, Patient was advised to gradually titrate gabapentin up to Gabapentin 300 mg am and noon with 600 mg HS  Called and spoke with patient's wife, she confirms patient is taking the above dose  States he is tolerating this well and he appears to be more comfortable  States he is not pulling his shirt away from his body anymore and is no longer complaining  She is asking for new script to be sent asap as medication is sent by mail and she does not want the patient to run out  Please review and sign order for 300mg caps if agreeable

## 2020-03-05 ENCOUNTER — TRANSCRIBE ORDERS (OUTPATIENT)
Dept: LAB | Facility: CLINIC | Age: 75
End: 2020-03-05

## 2020-03-05 ENCOUNTER — HOSPITAL ENCOUNTER (OUTPATIENT)
Dept: MRI IMAGING | Facility: HOSPITAL | Age: 75
Discharge: HOME | End: 2020-03-05
Attending: PSYCHIATRY & NEUROLOGY
Payer: MEDICARE

## 2020-03-05 ENCOUNTER — APPOINTMENT (OUTPATIENT)
Dept: LAB | Facility: CLINIC | Age: 75
End: 2020-03-05
Payer: MEDICARE

## 2020-03-05 ENCOUNTER — APPOINTMENT (OUTPATIENT)
Dept: NON INVASIVE DIAGNOSTICS | Facility: CLINIC | Age: 75
End: 2020-03-05
Attending: PSYCHIATRY & NEUROLOGY
Payer: MEDICARE

## 2020-03-05 DIAGNOSIS — Z79.4 DIABETES MELLITUS DUE TO UNDERLYING CONDITION WITH DIABETIC NEPHROPATHY, WITH LONG-TERM CURRENT USE OF INSULIN (HCC): ICD-10-CM

## 2020-03-05 DIAGNOSIS — Z12.5 SCREENING FOR PROSTATE CANCER: ICD-10-CM

## 2020-03-05 DIAGNOSIS — E08.21 DIABETES MELLITUS DUE TO UNDERLYING CONDITION WITH DIABETIC NEPHROPATHY, WITH LONG-TERM CURRENT USE OF INSULIN (HCC): ICD-10-CM

## 2020-03-05 DIAGNOSIS — N18.4 TYPE 2 DIABETES MELLITUS WITH STAGE 4 CHRONIC KIDNEY DISEASE, WITH LONG-TERM CURRENT USE OF INSULIN (HCC): ICD-10-CM

## 2020-03-05 DIAGNOSIS — Z95.1 S/P CABG (CORONARY ARTERY BYPASS GRAFT): ICD-10-CM

## 2020-03-05 DIAGNOSIS — R79.89 ABNORMAL THYROID BLOOD TEST: ICD-10-CM

## 2020-03-05 DIAGNOSIS — G45.0 VERTEBRAL ARTERY SYNDROME: ICD-10-CM

## 2020-03-05 DIAGNOSIS — N18.9 CHRONIC KIDNEY DISEASE-MINERAL AND BONE DISORDER: ICD-10-CM

## 2020-03-05 DIAGNOSIS — I65.22 CAROTID ARTERY STENOSIS, ASYMPTOMATIC, LEFT: ICD-10-CM

## 2020-03-05 DIAGNOSIS — E83.9 CHRONIC KIDNEY DISEASE-MINERAL AND BONE DISORDER: ICD-10-CM

## 2020-03-05 DIAGNOSIS — Z79.4 TYPE 2 DIABETES MELLITUS WITH STAGE 4 CHRONIC KIDNEY DISEASE, WITH LONG-TERM CURRENT USE OF INSULIN (HCC): ICD-10-CM

## 2020-03-05 DIAGNOSIS — I12.9 BENIGN HYPERTENSION WITH CHRONIC KIDNEY DISEASE, STAGE IV (HCC): ICD-10-CM

## 2020-03-05 DIAGNOSIS — N18.30 BENIGN HYPERTENSION WITH CHRONIC KIDNEY DISEASE, STAGE III (HCC): ICD-10-CM

## 2020-03-05 DIAGNOSIS — G62.9 NEUROPATHY: ICD-10-CM

## 2020-03-05 DIAGNOSIS — I12.9 BENIGN HYPERTENSION WITH CHRONIC KIDNEY DISEASE, STAGE III (HCC): ICD-10-CM

## 2020-03-05 DIAGNOSIS — M89.9 CHRONIC KIDNEY DISEASE-MINERAL AND BONE DISORDER: ICD-10-CM

## 2020-03-05 DIAGNOSIS — E11.22 TYPE 2 DIABETES MELLITUS WITH STAGE 4 CHRONIC KIDNEY DISEASE, WITH LONG-TERM CURRENT USE OF INSULIN (HCC): ICD-10-CM

## 2020-03-05 DIAGNOSIS — N18.30 CKD (CHRONIC KIDNEY DISEASE), STAGE III (HCC): ICD-10-CM

## 2020-03-05 DIAGNOSIS — I25.10 TRIPLE VESSEL CORONARY ARTERY DISEASE: ICD-10-CM

## 2020-03-05 DIAGNOSIS — N18.4 BENIGN HYPERTENSION WITH CHRONIC KIDNEY DISEASE, STAGE IV (HCC): ICD-10-CM

## 2020-03-05 DIAGNOSIS — Z87.898 HISTORY OF ELEVATED PSA: ICD-10-CM

## 2020-03-05 LAB
ALBUMIN SERPL BCP-MCNC: 3.2 G/DL (ref 3.5–5)
ALP SERPL-CCNC: 97 U/L (ref 46–116)
ALT SERPL W P-5'-P-CCNC: 28 U/L (ref 12–78)
ANION GAP SERPL CALCULATED.3IONS-SCNC: 10 MMOL/L (ref 4–13)
AST SERPL W P-5'-P-CCNC: 21 U/L (ref 5–45)
BACTERIA UR QL AUTO: NORMAL /HPF
BASOPHILS # BLD AUTO: 0.03 THOUSANDS/ΜL (ref 0–0.1)
BASOPHILS NFR BLD AUTO: 1 % (ref 0–1)
BILIRUB SERPL-MCNC: 0.4 MG/DL (ref 0.2–1)
BILIRUB UR QL STRIP: NEGATIVE
BUN SERPL-MCNC: 39 MG/DL (ref 5–25)
CALCIUM SERPL-MCNC: 8.8 MG/DL (ref 8.3–10.1)
CHLORIDE SERPL-SCNC: 105 MMOL/L (ref 100–108)
CLARITY UR: CLEAR
CO2 SERPL-SCNC: 28 MMOL/L (ref 21–32)
COLOR UR: YELLOW
CREAT SERPL-MCNC: 2.09 MG/DL (ref 0.6–1.3)
EOSINOPHIL # BLD AUTO: 0.32 THOUSAND/ΜL (ref 0–0.61)
EOSINOPHIL NFR BLD AUTO: 5 % (ref 0–6)
ERYTHROCYTE [DISTWIDTH] IN BLOOD BY AUTOMATED COUNT: 13.9 % (ref 11.6–15.1)
EST. AVERAGE GLUCOSE BLD GHB EST-MCNC: 200 MG/DL
FERRITIN SERPL-MCNC: 205 NG/ML (ref 8–388)
GFR SERPL CREATININE-BSD FRML MDRD: 30 ML/MIN/1.73SQ M
GLUCOSE P FAST SERPL-MCNC: 185 MG/DL (ref 65–99)
GLUCOSE UR STRIP-MCNC: NEGATIVE MG/DL
HBA1C MFR BLD: 8.6 %
HCT VFR BLD AUTO: 38 % (ref 36.5–49.3)
HGB BLD-MCNC: 12.3 G/DL (ref 12–17)
HGB UR QL STRIP.AUTO: NEGATIVE
IMM GRANULOCYTES # BLD AUTO: 0.02 THOUSAND/UL (ref 0–0.2)
IMM GRANULOCYTES NFR BLD AUTO: 0 % (ref 0–2)
IRON SATN MFR SERPL: 28 %
IRON SERPL-MCNC: 70 UG/DL (ref 65–175)
KETONES UR STRIP-MCNC: NEGATIVE MG/DL
LEUKOCYTE ESTERASE UR QL STRIP: NEGATIVE
LYMPHOCYTES # BLD AUTO: 1.29 THOUSANDS/ΜL (ref 0.6–4.47)
LYMPHOCYTES NFR BLD AUTO: 21 % (ref 14–44)
MCH RBC QN AUTO: 30.1 PG (ref 26.8–34.3)
MCHC RBC AUTO-ENTMCNC: 32.4 G/DL (ref 31.4–37.4)
MCV RBC AUTO: 93 FL (ref 82–98)
MONOCYTES # BLD AUTO: 0.59 THOUSAND/ΜL (ref 0.17–1.22)
MONOCYTES NFR BLD AUTO: 10 % (ref 4–12)
NEUTROPHILS # BLD AUTO: 3.94 THOUSANDS/ΜL (ref 1.85–7.62)
NEUTS SEG NFR BLD AUTO: 63 % (ref 43–75)
NITRITE UR QL STRIP: NEGATIVE
NON-SQ EPI CELLS URNS QL MICRO: NORMAL /HPF
NRBC BLD AUTO-RTO: 0 /100 WBCS
PH UR STRIP.AUTO: 5 [PH]
PLATELET # BLD AUTO: 207 THOUSANDS/UL (ref 149–390)
PMV BLD AUTO: 11.3 FL (ref 8.9–12.7)
POTASSIUM SERPL-SCNC: 4.3 MMOL/L (ref 3.5–5.3)
PROT SERPL-MCNC: 7.1 G/DL (ref 6.4–8.2)
PROT UR STRIP-MCNC: NEGATIVE MG/DL
PSA SERPL-MCNC: 0.2 NG/ML (ref 0–4)
PSA SERPL-MCNC: 0.2 NG/ML (ref 0–4)
RBC # BLD AUTO: 4.08 MILLION/UL (ref 3.88–5.62)
RBC #/AREA URNS AUTO: NORMAL /HPF
SODIUM SERPL-SCNC: 143 MMOL/L (ref 136–145)
SP GR UR STRIP.AUTO: 1.01 (ref 1–1.03)
T4 FREE SERPL-MCNC: 0.91 NG/DL (ref 0.76–1.46)
TIBC SERPL-MCNC: 246 UG/DL (ref 250–450)
TSH SERPL DL<=0.05 MIU/L-ACNC: 3.78 UIU/ML (ref 0.36–3.74)
UROBILINOGEN UR QL STRIP.AUTO: 0.2 E.U./DL
WBC # BLD AUTO: 6.19 THOUSAND/UL (ref 4.31–10.16)
WBC #/AREA URNS AUTO: NORMAL /HPF

## 2020-03-05 PROCEDURE — 83540 ASSAY OF IRON: CPT

## 2020-03-05 PROCEDURE — 36415 COLL VENOUS BLD VENIPUNCTURE: CPT

## 2020-03-05 PROCEDURE — 83036 HEMOGLOBIN GLYCOSYLATED A1C: CPT

## 2020-03-05 PROCEDURE — G0103 PSA SCREENING: HCPCS

## 2020-03-05 PROCEDURE — 83550 IRON BINDING TEST: CPT

## 2020-03-05 PROCEDURE — 84443 ASSAY THYROID STIM HORMONE: CPT

## 2020-03-05 PROCEDURE — 85025 COMPLETE CBC W/AUTO DIFF WBC: CPT

## 2020-03-05 PROCEDURE — 82728 ASSAY OF FERRITIN: CPT

## 2020-03-05 PROCEDURE — 84439 ASSAY OF FREE THYROXINE: CPT

## 2020-03-05 PROCEDURE — 81001 URINALYSIS AUTO W/SCOPE: CPT

## 2020-03-05 PROCEDURE — 70544 MR ANGIOGRAPHY HEAD W/O DYE: CPT

## 2020-03-05 PROCEDURE — 80053 COMPREHEN METABOLIC PANEL: CPT

## 2020-03-09 ENCOUNTER — TELEPHONE (OUTPATIENT)
Dept: ENDOCRINOLOGY | Facility: CLINIC | Age: 75
End: 2020-03-09

## 2020-03-09 NOTE — TELEPHONE ENCOUNTER
----- Message from Marget Goltz, PA-C sent at 3/9/2020  4:01 PM EDT -----  Please call the patient regarding his abnormal result  A1C elevated at 8 6%, but improved from 9 0%  Goal A1C is 7%  Patient last saw Dr RAND 1 month ago  Advise patient to send log or download [employer] so adjustments can be made to regimen  For now continue current treatment as instructed

## 2020-03-16 ENCOUNTER — OFFICE VISIT (OUTPATIENT)
Dept: FAMILY MEDICINE CLINIC | Facility: OTHER | Age: 75
End: 2020-03-16
Payer: MEDICARE

## 2020-03-16 VITALS
OXYGEN SATURATION: 97 % | SYSTOLIC BLOOD PRESSURE: 138 MMHG | BODY MASS INDEX: 37.85 KG/M2 | HEIGHT: 71 IN | TEMPERATURE: 99.3 F | HEART RATE: 74 BPM | DIASTOLIC BLOOD PRESSURE: 80 MMHG | WEIGHT: 270.4 LBS

## 2020-03-16 DIAGNOSIS — E78.2 MIXED HYPERLIPIDEMIA: ICD-10-CM

## 2020-03-16 DIAGNOSIS — I25.10 TRIPLE VESSEL CORONARY ARTERY DISEASE: ICD-10-CM

## 2020-03-16 DIAGNOSIS — Z95.1 S/P CABG (CORONARY ARTERY BYPASS GRAFT): ICD-10-CM

## 2020-03-16 DIAGNOSIS — N18.30 CKD (CHRONIC KIDNEY DISEASE), STAGE III (HCC): ICD-10-CM

## 2020-03-16 DIAGNOSIS — E11.59 TYPE 2 DIABETES MELLITUS WITH OTHER CIRCULATORY COMPLICATION, UNSPECIFIED WHETHER LONG TERM INSULIN USE (HCC): Primary | ICD-10-CM

## 2020-03-16 PROCEDURE — 4040F PNEUMOC VAC/ADMIN/RCVD: CPT | Performed by: NURSE PRACTITIONER

## 2020-03-16 PROCEDURE — 1036F TOBACCO NON-USER: CPT | Performed by: NURSE PRACTITIONER

## 2020-03-16 PROCEDURE — 99214 OFFICE O/P EST MOD 30 MIN: CPT | Performed by: NURSE PRACTITIONER

## 2020-03-16 PROCEDURE — 3008F BODY MASS INDEX DOCD: CPT | Performed by: NURSE PRACTITIONER

## 2020-03-16 PROCEDURE — 3079F DIAST BP 80-89 MM HG: CPT | Performed by: NURSE PRACTITIONER

## 2020-03-16 PROCEDURE — 3075F SYST BP GE 130 - 139MM HG: CPT | Performed by: NURSE PRACTITIONER

## 2020-03-16 PROCEDURE — 1160F RVW MEDS BY RX/DR IN RCRD: CPT | Performed by: NURSE PRACTITIONER

## 2020-03-16 NOTE — PATIENT INSTRUCTIONS
Type 2 Diabetes in Adults   AMBULATORY CARE:   Type 2 diabetes  is a disease that affects how your body uses glucose (sugar)  Normally, when the blood sugar level increases, the pancreas makes more insulin  Insulin helps move sugar out of the blood so it can be used for energy  Type 2 diabetes develops because either the body cannot make enough insulin, or it cannot use the insulin correctly  After many years, your pancreas may stop making insulin  Common symptoms include the following:   · More hunger or thirst than usual     · Frequent urination     · Weight loss without trying     · Blurred vision  Call 911 if you have any of the following:   · You have any of the following signs of a stroke:      ¨ Numbness or drooping on one side of your face     ¨ Weakness in an arm or leg    ¨ Confusion or difficulty speaking    ¨ Dizziness, a severe headache, or vision loss    · You have any of the following signs of a heart attack:      ¨ Squeezing, pressure, or pain in your chest that lasts longer than 5 minutes or returns    ¨ Discomfort or pain in your back, neck, jaw, stomach, or arm     ¨ Trouble breathing    ¨ Nausea or vomiting    ¨ Lightheadedness or a sudden cold sweat, especially with chest pain or trouble breathing  Seek care immediately if:   · You have severe abdominal pain, or the pain spreads to your back  You may also be vomiting  · You have trouble staying awake or focusing  · You are shaking or sweating  · You have blurred or double vision  · Your breath has a fruity, sweet smell  · Your breathing is deep and labored, or rapid and shallow  · Your heartbeat is fast and weak  Contact your healthcare provider if:   · You are vomiting or have diarrhea  · You have an upset stomach and cannot eat the foods on your meal plan  · You feel weak or more tired than usual      · You feel dizzy, have headaches, or are easily irritated  · Your skin is red, warm, dry, or swollen  · You have a wound that does not heal      · You have numbness in your arms or legs  · You have trouble coping with your illness, or you feel anxious or depressed  · You have questions or concerns about your condition or care  Treatment for type 2 diabetes  includes keeping your blood sugar at a normal level  You must eat the right foods, and exercise regularly  You may need medicine if you cannot control your blood sugar level with nutrition and exercise  You may also need medicine to prevent heart disease, a complication of type 2 diabetes  You may  need any of the following:  · Hypoglycemic medicines or insulin  may be given to decrease the amount of sugar in your blood  · Blood pressure medicine  may be given to lower your blood pressure  Your blood pressure should be less than 140/90  · Cholesterol lowering medicine  may be given to prevent heart disease  · Antiplatelets , such as aspirin, help prevent blood clots  Take your antiplatelet medicine exactly as directed  These medicines make it more likely for you to bleed or bruise  If you are told to take aspirin, do not take acetaminophen or ibuprofen instead  · Take your medicine as directed  Contact your healthcare provider if you think your medicine is not helping or if you have side effects  Tell him or her if you are allergic to any medicine  Keep a list of the medicines, vitamins, and herbs you take  Include the amounts, and when and why you take them  Bring the list or the pill bottles to follow-up visits  Carry your medicine list with you in case of an emergency  Check your blood sugar level: You will be taught how to check a small drop of blood in a glucose monitor  You will need to check your blood sugar level at least 3 times each day if you are on insulin  Ask your healthcare provider when and how often to check during the day  If you check your blood sugar level before a meal , it should be between 80 and 130 mg/dL   If you check your blood sugar level 1 to 2 hours after a meal , it should be less than 180 mg/dL  Ask your healthcare provider if these are good goals for you  Write down your results, and show them to your healthcare provider  He may use the results to make changes to your medicine, food, and exercise schedules  If your blood sugar level is too low: Your blood sugar level is too low if it goes below 70 mg/dL  If the level is too low, eat or drink 15 grams of fast-acting carbohydrate  These are found naturally in fruits  Fast-acting carbohydrates will raise your blood sugar level quickly  Examples of 15 grams of fast-acting carbohydrate are 4 ounces (½ cup) of fruit juice or 4 ounces of regular soda  Other examples are 2 tablespoons of raisins or 3 to 4 glucose tablets  Check your blood sugar level 15 minutes later  If the level is still low (less than 100 mg/dL), eat another 15 grams of carbohydrate  When the level returns to 100 mg/dL, eat a snack or meal that contains carbohydrates  This will help prevent another drop in blood sugar  Always carefully follow your healthcare provider's instructions on how to treat low blood sugar levels  Check your feet each day for sores:  Wear shoes and socks that fit correctly  Do not trim your toenails  Ask your healthcare provider for more information about foot care  Follow your meal plan:  A dietitian will help you make a meal plan to keep your blood sugar level steady  Do not skip meals  Your blood sugar level may drop too low if you have taken diabetes medicine and do not eat  · Keep track of carbohydrates (sugar and starchy foods)  Your blood sugar level can get too high if you eat too many carbohydrates  Your dietitian will help you plan meals and snacks that have the right amount of carbohydrates  · Eat low-fat foods , such as skinless chicken and low-fat milk  · Eat less sodium (salt)    Limit high-sodium foods, such as soy sauce, potato chips, and soup  Do not add salt to food you cook  Limit your use of table salt  You should have less than 2,300 mg of sodium per day  · Eat high-fiber foods , such as vegetables, whole grain breads, and beans  · Limit alcohol  Alcohol affects your blood sugar level and can make it harder to manage your diabetes  Limit alcohol to 1 drink a day if you are a woman  Limit alcohol to 2 drinks a day if you are a man  A drink of alcohol is 12 ounces of beer, 5 ounces of wine, or 1½ ounces of liquor  Maintain a healthy weight:  Ask your healthcare provider how much you should weigh  A healthy weight can help you control your diabetes  Ask your provider to help you create a weight loss plan if you are overweight  Together you can set manageable weight loss goals  Exercise as directed:  Exercise can help keep your blood sugar level steady, decrease your risk of heart disease, and help you lose weight  Stretch before and after you exercise  Exercise for at least 150 minutes every week  Spread this amount of exercise over at least 3 days a week  Do not skip exercise more than 2 days in a row  Include muscle strengthening activities 2 to 3 days each week  Older adults should include balance training 2 to 3 times each week  Activities that help increase balance include yoga and patricia chi  Work with your healthcare provider to create an exercise plan  · Check your blood sugar level before and after exercise  Healthcare providers may tell you to change the amount of insulin you take or food you eat  If your blood sugar level is high, check your blood or urine for ketones before you exercise  Do not exercise if your blood sugar level is high and you have ketones  · If your blood sugar level is less than 100 mg/dL, have a carbohydrate snack before you exercise  Examples are 4 to 6 crackers, ½ banana, 8 ounces (1 cup) of milk, or 4 ounces (½ cup) of juice   Drink water or liquids that do not contain sugar before, during, and after exercise  Ask your dietitian or healthcare provider which liquids you should drink when you exercise  · Do not sit for longer than 30 minutes  If you cannot walk around, at least stand up  This will help you stay active and keep your blood circulating  Do not smoke:  Nicotine and other chemicals in cigarettes and cigars can cause lung damage and make it more difficult to manage your diabetes  Ask your healthcare provider for information if you currently smoke and need help to quit  Do not use e-cigarettes or smokeless tobacco in place of cigarettes or to help you quit  They still contain nicotine  Check your blood pressure as directed:  Ask your healthcare provider what your blood pressure should be  Most adults with diabetes and high blood pressure should have a systolic blood pressure (first number) less than 140  Your diastolic blood pressure (second number) should be less than 90  Wear medical alert identification:  Wear medical alert jewelry or carry a card that says you have diabetes  Ask your healthcare provider where to get these items  Ask about vaccines: You have a higher risk for serious illness if you get the flu, pneumonia, or hepatitis  Ask your healthcare provider if you should get a flu, pneumonia, or hepatitis B vaccine, and when to get the vaccine  Follow up with your healthcare provider as directed: You may need to return to have your A1c checked every 3 months  You will need to return at least once each year to have your feet checked  You will need an eye exam once a year to check for retinopathy  You will also need urine tests every year to check for kidney problems  You may need tests to monitor for heart disease such as an EKG, stress test, blood pressure monitoring, and blood tests  Write down your questions so you remember to ask them during your visits     © 2017 [employer] Information is for End User's use only and may not be sold, redistributed or otherwise used for commercial purposes  All illustrations and images included in CareNotes® are the copyrighted property of [employer]  or Micah Perez  The above information is an  only  It is not intended as medical advice for individual conditions or treatments  Talk to your doctor, nurse or pharmacist before following any medical regimen to see if it is safe and effective for you

## 2020-03-16 NOTE — PROGRESS NOTES
Assessment/Plan:         Problem List Items Addressed This Visit     Diabetes mellitus ([facility] )   Peripheral neuropathy  --A1C gradually improving, but remains sub-optimal on basal + prandial insulin, as well as GLP-1  Dietary non-compliance and weight are likely primary contributing factors  Encouraged to work on both  Details discussed  Previously saw diabetes educator  --Remains on gabapentin for PN  Followed by neurology  --Foot exam today  --UTD with eye exam       Triple vessel coronary artery disease  Hyperlipidemia   S/P CABG (coronary artery bypass graft)  --Asymptomatic on aspirin, B-blocker, statin  --Followed by cardiology          CKD (chronic kidney disease), stage III ([facility] )  Hypertension  --Stable renal function, borderline high BP on current regimen  --Followed by nephrology  --Continue dietary, weight loss measures  HARJIT  --Controlled on C-pap  --Weight loss  BPH  --Remains minimally symptomatic on Flomax  Recent normal PSA  --Followed by urology          UTD with immunizations, colonoscopy (2019)    RTO 6 months for AWV      Subjective:      Patient ID: Boom Huynh is a 69 y o  male  Patient of Dr Ashlyn Rosario  New to myself  Here for routine follow-up  No complaints or issues  Recent (3/20) lab results reviewed with patient  Notable for continued sub-optimal A1C (8 6), but improved from previous (9 0)  Stable creatinine (2 1), normal TSH, PSA  Home fasting glucose readings have been mid 100's-low 200's lately on 30-30-32 prandial insulin, 75 units basal insulin  No recent hypoglycemic episodes  Admits to not being careful with his diet the way the he should  Tends to eat out a lot  Drinks water, Gingerale, diet soda  Previously saw diabetes educator  Continued peripheral neuropathy, feet > hands  Also notes paraesthesia of skin overlying chest (itching) for which he is currently seeing neurology  EMG scheduled  Remains on gabapentin     Sees podiatrist regularly--upcoming appointment  Eye exam within the past year  Glasses UTD  No recent CP, palpitations, dizziness, dyspnea  Sees cardiologist regularly  HARJIT remains well controlled on C-pap  Denies feeling unrefreshed in the morning  No recent urinary changes  Remains on Flomax  Sees both urologist and nephrologist       No GI issues including C/D, melena, hematochezia  States he's UTD with colonoscopy  UTD with immunizations  The following portions of the patient's history were reviewed and updated as appropriate: current medications, past family history, past medical history, past social history, past surgical history and problem list     Review of Systems   Constitutional: Negative for fatigue and fever  HENT: Negative for sore throat  Eyes: Negative for visual disturbance  Respiratory: Negative for cough and shortness of breath  Cardiovascular: Negative for chest pain and palpitations  Gastrointestinal: Negative for abdominal pain, constipation, diarrhea and vomiting  Genitourinary: Negative for dysuria  Musculoskeletal: Negative for arthralgias and gait problem  Skin: Negative for rash  Neurological: Positive for numbness  Negative for dizziness and headaches  Objective:      /80 (BP Location: Left arm, Patient Position: Sitting, Cuff Size: Large)   Pulse 74   Temp 99 3 °F (37 4 °C) (Tympanic)   Ht 5' 11" (1 803 m)   Wt 123 kg (270 lb 6 4 oz)   SpO2 97%   BMI 37 71 kg/m²          Physical Exam   Constitutional: He is oriented to person, place, and time  He appears well-developed and well-nourished  HENT:   Head: Normocephalic and atraumatic  Right Ear: External ear normal    Left Ear: External ear normal    Nose: Nose normal    Mouth/Throat: Oropharynx is clear and moist    Eyes: Pupils are equal, round, and reactive to light  Conjunctivae are normal    Neck: Normal range of motion  Neck supple     Cardiovascular: Normal rate, regular rhythm, normal heart sounds and intact distal pulses  Pulmonary/Chest: Effort normal and breath sounds normal    Abdominal: Soft  Bowel sounds are normal  There is no tenderness  Musculoskeletal: He exhibits no edema  Lymphadenopathy:     He has no cervical adenopathy  Neurological: He is alert and oriented to person, place, and time  He has normal reflexes  Skin: Skin is warm and dry  Psychiatric: He has a normal mood and affect  Patient's shoes and socks removed

## 2020-03-19 DIAGNOSIS — E11.65 UNCONTROLLED TYPE 2 DIABETES MELLITUS WITH HYPERGLYCEMIA (HCC): ICD-10-CM

## 2020-03-19 RX ORDER — INSULIN LISPRO 100 [IU]/ML
INJECTION, SOLUTION INTRAVENOUS; SUBCUTANEOUS
Qty: 25 PEN | Refills: 1
Start: 2020-03-19 | End: 2020-05-06 | Stop reason: SDUPTHER

## 2020-03-19 NOTE — TELEPHONE ENCOUNTER
Pt stopped in to drop off BG log/ Joe download:    Current meds: Toujeo 75 units @ bedtime  Humalog 30-30-32 + scale  Victoza   6 mg daily

## 2020-03-19 NOTE — TELEPHONE ENCOUNTER
Current meds: Toujeo 75 units @ bedtime  Humalog 30-30-32 + scale  Victoza   6 mg daily    reviewed blood sugar log   Blood sugars are overall elevated in 150-250 range  Please inform patient to increase Humalog to 35 units with meals plus scale  Continue toujeo 75 unit at bedtime   And increase Victoza, 1 2 mg daily  He should call if blood sugar more than 300 or less than 70 persistently    Cecelia Figueroa MD

## 2020-03-23 ENCOUNTER — TELEPHONE (OUTPATIENT)
Dept: CARDIOLOGY CLINIC | Facility: CLINIC | Age: 75
End: 2020-03-23

## 2020-03-23 NOTE — TELEPHONE ENCOUNTER
Due to the current pandemic of COVID-19 patient was given the option to par take in a video/telephone call, which was accepted by the patient  Patient was informed via telephone the following: There is a possibility of a $27 00 fee to participate in this Virtual Visit  This is depending on your insurance company if the will cover that cost     Patients preferred phone number to contact is [phone]_____________________  Video option - Patients preferred email:_______________________________  Patient informed that they will receive an e-mail 15 minutes before their scheduled time as a reminder

## 2020-03-24 ENCOUNTER — TELEPHONE (OUTPATIENT)
Dept: CARDIOLOGY CLINIC | Facility: CLINIC | Age: 75
End: 2020-03-24

## 2020-03-24 ENCOUNTER — TELEMEDICINE (OUTPATIENT)
Dept: CARDIOLOGY CLINIC | Facility: CLINIC | Age: 75
End: 2020-03-24
Payer: MEDICARE

## 2020-03-24 ENCOUNTER — HOSPITAL ENCOUNTER (OUTPATIENT)
Dept: NON INVASIVE DIAGNOSTICS | Facility: CLINIC | Age: 75
Discharge: HOME | End: 2020-03-24
Attending: PSYCHIATRY & NEUROLOGY
Payer: MEDICARE

## 2020-03-24 DIAGNOSIS — E78.2 MIXED HYPERLIPIDEMIA: ICD-10-CM

## 2020-03-24 DIAGNOSIS — I12.9 BENIGN HYPERTENSION WITH CHRONIC KIDNEY DISEASE, STAGE III (HCC): ICD-10-CM

## 2020-03-24 DIAGNOSIS — N18.30 BENIGN HYPERTENSION WITH CHRONIC KIDNEY DISEASE, STAGE III (HCC): ICD-10-CM

## 2020-03-24 DIAGNOSIS — I25.10 TRIPLE VESSEL CORONARY ARTERY DISEASE: Primary | ICD-10-CM

## 2020-03-24 DIAGNOSIS — G45.0 VERTEBRAL ARTERY SYNDROME: ICD-10-CM

## 2020-03-24 DIAGNOSIS — N18.30 CKD (CHRONIC KIDNEY DISEASE), STAGE III (HCC): ICD-10-CM

## 2020-03-24 DIAGNOSIS — I65.22 CAROTID ARTERY STENOSIS, ASYMPTOMATIC, LEFT: ICD-10-CM

## 2020-03-24 DIAGNOSIS — Z95.1 S/P CABG (CORONARY ARTERY BYPASS GRAFT): ICD-10-CM

## 2020-03-24 PROCEDURE — 93880 EXTRACRANIAL BILAT STUDY: CPT | Performed by: SURGERY

## 2020-03-24 PROCEDURE — 99442 PR PHYS/QHP TELEPHONE EVALUATION 11-20 MIN: CPT | Performed by: INTERNAL MEDICINE

## 2020-03-24 PROCEDURE — 93880 EXTRACRANIAL BILAT STUDY: CPT

## 2020-03-24 RX ORDER — ATORVASTATIN CALCIUM 80 MG/1
80 TABLET, FILM COATED ORAL
Qty: 90 TABLET | Refills: 3 | Status: SHIPPED | OUTPATIENT
Start: 2020-03-24 | End: 2021-04-02

## 2020-03-24 NOTE — TELEPHONE ENCOUNTER
Due to the current pandemic of COVID-19 patient was given the option to par take in a video/telephone call, which was accepted by the patient  Patient was informed via telephone the following: There is a possibility of a $27 00 fee to participate in this Virtual Visit  This is depending on your insurance company if the will cover that cost     Patients preferred phone number to contact is [phone]  Video option - Patients preferred email:_______________________________  Patient informed that they will receive an e-mail 15 minutes before their scheduled time as a reminder

## 2020-03-24 NOTE — PROGRESS NOTES
Virtual Cardiology Visit    Problem List Items Addressed This Visit        Cardiovascular and Mediastinum    Benign hypertension with chronic kidney disease, stage III (HCC)    Triple vessel coronary artery disease - Primary       Genitourinary    CKD (chronic kidney disease), stage III (HCC)       Other    Hyperlipidemia    S/P CABG (coronary artery bypass graft)               Reason for visit is follow up coronary artery disease  Encounter provider Venkatesh Abreu MD    Provider located at [address] [place] [id number]      Recent Visits  Date Type Provider Dept   03/23/20 Telephone Kalia Moss, [address] recent visits within past 7 days and meeting all other requirements     Today's Visits  Date Type Provider Dept   03/24/20 Telephone [phone] today's visits and meeting all other requirements     Future Appointments  Date Type Provider Dept   03/24/20 Telephone [address]   Showing future appointments within next 150 days and meeting all other requirements        After connecting through [employer], the patient was identified by name and date of birth  Mikey Brantley was informed that this is a telemedicine visit and that the visit is being conducted through telephone which may not be secure and therefore, might not be HIPAA-compliant  My office door was closed  No one else was in the room  He acknowledged consent and understanding of privacy and security of the video platform  The patient has agreed to participate and understands they can discontinue the visit at any time  Impression:  1  Diastolic heart failure - compensated at this time  2  CAD s/p CABG x 4 - 6/19  3  HTN - controlled  4  Dyslipidemia - on statin  5  CKD - follows with nephrology  6  Chest pain - unclear etiology  Most likely due to nerve regrowth from CABG     Recommendations:  1  Continue current medications  2  Follow up in 6 months  Subjective  Nikita Hanson is a 69 y o  male with coronary artery disease s/p CABG 6/19, hypertension, and dyslipidemia who returns for a virtual follow up  Does have a tightness on chest - usually occurs later in the day  Resolves in morning  No dyspnea or palpitations  Blood pressure 142/80 mmHg      Past Medical History:   Diagnosis Date    Diabetes mellitus ([address] )     Hyperlipidemia     Hypertension     Renal disorder        Past Surgical History:   Procedure Laterality Date    BACK SURGERY      CORONARY ARTERY BYPASS GRAFT      quad    GALLBLADDER SURGERY      HERNIA REPAIR      TX CABG, ARTERY-VEIN, FOUR N/A 6/21/2019    Procedure: CORONARY ARTERY BYPASS GRAFT (CABG) 4 VESSELS WITH SVG TO PDA, OM, DIAGONAL AND LIMA TO LAD; RIGHT LEG EVH;  Surgeon: Moose Watt MD;  Location: [facility];  Service: Cardiac Surgery       Current Outpatient Medications   Medication Sig Dispense Refill    Ascorbic Acid (VITAMIN C) 1000 MG tablet Take 1,000 mg by mouth daily      aspirin 325 mg tablet Take 1 tablet (325 mg total) by mouth daily 100 tablet 0    atorvastatin (LIPITOR) 80 mg tablet Take 1 tablet (80 mg total) by mouth daily with dinner 90 tablet 3    cholecalciferol (VITAMIN D3) 1,000 units tablet Take 1 tablet (1,000 Units total) by mouth daily 1 tablet 1    Continuous Blood Gluc  (FREESTYLE JOHANNE READER) MAMI Use device to scan blood glucose at least 4 times a day 1 Device 0    Continuous Blood Gluc Sensor (FREESTYLE JOHANNE SENSOR SYSTEM) MISC Apply sensor every 14 days to check blood glucose at least 4 times a day 6 each 1    cyanocobalamin (VITAMIN B-12) 500 mcg tablet Take 500 mcg by mouth daily      docusate sodium (COLACE) 100 mg capsule Take 1 capsule (100 mg total) by mouth 2 (two) times a day 60 capsule 1    Doxepin HCl (SILENOR) 6 MG TABS Take 6 mg by mouth as needed       ferrous sulfate 325 (65 Fe) mg tablet Take 1 tablet (325 mg total) by mouth daily with breakfast for 90 days (Patient not taking: Reported on 10/21/2019) 90 tablet 0    finasteride (PROSCAR) 5 mg tablet Take 5 mg by mouth daily      gabapentin (NEURONTIN) 100 mg capsule Take 3 capsules (300 mg total) by mouth daily at bedtime 90 capsule 1    gabapentin (NEURONTIN) 300 mg capsule Take 1 cap (300mg) by mouth in the morning and 1 cap (300mg) at noon, take 2 caps (600mg) at bedtime 360 capsule 3    insulin glargine (TOUJEO SOLOSTAR) 300 units/mL CONCETRATED U-300 injection pen (1-unit dial) Inject 75  Units under the skin daily at bedtime 15 pen 1    insulin lispro (HumaLOG KwikPen) 100 units/mL injection pen 35-35-35 +scale ( approx daily dose 100 unit) 25 pen 1    Insulin Pen Needle (BD PEN NEEDLE RON U/F) 32G X 4 MM MISC Use to inject insulin up to 6 times daily 600 each 1    latanoprost (XALATAN) 0 005 % ophthalmic solution 1 drop daily at bedtime      liraglutide (VICTOZA) injection Inject 1 2 mg daily into skin 5 pen 3    LORazepam (ATIVAN) 1 mg tablet Take 1 tab 40 min prior to MRI with a second tab 10 min prior to imaging (Patient not taking: Reported on 2/14/2020) 2 tablet 0    metoprolol tartrate (LOPRESSOR) 25 mg tablet Take 1 tablet (25 mg total) by mouth every 12 (twelve) hours 180 tablet 3    omeprazole (PriLOSEC) 40 MG capsule Take 40 mg by mouth daily      tamsulosin (FLOMAX) 0 4 mg Take 1 capsule (0 4 mg total) by mouth daily with dinner 90 capsule 3    torsemide (DEMADEX) 20 mg tablet Take 1 tablet (20 mg total) by mouth daily 90 tablet 3     No current facility-administered medications for this visit  No Known Allergies    Review of Systems   Constitutional: Negative  HENT: Negative  Eyes: Negative  Respiratory: Negative for chest tightness and shortness of breath  Cardiovascular: Positive for chest pain  Negative for palpitations and leg swelling  Gastrointestinal: Negative  Endocrine: Negative  Genitourinary: Negative  Musculoskeletal: Negative  Skin: Negative  Allergic/Immunologic: Negative  Neurological: Negative  Hematological: Negative  Psychiatric/Behavioral: Negative  All other systems reviewed and are negative  I spent 20 minutes with the patient during this visit      [id number]

## 2020-04-22 ENCOUNTER — TELEPHONE (OUTPATIENT)
Dept: NEUROLOGY | Facility: CLINIC | Age: 75
End: 2020-04-22

## 2020-04-27 ENCOUNTER — LAB (OUTPATIENT)
Dept: LAB | Facility: CLINIC | Age: 75
End: 2020-04-27
Payer: MEDICARE

## 2020-04-27 DIAGNOSIS — E11.65 UNCONTROLLED TYPE 2 DIABETES MELLITUS WITH HYPERGLYCEMIA (HCC): ICD-10-CM

## 2020-04-27 DIAGNOSIS — E78.2 MIXED HYPERLIPIDEMIA: ICD-10-CM

## 2020-04-27 LAB
ANION GAP SERPL CALCULATED.3IONS-SCNC: 9 MMOL/L (ref 4–13)
BUN SERPL-MCNC: 38 MG/DL (ref 5–25)
CALCIUM SERPL-MCNC: 9.1 MG/DL (ref 8.3–10.1)
CHLORIDE SERPL-SCNC: 106 MMOL/L (ref 100–108)
CHOLEST SERPL-MCNC: 115 MG/DL (ref 50–200)
CO2 SERPL-SCNC: 29 MMOL/L (ref 21–32)
CREAT SERPL-MCNC: 2.02 MG/DL (ref 0.6–1.3)
EST. AVERAGE GLUCOSE BLD GHB EST-MCNC: 177 MG/DL
GFR SERPL CREATININE-BSD FRML MDRD: 32 ML/MIN/1.73SQ M
GLUCOSE P FAST SERPL-MCNC: 159 MG/DL (ref 65–99)
HBA1C MFR BLD: 7.8 %
HDLC SERPL-MCNC: 45 MG/DL
LDLC SERPL CALC-MCNC: 52 MG/DL (ref 0–100)
NONHDLC SERPL-MCNC: 70 MG/DL
POTASSIUM SERPL-SCNC: 3.9 MMOL/L (ref 3.5–5.3)
SODIUM SERPL-SCNC: 144 MMOL/L (ref 136–145)
TRIGL SERPL-MCNC: 89 MG/DL

## 2020-04-27 PROCEDURE — 36415 COLL VENOUS BLD VENIPUNCTURE: CPT

## 2020-04-27 PROCEDURE — 80048 BASIC METABOLIC PNL TOTAL CA: CPT

## 2020-04-27 PROCEDURE — 83036 HEMOGLOBIN GLYCOSYLATED A1C: CPT

## 2020-04-27 PROCEDURE — 80061 LIPID PANEL: CPT

## 2020-04-28 ENCOUNTER — TELEPHONE (OUTPATIENT)
Dept: NEUROLOGY | Facility: CLINIC | Age: 75
End: 2020-04-28

## 2020-04-29 ENCOUNTER — OFFICE VISIT (OUTPATIENT)
Dept: NEUROLOGY | Facility: CLINIC | Age: 75
End: 2020-04-29
Payer: MEDICARE

## 2020-04-29 VITALS
SYSTOLIC BLOOD PRESSURE: 138 MMHG | HEIGHT: 71 IN | BODY MASS INDEX: 37.66 KG/M2 | DIASTOLIC BLOOD PRESSURE: 68 MMHG | RESPIRATION RATE: 18 BRPM | WEIGHT: 269 LBS | HEART RATE: 68 BPM

## 2020-04-29 DIAGNOSIS — Z95.1 S/P CABG (CORONARY ARTERY BYPASS GRAFT): ICD-10-CM

## 2020-04-29 DIAGNOSIS — I65.22 STENOSIS OF LEFT INTERNAL CAROTID ARTERY: ICD-10-CM

## 2020-04-29 DIAGNOSIS — G62.9 NEUROPATHY: Primary | ICD-10-CM

## 2020-04-29 DIAGNOSIS — D50.0 IRON DEFICIENCY ANEMIA DUE TO CHRONIC BLOOD LOSS: ICD-10-CM

## 2020-04-29 DIAGNOSIS — G47.30 SLEEP APNEA, UNSPECIFIED TYPE: ICD-10-CM

## 2020-04-29 PROCEDURE — 1036F TOBACCO NON-USER: CPT | Performed by: PHYSICIAN ASSISTANT

## 2020-04-29 PROCEDURE — 4040F PNEUMOC VAC/ADMIN/RCVD: CPT | Performed by: PHYSICIAN ASSISTANT

## 2020-04-29 PROCEDURE — 3075F SYST BP GE 130 - 139MM HG: CPT | Performed by: PHYSICIAN ASSISTANT

## 2020-04-29 PROCEDURE — 99214 OFFICE O/P EST MOD 30 MIN: CPT | Performed by: PHYSICIAN ASSISTANT

## 2020-04-29 PROCEDURE — 1160F RVW MEDS BY RX/DR IN RCRD: CPT | Performed by: PHYSICIAN ASSISTANT

## 2020-04-29 PROCEDURE — 3078F DIAST BP <80 MM HG: CPT | Performed by: PHYSICIAN ASSISTANT

## 2020-04-29 RX ORDER — PERPHENAZINE 16 MG
TABLET ORAL
Qty: 30 CAPSULE | Refills: 5 | Status: SHIPPED | OUTPATIENT
Start: 2020-04-29 | End: 2020-10-23

## 2020-04-30 PROBLEM — I65.22 STENOSIS OF LEFT INTERNAL CAROTID ARTERY: Status: ACTIVE | Noted: 2020-04-30

## 2020-04-30 PROBLEM — G62.9 NEUROPATHY: Status: ACTIVE | Noted: 2020-04-30

## 2020-05-06 ENCOUNTER — TELEMEDICINE (OUTPATIENT)
Dept: ENDOCRINOLOGY | Facility: CLINIC | Age: 75
End: 2020-05-06
Payer: MEDICARE

## 2020-05-06 DIAGNOSIS — I12.9 BENIGN HYPERTENSION WITH CHRONIC KIDNEY DISEASE, STAGE IV (HCC): ICD-10-CM

## 2020-05-06 DIAGNOSIS — E78.2 MIXED HYPERLIPIDEMIA: ICD-10-CM

## 2020-05-06 DIAGNOSIS — N18.4 BENIGN HYPERTENSION WITH CHRONIC KIDNEY DISEASE, STAGE IV (HCC): ICD-10-CM

## 2020-05-06 DIAGNOSIS — E11.65 TYPE 2 DIABETES MELLITUS WITH HYPERGLYCEMIA, WITH LONG-TERM CURRENT USE OF INSULIN (HCC): Primary | ICD-10-CM

## 2020-05-06 DIAGNOSIS — Z79.4 TYPE 2 DIABETES MELLITUS WITH HYPERGLYCEMIA, WITH LONG-TERM CURRENT USE OF INSULIN (HCC): Primary | ICD-10-CM

## 2020-05-06 PROCEDURE — 99214 OFFICE O/P EST MOD 30 MIN: CPT | Performed by: PHYSICIAN ASSISTANT

## 2020-05-06 RX ORDER — FLASH GLUCOSE SENSOR
KIT MISCELLANEOUS
Qty: 6 EACH | Refills: 1 | Status: SHIPPED | OUTPATIENT
Start: 2020-05-06 | End: 2020-09-30

## 2020-05-06 RX ORDER — INSULIN LISPRO 100 [IU]/ML
INJECTION, SOLUTION INTRAVENOUS; SUBCUTANEOUS
Qty: 36 PEN | Refills: 1 | Status: SHIPPED | OUTPATIENT
Start: 2020-05-06 | End: 2020-05-12 | Stop reason: SDUPTHER

## 2020-05-11 DIAGNOSIS — Z79.4 TYPE 2 DIABETES MELLITUS WITH HYPERGLYCEMIA, WITH LONG-TERM CURRENT USE OF INSULIN (HCC): ICD-10-CM

## 2020-05-11 DIAGNOSIS — E11.65 TYPE 2 DIABETES MELLITUS WITH HYPERGLYCEMIA, WITH LONG-TERM CURRENT USE OF INSULIN (HCC): ICD-10-CM

## 2020-05-11 DIAGNOSIS — E11.65 UNCONTROLLED TYPE 2 DIABETES MELLITUS WITH HYPERGLYCEMIA (HCC): ICD-10-CM

## 2020-05-12 ENCOUNTER — HOSPITAL ENCOUNTER (OUTPATIENT)
Dept: NEUROLOGY | Facility: CLINIC | Age: 75
Discharge: HOME | End: 2020-05-12
Payer: MEDICARE

## 2020-05-12 DIAGNOSIS — E11.42 DIABETIC POLYNEUROPATHY ASSOCIATED WITH TYPE 2 DIABETES MELLITUS (HCC): ICD-10-CM

## 2020-05-12 DIAGNOSIS — G62.9 NEUROPATHY: ICD-10-CM

## 2020-05-12 PROCEDURE — 95911 NRV CNDJ TEST 9-10 STUDIES: CPT | Performed by: PSYCHIATRY & NEUROLOGY

## 2020-05-12 PROCEDURE — 95886 MUSC TEST DONE W/N TEST COMP: CPT | Performed by: PSYCHIATRY & NEUROLOGY

## 2020-05-12 RX ORDER — INSULIN LISPRO 100 [IU]/ML
INJECTION, SOLUTION INTRAVENOUS; SUBCUTANEOUS
Qty: 36 PEN | Refills: 1 | Status: SHIPPED | OUTPATIENT
Start: 2020-05-12 | End: 2020-07-28 | Stop reason: SDUPTHER

## 2020-05-16 DIAGNOSIS — E11.65 UNCONTROLLED TYPE 2 DIABETES MELLITUS WITH HYPERGLYCEMIA (HCC): ICD-10-CM

## 2020-06-03 PROBLEM — Z00.00 ROUTINE GENERAL MEDICAL EXAMINATION AT A HEALTH CARE FACILITY: Status: ACTIVE | Noted: 2020-06-03

## 2020-06-05 ENCOUNTER — TELEPHONE (OUTPATIENT)
Dept: ENDOCRINOLOGY | Facility: CLINIC | Age: 75
End: 2020-06-05

## 2020-06-08 ENCOUNTER — OFFICE VISIT (OUTPATIENT)
Dept: UROLOGY | Facility: CLINIC | Age: 75
End: 2020-06-08
Payer: MEDICARE

## 2020-06-08 VITALS
TEMPERATURE: 98.5 F | WEIGHT: 269 LBS | SYSTOLIC BLOOD PRESSURE: 132 MMHG | BODY MASS INDEX: 37.66 KG/M2 | HEART RATE: 78 BPM | DIASTOLIC BLOOD PRESSURE: 78 MMHG | HEIGHT: 71 IN

## 2020-06-08 DIAGNOSIS — I25.10 TRIPLE VESSEL CORONARY ARTERY DISEASE: ICD-10-CM

## 2020-06-08 DIAGNOSIS — R33.9 URINARY RETENTION: Primary | ICD-10-CM

## 2020-06-08 DIAGNOSIS — Z95.1 S/P CABG (CORONARY ARTERY BYPASS GRAFT): ICD-10-CM

## 2020-06-08 LAB — POST-VOID RESIDUAL VOLUME, ML POC: 136 ML

## 2020-06-08 PROCEDURE — 3075F SYST BP GE 130 - 139MM HG: CPT | Performed by: PHYSICIAN ASSISTANT

## 2020-06-08 PROCEDURE — 4040F PNEUMOC VAC/ADMIN/RCVD: CPT | Performed by: PHYSICIAN ASSISTANT

## 2020-06-08 PROCEDURE — 1036F TOBACCO NON-USER: CPT | Performed by: PHYSICIAN ASSISTANT

## 2020-06-08 PROCEDURE — 51798 US URINE CAPACITY MEASURE: CPT | Performed by: PHYSICIAN ASSISTANT

## 2020-06-08 PROCEDURE — 99213 OFFICE O/P EST LOW 20 MIN: CPT | Performed by: PHYSICIAN ASSISTANT

## 2020-06-08 PROCEDURE — 3078F DIAST BP <80 MM HG: CPT | Performed by: PHYSICIAN ASSISTANT

## 2020-06-08 PROCEDURE — 3008F BODY MASS INDEX DOCD: CPT | Performed by: PHYSICIAN ASSISTANT

## 2020-06-08 PROCEDURE — 1160F RVW MEDS BY RX/DR IN RCRD: CPT | Performed by: PHYSICIAN ASSISTANT

## 2020-06-08 RX ORDER — TAMSULOSIN HYDROCHLORIDE 0.4 MG/1
0.4 CAPSULE ORAL
Qty: 90 CAPSULE | Refills: 3 | Status: SHIPPED | OUTPATIENT
Start: 2020-06-08 | End: 2021-02-18 | Stop reason: SDUPTHER

## 2020-06-08 RX ORDER — FINASTERIDE 5 MG/1
5 TABLET, FILM COATED ORAL DAILY
Qty: 90 TABLET | Refills: 3 | Status: SHIPPED | OUTPATIENT
Start: 2020-06-08 | End: 2021-02-18 | Stop reason: SDUPTHER

## 2020-06-16 ENCOUNTER — TELEPHONE (OUTPATIENT)
Dept: ENDOCRINOLOGY | Facility: CLINIC | Age: 75
End: 2020-06-16

## 2020-07-03 ENCOUNTER — TELEPHONE (OUTPATIENT)
Dept: OTHER | Facility: OTHER | Age: 75
End: 2020-07-03

## 2020-07-03 NOTE — TELEPHONE ENCOUNTER
Patient is calling because he was seen at the the office for a [facility] physical on 6/3 and he needs a call back about his results  Please call

## 2020-07-06 ENCOUNTER — TELEPHONE (OUTPATIENT)
Dept: ENDOCRINOLOGY | Facility: CLINIC | Age: 75
End: 2020-07-06

## 2020-07-06 ENCOUNTER — TELEPHONE (OUTPATIENT)
Dept: FAMILY MEDICINE CLINIC | Facility: CLINIC | Age: 75
End: 2020-07-06

## 2020-07-06 NOTE — TELEPHONE ENCOUNTER
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

## 2020-07-09 ENCOUNTER — TELEPHONE (OUTPATIENT)
Dept: NEUROLOGY | Facility: CLINIC | Age: 75
End: 2020-07-09

## 2020-07-09 NOTE — TELEPHONE ENCOUNTER
Spoke with spouse, Mell Aguayoed - confirmed 7/10/2020 9:30AM [facility]  Registration completed  Covid-screening questions completed for patient and also, spouse  Spouse is coming with the patient to the appt  Aware of all policies

## 2020-07-10 ENCOUNTER — OFFICE VISIT (OUTPATIENT)
Dept: NEUROLOGY | Facility: CLINIC | Age: 75
End: 2020-07-10
Payer: MEDICARE

## 2020-07-10 ENCOUNTER — TELEPHONE (OUTPATIENT)
Dept: NEUROLOGY | Facility: CLINIC | Age: 75
End: 2020-07-10

## 2020-07-10 VITALS
HEART RATE: 74 BPM | SYSTOLIC BLOOD PRESSURE: 107 MMHG | HEIGHT: 71 IN | DIASTOLIC BLOOD PRESSURE: 69 MMHG | TEMPERATURE: 98.6 F | BODY MASS INDEX: 37.72 KG/M2 | WEIGHT: 269.4 LBS

## 2020-07-10 DIAGNOSIS — E11.42 DIABETIC POLYNEUROPATHY ASSOCIATED WITH TYPE 2 DIABETES MELLITUS (HCC): Primary | ICD-10-CM

## 2020-07-10 DIAGNOSIS — I65.22 STENOSIS OF LEFT INTERNAL CAROTID ARTERY: ICD-10-CM

## 2020-07-10 DIAGNOSIS — G62.9 NEUROPATHY: ICD-10-CM

## 2020-07-10 DIAGNOSIS — E74.04 MCARDLE'S DISEASE (HCC): ICD-10-CM

## 2020-07-10 PROCEDURE — 99214 OFFICE O/P EST MOD 30 MIN: CPT | Performed by: PHYSICIAN ASSISTANT

## 2020-07-10 NOTE — TELEPHONE ENCOUNTER
----- Message from Tara Mares MD sent at 7/10/2020 11:36 AM EDT -----  Thanks [address], pls add him in the next 2-3 months, not urgent  Thanks      ----- Message -----  From: Karla Ramos PA-C  Sent: 7/10/2020  11:07 AM EDT  To: Dori Middleton, Tara Mares MD    [facility],  Could you reschedule f/u with Dr Lanette Diaz, new patient for her, 3 months or next available, or whatever time works for Dr Sakina Kwong  The patient would like to see her for ? [place] discussion      Thanks,  Belgica Orellana

## 2020-07-10 NOTE — PROGRESS NOTES
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[facility] )          He was diagnosed with McArdle's disease at [facility], allegedly, which presents as exercise intolerance and easy fatigability  There is no focal weakness  I asked him to follow up with neuromuscular specialist Dr eLandra Buck for more information on this disease, but from my quick review this is overall self-limiting  He should continue to exercise as much as possible, but he may need multiple breaks  We discussed the importance of regular aerobic exercise in controlling his hemoglobin A1c/glucose levels in the setting of diabetes type 2  This will prevent further neuropathy, as present on EMG  The patient is continuing alpha lipoic acid  He continues gabapentin  300 mg q a m , 300 mg q noon and 600 mg q h s  He describes good control of his pain at this time  The patient should not hesitate to call me prior to his follow up with any questions or concerns  The patient was instructed to urgently call 911 or present to the nearest emergency room with any new or worsening neurological deficits  Subjective:    HPI    Since last seen the patient reports some trouble swallowing  He states that he has to have his throat stretched      He is trying to exercise more  He has a stationary bike which he is thinking about using more often, however when he uses that he needs to take multiple breaks because he gets short of breath very quickly  He has a history of 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, had lumbar spinal surgery at [place] [id number] about a decade ago or so      His sometimes difficulty breathing/shortness of breath which is complicated by coronary artery disease, allergies in the spring and fall (allergic to grasses)- he used to see an allergist as a teenager  On questioning the patient thinks he has Mccardles which was a diagnosis made when he first saw [facility] in 1973  States he had a muscle biopsy but no genetic testing  He does not remember the results of the muscle biopsy  States he had trouble with using the legs when he was in [employer] school  States he would be standing for 15 minutes and then fall over  At [facility] he was asked to take raw sugars as an experiment in front of the physicians  This did help his muscle strength momentarily  The initial reason for consultation was an itching sensation in the chest on both sides s/p CABG, but this has since significantly improved  He does take gabapentin but he is not sure if it is reducing the symptoms, or if the symptoms are just getting better on their own  EMG was done 5/12/20:  "This is a severely abnormal study  There is electrophysiologic evidence consistent with a moderate to severe, generalized, active and chronic, axonal with secondary demyelination, motor and sensory peripheral polyneuropathy  Given the severity of the polyneuropathy it is very difficult to determine if there is a superimposed entrapment neuropathy  There was no electrophysiologic evidence of superimposed cervical radiculopathy, lumbar radiculopathy or other entrapment neuropathy in the right upper or lower extremity "    Prior documentation:  He continues to have an itching sensation in his chest bilaterally, and lately the itching sensation has moved into the upper abdomen, superficial only  His wife states that he itches these locations sometimes  He uses a lidocaine spray which relieves the symptoms for up to 1-2 hours  He denies arm weakness, he denies numbness or tingling  He denies imbalance or falls    He denies incontinence or retention of the bowel or bladder      The symptoms began in August   The patient believes that the symptoms began after his iron infusions      He is s/p 4 vessel CABG 6/15/2019      Diagnostics:  Since last seen the patient had a thoracic spine MRI, brain MRI and MRA head  Carotid ultrasound was complete  Findings suggest a 50-69% stenosis in the left ICA and less than 50% stenosis in the right ICA  MRA confirms more severe stenosis in the proximal cavernous segment of the left ICA and mild stenosis at left A1 origin      His brain MRI is unremarkable except for chronic microangiopathy      Thoracic MRI is unremarkable except for mild midthoracic endplate degenerative changes, which would not correlate to his symptoms       The following portions of the patient's history were reviewed and updated as appropriate:   He  has a past medical history of Diabetes mellitus ([facility] ), Hyperlipidemia, Hypertension, Obesity, and Renal disorder    He   Patient Active Problem List    Diagnosis Date Noted    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[facility] ) 07/21/2020    Encounter for  medical examination (CDME) 06/03/2020    ERRONEOUS ENCOUNTER--DISREGARD 06/03/2020    Diabetic polyneuropathy associated with type 2 diabetes mellitus ([address] )     Stenosis of left internal carotid artery 04/30/2020    Neuropathy 04/30/2020    Diabetes mellitus ([address] ) 01/27/2020    Lymphadenopathy 10/22/2019    Vitamin D deficiency 10/21/2019    Obesity, unspecified 09/05/2019    Iron deficiency anemia due to chronic blood loss 07/09/2019    Other constipation 06/26/2019    S/P CABG (coronary artery bypass graft) 06/21/2019    Acute postoperative pulmonary insufficiency ([address] ) 06/21/2019    Blood loss anemia 06/21/2019    Triple vessel coronary artery disease 06/17/2019    Hyperlipidemia 06/17/2019    Type II or unspecified type diabetes mellitus without mention of complication, uncontrolled 06/15/2019    Bilateral leg edema 06/15/2019    Sleep apnea 06/15/2019    Benign hypertension with chronic kidney disease, stage IV ([facility] ) 05/30/2019    Chronic kidney disease-mineral and bone disorder 05/30/2019    Esophageal dysphagia 02/14/2019    History of colonic polyps 02/14/2019    CKD (chronic kidney disease), stage III ([address] ) 08/18/2017    Albuminuria 08/18/2017    Trigger finger of left hand 04/05/2017    Lumbar back pain 02/07/2012    Lumbar discogenic pain syndrome 02/07/2012    Pain of lower extremity 02/07/2012    Spondylolisthesis 02/07/2012    Spinal stenosis 02/07/2012     He  has a past surgical history that includes Gallbladder surgery; Back surgery; Hernia repair; pr cabg, artery-vein, four (N/A, 6/21/2019); Colonoscopy; Rectal surgery; and Coronary artery bypass graft (2019)  His family history includes Cancer in his father and mother; Diabetes in his maternal grandmother and paternal aunt  He  reports that he quit smoking about 30 years ago  He has a 90 00 pack-year smoking history  He has never used smokeless tobacco  He reports previous alcohol use  He reports that he does not use drugs  Current Outpatient Medications   Medication Sig Dispense Refill    Alpha-Lipoic Acid 600 MG CAPS Once daily   30 capsule 5    Ascorbic Acid (VITAMIN C) 1000 MG tablet Take 1,000 mg by mouth daily      aspirin 325 mg tablet Take 1 tablet (325 mg total) by mouth daily 100 tablet 0    atorvastatin (LIPITOR) 80 mg tablet Take 1 tablet (80 mg total) by mouth daily with dinner 90 tablet 3    B-D ULTRAFINE III SHORT PEN 31G X 8 MM MISC USE 4 TIMES DAILY AS DIRECTED      cholecalciferol (VITAMIN D3) 1,000 units tablet Take 1 tablet (1,000 Units total) by mouth daily (Patient taking differently: Take 2,000 Units by mouth daily ) 1 tablet 1    Continuous Blood Gluc  (FREESTYLE JOHANNE READER) MAMI Use device to scan blood glucose at least 4 times a day 1 Device 0    Continuous Blood Gluc Sensor (FREESTYLE JOHANNE SENSOR SYSTEM) MISC Apply sensor every 14 days to check blood glucose at least 4 times a day 6 each 1    cyanocobalamin (VITAMIN B-12) 500 mcg tablet Take 500 mcg by mouth daily      docusate sodium (COLACE) 100 mg capsule Take 1 capsule (100 mg total) by mouth 2 (two) times a day 60 capsule 1    finasteride (PROSCAR) 5 mg tablet Take 1 tablet (5 mg total) by mouth daily 90 tablet 3    gabapentin (NEURONTIN) 300 mg capsule Take 1 cap (300mg) by mouth in the morning and 1 cap (300mg) at noon, take 2 caps (600mg) at bedtime 360 capsule 3    Insulin Pen Needle (BD PEN NEEDLE RON U/F) 32G X 4 MM MISC Use to inject insulin up to 6 times daily 600 each 1    latanoprost (XALATAN) 0 005 % ophthalmic solution 1 drop daily at bedtime      metoprolol tartrate (LOPRESSOR) 25 mg tablet Take 1 tablet (25 mg total) by mouth every 12 (twelve) hours 180 tablet 3    omeprazole (PriLOSEC) 40 MG capsule Take 40 mg by mouth daily      tamsulosin (FLOMAX) 0 4 mg Take 1 capsule (0 4 mg total) by mouth daily with dinner 90 capsule 3    torsemide (DEMADEX) 20 mg tablet Take 1 tablet (20 mg total) by mouth daily 90 tablet 3    Doxepin HCl (SILENOR) 6 MG TABS Take 6 mg by mouth as needed       ferrous sulfate 325 (65 Fe) mg tablet Take 1 tablet (325 mg total) by mouth daily with breakfast for 90 days (Patient not taking: Reported on 10/21/2019) 90 tablet 0    insulin glargine (Toujeo SoloStar) 300 units/mL CONCETRATED U-300 injection pen (1-unit dial) Inject 68 Units under the skin daily at bedtime 15 pen 1    insulin lispro (HumaLOG KwikPen) 100 units/mL injection pen 30-26-30 +scale ( approx daily dose 120 unit) 36 pen 1    liraglutide (VICTOZA) injection Inject 1 8 mg daily into skin 5 pen 1    losartan (COZAAR) 25 mg tablet Take 1 tablet (25 mg total) by mouth daily 90 tablet 2     No current facility-administered medications for this visit  He has No Known Allergies            Objective:    Blood pressure 107/69, pulse 74, temperature 98 6 °F (37 °C), height 5' 11" (1 803 m), weight 122 kg (269 lb 6 4 oz)  Body mass index is 37 57 kg/m²  Physical Exam    Neurological Exam  Vital signs reviewed  Well developed, well nourished  Speech is fluent and articulate  Head: Normocephalic, atraumatic  CN 1-80: intact and symmetric, including EOMs which are normal b/l and PERRL  MSK: 5/5 t/o  ROM normal x all 4 extr  Sensation: Romberg positive  Sensation is intact to light touch in the upper extremities and over the anterior chest and neck  Temp is reduced in stocking distribution from the knees b/l  Reflexes: 2+ and symmetric in the biceps b/l  Trace LEs  Non focal t/o  Coordination: Nml x4 extr  Gait: His gait is slightly wide-based  He has a short stride  There is no shuffling  Posture is adequate  ROS:    Review of Systems   Constitutional: Negative  Negative for appetite change and fever  HENT: Negative  Negative for hearing loss, tinnitus, trouble swallowing and voice change  Eyes: Negative  Negative for photophobia and pain  Respiratory: Negative  Negative for shortness of breath  Cardiovascular: Negative  Negative for palpitations  Gastrointestinal: Negative  Negative for nausea and vomiting  Endocrine: Negative  Negative for cold intolerance  Genitourinary: Negative  Negative for dysuria, frequency and urgency  Musculoskeletal: Negative for myalgias and neck pain  Swelling to lower extremities   Skin: Negative  Negative for rash  Neurological: Negative  Negative for dizziness, tremors, seizures, syncope, facial asymmetry, speech difficulty, weakness, light-headedness, numbness and headaches  Hematological: Negative  Does not bruise/bleed easily  Psychiatric/Behavioral: Positive for sleep disturbance (marcy)  Negative for confusion and hallucinations       The following portions of the patient's history were reviewed and updated as appropriate: allergies, current medications/ medication history, past family history, past medical history, past social history, past surgical history and problem list     Review of systems was reviewed and otherwise unremarkable from a neurological perspective

## 2020-07-21 ENCOUNTER — TELEPHONE (OUTPATIENT)
Dept: ENDOCRINOLOGY | Facility: CLINIC | Age: 75
End: 2020-07-21

## 2020-07-21 ENCOUNTER — OFFICE VISIT (OUTPATIENT)
Dept: NEUROLOGY | Facility: CLINIC | Age: 75
End: 2020-07-21
Payer: MEDICARE

## 2020-07-21 VITALS
SYSTOLIC BLOOD PRESSURE: 136 MMHG | TEMPERATURE: 98.8 F | DIASTOLIC BLOOD PRESSURE: 56 MMHG | BODY MASS INDEX: 37.8 KG/M2 | HEIGHT: 71 IN | WEIGHT: 270 LBS | HEART RATE: 39 BPM

## 2020-07-21 DIAGNOSIS — E11.42 DIABETIC POLYNEUROPATHY ASSOCIATED WITH TYPE 2 DIABETES MELLITUS (HCC): Primary | ICD-10-CM

## 2020-07-21 DIAGNOSIS — R60.0 BILATERAL LOWER EXTREMITY EDEMA: ICD-10-CM

## 2020-07-21 DIAGNOSIS — E74.04 MCARDLE'S DISEASE (HCC): ICD-10-CM

## 2020-07-21 PROCEDURE — 3078F DIAST BP <80 MM HG: CPT | Performed by: PSYCHIATRY & NEUROLOGY

## 2020-07-21 PROCEDURE — 1160F RVW MEDS BY RX/DR IN RCRD: CPT | Performed by: PSYCHIATRY & NEUROLOGY

## 2020-07-21 PROCEDURE — 99215 OFFICE O/P EST HI 40 MIN: CPT | Performed by: PSYCHIATRY & NEUROLOGY

## 2020-07-21 PROCEDURE — 4040F PNEUMOC VAC/ADMIN/RCVD: CPT | Performed by: PSYCHIATRY & NEUROLOGY

## 2020-07-21 PROCEDURE — 3075F SYST BP GE 130 - 139MM HG: CPT | Performed by: PSYCHIATRY & NEUROLOGY

## 2020-07-21 PROCEDURE — 3008F BODY MASS INDEX DOCD: CPT | Performed by: PSYCHIATRY & NEUROLOGY

## 2020-07-21 PROCEDURE — 1036F TOBACCO NON-USER: CPT | Performed by: PSYCHIATRY & NEUROLOGY

## 2020-07-21 NOTE — PROGRESS NOTES
Patient ID: Ira Avendaño is a 69 y o  male  Assessment/Plan:    Diabetic polyneuropathy associated with type 2 diabetes mellitus ([facility] )  This patient clearly has peripheral neuropathy, likely secondary to his longstanding uncontrolled diabetes  Other blood work done in the past has been normal for other reversible causes  Ambulation is further limited because of severe bilateral lower extremity edema, and overall his morbid obesity, with an underlying prior lower back surgery  From an exam standpoint, he does have weakness in bilateral extensor hallucis longus muscles, from from underlying neuropathy, in addition mild asymmetry was noted on ankle everters on the right, likely prior lumbar radiculopathy from his prior lumbar spine surgery  Talked about falls precautions at length today, does not feel the need to hold a cane at all times, knows he should when is on uneven ground, or walking for long distances  He is very cautious going up the steps, getting in the bathroom, knows to take a few seconds when he initially gets up to get his bearings together in order to avoid falls  His wife asked if he is fit to drive because of neuropathy  He had an incident, was driving a trailer, had 3 peddles ( for break, accelator and clutch), where accidentally his foot slipped while backing up, pressing the accelator  No issues with routine driving  Considering nromal proprioception at the ankle, I do not see any contraindications to driving  He has a CDL, 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[facility] )  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 due to symptoms of muscle fatigue and exercise intolerance     We talked about the McArdle's disease in general today, it is a metabolic muscle disease, in which 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, impaired the glycogen metabolism in the muscle itself  Most patients have issues with exercise intolerance as in his case, which usually improves with a period of rest, or muscle cramps  Overall, he has done very well over his life from his muscle disease, without any functional limitations, and I did not appreciate any fixed proximal muscle weakness on his exam today  He denies any muscle cramps at all  At this time, considering his other comorbidities, he is not doing anything exertional and have no concern for his muscle disease to impair his function in any way  I believe his biggest issue right now is the underlying DM, neuropathy and lower extremity edema, which he and his wife understand  Diagnoses and all orders for this visit:    Diabetic polyneuropathy associated with type 2 diabetes mellitus ([facility] )    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[facility] [address] )    Bilateral lower extremity edema       Total time spent with the patient and his wife today was 55 minutes, more than half the time was spent in counseling and discussion regarding his disease, functional limitations and measures to keep his balance and fall precautions  Subjective:    HPI    I had the pleasure of seeing your patient in [facility]rology [facility] for neuromuscular consultation  As you know, he is a 52-year-old man with peripheral neuropathy, secondary to longstanding history of diabetes  In addition, he was referred for evaluation for Mcardle's disease  Please allow me to summarize his history for the record    He reports he has had issues with muscle fatigue growing up, remember it as far as his teen years, mostly in the lines of being tired when he would play sports  He was seen at [facility] [place], had work up done including a muscle biopsy from the left calf, was diagnosed with Mcardle's disease  At the time, he was involved in a research trial, that lost funding and he stopped going for follow up     He and his wife report he had an active life, played sports in his young years  Was able to go and down the stairs very well  Would use some glucose tabs/packets if he is feeling tired that would give him an energy boost      Things started to change 10-15 years ago, when he got with HTN, High cholesterol and eventually DM in 2009-10  He had a fall, led to 3 disc herniations, led to spine surgery in 2012  Was seen at [facility] again right abt this time, a1c was 7 2 at this time  A1c creeped up to 14 in may 2019, had a heart attack in June 2019  Overall, over these years, he started gaining weight, balance started to get somewhat affected, had issues with lower back  He has had neuropathy symptoms in his legs for more than 10 years, mainly numbness in the lower legs and swelling in the feet  Cannot walk any longer distances  Right leg tends to lag behind  Balance is minimally affected, no falls, tends to stumble easily specially when he gets up from a seating position  No major falls  Uses compression stockings for edema in the legs Numbness may be more so in the calves  NO muscle cramps or spasms  Has trouble getting up and down the stairs, holds on to the side rail, goes slowly  No issues getting up from a chair, with minimal assistance  Getting up from a recliner may be somewhat hard  Feels arms are weak as well, endurance is limited  Reports he has been getting tired again over the years, getting sleepy easily  Was seen by Sleep specialist, has been diagnosed with sleep apnea, was recommended CPAP, does feel that helps  Still has stiffness in the lower back, has back pain, stays in lower back, does not radiate down the legs  Does think right leg is stronger than the left, right arm feels stronger as well       Most recent hemoglobin A1c done in April 2020 was 7 8, TSH was normal, CBC, comprehensive metabolic panel were normal   A1c was 8 6 in March 2020, vitamin-D level was 23 6, January 2020  CK was normal in December 2019 (234)  EMG done of the right upper and lower extremities was performed by my colleague Dr Sobeida Vasquez in May, I personally reviewed the raw data  Ulnar sensory response was absent, superficial peroneal sensory responses were absent, sural, radial and median sensory studies showed reduced amplitudes with normal latencies and normal to mild slowing of conduction velocities  Mixed palmar study was minimally abnormal with slightly prolonged median latency compared to the ulnar  Median motor study showed normal distal latency and amplitude, there was significant drop in amplitude between the distal and proximal sides, technical in origin  Ulnar motor response was reduced with slow conduction velocity  Peroneal and tibial responses were absent  Peroneal motor response in tibialis anterior was markedly reduced with normal conduction velocity  Needle examination showed fibrillation potentials and positive sharp waves in tibialis anterior, medial gastrocnemius and extensor digitorum brevis muscle, there was no abnormal spontaneous activity in tibialis posterior muscle  Motor units were reported to be of reduced recruitment, poly phasic and unstable with normal amplitude and duration in all these tested muscles and the others in the right upper extremity  These findings are best interpreted as moderate to severe, predominantly axonal sensory-motor polyneuropathy  There was no definite electrophysiologic evidence to support a superimposed lumbar radiculopathy or focal neuropathy in the right lower or upper extremity  His brain MRI is unremarkable except for chronic microangiopathy    Images were personally reviewed by me as a part of this evaluation      Thoracic MRI is unremarkable except for mild midthoracic endplate degenerative changes, which would not correlate to his symptoms       Carotid ultrasound was complete   Findings suggest a 50-69% stenosis in the left ICA and less than 50% stenosis in the right ICA   MRA confirms more severe stenosis in the proximal cavernous segment of the left ICA and mild stenosis at left A1 origin  The following portions of the patient's history were reviewed and updated as appropriate: allergies, current medications, past family history, past medical history, past social history, past surgical history and problem list          Objective:    Blood pressure 136/56, pulse (!) 39, temperature 98 8 °F (37 1 °C), height 5' 11" (1 803 m), weight 122 kg (270 lb)  Physical Exam  On general examination, he was not in any acute distress  HEENT was unremarkable  Extremities revealed severe bilateral lower extremity edema  Neurological Exam  Neurologically, patient was awake and alert  Speech was fluent without any dysarthria or aphasia  Cranial examination revealed normal extraocular movements, normal strength of eye closure muscles, no ptosis at baseline or with sustained upward gaze  Face was symmetric, with no side-to-side asymmetry, no lower facial weakness, was able to puff out his cheeks well and push his tongue into his cheeks well  Motor examination revealed full strength in neck flexors and extensor muscles, and all muscle groups in both upper extremities  Strength testing in the lower extremities was limited in the hip flexors due to his body habitus and the position in the chair, but was graded at least 4+/5  Knee flexors and knee extensor muscles were full bilaterally  Ankle dorsiflexors were full bilaterally, plantar flexors were 5/5, ankle everters were 4/5 on the right, 5/5 on the left, invertors were 5/5, and extensor hallucis longus was 4-/4- bilaterally  Sensory examination revealed decreased sensation to pinprick and temperature up to lower 1/3 of the legs bilaterally, vibration was absent at the toes, and was moderately reduced at the ankles      Proprioception was limited at the toes, needed larger excursions to be able to appreciate the movements, proprioception was relatively normal at the ankles  Reflexes were 1 in the upper extremities, and absent in the lowers  He needed to support in order to get up from seating position, Romberg was positive  Gait was wide based, and slow  ROS:  I reviewed the below ROS and what is mentioned in HPI, the remainder of ROS was negative  Review of Systems   Constitutional: Negative  Negative for appetite change and fever  HENT: Negative  Negative for hearing loss, tinnitus, trouble swallowing and voice change  Eyes: Negative  Negative for photophobia and pain  Respiratory: Negative  Negative for shortness of breath  Cardiovascular: Negative  Negative for palpitations  Gastrointestinal: Negative  Negative for nausea and vomiting  Endocrine: Negative  Negative for cold intolerance  Genitourinary: Negative  Negative for dysuria, frequency and urgency  Musculoskeletal: Negative  Negative for myalgias and neck pain  Skin: Negative  Negative for rash  Neurological: Negative  Negative for dizziness, tremors, seizures, syncope, facial asymmetry, speech difficulty, weakness, light-headedness, numbness and headaches  Hematological: Negative  Does not bruise/bleed easily  Psychiatric/Behavioral: Negative  Negative for confusion, hallucinations and sleep disturbance

## 2020-07-22 NOTE — ASSESSMENT & PLAN NOTE
This patient clearly has peripheral neuropathy, likely secondary to his longstanding uncontrolled diabetes  Other blood work done in the past has been normal for other reversible causes  Ambulation is further limited because of severe bilateral lower extremity edema, and overall his morbid obesity, with an underlying prior lower back surgery  From an exam standpoint, he does have weakness in bilateral extensor hallucis longus muscles, from from underlying neuropathy, in addition mild asymmetry was noted on ankle everters on the right, likely prior lumbar radiculopathy from his prior lumbar spine surgery  Talked about falls precautions at length today, does not feel the need to hold a cane at all times, knows he should when is on uneven ground, or walking for long distances  He is very cautious going up the steps, getting in the bathroom, knows to take a few seconds when he initially gets up to get his bearings together in order to avoid falls  His wife asked if he is fit to drive because of neuropathy  He had an incident, was driving a trailer, had 3 peddles ( for break, accelator and clutch), where accidentally his foot slipped while backing up, pressing the accelator  No issues with routine driving  Considering nromal proprioception at the ankle, I do not see any contraindications to driving  He has a CDL, 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

## 2020-07-22 NOTE — ASSESSMENT & PLAN NOTE
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 due to symptoms of muscle fatigue and exercise intolerance  We talked about the McArdle's disease in general today, it is a metabolic muscle disease, in which 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, impaired the glycogen metabolism in the muscle itself  Most patients have issues with exercise intolerance as in his case, which usually improves with a period of rest, or muscle cramps  Overall, he has done very well over his life from his muscle disease, without any functional limitations, and I did not appreciate any fixed proximal muscle weakness on his exam today  He denies any muscle cramps at all  At this time, considering his other comorbidities, he is not doing anything exertional and have no concern for his muscle disease to impair his function in any way  I believe his biggest issue right now is the underlying DM, neuropathy and lower extremity edema, which he and his wife understand

## 2020-07-23 ENCOUNTER — TELEPHONE (OUTPATIENT)
Dept: FAMILY MEDICINE CLINIC | Facility: CLINIC | Age: 75
End: 2020-07-23

## 2020-07-23 NOTE — TELEPHONE ENCOUNTER
Pt called in regards to his DOT PE and the papaerwork  I made him aware he needs to apply for the diabetic waiver through the state  I gave him paperwork the last time he was in the office     [facility]

## 2020-07-24 ENCOUNTER — TELEPHONE (OUTPATIENT)
Dept: ENDOCRINOLOGY | Facility: CLINIC | Age: 75
End: 2020-07-24

## 2020-07-24 DIAGNOSIS — E11.65 UNCONTROLLED TYPE 2 DIABETES MELLITUS WITH HYPERGLYCEMIA (HCC): ICD-10-CM

## 2020-07-24 DIAGNOSIS — E11.65 TYPE 2 DIABETES MELLITUS WITH HYPERGLYCEMIA, WITH LONG-TERM CURRENT USE OF INSULIN (HCC): ICD-10-CM

## 2020-07-24 DIAGNOSIS — Z79.4 TYPE 2 DIABETES MELLITUS WITH HYPERGLYCEMIA, WITH LONG-TERM CURRENT USE OF INSULIN (HCC): ICD-10-CM

## 2020-07-24 NOTE — TELEPHONE ENCOUNTER
Reviewed blood sugar log, phyllis  Download  Overall blood sugars are in 150-220 range    Please verify the dose of insulin  As per records he is taking insulin glargine 78 units at bedtime and Humalog 35 units with meals    He should increase Humalog by 2 units with meals, as well as insulin glargine by 2 units  Keep carbohydrate consistent with meals    Rosie Chen MD

## 2020-07-27 ENCOUNTER — OFFICE VISIT (OUTPATIENT)
Dept: FAMILY MEDICINE CLINIC | Facility: CLINIC | Age: 75
End: 2020-07-27

## 2020-07-27 VITALS
BODY MASS INDEX: 37.38 KG/M2 | SYSTOLIC BLOOD PRESSURE: 130 MMHG | OXYGEN SATURATION: 97 % | TEMPERATURE: 97.9 F | DIASTOLIC BLOOD PRESSURE: 80 MMHG | HEART RATE: 64 BPM | WEIGHT: 267 LBS | RESPIRATION RATE: 16 BRPM | HEIGHT: 71 IN

## 2020-07-27 DIAGNOSIS — Z02.4 ENCOUNTER FOR COMMERCIAL DRIVER MEDICAL EXAMINATION (CDME): Primary | ICD-10-CM

## 2020-07-27 LAB
SL AMB  POCT GLUCOSE, UA: NORMAL
SL AMB LEUKOCYTE ESTERASE,UA: NORMAL
SL AMB POCT BILIRUBIN,UA: NORMAL
SL AMB POCT BLOOD,UA: NORMAL
SL AMB POCT CLARITY,UA: CLEAR
SL AMB POCT COLOR,UA: YELLOW
SL AMB POCT KETONES,UA: NORMAL
SL AMB POCT NITRITE,UA: NORMAL
SL AMB POCT PH,UA: 5
SL AMB POCT SPECIFIC GRAVITY,UA: 1.01
SL AMB POCT URINE PROTEIN: 30
SL AMB POCT UROBILINOGEN: NORMAL

## 2020-07-27 PROCEDURE — 3075F SYST BP GE 130 - 139MM HG: CPT | Performed by: NURSE PRACTITIONER

## 2020-07-27 PROCEDURE — 99499 UNLISTED E&M SERVICE: CPT | Performed by: NURSE PRACTITIONER

## 2020-07-27 PROCEDURE — 4040F PNEUMOC VAC/ADMIN/RCVD: CPT | Performed by: NURSE PRACTITIONER

## 2020-07-27 PROCEDURE — 3079F DIAST BP 80-89 MM HG: CPT | Performed by: NURSE PRACTITIONER

## 2020-07-27 PROCEDURE — 1160F RVW MEDS BY RX/DR IN RCRD: CPT | Performed by: NURSE PRACTITIONER

## 2020-07-27 PROCEDURE — 81002 URINALYSIS NONAUTO W/O SCOPE: CPT | Performed by: NURSE PRACTITIONER

## 2020-07-27 PROCEDURE — 3008F BODY MASS INDEX DOCD: CPT | Performed by: NURSE PRACTITIONER

## 2020-07-27 NOTE — PROGRESS NOTES
BMI Counseling: Body mass index is 37 24 kg/m²  The BMI is above normal  Nutrition recommendations include decreasing portion sizes, encouraging healthy choices of fruits and vegetables, decreasing fast food intake, consuming healthier snacks, limiting drinks that contain sugar, moderation in carbohydrate intake, increasing intake of lean protein, reducing intake of saturated and trans fat and reducing intake of cholesterol  Exercise recommendations include vigorous physical activity 75 minutes/week, exercising 3-5 times per week, obtaining a gym membership and strength training exercises  No pharmacotherapy was ordered  BMI Counseling: Body mass index is 37 24 kg/m²  Follow-up plan was not completed due to patient being in urgent or emergent medical situation  Depression Screening and Follow-up Plan: Clincally patient does not have depression  No treatment is required  Falls Plan of Care: balance, strength, and gait training instructions were provided  Assessment/Plan:         Problem List Items Addressed This Visit        Other    Encounter for  medical examination (CDME) - Primary    Relevant Orders    POCT urine dip (Completed)            Subjective:      Patient ID: Kd Conley is a 69 y o  male  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   He has history of type 2 diabetes with peripheral neuropathy, sleep apnea, hypertension with chronic kidney disease, prior triple-vessel coronary artery disease, stenosis of left internal carotid artery, lower back pain, obesity, neuropathy lower extremities, spondylolisthesis, McArdle's disease, bilateral lower extremity edema, hyperlipidemia, anemia, vitamin-D deficiency, and CABG  All accompany wavers scan to media  Patient also require a diabetic waiver for insulin use        The following portions of the patient's history were reviewed and updated as appropriate:   He has a past medical history of Diabetes mellitus ([facility] [address] ), Hyperlipidemia, Hypertension, Obesity, and Renal disorder  ,  does not have any pertinent problems on file  ,   has a past surgical history that includes Gallbladder surgery; Back surgery; Hernia repair; pr cabg, artery-vein, four (N/A, 6/21/2019); Colonoscopy; Rectal surgery; and Coronary artery bypass graft (2019)  ,  family history includes Cancer in his father and mother; Diabetes in his maternal grandmother and paternal aunt  ,   reports that he quit smoking about 30 years ago  He has a 90 00 pack-year smoking history  He has never used smokeless tobacco  He reports that he drank alcohol  He reports that he does not use drugs  ,  has No Known Allergies     Current Outpatient Medications   Medication Sig Dispense Refill    Alpha-Lipoic Acid 600 MG CAPS Once daily   30 capsule 5    Ascorbic Acid (VITAMIN C) 1000 MG tablet Take 1,000 mg by mouth daily      aspirin 325 mg tablet Take 1 tablet (325 mg total) by mouth daily 100 tablet 0    atorvastatin (LIPITOR) 80 mg tablet Take 1 tablet (80 mg total) by mouth daily with dinner 90 tablet 3    B-D ULTRAFINE III SHORT PEN 31G X 8 MM MISC USE 4 TIMES DAILY AS DIRECTED      cholecalciferol (VITAMIN D3) 1,000 units tablet Take 1 tablet (1,000 Units total) by mouth daily 1 tablet 1    Continuous Blood Gluc  (FREESTYLE JOHANNE READER) MAMI Use device to scan blood glucose at least 4 times a day 1 Device 0    Continuous Blood Gluc Sensor ([address] [place] [address]) MISC Apply sensor every 14 days to check blood glucose at least 4 times a day 6 each 1    cyanocobalamin (VITAMIN B-12) 500 mcg tablet Take 500 mcg by mouth daily      docusate sodium (COLACE) 100 mg capsule Take 1 capsule (100 mg total) by mouth 2 (two) times a day 60 capsule 1    Doxepin HCl (SILENOR) 6 MG TABS Take 6 mg by mouth as needed       finasteride (PROSCAR) 5 mg tablet Take 1 tablet (5 mg total) by mouth daily 90 tablet 3    gabapentin (NEURONTIN) 300 mg capsule Take 1 cap (300mg) by mouth in the morning and 1 cap (300mg) at noon, take 2 caps (600mg) at bedtime 360 capsule 3    insulin glargine (Toujeo SoloStar) 300 units/mL CONCETRATED U-300 injection pen (1-unit dial) Inject 78  Units under the skin daily at bedtime 15 pen 1    insulin lispro (HumaLOG KwikPen) 100 units/mL injection pen 35-35-35 +scale ( approx daily dose 120 unit) 36 pen 1    Insulin Pen Needle (BD PEN NEEDLE RON U/F) 32G X 4 MM MISC Use to inject insulin up to 6 times daily 600 each 1    latanoprost (XALATAN) 0 005 % ophthalmic solution 1 drop daily at bedtime      liraglutide (VICTOZA) injection Inject 1 2 mg daily into skin 5 pen 1    metoprolol tartrate (LOPRESSOR) 25 mg tablet Take 1 tablet (25 mg total) by mouth every 12 (twelve) hours 180 tablet 3    omeprazole (PriLOSEC) 40 MG capsule Take 40 mg by mouth daily      tamsulosin (FLOMAX) 0 4 mg Take 1 capsule (0 4 mg total) by mouth daily with dinner 90 capsule 3    torsemide (DEMADEX) 20 mg tablet Take 1 tablet (20 mg total) by mouth daily 90 tablet 3    ferrous sulfate 325 (65 Fe) mg tablet Take 1 tablet (325 mg total) by mouth daily with breakfast for 90 days (Patient not taking: Reported on 10/21/2019) 90 tablet 0     No current facility-administered medications for this visit  Review of Systems   Constitutional: Negative for appetite change, chills, fatigue and fever  HENT: Negative for congestion, ear pain, postnasal drip, rhinorrhea, sinus pain and sore throat  Eyes: Negative for pain, redness and visual disturbance  Respiratory: Negative for cough and shortness of breath  Cardiovascular: Negative for chest pain  Gastrointestinal: Negative for abdominal pain, diarrhea, nausea and vomiting  Genitourinary: Negative for dysuria and hematuria  Musculoskeletal: Negative for arthralgias  Skin: Negative  Allergic/Immunologic: Negative for environmental allergies and food allergies     Neurological: Negative for dizziness, tremors, weakness, light-headedness, numbness and headaches  Objective:  Vitals:    07/27/20 1012   BP: 130/80   BP Location: Left arm   Patient Position: Sitting   Cuff Size: Standard   Pulse: 64   Resp: 16   Temp: 97 9 °F (36 6 °C)   TempSrc: Temporal   SpO2: 97%   Weight: 121 kg (267 lb)   Height: 5' 11" (1 803 m)     Body mass index is 37 24 kg/m²  Physical Exam   Constitutional: He is oriented to person, place, and time  He appears well-developed and well-nourished  HENT:   Head: Normocephalic and atraumatic  Eyes: Pupils are equal, round, and reactive to light  Neck: Normal range of motion  Cardiovascular: Normal rate and regular rhythm  No murmur heard  Pulmonary/Chest: Effort normal and breath sounds normal    Abdominal: Soft  Bowel sounds are normal    Musculoskeletal: Normal range of motion  Neurological: He is alert and oriented to person, place, and time  Skin: Skin is warm  Psychiatric: He has a normal mood and affect  Nursing note and vitals reviewed

## 2020-07-27 NOTE — PATIENT INSTRUCTIONS
Medical Examiner's Certificate    I certify that I have examined Jerald Santana  In accordance with the [employer] (35   02) and with knowledge of the driving duties, I find this person is qualified; and, if applicable, only when:      Wearing corrective lenses  The information I have provided regarding this physical examination is true and complete   A complete examination form with any attachment embodies my findings completely and correctly, and is on file in my office       _________________________________________  Lum ELEAZAR Fountain  7/27/2020    __________________________________________ _______________________ _____   Signature of  s License No  State       Address of   [address] [facility] [address] [facility]    Medical Certificate Expiration Date  7/27/2021

## 2020-08-03 ENCOUNTER — TRANSCRIBE ORDERS (OUTPATIENT)
Dept: LAB | Facility: CLINIC | Age: 75
End: 2020-08-03

## 2020-08-03 ENCOUNTER — LAB (OUTPATIENT)
Dept: LAB | Facility: CLINIC | Age: 75
End: 2020-08-03
Payer: MEDICARE

## 2020-08-03 DIAGNOSIS — N18.4 CKD (CHRONIC KIDNEY DISEASE), STAGE IV (HCC): ICD-10-CM

## 2020-08-03 DIAGNOSIS — E11.65 TYPE 2 DIABETES MELLITUS WITH HYPERGLYCEMIA, WITH LONG-TERM CURRENT USE OF INSULIN (HCC): ICD-10-CM

## 2020-08-03 DIAGNOSIS — N18.4 BENIGN HYPERTENSION WITH CHRONIC KIDNEY DISEASE, STAGE IV (HCC): ICD-10-CM

## 2020-08-03 DIAGNOSIS — Z79.4 TYPE 2 DIABETES MELLITUS WITH HYPERGLYCEMIA, WITH LONG-TERM CURRENT USE OF INSULIN (HCC): ICD-10-CM

## 2020-08-03 DIAGNOSIS — I12.9 BENIGN HYPERTENSION WITH CHRONIC KIDNEY DISEASE, STAGE IV (HCC): ICD-10-CM

## 2020-08-03 LAB
25(OH)D3 SERPL-MCNC: 32.9 NG/ML (ref 30–100)
ALBUMIN SERPL BCP-MCNC: 3.2 G/DL (ref 3.5–5)
ANION GAP SERPL CALCULATED.3IONS-SCNC: 7 MMOL/L (ref 4–13)
BUN SERPL-MCNC: 37 MG/DL (ref 5–25)
CALCIUM SERPL-MCNC: 8.9 MG/DL (ref 8.3–10.1)
CHLORIDE SERPL-SCNC: 104 MMOL/L (ref 100–108)
CO2 SERPL-SCNC: 30 MMOL/L (ref 21–32)
CREAT SERPL-MCNC: 2.01 MG/DL (ref 0.6–1.3)
CREAT UR-MCNC: 103 MG/DL
ERYTHROCYTE [DISTWIDTH] IN BLOOD BY AUTOMATED COUNT: 14.2 % (ref 11.6–15.1)
EST. AVERAGE GLUCOSE BLD GHB EST-MCNC: 180 MG/DL
GFR SERPL CREATININE-BSD FRML MDRD: 32 ML/MIN/1.73SQ M
GLUCOSE P FAST SERPL-MCNC: 137 MG/DL (ref 65–99)
HBA1C MFR BLD: 7.9 %
HCT VFR BLD AUTO: 35.9 % (ref 36.5–49.3)
HGB BLD-MCNC: 11.5 G/DL (ref 12–17)
MAGNESIUM SERPL-MCNC: 2.1 MG/DL (ref 1.6–2.6)
MCH RBC QN AUTO: 29.9 PG (ref 26.8–34.3)
MCHC RBC AUTO-ENTMCNC: 32 G/DL (ref 31.4–37.4)
MCV RBC AUTO: 93 FL (ref 82–98)
PHOSPHATE SERPL-MCNC: 3.7 MG/DL (ref 2.3–4.1)
PLATELET # BLD AUTO: 229 THOUSANDS/UL (ref 149–390)
PMV BLD AUTO: 10.9 FL (ref 8.9–12.7)
POTASSIUM SERPL-SCNC: 3.9 MMOL/L (ref 3.5–5.3)
PROT UR-MCNC: 86 MG/DL
PROT/CREAT UR: 0.83 MG/G{CREAT} (ref 0–0.1)
PTH-INTACT SERPL-MCNC: 63.5 PG/ML (ref 18.4–80.1)
RBC # BLD AUTO: 3.85 MILLION/UL (ref 3.88–5.62)
SODIUM SERPL-SCNC: 141 MMOL/L (ref 136–145)
WBC # BLD AUTO: 7.17 THOUSAND/UL (ref 4.31–10.16)

## 2020-08-03 PROCEDURE — 83970 ASSAY OF PARATHORMONE: CPT

## 2020-08-03 PROCEDURE — 83036 HEMOGLOBIN GLYCOSYLATED A1C: CPT

## 2020-08-03 PROCEDURE — 82306 VITAMIN D 25 HYDROXY: CPT

## 2020-08-03 PROCEDURE — 83735 ASSAY OF MAGNESIUM: CPT

## 2020-08-03 PROCEDURE — 85027 COMPLETE CBC AUTOMATED: CPT

## 2020-08-03 PROCEDURE — 80069 RENAL FUNCTION PANEL: CPT

## 2020-08-03 PROCEDURE — 84156 ASSAY OF PROTEIN URINE: CPT

## 2020-08-03 PROCEDURE — 82570 ASSAY OF URINE CREATININE: CPT

## 2020-08-03 PROCEDURE — 36415 COLL VENOUS BLD VENIPUNCTURE: CPT

## 2020-08-03 RX ORDER — INSULIN GLARGINE 300 U/ML
INJECTION, SOLUTION SUBCUTANEOUS
Qty: 15 PEN | Refills: 1
Start: 2020-08-03 | End: 2020-08-12 | Stop reason: SDUPTHER

## 2020-08-03 RX ORDER — INSULIN LISPRO 100 [IU]/ML
INJECTION, SOLUTION INTRAVENOUS; SUBCUTANEOUS
Qty: 36 PEN | Refills: 1
Start: 2020-08-03 | End: 2020-08-12 | Stop reason: SDUPTHER

## 2020-08-12 ENCOUNTER — TELEMEDICINE (OUTPATIENT)
Dept: ENDOCRINOLOGY | Facility: CLINIC | Age: 75
End: 2020-08-12
Payer: MEDICARE

## 2020-08-12 DIAGNOSIS — I12.9 BENIGN HYPERTENSION WITH CHRONIC KIDNEY DISEASE, STAGE IV (HCC): ICD-10-CM

## 2020-08-12 DIAGNOSIS — E78.2 MIXED HYPERLIPIDEMIA: ICD-10-CM

## 2020-08-12 DIAGNOSIS — E11.65 TYPE 2 DIABETES MELLITUS WITH HYPERGLYCEMIA, WITH LONG-TERM CURRENT USE OF INSULIN (HCC): Primary | ICD-10-CM

## 2020-08-12 DIAGNOSIS — E11.65 UNCONTROLLED TYPE 2 DIABETES MELLITUS WITH HYPERGLYCEMIA (HCC): ICD-10-CM

## 2020-08-12 DIAGNOSIS — N18.4 BENIGN HYPERTENSION WITH CHRONIC KIDNEY DISEASE, STAGE IV (HCC): ICD-10-CM

## 2020-08-12 DIAGNOSIS — E55.9 VITAMIN D DEFICIENCY: ICD-10-CM

## 2020-08-12 DIAGNOSIS — Z79.4 TYPE 2 DIABETES MELLITUS WITH HYPERGLYCEMIA, WITH LONG-TERM CURRENT USE OF INSULIN (HCC): Primary | ICD-10-CM

## 2020-08-12 PROCEDURE — 4040F PNEUMOC VAC/ADMIN/RCVD: CPT | Performed by: INTERNAL MEDICINE

## 2020-08-12 PROCEDURE — 99214 OFFICE O/P EST MOD 30 MIN: CPT | Performed by: INTERNAL MEDICINE

## 2020-08-12 PROCEDURE — 3079F DIAST BP 80-89 MM HG: CPT | Performed by: INTERNAL MEDICINE

## 2020-08-12 PROCEDURE — 3075F SYST BP GE 130 - 139MM HG: CPT | Performed by: INTERNAL MEDICINE

## 2020-08-12 PROCEDURE — 1160F RVW MEDS BY RX/DR IN RCRD: CPT | Performed by: INTERNAL MEDICINE

## 2020-08-12 PROCEDURE — 1036F TOBACCO NON-USER: CPT | Performed by: INTERNAL MEDICINE

## 2020-08-12 RX ORDER — INSULIN GLARGINE 300 U/ML
INJECTION, SOLUTION SUBCUTANEOUS
Qty: 15 PEN | Refills: 1
Start: 2020-08-12 | End: 2020-09-11 | Stop reason: SDUPTHER

## 2020-08-12 RX ORDER — INSULIN LISPRO 100 [IU]/ML
INJECTION, SOLUTION INTRAVENOUS; SUBCUTANEOUS
Qty: 36 PEN | Refills: 1
Start: 2020-08-12 | End: 2020-09-11 | Stop reason: SDUPTHER

## 2020-08-12 NOTE — PROGRESS NOTES
Virtual Regular Visit      Assessment/Plan:    Diagnoses and all orders for this visit:    Type 2 diabetes mellitus with hyperglycemia, with long-term current use of insulin ([facility] )    Lab Results   Component Value Date    HGBA1C 7 9 (H) 08/03/2020   A1c is uncontrolled, patient has been having low blood sugars at middle of night and in the morning  Will reduce Toujeo at 72 units at bedtime   Increase Victoza  to 1 8 mg once daily   Reduce Humalog 30 units with meals +Scale   Discussed importance of keeping carbohydrate consistent with meals  Also discussed to check blood sugar 4 times daily, and send log in 4 weeks         The patient, was counseled regarding diagnostic results,-- instructions for management,-- risk factor reductions,--impressions,-- importance of compliance with treatment  Medication SE Review and Pt Understands Tx: Possible side effects of new medications were reviewed with the patient today  The treatment plan was reviewed with the patient  The patient/guardian understands and agrees with the treatment plan     Benign hypertension with chronic kidney disease, stage IV (HCC)  BP Readings from Last 3 Encounters:   07/27/20 130/80   07/21/20 136/56   07/10/20 107/69   continue current medications   Follow up with nephrology     Vitamin D deficiency  Continue current vitamin d deficiency   Uncontrolled type 2 diabetes mellitus with hyperglycemia ([place] [address] )  Lab Results   Component Value Date    HGBA1C 7 9 (H) 08/03/2020       -     liraglutide (VICTOZA) injection; Inject 1 8 mg daily into skin  -     insulin glargine (Toujeo SoloStar) 300 units/mL CONCETRATED U-300 injection pen (1-unit dial);  Inject 72  Units under the skin daily at bedtime        Problem List Items Addressed This Visit        Endocrine    Diabetes mellitus ([place] [address] ) - Primary    Relevant Medications    liraglutide (VICTOZA) injection    insulin glargine (Toujeo SoloStar) 300 units/mL CONCETRATED U-300 injection pen (1-unit dial) Cardiovascular and Mediastinum    Benign hypertension with chronic kidney disease, stage IV (HCC)       Other    Vitamin D deficiency      Other Visit Diagnoses     Uncontrolled type 2 diabetes mellitus with hyperglycemia (HCC)        Relevant Medications    liraglutide (VICTOZA) injection    insulin glargine (Toujeo SoloStar) 300 units/mL CONCETRATED U-300 injection pen (1-unit dial)               Reason for visit is   Chief Complaint   Patient presents with    Virtual Regular Visit        Encounter provider Joselin Castellanos MD    Provider located at [address] [id number]      Recent Visits  No visits were found meeting these conditions  Showing recent visits within past 7 days and meeting all other requirements     Today's Visits  Date Type Provider Dept   08/12/20 Telemedicine Joselin Castellanos MD [facility]   Showing today's visits and meeting all other requirements     Future Appointments  No visits were found meeting these conditions  Showing future appointments within next 150 days and meeting all other requirements        The patient was identified by name and date of birth  Leah Cat was informed that this is a telemedicine visit and that the visit is being conducted through [facility] and patient was informed that this is a secure, HIPAA-compliant platform  He agrees to proceed     My office door was closed  No one else was in the room  He acknowledged consent and understanding of privacy and security of the video platform  The patient has agreed to participate and understands they can discontinue the visit at any time  Patient is aware this is a billable service       Subjective  Leah Cat is a 69 y o  male with medical history of insulin dependent type 2 diabetes, with insulin resistance, CKD , hyperlipidemia, hypertension, vitamin-D deficiency, history of coronary artery disease is here for follow-up  Reviewed continues glucose monitor sensor download from July 2020     Blood sugars above target 39%, at target range 59%, below target 2%, glucose variability is 36%, average blood sugars 174 mg per dL  Patient is currently taking Humalog 37 units with meals, Toujeo 80 units at bedtime   His also taking Victoza 1 2 mg once daily  Is having pattern of having low blood sugars, in the morning  He admits compliance with his medications  Admits eating in consistent carbohydrates with meals, eat snack sometimes at bedtime  He has complications of diabetes such as coronary artery disease, CKD, nephropathy,    For hyperlipidemia his currently taking Lipitor 80 mg daily with dinner  For hypertension his taking Lopressor 25 mg twice a day  Lab Results   Component Value Date    HGBA1C 7 9 (H) 08/03/2020       Results for Dom Coyle (MRN [id number]) as of 8/12/2020 11:55   Ref   Range 8/3/2020 08:06   Sodium Latest Ref Range: 136 - 145 mmol/L 141   Potassium Latest Ref Range: 3 5 - 5 3 mmol/L 3 9   Chloride Latest Ref Range: 100 - 108 mmol/L 104   CO2 Latest Ref Range: 21 - 32 mmol/L 30   Anion Gap Latest Ref Range: 4 - 13 mmol/L 7   BUN Latest Ref Range: 5 - 25 mg/dL 37 (H)   Creatinine Latest Ref Range: 0 60 - 1 30 mg/dL 2 01 (H)   GLUCOSE FASTING Latest Ref Range: 65 - 99 mg/dL 137 (H)   Calcium Latest Ref Range: 8 3 - 10 1 mg/dL 8 9   Albumin Latest Ref Range: 3 5 - 5 0 g/dL 3 2 (L)   eGFR Latest Units: ml/min/1 73sq m 32   Phosphorus Latest Ref Range: 2 3 - 4 1 mg/dL 3 7   Magnesium Latest Ref Range: 1 6 - 2 6 mg/dL 2 1       Past Medical History:   Diagnosis Date    Diabetes mellitus ([address] )     Hyperlipidemia     Hypertension     Obesity     Renal disorder        Past Surgical History:   Procedure Laterality Date    BACK SURGERY      COLONOSCOPY      CORONARY ARTERY BYPASS GRAFT  2019    quad    GALLBLADDER SURGERY      HERNIA REPAIR      ND CABG, ARTERY-VEIN, FOUR N/A 6/21/2019    Procedure: CORONARY ARTERY BYPASS GRAFT (CABG) 4 VESSELS WITH SVG TO PDA, OM, DIAGONAL AND LIMA TO LAD; RIGHT LEG EVH;  Surgeon: Dino Gutierrez MD;  Location: [facility];  Service: Cardiac Surgery    RECTAL SURGERY         Current Outpatient Medications   Medication Sig Dispense Refill    Alpha-Lipoic Acid 600 MG CAPS Once daily   30 capsule 5    Ascorbic Acid (VITAMIN C) 1000 MG tablet Take 1,000 mg by mouth daily      aspirin 325 mg tablet Take 1 tablet (325 mg total) by mouth daily 100 tablet 0    atorvastatin (LIPITOR) 80 mg tablet Take 1 tablet (80 mg total) by mouth daily with dinner 90 tablet 3    B-D ULTRAFINE III SHORT PEN 31G X 8 MM MISC USE 4 TIMES DAILY AS DIRECTED      cholecalciferol (VITAMIN D3) 1,000 units tablet Take 1 tablet (1,000 Units total) by mouth daily 1 tablet 1    Continuous Blood Gluc  (FREESTYLE JOHANNE READER) MAMI Use device to scan blood glucose at least 4 times a day 1 Device 0    Continuous Blood Gluc Sensor ([address] [place] [address]) MISC Apply sensor every 14 days to check blood glucose at least 4 times a day 6 each 1    cyanocobalamin (VITAMIN B-12) 500 mcg tablet Take 500 mcg by mouth daily      docusate sodium (COLACE) 100 mg capsule Take 1 capsule (100 mg total) by mouth 2 (two) times a day 60 capsule 1    Doxepin HCl (SILENOR) 6 MG TABS Take 6 mg by mouth as needed       ferrous sulfate 325 (65 Fe) mg tablet Take 1 tablet (325 mg total) by mouth daily with breakfast for 90 days (Patient not taking: Reported on 10/21/2019) 90 tablet 0    finasteride (PROSCAR) 5 mg tablet Take 1 tablet (5 mg total) by mouth daily 90 tablet 3    gabapentin (NEURONTIN) 300 mg capsule Take 1 cap (300mg) by mouth in the morning and 1 cap (300mg) at noon, take 2 caps (600mg) at bedtime 360 capsule 3    insulin glargine (Toujeo SoloStar) 300 units/mL CONCETRATED U-300 injection pen (1-unit dial) Inject 72  Units under the skin daily at bedtime 15 pen 1    insulin lispro (HumaLOG KwikPen) 100 units/mL injection pen 37-37-37 +scale ( approx daily dose 120 unit) 36 pen 1    Insulin Pen Needle (BD PEN NEEDLE RON U/F) 32G X 4 MM MISC Use to inject insulin up to 6 times daily 600 each 1    latanoprost (XALATAN) 0 005 % ophthalmic solution 1 drop daily at bedtime      liraglutide (VICTOZA) injection Inject 1 8 mg daily into skin 5 pen 1    metoprolol tartrate (LOPRESSOR) 25 mg tablet Take 1 tablet (25 mg total) by mouth every 12 (twelve) hours 180 tablet 3    omeprazole (PriLOSEC) 40 MG capsule Take 40 mg by mouth daily      tamsulosin (FLOMAX) 0 4 mg Take 1 capsule (0 4 mg total) by mouth daily with dinner 90 capsule 3    torsemide (DEMADEX) 20 mg tablet Take 1 tablet (20 mg total) by mouth daily 90 tablet 3     No current facility-administered medications for this visit  No Known Allergies    Review of Systems   Constitutional: Negative for activity change, diaphoresis, fatigue, fever and unexpected weight change  HENT: Negative  Eyes: Negative for visual disturbance  Respiratory: Negative for cough, chest tightness and shortness of breath  Cardiovascular: Negative for chest pain, palpitations and leg swelling  Gastrointestinal: Negative for abdominal pain, blood in stool, constipation, diarrhea, nausea and vomiting  Endocrine: Negative for cold intolerance, heat intolerance, polydipsia, polyphagia and polyuria  Genitourinary: Negative for dysuria, enuresis, frequency and urgency  Musculoskeletal: Negative for arthralgias and myalgias  Skin: Negative for pallor, rash and wound  Allergic/Immunologic: Negative  Neurological: Negative for dizziness, tremors, weakness and numbness  Hematological: Negative  Psychiatric/Behavioral: Negative  Video Exam    There were no vitals filed for this visit  Physical Exam  Vitals signs reviewed  Constitutional:       General: He is not in acute distress  Appearance: Normal appearance  He is well-developed  HENT:      Head: Normocephalic and atraumatic  Eyes:      Pupils: Pupils are equal, round, and reactive to light  Neck:      Musculoskeletal: Normal range of motion and neck supple  Thyroid: No thyromegaly  Pulmonary:      Effort: No respiratory distress  Musculoskeletal: Normal range of motion  General: No deformity  Skin:     Findings: No erythema  Neurological:      Mental Status: He is alert and oriented to person, place, and time  I spent 24 minutes directly with the patient during this visit      VIRTUAL VISIT DISCLAIMER    Myra Lovell acknowledges that he has consented to an online visit or consultation  He understands that the online visit is based solely on information provided by him, and that, in the absence of a face-to-face physical evaluation by the physician, the diagnosis he receives is both limited and provisional in terms of accuracy and completeness  This is not intended to replace a full medical face-to-face evaluation by the physician  Myra Lovell understands and accepts these terms

## 2020-08-17 ENCOUNTER — OFFICE VISIT (OUTPATIENT)
Dept: NEPHROLOGY | Facility: CLINIC | Age: 75
End: 2020-08-17
Payer: MEDICARE

## 2020-08-17 ENCOUNTER — TELEPHONE (OUTPATIENT)
Dept: NEPHROLOGY | Facility: CLINIC | Age: 75
End: 2020-08-17

## 2020-08-17 VITALS
SYSTOLIC BLOOD PRESSURE: 122 MMHG | DIASTOLIC BLOOD PRESSURE: 68 MMHG | WEIGHT: 271.4 LBS | TEMPERATURE: 98.2 F | BODY MASS INDEX: 37.85 KG/M2

## 2020-08-17 DIAGNOSIS — M89.9 CHRONIC KIDNEY DISEASE-MINERAL AND BONE DISORDER: ICD-10-CM

## 2020-08-17 DIAGNOSIS — I12.9 BENIGN HYPERTENSION WITH CHRONIC KIDNEY DISEASE, STAGE III (HCC): ICD-10-CM

## 2020-08-17 DIAGNOSIS — N18.30 BENIGN HYPERTENSION WITH CHRONIC KIDNEY DISEASE, STAGE III (HCC): ICD-10-CM

## 2020-08-17 DIAGNOSIS — E83.9 CHRONIC KIDNEY DISEASE-MINERAL AND BONE DISORDER: ICD-10-CM

## 2020-08-17 DIAGNOSIS — N18.9 CHRONIC KIDNEY DISEASE-MINERAL AND BONE DISORDER: ICD-10-CM

## 2020-08-17 DIAGNOSIS — R60.0 BILATERAL LEG EDEMA: ICD-10-CM

## 2020-08-17 DIAGNOSIS — N18.4 BENIGN HYPERTENSION WITH CHRONIC KIDNEY DISEASE, STAGE IV (HCC): ICD-10-CM

## 2020-08-17 DIAGNOSIS — N18.30 CKD (CHRONIC KIDNEY DISEASE), STAGE III (HCC): Primary | ICD-10-CM

## 2020-08-17 DIAGNOSIS — R80.9 ALBUMINURIA: ICD-10-CM

## 2020-08-17 DIAGNOSIS — E55.9 VITAMIN D DEFICIENCY: ICD-10-CM

## 2020-08-17 DIAGNOSIS — I12.9 BENIGN HYPERTENSION WITH CHRONIC KIDNEY DISEASE, STAGE IV (HCC): ICD-10-CM

## 2020-08-17 PROCEDURE — 99214 OFFICE O/P EST MOD 30 MIN: CPT | Performed by: INTERNAL MEDICINE

## 2020-08-17 PROCEDURE — 3078F DIAST BP <80 MM HG: CPT | Performed by: INTERNAL MEDICINE

## 2020-08-17 PROCEDURE — 1160F RVW MEDS BY RX/DR IN RCRD: CPT | Performed by: INTERNAL MEDICINE

## 2020-08-17 PROCEDURE — 3074F SYST BP LT 130 MM HG: CPT | Performed by: INTERNAL MEDICINE

## 2020-08-17 PROCEDURE — 1036F TOBACCO NON-USER: CPT | Performed by: INTERNAL MEDICINE

## 2020-08-17 PROCEDURE — 4040F PNEUMOC VAC/ADMIN/RCVD: CPT | Performed by: INTERNAL MEDICINE

## 2020-08-17 RX ORDER — LOSARTAN POTASSIUM 25 MG/1
25 TABLET ORAL DAILY
Qty: 90 TABLET | Refills: 2 | Status: SHIPPED | OUTPATIENT
Start: 2020-08-17 | End: 2020-12-18 | Stop reason: SDUPTHER

## 2020-08-17 NOTE — PROGRESS NOTES
NEPHROLOGY OFFICE PROGRESS NOTE   Grey Hinkle 69 y o  male MRN: [id number]  DATE: 08/17/20  Reason for visit: Continued evaluation of CKD    ASSESSMENT & PLAN:  1  Chronic kidney disease, stage III  · Johan's creatinine has settled down at around 2 0 to 2 1 over the past 6 months  · The etiology of his renal disease is likely due to diabetic nephropathy + sequelae from post op ATN from CABG  · Renal function is stable - SCr 2 01    · Prior baseline was 1 6 to 1 8 before May 2019  · Will start Losartan 25 mg daily for renoprotection and proteinuria  3  Hypertension  · BP is controlled with Torsemide 20 mg daily and Metoprolol 25 mg BID  · Add Losartan for renoprotection  4  Bilateral leg edema  · He has leg swelling which did not improve with Torsemide 20 mg BID  · He is currently on Torsemide 20 mg daily  · I explained to Jesse Hansen and Sukhjinder Garsia that the reason the increase in Torsemide did not work is because Donnamatildafabrice Jade is not following a low salt diet  He is eating out twice a day and does a lot of fast food  · I do not think increasing the diuretic is appropriate if he cannot comply with a low salt diet  · I would like for him to start a low salt diet first and see how the swelling goes  5  Mineral bone disease  · PTH is at goal - 63 5 in Aug 2020  · Ca and phos are at goal    · Vitamin D level is at goal - 32 9 on Aug 2020  Continue  Vitamin D3 2000 units daily  5  Proteinuria:  · UPC is higher but still at goal  0 83 on 8/3/20  · Start Losartan 25 mg daily and check BMP  6  Obesity, weight gain:  · He is not compliant with dietary restrictions and has gained another 8 lbs  · I told him that only he can help himself with this  · I told him that he will definitely have worse outcomes if he stays on his current course  He may need dialysis in the future  7  Anemia:  · Hgb stable - 11 5    Patient Instructions   Start Losartan 25 mg daily - take it at night     Check BMP 2 weeks after starting Losartan  Please work on losing weight - try to exercise more  Please work on a low sodium diet - try minimizing the times that you are eating out  Follow up in 4 months  SUBJECTIVE / INTERVAL HISTORY:  Kaley Briscoe was last seen in the office by me in Feb 2020  He is with his significant other, Nakia Canas, today  No recent hospital stays or ER visits  Nakia Canas raises a few concerns:  - Kaley Briscoe has not worked on trying to lose weight    - He eats out twice a day - breakfast and lunch  Lunch is typically [employer] or fastfood  - He does not exercise and leads a sedentary lifestyle  - He has had more leg swelling over the past 2 months - did not improve with Torsemide 20 mg BID for 1 week  His weight today is up by 8 lbs since last visit  Home BP average is 129 9/75 3 x 8 readings over 7 days  PMH/PSH: HTN, DM, HLP, CKD, CAD s/p CABG, HARJIT, BPH, obesity, DDD s/p fusion surgery, cholecystectomy, hernia repair, tonsillectomy, urinary retention    Previous work up:   6/3/19 Renal US: R 10 6 cm, L 11 cm,  cc    ALLERGIES: No Known Allergies    REVIEW OF SYSTEMS:  Review of Systems   Constitutional: Positive for fatigue  Negative for appetite change, chills and fever  Respiratory: Negative for cough and shortness of breath  Cardiovascular: Positive for leg swelling  Negative for chest pain  Gastrointestinal: Negative for abdominal pain, diarrhea, nausea and vomiting  Genitourinary: Negative for dysuria and hematuria  Musculoskeletal: Negative for arthralgias  Neurological: Negative for dizziness and light-headedness  OBJECTIVE:  Temp 98 2 °F (36 8 °C)   Wt 123 kg (271 lb 6 4 oz)   BMI 37 85 kg/m²   Current Weight: Weight - Scale: 123 kg (271 lb 6 4 oz) Body mass index is 37 85 kg/m²  Physical Exam  Constitutional:       General: He is not in acute distress  Appearance: Normal appearance  He is well-developed  HENT:      Head: Normocephalic and atraumatic  Nose: Nose normal    Eyes:      General: No scleral icterus  Conjunctiva/sclera: Conjunctivae normal    Neck:      Musculoskeletal: Neck supple  Vascular: No JVD  Cardiovascular:      Rate and Rhythm: Regular rhythm  Bradycardia present  Heart sounds: Normal heart sounds  Pulmonary:      Effort: Pulmonary effort is normal  No respiratory distress  Breath sounds: Normal breath sounds  Abdominal:      General: Bowel sounds are normal       Palpations: Abdomen is soft  Musculoskeletal:      Right lower leg: Edema present  Left lower leg: Edema present  Skin:     General: Skin is warm and dry  Neurological:      Mental Status: He is alert and oriented to person, place, and time  Psychiatric:         Behavior: Behavior normal      Medications:  Current Outpatient Medications:     Alpha-Lipoic Acid 600 MG CAPS, Once daily  , Disp: 30 capsule, Rfl: 5    Ascorbic Acid (VITAMIN C) 1000 MG tablet, Take 1,000 mg by mouth daily, Disp: , Rfl:     aspirin 325 mg tablet, Take 1 tablet (325 mg total) by mouth daily, Disp: 100 tablet, Rfl: 0    atorvastatin (LIPITOR) 80 mg tablet, Take 1 tablet (80 mg total) by mouth daily with dinner, Disp: 90 tablet, Rfl: 3    cholecalciferol (VITAMIN D3) 1,000 units tablet, Take 1 tablet (1,000 Units total) by mouth daily (Patient taking differently: Take 2,000 Units by mouth daily ), Disp: 1 tablet, Rfl: 1    cyanocobalamin (VITAMIN B-12) 500 mcg tablet, Take 500 mcg by mouth daily, Disp: , Rfl:     docusate sodium (COLACE) 100 mg capsule, Take 1 capsule (100 mg total) by mouth 2 (two) times a day, Disp: 60 capsule, Rfl: 1    Doxepin HCl (SILENOR) 6 MG TABS, Take 6 mg by mouth as needed , Disp: , Rfl:     finasteride (PROSCAR) 5 mg tablet, Take 1 tablet (5 mg total) by mouth daily, Disp: 90 tablet, Rfl: 3    gabapentin (NEURONTIN) 300 mg capsule, Take 1 cap (300mg) by mouth in the morning and 1 cap (300mg) at noon, take 2 caps (600mg) at bedtime, Disp: 360 capsule, Rfl: 3    insulin glargine (Toujeo SoloStar) 300 units/mL CONCETRATED U-300 injection pen (1-unit dial), Inject 72  Units under the skin daily at bedtime, Disp: 15 pen, Rfl: 1    insulin lispro (HumaLOG KwikPen) 100 units/mL injection pen, 30-30-30 +scale ( approx daily dose 120 unit), Disp: 36 pen, Rfl: 1    latanoprost (XALATAN) 0 005 % ophthalmic solution, 1 drop daily at bedtime, Disp: , Rfl:     liraglutide (VICTOZA) injection, Inject 1 8 mg daily into skin, Disp: 5 pen, Rfl: 1    metoprolol tartrate (LOPRESSOR) 25 mg tablet, Take 1 tablet (25 mg total) by mouth every 12 (twelve) hours, Disp: 180 tablet, Rfl: 3    omeprazole (PriLOSEC) 40 MG capsule, Take 40 mg by mouth daily, Disp: , Rfl:     tamsulosin (FLOMAX) 0 4 mg, Take 1 capsule (0 4 mg total) by mouth daily with dinner, Disp: 90 capsule, Rfl: 3    torsemide (DEMADEX) 20 mg tablet, Take 1 tablet (20 mg total) by mouth daily, Disp: 90 tablet, Rfl: 3    B-D ULTRAFINE III SHORT PEN 31G X 8 MM MISC, USE 4 TIMES DAILY AS DIRECTED, Disp: , Rfl:     Continuous Blood Gluc  (FREESTYLE JOHANNE READER) MAMI, Use device to scan blood glucose at least 4 times a day, Disp: 1 Device, Rfl: 0    Continuous Blood Gluc Sensor ([address] [place] [address]) [place], Apply sensor every 14 days to check blood glucose at least 4 times a day, Disp: 6 each, Rfl: 1    ferrous sulfate 325 (65 Fe) mg tablet, Take 1 tablet (325 mg total) by mouth daily with breakfast for 90 days (Patient not taking: Reported on 10/21/2019), Disp: 90 tablet, Rfl: 0    Insulin Pen Needle (BD PEN NEEDLE RON U/F) 32G X 4 MM MISC, Use to inject insulin up to 6 times daily, Disp: 600 each, Rfl: 1    Laboratory Results:  Results for orders placed or performed in visit on 08/03/20   CBC   Result Value Ref Range    WBC 7 17 4 31 - 10 16 Thousand/uL    RBC 3 85 (L) 3 88 - 5 62 Million/uL    Hemoglobin 11 5 (L) 12 0 - 17 0 g/dL    Hematocrit 35 9 (L) 36 5 - 49 3 %    MCV 93 82 - 98 fL    MCH 29 9 26 8 - 34 3 pg    MCHC 32 0 31 4 - 37 4 g/dL    RDW 14 2 11 6 - 15 1 %    Platelets 454 643 - 455 Thousands/uL    MPV 10 9 8 9 - 12 7 fL   Magnesium   Result Value Ref Range    Magnesium 2 1 1 6 - 2 6 mg/dL   Protein / creatinine ratio, urine   Result Value Ref Range    Creatinine, Ur 103 0 mg/dL    Protein Urine Random 86 mg/dL    Prot/Creat Ratio, Ur 0 83 (H) 0 00 - 0 10   PTH, intact   Result Value Ref Range    PTH 63 5 18 4 - 80 1 pg/mL   Hemoglobin A1C   Result Value Ref Range    Hemoglobin A1C 7 9 (H) Normal 3 8-5 6%; PreDiabetic 5 7-6 4%;  Diabetic >=6 5%; Glycemic control for adults with diabetes <7 0% %     mg/dl   Vitamin D 25 hydroxy   Result Value Ref Range    Vit D, 25-Hydroxy 32 9 30 0 - 100 0 ng/mL   Renal function panel   Result Value Ref Range    Albumin 3 2 (L) 3 5 - 5 0 g/dL    Calcium 8 9 8 3 - 10 1 mg/dL    Phosphorus 3 7 2 3 - 4 1 mg/dL    BUN 37 (H) 5 - 25 mg/dL    Creatinine 2 01 (H) 0 60 - 1 30 mg/dL    Sodium 141 136 - 145 mmol/L    Potassium 3 9 3 5 - 5 3 mmol/L    Chloride 104 100 - 108 mmol/L    CO2 30 21 - 32 mmol/L    ANION GAP 7 4 - 13 mmol/L    eGFR 32 ml/min/1 73sq m    Glucose, Fasting 137 (H) 65 - 99 mg/dL

## 2020-08-17 NOTE — PATIENT INSTRUCTIONS
Start Losartan 25 mg daily - take it at night  Check BMP 2 weeks after starting Losartan  Please work on losing weight - try to exercise more  Please work on a low sodium diet - try minimizing the times that you are eating out  Follow up in 4 months

## 2020-08-17 NOTE — TELEPHONE ENCOUNTER
COVID Pre-Visit Screening     1  Is this a family member screening? No  2  Have you traveled outside of your state in the past 2 weeks? No  3  Do you presently have a fever or flu-like symptoms? No  4  Do you have symptoms of an upper respiratory infection like runny nose, sore throat, or cough? No  5  Are you suffering from new headache that you have not had in the past?  No  6  Do you have/have you experienced any new shortness of breath recently? No  7  Do you have any new diarrhea, nausea or vomiting? No  8  Have you been in contact with anyone who has been sick or diagnosed with COVID-19? No  9  Do you have any new loss of taste or smell? No  10  Are you able to wear a mask without a valve for the entire visit? Yes    Patient's wife who was accompanying patient was also screened  COVID Pre-Visit Screening     11  Is this a family member screening? Yes  12  Have you traveled outside of your state in the past 2 weeks? No  13  Do you presently have a fever or flu-like symptoms? No  14  Do you have symptoms of an upper respiratory infection like runny nose, sore throat, or cough? No  15  Are you suffering from new headache that you have not had in the past?  No  16  Do you have/have you experienced any new shortness of breath recently? No  17  Do you have any new diarrhea, nausea or vomiting? No  18  Have you been in contact with anyone who has been sick or diagnosed with COVID-19? No  19  Do you have any new loss of taste or smell? No  20  Are you able to wear a mask without a valve for the entire visit?  Yes

## 2020-08-17 NOTE — LETTER
August 17, 2020     B[address]    Patient: Ira Avendaño   YOB: 1945   Date of Visit: 8/17/2020       Dear Dr Yana Hinkle: Thank you for referring Jose Gurjit to me for evaluation  Below are my notes for this consultation  If you have questions, please do not hesitate to call me  I look forward to following your patient along with you  Sincerely,        Celine Mullen MD        CC: No Recipients  Celine Mullen MD  8/17/2020 12:48 PM  Sign when Signing Visit  NEPHROLOGY OFFICE PROGRESS NOTE   Ira Avendaño 69 y o  male MRN: [id number]  DATE: 08/17/20  Reason for visit: Continued evaluation of CKD    ASSESSMENT & PLAN:  1  Chronic kidney disease, stage III  · Johan's creatinine has settled down at around 2 0 to 2 1 over the past 6 months  · The etiology of his renal disease is likely due to diabetic nephropathy + sequelae from post op ATN from CABG  · Renal function is stable - SCr 2 01    · Prior baseline was 1 6 to 1 8 before May 2019  · Will start Losartan 25 mg daily for renoprotection and proteinuria  3  Hypertension  · BP is controlled with Torsemide 20 mg daily and Metoprolol 25 mg BID  · Add Losartan for renoprotection  4  Bilateral leg edema  · He has leg swelling which did not improve with Torsemide 20 mg BID  · He is currently on Torsemide 20 mg daily  · I explained to Wendy Santos and Rose Vogt that the reason the increase in Torsemide did not work is because Wendy Santos is not following a low salt diet  He is eating out twice a day and does a lot of fast food  · I do not think increasing the diuretic is appropriate if he cannot comply with a low salt diet  · I would like for him to start a low salt diet first and see how the swelling goes  5  Mineral bone disease  · PTH is at goal - 63 5 in Aug 2020  · Ca and phos are at goal    · Vitamin D level is at goal - 32 9 on Aug 2020  Continue  Vitamin D3 2000 units daily  5  Proteinuria:  · UPC is higher but still at goal  0 83 on 8/3/20  · Start Losartan 25 mg daily and check BMP  6  Obesity, weight gain:  · He is not compliant with dietary restrictions and has gained another 8 lbs  · I told him that only he can help himself with this  · I told him that he will definitely have worse outcomes if he stays on his current course  He may need dialysis in the future  7  Anemia:  · Hgb stable - 11 5    Patient Instructions   Start Losartan 25 mg daily - take it at night  Check BMP 2 weeks after starting Losartan  Please work on losing weight - try to exercise more  Please work on a low sodium diet - try minimizing the times that you are eating out  Follow up in 4 months  SUBJECTIVE / INTERVAL HISTORY:  Artur Canela was last seen in the office by me in Feb 2020  He is with his significant other, Alida Watson, today  No recent hospital stays or ER visits  Alida Watson raises a few concerns:  - Artur Canela has not worked on trying to lose weight    - He eats out twice a day - breakfast and lunch  Lunch is typically [employer] or fastfood  - He does not exercise and leads a sedentary lifestyle  - He has had more leg swelling over the past 2 months - did not improve with Torsemide 20 mg BID for 1 week  His weight today is up by 8 lbs since last visit  Home BP average is 129 9/75 3 x 8 readings over 7 days  PMH/PSH: HTN, DM, HLP, CKD, CAD s/p CABG, HARJIT, BPH, obesity, DDD s/p fusion surgery, cholecystectomy, hernia repair, tonsillectomy, urinary retention    Previous work up:   6/3/19 Renal US: R 10 6 cm, L 11 cm,  cc    ALLERGIES: No Known Allergies    REVIEW OF SYSTEMS:  Review of Systems   Constitutional: Positive for fatigue  Negative for appetite change, chills and fever  Respiratory: Negative for cough and shortness of breath  Cardiovascular: Positive for leg swelling  Negative for chest pain     Gastrointestinal: Negative for abdominal pain, diarrhea, nausea and vomiting  Genitourinary: Negative for dysuria and hematuria  Musculoskeletal: Negative for arthralgias  Neurological: Negative for dizziness and light-headedness  OBJECTIVE:  Temp 98 2 °F (36 8 °C)   Wt 123 kg (271 lb 6 4 oz)   BMI 37 85 kg/m²   Current Weight: Weight - Scale: 123 kg (271 lb 6 4 oz) Body mass index is 37 85 kg/m²  Physical Exam  Constitutional:       General: He is not in acute distress  Appearance: Normal appearance  He is well-developed  HENT:      Head: Normocephalic and atraumatic  Nose: Nose normal    Eyes:      General: No scleral icterus  Conjunctiva/sclera: Conjunctivae normal    Neck:      Musculoskeletal: Neck supple  Vascular: No JVD  Cardiovascular:      Rate and Rhythm: Regular rhythm  Bradycardia present  Heart sounds: Normal heart sounds  Pulmonary:      Effort: Pulmonary effort is normal  No respiratory distress  Breath sounds: Normal breath sounds  Abdominal:      General: Bowel sounds are normal       Palpations: Abdomen is soft  Musculoskeletal:      Right lower leg: Edema present  Left lower leg: Edema present  Skin:     General: Skin is warm and dry  Neurological:      Mental Status: He is alert and oriented to person, place, and time  Psychiatric:         Behavior: Behavior normal      Medications:  Current Outpatient Medications:     Alpha-Lipoic Acid 600 MG CAPS, Once daily  , Disp: 30 capsule, Rfl: 5    Ascorbic Acid (VITAMIN C) 1000 MG tablet, Take 1,000 mg by mouth daily, Disp: , Rfl:     aspirin 325 mg tablet, Take 1 tablet (325 mg total) by mouth daily, Disp: 100 tablet, Rfl: 0    atorvastatin (LIPITOR) 80 mg tablet, Take 1 tablet (80 mg total) by mouth daily with dinner, Disp: 90 tablet, Rfl: 3    cholecalciferol (VITAMIN D3) 1,000 units tablet, Take 1 tablet (1,000 Units total) by mouth daily (Patient taking differently: Take 2,000 Units by mouth daily ), Disp: 1 tablet, Rfl: 1   cyanocobalamin (VITAMIN B-12) 500 mcg tablet, Take 500 mcg by mouth daily, Disp: , Rfl:     docusate sodium (COLACE) 100 mg capsule, Take 1 capsule (100 mg total) by mouth 2 (two) times a day, Disp: 60 capsule, Rfl: 1    Doxepin HCl (SILENOR) 6 MG TABS, Take 6 mg by mouth as needed , Disp: , Rfl:     finasteride (PROSCAR) 5 mg tablet, Take 1 tablet (5 mg total) by mouth daily, Disp: 90 tablet, Rfl: 3    gabapentin (NEURONTIN) 300 mg capsule, Take 1 cap (300mg) by mouth in the morning and 1 cap (300mg) at noon, take 2 caps (600mg) at bedtime, Disp: 360 capsule, Rfl: 3    insulin glargine (Toujeo SoloStar) 300 units/mL CONCETRATED U-300 injection pen (1-unit dial), Inject 72  Units under the skin daily at bedtime, Disp: 15 pen, Rfl: 1    insulin lispro (HumaLOG KwikPen) 100 units/mL injection pen, 30-30-30 +scale ( approx daily dose 120 unit), Disp: 36 pen, Rfl: 1    latanoprost (XALATAN) 0 005 % ophthalmic solution, 1 drop daily at bedtime, Disp: , Rfl:     liraglutide (VICTOZA) injection, Inject 1 8 mg daily into skin, Disp: 5 pen, Rfl: 1    metoprolol tartrate (LOPRESSOR) 25 mg tablet, Take 1 tablet (25 mg total) by mouth every 12 (twelve) hours, Disp: 180 tablet, Rfl: 3    omeprazole (PriLOSEC) 40 MG capsule, Take 40 mg by mouth daily, Disp: , Rfl:     tamsulosin (FLOMAX) 0 4 mg, Take 1 capsule (0 4 mg total) by mouth daily with dinner, Disp: 90 capsule, Rfl: 3    torsemide (DEMADEX) 20 mg tablet, Take 1 tablet (20 mg total) by mouth daily, Disp: 90 tablet, Rfl: 3    B-D ULTRAFINE III SHORT PEN 31G X 8 MM MISC, USE 4 TIMES DAILY AS DIRECTED, Disp: , Rfl:     Continuous Blood Gluc  (FREESTYLE JOHANNE READER) MAMI, Use device to scan blood glucose at least 4 times a day, Disp: 1 Device, Rfl: 0    Continuous Blood Gluc Sensor (FREESTYLE JOHANNE SENSOR SYSTEM) MISC, Apply sensor every 14 days to check blood glucose at least 4 times a day, Disp: 6 each, Rfl: 1    ferrous sulfate 325 (65 Fe) mg tablet, Take 1 tablet (325 mg total) by mouth daily with breakfast for 90 days (Patient not taking: Reported on 10/21/2019), Disp: 90 tablet, Rfl: 0    Insulin Pen Needle (BD PEN NEEDLE RON U/F) 32G X 4 MM MISC, Use to inject insulin up to 6 times daily, Disp: 600 each, Rfl: 1    Laboratory Results:  Results for orders placed or performed in visit on 08/03/20   CBC   Result Value Ref Range    WBC 7 17 4 31 - 10 16 Thousand/uL    RBC 3 85 (L) 3 88 - 5 62 Million/uL    Hemoglobin 11 5 (L) 12 0 - 17 0 g/dL    Hematocrit 35 9 (L) 36 5 - 49 3 %    MCV 93 82 - 98 fL    MCH 29 9 26 8 - 34 3 pg    MCHC 32 0 31 4 - 37 4 g/dL    RDW 14 2 11 6 - 15 1 %    Platelets 068 711 - 413 Thousands/uL    MPV 10 9 8 9 - 12 7 fL   Magnesium   Result Value Ref Range    Magnesium 2 1 1 6 - 2 6 mg/dL   Protein / creatinine ratio, urine   Result Value Ref Range    Creatinine, Ur 103 0 mg/dL    Protein Urine Random 86 mg/dL    Prot/Creat Ratio, Ur 0 83 (H) 0 00 - 0 10   PTH, intact   Result Value Ref Range    PTH 63 5 18 4 - 80 1 pg/mL   Hemoglobin A1C   Result Value Ref Range    Hemoglobin A1C 7 9 (H) Normal 3 8-5 6%; PreDiabetic 5 7-6 4%;  Diabetic >=6 5%; Glycemic control for adults with diabetes <7 0% %     mg/dl   Vitamin D 25 hydroxy   Result Value Ref Range    Vit D, 25-Hydroxy 32 9 30 0 - 100 0 ng/mL   Renal function panel   Result Value Ref Range    Albumin 3 2 (L) 3 5 - 5 0 g/dL    Calcium 8 9 8 3 - 10 1 mg/dL    Phosphorus 3 7 2 3 - 4 1 mg/dL    BUN 37 (H) 5 - 25 mg/dL    Creatinine 2 01 (H) 0 60 - 1 30 mg/dL    Sodium 141 136 - 145 mmol/L    Potassium 3 9 3 5 - 5 3 mmol/L    Chloride 104 100 - 108 mmol/L    CO2 30 21 - 32 mmol/L    ANION GAP 7 4 - 13 mmol/L    eGFR 32 ml/min/1 73sq m    Glucose, Fasting 137 (H) 65 - 99 mg/dL

## 2020-09-10 ENCOUNTER — TRANSCRIBE ORDERS (OUTPATIENT)
Dept: LAB | Facility: CLINIC | Age: 75
End: 2020-09-10

## 2020-09-10 ENCOUNTER — APPOINTMENT (OUTPATIENT)
Dept: LAB | Facility: CLINIC | Age: 75
End: 2020-09-10
Payer: MEDICARE

## 2020-09-10 ENCOUNTER — TELEPHONE (OUTPATIENT)
Dept: ENDOCRINOLOGY | Facility: CLINIC | Age: 75
End: 2020-09-10

## 2020-09-10 DIAGNOSIS — N18.30 CKD (CHRONIC KIDNEY DISEASE), STAGE III (HCC): ICD-10-CM

## 2020-09-10 DIAGNOSIS — E11.65 UNCONTROLLED TYPE 2 DIABETES MELLITUS WITH HYPERGLYCEMIA (HCC): ICD-10-CM

## 2020-09-10 DIAGNOSIS — Z79.4 TYPE 2 DIABETES MELLITUS WITH HYPERGLYCEMIA, WITH LONG-TERM CURRENT USE OF INSULIN (HCC): ICD-10-CM

## 2020-09-10 DIAGNOSIS — E11.65 TYPE 2 DIABETES MELLITUS WITH HYPERGLYCEMIA, WITH LONG-TERM CURRENT USE OF INSULIN (HCC): ICD-10-CM

## 2020-09-10 LAB
ANION GAP SERPL CALCULATED.3IONS-SCNC: 9 MMOL/L (ref 4–13)
BUN SERPL-MCNC: 27 MG/DL (ref 5–25)
CALCIUM SERPL-MCNC: 8.8 MG/DL (ref 8.3–10.1)
CHLORIDE SERPL-SCNC: 104 MMOL/L (ref 100–108)
CO2 SERPL-SCNC: 28 MMOL/L (ref 21–32)
CREAT SERPL-MCNC: 2.11 MG/DL (ref 0.6–1.3)
GFR SERPL CREATININE-BSD FRML MDRD: 30 ML/MIN/1.73SQ M
GLUCOSE P FAST SERPL-MCNC: 163 MG/DL (ref 65–99)
POTASSIUM SERPL-SCNC: 4.2 MMOL/L (ref 3.5–5.3)
SODIUM SERPL-SCNC: 141 MMOL/L (ref 136–145)

## 2020-09-10 PROCEDURE — 36415 COLL VENOUS BLD VENIPUNCTURE: CPT

## 2020-09-10 PROCEDURE — 80048 BASIC METABOLIC PNL TOTAL CA: CPT

## 2020-09-10 NOTE — TELEPHONE ENCOUNTER
Medications -      Valentino Gan 72  Units under the skin daily at bedtime      HumaLOG KwikPen 30-30-30 +scale      VICTOZA Inject 1 8 mg daily into skin     Most of the blood sugars are within target range  However sometimes blood sugars are low before dinner, couple of times a week  Please inform patient to reduce Humalog to 26 units with lunch, continue 30 units with breakfast and 30 units with dinner plus scale  Reduce lantus to 68 units at bedtime     Roxy Skiff, MD

## 2020-09-10 NOTE — TELEPHONE ENCOUNTER
Celia report and blood glucose log attached under media       Medications -     Valentino Gan 72  Units under the skin daily at bedtime     HumaLOG KwikPen 30-30-30 +scale     VICTOZA Inject 1 8 mg daily into skin

## 2020-09-11 ENCOUNTER — TELEPHONE (OUTPATIENT)
Dept: NEPHROLOGY | Facility: CLINIC | Age: 75
End: 2020-09-11

## 2020-09-11 RX ORDER — INSULIN GLARGINE 300 U/ML
INJECTION, SOLUTION SUBCUTANEOUS
Qty: 15 PEN | Refills: 1
Start: 2020-09-11 | End: 2020-10-09

## 2020-09-11 RX ORDER — INSULIN LISPRO 100 [IU]/ML
INJECTION, SOLUTION INTRAVENOUS; SUBCUTANEOUS
Qty: 36 PEN | Refills: 1
Start: 2020-09-11 | End: 2020-10-09

## 2020-09-11 NOTE — TELEPHONE ENCOUNTER
----- Message from Lord Alverto MD sent at 9/10/2020  6:00 PM EDT -----  Please call patient and inform him that renal function is stable based on 9/10/20 labs with recent initiation losartan

## 2020-09-30 DIAGNOSIS — E11.65 TYPE 2 DIABETES MELLITUS WITH HYPERGLYCEMIA, WITH LONG-TERM CURRENT USE OF INSULIN (HCC): ICD-10-CM

## 2020-09-30 DIAGNOSIS — Z79.4 TYPE 2 DIABETES MELLITUS WITH HYPERGLYCEMIA, WITH LONG-TERM CURRENT USE OF INSULIN (HCC): ICD-10-CM

## 2020-09-30 RX ORDER — FLASH GLUCOSE SENSOR
KIT MISCELLANEOUS
Qty: 2 EACH | Refills: 5 | Status: SHIPPED | OUTPATIENT
Start: 2020-09-30 | End: 2021-03-22

## 2020-10-08 DIAGNOSIS — E11.65 UNCONTROLLED TYPE 2 DIABETES MELLITUS WITH HYPERGLYCEMIA (HCC): ICD-10-CM

## 2020-10-08 DIAGNOSIS — E11.65 TYPE 2 DIABETES MELLITUS WITH HYPERGLYCEMIA, WITH LONG-TERM CURRENT USE OF INSULIN (HCC): ICD-10-CM

## 2020-10-08 DIAGNOSIS — Z79.4 TYPE 2 DIABETES MELLITUS WITH HYPERGLYCEMIA, WITH LONG-TERM CURRENT USE OF INSULIN (HCC): ICD-10-CM

## 2020-10-10 DIAGNOSIS — R60.0 BILATERAL LOWER EXTREMITY EDEMA: ICD-10-CM

## 2020-10-12 RX ORDER — TORSEMIDE 20 MG/1
TABLET ORAL
Qty: 90 TABLET | Refills: 3 | Status: SHIPPED | OUTPATIENT
Start: 2020-10-12 | End: 2021-05-18 | Stop reason: ALTCHOICE

## 2020-10-12 RX ORDER — INSULIN LISPRO 100 [IU]/ML
INJECTION, SOLUTION INTRAVENOUS; SUBCUTANEOUS
Qty: 36 PEN | Refills: 1
Start: 2020-10-12 | End: 2020-11-11

## 2020-10-12 RX ORDER — INSULIN GLARGINE 300 U/ML
INJECTION, SOLUTION SUBCUTANEOUS
Qty: 15 PEN | Refills: 1
Start: 2020-10-12 | End: 2020-11-10

## 2020-10-23 DIAGNOSIS — G62.9 NEUROPATHY: ICD-10-CM

## 2020-10-23 RX ORDER — PERPHENAZINE 16 MG
TABLET ORAL
Qty: 30 CAPSULE | Refills: 5 | Status: SHIPPED | OUTPATIENT
Start: 2020-10-23 | End: 2021-04-14

## 2020-10-26 DIAGNOSIS — Z79.4 TYPE 2 DIABETES MELLITUS WITH HYPERGLYCEMIA, WITH LONG-TERM CURRENT USE OF INSULIN (HCC): Primary | ICD-10-CM

## 2020-10-26 DIAGNOSIS — E11.65 TYPE 2 DIABETES MELLITUS WITH HYPERGLYCEMIA, WITH LONG-TERM CURRENT USE OF INSULIN (HCC): Primary | ICD-10-CM

## 2020-10-27 RX ORDER — PEN NEEDLE, DIABETIC 31 GX5/16"
NEEDLE, DISPOSABLE MISCELLANEOUS 4 TIMES DAILY
Qty: 100 EACH | Refills: 1 | Status: SHIPPED | OUTPATIENT
Start: 2020-10-27 | End: 2020-12-17

## 2020-11-10 DIAGNOSIS — E11.65 UNCONTROLLED TYPE 2 DIABETES MELLITUS WITH HYPERGLYCEMIA (HCC): ICD-10-CM

## 2020-11-10 RX ORDER — INSULIN GLARGINE 300 U/ML
INJECTION, SOLUTION SUBCUTANEOUS
Qty: 22 ML | Refills: 3 | Status: SHIPPED | OUTPATIENT
Start: 2020-11-10 | End: 2021-02-10

## 2020-11-11 ENCOUNTER — LAB (OUTPATIENT)
Dept: LAB | Facility: CLINIC | Age: 75
End: 2020-11-11
Payer: MEDICARE

## 2020-11-11 ENCOUNTER — TRANSCRIBE ORDERS (OUTPATIENT)
Dept: LAB | Facility: CLINIC | Age: 75
End: 2020-11-11

## 2020-11-11 DIAGNOSIS — Z79.4 TYPE 2 DIABETES MELLITUS WITH HYPERGLYCEMIA, WITH LONG-TERM CURRENT USE OF INSULIN (HCC): ICD-10-CM

## 2020-11-11 DIAGNOSIS — E11.65 TYPE 2 DIABETES MELLITUS WITH HYPERGLYCEMIA, WITH LONG-TERM CURRENT USE OF INSULIN (HCC): ICD-10-CM

## 2020-11-11 DIAGNOSIS — E78.2 MIXED HYPERLIPIDEMIA: ICD-10-CM

## 2020-11-11 LAB
ANION GAP SERPL CALCULATED.3IONS-SCNC: 8 MMOL/L (ref 4–13)
BUN SERPL-MCNC: 37 MG/DL (ref 5–25)
CALCIUM SERPL-MCNC: 8.8 MG/DL (ref 8.3–10.1)
CHLORIDE SERPL-SCNC: 108 MMOL/L (ref 100–108)
CHOLEST SERPL-MCNC: 149 MG/DL (ref 50–200)
CO2 SERPL-SCNC: 29 MMOL/L (ref 21–32)
CREAT SERPL-MCNC: 2.32 MG/DL (ref 0.6–1.3)
EST. AVERAGE GLUCOSE BLD GHB EST-MCNC: 197 MG/DL
GFR SERPL CREATININE-BSD FRML MDRD: 27 ML/MIN/1.73SQ M
GLUCOSE P FAST SERPL-MCNC: 150 MG/DL (ref 65–99)
HBA1C MFR BLD: 8.5 %
HDLC SERPL-MCNC: 48 MG/DL
LDLC SERPL CALC-MCNC: 78 MG/DL (ref 0–100)
NONHDLC SERPL-MCNC: 101 MG/DL
POTASSIUM SERPL-SCNC: 4.3 MMOL/L (ref 3.5–5.3)
SODIUM SERPL-SCNC: 145 MMOL/L (ref 136–145)
TRIGL SERPL-MCNC: 115 MG/DL

## 2020-11-11 PROCEDURE — 80048 BASIC METABOLIC PNL TOTAL CA: CPT

## 2020-11-11 PROCEDURE — 80061 LIPID PANEL: CPT

## 2020-11-11 PROCEDURE — 83036 HEMOGLOBIN GLYCOSYLATED A1C: CPT

## 2020-11-11 PROCEDURE — 36415 COLL VENOUS BLD VENIPUNCTURE: CPT

## 2020-11-11 RX ORDER — INSULIN LISPRO 100 [IU]/ML
INJECTION, SOLUTION INTRAVENOUS; SUBCUTANEOUS
Qty: 105 ML | Refills: 3 | Status: SHIPPED | OUTPATIENT
Start: 2020-11-11 | End: 2020-11-16

## 2020-11-12 DIAGNOSIS — E11.65 UNCONTROLLED TYPE 2 DIABETES MELLITUS WITH HYPERGLYCEMIA (HCC): ICD-10-CM

## 2020-11-12 RX ORDER — LIRAGLUTIDE 6 MG/ML
INJECTION SUBCUTANEOUS
Qty: 12 PEN | Refills: 1 | Status: SHIPPED | OUTPATIENT
Start: 2020-11-12 | End: 2021-03-15

## 2020-11-16 ENCOUNTER — OFFICE VISIT (OUTPATIENT)
Dept: ENDOCRINOLOGY | Facility: CLINIC | Age: 75
End: 2020-11-16
Payer: MEDICARE

## 2020-11-16 VITALS
WEIGHT: 285 LBS | DIASTOLIC BLOOD PRESSURE: 84 MMHG | TEMPERATURE: 97.1 F | SYSTOLIC BLOOD PRESSURE: 130 MMHG | HEART RATE: 72 BPM | HEIGHT: 71 IN | BODY MASS INDEX: 39.9 KG/M2

## 2020-11-16 DIAGNOSIS — E11.65 TYPE 2 DIABETES MELLITUS WITH HYPERGLYCEMIA, WITH LONG-TERM CURRENT USE OF INSULIN (HCC): Primary | ICD-10-CM

## 2020-11-16 DIAGNOSIS — I12.9 BENIGN HYPERTENSION WITH CHRONIC KIDNEY DISEASE, STAGE IV (HCC): ICD-10-CM

## 2020-11-16 DIAGNOSIS — E11.59 TYPE 2 DIABETES MELLITUS WITH OTHER CIRCULATORY COMPLICATION, UNSPECIFIED WHETHER LONG TERM INSULIN USE (HCC): Primary | ICD-10-CM

## 2020-11-16 DIAGNOSIS — E78.2 MIXED HYPERLIPIDEMIA: ICD-10-CM

## 2020-11-16 DIAGNOSIS — N18.4 BENIGN HYPERTENSION WITH CHRONIC KIDNEY DISEASE, STAGE IV (HCC): ICD-10-CM

## 2020-11-16 DIAGNOSIS — Z79.4 TYPE 2 DIABETES MELLITUS WITH HYPERGLYCEMIA, WITH LONG-TERM CURRENT USE OF INSULIN (HCC): Primary | ICD-10-CM

## 2020-11-16 PROCEDURE — 99214 OFFICE O/P EST MOD 30 MIN: CPT | Performed by: PHYSICIAN ASSISTANT

## 2020-11-16 RX ORDER — INSULIN LISPRO 100 [IU]/ML
INJECTION, SOLUTION INTRAVENOUS; SUBCUTANEOUS
Qty: 105 ML | Refills: 3 | Status: SHIPPED | OUTPATIENT
Start: 2020-11-16 | End: 2021-02-10

## 2020-11-18 DIAGNOSIS — E11.59 TYPE 2 DIABETES MELLITUS WITH OTHER CIRCULATORY COMPLICATION, UNSPECIFIED WHETHER LONG TERM INSULIN USE (HCC): ICD-10-CM

## 2020-11-18 RX ORDER — LANCETS
EACH MISCELLANEOUS
Qty: 300 EACH | Refills: 1 | Status: SHIPPED | OUTPATIENT
Start: 2020-11-18

## 2020-11-25 ENCOUNTER — OFFICE VISIT (OUTPATIENT)
Dept: CARDIOLOGY CLINIC | Facility: CLINIC | Age: 75
End: 2020-11-25
Payer: MEDICARE

## 2020-11-25 VITALS
HEART RATE: 79 BPM | DIASTOLIC BLOOD PRESSURE: 76 MMHG | HEIGHT: 71 IN | BODY MASS INDEX: 40.46 KG/M2 | OXYGEN SATURATION: 93 % | WEIGHT: 289 LBS | SYSTOLIC BLOOD PRESSURE: 124 MMHG

## 2020-11-25 DIAGNOSIS — N18.4 BENIGN HYPERTENSION WITH CHRONIC KIDNEY DISEASE, STAGE IV (HCC): ICD-10-CM

## 2020-11-25 DIAGNOSIS — I25.10 TRIPLE VESSEL CORONARY ARTERY DISEASE: Primary | ICD-10-CM

## 2020-11-25 DIAGNOSIS — E78.2 MIXED HYPERLIPIDEMIA: ICD-10-CM

## 2020-11-25 DIAGNOSIS — I12.9 BENIGN HYPERTENSION WITH CHRONIC KIDNEY DISEASE, STAGE IV (HCC): ICD-10-CM

## 2020-11-25 DIAGNOSIS — Z95.1 S/P CABG (CORONARY ARTERY BYPASS GRAFT): ICD-10-CM

## 2020-11-25 PROCEDURE — 93000 ELECTROCARDIOGRAM COMPLETE: CPT | Performed by: INTERNAL MEDICINE

## 2020-11-25 PROCEDURE — 99214 OFFICE O/P EST MOD 30 MIN: CPT | Performed by: INTERNAL MEDICINE

## 2020-12-04 ENCOUNTER — LAB (OUTPATIENT)
Dept: LAB | Facility: CLINIC | Age: 75
End: 2020-12-04
Payer: MEDICARE

## 2020-12-04 DIAGNOSIS — N18.30 CKD (CHRONIC KIDNEY DISEASE), STAGE III (HCC): ICD-10-CM

## 2020-12-04 LAB
ALBUMIN SERPL BCP-MCNC: 3.2 G/DL (ref 3.5–5)
ANION GAP SERPL CALCULATED.3IONS-SCNC: 10 MMOL/L (ref 4–13)
BUN SERPL-MCNC: 37 MG/DL (ref 5–25)
CALCIUM ALBUM COR SERPL-MCNC: 9.6 MG/DL (ref 8.3–10.1)
CALCIUM SERPL-MCNC: 9 MG/DL (ref 8.3–10.1)
CHLORIDE SERPL-SCNC: 105 MMOL/L (ref 100–108)
CO2 SERPL-SCNC: 28 MMOL/L (ref 21–32)
CREAT SERPL-MCNC: 2.11 MG/DL (ref 0.6–1.3)
CREAT UR-MCNC: 107 MG/DL
GFR SERPL CREATININE-BSD FRML MDRD: 30 ML/MIN/1.73SQ M
GLUCOSE P FAST SERPL-MCNC: 134 MG/DL (ref 65–99)
MAGNESIUM SERPL-MCNC: 2.2 MG/DL (ref 1.6–2.6)
PHOSPHATE SERPL-MCNC: 4.2 MG/DL (ref 2.3–4.1)
POTASSIUM SERPL-SCNC: 3.9 MMOL/L (ref 3.5–5.3)
PROT UR-MCNC: 253 MG/DL
PROT/CREAT UR: 2.36 MG/G{CREAT} (ref 0–0.1)
SODIUM SERPL-SCNC: 143 MMOL/L (ref 136–145)

## 2020-12-04 PROCEDURE — 36415 COLL VENOUS BLD VENIPUNCTURE: CPT

## 2020-12-04 PROCEDURE — 84156 ASSAY OF PROTEIN URINE: CPT

## 2020-12-04 PROCEDURE — 83735 ASSAY OF MAGNESIUM: CPT

## 2020-12-04 PROCEDURE — 80069 RENAL FUNCTION PANEL: CPT

## 2020-12-04 PROCEDURE — 82570 ASSAY OF URINE CREATININE: CPT

## 2020-12-10 ENCOUNTER — TELEPHONE (OUTPATIENT)
Dept: UROLOGY | Facility: MEDICAL CENTER | Age: 75
End: 2020-12-10

## 2020-12-10 DIAGNOSIS — R32 URINARY INCONTINENCE, UNSPECIFIED TYPE: Primary | ICD-10-CM

## 2020-12-11 ENCOUNTER — LAB (OUTPATIENT)
Dept: LAB | Facility: CLINIC | Age: 75
End: 2020-12-11
Payer: MEDICARE

## 2020-12-11 LAB
BACTERIA UR QL AUTO: ABNORMAL /HPF
BILIRUB UR QL STRIP: NEGATIVE
CLARITY UR: CLEAR
COLOR UR: YELLOW
GLUCOSE UR STRIP-MCNC: NEGATIVE MG/DL
HGB UR QL STRIP.AUTO: ABNORMAL
KETONES UR STRIP-MCNC: NEGATIVE MG/DL
LEUKOCYTE ESTERASE UR QL STRIP: NEGATIVE
NITRITE UR QL STRIP: NEGATIVE
NON-SQ EPI CELLS URNS QL MICRO: ABNORMAL /HPF
PH UR STRIP.AUTO: 5.5 [PH]
PROT UR STRIP-MCNC: ABNORMAL MG/DL
RBC #/AREA URNS AUTO: ABNORMAL /HPF
SP GR UR STRIP.AUTO: 1.02 (ref 1–1.03)
UROBILINOGEN UR QL STRIP.AUTO: 0.2 E.U./DL
WBC #/AREA URNS AUTO: ABNORMAL /HPF

## 2020-12-11 PROCEDURE — 87086 URINE CULTURE/COLONY COUNT: CPT

## 2020-12-11 PROCEDURE — 81001 URINALYSIS AUTO W/SCOPE: CPT

## 2020-12-12 LAB — BACTERIA UR CULT: NORMAL

## 2020-12-17 DIAGNOSIS — E11.65 TYPE 2 DIABETES MELLITUS WITH HYPERGLYCEMIA, WITH LONG-TERM CURRENT USE OF INSULIN (HCC): ICD-10-CM

## 2020-12-17 DIAGNOSIS — Z79.4 TYPE 2 DIABETES MELLITUS WITH HYPERGLYCEMIA, WITH LONG-TERM CURRENT USE OF INSULIN (HCC): ICD-10-CM

## 2020-12-17 RX ORDER — PEN NEEDLE, DIABETIC 31 GX5/16"
NEEDLE, DISPOSABLE MISCELLANEOUS 4 TIMES DAILY
Qty: 400 EACH | Refills: 1 | Status: SHIPPED | OUTPATIENT
Start: 2020-12-17

## 2020-12-18 ENCOUNTER — OFFICE VISIT (OUTPATIENT)
Dept: NEPHROLOGY | Facility: CLINIC | Age: 75
End: 2020-12-18
Payer: MEDICARE

## 2020-12-18 VITALS
HEIGHT: 71 IN | SYSTOLIC BLOOD PRESSURE: 136 MMHG | BODY MASS INDEX: 39.9 KG/M2 | DIASTOLIC BLOOD PRESSURE: 80 MMHG | WEIGHT: 285 LBS

## 2020-12-18 DIAGNOSIS — N18.4 BENIGN HYPERTENSION WITH CHRONIC KIDNEY DISEASE, STAGE IV (HCC): ICD-10-CM

## 2020-12-18 DIAGNOSIS — R80.9 NON-NEPHROTIC RANGE PROTEINURIA: ICD-10-CM

## 2020-12-18 DIAGNOSIS — I12.9 BENIGN HYPERTENSION WITH CHRONIC KIDNEY DISEASE, STAGE III (HCC): ICD-10-CM

## 2020-12-18 DIAGNOSIS — I12.9 BENIGN HYPERTENSION WITH CHRONIC KIDNEY DISEASE, STAGE IV (HCC): ICD-10-CM

## 2020-12-18 DIAGNOSIS — E66.01 CLASS 2 SEVERE OBESITY DUE TO EXCESS CALORIES WITH SERIOUS COMORBIDITY AND BODY MASS INDEX (BMI) OF 39.0 TO 39.9 IN ADULT (HCC): ICD-10-CM

## 2020-12-18 DIAGNOSIS — N18.9 CHRONIC KIDNEY DISEASE-MINERAL AND BONE DISORDER: ICD-10-CM

## 2020-12-18 DIAGNOSIS — N18.4 CKD (CHRONIC KIDNEY DISEASE), STAGE IV (HCC): Primary | ICD-10-CM

## 2020-12-18 DIAGNOSIS — R60.0 BILATERAL LEG EDEMA: ICD-10-CM

## 2020-12-18 DIAGNOSIS — E55.9 VITAMIN D DEFICIENCY: ICD-10-CM

## 2020-12-18 DIAGNOSIS — M89.9 CHRONIC KIDNEY DISEASE-MINERAL AND BONE DISORDER: ICD-10-CM

## 2020-12-18 DIAGNOSIS — N18.30 BENIGN HYPERTENSION WITH CHRONIC KIDNEY DISEASE, STAGE III (HCC): ICD-10-CM

## 2020-12-18 DIAGNOSIS — E83.9 CHRONIC KIDNEY DISEASE-MINERAL AND BONE DISORDER: ICD-10-CM

## 2020-12-18 PROCEDURE — 99214 OFFICE O/P EST MOD 30 MIN: CPT | Performed by: INTERNAL MEDICINE

## 2020-12-18 RX ORDER — LOSARTAN POTASSIUM 50 MG/1
50 TABLET ORAL
Qty: 90 TABLET | Refills: 1 | Status: SHIPPED | OUTPATIENT
Start: 2020-12-18 | End: 2020-12-23 | Stop reason: SDUPTHER

## 2020-12-23 DIAGNOSIS — I12.9 BENIGN HYPERTENSION WITH CHRONIC KIDNEY DISEASE, STAGE III (HCC): ICD-10-CM

## 2020-12-23 DIAGNOSIS — N18.30 BENIGN HYPERTENSION WITH CHRONIC KIDNEY DISEASE, STAGE III (HCC): ICD-10-CM

## 2020-12-23 RX ORDER — LOSARTAN POTASSIUM 50 MG/1
50 TABLET ORAL
Qty: 90 TABLET | Refills: 3 | Status: SHIPPED | OUTPATIENT
Start: 2020-12-23 | End: 2021-12-21

## 2020-12-29 ENCOUNTER — OFFICE VISIT (OUTPATIENT)
Dept: FAMILY MEDICINE CLINIC | Facility: OTHER | Age: 75
End: 2020-12-29
Payer: MEDICARE

## 2020-12-29 VITALS
SYSTOLIC BLOOD PRESSURE: 126 MMHG | HEIGHT: 68 IN | WEIGHT: 281.25 LBS | OXYGEN SATURATION: 97 % | TEMPERATURE: 97.6 F | HEART RATE: 86 BPM | BODY MASS INDEX: 42.62 KG/M2 | DIASTOLIC BLOOD PRESSURE: 60 MMHG

## 2020-12-29 DIAGNOSIS — I12.9 BENIGN HYPERTENSION WITH CHRONIC KIDNEY DISEASE, STAGE IV (HCC): ICD-10-CM

## 2020-12-29 DIAGNOSIS — Z00.00 MEDICARE ANNUAL WELLNESS VISIT, SUBSEQUENT: Primary | ICD-10-CM

## 2020-12-29 DIAGNOSIS — N18.4 BENIGN HYPERTENSION WITH CHRONIC KIDNEY DISEASE, STAGE IV (HCC): ICD-10-CM

## 2020-12-29 DIAGNOSIS — G62.9 NEUROPATHY: ICD-10-CM

## 2020-12-29 DIAGNOSIS — E11.42 DIABETIC POLYNEUROPATHY ASSOCIATED WITH TYPE 2 DIABETES MELLITUS (HCC): ICD-10-CM

## 2020-12-29 PROCEDURE — G0439 PPPS, SUBSEQ VISIT: HCPCS | Performed by: FAMILY MEDICINE

## 2020-12-29 RX ORDER — GABAPENTIN 300 MG/1
CAPSULE ORAL
Qty: 28 CAPSULE | Refills: 0 | Status: SHIPPED | OUTPATIENT
Start: 2020-12-29 | End: 2021-04-07 | Stop reason: SDUPTHER

## 2021-01-04 ENCOUNTER — LAB (OUTPATIENT)
Dept: LAB | Facility: CLINIC | Age: 76
End: 2021-01-04
Payer: MEDICARE

## 2021-01-04 ENCOUNTER — TELEPHONE (OUTPATIENT)
Dept: NEPHROLOGY | Facility: CLINIC | Age: 76
End: 2021-01-04

## 2021-01-04 DIAGNOSIS — I12.9 BENIGN HYPERTENSION WITH CHRONIC KIDNEY DISEASE, STAGE III (HCC): ICD-10-CM

## 2021-01-04 DIAGNOSIS — N18.30 BENIGN HYPERTENSION WITH CHRONIC KIDNEY DISEASE, STAGE III (HCC): ICD-10-CM

## 2021-01-04 LAB
ANION GAP SERPL CALCULATED.3IONS-SCNC: 9 MMOL/L (ref 4–13)
BUN SERPL-MCNC: 49 MG/DL (ref 5–25)
CALCIUM SERPL-MCNC: 8.8 MG/DL (ref 8.3–10.1)
CHLORIDE SERPL-SCNC: 107 MMOL/L (ref 100–108)
CO2 SERPL-SCNC: 27 MMOL/L (ref 21–32)
CREAT SERPL-MCNC: 2.2 MG/DL (ref 0.6–1.3)
CREAT UR-MCNC: 113 MG/DL
GFR SERPL CREATININE-BSD FRML MDRD: 28 ML/MIN/1.73SQ M
GLUCOSE P FAST SERPL-MCNC: 178 MG/DL (ref 65–99)
POTASSIUM SERPL-SCNC: 4.2 MMOL/L (ref 3.5–5.3)
PROT UR-MCNC: 109 MG/DL
PROT/CREAT UR: 0.96 MG/G{CREAT} (ref 0–0.1)
SODIUM SERPL-SCNC: 143 MMOL/L (ref 136–145)

## 2021-01-04 PROCEDURE — 82570 ASSAY OF URINE CREATININE: CPT

## 2021-01-04 PROCEDURE — 36415 COLL VENOUS BLD VENIPUNCTURE: CPT

## 2021-01-04 PROCEDURE — 80048 BASIC METABOLIC PNL TOTAL CA: CPT

## 2021-01-04 PROCEDURE — 84156 ASSAY OF PROTEIN URINE: CPT

## 2021-01-04 NOTE — TELEPHONE ENCOUNTER
----- Message from Laura Ross MD sent at 1/4/2021 12:22 PM EST -----  Please inform patient that kidney function is stable based on latest lab tests (1/4/21)

## 2021-01-11 ENCOUNTER — TELEPHONE (OUTPATIENT)
Dept: NEUROLOGY | Facility: CLINIC | Age: 76
End: 2021-01-11

## 2021-01-11 NOTE — TELEPHONE ENCOUNTER
Registration completed via e-check in for appt 1/12/2021 9:30AM [address] at [facility]  Covid-screening questions completed with spouse for patient and herself  Aware of all policies - mask, visitor and no show fee

## 2021-01-12 ENCOUNTER — TELEPHONE (OUTPATIENT)
Dept: ADMINISTRATIVE | Facility: OTHER | Age: 76
End: 2021-01-12

## 2021-01-12 ENCOUNTER — OFFICE VISIT (OUTPATIENT)
Dept: NEUROLOGY | Facility: CLINIC | Age: 76
End: 2021-01-12
Payer: MEDICARE

## 2021-01-12 VITALS
HEART RATE: 70 BPM | HEIGHT: 71 IN | BODY MASS INDEX: 39.87 KG/M2 | WEIGHT: 284.8 LBS | DIASTOLIC BLOOD PRESSURE: 79 MMHG | SYSTOLIC BLOOD PRESSURE: 110 MMHG | TEMPERATURE: 97.6 F

## 2021-01-12 DIAGNOSIS — T14.8XXA TRAUMATIC INJURY OF SKIN: ICD-10-CM

## 2021-01-12 DIAGNOSIS — E74.04 MCARDLE'S DISEASE (HCC): ICD-10-CM

## 2021-01-12 DIAGNOSIS — I65.22 STENOSIS OF LEFT INTERNAL CAROTID ARTERY: ICD-10-CM

## 2021-01-12 DIAGNOSIS — E11.42 DIABETIC POLYNEUROPATHY ASSOCIATED WITH TYPE 2 DIABETES MELLITUS (HCC): Primary | ICD-10-CM

## 2021-01-12 PROCEDURE — 99215 OFFICE O/P EST HI 40 MIN: CPT | Performed by: PHYSICIAN ASSISTANT

## 2021-01-12 NOTE — PROGRESS NOTES
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  Self limiting  Encouraged small but frequent bursts of aerobic exercise throughout the day, with intolerability  Diabetic polyneuropathy associated with type 2 diabetes mellitus ([address] )    Lab Results   Component Value Date    HGBA1C 8 5 (H) 11/11/2020     There are no new symptoms to report, however the patient was again counseled on the importance of regulating diet and nutrition, with avoidance of excessive simple sugars as recommended by his other providers, for the prevention of worsening neuropathy  His wife is concerned that he is going out to eat fast food, ever since he was cleared to drive again s/p cardiac surgery  Extensive counseling was provided in this regard  I offered weight management, nutritionist/dietitian but the patient declined  Stenosis of left internal carotid artery  Would recommend yearly carotid ultrasound for surveillance  He would be due for another 1 in April or May  Continue aspirin and atorvastatin daily  Traumatic injury of skin  As noted below the patient accidentally hits his leg against the corner of the bottom step in his house and has some contusions and the skin has broken in some areas  His wife is dressing it with gauze and Neosporin  There are some areas that appear red but not clearly indurated  I reminded him to watch this very closely due to being diabetic and report this to his podiatrist or PCP  He may need to see wound care  We also discussed placing a soft covering on the bottom step to prevent injury  Diagnoses and all orders for this visit:    Diabetic polyneuropathy associated with type 2 diabetes mellitus ([facility] [address] )    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[address] )    Stenosis of left internal carotid artery    Traumatic injury of skin       The patient should not hesitate to call me prior to his follow up with any questions or concerns        Subjective:    HPI  The patient and his wife are present today  The patient's wife provides most of the history and she tells me that he has not been watching his diet  She reports that he is eating more junk food and eat fast food outside of the home  Since he was cleared to drive after cardiac surgery he has been going out more  His A1c did go up to 8 5%, since 7 9% over the summer  His wife is concerned that he his health is not good  She reports that he was eating better and actually lost weight when he was at home after his cardiac surgery, because she was giving him healthy food  He follows with Podiatry, ophthalmology and endocrinology regularly  He denies any changes in foot care, does foot checks regularly  He wears shoes at all times  He denies rashes or cuts on his feet, but he does have contusions and small cuts which are healing in the shin regions bilaterally from accidentally hitting his leg on the corners of the bottom step in his house  He states he does not see it and accidentally runs into it  His wife covered a couple of these injuries using gauze and Neosporin  His legs do appear red, but that may also be due to venous stasis  He denies fevers chills or other infectious symptoms  Will refer to the last Neurology note on 7/21/2020  The following portions of the patient's history were reviewed and updated as appropriate:   He  has a past medical history of Diabetes mellitus ([address] ), Hyperlipidemia, Hypertension, Obesity, and Renal disorder    He   Patient Active Problem List    Diagnosis Date Noted    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[address] ) 01/21/2021    Traumatic injury of skin 01/21/2021    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[facility] [address] ) 07/21/2020    Encounter for  medical examination (CDME) 06/03/2020    ERRONEOUS ENCOUNTER--DISREGARD 06/03/2020    Diabetic polyneuropathy associated with type 2 diabetes mellitus ([address] )     Stenosis of left internal carotid artery 04/30/2020    Neuropathy 04/30/2020    Diabetes mellitus ([place] [address] ) 01/27/2020    Lymphadenopathy 10/22/2019    Vitamin D deficiency 10/21/2019    Obesity, unspecified 09/05/2019    Iron deficiency anemia due to chronic blood loss 07/09/2019    Other constipation 06/26/2019    S/P CABG (coronary artery bypass graft) 06/21/2019    Acute postoperative pulmonary insufficiency ([place] [address] ) 06/21/2019    Blood loss anemia 06/21/2019    Triple vessel coronary artery disease 06/17/2019    Hyperlipidemia 06/17/2019    Type II or unspecified type diabetes mellitus without mention of complication, uncontrolled 06/15/2019    Bilateral leg edema 06/15/2019    Sleep apnea 06/15/2019    Benign hypertension with chronic kidney disease, stage IV ([place] [address] ) 05/30/2019    Chronic kidney disease-mineral and bone disorder 05/30/2019    Esophageal dysphagia 02/14/2019    History of colonic polyps 02/14/2019    CKD (chronic kidney disease), stage IV ([facility]) 08/18/2017    Non-nephrotic range proteinuria 08/18/2017    Trigger finger of left hand 04/05/2017    Lumbar back pain 02/07/2012    Lumbar discogenic pain syndrome 02/07/2012    Pain of lower extremity 02/07/2012    Spondylolisthesis 02/07/2012    Spinal stenosis 02/07/2012     He  has a past surgical history that includes Gallbladder surgery; Back surgery; Hernia repair; pr cabg, artery-vein, four (N/A, 6/21/2019); Colonoscopy; Rectal surgery; and Coronary artery bypass graft (2019)  His family history includes Cancer in his father and mother; Diabetes in his maternal grandmother and paternal aunt  He  reports that he quit smoking about 31 years ago  He has a 90 00 pack-year smoking history  He has never used smokeless tobacco  He reports previous alcohol use  He reports that he does not use drugs    Current Outpatient Medications   Medication Sig Dispense Refill    Alpha-Lipoic Acid 600 MG CAPS TAKE 1 CAPSULE BY MOUTH EVERY DAY 30 capsule 5    Ascorbic Acid (VITAMIN C) 1000 MG tablet Take 1,000 mg by mouth daily      aspirin 325 mg tablet Take 1 tablet (325 mg total) by mouth daily 100 tablet 0    atorvastatin (LIPITOR) 80 mg tablet Take 1 tablet (80 mg total) by mouth daily with dinner 90 tablet 3    B-D ULTRAFINE III SHORT PEN 31G X 8 MM MISC INJECT INTO THE SKIN 4 (FOUR) TIMES A  each 1    cholecalciferol (VITAMIN D3) 1,000 units tablet Take 1 tablet (1,000 Units total) by mouth daily (Patient taking differently: Take 1,000 Units by mouth ) 1 tablet 1    Continuous Blood Gluc  (FREESTYLE JOHANNE READER) MAMI Use device to scan blood glucose at least 4 times a day 1 Device 0    Continuous Blood Gluc Sensor (FreeStyle Johanne 14 Day Sensor) MISC APPLY SENSOR EVERY 14 DAYS TO CHECK BLOOD GLUCOSE AT LEAST 4 TIMES A DAY 2 each 5    cyanocobalamin (VITAMIN B-12) 500 mcg tablet Take 500 mcg by mouth daily      docusate sodium (COLACE) 100 mg capsule Take 1 capsule (100 mg total) by mouth 2 (two) times a day 60 capsule 1    Doxepin HCl (SILENOR) 6 MG TABS Take 6 mg by mouth as needed       finasteride (PROSCAR) 5 mg tablet Take 1 tablet (5 mg total) by mouth daily 90 tablet 3    gabapentin (NEURONTIN) 300 mg capsule Take 1 cap (300mg) by mouth in the morning and 1 cap (300mg) at noon, take 2 caps (600mg) at bedtime 28 capsule 0    glucose blood test strip Check 4 times a day 400 each 1    insulin glargine (Toujeo SoloStar) 300 units/mL CONCETRATED U-300 injection pen (1-unit dial) INJECT 72 UNITS UNDER THE SKIN DAILY AT BEDTIME 22 mL 3    insulin lispro (HumaLOG KwikPen) 100 units/mL injection pen INJECT 32-28-32 UNITS THREE TIMES A DAY PLUS SCALE MAXIMUM DOSE 130 UNITS 105 mL 3    latanoprost (XALATAN) 0 005 % ophthalmic solution 1 drop daily at bedtime      liraglutide (Victoza) injection INJECT 1 8 MG DAILY INTO SKIN (Patient taking differently: daily INJECT 1 8 MG DAILY INTO SKIN) 12 pen 1    losartan (COZAAR) 50 mg tablet Take 1 tablet (50 mg total) by mouth daily after dinner 90 tablet 3    metoprolol tartrate (LOPRESSOR) 25 mg tablet Take 1 tablet (25 mg total) by mouth every 12 (twelve) hours 180 tablet 3    Microlet Lancets MISC USE 3 TIMES  each 1    omeprazole (PriLOSEC) 40 MG capsule Take 40 mg by mouth daily      tamsulosin (FLOMAX) 0 4 mg Take 1 capsule (0 4 mg total) by mouth daily with dinner 90 capsule 3    torsemide (DEMADEX) 20 mg tablet TAKE 1 TABLET DAILY 90 tablet 3     No current facility-administered medications for this visit  He has No Known Allergies            Objective:    Blood pressure 110/79, pulse 70, temperature 97 6 °F (36 4 °C), temperature source Temporal, height 5' 11" (1 803 m), weight 129 kg (284 lb 12 8 oz)  Body mass index is 39 72 kg/m²  Physical Exam  Skin:     Findings: Wound (Present in multiple areas on the shins bilaterally, some covered with gauze, with mild redness around the site of injury) present  + pitting ankle edema bilaterally      Neurological Exam  On neurologic exam, the patient is alert and oriented to time and place  Speech is fluent and articulate, and the patient follows commands appropriately  Judgment and affect appear normal  Pupils are equally round and reactive to light and extraocular muscles are intact without nystagmus  Face is symmetric, and tongue, uvula, and palate are midline  Facial sensation is normal and symmetric, in all 3 divisions of the trigeminal nerve  Hearing is intact  Motor examination reveals intact strength throughout  Reflexes are 1+ in the upper extremities bilaterally, and trace in the knees and ankles bilaterally  Gait is wide-based  ROS:    Review of Systems   Constitutional: Negative  Negative for appetite change and fever  HENT: Negative  Negative for hearing loss, tinnitus, trouble swallowing and voice change  Eyes: Negative  Negative for photophobia and pain  Respiratory: Negative  Negative for shortness of breath  Cardiovascular: Negative    Negative for palpitations  Gastrointestinal: Negative  Negative for nausea and vomiting  Endocrine: Negative  Negative for cold intolerance  Genitourinary: Negative  Negative for dysuria, frequency and urgency  Musculoskeletal: Positive for myalgias  Negative for neck pain  Patients states middle finger hurts  Skin: Negative  Negative for rash  Neurological: Negative  Negative for dizziness, tremors, seizures, syncope, facial asymmetry, speech difficulty, weakness, light-headedness, numbness and headaches  Hematological: Negative  Does not bruise/bleed easily  Psychiatric/Behavioral: Negative  Negative for confusion, hallucinations and sleep disturbance  The following portions of the patient's history were reviewed and updated as appropriate: allergies, current medications/ medication history, past family history, past medical history, past social history, past surgical history and problem list     Review of systems was reviewed and otherwise unremarkable from a neurological perspective  I have spent 40 minutes with the patient today in which greater than 50% of this time was spent in counseling/coordination of care regarding diagnoses, test results, impression, plan and potential medication side effects

## 2021-01-15 ENCOUNTER — APPOINTMENT (OUTPATIENT)
Dept: RADIOLOGY | Facility: CLINIC | Age: 76
End: 2021-01-15
Payer: MEDICARE

## 2021-01-15 ENCOUNTER — OFFICE VISIT (OUTPATIENT)
Dept: URGENT CARE | Facility: CLINIC | Age: 76
End: 2021-01-15
Payer: MEDICARE

## 2021-01-15 VITALS
WEIGHT: 287 LBS | DIASTOLIC BLOOD PRESSURE: 59 MMHG | BODY MASS INDEX: 40.18 KG/M2 | OXYGEN SATURATION: 94 % | RESPIRATION RATE: 20 BRPM | HEART RATE: 72 BPM | HEIGHT: 71 IN | TEMPERATURE: 98.6 F | SYSTOLIC BLOOD PRESSURE: 119 MMHG

## 2021-01-15 DIAGNOSIS — S66.102A: Primary | ICD-10-CM

## 2021-01-15 DIAGNOSIS — S69.91XA INJURY OF FINGER OF RIGHT HAND, INITIAL ENCOUNTER: ICD-10-CM

## 2021-01-15 PROCEDURE — 73140 X-RAY EXAM OF FINGER(S): CPT

## 2021-01-15 PROCEDURE — 99213 OFFICE O/P EST LOW 20 MIN: CPT | Performed by: NURSE PRACTITIONER

## 2021-01-15 PROCEDURE — G0463 HOSPITAL OUTPT CLINIC VISIT: HCPCS | Performed by: NURSE PRACTITIONER

## 2021-01-15 NOTE — PATIENT INSTRUCTIONS
Tylenol as needed for finger pain   Follow up with orthopedics (hand specialist) for further evaluation and treatment plan  Tendon Rupture   WHAT YOU NEED TO KNOW:   A tendon rupture is a partial or complete tear of your tendon  Tendons are tough bands of tissue that attach your muscles to your bones  A tear may be caused by an injury or increased pressure on the tendon that occurs during sports or a fall  Your risk may be higher if you have a weak tendon  Weak tendons may be caused by tendonitis, use of steroids, older age, and chronic conditions such as arthritis  DISCHARGE INSTRUCTIONS:   Seek care immediately if:   · You have severe pain in the injured area, even after you take medicine  · Your arm or leg feels warm, tender, and painful  It may look swollen and red  · You feel lightheaded, short of breath, and have chest pain  · You cough up blood  Contact your healthcare provider if:   · Your symptoms do not get better with treatment  · You feel another pop, snap, or crack in your tendon  · You have questions or concerns about your condition or care  Medicines:   · NSAIDs , such as ibuprofen, help decrease swelling, pain, and fever  This medicine is available with or without a doctor's order  NSAIDs can cause stomach bleeding or kidney problems in certain people  If you take blood thinner medicine, always ask your healthcare provider if NSAIDs are safe for you  Always read the medicine label and follow directions  · Acetaminophen  decreases pain  It is available without a doctor's order  Ask how much to take and how often to take it  Follow directions  Acetaminophen can cause liver damage if not taken correctly  · Take your medicine as directed  Contact your healthcare provider if you think your medicine is not helping or if you have side effects  Tell him or her if you are allergic to any medicine  Keep a list of the medicines, vitamins, and herbs you take   Include the amounts, and when and why you take them  Bring the list or the pill bottles to follow-up visits  Carry your medicine list with you in case of an emergency  Follow up with your healthcare provider as directed:  Write down your questions so you remember to ask them during your visits  Self-care:   · Rest  the injured tendon until pain and swelling have decreased  Ask your healthcare provider what activities you can do while your tendon heals  · Apply ice  on your tendon for 15 to 20 minutes every hour for 48 hours or as directed  Use an ice pack, or put crushed ice in a plastic bag  Cover it with a towel  Ice helps prevent tissue damage and decreases swelling and pain  · Compress  the injury with an elastic bandage, air cast, medical boot, or splint to reduce swelling  Ask your healthcare provider which compression device to use, and how tight it should be  · Elevate  the injured area above the level of your heart as often as you can  This will help decrease swelling and pain  If possible, prop the injured area on pillows or blankets to keep it elevated comfortably  Wear support devices as directed  You may need a brace, cast, or splint to limit movement and protect your tendon  If the tendon rupture is in your leg, you may need to use crutches  This will decrease pain as you move around  Go to physical therapy as directed:  A physical therapist teaches you exercises to help improve movement and strength  © Copyright [address] Information is for End User's use only and may not be sold, redistributed or otherwise used for commercial purposes  All illustrations and images included in CareNotes® are the copyrighted property of A D A M , Inc  or [facility] [employer]   The above information is an  only  It is not intended as medical advice for individual conditions or treatments   Talk to your doctor, nurse or pharmacist before following any medical regimen to see if it is safe and effective for you

## 2021-01-15 NOTE — PROGRESS NOTES
[address]        NAME: Audrey Craig is a 77 y o  male  : 1945    MRN: [id number]  DATE: January 15, 2021  TIME: 4:49 PM    Assessment and Plan   Unspecified injury of flexor muscle, fascia and tendon of right middle finger at wrist and hand level, initial encounter [S66 102A]  1  Unspecified injury of flexor muscle, fascia and tendon of right middle finger at wrist and hand level, initial encounter  XR finger right third digit-middle    Ambulatory referral to Orthopedic Surgery         Patient Instructions   Tylenol as needed for finger pain   Follow up with orthopedics (hand specialist) for further evaluation and treatment plan  Follow up with PCP in 3-5 days  Proceed to  ER if symptoms worsen  Chief Complaint     Chief Complaint   Patient presents with    Finger Injury     right hand/middle finger injury, putting shoe on and had finger in shoe and pulled it out and heard a snap x 1 week         History of Present Illness       Patient is a 77year old male presenting with right middle finger injury  3 days ago he put his middle finger into the back of his shoe to fix it when he felt a "pop" in the finger  He reports not being able to bend the finger  Denies sensation changes  Minimal swelling  No OTC medication for pain  He is RHD  Review of Systems   Review of Systems   Constitutional: Negative for activity change, chills and fever  Musculoskeletal: Positive for arthralgias and joint swelling  Negative for myalgias  Skin: Negative for color change and wound  Neurological: Negative for weakness and numbness           Current Medications       Current Outpatient Medications:     Alpha-Lipoic Acid 600 MG CAPS, TAKE 1 CAPSULE BY MOUTH EVERY DAY, Disp: 30 capsule, Rfl: 5    Ascorbic Acid (VITAMIN C) 1000 MG tablet, Take 1,000 mg by mouth daily, Disp: , Rfl:     aspirin 325 mg tablet, Take 1 tablet (325 mg total) by mouth daily, Disp: 100 tablet, Rfl: 0    atorvastatin (LIPITOR) 80 mg tablet, Take 1 tablet (80 mg total) by mouth daily with dinner, Disp: 90 tablet, Rfl: 3    cholecalciferol (VITAMIN D3) 1,000 units tablet, Take 1 tablet (1,000 Units total) by mouth daily (Patient taking differently: Take 1,000 Units by mouth ), Disp: 1 tablet, Rfl: 1    cyanocobalamin (VITAMIN B-12) 500 mcg tablet, Take 500 mcg by mouth daily, Disp: , Rfl:     docusate sodium (COLACE) 100 mg capsule, Take 1 capsule (100 mg total) by mouth 2 (two) times a day, Disp: 60 capsule, Rfl: 1    Doxepin HCl (SILENOR) 6 MG TABS, Take 6 mg by mouth as needed , Disp: , Rfl:     finasteride (PROSCAR) 5 mg tablet, Take 1 tablet (5 mg total) by mouth daily, Disp: 90 tablet, Rfl: 3    gabapentin (NEURONTIN) 300 mg capsule, Take 1 cap (300mg) by mouth in the morning and 1 cap (300mg) at noon, take 2 caps (600mg) at bedtime, Disp: 28 capsule, Rfl: 0    insulin glargine (Toujeo SoloStar) 300 units/mL CONCETRATED U-300 injection pen (1-unit dial), INJECT 72 UNITS UNDER THE SKIN DAILY AT BEDTIME, Disp: 22 mL, Rfl: 3    insulin lispro (HumaLOG KwikPen) 100 units/mL injection pen, INJECT 32-28-32 UNITS THREE TIMES A DAY PLUS SCALE MAXIMUM DOSE 130 UNITS, Disp: 105 mL, Rfl: 3    latanoprost (XALATAN) 0 005 % ophthalmic solution, 1 drop daily at bedtime, Disp: , Rfl:     liraglutide (Victoza) injection, INJECT 1 8 MG DAILY INTO SKIN (Patient taking differently: daily INJECT 1 8 MG DAILY INTO SKIN), Disp: 12 pen, Rfl: 1    losartan (COZAAR) 50 mg tablet, Take 1 tablet (50 mg total) by mouth daily after dinner, Disp: 90 tablet, Rfl: 3    omeprazole (PriLOSEC) 40 MG capsule, Take 40 mg by mouth daily, Disp: , Rfl:     tamsulosin (FLOMAX) 0 4 mg, Take 1 capsule (0 4 mg total) by mouth daily with dinner, Disp: 90 capsule, Rfl: 3    torsemide (DEMADEX) 20 mg tablet, TAKE 1 TABLET DAILY, Disp: 90 tablet, Rfl: 3    B-D ULTRAFINE III SHORT PEN 31G X 8 MM MISC, INJECT INTO THE SKIN 4 (FOUR) TIMES A DAY, Disp: 400 each, Rfl: 1    Continuous Blood Gluc  (FREESTYLE JOHANNE READER) MAMI, Use device to scan blood glucose at least 4 times a day, Disp: 1 Device, Rfl: 0    Continuous Blood Gluc Sensor (FreeStyle Johanne 14 Day Sensor) MISC, APPLY SENSOR EVERY 14 DAYS TO CHECK BLOOD GLUCOSE AT LEAST 4 TIMES A DAY, Disp: 2 each, Rfl: 5    glucose blood test strip, Check 4 times a day, Disp: 400 each, Rfl: 1    metoprolol tartrate (LOPRESSOR) 25 mg tablet, Take 1 tablet (25 mg total) by mouth every 12 (twelve) hours, Disp: 180 tablet, Rfl: 3    Microlet Lancets MISC, USE 3 TIMES DAY, Disp: 300 each, Rfl: 1    Current Allergies     Allergies as of 01/15/2021    (No Known Allergies)            The following portions of the patient's history were reviewed and updated as appropriate: allergies, current medications, past family history, past medical history, past social history, past surgical history and problem list      Past Medical History:   Diagnosis Date    Diabetes mellitus ([facility] [address] )     Hyperlipidemia     Hypertension     Obesity     Renal disorder        Past Surgical History:   Procedure Laterality Date    BACK SURGERY      COLONOSCOPY      CORONARY ARTERY BYPASS GRAFT  2019    quad    GALLBLADDER SURGERY      HERNIA REPAIR      IA CABG, ARTERY-VEIN, FOUR N/A 6/21/2019    Procedure: CORONARY ARTERY BYPASS GRAFT (CABG) 4 VESSELS WITH SVG TO PDA, OM, DIAGONAL AND LIMA TO LAD; RIGHT LEG EVH;  Surgeon: Caden Wagner MD;  Location: [facility];  Service: Cardiac Surgery    RECTAL SURGERY         Family History   Problem Relation Age of Onset    Cancer Mother     Cancer Father     Diabetes Maternal Grandmother     Diabetes Paternal Aunt          Medications have been verified  Objective   /59   Pulse 72   Temp 98 6 °F (37 °C)   Resp 20   Ht 5' 11" (1 803 m)   Wt 130 kg (287 lb)   SpO2 94%   BMI 40 03 kg/m²      Right middle finger xray: no fracture or dislocation   Pending official radiology read  Physical Exam     Physical Exam  Vitals signs reviewed  Constitutional:       General: He is not in acute distress  Appearance: Normal appearance  He is obese  Cardiovascular:      Rate and Rhythm: Normal rate  Pulmonary:      Effort: Pulmonary effort is normal    Musculoskeletal:        Hands:    Skin:     General: Skin is warm and moist    Neurological:      Mental Status: He is alert

## 2021-01-21 ENCOUNTER — HOSPITAL ENCOUNTER (OUTPATIENT)
Dept: RADIOLOGY | Facility: HOSPITAL | Age: 76
Discharge: HOME | End: 2021-01-21
Payer: MEDICARE

## 2021-01-21 ENCOUNTER — OFFICE VISIT (OUTPATIENT)
Dept: OBGYN CLINIC | Facility: CLINIC | Age: 76
End: 2021-01-21
Payer: MEDICARE

## 2021-01-21 VITALS
BODY MASS INDEX: 40.18 KG/M2 | SYSTOLIC BLOOD PRESSURE: 126 MMHG | DIASTOLIC BLOOD PRESSURE: 56 MMHG | HEART RATE: 76 BPM | WEIGHT: 287 LBS | HEIGHT: 71 IN

## 2021-01-21 DIAGNOSIS — S66.801A INJURY OF FLEXOR TENDON OF RIGHT HAND, INITIAL ENCOUNTER: Primary | ICD-10-CM

## 2021-01-21 DIAGNOSIS — S66.192A: ICD-10-CM

## 2021-01-21 DIAGNOSIS — S66.801A INJURY OF FLEXOR TENDON OF RIGHT HAND, INITIAL ENCOUNTER: ICD-10-CM

## 2021-01-21 PROBLEM — T14.8XXA TRAUMATIC INJURY OF SKIN: Status: ACTIVE | Noted: 2021-01-21

## 2021-01-21 PROBLEM — E74.04 MCARDLE'S DISEASE (HCC): Status: ACTIVE | Noted: 2021-01-21

## 2021-01-21 PROCEDURE — 76882 US LMTD JT/FCL EVL NVASC XTR: CPT

## 2021-01-21 PROCEDURE — 99204 OFFICE O/P NEW MOD 45 MIN: CPT | Performed by: SURGERY

## 2021-01-21 NOTE — ASSESSMENT & PLAN NOTE
Stable  Self limiting  Encouraged small but frequent bursts of aerobic exercise throughout the day, with intolerability

## 2021-01-21 NOTE — ASSESSMENT & PLAN NOTE
Would recommend yearly carotid ultrasound for surveillance  He would be due for another 1 in April or May  Continue aspirin and atorvastatin daily

## 2021-01-21 NOTE — PROGRESS NOTES
Khadra RAND  Attending, Orthopaedic Surgery  Hand, Wrist, and Elbow Surgery  [facility]      ORTHOPAEDIC HAND, WRIST, AND ELBOW OFFICE  VISIT       ASSESSMENT/PLAN:      Right Long Finger Flexor Tendon Rupture, subacute presentation currently about 3 and half weeks status post injury  Likely avulsion of the FDP tendon of the long finger certain the degree of retraction  This is in the setting of pre-existing hand arthritis and diabetes, with last hemoglobin A1c above 8  Had a long discussion with the patient in terms  Of predicted outcomes given chronicity of the issue may be difficult to dock the tendon to its insertion site and may require a 2 stage reconstruction  If surgical intervention is undertaken he would require  significant occupational therapy and may end up losing some motion at the PIP joint particularly extension  Because he has pre-existing arthritis that would likely limit maximal improvement in his DIP motion to approximately 50% of adjacent fingers at best is also increased risk for infection given his elevated blood sugar  Patient would like to proceed with obtaining the ultrasound and discussion of interventions after that  We also did discuss benign neglect and fusion of the joint and patient will consider these options  We will obtain an ultrasound of right hand to evaluate for long finger FDP rupture  We will follow up after the ultrasound to review results and treatment options moving forward  The patient verbalized understanding of exam findings and treatment plan  We engaged in the shared decision-making process and treatment options were discussed at length with the patient  Surgical and conservative management discussed today along with risks and benefits      Diagnoses and all orders for this visit:    Injury of flexor tendon of right hand, initial encounter      Follow Up:  FU 2 days after ultrasound complete    To Do Next Visit:  FU on ultrasound results      General Discussions: The risks and benefits of operative versus non-operative treatment options for flexor tendon injury were discussed at length  CHIEF COMPLAINT:  Unable to bend right long finger    SUBJECTIVE:  Charis Le is a 77y o  year old RHD male who presents 3 5 weeks after a right hand injury  He notes putting his shoe on when he went to use his right hand to fit his heel into the shoe he is being referred for this  ELEAZAR Quick by  At that time, he heard a pop and has been unable to bend his finger since  He notes minimal swelling with no ecchymoses and he has no pain  No history of injury to this hand        Pain/symptom timing:  Worse during the day when active  Pain/symptom context:  Worse with activites and work  Pain/symptom modifying factors:  Rest makes better, activities make worse  Pain/symptom associated signs/symptoms: none    Prior treatment   · NSAIDsYes   · Injections No   · Bracing/Orthotics No    · Physical Therapy No     I have personally reviewed all the relevant PMH, PSH, SH, FH, Medications and allergies      PAST MEDICAL HISTORY:  Past Medical History:   Diagnosis Date    Diabetes mellitus ([facility] [address] )     Hyperlipidemia     Hypertension     Obesity     Renal disorder        PAST SURGICAL HISTORY:  Past Surgical History:   Procedure Laterality Date    BACK SURGERY      COLONOSCOPY      CORONARY ARTERY BYPASS GRAFT  2019    quad    GALLBLADDER SURGERY      HERNIA REPAIR      [place] CABG, ARTERY-VEIN, FOUR N/A 6/21/2019    Procedure: CORONARY ARTERY BYPASS GRAFT (CABG) 4 VESSELS WITH SVG TO PDA, OM, DIAGONAL AND LIMA TO LAD; RIGHT LEG EVH;  Surgeon: Cam Brown MD;  [facility];  Service: Cardiac Surgery    RECTAL SURGERY         FAMILY HISTORY:  Family History   Problem Relation Age of Onset    Cancer Mother     Cancer Father     Diabetes Maternal Grandmother     Diabetes Paternal Aunt        SOCIAL HISTORY:  Social History     Tobacco Use    Smoking status: Former Smoker     Packs/day: 3 00     Years: 30 00     Pack years: 90 00     Quit date:      Years since quittin 0    Smokeless tobacco: Never Used    Tobacco comment: Quit    Substance Use Topics    Alcohol use: Not Currently     Comment: 1 drink every 6 months      Drug use: Never       MEDICATIONS:    Current Outpatient Medications:     Alpha-Lipoic Acid 600 MG CAPS, TAKE 1 CAPSULE BY MOUTH EVERY DAY, Disp: 30 capsule, Rfl: 5    Ascorbic Acid (VITAMIN C) 1000 MG tablet, Take 1,000 mg by mouth daily, Disp: , Rfl:     aspirin 325 mg tablet, Take 1 tablet (325 mg total) by mouth daily, Disp: 100 tablet, Rfl: 0    atorvastatin (LIPITOR) 80 mg tablet, Take 1 tablet (80 mg total) by mouth daily with dinner, Disp: 90 tablet, Rfl: 3    B-D ULTRAFINE III SHORT PEN 31G X 8 MM MISC, INJECT INTO THE SKIN 4 (FOUR) TIMES A DAY, Disp: 400 each, Rfl: 1    cholecalciferol (VITAMIN D3) 1,000 units tablet, Take 1 tablet (1,000 Units total) by mouth daily (Patient taking differently: Take 1,000 Units by mouth ), Disp: 1 tablet, Rfl: 1    Continuous Blood Gluc  (FREESTYLE JOHANNE READER) MAMI, Use device to scan blood glucose at least 4 times a day, Disp: 1 Device, Rfl: 0    Continuous Blood Gluc Sensor (FreeStyle Johanne 14 Day Sensor) [facility], APPLY SENSOR EVERY 14 DAYS TO CHECK BLOOD GLUCOSE AT LEAST 4 TIMES A DAY, Disp: 2 each, Rfl: 5    cyanocobalamin (VITAMIN B-12) 500 mcg tablet, Take 500 mcg by mouth daily, Disp: , Rfl:     docusate sodium (COLACE) 100 mg capsule, Take 1 capsule (100 mg total) by mouth 2 (two) times a day, Disp: 60 capsule, Rfl: 1    Doxepin HCl (SILENOR) 6 MG TABS, Take 6 mg by mouth as needed , Disp: , Rfl:     finasteride (PROSCAR) 5 mg tablet, Take 1 tablet (5 mg total) by mouth daily, Disp: 90 tablet, Rfl: 3    gabapentin (NEURONTIN) 300 mg capsule, Take 1 cap (300mg) by mouth in the morning and 1 cap (300mg) at noon, take 2 caps (600mg) at bedtime, Disp: 28 capsule, Rfl: 0    glucose blood test strip, Check 4 times a day, Disp: 400 each, Rfl: 1    insulin glargine (Toujeo SoloStar) 300 units/mL CONCETRATED U-300 injection pen (1-unit dial), INJECT 72 UNITS UNDER THE SKIN DAILY AT BEDTIME, Disp: 22 mL, Rfl: 3    insulin lispro (HumaLOG KwikPen) 100 units/mL injection pen, INJECT 32-28-32 UNITS THREE TIMES A DAY PLUS SCALE MAXIMUM DOSE 130 UNITS, Disp: 105 mL, Rfl: 3    latanoprost (XALATAN) 0 005 % ophthalmic solution, 1 drop daily at bedtime, Disp: , Rfl:     liraglutide (Victoza) injection, INJECT 1 8 MG DAILY INTO SKIN (Patient taking differently: daily INJECT 1 8 MG DAILY INTO SKIN), Disp: 12 pen, Rfl: 1    losartan (COZAAR) 50 mg tablet, Take 1 tablet (50 mg total) by mouth daily after dinner, Disp: 90 tablet, Rfl: 3    metoprolol tartrate (LOPRESSOR) 25 mg tablet, Take 1 tablet (25 mg total) by mouth every 12 (twelve) hours, Disp: 180 tablet, Rfl: 3    Microlet Lancets MISC, USE 3 TIMES DAY, Disp: 300 each, Rfl: 1    omeprazole (PriLOSEC) 40 MG capsule, Take 40 mg by mouth daily, Disp: , Rfl:     tamsulosin (FLOMAX) 0 4 mg, Take 1 capsule (0 4 mg total) by mouth daily with dinner, Disp: 90 capsule, Rfl: 3    torsemide (DEMADEX) 20 mg tablet, TAKE 1 TABLET DAILY, Disp: 90 tablet, Rfl: 3    ALLERGIES:  No Known Allergies        REVIEW OF SYSTEMS:  Review of Systems   Constitutional: Positive for activity change  HENT: Negative  Eyes: Negative  Respiratory: Negative  Cardiovascular: Negative  Gastrointestinal: Negative  Endocrine: Negative  Genitourinary: Negative  Musculoskeletal: Positive for arthralgias and myalgias  Skin: Negative  Allergic/Immunologic: Negative  Neurological: Negative  Hematological: Negative  Psychiatric/Behavioral: Negative  All other systems reviewed and are negative        VITALS:  Vitals:    01/21/21 1013   BP: 126/56   Pulse: 76       LABS:  HgA1c:   Lab Results Component Value Date    HGBA1C 8 5 (H) 11/11/2020     BMP:   Lab Results   Component Value Date    GLUCOSE 141 (H) 06/21/2019    CALCIUM 8 8 01/04/2021    K 4 2 01/04/2021    CO2 27 01/04/2021     01/04/2021    BUN 49 (H) 01/04/2021    CREATININE 2 20 (H) 01/04/2021       _____________________________________________________  PHYSICAL EXAMINATION:  General: well developed and well nourished, alert, oriented times 3 and appears comfortable  Psychiatric: Normal  HEENT: Normocephalic, Atraumatic Trachea Midline, No torticollis  Pulmonary: No audible wheezing or respiratory distress   Cardiovascular: No pitting edema, 2+ radial pulse   Skin: No masses, erythema, lacerations, fluctation, ulcerations  Neurovascular: Sensation Intact to the Median, Ulnar, Radial Nerve, Motor Intact to the Median, Ulnar, Radial Nerve and Pulses Intact  Musculoskeletal: Normal, except as noted in detailed exam and in HPI  MUSCULOSKELETAL EXAMINATION:  Right Hand:  TTP over long finger proximal phalanx and PIP joint with no discrete palpable masses  Unable to make full composite fist  Unable to flex at long finger DIP Joint  Able to flex and extend at MCP and PIP joints of long finger  FDS intact to long finger  Motor and sensory exam intact to median, radial and ulnar nerve distribution distally  Sensation intact to long finger  Fingers WWP    ___________________________________________________  STUDIES REVIEWED:  I have personally reviewed AP lateral and oblique radiographs of right hand which demonstrate long finger DIP arthritis with no acute fractures or dislocations       PROCEDURES PERFORMED:  No Procedures performed today

## 2021-01-21 NOTE — ASSESSMENT & PLAN NOTE
As noted below the patient accidentally hits his leg against the corner of the bottom step in his house and has some contusions and the skin has broken in some areas  His wife is dressing it with gauze and Neosporin  There are some areas that appear red but not clearly indurated  I reminded him to watch this very closely due to being diabetic and report this to his podiatrist or PCP  He may need to see wound care  We also discussed placing a soft covering on the bottom step to prevent injury

## 2021-01-27 ENCOUNTER — OFFICE VISIT (OUTPATIENT)
Dept: OBGYN CLINIC | Facility: CLINIC | Age: 76
End: 2021-01-27
Payer: MEDICARE

## 2021-01-27 VITALS
HEIGHT: 71 IN | BODY MASS INDEX: 39.2 KG/M2 | WEIGHT: 280 LBS | SYSTOLIC BLOOD PRESSURE: 160 MMHG | DIASTOLIC BLOOD PRESSURE: 70 MMHG | HEART RATE: 80 BPM

## 2021-01-27 DIAGNOSIS — S66.801D INJURY OF FLEXOR TENDON OF RIGHT HAND, SUBSEQUENT ENCOUNTER: ICD-10-CM

## 2021-01-27 DIAGNOSIS — S66.812A RUPTURE OF FLEXOR TENDON OF LEFT HAND, INITIAL ENCOUNTER: Primary | ICD-10-CM

## 2021-01-27 PROCEDURE — 99214 OFFICE O/P EST MOD 30 MIN: CPT | Performed by: SURGERY

## 2021-01-27 NOTE — PROGRESS NOTES
ASSESSMENT/PLAN:      77year old male here to reviewed the US that shows FDP has torn, zone 3 injury with approximately 3 mm  It was explained that the DIP joint will never flex due to the discontinuity of the FDP tendon   This was reiterated to the patient multiple times   All questions were answered including whether not ball exercises with palm  would improve his FDP function   We again explained that he will not regain FDP flexion on LEs he has surgical intervention   Patient shows understanding and wishes to proceed with nonsurgical intervention at this time  We will see him as needed  The patient verbalized understanding of exam findings and treatment plan  We engaged in the shared decision-making process and treatment options were discussed at length with the patient  Surgical and conservative management discussed today along with risks and benefits  Diagnoses and all orders for this visit:    Rupture of flexor tendon of left hand, initial encounter    Injury of flexor tendon of right hand, subsequent encounter         Follow Up:  Return if symptoms worsen or fail to improve  To Do Next Visit:  Re-evaluation of current issue    ____________________________________________________________________________________________________________________________________________      CHIEF COMPLAINT:  Chief Complaint   Patient presents with    Left Hand - Follow-up       SUBJECTIVE:  Kelvin Ignacio is a 77y o  year old RHD male who presents today to review an ultrasound of the right long finger flexor tendon rupture that occurred 4 weeks ago  He notes he was putting his shoe on when he went to use his right hand to fit his heel into the shoe  He heard a pop and has been unable to bend his finger since  I have personally reviewed all the relevant PMH, PSH, SH, FH, Medications and allergies       PAST MEDICAL HISTORY:  Past Medical History:   Diagnosis Date    [facility] ([facility] [address] )  Hyperlipidemia     Hypertension     Obesity     Renal disorder        PAST SURGICAL HISTORY:  Past Surgical History:   Procedure Laterality Date    BACK SURGERY      COLONOSCOPY      CORONARY ARTERY BYPASS GRAFT      quad    GALLBLADDER SURGERY      HERNIA REPAIR      [place] CABG, ARTERY-VEIN, FOUR N/A 2019    Procedure: CORONARY ARTERY BYPASS GRAFT (CABG) 4 VESSELS WITH SVG TO PDA, OM, DIAGONAL AND LIMA TO LAD; RIGHT LEG EVH;  Surgeon: Kelvin Smith MD;  [facility];  Service: Cardiac Surgery    RECTAL SURGERY         FAMILY HISTORY:  Family History   Problem Relation Age of Onset    Cancer Mother     Cancer Father     Diabetes Maternal Grandmother     Diabetes Paternal Aunt        SOCIAL HISTORY:  Social History     Tobacco Use    Smoking status: Former Smoker     Packs/day: 3 00     Years: 30 00     Pack years: 90 00     Quit date:      Years since quittin 0    Smokeless tobacco: Never Used    Tobacco comment: Quit    Substance Use Topics    Alcohol use: Not Currently     Comment: 1 drink every 6 months      Drug use: Never       MEDICATIONS:    Current Outpatient Medications:     Alpha-Lipoic Acid 600 MG CAPS, TAKE 1 CAPSULE BY MOUTH EVERY DAY, Disp: 30 capsule, Rfl: 5    Ascorbic Acid (VITAMIN C) 1000 MG tablet, Take 1,000 mg by mouth daily, Disp: , Rfl:     aspirin 325 mg tablet, Take 1 tablet (325 mg total) by mouth daily, Disp: 100 tablet, Rfl: 0    atorvastatin (LIPITOR) 80 mg tablet, Take 1 tablet (80 mg total) by mouth daily with dinner, Disp: 90 tablet, Rfl: 3    B-D ULTRAFINE III SHORT PEN 31G X 8 MM MISC, INJECT INTO THE SKIN 4 (FOUR) TIMES A DAY, Disp: 400 each, Rfl: 1    cholecalciferol (VITAMIN D3) 1,000 units tablet, Take 1 tablet (1,000 Units total) by mouth daily (Patient taking differently: Take 1,000 Units by mouth ), Disp: 1 tablet, Rfl: 1    Continuous Blood Gluc  (FREESTYLE JOHANNE READER) MAMI, Use device to scan blood glucose at least 4 times a day, Disp: 1 Device, Rfl: 0    Continuous Blood Gluc Sensor (FreeStyle Celia 14 Day Sensor) [facility] [place], APPLY SENSOR EVERY 14 DAYS TO CHECK BLOOD GLUCOSE AT LEAST 4 TIMES A DAY, Disp: 2 each, Rfl: 5    cyanocobalamin (VITAMIN B-12) 500 mcg tablet, Take 500 mcg by mouth daily, Disp: , Rfl:     docusate sodium (COLACE) 100 mg capsule, Take 1 capsule (100 mg total) by mouth 2 (two) times a day, Disp: 60 capsule, Rfl: 1    Doxepin HCl (SILENOR) 6 MG TABS, Take 6 mg by mouth as needed , Disp: , Rfl:     finasteride (PROSCAR) 5 mg tablet, Take 1 tablet (5 mg total) by mouth daily, Disp: 90 tablet, Rfl: 3    gabapentin (NEURONTIN) 300 mg capsule, Take 1 cap (300mg) by mouth in the morning and 1 cap (300mg) at noon, take 2 caps (600mg) at bedtime, Disp: 28 capsule, Rfl: 0    glucose blood test strip, Check 4 times a day, Disp: 400 each, Rfl: 1    insulin glargine (Toujeo SoloStar) 300 units/mL CONCETRATED U-300 injection pen (1-unit dial), INJECT 72 UNITS UNDER THE SKIN DAILY AT BEDTIME, Disp: 22 mL, Rfl: 3    insulin lispro (HumaLOG KwikPen) 100 units/mL injection pen, INJECT 32-28-32 UNITS THREE TIMES A DAY PLUS SCALE MAXIMUM DOSE 130 UNITS, Disp: 105 mL, Rfl: 3    latanoprost (XALATAN) 0 005 % ophthalmic solution, 1 drop daily at bedtime, Disp: , Rfl:     liraglutide (Victoza) injection, INJECT 1 8 MG DAILY INTO SKIN (Patient taking differently: daily INJECT 1 8 MG DAILY INTO SKIN), Disp: 12 pen, Rfl: 1    losartan (COZAAR) 50 mg tablet, Take 1 tablet (50 mg total) by mouth daily after dinner, Disp: 90 tablet, Rfl: 3    metoprolol tartrate (LOPRESSOR) 25 mg tablet, Take 1 tablet (25 mg total) by mouth every 12 (twelve) hours, Disp: 180 tablet, Rfl: 3    Microlet Lancets MISC, USE 3 TIMES DAY, Disp: 300 each, Rfl: 1    omeprazole (PriLOSEC) 40 MG capsule, Take 40 mg by mouth daily, Disp: , Rfl:     tamsulosin (FLOMAX) 0 4 mg, Take 1 capsule (0 4 mg total) by mouth daily with dinner, Disp: 90 capsule, Rfl: 3    torsemide (DEMADEX) 20 mg tablet, TAKE 1 TABLET DAILY, Disp: 90 tablet, Rfl: 3    ALLERGIES:  No Known Allergies    REVIEW OF SYSTEMS:  Review of Systems   Constitutional: Negative for chills, fever and unexpected weight change  HENT: Negative for hearing loss, nosebleeds and sore throat  Eyes: Negative for pain, redness and visual disturbance  Respiratory: Negative for cough, shortness of breath and wheezing  Cardiovascular: Negative for chest pain, palpitations and leg swelling  Gastrointestinal: Negative for abdominal pain, nausea and vomiting  Endocrine: Negative for polydipsia and polyuria  Genitourinary: Negative for dysuria and hematuria  Skin: Negative for rash and wound  Neurological: Negative for dizziness, light-headedness and headaches  Psychiatric/Behavioral: Negative for decreased concentration, dysphoric mood and suicidal ideas  The patient is not nervous/anxious           VITALS:  Vitals:    01/27/21 1144   BP: 160/70   Pulse: 80       LABS:  HgA1c:   Lab Results   Component Value Date    HGBA1C 8 5 (H) 11/11/2020     BMP:   Lab Results   Component Value Date    GLUCOSE 141 (H) 06/21/2019    CALCIUM 8 8 01/04/2021    K 4 2 01/04/2021    CO2 27 01/04/2021     01/04/2021    BUN 49 (H) 01/04/2021    CREATININE 2 20 (H) 01/04/2021       _____________________________________________________  PHYSICAL EXAMINATION:  General: well developed and well nourished, alert, oriented times 3 and appears comfortable  Psychiatric: Normal  HEENT: Normocephalic, Atraumatic Trachea Midline, No torticollis  Pulmonary: No audible wheezing or respiratory distress   Cardiovascular: No pitting edema, 2+ radial pulse   Skin: No masses, erythema, lacerations, fluctation, ulcerations  Neurovascular: Sensation Intact to the Median, Ulnar, Radial Nerve, Motor Intact to the Median, Ulnar, Radial Nerve and Pulses Intact  Musculoskeletal: Normal, except as noted in detailed exam and in HPI       MUSCULOSKELETAL EXAMINATION:  Left hand:    There is no swelling within the palmar aspect of the hand  There is flexion at the MCP joint, PIP joint no flexion at the PIP joint  Opposition intact  Sensation intact to light touch  Brisk capillary refill    ___________________________________________________  STUDIES REVIEWED:  I have personally reviewed ultrasound of right hand from 01/21/2021  which demonstrate complete tear of the right long finger FDP tendon at the level of the mid 3rd metacarpal, with a 3-5 mm gap between the torn tendon edges        PROCEDURES PERFORMED:  Procedures  No Procedures performed today    _____________________________________________________      Zane Iglesias    I,:  Tito Chatterjee am acting as a scribe while in the presence of the attending physician :       I,:  Dorene Jeffrey MD personally performed the services described in this documentation    as scribed in my presence :

## 2021-02-03 NOTE — TELEPHONE ENCOUNTER
Upon review of the In Basket request we were able to locate, review, and update the patient chart as requested for Diabetic Eye Exam     Any additional questions or concerns should be emailed to the Practice Liaisons via [email or web address] email, please do not reply via In Basket      Thank you  Anu Mortensen MA

## 2021-02-10 DIAGNOSIS — E11.65 TYPE 2 DIABETES MELLITUS WITH HYPERGLYCEMIA, WITH LONG-TERM CURRENT USE OF INSULIN (HCC): ICD-10-CM

## 2021-02-10 DIAGNOSIS — Z79.4 TYPE 2 DIABETES MELLITUS WITH HYPERGLYCEMIA, WITH LONG-TERM CURRENT USE OF INSULIN (HCC): ICD-10-CM

## 2021-02-10 DIAGNOSIS — E11.65 UNCONTROLLED TYPE 2 DIABETES MELLITUS WITH HYPERGLYCEMIA (HCC): ICD-10-CM

## 2021-02-10 RX ORDER — INSULIN GLARGINE 300 U/ML
INJECTION, SOLUTION SUBCUTANEOUS
Qty: 22 ML | Refills: 3
Start: 2021-02-10 | End: 2021-03-31

## 2021-02-10 RX ORDER — INSULIN LISPRO 100 [IU]/ML
INJECTION, SOLUTION INTRAVENOUS; SUBCUTANEOUS
Qty: 105 ML | Refills: 3
Start: 2021-02-10 | End: 2021-07-19 | Stop reason: SDUPTHER

## 2021-02-10 NOTE — TELEPHONE ENCOUNTER
Average glucose 191  Increase humalog at lunch to 30 untis and Toujeo to 74 units  Keep carbohydrate intake consistent to limit fluctuations in BG levels  Follow diabetic diet

## 2021-02-13 DIAGNOSIS — Z23 ENCOUNTER FOR IMMUNIZATION: ICD-10-CM

## 2021-02-15 ENCOUNTER — IMMUNIZATIONS (OUTPATIENT)
Dept: FAMILY MEDICINE CLINIC | Facility: HOSPITAL | Age: 76
End: 2021-02-15

## 2021-02-15 DIAGNOSIS — Z23 ENCOUNTER FOR IMMUNIZATION: Primary | ICD-10-CM

## 2021-02-15 PROCEDURE — 91300 SARS-COV-2 / COVID-19 MRNA VACCINE (PFIZER-BIONTECH) 30 MCG: CPT | Performed by: PHYSICIAN ASSISTANT

## 2021-02-15 PROCEDURE — 0001A SARS-COV-2 / COVID-19 MRNA VACCINE (PFIZER-BIONTECH) 30 MCG: CPT | Performed by: PHYSICIAN ASSISTANT

## 2021-02-18 ENCOUNTER — OFFICE VISIT (OUTPATIENT)
Dept: UROLOGY | Facility: CLINIC | Age: 76
End: 2021-02-18
Payer: MEDICARE

## 2021-02-18 VITALS
DIASTOLIC BLOOD PRESSURE: 70 MMHG | BODY MASS INDEX: 39.62 KG/M2 | SYSTOLIC BLOOD PRESSURE: 126 MMHG | WEIGHT: 283 LBS | HEART RATE: 79 BPM | HEIGHT: 71 IN

## 2021-02-18 DIAGNOSIS — I25.10 TRIPLE VESSEL CORONARY ARTERY DISEASE: ICD-10-CM

## 2021-02-18 DIAGNOSIS — Z95.1 S/P CABG (CORONARY ARTERY BYPASS GRAFT): ICD-10-CM

## 2021-02-18 DIAGNOSIS — R32 URINARY INCONTINENCE, UNSPECIFIED TYPE: Primary | ICD-10-CM

## 2021-02-18 DIAGNOSIS — R33.9 URINARY RETENTION: ICD-10-CM

## 2021-02-18 LAB
POST-VOID RESIDUAL VOLUME, ML POC: 281 ML
SL AMB  POCT GLUCOSE, UA: NORMAL
SL AMB LEUKOCYTE ESTERASE,UA: NORMAL
SL AMB POCT BILIRUBIN,UA: NORMAL
SL AMB POCT BLOOD,UA: NORMAL
SL AMB POCT CLARITY,UA: CLEAR
SL AMB POCT COLOR,UA: YELLOW
SL AMB POCT KETONES,UA: NORMAL
SL AMB POCT NITRITE,UA: NORMAL
SL AMB POCT PH,UA: 5
SL AMB POCT SPECIFIC GRAVITY,UA: 1.01
SL AMB POCT URINE PROTEIN: NORMAL
SL AMB POCT UROBILINOGEN: NORMAL

## 2021-02-18 PROCEDURE — 51798 US URINE CAPACITY MEASURE: CPT | Performed by: PHYSICIAN ASSISTANT

## 2021-02-18 PROCEDURE — 81002 URINALYSIS NONAUTO W/O SCOPE: CPT | Performed by: PHYSICIAN ASSISTANT

## 2021-02-18 PROCEDURE — 99213 OFFICE O/P EST LOW 20 MIN: CPT | Performed by: PHYSICIAN ASSISTANT

## 2021-02-18 RX ORDER — TAMSULOSIN HYDROCHLORIDE 0.4 MG/1
0.8 CAPSULE ORAL
Qty: 180 CAPSULE | Refills: 3 | Status: SHIPPED | OUTPATIENT
Start: 2021-02-18 | End: 2022-03-28 | Stop reason: SDUPTHER

## 2021-02-18 RX ORDER — FINASTERIDE 5 MG/1
5 TABLET, FILM COATED ORAL DAILY
Qty: 90 TABLET | Refills: 3 | Status: SHIPPED | OUTPATIENT
Start: 2021-02-18 | End: 2022-03-28 | Stop reason: SDUPTHER

## 2021-03-01 NOTE — PROGRESS NOTES
UROLOGY PROGRESS NOTE   Patient Identifiers: Clarinda Cranker (MRN [id number])  Date of Service: 3/1/2021    Subjective:    78-year-old man with history of BPH with urinary retention and elevated PSA  He has had a negative biopsy in the past   He had been previously followed by another urologist   He was seen in June and his PVR was 160 mL  He has been on tamsulosin and finasteride since March of 2019  Today his urine is clear but his postvoid residual is 281 mL  He has no burning or hematuria  Reason for visit:  BPH with incomplete bladder emptying    Objective:     VITALS:    Vitals:    02/18/21 0806   BP: 126/70   Pulse: 79     AUA SYMPTOM SCORE      Most Recent Value   AUA SYMPTOM SCORE   How often have you had a sensation of not emptying your bladder completely after you finished urinating? 3   How often have you had to urinate again less than two hours after you finished urinating? 4   How often have you found you stopped and started again several times when you urinate?  0   How often have you found it difficult to postpone urination? 4   How often have you had a weak urinary stream?  3   How often have you had to push or strain to begin urination?  --   How many times did you most typically get up to urinate from the time you went to bed at night until the time you got up in the morning?   1   Quality of Life: If you were to spend the rest of your life with your urinary condition just the way it is now, how would you feel about that?  6   AUA SYMPTOM SCORE  --            LABS:  Lab Results   Component Value Date    HGB 11 5 (L) 08/03/2020    HCT 35 9 (L) 08/03/2020    WBC 7 17 08/03/2020     08/03/2020   ]    Lab Results   Component Value Date    K 4 2 01/04/2021     01/04/2021    CO2 27 01/04/2021    BUN 49 (H) 01/04/2021    CREATININE 2 20 (H) 01/04/2021    CALCIUM 8 8 01/04/2021    GLUCOSE 141 (H) 06/21/2019   ]        INPATIENT MEDS:    Current Outpatient Medications:     Alpha-Lipoic Acid 600 MG CAPS, TAKE 1 CAPSULE BY MOUTH EVERY DAY, Disp: 30 capsule, Rfl: 5    Ascorbic Acid (VITAMIN C) 1000 MG tablet, Take 1,000 mg by mouth daily, Disp: , Rfl:     aspirin 325 mg tablet, Take 1 tablet (325 mg total) by mouth daily, Disp: 100 tablet, Rfl: 0    atorvastatin (LIPITOR) 80 mg tablet, Take 1 tablet (80 mg total) by mouth daily with dinner, Disp: 90 tablet, Rfl: 3    B-D ULTRAFINE III SHORT PEN 31G X 8 MM MISC, INJECT INTO THE SKIN 4 (FOUR) TIMES A DAY, Disp: 400 each, Rfl: 1    cholecalciferol (VITAMIN D3) 1,000 units tablet, Take 1 tablet (1,000 Units total) by mouth daily (Patient taking differently: Take 1,000 Units by mouth ), Disp: 1 tablet, Rfl: 1    Continuous Blood Gluc  (FREESTYLE JOHANNE READER) MAMI, Use device to scan blood glucose at least 4 times a day, Disp: 1 Device, Rfl: 0    Continuous Blood Gluc Sensor (FreeStyle Johanne 14 Day Sensor) [facility], APPLY SENSOR EVERY 14 DAYS TO CHECK BLOOD GLUCOSE AT LEAST 4 TIMES A DAY, Disp: 2 each, Rfl: 5    cyanocobalamin (VITAMIN B-12) 500 mcg tablet, Take 500 mcg by mouth daily, Disp: , Rfl:     docusate sodium (COLACE) 100 mg capsule, Take 1 capsule (100 mg total) by mouth 2 (two) times a day, Disp: 60 capsule, Rfl: 1    Doxepin HCl (SILENOR) 6 MG TABS, Take 6 mg by mouth as needed , Disp: , Rfl:     finasteride (PROSCAR) 5 mg tablet, Take 1 tablet (5 mg total) by mouth daily, Disp: 90 tablet, Rfl: 3    gabapentin (NEURONTIN) 300 mg capsule, Take 1 cap (300mg) by mouth in the morning and 1 cap (300mg) at noon, take 2 caps (600mg) at bedtime, Disp: 28 capsule, Rfl: 0    glucose blood test strip, Check 4 times a day, Disp: 400 each, Rfl: 1    insulin glargine (Toujeo SoloStar) 300 units/mL CONCETRATED U-300 injection pen (1-unit dial), INJECT 74 UNITS UNDER THE SKIN DAILY AT BEDTIME, Disp: 22 mL, Rfl: 3    insulin lispro (HumaLOG KwikPen) 100 units/mL injection pen, INJECT 32-30-32 UNITS THREE TIMES A DAY PLUS SCALE MAXIMUM DOSE 130 UNITS, Disp: 105 mL, Rfl: 3    latanoprost (XALATAN) 0 005 % ophthalmic solution, 1 drop daily at bedtime, Disp: , Rfl:     liraglutide (Victoza) injection, INJECT 1 8 MG DAILY INTO SKIN (Patient taking differently: daily INJECT 1 8 MG DAILY INTO SKIN), Disp: 12 pen, Rfl: 1    losartan (COZAAR) 50 mg tablet, Take 1 tablet (50 mg total) by mouth daily after dinner, Disp: 90 tablet, Rfl: 3    metoprolol tartrate (LOPRESSOR) 25 mg tablet, Take 1 tablet (25 mg total) by mouth every 12 (twelve) hours, Disp: 180 tablet, Rfl: 3    Microlet Lancets MISC, USE 3 TIMES DAY, Disp: 300 each, Rfl: 1    omeprazole (PriLOSEC) 40 MG capsule, Take 40 mg by mouth daily, Disp: , Rfl:     tamsulosin (FLOMAX) 0 4 mg, Take 2 capsules (0 8 mg total) by mouth daily with dinner, Disp: 180 capsule, Rfl: 3    torsemide (DEMADEX) 20 mg tablet, TAKE 1 TABLET DAILY, Disp: 90 tablet, Rfl: 3      Physical Exam:   /70 (BP Location: Right arm, Patient Position: Sitting, Cuff Size: Adult)   Pulse 79   Ht 5' 11" (1 803 m)   Wt 128 kg (283 lb)   BMI 39 47 kg/m²   GEN: no acute distress    RESP: breathing comfortably with no accessory muscle use    ABD: soft, non-tender, non-distended   INCISION:    EXT: no significant peripheral edema   (Male): Penis circumcised, phallus normal, meatus patent  Testicles descended into scrotum bilaterally without masses nor tenderness  No inguinal hernias bilaterally  ROHITH: Prostate is enlarged at 40 grams  The prostate is not boggy  The prostate is not tender  No nodules noted      RADIOLOGY:   None     Assessment:   1  BPH with incomplete bladder emptying  2   Chronic kidney disease     Plan:   -I reviewed the options of management with the patient  -I did recommend he return for cystoscopy truss and uroflow  -he prefers to remain conservative and continue medical management  -I started him on 0 8 mg of tamsulosin and he will follow-up in 3 months

## 2021-03-08 ENCOUNTER — IMMUNIZATIONS (OUTPATIENT)
Dept: FAMILY MEDICINE CLINIC | Facility: HOSPITAL | Age: 76
End: 2021-03-08

## 2021-03-08 DIAGNOSIS — Z23 ENCOUNTER FOR IMMUNIZATION: Primary | ICD-10-CM

## 2021-03-08 PROCEDURE — 91300 SARS-COV-2 / COVID-19 MRNA VACCINE (PFIZER-BIONTECH) 30 MCG: CPT

## 2021-03-08 PROCEDURE — 0002A SARS-COV-2 / COVID-19 MRNA VACCINE (PFIZER-BIONTECH) 30 MCG: CPT

## 2021-03-11 ENCOUNTER — HOSPITAL ENCOUNTER (INPATIENT)
Facility: HOSPITAL | Age: 76
LOS: 2 days | Discharge: HOME HEALTH | DRG: 291 | End: 2021-03-14
Attending: EMERGENCY MEDICINE | Admitting: INTERNAL MEDICINE
Payer: MEDICARE

## 2021-03-11 ENCOUNTER — APPOINTMENT (EMERGENCY)
Dept: RADIOLOGY | Facility: HOSPITAL | Age: 76
DRG: 291 | End: 2021-03-11
Payer: MEDICARE

## 2021-03-11 DIAGNOSIS — M79.604 RIGHT LEG PAIN: ICD-10-CM

## 2021-03-11 DIAGNOSIS — N18.4 TYPE 2 DIABETES MELLITUS WITH STAGE 4 CHRONIC KIDNEY DISEASE, WITH LONG-TERM CURRENT USE OF INSULIN (HCC): ICD-10-CM

## 2021-03-11 DIAGNOSIS — I50.9 CHF (CONGESTIVE HEART FAILURE) (HCC): Primary | ICD-10-CM

## 2021-03-11 DIAGNOSIS — I50.33 ACUTE ON CHRONIC DIASTOLIC CONGESTIVE HEART FAILURE (HCC): ICD-10-CM

## 2021-03-11 DIAGNOSIS — E74.04 MCARDLE DISEASE (HCC): ICD-10-CM

## 2021-03-11 DIAGNOSIS — G47.30 SLEEP APNEA, UNSPECIFIED TYPE: ICD-10-CM

## 2021-03-11 DIAGNOSIS — Z79.4 TYPE 2 DIABETES MELLITUS WITH STAGE 4 CHRONIC KIDNEY DISEASE, WITH LONG-TERM CURRENT USE OF INSULIN (HCC): ICD-10-CM

## 2021-03-11 DIAGNOSIS — R60.9 PERIPHERAL EDEMA: ICD-10-CM

## 2021-03-11 DIAGNOSIS — R60.0 BILATERAL LEG EDEMA: ICD-10-CM

## 2021-03-11 DIAGNOSIS — E11.22 TYPE 2 DIABETES MELLITUS WITH STAGE 4 CHRONIC KIDNEY DISEASE, WITH LONG-TERM CURRENT USE OF INSULIN (HCC): ICD-10-CM

## 2021-03-11 LAB
ALBUMIN SERPL BCP-MCNC: 3.2 G/DL (ref 3.5–5)
ALP SERPL-CCNC: 95 U/L (ref 46–116)
ALT SERPL W P-5'-P-CCNC: 28 U/L (ref 12–78)
ANION GAP SERPL CALCULATED.3IONS-SCNC: 6 MMOL/L (ref 4–13)
AST SERPL W P-5'-P-CCNC: 18 U/L (ref 5–45)
BASOPHILS # BLD AUTO: 0.04 THOUSANDS/ΜL (ref 0–0.1)
BASOPHILS NFR BLD AUTO: 1 % (ref 0–1)
BILIRUB SERPL-MCNC: 0.45 MG/DL (ref 0.2–1)
BUN SERPL-MCNC: 51 MG/DL (ref 5–25)
CALCIUM ALBUM COR SERPL-MCNC: 9.6 MG/DL (ref 8.3–10.1)
CALCIUM SERPL-MCNC: 9 MG/DL (ref 8.3–10.1)
CHLORIDE SERPL-SCNC: 108 MMOL/L (ref 100–108)
CK MB SERPL-MCNC: 1.8 % (ref 0–2.5)
CK MB SERPL-MCNC: 6.9 NG/ML (ref 0–5)
CK SERPL-CCNC: 383 U/L (ref 39–308)
CO2 SERPL-SCNC: 29 MMOL/L (ref 21–32)
CREAT SERPL-MCNC: 2.35 MG/DL (ref 0.6–1.3)
EOSINOPHIL # BLD AUTO: 0.41 THOUSAND/ΜL (ref 0–0.61)
EOSINOPHIL NFR BLD AUTO: 5 % (ref 0–6)
ERYTHROCYTE [DISTWIDTH] IN BLOOD BY AUTOMATED COUNT: 14.4 % (ref 11.6–15.1)
GFR SERPL CREATININE-BSD FRML MDRD: 26 ML/MIN/1.73SQ M
GLUCOSE SERPL-MCNC: 118 MG/DL (ref 65–140)
HCT VFR BLD AUTO: 34.9 % (ref 36.5–49.3)
HGB BLD-MCNC: 11 G/DL (ref 12–17)
IMM GRANULOCYTES # BLD AUTO: 0.15 THOUSAND/UL (ref 0–0.2)
IMM GRANULOCYTES NFR BLD AUTO: 2 % (ref 0–2)
LYMPHOCYTES # BLD AUTO: 1.08 THOUSANDS/ΜL (ref 0.6–4.47)
LYMPHOCYTES NFR BLD AUTO: 14 % (ref 14–44)
MCH RBC QN AUTO: 29.6 PG (ref 26.8–34.3)
MCHC RBC AUTO-ENTMCNC: 31.5 G/DL (ref 31.4–37.4)
MCV RBC AUTO: 94 FL (ref 82–98)
MONOCYTES # BLD AUTO: 0.63 THOUSAND/ΜL (ref 0.17–1.22)
MONOCYTES NFR BLD AUTO: 8 % (ref 4–12)
NEUTROPHILS # BLD AUTO: 5.46 THOUSANDS/ΜL (ref 1.85–7.62)
NEUTS SEG NFR BLD AUTO: 70 % (ref 43–75)
NRBC BLD AUTO-RTO: 0 /100 WBCS
NT-PROBNP SERPL-MCNC: 1451 PG/ML
PLATELET # BLD AUTO: 167 THOUSANDS/UL (ref 149–390)
PMV BLD AUTO: 11.4 FL (ref 8.9–12.7)
POTASSIUM SERPL-SCNC: 3.8 MMOL/L (ref 3.5–5.3)
PROT SERPL-MCNC: 6.7 G/DL (ref 6.4–8.2)
RBC # BLD AUTO: 3.72 MILLION/UL (ref 3.88–5.62)
SODIUM SERPL-SCNC: 143 MMOL/L (ref 136–145)
WBC # BLD AUTO: 7.77 THOUSAND/UL (ref 4.31–10.16)

## 2021-03-11 PROCEDURE — 99285 EMERGENCY DEPT VISIT HI MDM: CPT

## 2021-03-11 PROCEDURE — 83880 ASSAY OF NATRIURETIC PEPTIDE: CPT | Performed by: PHYSICIAN ASSISTANT

## 2021-03-11 PROCEDURE — 99285 EMERGENCY DEPT VISIT HI MDM: CPT | Performed by: PHYSICIAN ASSISTANT

## 2021-03-11 PROCEDURE — 36415 COLL VENOUS BLD VENIPUNCTURE: CPT | Performed by: PHYSICIAN ASSISTANT

## 2021-03-11 PROCEDURE — 82550 ASSAY OF CK (CPK): CPT | Performed by: PHYSICIAN ASSISTANT

## 2021-03-11 PROCEDURE — 71045 X-RAY EXAM CHEST 1 VIEW: CPT

## 2021-03-11 PROCEDURE — 85025 COMPLETE CBC W/AUTO DIFF WBC: CPT | Performed by: PHYSICIAN ASSISTANT

## 2021-03-11 PROCEDURE — 82553 CREATINE MB FRACTION: CPT | Performed by: PHYSICIAN ASSISTANT

## 2021-03-11 PROCEDURE — 80053 COMPREHEN METABOLIC PANEL: CPT | Performed by: PHYSICIAN ASSISTANT

## 2021-03-12 ENCOUNTER — APPOINTMENT (OUTPATIENT)
Dept: VASCULAR ULTRASOUND | Facility: HOSPITAL | Age: 76
DRG: 291 | End: 2021-03-12
Payer: MEDICARE

## 2021-03-12 ENCOUNTER — APPOINTMENT (OUTPATIENT)
Dept: CT IMAGING | Facility: HOSPITAL | Age: 76
DRG: 291 | End: 2021-03-12
Payer: MEDICARE

## 2021-03-12 PROBLEM — I50.33 ACUTE ON CHRONIC DIASTOLIC CONGESTIVE HEART FAILURE (HCC): Status: ACTIVE | Noted: 2021-03-12

## 2021-03-12 PROBLEM — E11.9 TYPE 2 DIABETES MELLITUS, WITH LONG-TERM CURRENT USE OF INSULIN (HCC): Status: ACTIVE | Noted: 2019-06-15

## 2021-03-12 PROBLEM — R29.898 WEAKNESS OF RIGHT LEG: Status: ACTIVE | Noted: 2021-03-12

## 2021-03-12 LAB
ANION GAP SERPL CALCULATED.3IONS-SCNC: 6 MMOL/L (ref 4–13)
APTT PPP: 160 SECONDS (ref 23–37)
APTT PPP: 25 SECONDS (ref 23–37)
APTT PPP: 26 SECONDS (ref 23–37)
ATRIAL RATE: 75 BPM
ATRIAL RATE: 79 BPM
BACTERIA UR QL AUTO: ABNORMAL /HPF
BILIRUB UR QL STRIP: NEGATIVE
BUN SERPL-MCNC: 51 MG/DL (ref 5–25)
CALCIUM SERPL-MCNC: 9.2 MG/DL (ref 8.3–10.1)
CHLORIDE SERPL-SCNC: 107 MMOL/L (ref 100–108)
CLARITY UR: CLEAR
CO2 SERPL-SCNC: 29 MMOL/L (ref 21–32)
COLOR UR: COLORLESS
CREAT SERPL-MCNC: 2.28 MG/DL (ref 0.6–1.3)
D DIMER PPP FEU-MCNC: 2.35 UG/ML FEU
FLUAV RNA RESP QL NAA+PROBE: NEGATIVE
FLUBV RNA RESP QL NAA+PROBE: NEGATIVE
GFR SERPL CREATININE-BSD FRML MDRD: 27 ML/MIN/1.73SQ M
GLUCOSE P FAST SERPL-MCNC: 170 MG/DL (ref 65–99)
GLUCOSE SERPL-MCNC: 103 MG/DL (ref 65–140)
GLUCOSE SERPL-MCNC: 112 MG/DL (ref 65–140)
GLUCOSE SERPL-MCNC: 167 MG/DL (ref 65–140)
GLUCOSE SERPL-MCNC: 170 MG/DL (ref 65–140)
GLUCOSE SERPL-MCNC: 186 MG/DL (ref 65–140)
GLUCOSE UR STRIP-MCNC: NEGATIVE MG/DL
HGB UR QL STRIP.AUTO: ABNORMAL
INR PPP: 1.1 (ref 0.84–1.19)
KETONES UR STRIP-MCNC: NEGATIVE MG/DL
LEUKOCYTE ESTERASE UR QL STRIP: NEGATIVE
MUCOUS THREADS UR QL AUTO: ABNORMAL
NITRITE UR QL STRIP: NEGATIVE
NON-SQ EPI CELLS URNS QL MICRO: ABNORMAL /HPF
P AXIS: 53 DEGREES
P AXIS: 55 DEGREES
PH UR STRIP.AUTO: 6 [PH]
POTASSIUM SERPL-SCNC: 3.9 MMOL/L (ref 3.5–5.3)
PR INTERVAL: 224 MS
PR INTERVAL: 236 MS
PROT UR STRIP-MCNC: ABNORMAL MG/DL
PROTHROMBIN TIME: 14.4 SECONDS (ref 11.6–14.5)
QRS AXIS: -5 DEGREES
QRS AXIS: 10 DEGREES
QRSD INTERVAL: 112 MS
QRSD INTERVAL: 114 MS
QT INTERVAL: 386 MS
QT INTERVAL: 418 MS
QTC INTERVAL: 442 MS
QTC INTERVAL: 466 MS
RBC #/AREA URNS AUTO: ABNORMAL /HPF
RSV RNA RESP QL NAA+PROBE: NEGATIVE
SARS-COV-2 RNA RESP QL NAA+PROBE: NEGATIVE
SODIUM SERPL-SCNC: 142 MMOL/L (ref 136–145)
SP GR UR STRIP.AUTO: 1.02 (ref 1–1.03)
T WAVE AXIS: -5 DEGREES
T WAVE AXIS: -7 DEGREES
TSH SERPL DL<=0.05 MIU/L-ACNC: 4.63 UIU/ML (ref 0.36–3.74)
UROBILINOGEN UR QL STRIP.AUTO: 0.2 E.U./DL
VENTRICULAR RATE: 75 BPM
VENTRICULAR RATE: 79 BPM
WBC #/AREA URNS AUTO: ABNORMAL /HPF

## 2021-03-12 PROCEDURE — 96374 THER/PROPH/DIAG INJ IV PUSH: CPT

## 2021-03-12 PROCEDURE — 80048 BASIC METABOLIC PNL TOTAL CA: CPT | Performed by: PHYSICIAN ASSISTANT

## 2021-03-12 PROCEDURE — 81001 URINALYSIS AUTO W/SCOPE: CPT | Performed by: FAMILY MEDICINE

## 2021-03-12 PROCEDURE — 93005 ELECTROCARDIOGRAM TRACING: CPT

## 2021-03-12 PROCEDURE — 93010 ELECTROCARDIOGRAM REPORT: CPT | Performed by: INTERNAL MEDICINE

## 2021-03-12 PROCEDURE — 93971 EXTREMITY STUDY: CPT | Performed by: SURGERY

## 2021-03-12 PROCEDURE — 0241U HB NFCT DS VIR RESP RNA 4 TRGT: CPT | Performed by: PHYSICIAN ASSISTANT

## 2021-03-12 PROCEDURE — 85379 FIBRIN DEGRADATION QUANT: CPT | Performed by: INTERNAL MEDICINE

## 2021-03-12 PROCEDURE — 84443 ASSAY THYROID STIM HORMONE: CPT | Performed by: PHYSICIAN ASSISTANT

## 2021-03-12 PROCEDURE — G1004 CDSM NDSC: HCPCS

## 2021-03-12 PROCEDURE — 72131 CT LUMBAR SPINE W/O DYE: CPT

## 2021-03-12 PROCEDURE — 85730 THROMBOPLASTIN TIME PARTIAL: CPT | Performed by: PHYSICIAN ASSISTANT

## 2021-03-12 PROCEDURE — 70450 CT HEAD/BRAIN W/O DYE: CPT

## 2021-03-12 PROCEDURE — 82948 REAGENT STRIP/BLOOD GLUCOSE: CPT

## 2021-03-12 PROCEDURE — 85730 THROMBOPLASTIN TIME PARTIAL: CPT | Performed by: INTERNAL MEDICINE

## 2021-03-12 PROCEDURE — 85610 PROTHROMBIN TIME: CPT | Performed by: PHYSICIAN ASSISTANT

## 2021-03-12 PROCEDURE — 93971 EXTREMITY STUDY: CPT

## 2021-03-12 PROCEDURE — 36415 COLL VENOUS BLD VENIPUNCTURE: CPT | Performed by: PHYSICIAN ASSISTANT

## 2021-03-12 PROCEDURE — 99223 1ST HOSP IP/OBS HIGH 75: CPT | Performed by: INTERNAL MEDICINE

## 2021-03-12 RX ORDER — DOXEPIN HYDROCHLORIDE 10 MG/1
10 CAPSULE ORAL
Status: DISCONTINUED | OUTPATIENT
Start: 2021-03-12 | End: 2021-03-14 | Stop reason: HOSPADM

## 2021-03-12 RX ORDER — ASPIRIN 325 MG
325 TABLET ORAL DAILY
Status: DISCONTINUED | OUTPATIENT
Start: 2021-03-12 | End: 2021-03-14 | Stop reason: HOSPADM

## 2021-03-12 RX ORDER — LOSARTAN POTASSIUM 50 MG/1
50 TABLET ORAL
Status: DISCONTINUED | OUTPATIENT
Start: 2021-03-12 | End: 2021-03-14 | Stop reason: HOSPADM

## 2021-03-12 RX ORDER — GABAPENTIN 300 MG/1
300 CAPSULE ORAL 4 TIMES DAILY
Status: DISCONTINUED | OUTPATIENT
Start: 2021-03-12 | End: 2021-03-12

## 2021-03-12 RX ORDER — HEPARIN SODIUM 1000 [USP'U]/ML
10000 INJECTION, SOLUTION INTRAVENOUS; SUBCUTANEOUS
Status: DISCONTINUED | OUTPATIENT
Start: 2021-03-12 | End: 2021-03-12

## 2021-03-12 RX ORDER — FUROSEMIDE 10 MG/ML
40 INJECTION INTRAMUSCULAR; INTRAVENOUS ONCE
Status: COMPLETED | OUTPATIENT
Start: 2021-03-12 | End: 2021-03-12

## 2021-03-12 RX ORDER — HEPARIN SODIUM 5000 [USP'U]/ML
5000 INJECTION, SOLUTION INTRAVENOUS; SUBCUTANEOUS EVERY 8 HOURS SCHEDULED
Status: DISCONTINUED | OUTPATIENT
Start: 2021-03-12 | End: 2021-03-14 | Stop reason: HOSPADM

## 2021-03-12 RX ORDER — DOCUSATE SODIUM 100 MG/1
100 CAPSULE, LIQUID FILLED ORAL 2 TIMES DAILY
Status: DISCONTINUED | OUTPATIENT
Start: 2021-03-12 | End: 2021-03-14 | Stop reason: HOSPADM

## 2021-03-12 RX ORDER — FINASTERIDE 5 MG/1
5 TABLET, FILM COATED ORAL DAILY
Status: DISCONTINUED | OUTPATIENT
Start: 2021-03-12 | End: 2021-03-14 | Stop reason: HOSPADM

## 2021-03-12 RX ORDER — HEPARIN SODIUM 10000 [USP'U]/100ML
3-30 INJECTION, SOLUTION INTRAVENOUS
Status: DISCONTINUED | OUTPATIENT
Start: 2021-03-12 | End: 2021-03-12

## 2021-03-12 RX ORDER — PANTOPRAZOLE SODIUM 40 MG/1
40 TABLET, DELAYED RELEASE ORAL
Status: DISCONTINUED | OUTPATIENT
Start: 2021-03-12 | End: 2021-03-14 | Stop reason: HOSPADM

## 2021-03-12 RX ORDER — INSULIN GLARGINE 100 [IU]/ML
74 INJECTION, SOLUTION SUBCUTANEOUS
Status: DISCONTINUED | OUTPATIENT
Start: 2021-03-12 | End: 2021-03-14 | Stop reason: HOSPADM

## 2021-03-12 RX ORDER — GABAPENTIN 300 MG/1
300 CAPSULE ORAL 2 TIMES DAILY
Status: DISCONTINUED | OUTPATIENT
Start: 2021-03-12 | End: 2021-03-14 | Stop reason: HOSPADM

## 2021-03-12 RX ORDER — DOXEPIN HYDROCHLORIDE 6 MG/1
10 TABLET ORAL DAILY
Status: DISCONTINUED | OUTPATIENT
Start: 2021-03-12 | End: 2021-03-12

## 2021-03-12 RX ORDER — GABAPENTIN 300 MG/1
600 CAPSULE ORAL
Status: DISCONTINUED | OUTPATIENT
Start: 2021-03-12 | End: 2021-03-14 | Stop reason: HOSPADM

## 2021-03-12 RX ORDER — HEPARIN SODIUM 1000 [USP'U]/ML
10000 INJECTION, SOLUTION INTRAVENOUS; SUBCUTANEOUS ONCE
Status: COMPLETED | OUTPATIENT
Start: 2021-03-12 | End: 2021-03-12

## 2021-03-12 RX ORDER — ATORVASTATIN CALCIUM 40 MG/1
80 TABLET, FILM COATED ORAL
Status: DISCONTINUED | OUTPATIENT
Start: 2021-03-12 | End: 2021-03-14 | Stop reason: HOSPADM

## 2021-03-12 RX ORDER — HEPARIN SODIUM 1000 [USP'U]/ML
5000 INJECTION, SOLUTION INTRAVENOUS; SUBCUTANEOUS
Status: DISCONTINUED | OUTPATIENT
Start: 2021-03-12 | End: 2021-03-12

## 2021-03-12 RX ORDER — LATANOPROST 50 UG/ML
1 SOLUTION/ DROPS OPHTHALMIC
Status: DISCONTINUED | OUTPATIENT
Start: 2021-03-12 | End: 2021-03-14 | Stop reason: HOSPADM

## 2021-03-12 RX ORDER — TAMSULOSIN HYDROCHLORIDE 0.4 MG/1
0.8 CAPSULE ORAL
Status: DISCONTINUED | OUTPATIENT
Start: 2021-03-12 | End: 2021-03-14 | Stop reason: HOSPADM

## 2021-03-12 RX ORDER — MELATONIN
1000 DAILY
Status: DISCONTINUED | OUTPATIENT
Start: 2021-03-12 | End: 2021-03-14 | Stop reason: HOSPADM

## 2021-03-12 RX ADMIN — ASPIRIN 325 MG ORAL TABLET 325 MG: 325 PILL ORAL at 08:58

## 2021-03-12 RX ADMIN — PANTOPRAZOLE SODIUM 40 MG: 40 TABLET, DELAYED RELEASE ORAL at 06:13

## 2021-03-12 RX ADMIN — Medication 12.5 MG: at 22:52

## 2021-03-12 RX ADMIN — DOCUSATE SODIUM 100 MG: 100 CAPSULE, LIQUID FILLED ORAL at 17:00

## 2021-03-12 RX ADMIN — INSULIN LISPRO 32 UNITS: 100 INJECTION, SOLUTION INTRAVENOUS; SUBCUTANEOUS at 10:32

## 2021-03-12 RX ADMIN — INSULIN LISPRO 2 UNITS: 100 INJECTION, SOLUTION INTRAVENOUS; SUBCUTANEOUS at 12:33

## 2021-03-12 RX ADMIN — GABAPENTIN 600 MG: 300 CAPSULE ORAL at 22:51

## 2021-03-12 RX ADMIN — INSULIN LISPRO 32 UNITS: 100 INJECTION, SOLUTION INTRAVENOUS; SUBCUTANEOUS at 17:00

## 2021-03-12 RX ADMIN — HEPARIN SODIUM 18 UNITS/KG/HR: 10000 INJECTION, SOLUTION INTRAVENOUS at 01:11

## 2021-03-12 RX ADMIN — INSULIN LISPRO 32 UNITS: 100 INJECTION, SOLUTION INTRAVENOUS; SUBCUTANEOUS at 12:33

## 2021-03-12 RX ADMIN — INSULIN LISPRO 2 UNITS: 100 INJECTION, SOLUTION INTRAVENOUS; SUBCUTANEOUS at 10:32

## 2021-03-12 RX ADMIN — FUROSEMIDE 40 MG: 10 INJECTION, SOLUTION INTRAVENOUS at 13:37

## 2021-03-12 RX ADMIN — ATORVASTATIN CALCIUM 80 MG: 40 TABLET, FILM COATED ORAL at 17:00

## 2021-03-12 RX ADMIN — GABAPENTIN 300 MG: 300 CAPSULE ORAL at 12:33

## 2021-03-12 RX ADMIN — HEPARIN SODIUM 15 UNITS/KG/HR: 10000 INJECTION, SOLUTION INTRAVENOUS at 14:33

## 2021-03-12 RX ADMIN — DOCUSATE SODIUM 100 MG: 100 CAPSULE, LIQUID FILLED ORAL at 08:59

## 2021-03-12 RX ADMIN — METOPROLOL TARTRATE 25 MG: 25 TABLET, FILM COATED ORAL at 02:41

## 2021-03-12 RX ADMIN — LOSARTAN POTASSIUM 50 MG: 50 TABLET, FILM COATED ORAL at 17:03

## 2021-03-12 RX ADMIN — HEPARIN SODIUM 10000 UNITS: 1000 INJECTION INTRAVENOUS; SUBCUTANEOUS at 01:08

## 2021-03-12 RX ADMIN — METOPROLOL TARTRATE 25 MG: 25 TABLET, FILM COATED ORAL at 08:58

## 2021-03-12 RX ADMIN — GABAPENTIN 300 MG: 300 CAPSULE ORAL at 08:59

## 2021-03-12 RX ADMIN — FINASTERIDE 5 MG: 5 TABLET, FILM COATED ORAL at 10:33

## 2021-03-12 RX ADMIN — HEPARIN SODIUM 5000 UNITS: 5000 INJECTION INTRAVENOUS; SUBCUTANEOUS at 17:01

## 2021-03-12 RX ADMIN — FUROSEMIDE 40 MG: 10 INJECTION, SOLUTION INTRAVENOUS at 00:49

## 2021-03-12 RX ADMIN — FUROSEMIDE 40 MG: 10 INJECTION, SOLUTION INTRAVENOUS at 08:58

## 2021-03-12 RX ADMIN — Medication 1000 UNITS: at 08:58

## 2021-03-12 RX ADMIN — TAMSULOSIN HYDROCHLORIDE 0.8 MG: 0.4 CAPSULE ORAL at 17:00

## 2021-03-12 RX ADMIN — HEPARIN SODIUM 5000 UNITS: 5000 INJECTION INTRAVENOUS; SUBCUTANEOUS at 22:50

## 2021-03-12 NOTE — ASSESSMENT & PLAN NOTE
Lab Results   Component Value Date    HGBA1C 8 5 (H) 11/11/2020       No results for input(s): POCGLU in the last 72 hours  Blood Sugar Average: Last 72 hrs:     Plan:  · Continue gabapentin 300 mg Q a m  And at noon and 600 mg at bedtime

## 2021-03-12 NOTE — ASSESSMENT & PLAN NOTE
· Overall, he has done very well over his life from his muscle disease, without any functional limitations  · Patient has proximal muscle weakness on his RLE exam today  He denies any muscle cramps  · Symptoms may be secondary to recent COVID-19 vaccination on 03/08/2021 in the setting of chronic Mcardle disease, BLE edema, dm neuropathy  · Ck elevated at 338  Plan:  · Continue to monitor  · Plan for Right leg weakness as above

## 2021-03-12 NOTE — PLAN OF CARE
Problem: Prexisting or High Potential for Compromised Skin Integrity  Goal: Skin integrity is maintained or improved  Description: INTERVENTIONS:  - Identify patients at risk for skin breakdown  - Assess and monitor skin integrity  - Assess and monitor nutrition and hydration status  - Monitor labs   - Assess for incontinence   - Turn and reposition patient  - Assist with mobility/ambulation  - Relieve pressure over bony prominences  - Avoid friction and shearing  - Provide appropriate hygiene as needed including keeping skin clean and dry  - Evaluate need for skin moisturizer/barrier cream  - Collaborate with interdisciplinary team   - Patient/family teaching  - Consider wound care consult   Outcome: Progressing     Problem: Potential for Falls  Goal: Patient will remain free of falls  Description: INTERVENTIONS:  - Assess patient frequently for physical needs  -  Identify cognitive and physical deficits and behaviors that affect risk of falls    -  [place] fall precautions as indicated by assessment   - Educate patient/family on patient safety including physical limitations  - Instruct patient to call for assistance with activity based on assessment  - Modify environment to reduce risk of injury  - Consider OT/PT consult to assist with strengthening/mobility  Outcome: Progressing     Problem: PAIN - ADULT  Goal: Verbalizes/displays adequate comfort level or baseline comfort level  Description: Interventions:  - Encourage patient to monitor pain and request assistance  - Assess pain using appropriate pain scale  - Administer analgesics based on type and severity of pain and evaluate response  - Implement non-pharmacological measures as appropriate and evaluate response  - Consider cultural and social influences on pain and pain management  - Notify physician/advanced practitioner if interventions unsuccessful or patient reports new pain  Outcome: Progressing     Problem: INFECTION - ADULT  Goal: Absence or prevention of progression during hospitalization  Description: INTERVENTIONS:  - Assess and monitor for signs and symptoms of infection  - Monitor lab/diagnostic results  - Monitor all insertion sites, i e  indwelling lines, tubes, and drains  - Monitor endotracheal if appropriate and nasal secretions for changes in amount and color  - [place] appropriate cooling/warming therapies per order  - Administer medications as ordered  - Instruct and encourage patient and family to use good hand hygiene technique  - Identify and instruct in appropriate isolation precautions for identified infection/condition  Outcome: Progressing  Goal: Absence of fever/infection during neutropenic period  Description: INTERVENTIONS:  - Monitor WBC    Outcome: Progressing     Problem: SAFETY ADULT  Goal: Patient will remain free of falls  Description: INTERVENTIONS:  - Assess patient frequently for physical needs  -  Identify cognitive and physical deficits and behaviors that affect risk of falls    -  [place] fall precautions as indicated by assessment   - Educate patient/family on patient safety including physical limitations  - Instruct patient to call for assistance with activity based on assessment  - Modify environment to reduce risk of injury  - Consider OT/PT consult to assist with strengthening/mobility  Outcome: Progressing  Goal: Maintain or return to baseline ADL function  Description: INTERVENTIONS:  -  Assess patient's ability to carry out ADLs; assess patient's baseline for ADL function and identify physical deficits which impact ability to perform ADLs (bathing, care of mouth/teeth, toileting, grooming, dressing, etc )  - Assess/evaluate cause of self-care deficits   - Assess range of motion  - Assess patient's mobility; develop plan if impaired  - Assess patient's need for assistive devices and provide as appropriate  - Encourage maximum independence but intervene and supervise when necessary  - Involve family in performance of ADLs  - Assess for home care needs following discharge   - Consider OT consult to assist with ADL evaluation and planning for discharge  - Provide patient education as appropriate  Outcome: Progressing  Goal: Maintain or return mobility status to optimal level  Description: INTERVENTIONS:  - Assess patient's baseline mobility status (ambulation, transfers, stairs, etc )    - Identify cognitive and physical deficits and behaviors that affect mobility  - Identify mobility aids required to assist with transfers and/or ambulation (gait belt, sit-to-stand, lift, walker, cane, etc )  - [place] fall precautions as indicated by assessment  - Record patient progress and toleration of activity level on Mobility SBAR; progress patient to next Phase/Stage  - Instruct patient to call for assistance with activity based on assessment  - Consider rehabilitation consult to assist with strengthening/weightbearing, etc   Outcome: Progressing     Problem: DISCHARGE PLANNING  Goal: Discharge to home or other facility with appropriate resources  Description: INTERVENTIONS:  - Identify barriers to discharge w/patient and caregiver  - Arrange for needed discharge resources and transportation as appropriate  - Identify discharge learning needs (meds, wound care, etc )  - Arrange for interpretive services to assist at discharge as needed  - Refer to Case Management Department for coordinating discharge planning if the patient needs post-hospital services based on physician/advanced practitioner order or complex needs related to functional status, cognitive ability, or social support system  Outcome: Progressing     Problem: Knowledge Deficit  Goal: Patient/family/caregiver demonstrates understanding of disease process, treatment plan, medications, and discharge instructions  Description: Complete learning assessment and assess knowledge base    Interventions:  - Provide teaching at level of understanding  - Provide teaching via preferred learning methods  Outcome: Progressing     Problem: MUSCULOSKELETAL - ADULT  Goal: Maintain or return mobility to safest level of function  Description: INTERVENTIONS:  - Assess patient's ability to carry out ADLs; assess patient's baseline for ADL function and identify physical deficits which impact ability to perform ADLs (bathing, care of mouth/teeth, toileting, grooming, dressing, etc )  - Assess/evaluate cause of self-care deficits   - Assess range of motion  - Assess patient's mobility  - Assess patient's need for assistive devices and provide as appropriate  - Encourage maximum independence but intervene and supervise when necessary  - Involve family in performance of ADLs  - Assess for home care needs following discharge   - Consider OT consult to assist with ADL evaluation and planning for discharge  - Provide patient education as appropriate  Outcome: Progressing

## 2021-03-12 NOTE — QUICK NOTE
Senior Resident Supervision Note - Admission  [facility] Internal Medicine Residency    Patient Information:     Name: Shital Mejia  MRN: [id number]  Age / Sex: 77 y o  male  Unit/Bed #: ED 25  @ Encounter: [id number]    Senior Resident Statement:    Patient seen and examined personally  History, assessment, and plan detailed below as well as all orders were reviewed with PGY1 resident, Dr Stefania Hansen  I agree with the assessment and plan with the following additions / corrections: below  See Resident H&P for details  Assessment/Plan: Principal Problem:    Weakness of right leg  Active Problems:    CKD (chronic kidney disease), stage IV (HCC)    Benign hypertension with chronic kidney disease, stage IV (HCC)    Type 2 diabetes mellitus, with long-term current use of insulin (HCC)    Bilateral leg edema    Sleep apnea    Obesity, unspecified    Diabetic polyneuropathy associated with type 2 diabetes mellitus (HCC)    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HCC)    Acute on chronic diastolic congestive heart failure ([facility] [address] )    78 yo M with PMH of CAD s/p CABG 3319, diastolic HF, poorly controlled Type 2 DM on insulin complicated with neuropathy and CKD stage 3, left ICS stenosis, HTN, obesity, chronic bilateral LE edema, McArdle's, presenting with RLE "heaviness" for 4 days  He is a former smoker with 90 pack years quit in [place] [facility]  Got his 2nd COVID shot 5 days ago, and since has increasing "heaviness" of whole RLE  Mentions he does exercises in bed with his legs, and today he wasn't able to move his right leg  When attempts to move against gravity, there is pain from thigh to heel  Associated with feeling of warm right leg  No trauma, injury  No recent viral illness  No new rash  However, patient is able to walk once on his feet without issues  No saddle anesthesia, bowel/bladder incontinence, back pain  Based on chart review, patient seems to be hesitant to treatments  Known to be noncompliant to diet, cardiac and diabetic   Wife is a RN, takes care of all his meds  In ED, patient hypertensive at 172/77  Coming with a total CK of 380 and CK-MB of 7, BNP 1400  INR of 1 1  CXR with prominent vascular markings, no congestion or effusions, unchanged from previous however  Received IV lasix 40mg in Ed  ECHO before CABG - EF 60%, concentric hypertrophy moderate, G1DD, dilated atria  B/L LE dermastasis, no warmth, trace edema  Limited ROM of RLE due to weakness against gravity, however normal in horizontal plane  Sensation intact  DTRs symmetric  +SLR on right at 60  Passive ROM intact  1  RLE with reduced ROM - No asymmetrical LE circumference or concern for DVT, although ED has started heparin drip  Duplex pending  If dddimer negative, discontinue  This seems likely to be neurological> muscular> vascular>? COVID vaccine side effect  Had an EMG done in 05/2020 for neuropathy, deemed to be 2/2 DM  Marginally elevated CK with creatinine, other than history of McArdle's and use of statin no other risk factors (trauma, immobility) for rhabdomyolysis/compartment syndrome  Consider MRI lumbar spine to assess spinal cord  Has history of severe ICS stenosis left, presenting with symptoms on right  If unable to explain this with other etiology, consider CTH  2  CKD - follows Dr Jeffery Montiel outpatient  baseline creatinine 2-2  3  presenting with elevated creatinine  Receiving lasix, monitor BMP  3  Diastolic heart failure, CAD - follows with Dr Kathi Reyes  Unclear dry weight  BNP of 1400, compared to 800 in 2019  Takes toresmide 20mg daily at home, however noncompliant with proper diet  Unsure if increase now with just worsening of co-morbidities or acute HF  Being >1000 does lead to AHF  He is not volume overloaded on exam, baseline as per patient  Received lasix in ED, continue with additional dose of IV lasix 40, and determine further lasix needs based on exam and BMP  Administer potassium    4  Anemia - hemoglobin baseline 11    Disposition: OBSERVATION    Expected LOS: <2 Whit Mayo MD

## 2021-03-12 NOTE — ASSESSMENT & PLAN NOTE
Lab Results   Component Value Date    EGFR 27 03/12/2021    EGFR 26 03/11/2021    EGFR 28 01/04/2021    CREATININE 2 28 (H) 03/12/2021    CREATININE 2 35 (H) 03/11/2021    CREATININE 2 20 (H) 01/04/2021     · Baseline Cr   Of 2 0 -2 1 in the past year  · Elevated Cr  2 28 03/12    Plan:  · Monitor volume status closely  · Avoid hypotension

## 2021-03-12 NOTE — ASSESSMENT & PLAN NOTE
Lab Results   Component Value Date    EGFR 26 03/11/2021    EGFR 28 01/04/2021    EGFR 30 12/04/2020    CREATININE 2 35 (H) 03/11/2021    CREATININE 2 20 (H) 01/04/2021    CREATININE 2 11 (H) 12/04/2020     · Baseline Cr  Of 2 0 -2 1 in the past year  · Elevated Cr  At 2  35From baseline at admission  · Patient received Lasix 40 mg IV in the ED      Plan:  · Lasix 40 mg IV  · Monitor volume status closely  · Avoid hypotension

## 2021-03-12 NOTE — ASSESSMENT & PLAN NOTE
Wt Readings from Last 3 Encounters:   03/12/21 127 kg (280 lb)   02/18/21 128 kg (283 lb)   01/27/21 127 kg (280 lb)     · Patient has GIDD HF with EF of 60% per echo on 06/19  No CP, SOB or worsening bilateral leg edema at admission  Developed mild chest pain 03/13 and troponins trended  · BNP elevated at 1451  · CXR showed increased pulmonary congestion  · Lasix 40 mg IV x 1 dose given in ED  Plan:  · Daily weights  · Strict Is and Os  · Hold torsemide 20 mg qd home dose

## 2021-03-12 NOTE — UTILIZATION REVIEW
Initial Clinical Review  OBSERVATION 3/12/21 @0053 CONVERTED TO INPATIENT ADMISSION 3/12/21 @1536 DUE TO CONTINUED STAY REQUIRED TO EVALUATE AND TREAT PATIENT WITH BRADYCARDIA AND ACUTE ON CHRONIC DIASTOLIC HEART FAILURE WITH TELEMETRY, MEDICATION ADJUSTMENT AND ADDITIONAL DOSAGE OF IV DIURETIC   Admission: Date/Time/Statement:   Admission Orders (From admission, onward)     Ordered        03/12/21 1536  Inpatient Admission  Once         03/12/21 0053  Place in Observation  Once                   Orders Placed This Encounter   Procedures    Inpatient Admission     Standing Status:   Standing     Number of Occurrences:   1     Order Specific Question:   Level of Care     Answer:   Med Surg [16]     Order Specific Question:   Estimated length of stay     Answer:   More than 2 Midnights     Order Specific Question:   Certification     Answer:   I certify that inpatient services are medically necessary for this patient for a duration of greater than two midnights  See H&P and MD Progress Notes for additional information about the patient's course of treatment  ED Arrival Information     Expected Arrival Acuity Means of Arrival Escorted By Service Admission Type    - 3/11/2021 21:35 Urgent Walk-In Spouse Hospitalist Urgent    Arrival Complaint    rt leg pain,        Chief Complaint   Patient presents with    Leg Pain     pt reprots r leg pain since monday no known injury     Assessment/Plan: 77 y o  male with PMH of McArdle dz, CAD, MI s/p CABG 2019,diastolic CHF, left ICS stenosis, HTN CKD4, anemia of chronic disease, chronic BLE edema, IDDM T2, DM Neuropathy who presents with right lower extremity heaviness and weakness that started yesterday and worsened today  Patient states that today he was having trouble moving his right leg and that prompted him to come to the ED  He is unable to bend his knee and cannot lift the leg up from the bed when lying down   He is having difficulty walking but is able to still walk  He now requires assistance to get up from laying position  He complains of numbness below the right knee  He has a hx of chronic bilateral knee edema and DM neuropathy but the numbness on the right side is new  FH is significant for paternal Grandfather with DVT hx   Patient denied any CP, SOB, abdominal pain, N/V/D  He admits to having his [address] vaccine on 02/15 and 2nd dose on 03/08  He states since his last vaccine dose, he has not felt well and attributes the right lower extremity problems to the vaccine      In the ED patient was afebrile and hemodynamically stable with /70 pulse is 86, respirations 18, SpO2 94% on room air  BP increased to 210/94 but trended down  CBC was remarkable for anemia with HGB 11 5 and CMP was remarkable for elevated BUN at 51 and creatinine at 2 35  BNP was 1451    CXR was significant for increased vascular congestion but no acute cardiopulmonary disease  Venous Doppler studies pending  Patient was given Lasix 40 mg IV x1 dose  Heparin VTE infusion was empirically initiated in the ED for DVT treatment  Weakness of right leg  Assessment & Plan  · Patient admits to having right leg weakness that started yesterday and worsened this morning  He attributes s/s to 2nd Tiny vaccination on 3/8  · He has a hx of McArdle dz but has been stable  He states that he has been resting and lying down but still has difficulty raising leg from the hip or bending his knee  He has right leg heaviness, numbness distal to the knee, but on PE has light touch sensation intact    · CK elevated 338      Plan:  · Venous doppler pending  · Heparin VTE gtts  · Consider CT head wo contrast to rule out neuro pathology  · CT lumbar wo contrast pending  · Fall precautions  · OOB with assistance      Acute on chronic diastolic congestive heart failure (HCC)  Assessment & Plan      Wt Readings from Last 3 Encounters:   03/12/21 127 kg (280 lb)   02/18/21 128 kg (283 lb) 01/27/21 127 kg (280 lb)      · Patient has GIDD HF with EF of 60% per echo on 06/19  · No CP, SOB or worsening bilateral leg edema at admission  · BNP elevated at 1451  · CXR showed increased pulmonary congestion  · Lasix 40 mg IV x 1 dose given in ED      Plan:  · Lasix 40 mg IV   · Daily weights  · Strict Is and Os  · Hold torsemide 20 mg qd home dose       Type 2 diabetes mellitus, with long-term current use of insulin ([facility])  Assessment & Plan        Lab Results   Component Value Date     HGBA1C 8 5 (H) 11/11/2020         No results for input(s): POCGLU in the last 72 hours      Blood Sugar Average: Last 72 hrs:     Plan:  ·  Lantus 74 units at bedtime  · Lispro 32 units t i d   · SSI  · POCT glucose checks with meals and at bedtime  · Hypoglycemia protocol  · Hold Victoza  · Diabetic and renal diet         Benign hypertension with chronic kidney disease, stage IV [facility])  Assessment & Plan        Lab Results   Component Value Date     EGFR 27 03/12/2021     EGFR 26 03/11/2021     EGFR 28 01/04/2021     CREATININE 2 28 (H) 03/12/2021     CREATININE 2 35 (H) 03/11/2021     CREATININE 2 20 (H) 01/04/2021      Blood Pressure: 164/79      Plan:  · Continue Metoprolol 25 mg b i d   · Continue losartan 50 mg q h s  · Lasix 40 mg IV     CKD (chronic kidney disease), stage IV [facility])  Assessment & Plan        Lab Results   Component Value Date     EGFR 27 03/12/2021     EGFR 26 03/11/2021     EGFR 28 01/04/2021     CREATININE 2 28 (H) 03/12/2021     CREATININE 2 35 (H) 03/11/2021     CREATININE 2 20 (H) 01/04/2021      · Baseline Cr  Of 2 0 -2 1 in the past year  · Elevated Cr  At 2  35From baseline at admission  · Patient received Lasix 40 mg IV in the ED    Plan:  · Lasix 40 mg IV  · Monitor volume status closely  · Avoid hypotension   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[facility] [address] )  Assessment & Plan  · Overall, he has done very well over his life from his muscle disease, without any functional limitations    · Patient has proximal muscle weakness on his RLE exam today   He denies any muscle cramps  · Symptoms may be secondary to recent COVID-19 vaccination on 03/08/2021 in the setting of chronic Mcardle disease, BLE edema, dm neuropathy  · Ck elevated at 338  Plan:  · Continue to monitor  · Plan for Right leg weakness as above  3/12/21   This patient was assessed in the ED at approximately 8:45 a m  and then again while rounding in his room at around 1:00 p m     The reason for his presentation to the ED was the acute onset of right lower extremity pain, weakness, and numbness distal to the knee  The patient reported to me this morning that the symptoms originally developed while he was getting into his car on Tuesday became worse yesterday  On examination, his right distal calf was erythematous and firm with a negative Homans sign  The patient reports some posterior right knee discomfort which is exacerbated by minimal straight leg raise, and appears to be hamstring related spasm  His venous Doppler studies shows no evidence of DVT  Lumbar spine CT interpretation is still pending  He was noted to be bradycardic this morning, was placed on telemetry  Also his metoprolol  was reduced to 12 5 from 25  Given the patient's presentation and negative DVT studies it is possible symptoms could be related to fluid overload  He just received an additional dose of IV Lasix  Will continue to monitor changes in progress during his stay  If remains stable, expect discharge within the next day    ED Triage Vitals   Temperature Pulse Respirations Blood Pressure SpO2   03/11/21 2140 03/11/21 2140 03/11/21 2140 03/11/21 2140 03/11/21 2140   98 °F (36 7 °C) 86 18 138/70 94 %      Temp Source Heart Rate Source Patient Position - Orthostatic VS BP Location FiO2 (%)   03/11/21 2140 03/12/21 0000 03/11/21 2140 03/11/21 2140 --   Oral Monitor Sitting Right arm       Pain Score       03/11/21 2140       No Pain          Wt Readings from Last 1 Encounters:   03/12/21 127 kg (280 lb)     Additional Vital Signs:   Date/Time  Temp  Pulse  Resp  BP  MAP (mmHg)  SpO2    03/12/21 1007  98 7 °F (37 1 °C)  48Abnormal   --  158/77  104  93 %    03/12/21 0925  --  79  18  205/88Abnormal   126  98 %    03/12/21 0743  --  78  18  181/118Abnormal   142  96 %        Date/Time  Temp  Pulse  Resp  BP  MAP (mmHg)  SpO2    03/12/21 0415  --  80  18  164/79  113  95 %    03/12/21 0300  --  82  --  179/86Abnormal   124  95 %    03/12/21 0241  --  82  --  210/94Abnormal   --  --    03/12/21 0106  --  81  18  163/80  --  96 %    03/12/21 0000  --  82  18  172/77Abnormal   --  95 %      Date and Time Eye Opening Best Verbal Response Best Motor Response Marc Coma Scale Score   03/12/21 0959 4 5 6 15   03/12/21 0700 4 5 6 15       Date and Time Eye Opening Best Verbal Response Best Motor Response [place] Coma Scale Score   03/12/21 0129 4 5 6 15   03/12/21 0119 4 5 6 15     Date and Time R Radial Pulse L Radial Pulse R Pedal Pulse L Pedal Pulse   03/12/21 0959 +2 +2 +2 +2   03/12/21 0700 -- -- +2 +2       Date and Time R Radial Pulse L Radial Pulse R Pedal Pulse L Pedal Pulse   03/12/21 0130 +2 +2 +1 +1   03/12/21 0119 +2 +2 +1 +1         Pertinent Labs/Diagnostic Test Results:   3/12 CXR  Mild vascular congestion  Mild cardiomegaly status post CABG  3/12 CT head-not read yet  3/12 CT L spine-not read yet  3/12 VAS -unilateral LE-prelim reading  RIGHT LOWER LIMB  No evidence of acute or chronic deep vein thrombosis   No evidence of superficial thrombophlebitis noted  Doppler evaluation shows a normal response to augmentation maneuvers  Popliteal, posterior tibial and anterior tibial arterial Doppler waveforms are  triphasic  LEFT LOWER LIMB LIMITED  Evaluation shows no evidence of thrombus in the common femoral vein  Doppler evaluation shows a normal response to augmentation maneuvers     Technically difficult/limited study to body habitus, pitting edema and poor  visualization of calf veins    Results from last 7 days   Lab Units 03/12/21  0419   SARS-COV-2  Negative     Results from last 7 days   Lab Units 03/11/21  2254   WBC Thousand/uL 7 77   HEMOGLOBIN g/dL 11 0*   HEMATOCRIT % 34 9*   PLATELETS Thousands/uL 167   NEUTROS ABS Thousands/µL 5 46         Results from last 7 days   Lab Units 03/12/21  0421 03/11/21  2254   SODIUM mmol/L 142 143   POTASSIUM mmol/L 3 9 3 8   CHLORIDE mmol/L 107 108   CO2 mmol/L 29 29   ANION GAP mmol/L 6 6   BUN mg/dL 51* 51*   CREATININE mg/dL 2 28* 2 35*   EGFR ml/min/1 73sq m 27 26   CALCIUM mg/dL 9 2 9 0     Results from last 7 days   Lab Units 03/11/21  2254   AST U/L 18   ALT U/L 28   ALK PHOS U/L 95   TOTAL PROTEIN g/dL 6 7   ALBUMIN g/dL 3 2*   TOTAL BILIRUBIN mg/dL 0 45     Results from last 7 days   Lab Units 03/12/21  1144 03/12/21  0742   POC GLUCOSE mg/dl 186* 167*     Results from last 7 days   Lab Units 03/12/21  0421 03/11/21  2254   GLUCOSE RANDOM mg/dL 170* 118           Results from last 7 days   Lab Units 03/11/21  2254   CK TOTAL U/L 383*   CK MB INDEX % 1 8   CK MB ng/mL 6 9*         Results from last 7 days   Lab Units 03/12/21  0419   D-DIMER QUANTITATIVE ug/ml FEU 2 35*     Results from last 7 days   Lab Units 03/12/21  0754 03/12/21  0419 03/12/21  0040   PROTIME seconds  --   --  14 4   INR   --   --  1 10   PTT seconds 160* 25 26     Results from last 7 days   Lab Units 03/12/21  0318   TSH 3RD GENERATON uIU/mL 4 627*                     Results from last 7 days   Lab Units 03/11/21  2254   NT-PRO BNP pg/mL 1,451*         Results from last 7 days   Lab Units 03/12/21  1242   CLARITY UA  Clear   COLOR UA  Colorless   SPEC GRAV UA  1 020   PH UA  6 0   GLUCOSE UA mg/dl Negative   KETONES UA mg/dl Negative   BLOOD UA  Trace-Intact*   PROTEIN UA mg/dl 30 (1+)*   NITRITE UA  Negative   BILIRUBIN UA  Negative   UROBILINOGEN UA E U /dl 0 2   LEUKOCYTES UA  Negative   WBC UA /hpf 0-1   RBC UA /hpf 1-2   BACTERIA UA /hpf None Seen   EPITHELIAL CELLS WET PREP /hpf Occasional   MUCUS THREADS  Occasional*     Results from last 7 days   Lab Units 03/12/21  0419   INFLUENZA A PCR  Negative   INFLUENZA B PCR  Negative   RSV PCR  Negative         ED Treatment:   Medication Administration from 03/11/2021 2135 to 03/12/2021 6819       Date/Time Order Dose Route Action     03/12/2021 0049 furosemide (LASIX) injection 40 mg 40 mg Intravenous Given     03/12/2021 0108 heparin (porcine) injection 10,000 Units 10,000 Units Intravenous Given     03/12/2021 0111 heparin (porcine) 25,000 units in 0 45% NaCl 250 mL infusion (premix) 18 Units/kg/hr Intravenous New Bag     03/12/2021 0241 metoprolol tartrate (LOPRESSOR) tablet 25 mg 25 mg Oral Given     03/12/2021 0884 pantoprazole (PROTONIX) EC tablet 40 mg 40 mg Oral Given        Past Medical History:   Diagnosis Date    Diabetes mellitus ([facility] )     Hyperlipidemia     Hypertension     Obesity     Renal disorder      Present on Admission:   CKD (chronic kidney disease), stage IV ([facility])   Benign hypertension with chronic kidney disease, stage IV (HCC)   Bilateral leg edema   Obesity, unspecified   Diabetic polyneuropathy associated with type 2 diabetes mellitus ([facility])   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[facility] )   Sleep apnea      Admitting Diagnosis: CHF (congestive heart failure) ([facility]) [I50 9]  Leg pain [M79 606]  Peripheral edema [R60 9]  Right leg pain [M79 604]  Age/Sex: 77 y o  male  Admission Orders:  Scheduled Medications:  aspirin, 325 mg, Oral, Daily  atorvastatin, 80 mg, Oral, Daily With Dinner  cholecalciferol, 1,000 Units, Oral, Daily  docusate sodium, 100 mg, Oral, BID  finasteride, 5 mg, Oral, Daily  gabapentin, 300 mg, Oral, BID  gabapentin, 600 mg, Oral, HS  heparin (porcine), 5,000 Units, Subcutaneous, Q8H ALLA  insulin glargine, 74 Units, Subcutaneous, HS  insulin lispro, 2-12 Units, Subcutaneous, TID AC  insulin lispro, 32 Units, Subcutaneous, TID With Meals  latanoprost, 1 drop, Both Eyes, HS  losartan, 50 mg, Oral, After Dinner  metoprolol tartrate, 12 5 mg, Oral, Q12H ALLA  pantoprazole, 40 mg, Oral, Early Morning  tamsulosin, 0 8 mg, Oral, Daily With Dinner      Continuous IV Infusions:     PRN Meds:  doxepin, 10 mg, Oral, HS PRN      Neuro checks q4h  Fall monitoring  poct glucose QID  Renal diet, cerb level 3       Network Utilization Review Department  ATTENTION: Please call with any questions or concerns to [phone] and carefully listen to the prompts so that you are directed to the right person  All voicemails are confidential   Chrystine Can all requests for admission clinical reviews, approved or denied determinations and any other requests to dedicated fax number below belonging to the campus where the patient is receiving treatment   List of dedicated fax numbers for the Facilities:  [address] DENIALS (Administrative/Medical Necessity) [phone]   [address] (Maternity/NICU/Pediatrics) [phone] [address] Gage Viramontes 7353 (  Cinthia Walker "Claire" 103) [address] [place] [address] Suzette Og 14[address] [place] [address] [phone]

## 2021-03-12 NOTE — ASSESSMENT & PLAN NOTE
· Patient admits to having right leg weakness that started yesterday and worsened this morning  He attributes s/s to 2nd Tiny vaccination on 3/8  · He has a hx of McArdle dz but has been stable  He states that he has been resting and lying down but still has difficulty raising leg from the hip or bending his knee  He has right leg heaviness, numbness distal to the knee, but on PE has light touch sensation intact  · CK elevated 338  Plan:  · Venous doppler pending  · Heparin VTE gtts  · Consider CT head wo contrast to rule out neuro pathology  · Fall precautions  · OOB with assistance

## 2021-03-12 NOTE — ASSESSMENT & PLAN NOTE
· Overall, he has done very well over his life from his muscle disease, without any functional limitations  · Symptoms may be secondary to recent COVID-19 vaccination on 03/08/2021 in the setting of chronic Mcardle disease, BLE edema, dm neuropathy  · Ck elevated upon admission at 338  Plan:  · Continue to monitor  · Plan for Right leg weakness as above

## 2021-03-12 NOTE — H&P
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  He states that he has been resting and lying down but still has difficulty raising leg from the hip or bending his knee  He has right leg heaviness, numbness distal to the knee, but on PE has light touch sensation intact  · CK elevated 338  Plan:  · Venous doppler pending  · Heparin VTE gtts  · Consider CT head wo contrast to rule out neuro pathology  · CT lumbar wo contrast pending  · Fall precautions  · OOB with assistance  Acute on chronic diastolic congestive heart failure [facility])  Assessment & Plan  Wt Readings from Last 3 Encounters:   03/12/21 127 kg (280 lb)   02/18/21 128 kg (283 lb)   01/27/21 127 kg (280 lb)     · Patient has GIDD HF with EF of 60% per echo on 06/19  · No CP, SOB or worsening bilateral leg edema at admission  · BNP elevated at 1451  · CXR showed increased pulmonary congestion  · Lasix 40 mg IV x 1 dose given in ED  Plan:  · Lasix 40 mg IV   · Daily weights  · Strict Is and Os  · Hold torsemide 20 mg qd home dose  Type 2 diabetes mellitus, with long-term current use of insulin (HCC)  Assessment & Plan  Lab Results   Component Value Date    HGBA1C 8 5 (H) 11/11/2020       No results for input(s): POCGLU in the last 72 hours  Blood Sugar Average: Last 72 hrs:    Plan:  ·  Lantus 74 units at bedtime  · Lispro 32 units t i d   · SSI  · POCT glucose checks with meals and at bedtime  · Hypoglycemia protocol  · Hold Victoza  · Diabetic and renal diet        Benign hypertension with chronic kidney disease, stage IV [facility])  Assessment & Plan  Lab Results   Component Value Date    EGFR 27 03/12/2021    EGFR 26 03/11/2021    EGFR 28 01/04/2021    CREATININE 2 28 (H) 03/12/2021    CREATININE 2 35 (H) 03/11/2021    CREATININE 2 20 (H) 01/04/2021     Blood Pressure: 164/79     Plan:  · Continue Metoprolol 25 mg b i d   · Continue losartan 50 mg q h s  · Lasix 40 mg IV    CKD (chronic kidney disease), stage IV [facility])  Assessment & Plan  Lab Results   Component Value Date    EGFR 27 03/12/2021    EGFR 26 03/11/2021    EGFR 28 01/04/2021    CREATININE 2 28 (H) 03/12/2021    CREATININE 2 35 (H) 03/11/2021    CREATININE 2 20 (H) 01/04/2021     · Baseline Cr  Of 2 0 -2 1 in the past year  · Elevated Cr  At 2  35From baseline at admission  · Patient received Lasix 40 mg IV in the ED  Plan:  · Lasix 40 mg IV  · Monitor volume status closely  · Avoid hypotension    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[facility] [address] )  Assessment & Plan  · Overall, he has done very well over his life from his muscle disease, without any functional limitations  · Patient has proximal muscle weakness on his RLE exam today  He denies any muscle cramps  · Symptoms may be secondary to recent COVID-19 vaccination on 03/08/2021 in the setting of chronic Mcardle disease, BLE edema, dm neuropathy  · Ck elevated at 338  Plan:  · Continue to monitor  · Plan for Right leg weakness as above  Diabetic polyneuropathy associated with type 2 diabetes mellitus ([facility] [address] )  Assessment & Plan  Lab Results   Component Value Date    HGBA1C 8 5 (H) 11/11/2020       No results for input(s): POCGLU in the last 72 hours  Blood Sugar Average: Last 72 hrs:     Plan:  · Continue gabapentin 300 mg Q a m  And at noon and 600 mg at bedtime  Obesity, unspecified  Assessment & Plan  Body mass index is 39 05 kg/m²       Recommend incorporating a more whole foods plant-predominant diet along with decreasing consumption of red meats and processed foods   Per CDC guidelines, recommend moderate-vigorous intensity exercise for 30 minutes a day for 5 days a week or a total of 150 min/week  Sleep apnea  Assessment & Plan  CPAP qhs    Bilateral leg edema  Assessment & Plan  · Patient has history of bilateral leg edema chronically in the setting of 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Plan:  · Venous Doppler bilateral lower extremities pending  VTE Prophylaxis: Heparin Drip  / reason for no mechanical VTE prophylaxis BLE edema   Code Status: Level 1: Full Code  POLST: POLST form is not discussed and not completed at this time  Anticipated Length of Stay:  Patient will be admitted on an Observation basis with an anticipated length of stay of  < 2 midnights  Justification for Hospital Stay: Right Leg numbness and decreased ROM  Chief Complaint:   Right Leg Heaviness    History of Present Illness:    Lala Bull  is a 77 y o  male with PMH of McArdle dz, CAD, MI s/p CABG 2019,diastolic CHF, left ICS stenosis, HTN CKD4, anemia of chronic disease, chronic BLE edema, IDDM T2, DM Neuropathy who presents with right lower extremity heaviness and weakness that started yesterday and worsened today  Patient states that today he was having trouble moving his right leg and that prompted him to come to the ED  He is unable to bend his knee and cannot lift the leg up from the bed when lying down  He is having difficulty walking but is able to still walk  He now requires assistance to get up from laying position  He complains of numbness below the right knee  He has a hx of chronic bilateral knee edema and DM neuropathy but the numbness on the right side is new  FH is significant for paternal Grandfather with DVT hx   Patient denied any CP, SOB, abdominal pain, N/V/D  He admits to having his [address] vaccine on 02/15 and 2nd dose on 03/08  He states since his last vaccine dose, he has not felt well and attributes the right lower extremity problems to the vaccine      In the ED patient was afebrile and hemodynamically stable with /70 pulse is 86, respirations 18, SpO2 94% on room air  BP increased to 210/94 but trended down  CBC was remarkable for anemia with HGB 11 5 and CMP was remarkable for elevated BUN at 51 and creatinine at 2 35  BNP was 1451    CXR was significant for increased vascular congestion but no acute cardiopulmonary disease  Venous Doppler studies pending  Patient was given Lasix 40 mg IV x1 dose  Heparin VTE infusion was empirically initiated in the ED for DVT treatment  Review of Systems:    Review of Systems   Constitutional: Positive for activity change  Negative for appetite change, chills, fatigue, fever and unexpected weight change (wt gain)  HENT: Negative for congestion  Eyes: Negative for visual disturbance  Respiratory: Negative for shortness of breath  Cardiovascular: Positive for leg swelling (BLE)  Negative for chest pain and palpitations  Gastrointestinal: Positive for abdominal distention  Negative for abdominal pain, constipation, diarrhea, nausea and vomiting  Genitourinary: Negative for difficulty urinating and penile swelling  Musculoskeletal: Negative for arthralgias, joint swelling, myalgias and neck pain  Skin: Positive for color change (BLE anteriorly)  Negative for rash  Neurological: Positive for weakness (right leg) and numbness (right leg below knee)  Negative for dizziness and headaches  Hematological: Does not bruise/bleed easily  Psychiatric/Behavioral: Negative for agitation, behavioral problems and confusion         Past Medical and Surgical History:     Past Medical History:   Diagnosis Date    Diabetes mellitus ([address] )     Hyperlipidemia     Hypertension     Obesity     Renal disorder        Past Surgical History:   Procedure Laterality Date    BACK SURGERY      COLONOSCOPY      CORONARY ARTERY BYPASS GRAFT  2019    quad    GALLBLADDER SURGERY      HERNIA REPAIR      CA CABG, ARTERY-VEIN, FOUR N/A 6/21/2019    Procedure: CORONARY ARTERY BYPASS GRAFT (CABG) 4 VESSELS WITH SVG TO PDA, OM, DIAGONAL AND LIMA TO LAD; RIGHT LEG EVH;  Surgeon: Abigail Cabrales MD;  Location: [facility];  Service: Cardiac Surgery    RECTAL SURGERY         Meds/Allergies:    Prior to Admission medications    Medication Sig Start Date End Date Taking?  Authorizing Provider   Alpha-Lipoic Acid 600 MG CAPS TAKE 1 CAPSULE BY MOUTH EVERY DAY 10/23/20  Yes Jay Jay LemKG wolf   Ascorbic Acid (VITAMIN C) 1000 MG tablet Take 1,000 mg by mouth daily   Yes Historical Provider, MD   aspirin 325 mg tablet Take 1 tablet (325 mg total) by mouth daily 6/26/19  Yes [address]   atorvastatin (LIPITOR) 80 mg tablet Take 1 tablet (80 mg total) by mouth daily with dinner 3/24/20  Yes Brayan Martin MD   cholecalciferol (VITAMIN D3) 1,000 units tablet Take 1 tablet (1,000 Units total) by mouth daily  Patient taking differently: Take 1,000 Units by mouth  10/21/19  Yes Tisha Collins MD   cyanocobalamin (VITAMIN B-12) 500 mcg tablet Take 500 mcg by mouth daily   Yes Historical Provider, MD   Doxepin HCl (SILENOR) 6 MG TABS Take 10 mg by mouth as needed    Yes Historical Provider, MD   finasteride (PROSCAR) 5 mg tablet Take 1 tablet (5 mg total) by mouth daily 2/18/21  Yes Tanna Bingham PA-C   gabapentin (NEURONTIN) 300 mg capsule Take 1 cap (300mg) by mouth in the morning and 1 cap (300mg) at noon, take 2 caps (600mg) at bedtime 12/29/20  Yes Rod Blanca MD   insulin glargine (Toujeo SoloStar) 300 units/mL CONCETRATED U-300 injection pen (1-unit dial) INJECT 74 UNITS UNDER THE SKIN DAILY AT BEDTIME 2/10/21  Yes Farnaz Crooks PA-C   liraglutide (Victoza) injection INJECT 1 8 MG DAILY INTO SKIN  Patient taking differently: daily INJECT 1 8 MG DAILY INTO SKIN 11/12/20  Yes Dmitriy Lentz MD   losartan (COZAAR) 50 mg tablet Take 1 tablet (50 mg total) by mouth daily after dinner 12/23/20  Yes Tisha Collins MD   metoprolol tartrate (LOPRESSOR) 25 mg tablet Take 1 tablet (25 mg total) by mouth every 12 (twelve) hours 3/24/20  Yes Pascual Lim MD   omeprazole (PriLOSEC) 40 MG capsule Take 40 mg by mouth daily   Yes Historical Provider, MD   tamsulosin (FLOMAX) 0 4 mg Take 2 capsules (0 8 mg total) by mouth daily with dinner 2/18/21  Yes Sally Mireles PA-C   torsemide (DEMADEX) 20 mg tablet TAKE 1 TABLET DAILY 10/12/20  Yes MD FANNIE OwenD ULTRAFINE III SHORT PEN 31G X 8 MM MISC INJECT INTO THE SKIN 4 (FOUR) TIMES A DAY 12/17/20   Farnaz Crooks PA-C   Continuous Blood Gluc  (FREESTYLE CELIA READER) MAMI Use device to scan blood glucose at least 4 times a day 7/3/19   Farnaz Crooks PA-C   Continuous Blood Gluc Sensor (FreeStyle Celia 14 Day Sensor) MISC APPLY SENSOR EVERY 14 DAYS TO CHECK BLOOD GLUCOSE AT LEAST 4 TIMES A DAY 9/30/20   Farnaz Crooks PA-C   docusate sodium (COLACE) 100 mg capsule Take 1 capsule (100 mg total) by mouth 2 (two) times a day 6/26/19   ELEAZAR Spencer   glucose blood test strip Check 4 times a day 11/16/20   Farnaz Crooks PA-C   insulin lispro (HumaLOG KwikPen) 100 units/mL injection pen INJECT 32-30-32 UNITS THREE TIMES A DAY PLUS SCALE MAXIMUM DOSE 130 UNITS 2/10/21   Farnaz Crooks PA-C   latanoprost (XALATAN) 0 005 % ophthalmic solution 1 drop daily at bedtime    Historical Provider, MD   Microlet Lancets MISC USE 3 TIMES DAY 11/18/20   Farnaz Crooks PA-C     I have reviewed home medications with patient personally  Allergies: No Known Allergies    Social History:     Marital Status: /Civil Union   Occupation: Retired  Patient Pre-hospital Living Situation: Home with wife  Patient Pre-hospital Level of Mobility: Independently  Patient Pre-hospital Diet Restrictions: Regular  Substance Use History:   Social History     Substance and Sexual Activity   Alcohol Use Not Currently    Comment: 1 drink every 6 months       Social History     Tobacco Use   Smoking Status Former Smoker    Packs/day: 3 00    Years: 30 00    Pack years: 90 00    Quit date: Jaiden Salgado Years since quittin 2   Smokeless Tobacco Never Used   Tobacco Comment    Quit      Social History     Substance and Sexual Activity   Drug Use Never       Family History:    Family History   Problem Relation Age of Onset    Cancer Mother     Cancer Father     Diabetes Maternal Grandmother     Diabetes Paternal Aunt        Physical Exam:     Vitals:   Blood Pressure: 163/80 (21)  Pulse: 81 (21)  Temperature: 98 °F (36 7 °C) (21)  Temp Source: Oral (21)  Respirations: 18 (21)  Height: 5' 11" (180 3 cm) (21)  Weight - Scale: 127 kg (280 lb) (21)  SpO2: 96 % (21)    Physical Exam  Vitals signs and nursing note reviewed  Constitutional:       General: He is not in acute distress  Appearance: Normal appearance  He is not ill-appearing or toxic-appearing  HENT:      Head: Normocephalic and atraumatic  Nose: Nose normal       Mouth/Throat:      Mouth: Mucous membranes are moist       Pharynx: No oropharyngeal exudate or posterior oropharyngeal erythema  Eyes:      General: No visual field deficit or scleral icterus  Right eye: No discharge  Left eye: No discharge  Extraocular Movements: Extraocular movements intact  Pupils: Pupils are equal, round, and reactive to light  Neck:      Musculoskeletal: Normal range of motion and neck supple  No neck rigidity or muscular tenderness  Cardiovascular:      Rate and Rhythm: Normal rate  Rhythm irregular  Pulses: Normal pulses  Heart sounds: Normal heart sounds  No murmur  No friction rub  No gallop  Pulmonary:      Effort: Pulmonary effort is normal  No respiratory distress  Breath sounds: Normal breath sounds  No stridor  No wheezing, rhonchi or rales  Abdominal:      General: Bowel sounds are normal  There is distension        Palpations: Abdomen is soft  Tenderness: There is no abdominal tenderness  There is no guarding or rebound  Musculoskeletal:         General: Swelling present  No tenderness or deformity  Right knee: He exhibits decreased range of motion  Right lower leg: Edema present  Left lower leg: Edema present  Right foot: Decreased range of motion  Skin:     General: Skin is warm and dry  Capillary Refill: Capillary refill takes less than 2 seconds  Coloration: Skin is not jaundiced  Findings: Erythema (anterior legs bilaterally) present  No lesion or rash  Neurological:      Mental Status: He is alert and oriented to person, place, and time  Mental status is at baseline  Cranial Nerves: Cranial nerves are intact  No cranial nerve deficit, dysarthria or facial asymmetry  Sensory: Sensory deficit (mildly decreased right dorsal foot) present  Motor: Weakness (right leg) present  No tremor, atrophy or seizure activity  Coordination: Heel to [facility] Test abnormal (Right leg unable to perform)  Gait: Gait abnormal    Psychiatric:         Mood and Affect: Mood normal          Behavior: Behavior normal          Thought Content: Thought content normal          Judgment: Judgment normal          Additional Data:     Lab Results: I have personally reviewed pertinent reports  Results from last 7 days   Lab Units 03/11/21  2254   WBC Thousand/uL 7 77   HEMOGLOBIN g/dL 11 0*   HEMATOCRIT % 34 9*   PLATELETS Thousands/uL 167   NEUTROS PCT % 70   LYMPHS PCT % 14   MONOS PCT % 8   EOS PCT % 5     Results from last 7 days   Lab Units 03/11/21  2254   POTASSIUM mmol/L 3 8   CHLORIDE mmol/L 108   CO2 mmol/L 29   BUN mg/dL 51*   CREATININE mg/dL 2 35*   CALCIUM mg/dL 9 0   ALK PHOS U/L 95   ALT U/L 28   AST U/L 18     Results from last 7 days   Lab Units 03/12/21  0040   INR  1 10       Imaging: I have personally reviewed pertinent films in PACS    No results found      EKG, Pathology, and Other Studies Reviewed on Admission:   · EKG: NSR with 1st degree AVB  PVCs and PACs  HR 79  Normal axis  Epic / Care Everywhere Records Reviewed: Yes     ** Please Note: This note has been constructed using a voice recognition system   **

## 2021-03-12 NOTE — QUICK NOTE
This patient was assessed in the ED at approximately 8:45 a m  and then again while rounding in his room at around 1:00 p m     The reason for his presentation to the ED was the acute onset of right lower extremity pain, weakness, and numbness distal to the knee  The patient reported to me this morning that the symptoms originally developed while he was getting into his car on Tuesday became worse yesterday  On examination, his right distal calf was erythematous and firm with a negative Homans sign  The patient reports some posterior right knee discomfort which is exacerbated by minimal straight leg raise, and appears to be hamstring related spasm  His venous Doppler studies shows no evidence of DVT  Lumbar spine CT interpretation is still pending  He was noted to be bradycardic this morning, was placed on telemetry  Also his metoprolol  was reduced to 12 5 from 25  Given the patient's presentation and negative DVT studies it is possible symptoms could be related to fluid overload  He just received an additional dose of IV Lasix  Will continue to monitor changes in progress during his stay  If remains stable, expect discharge within the next day      Iram Hansen MD PGY -1

## 2021-03-12 NOTE — ASSESSMENT & PLAN NOTE
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

## 2021-03-12 NOTE — ASSESSMENT & PLAN NOTE
Wt Readings from Last 3 Encounters:   03/12/21 127 kg (280 lb)   02/18/21 128 kg (283 lb)   01/27/21 127 kg (280 lb)     · Patient has GIDD HF with EF of 60% per echo on 06/19  · No CP, SOB or worsening bilateral leg edema at admission  · BNP elevated at 1451  · CXR showed increased pulmonary congestion  · Lasix 40 mg IV x 1 dose given in ED  Plan:  · Lasix 40 mg IV   · Daily weights  · Strict Is and Os  · Hold torsemide 20 mg qd home dose

## 2021-03-12 NOTE — ASSESSMENT & PLAN NOTE
Lab Results   Component Value Date    EGFR 26 03/11/2021    EGFR 28 01/04/2021    EGFR 30 12/04/2020    CREATININE 2 35 (H) 03/11/2021    CREATININE 2 20 (H) 01/04/2021    CREATININE 2 11 (H) 12/04/2020     Blood Pressure: (!) 179/86     Plan:  · Continue Metoprolol 25 mg b i d   · Continue losartan 50 mg q h s    · Lasix 40 mg IV

## 2021-03-12 NOTE — ASSESSMENT & PLAN NOTE
Body mass index is 39 05 kg/m²   Recommend incorporating a more whole foods plant-predominant diet along with decreasing consumption of red meats and processed foods   Per CDC guidelines, recommend moderate-vigorous intensity exercise for 30 minutes a day for 5 days a week or a total of 150 min/week

## 2021-03-12 NOTE — ASSESSMENT & PLAN NOTE
· Patient has history of bilateral leg edema chronically in the setting of 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Plan:  · Venous Doppler bilateral lower extremities pending

## 2021-03-12 NOTE — QUICK NOTE
I spoke with the patient's spouse, and updated her on the patient's condition  She wanted us to know that the patient has had difficulty with following exercise recommendations and protocols at home  In fact he discussed with us this morning not using either exercise bikes he has at home  She also stated the only time he has successfully completed physical therapy was for cardiac rehabilitation      Lonnie Garcia MD PGY-1 FM

## 2021-03-12 NOTE — ASSESSMENT & PLAN NOTE
Lab Results   Component Value Date    HGBA1C 8 5 (H) 11/11/2020       No results for input(s): POCGLU in the last 72 hours  Blood Sugar Average: Last 72 hrs:    Plan:  ·  Lantus 74 units at bedtime  · Lispro 32 units t i d   · SSI  · POCT glucose checks with meals and at bedtime  · Hypoglycemia protocol  · Hold Victoza  · Diabetic and renal diet

## 2021-03-13 LAB
GLUCOSE SERPL-MCNC: 140 MG/DL (ref 65–140)
GLUCOSE SERPL-MCNC: 146 MG/DL (ref 65–140)
GLUCOSE SERPL-MCNC: 166 MG/DL (ref 65–140)
GLUCOSE SERPL-MCNC: 182 MG/DL (ref 65–140)
T4 FREE SERPL-MCNC: 0.9 NG/DL (ref 0.76–1.46)
TROPONIN I SERPL-MCNC: 0.04 NG/ML
TROPONIN I SERPL-MCNC: 0.05 NG/ML
TROPONIN I SERPL-MCNC: 0.06 NG/ML

## 2021-03-13 PROCEDURE — 82948 REAGENT STRIP/BLOOD GLUCOSE: CPT

## 2021-03-13 PROCEDURE — 84439 ASSAY OF FREE THYROXINE: CPT | Performed by: CHIROPRACTOR

## 2021-03-13 PROCEDURE — 99233 SBSQ HOSP IP/OBS HIGH 50: CPT | Performed by: INTERNAL MEDICINE

## 2021-03-13 PROCEDURE — 84484 ASSAY OF TROPONIN QUANT: CPT | Performed by: CHIROPRACTOR

## 2021-03-13 PROCEDURE — 84484 ASSAY OF TROPONIN QUANT: CPT | Performed by: INTERNAL MEDICINE

## 2021-03-13 RX ORDER — TORSEMIDE 20 MG/1
20 TABLET ORAL DAILY
Status: DISCONTINUED | OUTPATIENT
Start: 2021-03-14 | End: 2021-03-14 | Stop reason: HOSPADM

## 2021-03-13 RX ORDER — ECHINACEA PURPUREA EXTRACT 125 MG
1 TABLET ORAL 3 TIMES DAILY PRN
Status: DISCONTINUED | OUTPATIENT
Start: 2021-03-13 | End: 2021-03-13

## 2021-03-13 RX ORDER — POLYETHYLENE GLYCOL 3350 17 G/17G
17 POWDER, FOR SOLUTION ORAL 2 TIMES DAILY
Status: DISCONTINUED | OUTPATIENT
Start: 2021-03-13 | End: 2021-03-14 | Stop reason: HOSPADM

## 2021-03-13 RX ORDER — SENNOSIDES 8.6 MG
2 TABLET ORAL 2 TIMES DAILY
Status: DISCONTINUED | OUTPATIENT
Start: 2021-03-13 | End: 2021-03-14 | Stop reason: HOSPADM

## 2021-03-13 RX ORDER — ECHINACEA PURPUREA EXTRACT 125 MG
1 TABLET ORAL
Status: DISCONTINUED | OUTPATIENT
Start: 2021-03-13 | End: 2021-03-14 | Stop reason: HOSPADM

## 2021-03-13 RX ADMIN — SENNOSIDES 17.2 MG: 8.6 TABLET, FILM COATED ORAL at 11:14

## 2021-03-13 RX ADMIN — Medication 12.5 MG: at 21:33

## 2021-03-13 RX ADMIN — ATORVASTATIN CALCIUM 80 MG: 40 TABLET, FILM COATED ORAL at 17:40

## 2021-03-13 RX ADMIN — GABAPENTIN 300 MG: 300 CAPSULE ORAL at 12:02

## 2021-03-13 RX ADMIN — PANTOPRAZOLE SODIUM 40 MG: 40 TABLET, DELAYED RELEASE ORAL at 05:03

## 2021-03-13 RX ADMIN — FINASTERIDE 5 MG: 5 TABLET, FILM COATED ORAL at 09:02

## 2021-03-13 RX ADMIN — INSULIN LISPRO 32 UNITS: 100 INJECTION, SOLUTION INTRAVENOUS; SUBCUTANEOUS at 09:09

## 2021-03-13 RX ADMIN — INSULIN LISPRO 32 UNITS: 100 INJECTION, SOLUTION INTRAVENOUS; SUBCUTANEOUS at 11:16

## 2021-03-13 RX ADMIN — Medication 12.5 MG: at 09:03

## 2021-03-13 RX ADMIN — GABAPENTIN 600 MG: 300 CAPSULE ORAL at 21:32

## 2021-03-13 RX ADMIN — DOCUSATE SODIUM 100 MG: 100 CAPSULE, LIQUID FILLED ORAL at 17:40

## 2021-03-13 RX ADMIN — INSULIN LISPRO 2 UNITS: 100 INJECTION, SOLUTION INTRAVENOUS; SUBCUTANEOUS at 11:16

## 2021-03-13 RX ADMIN — HEPARIN SODIUM 5000 UNITS: 5000 INJECTION INTRAVENOUS; SUBCUTANEOUS at 21:35

## 2021-03-13 RX ADMIN — ASPIRIN 325 MG ORAL TABLET 325 MG: 325 PILL ORAL at 09:03

## 2021-03-13 RX ADMIN — INSULIN GLARGINE 74 UNITS: 100 INJECTION, SOLUTION SUBCUTANEOUS at 21:32

## 2021-03-13 RX ADMIN — HEPARIN SODIUM 5000 UNITS: 5000 INJECTION INTRAVENOUS; SUBCUTANEOUS at 05:03

## 2021-03-13 RX ADMIN — POLYETHYLENE GLYCOL 3350 17 G: 17 POWDER, FOR SOLUTION ORAL at 11:16

## 2021-03-13 RX ADMIN — TAMSULOSIN HYDROCHLORIDE 0.8 MG: 0.4 CAPSULE ORAL at 17:39

## 2021-03-13 RX ADMIN — SENNOSIDES 17.2 MG: 8.6 TABLET, FILM COATED ORAL at 17:39

## 2021-03-13 RX ADMIN — HEPARIN SODIUM 5000 UNITS: 5000 INJECTION INTRAVENOUS; SUBCUTANEOUS at 13:03

## 2021-03-13 RX ADMIN — LOSARTAN POTASSIUM 50 MG: 50 TABLET, FILM COATED ORAL at 17:40

## 2021-03-13 RX ADMIN — GABAPENTIN 300 MG: 300 CAPSULE ORAL at 09:02

## 2021-03-13 RX ADMIN — INSULIN LISPRO 32 UNITS: 100 INJECTION, SOLUTION INTRAVENOUS; SUBCUTANEOUS at 17:17

## 2021-03-13 RX ADMIN — DOCUSATE SODIUM 100 MG: 100 CAPSULE, LIQUID FILLED ORAL at 09:02

## 2021-03-13 RX ADMIN — INSULIN LISPRO 2 UNITS: 100 INJECTION, SOLUTION INTRAVENOUS; SUBCUTANEOUS at 09:10

## 2021-03-13 RX ADMIN — Medication 1000 UNITS: at 09:02

## 2021-03-13 NOTE — DISCHARGE SUMMARY
[facility]  Discharge Summary - Alyssa Charles  1945, 77 y o  male MRN: [id number]  Unit/Bed#: S -01 Encounter: [id number]  Primary Care Provider: Sahil Fulton MD   Date and time admitted to hospital: 3/11/2021  9:42 PM    Acute on chronic diastolic congestive heart failure [facility])  Assessment & Plan  Wt Readings from Last 3 Encounters:   03/12/21 127 kg (280 lb)   02/18/21 128 kg (283 lb)   01/27/21 127 kg (280 lb)     · Patient has GIDD HF with EF of 60% per echo on 06/19  No CP, SOB or worsening bilateral leg edema at admission  Developed mild chest pain 03/13 and troponins trended  · BNP elevated at 1451  · CXR showed increased pulmonary congestion  · Lasix 40 mg IV x 1 dose given in ED  Plan:  · Daily weights  · Strict Is and Os  · Hold torsemide 20 mg qd home dose  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[facility] 75 )  Assessment & Plan  · Overall, he has done very well over his life from his muscle disease, without any functional limitations  · Symptoms may be secondary to recent COVID-19 vaccination on 03/08/2021 in the setting of chronic Mcardle disease, BLE edema, dm neuropathy  · Ck elevated upon admission at 338  Plan:  · Continue to monitor  · Plan for Right leg weakness as above  Diabetic polyneuropathy associated with type 2 diabetes mellitus ([facility] )  Assessment & Plan  Lab Results   Component Value Date    HGBA1C 8 5 (H) 11/11/2020       No results for input(s): POCGLU in the last 72 hours  Blood Sugar Average: Last 72 hrs:     Plan:  · Continue gabapentin 300 mg Q a m  And at noon and 600 mg at bedtime  Obesity, unspecified  Assessment & Plan  Body mass index is 39 05 kg/m²   Recommend incorporating a more whole foods plant-predominant diet along with decreasing consumption of red meats and processed foods   Per CDC guidelines, recommend moderate-vigorous intensity exercise for 30 minutes a day for 5 days a week or a total of 150 min/week      Sleep apnea  Assessment & Plan  CPAP qhs    Bilateral leg edema  Assessment & Plan  Significantly improved  03/13   · Venous Doppler bilateral lower extremities negative for DVT    Type 2 diabetes mellitus, with long-term current use of insulin ([facility])  Assessment & Plan  Lab Results   Component Value Date    HGBA1C 8 5 (H) 11/11/2020       No results for input(s): POCGLU in the last 72 hours  Blood Sugar Average: Last 72 hrs:    Plan:  ·  Lantus 74 units at bedtime  · Lispro 32 units t i d   · SSI  · POCT glucose checks with meals and at bedtime  · Hypoglycemia protocol  · Hold Victoza  · Diabetic and renal diet  Benign hypertension with chronic kidney disease, stage IV [facility])  Assessment & Plan  Lab Results   Component Value Date    EGFR 27 03/12/2021    EGFR 26 03/11/2021    EGFR 28 01/04/2021    CREATININE 2 28 (H) 03/12/2021    CREATININE 2 35 (H) 03/11/2021    CREATININE 2 20 (H) 01/04/2021     Blood Pressure: 164/79     Plan:  · Reduced Metoprolol to 12 5 mg b i d  (bradycardic 03/12)  Continue losartan 50 mg q h s  CKD (chronic kidney disease), stage IV [facility])  Assessment & Plan  Lab Results   Component Value Date    EGFR 27 03/12/2021    EGFR 26 03/11/2021    EGFR 28 01/04/2021    CREATININE 2 28 (H) 03/12/2021    CREATININE 2 35 (H) 03/11/2021    CREATININE 2 20 (H) 01/04/2021     · Baseline Cr  Of 2 0 -2 1 in the past year  · Elevated Cr  2 28 03/12    Plan:  · Monitor volume status closely  · Avoid hypotension    * Weakness of right leg  Assessment & Plan  · Patient admits to having right leg weakness that started 03/12  He he mentioned upon admission questioning if possibly  related to [place] vaccination on 3/8  · He has a hx of McArdle dz but has been stable  He states that he has been resting and lying down but still has difficulty raising leg from the hip or bending his knee  He has right leg heaviness, numbness distal to the knee, but on PE has light touch sensation intact    · CK elevated 338  Plan:  · Venous doppler demonstrated no DVT  · CT lumbar wo contrast demonstrates marked disc disease and post operative changes    Fall precautions OOB with assistance  Discharging Resident Physician: Dominick Kehr, MD  Attending: Chuy Casey MD  PCP: Jake Lambert MD  Admission Date: 3/11/2021  Discharge Date: 03/13/21    Disposition:     Home    Reason for Admission:  Right leg weakness pain and swelling    Consultations During Hospital Stay:    None      Procedures Performed:     · LE Doppler studies, CT lumbar spine    Significant Findings / Test Results:     · LE Doppler studies negative for DVT CT lumbar spine demonstrates extensive disc disease with postoperative changes    Incidental Findings:   · None     Test Results Pending at Discharge (will require follow up): · None     Outpatient Tests Requested:  · Per primary care and your other physicians    Complications:  None    Hospital Course: Adam Sethi  is a 77 y o  male with PMH of McArdle dz, CAD, MI s/p CABG 2019,diastolic CHF, left ICS stenosis, HTN CKD4, anemia of chronic disease, chronic BLE edema, IDDM T2, DM Neuropathy who presented to the ED on 03/12/21 with right lower extremity heaviness and weakness that started 03/11 and worsened 03/12  Patient stated he was having trouble moving his right leg and that prompted him to come to the ED  He was initially unable to bend his knee and could not t lift the leg up from the bed when lying down  He was having difficulty walking but was able to still walk  He require assistance to get up from laying position  He also reported numbness below the right knee  He has a hx of chronic bilateral knee edema and DM neuropathy but the numbness on the right side he stated was new  FH is significant for paternal Grandfather with DVT hx   Patient denied any CP, SOB, abdominal pain, N/V/D  He admits to having his [address] vaccine on 02/15 and 2nd dose on 03/08    He states since his last vaccine dose, he has not felt well and attributes the right lower extremity problems to the vaccine  Following medical care including IV diuresis, the patient made significant improvement during the course of the 1st day  The following morning, he developed some mild chest discomfort and troponins were drawn and followed  Condition at Discharge: good     Discharge Day Visit / Exam:     Subjective: The patient is significantly improved over yesterday  Lower extremity pain is resolving, he is able to move his leg more freely, and his swelling has also reduced  He did develop some mild chest pain this morning so troponins were ordered and followed  Suspicion is high for musculoskeletal origin head as he has significant pain upon palpation of the pectoral muscles  Vitals: Blood Pressure: 170/90 (03/13/21 0700)  Pulse: 61 (03/13/21 0700)  Temperature: 97 5 °F (36 4 °C) (03/13/21 0700)  Temp Source: Oral (03/13/21 0700)  Respirations: 20 (03/13/21 0700)  Height: 5' 11" (180 3 cm) (03/12/21 0106)  Weight - Scale: 132 kg (292 lb 1 8 oz) (03/13/21 0600)  SpO2: 93 % (03/13/21 0700)  Exam:   Physical Exam  Constitutional:       General: He is not in acute distress  Appearance: He is obese  HENT:      Head: Atraumatic  Nose: No rhinorrhea  Mouth/Throat:      Mouth: Mucous membranes are moist    Cardiovascular:      Rate and Rhythm: Normal rate and regular rhythm  Heart sounds: No murmur  Pulmonary:      Effort: Pulmonary effort is normal       Breath sounds: Normal breath sounds  Abdominal:      General: Bowel sounds are normal  There is distension  Tenderness: There is no abdominal tenderness  There is no guarding  Musculoskeletal:         General: Swelling (Lower ext significantly diminished over initial presentation) and tenderness (Bilateral pectoral muscles) present  Skin:     General: Skin is warm and dry  Neurological:      Mental Status: He is alert  Mental status is at baseline  Nutrition Assessment and Intervention:       Other interventions: Strongly encouraged patient to follow low-sodium no added salt at home  Strongly consider more plant based diet  Discussion with Family:  Spouse    Discharge instructions/Information to patient and family:   See after visit summary for information provided to patient and family  Provisions for Follow-Up Care:  See after visit summary for information related to follow-up care and any pertinent home health orders  Planned Readmission:  None     Discharge Medications:  See after visit summary for reconciled discharge medications provided to patient and family        ** Please Note: This note has been constructed using a voice recognition system **

## 2021-03-13 NOTE — PROGRESS NOTES
[address]   Progress Note - Yoon Steeleenne  1945, 77 y o  male MRN: [id number]  Unit/Bed#: S -01 Encounter: [id number]  Primary Care Provider: Rod Blanca MD   Date and time admitted to hospital: 3/11/2021  9:42 PM    Acute on chronic diastolic congestive heart failure [facility])  Assessment & Plan  Wt Readings from Last 3 Encounters:   03/12/21 127 kg (280 lb)   02/18/21 128 kg (283 lb)   01/27/21 127 kg (280 lb)     · Patient has GIDD HF with EF of 60% per echo on 06/19  No CP, SOB or worsening bilateral leg edema at admission  Developed mild chest pain 03/13 and troponins trended  · BNP elevated at 1451  · CXR showed increased pulmonary congestion  · Lasix 40 mg IV x 1 dose given in ED  Plan:  · Daily weights  · Strict Is and Os  · Hold torsemide 20 mg qd home dose  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[facility] 75 )  Assessment & Plan  · Overall, he has done very well over his life from his muscle disease, without any functional limitations  · Symptoms may be secondary to recent COVID-19 vaccination on 03/08/2021 in the setting of chronic Mcardle disease, BLE edema, dm neuropathy  · Ck elevated upon admission at 338  Plan:  · Continue to monitor  · Plan for Right leg weakness as above  Diabetic polyneuropathy associated with type 2 diabetes mellitus ([facility] 75 )  Assessment & Plan  Lab Results   Component Value Date    HGBA1C 8 5 (H) 11/11/2020       No results for input(s): POCGLU in the last 72 hours  Blood Sugar Average: Last 72 hrs:     Plan:  · Continue gabapentin 300 mg Q a m  And at noon and 600 mg at bedtime  Obesity, unspecified  Assessment & Plan  Body mass index is 39 05 kg/m²   Recommend incorporating a more whole foods plant-predominant diet along with decreasing consumption of red meats and processed foods   Per CDC guidelines, recommend moderate-vigorous intensity exercise for 30 minutes a day for 5 days a week or a total of 150 min/week      Sleep apnea  Assessment & Plan  CPAP qhs    Bilateral leg edema  Assessment & Plan  Significantly improved  03/13   · Venous Doppler bilateral lower extremities negative for DVT    Type 2 diabetes mellitus, with long-term current use of insulin ([facility])  Assessment & Plan  Lab Results   Component Value Date    HGBA1C 8 5 (H) 11/11/2020       No results for input(s): POCGLU in the last 72 hours  Blood Sugar Average: Last 72 hrs:    Plan:  ·  Lantus 74 units at bedtime  · Lispro 32 units t i d   · SSI  · POCT glucose checks with meals and at bedtime  · Hypoglycemia protocol  · Hold Victoza  · Diabetic and renal diet  Benign hypertension with chronic kidney disease, stage IV [facility])  Assessment & Plan  Lab Results   Component Value Date    EGFR 27 03/12/2021    EGFR 26 03/11/2021    EGFR 28 01/04/2021    CREATININE 2 28 (H) 03/12/2021    CREATININE 2 35 (H) 03/11/2021    CREATININE 2 20 (H) 01/04/2021     Blood Pressure: 164/79     Plan:  · Reduced Metoprolol to 12 5 mg b i d  (bradycardic 03/12)  Continue losartan 50 mg q h s  CKD (chronic kidney disease), stage IV [facility])  Assessment & Plan  Lab Results   Component Value Date    EGFR 27 03/12/2021    EGFR 26 03/11/2021    EGFR 28 01/04/2021    CREATININE 2 28 (H) 03/12/2021    CREATININE 2 35 (H) 03/11/2021    CREATININE 2 20 (H) 01/04/2021     · Baseline Cr  Of 2 0 -2 1 in the past year  · Elevated Cr  2 28 03/12    Plan:  · Monitor volume status closely  · Avoid hypotension    * Weakness of right leg  Assessment & Plan  · Patient admits to having right leg weakness that started 03/12  He he mentioned upon admission questioning if possibly  related to [place] vaccination on 3/8  · He has a hx of McArdle dz but has been stable  He states that he has been resting and lying down but still has difficulty raising leg from the hip or bending his knee  He has right leg heaviness, numbness distal to the knee, but on PE has light touch sensation intact    · CK elevated 338  Plan:  · Venous doppler demonstrated no DVT  · CT lumbar wo contrast demonstrates marked disc disease and post operative changes    Fall precautions OOB with assistance  VTE Pharmacologic Prophylaxis:   Pharmacologic: Heparin  Mechanical VTE Prophylaxis in Place: No    Discussions with Specialists or Other Care Team Provider: Dr Janes Roth    Education and Discussions with Family / Patient:  Extensive discussion on current condition with patient and spouse together  Current Length of Stay: 1 day(s)    Current Patient Status: Inpatient     Discharge Plan / Estimated Discharge Date: 21  Code Status: Level 1 - Full Code      Subjective: The patient states he has made additional progress today however still has some weakness of his left hip and thigh He reported some mild chest pain this morning so troponins were drawn and trended  Physical therapy will assess patient Konstantin morning  Objective:     Vitals:   Temp (24hrs), Av 1 °F (36 7 °C), Min:97 5 °F (36 4 °C), Max:98 6 °F (37 °C)    Temp:  [97 5 °F (36 4 °C)-98 6 °F (37 °C)] 98 1 °F (36 7 °C)  HR:  [61-94] 77  Resp:  [20] 20  BP: (135-185)/(65-90) 135/65  SpO2:  [93 %-94 %] 93 %  Body mass index is 40 74 kg/m²  Input and Output Summary (last 24 hours): Intake/Output Summary (Last 24 hours) at 3/13/2021 1808  Last data filed at 3/13/2021 1736  Gross per 24 hour   Intake 400 ml   Output 500 ml   Net -100 ml       Physical Exam:     Physical Exam  Vitals signs and nursing note reviewed  Constitutional:       General: He is not in acute distress  Appearance: Normal appearance  He is not ill-appearing or toxic-appearing  HENT:      Head: Normocephalic and atraumatic  Nose: Nose normal       Mouth/Throat:      Mouth: Mucous membranes are moist       Pharynx: No oropharyngeal exudate or posterior oropharyngeal erythema  Eyes:      General: No visual field deficit or scleral icterus          Right eye: No discharge  Left eye: No discharge  Extraocular Movements: Extraocular movements intact  Pupils: Pupils are equal, round, and reactive to light  Neck:      Musculoskeletal: Normal range of motion and neck supple  No neck rigidity or muscular tenderness  Cardiovascular:      Rate and Rhythm: Normal rate and regular rhythm  Pulses: Normal pulses  Heart sounds: Normal heart sounds  No murmur  No friction rub  No gallop  Pulmonary:      Effort: Pulmonary effort is normal  No respiratory distress  Breath sounds: Normal breath sounds  No stridor  No wheezing, rhonchi or rales  Abdominal:      General: Bowel sounds are normal  There is distension  Palpations: Abdomen is soft  Tenderness: There is no abdominal tenderness  There is no guarding or rebound  Musculoskeletal:         General: No swelling, tenderness or deformity  Right knee: He exhibits decreased range of motion  Right lower leg: No edema  Left lower leg: No edema  Right foot: Decreased range of motion  Skin:     General: Skin is warm and dry  Capillary Refill: Capillary refill takes less than 2 seconds  Coloration: Skin is not jaundiced  Findings: No erythema (anterior legs bilaterally), lesion or rash  Neurological:      Mental Status: He is alert and oriented to person, place, and time  Mental status is at baseline  Cranial Nerves: Cranial nerves are intact  No cranial nerve deficit, dysarthria or facial asymmetry  Sensory: No sensory deficit (mildly decreased right dorsal foot)  Motor: Weakness (right hip and thigh flexion grade 3/5) present  No tremor, atrophy or seizure activity  Coordination: Heel to [place] Flight Test abnormal (Right leg unable to perform)  Gait: Gait normal    Psychiatric:         Mood and Affect: Mood normal          Behavior: Behavior normal          Thought Content:  Thought content normal          Judgment: Judgment normal  Additional Data:     Labs:    Results from last 7 days   Lab Units 03/11/21  2254   WBC Thousand/uL 7 77   HEMOGLOBIN g/dL 11 0*   HEMATOCRIT % 34 9*   PLATELETS Thousands/uL 167   NEUTROS PCT % 70   LYMPHS PCT % 14   MONOS PCT % 8   EOS PCT % 5     Results from last 7 days   Lab Units 03/12/21  0421 03/11/21  2254   POTASSIUM mmol/L 3 9 3 8   CHLORIDE mmol/L 107 108   CO2 mmol/L 29 29   BUN mg/dL 51* 51*   CREATININE mg/dL 2 28* 2 35*   CALCIUM mg/dL 9 2 9 0   ALK PHOS U/L  --  95   ALT U/L  --  28   AST U/L  --  18     Results from last 7 days   Lab Units 03/12/21  0040   INR  1 10       * I Have Reviewed All Lab Data Listed Above            Last 24 Hours Medication List:   Current Facility-Administered Medications   Medication Dose Route Frequency Provider Last Rate    aspirin  325 mg Oral Daily Beth Garcia MD      atorvastatin  80 mg Oral Daily With Ron Scott MD      cholecalciferol  1,000 Units Oral Daily Beth Garcia MD      docusate sodium  100 mg Oral BID Beth Garcia MD      doxepin  10 mg Oral HS PRN Beth Garcia MD      finasteride  5 mg Oral Daily Beth Garcia MD      gabapentin  300 mg Oral BID Beth Garcia MD      gabapentin  600 mg Oral HS Beth Garcia MD      heparin (porcine)  5,000 Units Subcutaneous [facility] Shane James MD      insulin glargine  74 Units Subcutaneous HS Beth Garcia MD      insulin lispro  2-12 Units Subcutaneous TID [facility] Beth Garcia MD      insulin lispro  32 Units Subcutaneous TID With Meals Beth Garcia MD      latanoprost  1 drop Both Eyes HS Beth Garcia MD      losartan  50 mg Oral After Ron Scott MD      metoprolol tartrate  12 5 mg Oral Q12H [address] Vevelychris Justice MD      pantoprazole  40 mg Oral Early Morning Beth Garcia MD      polyethylene glycol  17 g Oral BID Shane James MD      senna  2 tablet Oral BID Shane James MD      sodium chloride  1 spray Each Nare Q1H PRN Beulah Gutierrez MD      tamsulosin  0 8 mg Oral Daily With Meme Sands MD          Today, Patient Was Seen By: Shanita Mercer MD    ** Please Note: This note has been constructed using a voice recognition system   **

## 2021-03-13 NOTE — QUICK NOTE
The patient was checked again at the bedside where he was resting comfortably  He reports his leg pain continues to improve  When asked to raise his leg, he elevates his hip thigh and leg without significant pain      Guille Barcenas MD PGY-1 FM

## 2021-03-14 VITALS
RESPIRATION RATE: 18 BRPM | OXYGEN SATURATION: 94 % | BODY MASS INDEX: 39.14 KG/M2 | DIASTOLIC BLOOD PRESSURE: 69 MMHG | HEIGHT: 71 IN | WEIGHT: 279.6 LBS | HEART RATE: 65 BPM | SYSTOLIC BLOOD PRESSURE: 124 MMHG | TEMPERATURE: 97.5 F

## 2021-03-14 DIAGNOSIS — E11.65 UNCONTROLLED TYPE 2 DIABETES MELLITUS WITH HYPERGLYCEMIA (HCC): ICD-10-CM

## 2021-03-14 PROBLEM — R60.0 BILATERAL LEG EDEMA: Status: RESOLVED | Noted: 2019-06-15 | Resolved: 2021-03-14

## 2021-03-14 PROBLEM — I50.33 ACUTE ON CHRONIC DIASTOLIC CONGESTIVE HEART FAILURE (HCC): Status: RESOLVED | Noted: 2021-03-12 | Resolved: 2021-03-14

## 2021-03-14 PROBLEM — R29.898 WEAKNESS OF RIGHT LEG: Status: RESOLVED | Noted: 2021-03-12 | Resolved: 2021-03-14

## 2021-03-14 LAB
ANION GAP SERPL CALCULATED.3IONS-SCNC: 9 MMOL/L (ref 4–13)
BUN SERPL-MCNC: 55 MG/DL (ref 5–25)
CALCIUM SERPL-MCNC: 9 MG/DL (ref 8.3–10.1)
CHLORIDE SERPL-SCNC: 102 MMOL/L (ref 100–108)
CO2 SERPL-SCNC: 26 MMOL/L (ref 21–32)
CREAT SERPL-MCNC: 2.41 MG/DL (ref 0.6–1.3)
GFR SERPL CREATININE-BSD FRML MDRD: 25 ML/MIN/1.73SQ M
GLUCOSE SERPL-MCNC: 149 MG/DL (ref 65–140)
GLUCOSE SERPL-MCNC: 192 MG/DL (ref 65–140)
GLUCOSE SERPL-MCNC: 212 MG/DL (ref 65–140)
POTASSIUM SERPL-SCNC: 4.2 MMOL/L (ref 3.5–5.3)
SODIUM SERPL-SCNC: 137 MMOL/L (ref 136–145)

## 2021-03-14 PROCEDURE — 97163 PT EVAL HIGH COMPLEX 45 MIN: CPT

## 2021-03-14 PROCEDURE — 97116 GAIT TRAINING THERAPY: CPT

## 2021-03-14 PROCEDURE — 80048 BASIC METABOLIC PNL TOTAL CA: CPT | Performed by: INTERNAL MEDICINE

## 2021-03-14 PROCEDURE — 99239 HOSP IP/OBS DSCHRG MGMT >30: CPT | Performed by: INTERNAL MEDICINE

## 2021-03-14 PROCEDURE — 82948 REAGENT STRIP/BLOOD GLUCOSE: CPT

## 2021-03-14 RX ORDER — FLUTICASONE PROPIONATE 50 MCG
1 SPRAY, SUSPENSION (ML) NASAL 2 TIMES DAILY PRN
Qty: 1 BOTTLE | Refills: 0 | Status: SHIPPED | OUTPATIENT
Start: 2021-03-14 | End: 2021-07-14 | Stop reason: ALTCHOICE

## 2021-03-14 RX ORDER — FLUTICASONE PROPIONATE 50 MCG
1 SPRAY, SUSPENSION (ML) NASAL DAILY
Status: DISCONTINUED | OUTPATIENT
Start: 2021-03-14 | End: 2021-03-14 | Stop reason: HOSPADM

## 2021-03-14 RX ADMIN — GABAPENTIN 300 MG: 300 CAPSULE ORAL at 12:05

## 2021-03-14 RX ADMIN — Medication 12.5 MG: at 08:44

## 2021-03-14 RX ADMIN — GABAPENTIN 300 MG: 300 CAPSULE ORAL at 08:44

## 2021-03-14 RX ADMIN — ASPIRIN 325 MG ORAL TABLET 325 MG: 325 PILL ORAL at 08:44

## 2021-03-14 RX ADMIN — Medication 1000 UNITS: at 08:44

## 2021-03-14 RX ADMIN — DOCUSATE SODIUM 100 MG: 100 CAPSULE, LIQUID FILLED ORAL at 08:44

## 2021-03-14 RX ADMIN — INSULIN LISPRO 2 UNITS: 100 INJECTION, SOLUTION INTRAVENOUS; SUBCUTANEOUS at 08:45

## 2021-03-14 RX ADMIN — FINASTERIDE 5 MG: 5 TABLET, FILM COATED ORAL at 08:44

## 2021-03-14 RX ADMIN — INSULIN LISPRO 32 UNITS: 100 INJECTION, SOLUTION INTRAVENOUS; SUBCUTANEOUS at 12:06

## 2021-03-14 RX ADMIN — HEPARIN SODIUM 5000 UNITS: 5000 INJECTION INTRAVENOUS; SUBCUTANEOUS at 05:19

## 2021-03-14 RX ADMIN — TORSEMIDE 20 MG: 20 TABLET ORAL at 08:44

## 2021-03-14 RX ADMIN — INSULIN LISPRO 32 UNITS: 100 INJECTION, SOLUTION INTRAVENOUS; SUBCUTANEOUS at 08:44

## 2021-03-14 RX ADMIN — INSULIN LISPRO 4 UNITS: 100 INJECTION, SOLUTION INTRAVENOUS; SUBCUTANEOUS at 12:06

## 2021-03-14 RX ADMIN — SENNOSIDES 17.2 MG: 8.6 TABLET, FILM COATED ORAL at 08:44

## 2021-03-14 RX ADMIN — PANTOPRAZOLE SODIUM 40 MG: 40 TABLET, DELAYED RELEASE ORAL at 05:18

## 2021-03-14 NOTE — PLAN OF CARE
Problem: PHYSICAL THERAPY ADULT  Goal: Performs mobility at highest level of function for planned discharge setting  See evaluation for individualized goals  Description: Treatment/Interventions: Functional transfer training, LE strengthening/ROM, Elevations, Therapeutic exercise, Endurance training, Cognitive reorientation, Patient/family training, Equipment eval/education, Bed mobility, Gait training  Equipment Recommended: (Recommend use of RW)       See flowsheet documentation for full assessment, interventions and recommendations  Note: Prognosis: Fair  Problem List: Decreased strength, Decreased endurance, Impaired balance, Decreased mobility, Decreased cognition, Impaired judgement, Decreased safety awareness, Obesity  Assessment: Shiv Johnson  is a 77 y o  Male who [facility] from home w/ c/o R LE weakness and swelling and diagnosis of acute on chronic CHF  Orders for PT eval and treat received, w/ activity orders of up w/ A and fall precautions  Pt presents w/ comorbidities of McArdle Disease, recent hx of COVID vaccie, DMII w/ polyneuropathy, sleep apnea, HTN, CKD Stage IV,  and personal factors including: stair(s) to enter home, decreased initiation and engagement and obesity  At baseline, pt mobilizes indendently without use of AD, and reports 0 falls in the last 6 months  Per chart review, appears per pt's wife that he lives a primarily sedentary lifestyle  Upon evaluation, pt presents w/ the following deficits: weakness, edema of extremities, impaired balance, decreased endurance and questionable if full effort provided during functional mobility tasks   Pt requires max A x 1 for bed mobility, min A x 1 for transfers, and min A x 1 for gait w/ RW  Pt's clinical presentation is unstable/unpredictable due to poor blood sugar control, need for assist w/ all phases of mobility when usually mobilizing independently and need for input for task focus and mobility technique, pt benefits from motivation to challenge activity tolerance  Pt is at an increased risk of falls due to physical deficits  Given the above findings, discharge recommendation is for home w/ family support and home PT (less likely STR, pending level of A family can provide at home  )  During this admission, pt would benefit from skilled acute inpatient PT in order to address the abovementioned deficits to maximize function and mobility before DC from acute care  PT Discharge Recommendation: Home with skilled therapy, Other (Comment)(HHPT)          See flowsheet documentation for full assessment

## 2021-03-14 NOTE — DISCHARGE SUMMARY
Discharge Summary - [facility] Internal Medicine    Patient Information: Beth Nash  77 y o  male MRN: [id number]  Unit/Bed#: S -01 Encounter: [id number]    Discharging Physician / Practitioner: Christian Seaman MD  PCP: Stacy Munoz MD  Admission Date: 3/11/2021  Discharge Date: 03/14/21    Reason for Admission:  Right lower extremity weakness    Discharge Diagnoses:     Principal Problem (Resolved):    Weakness of right leg  Active Problems:    CKD (chronic kidney disease), stage IV (HCC)    Benign hypertension with chronic kidney disease, stage IV (HCC)    Type 2 diabetes mellitus, with long-term current use of insulin ([facility])    Sleep apnea    Obesity, unspecified    Diabetic polyneuropathy associated with type 2 diabetes mellitus ([facility] )    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[facility] )  Resolved Problems:    Bilateral leg edema    Acute on chronic diastolic congestive heart failure ([facility] )      Consultations During Hospital Stay:  · Physical therapy    Procedures Performed:   · None    Significant Findings:   · Right lower extremity weakness  · Acute on chronic diastolic CHF    Incidental Findings:   · None     Test Results Pending at Discharge (will require follow up): · None     Outpatient Tests Requested:  · None    Complications:  None    Hospital Course:     Beth Nash  is a 77 y o  male patient who originally presented to the hospital on 3/11/2021 due to right lower extremity weakness  Imaging was benign for central cause  Patient was found to have bilateral lower extremity edema which was likely contributing to his weakness  He was diuresed with IV diuretics and came back to euvolemia  Patient was also assessed by Physical therapy and recommended home health physical therapy   did arrange for home health physical therapy prior to discharge  Patient was counseled on compliance with his medications and diet    It seems patient has been non compliant with medications and his sodium and carb restriction at home  This was emphasized to his wife as well  Condition at Discharge: fair     Discharge Day Visit / Exam:     Subjective:  Patient feels well  He has no complaints  Vitals: Blood Pressure: 124/69 (03/14/21 0656)  Pulse: 65 (03/14/21 0656)  Temperature: 97 5 °F (36 4 °C) (03/14/21 0656)  Temp Source: Oral (03/14/21 0656)  Respirations: 18 (03/14/21 0656)  Height: 5' 11" (180 3 cm) (03/12/21 0106)  Weight - Scale: 127 kg (279 lb 9 6 oz) (03/14/21 0600)  SpO2: 94 % (03/14/21 0656)  Exam:   Physical Exam  Vitals signs and nursing note reviewed  Constitutional:       Appearance: Normal appearance  He is obese  Comments: Chronically ill-appearing   HENT:      Head: Normocephalic and atraumatic  Right Ear: External ear normal       Left Ear: External ear normal       Nose: Nose normal       Mouth/Throat:      Mouth: Mucous membranes are moist       Pharynx: Oropharynx is clear  Eyes:      Conjunctiva/sclera: Conjunctivae normal       Pupils: Pupils are equal, round, and reactive to light  Neck:      Musculoskeletal: Neck supple  No muscular tenderness  Cardiovascular:      Rate and Rhythm: Normal rate and regular rhythm  Pulses: Normal pulses  Heart sounds: Normal heart sounds  Pulmonary:      Effort: Pulmonary effort is normal       Breath sounds: Normal breath sounds  Abdominal:      General: Abdomen is flat  Bowel sounds are normal       Palpations: Abdomen is soft  Musculoskeletal:         General: No swelling or tenderness  Skin:     General: Skin is warm and dry  Capillary Refill: Capillary refill takes less than 2 seconds  Neurological:      General: No focal deficit present  Mental Status: He is alert and oriented to person, place, and time  Mental status is at baseline  Psychiatric:         Mood and Affect: Mood normal          Behavior: Behavior normal          Thought Content:  Thought content normal          Judgment: Judgment normal  Discharge instructions/Information to patient and family:   See after visit summary for information provided to patient and family  Provisions for Follow-Up Care:  See after visit summary for information related to follow-up care and any pertinent home health orders  Disposition:     Home with VNA Services (Reminder: Complete face to face encounter)    For Discharges to [address] [facility]:   · Not Applicable to this Patient - Not Applicable to this Patient    Planned Readmission:  No     Discharge Statement:  I spent 38 minutes discharging the patient  This time was spent on the day of discharge  I had direct contact with the patient on the day of discharge  Greater than 50% of the total time was spent examining patient, answering all patient questions, arranging and discussing plan of care with patient as well as directly providing post-discharge instructions  Additional time then spent on discharge activities  Discharge Medications:  See after visit summary for reconciled discharge medications provided to patient and family  ** Please Note: Dragon 360 Dictation voice to text software may have been used in the creation of this document   **

## 2021-03-14 NOTE — CASE MANAGEMENT
GT met with Pt to discuss the recommendation of home PT services which he reported being agreeable to  Pt requested a referral to [facility]  CM advised the [facility] was currently at capacity and unable to accept any new cases  Pt gave permission for a blanket referral  [facility]      [facility] accepted the Pt's case with a [facility] 3/15  Pt reported being agreeable to accepting services from [facility]

## 2021-03-14 NOTE — PHYSICAL THERAPY NOTE
PHYSICAL THERAPY EVALUATION NOTE    Patient Name: Beth Nash  Today's Date: 3/14/2021     AGE:   77 y o  Mrn:   [id number]  ADMIT DX:  CHF (congestive heart failure) (HCC) [I50 9]  Leg pain [M79 606]  Peripheral edema [R60 9]  Right leg pain [M79 604]    Past Medical History:   Diagnosis Date    [facility]     Hyperlipidemia     Hypertension     Obesity     Renal disorder      Length Of Stay: 2  PHYSICAL THERAPY EVALUATION :   Patient's identity confirmed via 2 patient identifiers (full name and ) at start of session       21 0907   PT Last Visit   PT Visit Date 21   Note Type   Note type Evaluation   Pain Assessment   Pain Assessment Tool 0-10   Pain Score No Pain   Home Living   Type of [address] [place] [address] One level;Stairs to enter with rails  (2 ISABELLE)   Bathroom Shower/Tub   (Both)   Home Equipment Walker;Cane  (Not using PTA)   Additional Comments Pt lives in a 1 SH w/ 2 ISABELLE  Pt reports having DME but not using for mobility PTA   Prior Function   Level of [place] Independent with ADLs and functional mobility   Lives With Spouse   Receives Help From Family   ADL Assistance Independent   IADLs Needs assistance   Falls in the last 6 months   (pt denies)   Vocational Retired  (Construction)   Restrictions/Precautions   [place] Bearing Precautions Per Order No   Other Precautions Chair Alarm; Bed Alarm;Multiple lines; Fall Risk  (Condom catheter)   General   Family/Caregiver Present No   Cognition   Arousal/Participation Responsive   Orientation Level Oriented X4  (Pt identified by full name and )   Memory Within functional limits   Following Commands Follows multistep commands with increased time or repetition   Comments Pt w/ primarily flat affect but improves throughout session   Pt is agreeable to participate in PT evaluation, benefits from motivation and encouragement to challenge activity tolerance   Strength RLE   R Hip Flexion 3+/5   R Knee Extension 3+/5   R Ankle Dorsiflexion 3+/5   Strength LLE   L Hip Flexion 3+/5   L Knee Extension 3+/5   L Ankle Dorsiflexion 3+/5   Coordination   Sensation WFL   Light Touch   RLE Light Touch Impaired   RLE Light Touch Comments able to feel therapist's touch; (+) dx of neuropathy   LLE Light Touch Impaired   LLE Light Touch Comments able to feel therapist's touch; (+) dx of neuropathy   Bed Mobility   Supine to Sit 2  Maximal assistance  (? submaximal effort provided)   Additional items Assist x 1; Increased time required; Bedrails   Sit to Supine Unable to assess  (Pt in bathroom w/ RN present at end of session)   Additional Comments For supine to sit, questionable if pt provided full effort  Per PCA, pt able to get EOB w/ (+) time and A x 1 earlier this AM  Once sitting EOB, pt able to maintain upright balance without LOB  Pt requires motivation and encouragement for initiation of movement during session   Transfers   Sit to Stand 4  Minimal assistance   Additional items Assist x 1; Increased time required;Verbal cues  (for hand placement)   Stand to Sit 4  Minimal assistance   Additional items Assist x 1; Increased time required;Verbal cues  (for hand placement)   Additional Comments Pt seated OOB on toilet w/ RN and RN student present   Ambulation/Elevation   Gait pattern Decreased foot clearance; Short stride; Excessively slow   Gait Assistance 4  Minimal assist  (CGA/steadying A)   Additional items Assist x 1;Verbal cues  (RW management)   Assistive Device Rolling walker   Distance 100 ft   Balance   Static Sitting Fair +   Static Standing Fair -   Ambulatory Fair -   Activity Tolerance   Activity Tolerance Patient limited by fatigue; Other (Comment)  (pt appears self limiting at times)   Nurse Made Aware Spoke to RN Eduardo   Assessment   Prognosis Fair   Problem List Decreased strength;Decreased endurance; Impaired balance;Decreased mobility; Decreased cognition; Impaired judgement;Decreased safety awareness; Obesity   Assessment Yogesh Uribe  is a 77 y o  Male who [facility]21 from home w/ c/o R LE weakness and swelling and diagnosis of acute on chronic CHF  Orders for PT eval and treat received, w/ activity orders of up w/ A and fall precautions  Pt presents w/ comorbidities of McArdle Disease, recent hx of COVID vaccie, DMII w/ polyneuropathy, sleep apnea, HTN, CKD Stage IV,  and personal factors including: stair(s) to enter home, decreased initiation and engagement and obesity  At baseline, pt mobilizes indendently without use of AD, and reports 0 falls in the last 6 months  Per chart review, appears per pt's wife that he lives a primarily sedentary lifestyle  Upon evaluation, pt presents w/ the following deficits: weakness, edema of extremities, impaired balance, decreased endurance and questionable if full effort provided during functional mobility tasks  Pt requires max A x 1 for bed mobility, min A x 1 for transfers, and min A x 1 for gait w/ RW  Pt's clinical presentation is unstable/unpredictable due to poor blood sugar control, need for assist w/ all phases of mobility when usually mobilizing independently and need for input for task focus and mobility technique, pt benefits from motivation to challenge activity tolerance  Pt is at an increased risk of falls due to physical deficits  Given the above findings, discharge recommendation is for home w/ family support and home PT (less likely STR, pending level of A family can provide at home  )  During this admission, pt would benefit from skilled acute inpatient PT in order to address the abovementioned deficits to maximize function and mobility before DC from acute care  Goals   Patient Goals to be able to walk   STG Expiration Date 03/24/21   Short Term Goal #1 Patient will:  Increase bilateral LE strength 1/2 grade to facilitate independent mobility, Perform all bed mobility tasks w/ supervision to decrease fall risk factors, Perform all transfers modified independent to improve independence, Ambulate at least 150 ft  with least restrictive assistive device modified independent w/o LOB, Navigate 2 stairs w/ supervision with unilateral handrail to facilitate return to previous living environment, Increase all balance 1/2 grade to decrease risk for falls, Complete exercise program independently, Tolerate 3 hr OOB to faciliate upright tolerance and Improve Barthel Index score to 95 or greater to facilitate independence   PT Treatment Day 1  (see treatment note below)   Plan   Treatment/Interventions Functional transfer training;LE strengthening/ROM; Elevations; Therapeutic exercise; Endurance training;Cognitive reorientation;Patient/family training;Equipment eval/education; Bed mobility;Gait training   PT Frequency Other (Comment)  (3-5x/wk)   Recommendation   PT Discharge Recommendation Home with skilled therapy; Other (Comment)  (HHPT)   Equipment Recommended   (Recommend use of RW)   AM-PAC Basic Mobility Inpatient   Turning in Bed Without Bedrails 3   Lying on Back to Sitting on Edge of Flat Bed 2   Moving Bed to Chair 3   Standing Up From Chair 3   Walk in Room 3   Climb 3-5 Stairs 3   Basic Mobility Inpatient Raw Score 17   Basic Mobility Standardized Score 39 67   Barthel Index   Feeding 10   Bathing 0   Grooming Score 5   Dressing Score 10   Bladder Score 10   Bowels Score 10   Toilet Use Score 5   Transfers (Bed/Chair) Score 10   Mobility (Level Surface) Score 10   Stairs Score 0   Barthel Index Score 70           PHYSICAL THERAPY TREATMENT NOTE    Name: Zakiya Tariq  : 1945  Time in: 859  Time out: 907  Total treat time: 8 min    S: Pt OOB in bathroom, agreeable to continue to participate in PT intervention  O: Pt is able to stand from the standard height toilet w/ supervision and use of R grab bar, able to stand and perform pericare without LOB or UE support   Pt benefits from verbal cues for RW navigation in bathroom around obstacles  Pt is able to ambulate from toilet, stand at sink x 2 min to wash hands without UE support w/ supervision, and then ambulate to recliner chair  Pt ambulated ~20 ft w/ RW w/ close supervision for this distance  Pt is S for transfer into recliner chair, benefits from [facility] for hand placement  Pt seated OOB in recliner chair w/ chair alarm activated, call bell and room phone within reach w/ all needs met  RN Kait Castellanos and RN student in room w/ pt upon therapist exit  A: Pt is agreeable to participate in PT intervention  Pt continues to benefit from motivation to challenge self during functional mobility  Pt demonstrates ability to perform toileting tasks w/ supervision, and improves ambulation and transfers to close supervision w/ use of RW and cues for form and RW management  Pt would continue to benefit from use of RW in next 1-2 sessions and blocked practice of bed mobility to facilitate return to previous living environment      P: Continue per evaluation above    Skilled inpatient PT is recommended during this admission in order to progress patient toward goals so patient is able to maximize independence    Dia Mathur, PT, DPT

## 2021-03-15 ENCOUNTER — TRANSITIONAL CARE MANAGEMENT (OUTPATIENT)
Dept: FAMILY MEDICINE CLINIC | Facility: OTHER | Age: 76
End: 2021-03-15

## 2021-03-15 RX ORDER — LIRAGLUTIDE 6 MG/ML
1.8 INJECTION SUBCUTANEOUS DAILY
Qty: 3 PEN | Refills: 3 | Status: SHIPPED | OUTPATIENT
Start: 2021-03-15 | End: 2021-05-14

## 2021-03-16 ENCOUNTER — PATIENT OUTREACH (OUTPATIENT)
Dept: FAMILY MEDICINE CLINIC | Facility: OTHER | Age: 76
End: 2021-03-16

## 2021-03-16 ENCOUNTER — OFFICE VISIT (OUTPATIENT)
Dept: FAMILY MEDICINE CLINIC | Facility: OTHER | Age: 76
End: 2021-03-16
Payer: MEDICARE

## 2021-03-16 VITALS
BODY MASS INDEX: 39.48 KG/M2 | RESPIRATION RATE: 18 BRPM | WEIGHT: 282 LBS | TEMPERATURE: 98.2 F | DIASTOLIC BLOOD PRESSURE: 70 MMHG | HEIGHT: 71 IN | OXYGEN SATURATION: 96 % | HEART RATE: 76 BPM | SYSTOLIC BLOOD PRESSURE: 134 MMHG

## 2021-03-16 DIAGNOSIS — R29.898 WEAKNESS OF RIGHT LOWER EXTREMITY: ICD-10-CM

## 2021-03-16 DIAGNOSIS — Z79.4 TYPE 2 DIABETES MELLITUS WITH STAGE 4 CHRONIC KIDNEY DISEASE, WITH LONG-TERM CURRENT USE OF INSULIN (HCC): Primary | ICD-10-CM

## 2021-03-16 DIAGNOSIS — N18.4 TYPE 2 DIABETES MELLITUS WITH STAGE 4 CHRONIC KIDNEY DISEASE, WITH LONG-TERM CURRENT USE OF INSULIN (HCC): Primary | ICD-10-CM

## 2021-03-16 DIAGNOSIS — I50.9 CONGESTIVE HEART FAILURE, UNSPECIFIED HF CHRONICITY, UNSPECIFIED HEART FAILURE TYPE (HCC): ICD-10-CM

## 2021-03-16 DIAGNOSIS — E11.22 TYPE 2 DIABETES MELLITUS WITH STAGE 4 CHRONIC KIDNEY DISEASE, WITH LONG-TERM CURRENT USE OF INSULIN (HCC): Primary | ICD-10-CM

## 2021-03-16 LAB — SL AMB POCT HEMOGLOBIN AIC: 8.3 (ref ?–6.5)

## 2021-03-16 PROCEDURE — 83036 HEMOGLOBIN GLYCOSYLATED A1C: CPT | Performed by: FAMILY MEDICINE

## 2021-03-16 PROCEDURE — 99214 OFFICE O/P EST MOD 30 MIN: CPT | Performed by: FAMILY MEDICINE

## 2021-03-16 RX ORDER — DOXEPIN HYDROCHLORIDE 10 MG/1
10 CAPSULE ORAL
Status: ON HOLD | COMMUNITY
Start: 2021-02-10 | End: 2021-10-19

## 2021-03-16 NOTE — PROGRESS NOTES
Assessment/Plan:    No problem-specific Assessment & Plan notes found for this encounter  Problem List Items Addressed This Visit        Endocrine    Type 2 diabetes mellitus, with long-term current use of insulin ([facility] ) - Primary    Relevant Orders: Will continue with current regimen and low carb diet  Recheck labs in 3 months  Follows with endo  Foot exam today  Eye exam done in 2020    POCT hemoglobin A1c    CBC and differential    Comprehensive metabolic panel    Lipid panel    TSH, 3rd generation with Free T4 reflex    HEMOGLOBIN A1C W/ EAG ESTIMATION    Microalbumin / creatinine urine ratio      Other Visit Diagnoses     Congestive heart failure, unspecified HF chronicity, unspecified heart failure type ([facility] )        Relevant Orders  Resolved with acute phase  Will need eval by cards and advised to set up a visit  No SOB or CP noted still has lower extremity edema although significantly improved since discharge  Ambulatory Referral to Home Health    Weakness of right lower extremity        Relevant Orders  Improved but still weak per wife  Will need to continue with PT  Advised to continue to use Walker and cane for safety      Ambulatory referral to Physical Therapy    Ambulatory Referral to Home Health        BMI Counseling: Body mass index is 39 33 kg/m²  The BMI is above normal  Nutrition recommendations include limiting drinks that contain sugar  Falls Plan of Care: Medications that increase falls were reviewed  Vitamin D supplementation was recommended  Subjective:      Patient ID: Kenzie Holbrook  is a 77 y o  male  Kenzie Holbrook  is a 77 y o  male patient who originally presented to the hospital on 3/11/2021 due to right lower extremity weakness  Imaging was benign for central cause  Patient was found to have bilateral lower extremity edema which was likely contributing to his weakness  He was diuresed with IV diuretics and came back to euvolemia    Patient was also assessed by Physical therapy and recommended home health physical therapy   did arrange for home health physical therapy prior to discharge  Patient was counseled on compliance with his medications and diet  It seems patient has been non compliant with medications and his sodium and carb restriction at home  This was emphasized to his wife as well  Today he is feeling:  New symptoms: none denies SOB CP     Leg weakness of right side:  Still there per patient but improved and will need to work with PT and cardiology to help with the fluid overload as it was deemed one of the reasons for his CHF  He was advised to call and schedule a follow up with cardiology for evaluation of CHF and medication management  Working with PT not yet and needs referral today to [facility]  Feels unsteady on feet? Yes but takes time when he moves to avoid falls and has a walker and cane to use  Chronic condition:  Stable including HTN and DM type 2   Sees Dr Pipo Currie for eye exam and was done in 2020  Takes insulin Humalog 32 30 32 with meals with sliding scale  Trujeo 74 units at nights   victoza 1 8 daily              The following portions of the patient's history were reviewed and updated as appropriate:   He  has a past medical history of Diabetes mellitus ([facility] ), Hyperlipidemia, Hypertension, Obesity, and Renal disorder    He   Patient Active Problem List    Diagnosis Date Noted    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    Traumatic injury of skin 01/21/2021    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[facility] ) 07/21/2020    Encounter for  medical examination (CDME) 06/03/2020    ERRONEOUS ENCOUNTER--DISREGARD 06/03/2020    Diabetic polyneuropathy associated with type 2 diabetes mellitus ([facility] )     Stenosis of left internal carotid artery 04/30/2020    Neuropathy 04/30/2020    Diabetes mellitus ([facility] ) 01/27/2020    Lymphadenopathy 10/22/2019    Vitamin D deficiency 10/21/2019    Obesity, unspecified 09/05/2019    Iron deficiency anemia due to chronic blood loss 07/09/2019    Other constipation 06/26/2019    S/P CABG (coronary artery bypass graft) 06/21/2019    Acute postoperative pulmonary insufficiency ([facility] ) 06/21/2019    Blood loss anemia 06/21/2019    Triple vessel coronary artery disease 06/17/2019    Hyperlipidemia 06/17/2019    Type 2 diabetes mellitus, with long-term current use of insulin ([place] [address] ) 06/15/2019    Sleep apnea 06/15/2019    Benign hypertension with chronic kidney disease, stage IV ([place] [address] ) 05/30/2019    Chronic kidney disease-mineral and bone disorder 05/30/2019    Esophageal dysphagia 02/14/2019    History of colonic polyps 02/14/2019    CKD (chronic kidney disease), stage IV ([facility]) 08/18/2017    Non-nephrotic range proteinuria 08/18/2017    Trigger finger of left hand 04/05/2017    Lumbar back pain 02/07/2012    Lumbar discogenic pain syndrome 02/07/2012    Pain of lower extremity 02/07/2012    Spondylolisthesis 02/07/2012    Spinal stenosis 02/07/2012     He  has a past surgical history that includes Gallbladder surgery; Back surgery; Hernia repair; pr cabg, artery-vein, four (N/A, 6/21/2019); Colonoscopy; Rectal surgery; and Coronary artery bypass graft (2019)  His family history includes Cancer in his father and mother; Diabetes in his maternal grandmother and paternal aunt  He  reports that he quit smoking about 31 years ago  He has a 90 00 pack-year smoking history  He has never used smokeless tobacco  He reports previous alcohol use  He reports that he does not use drugs    Current Outpatient Medications   Medication Sig Dispense Refill    Alpha-Lipoic Acid 600 MG CAPS TAKE 1 CAPSULE BY MOUTH EVERY DAY 30 capsule 5    Ascorbic Acid (VITAMIN C) 1000 MG tablet Take 1,000 mg by mouth daily      aspirin 325 mg tablet Take 1 tablet (325 mg total) by mouth daily 100 tablet 0    atorvastatin (LIPITOR) 80 mg tablet Take 1 tablet (80 mg total) by mouth daily with dinner 90 tablet 3    B-D ULTRAFINE III SHORT PEN 31G X 8 MM MISC INJECT INTO THE SKIN 4 (FOUR) TIMES A  each 1    cholecalciferol (VITAMIN D3) 1,000 units tablet Take 1 tablet (1,000 Units total) by mouth daily (Patient taking differently: Take 1,000 Units by mouth ) 1 tablet 1    Continuous Blood Gluc  (FREESTYLE JOHANNE READER) MAMI Use device to scan blood glucose at least 4 times a day 1 Device 0    Continuous Blood Gluc Sensor (FreeStyle Johanne 14 Day Sensor) MISC APPLY SENSOR EVERY 14 DAYS TO CHECK BLOOD GLUCOSE AT LEAST 4 TIMES A DAY 2 each 5    cyanocobalamin (VITAMIN B-12) 500 mcg tablet Take 500 mcg by mouth daily      docusate sodium (COLACE) 100 mg capsule Take 1 capsule (100 mg total) by mouth 2 (two) times a day 60 capsule 1    doxepin (SINEquan) 10 mg capsule Take 10 mg by mouth daily at bedtime      finasteride (PROSCAR) 5 mg tablet Take 1 tablet (5 mg total) by mouth daily 90 tablet 3    fluticasone (FLONASE) 50 mcg/act nasal spray 1 spray into each nostril 2 (two) times a day as needed for rhinitis 1 Bottle 0    gabapentin (NEURONTIN) 300 mg capsule Take 1 cap (300mg) by mouth in the morning and 1 cap (300mg) at noon, take 2 caps (600mg) at bedtime 28 capsule 0    glucose blood test strip Check 4 times a day 400 each 1    insulin glargine (Toujeo SoloStar) 300 units/mL CONCETRATED U-300 injection pen (1-unit dial) INJECT 74 UNITS UNDER THE SKIN DAILY AT BEDTIME 22 mL 3    insulin lispro (HumaLOG KwikPen) 100 units/mL injection pen INJECT 32-30-32 UNITS THREE TIMES A DAY PLUS SCALE MAXIMUM DOSE 130 UNITS 105 mL 3    latanoprost (XALATAN) 0 005 % ophthalmic solution 1 drop daily at bedtime      liraglutide (Victoza) injection Inject 0 3 mL (1 8 mg total) under the skin daily INJECT 1 8 MG DAILY INTO SKIN 3 pen 3    losartan (COZAAR) 50 mg tablet Take 1 tablet (50 mg total) by mouth daily after dinner 90 tablet 3    metoprolol tartrate (LOPRESSOR) 25 mg tablet Take 1 tablet (25 mg total) by mouth every 12 (twelve) hours 180 tablet 3    Microlet Lancets MISC USE 3 TIMES  each 1    omeprazole (PriLOSEC) 40 MG capsule Take 40 mg by mouth daily      tamsulosin (FLOMAX) 0 4 mg Take 2 capsules (0 8 mg total) by mouth daily with dinner 180 capsule 3    torsemide (DEMADEX) 20 mg tablet TAKE 1 TABLET DAILY 90 tablet 3    Doxepin HCl (SILENOR) 6 MG TABS Take 10 mg by mouth as needed        No current facility-administered medications for this visit        Current Outpatient Medications on File Prior to Visit   Medication Sig    Alpha-Lipoic Acid 600 MG CAPS TAKE 1 CAPSULE BY MOUTH EVERY DAY    Ascorbic Acid (VITAMIN C) 1000 MG tablet Take 1,000 mg by mouth daily    aspirin 325 mg tablet Take 1 tablet (325 mg total) by mouth daily    atorvastatin (LIPITOR) 80 mg tablet Take 1 tablet (80 mg total) by mouth daily with dinner    B-D ULTRAFINE III SHORT PEN 31G X 8 MM MISC INJECT INTO THE SKIN 4 (FOUR) TIMES A DAY    cholecalciferol (VITAMIN D3) 1,000 units tablet Take 1 tablet (1,000 Units total) by mouth daily (Patient taking differently: Take 1,000 Units by mouth )    Continuous Blood Gluc  (FREESTYLE JOHANNE READER) MAMI Use device to scan blood glucose at least 4 times a day    Continuous Blood Gluc Sensor (FreeStyle Johanne 14 Day Sensor) MISC APPLY SENSOR EVERY 14 DAYS TO CHECK BLOOD GLUCOSE AT LEAST 4 TIMES A DAY    cyanocobalamin (VITAMIN B-12) 500 mcg tablet Take 500 mcg by mouth daily    docusate sodium (COLACE) 100 mg capsule Take 1 capsule (100 mg total) by mouth 2 (two) times a day    doxepin (SINEquan) 10 mg capsule Take 10 mg by mouth daily at bedtime    finasteride (PROSCAR) 5 mg tablet Take 1 tablet (5 mg total) by mouth daily    fluticasone (FLONASE) 50 mcg/act nasal spray 1 spray into each nostril 2 (two) times a day as needed for rhinitis    gabapentin (NEURONTIN) 300 mg capsule Take 1 cap (300mg) by mouth in the morning and 1 cap (300mg) at noon, take 2 caps (600mg) at bedtime    glucose blood test strip Check 4 times a day    insulin glargine (Toujeo SoloStar) 300 units/mL CONCETRATED U-300 injection pen (1-unit dial) INJECT 74 UNITS UNDER THE SKIN DAILY AT BEDTIME    insulin lispro (HumaLOG KwikPen) 100 units/mL injection pen INJECT 32-30-32 UNITS THREE TIMES A DAY PLUS SCALE MAXIMUM DOSE 130 UNITS    latanoprost (XALATAN) 0 005 % ophthalmic solution 1 drop daily at bedtime    liraglutide (Victoza) injection Inject 0 3 mL (1 8 mg total) under the skin daily INJECT 1 8 MG DAILY INTO SKIN    losartan (COZAAR) 50 mg tablet Take 1 tablet (50 mg total) by mouth daily after dinner    metoprolol tartrate (LOPRESSOR) 25 mg tablet Take 1 tablet (25 mg total) by mouth every 12 (twelve) hours    Microlet Lancets MISC USE 3 TIMES DAY    omeprazole (PriLOSEC) 40 MG capsule Take 40 mg by mouth daily    tamsulosin (FLOMAX) 0 4 mg Take 2 capsules (0 8 mg total) by mouth daily with dinner    torsemide (DEMADEX) 20 mg tablet TAKE 1 TABLET DAILY    Doxepin HCl (SILENOR) 6 MG TABS Take 10 mg by mouth as needed      No current facility-administered medications on file prior to visit  He has No Known Allergies       Review of Systems   Constitutional: Negative for chills and fever  HENT: Negative for ear pain and sore throat  Eyes: Negative for pain and visual disturbance  Respiratory: Negative for cough and shortness of breath  Cardiovascular: Negative for chest pain and palpitations  Gastrointestinal: Negative for abdominal pain and vomiting  Genitourinary: Negative for dysuria and hematuria  Musculoskeletal: Negative for arthralgias and back pain  Skin: Negative for color change and rash  Neurological: Positive for weakness  Negative for seizures and syncope  All other systems reviewed and are negative          Objective:      /70 (BP Location: Right arm, Patient Position: Sitting, Cuff Size: Adult)   Pulse 76   Temp 98 2 °F (36 8 °C) (Temporal)   Resp 18   Ht 5' 11" (1 803 m)   Wt 128 kg (282 lb)   SpO2 96%   BMI 39 33 kg/m²          Physical Exam  Vitals signs and nursing note reviewed  Constitutional:       General: He is not in acute distress  Appearance: He is well-developed  He is not diaphoretic  HENT:      Head: Normocephalic and atraumatic  Eyes:      General: No scleral icterus  Right eye: No discharge  Left eye: No discharge  Neck:      Musculoskeletal: Normal range of motion and neck supple  Cardiovascular:      Rate and Rhythm: Normal rate and regular rhythm  Pulses: Pulses are weak  Dorsalis pedis pulses are 1+ on the right side and 1+ on the left side  Heart sounds: Normal heart sounds  No murmur  Pulmonary:      Effort: Pulmonary effort is normal  No respiratory distress  Breath sounds: Normal breath sounds  No wheezing  Musculoskeletal:      Right lower leg: Edema present  Left lower leg: Edema present  Comments: Limited ROM overall  Feet:      Right foot:      Skin integrity: Dry skin present  Left foot:      Skin integrity: Dry skin present  Lymphadenopathy:      Cervical: No cervical adenopathy  Skin:     General: Skin is warm and dry  Coloration: Skin is not pale  Findings: No erythema or rash  Neurological:      General: No focal deficit present  Mental Status: He is oriented to person, place, and time  Patient's shoes and socks removed  Right Foot/Ankle   Right Foot Inspection  Skin Exam: dry skin                            Sensory       Monofilament testing: intact  Vascular    The right DP pulse is 1+  Left Foot/Ankle  Left Foot Inspection  Skin Exam: dry skin                                         Sensory       Monofilament: intact  Vascular    The left DP pulse is 1+  Assign Risk Category:  No deformity present;  Loss of protective sensation; Weak pulses       Risk: 1       Lab Results Component Value Date    WBC 7 77 03/11/2021    HGB 11 0 (L) 03/11/2021    HCT 34 9 (L) 03/11/2021     03/11/2021    TRIG 115 11/11/2020    HDL 48 11/11/2020    ALT 28 03/11/2021    AST 18 03/11/2021    K 4 2 03/14/2021     03/14/2021    CREATININE 2 41 (H) 03/14/2021    BUN 55 (H) 03/14/2021    CO2 26 03/14/2021    PSA 0 2 03/05/2020    PSA 0 2 03/05/2020    INR 1 10 03/12/2021    GLUF 170 (H) 03/12/2021    HGBA1C 8 3 (A) 03/16/2021     Lab Results   Component Value Date    EUR0OGUUGZBK 4 627 (H) 03/12/2021     TCM Call (since 3/15/2021)     Date and time call was made  3/15/2021 11:29 AM    Hospital care reviewed  Records reviewed    Patient was hospitialized at  [address] [place] [address]        Date of Admission  03/11/21    Date of discharge  03/14/21    Diagnosis  weakness right leg     Disposition  Home    Current Symptoms  -- (Comment)  leg pain       TCM Call (since 3/15/2021)     Post hospital issues  None    Scheduled for follow up?   Yes    Did you obtain your prescribed medications  Yes    Do you need help managing your prescriptions or medications  No    Is transportation to your appointment needed  No    I have advised the patient to call PCP with any new or worsening symptoms  [facility] medical recepitionist     Living Arrangements  Alone    Support System  None    Are you recieving any outpatient services  Yes    What type of services  physical therapy     Are you recieving home care services  Yes    Types of home care services  Home PT    Counseling  Patient

## 2021-03-16 NOTE — PROGRESS NOTES
Introduced self to wife  Agrees to participate in [place] program  Provided my name and contact number  Reviewed heart failure zone tool and diabetes zone tool   Will mail copy to home as well as my business card  Pt lives with wife in ranch home with one step to enter from garage  Ambulates with use of walker and at times a cane  He was referred for home health services with [facility] [place] [facility]  Order was faxed to agency today by [facility]  Initial visit is scheduled for tomorrow  Pt is not a   He had an eye exam this year  Sees podiatry for routine foot care  Pt has a [employer]ia glucose meter and to check blood sugars four times/day  Wife candidly states pt is noncompliant with managing diabetes and complying with low sodium low carb diet  Has scale in home and he is aware to weigh himself daily  He is presently not driving due to right leg weakness  Wife is providing transportation  She also assists with managing medications, meal prep and care of home  Will continue to follow

## 2021-03-16 NOTE — ED PROVIDER NOTES
EMERGENCY MEDICINE NOTE        PATIENT IDENTIFICATION PHYSICIAN/SERVICE INFORMATION   Name: Kenzie Holbrook  MRN: [id number]  YOB: 1945  Age/Sex: 77 y o  male  Preferred Language: English  Code Status: Prior  Encounter Date: 3/11/2021  Attending Physician: Chuck Burleson MD  Admitting Physician: Yosvany King MD   Primary Care Physician: Petrona Dugan MD         Primary Care Phone: [phone] [address] [facility]     Chief Complaint   Patient presents with    Leg Pain     pt reprots r leg pain since monday no known injury         HISTORY OF PRESENT ILLNESS     Kenzie Holbrook  is a 77 y o  male who presents due to R Leg pain, Swelling  Pt reports dull aching pain, swelling and subsequent difficulty ambulating secondary to these factors ongoing over few days, now worse today prompting him to come to ED for evaluation  Pt reports chronic SOB, mildly worse orthopnea since onset of these symptoms  Pt significant PMH CAD, MI s/p CABG with graft from RLE, CKD, HTN, IDDM, Neuropathy  Denies cp, palpitations, n/v/d, HA, visual disturbances, speech difficulty, confusion, and no other complaints at this time          History provided by:  Patient and spouse   used: No    Leg Pain  Associated symptoms: fatigue    Associated symptoms: no back pain, no fever and no neck pain          PAST MEDICAL AND SURGICAL HISTORY     Past Medical History:   Diagnosis Date    Diabetes mellitus ([address] )     Hyperlipidemia     Hypertension     Obesity     Renal disorder        Past Surgical History:   Procedure Laterality Date    BACK SURGERY      COLONOSCOPY      CORONARY ARTERY BYPASS GRAFT  2019    quad    GALLBLADDER SURGERY      HERNIA REPAIR      CA CABG, ARTERY-VEIN, FOUR N/A 6/21/2019    Procedure: CORONARY ARTERY BYPASS GRAFT (CABG) 4 VESSELS WITH SVG TO PDA, OM, DIAGONAL AND LIMA TO LAD; RIGHT LEG EVH;  Surgeon: Elizabeth Mueller MD;  [facility]: [facility];  Service: Cardiac Surgery    RECTAL SURGERY         Family History   Problem Relation Age of Onset    Cancer Mother     Cancer Father     Diabetes Maternal Grandmother     Diabetes Paternal Aunt        E-Cigarette/Vaping    E-Cigarette Use Never User      E-Cigarette/Vaping Substances    Nicotine No     THC No     CBD No     Flavoring No     Other No     Unknown No      Social History     Tobacco Use    Smoking status: Former Smoker     Packs/day: 3 00     Years: 30 00     Pack years: 90 00     Quit date:      Years since quittin 2    Smokeless tobacco: Never Used    Tobacco comment: Quit    Substance Use Topics    Alcohol use: Not Currently     Comment: 1 drink every 6 months   Drug use: Never         ALLERGIES     No Known Allergies      HOME MEDICATIONS     Prior to Admission Medications   Prescriptions Last Dose Informant Patient Reported? Taking?    Alpha-Lipoic Acid 600 MG CAPS 3/11/2021 at Unknown time Spouse/Significant Other No Yes   Sig: TAKE 1 CAPSULE BY MOUTH EVERY DAY   Ascorbic Acid (VITAMIN C) 1000 MG tablet 3/11/2021 at Unknown time Spouse/Significant Other Yes Yes   Sig: Take 1,000 mg by mouth daily   B-D ULTRAFINE III SHORT PEN 31G X 8 MM MISC  Spouse/Significant Other No No   Sig: INJECT INTO THE SKIN 4 (FOUR) TIMES A DAY   Continuous Blood Gluc  (FREESTYLE JOHANNE READER) MAMI  Spouse/Significant Other No No   Sig: Use device to scan blood glucose at least 4 times a day   Continuous Blood Gluc Sensor (FreeStyle Johanne 14 Day Sensor) MISC  Spouse/Significant Other No No   Sig: APPLY SENSOR EVERY 14 DAYS TO CHECK BLOOD GLUCOSE AT LEAST 4 TIMES A DAY   Doxepin HCl (SILENOR) 6 MG TABS 3/10/2021 at Unknown time Spouse/Significant Other Yes Yes   Sig: Take 10 mg by mouth as needed    Microlet Lancets MISC  Spouse/Significant Other No No   Sig: USE 3 TIMES DAY   aspirin 325 mg tablet 3/11/2021 at Unknown time Spouse/Significant Other No Yes   Sig: Take 1 tablet (325 mg total) by mouth daily   atorvastatin (LIPITOR) 80 mg tablet 3/11/2021 at Unknown time Spouse/Significant Other No Yes   Sig: Take 1 tablet (80 mg total) by mouth daily with dinner   cholecalciferol (VITAMIN D3) 1,000 units tablet 3/11/2021 at Unknown time Spouse/Significant Other No Yes   Sig: Take 1 tablet (1,000 Units total) by mouth daily   Patient taking differently: Take 1,000 Units by mouth    cyanocobalamin (VITAMIN B-12) 500 mcg tablet 3/11/2021 at Unknown time Spouse/Significant Other Yes Yes   Sig: Take 500 mcg by mouth daily   docusate sodium (COLACE) 100 mg capsule  Spouse/Significant Other No No   Sig: Take 1 capsule (100 mg total) by mouth 2 (two) times a day   finasteride (PROSCAR) 5 mg tablet 3/11/2021 at Unknown time  No Yes   Sig: Take 1 tablet (5 mg total) by mouth daily   gabapentin (NEURONTIN) 300 mg capsule 3/11/2021 at Unknown time Spouse/Significant Other No Yes   Sig: Take 1 cap (300mg) by mouth in the morning and 1 cap (300mg) at noon, take 2 caps (600mg) at bedtime   glucose blood test strip  Spouse/Significant Other No No   Sig: Check 4 times a day   insulin glargine (Toujeo SoloStar) 300 units/mL CONCETRATED U-300 injection pen (1-unit dial) 3/10/2021 at Unknown time Spouse/Significant Other No Yes   Sig: INJECT 74 UNITS UNDER THE SKIN DAILY AT BEDTIME   insulin lispro (HumaLOG KwikPen) 100 units/mL injection pen  Spouse/Significant Other No No   Sig: INJECT 32-30-32 UNITS THREE TIMES A DAY PLUS SCALE MAXIMUM DOSE 130 UNITS   latanoprost (XALATAN) 0 005 % ophthalmic solution  Spouse/Significant Other Yes No   Si drop daily at bedtime   losartan (COZAAR) 50 mg tablet 3/11/2021 at Unknown time Spouse/Significant Other No Yes   Sig: Take 1 tablet (50 mg total) by mouth daily after dinner   metoprolol tartrate (LOPRESSOR) 25 mg tablet 3/11/2021 at Unknown time Spouse/Significant Other No Yes   Sig: Take 1 tablet (25 mg total) by mouth every 12 (twelve) hours   omeprazole (PriLOSEC) 40 MG capsule 3/11/2021 at Unknown time Spouse/Significant Other Yes Yes   Sig: Take 40 mg by mouth daily   tamsulosin (FLOMAX) 0 4 mg 3/11/2021 at Unknown time  No Yes   Sig: Take 2 capsules (0 8 mg total) by mouth daily with dinner   torsemide (DEMADEX) 20 mg tablet 3/11/2021 at Unknown time Spouse/Significant Other No Yes   Sig: TAKE 1 TABLET DAILY      Facility-Administered Medications: None         REVIEW OF SYSTEMS     Review of Systems   Constitutional: Positive for fatigue  Negative for activity change, appetite change, chills, diaphoresis and fever  HENT: Negative for ear pain, sore throat and trouble swallowing  Eyes: Negative for pain and visual disturbance  Respiratory: Positive for shortness of breath (chronic, mildly worse orthopnea)  Negative for apnea, cough, choking, chest tightness, wheezing and stridor  Cardiovascular: Positive for leg swelling  Negative for chest pain and palpitations  Gastrointestinal: Negative for abdominal distention, abdominal pain, constipation, diarrhea, nausea and vomiting  Genitourinary: Negative for decreased urine volume, difficulty urinating, dysuria, flank pain, frequency and hematuria  Musculoskeletal: Positive for arthralgias and gait problem  Negative for back pain and neck pain  Skin: Positive for rash  Negative for color change and wound  Neurological: Positive for numbness (chronic unchanged b/l lower extremities)  Negative for dizziness, seizures, syncope, weakness, light-headedness and headaches  Psychiatric/Behavioral: The patient is not nervous/anxious  All other systems reviewed and are negative          PHYSICAL EXAMINATION     ED Triage Vitals   Temperature Pulse Respirations Blood Pressure SpO2   03/11/21 2140 03/11/21 2140 03/11/21 2140 03/11/21 2140 03/11/21 2140   98 °F (36 7 °C) 86 18 138/70 94 %      Temp Source Heart Rate Source Patient Position - Orthostatic VS BP Location FiO2 (%)   03/11/21 2140 03/12/21 0000 03/11/21 2140 03/11/21 2140 --   Oral Monitor Sitting Right arm       Pain Score       03/11/21 2140       No Pain         Wt Readings from Last 3 Encounters:   03/14/21 127 kg (279 lb 9 6 oz)   02/18/21 128 kg (283 lb)   01/27/21 127 kg (280 lb)     Visual Acuity      Most Recent Value   L Pupil Size (mm)  3   R Pupil Size (mm)  3   L Pupil Shape  Round   R Pupil Shape  Round          Physical Exam  Vitals signs and nursing note reviewed  Constitutional:       General: He is not in acute distress  Appearance: He is well-developed  He is not ill-appearing, toxic-appearing or diaphoretic  HENT:      Head: Normocephalic and atraumatic  Mouth/Throat:      Mouth: Mucous membranes are moist    Eyes:      Conjunctiva/sclera: Conjunctivae normal       Pupils: Pupils are equal, round, and reactive to light  Neck:      Musculoskeletal: Normal range of motion and neck supple  Cardiovascular:      Rate and Rhythm: Normal rate  Rhythm irregular  Extrasystoles are present  Heart sounds: No friction rub  No gallop  Pulmonary:      Effort: Pulmonary effort is normal  No respiratory distress  Breath sounds: Decreased breath sounds (mild bases) present  Abdominal:      General: Bowel sounds are normal  There is no distension  Palpations: Abdomen is soft  Abdomen is not rigid  There is no mass  Tenderness: There is no abdominal tenderness  There is no guarding or rebound  Negative signs include Oleary's sign and McBurney's sign  Hernia: No hernia is present  Comments: Negative Oleary's  Negative Appendiceal signs (Psoas, Rovsing's, Obturator)  Negative Peritoneal Signs   Musculoskeletal:         General: Swelling present  Right knee: He exhibits decreased range of motion  He exhibits no effusion, no ecchymosis, no deformity, no laceration, normal alignment, no LCL laxity, normal patellar mobility and no bony tenderness  No tenderness found   No medial joint line and no lateral joint line tenderness noted  Left knee: Normal  He exhibits normal range of motion  Right ankle: He exhibits swelling  He exhibits normal range of motion, no ecchymosis, no deformity, no laceration and normal pulse  No tenderness  Achilles tendon normal       Left ankle: He exhibits swelling  He exhibits normal range of motion, no ecchymosis, no deformity, no laceration and normal pulse  No tenderness  No lateral malleolus and no medial malleolus tenderness found  Achilles tendon normal       Right lower leg: Edema (3+) present  Left lower leg: Edema (2+) present  Skin:     General: Skin is warm and dry  Capillary Refill: Capillary refill takes less than 2 seconds  Comments: Bilateral lower extremity venous stasis changes  Neurological:      Mental Status: He is alert and oriented to person, place, and time             DIAGNOSTIC RESULTS     Laboratory results:    Labs Reviewed   CBC AND DIFFERENTIAL - Abnormal       Result Value Ref Range Status    WBC 7 77  4 31 - 10 16 Thousand/uL Final    RBC 3 72 (*) 3 88 - 5 62 Million/uL Final    Hemoglobin 11 0 (*) 12 0 - 17 0 g/dL Final    Hematocrit 34 9 (*) 36 5 - 49 3 % Final    MCV 94  82 - 98 fL Final    MCH 29 6  26 8 - 34 3 pg Final    MCHC 31 5  31 4 - 37 4 g/dL Final    RDW 14 4  11 6 - 15 1 % Final    MPV 11 4  8 9 - 12 7 fL Final    Platelets 257  457 - 390 Thousands/uL Final    nRBC 0  /100 WBCs Final    Neutrophils Relative 70  43 - 75 % Final    Immat GRANS % 2  0 - 2 % Final    Lymphocytes Relative 14  14 - 44 % Final    Monocytes Relative 8  4 - 12 % Final    Eosinophils Relative 5  0 - 6 % Final    Basophils Relative 1  0 - 1 % Final    Neutrophils Absolute 5 46  1 85 - 7 62 Thousands/µL Final    Immature Grans Absolute 0 15  0 00 - 0 20 Thousand/uL Final    Lymphocytes Absolute 1 08  0 60 - 4 47 Thousands/µL Final    Monocytes Absolute 0 63  0 17 - 1 22 Thousand/µL Final    Eosinophils Absolute 0 41  0 00 - 0 61 Thousand/µL Final    Basophils Absolute 0 04  0 00 - 0 10 Thousands/µL Final   COMPREHENSIVE METABOLIC PANEL - Abnormal    Sodium 143  136 - 145 mmol/L Final    Potassium 3 8  3 5 - 5 3 mmol/L Final    Chloride 108  100 - 108 mmol/L Final    CO2 29  21 - 32 mmol/L Final    ANION GAP 6  4 - 13 mmol/L Final    BUN 51 (*) 5 - 25 mg/dL Final    Creatinine 2 35 (*) 0 60 - 1 30 mg/dL Final    Comment: Standardized to IDMS reference method    Glucose 118  65 - 140 mg/dL Final    Comment: If the patient is fasting, the ADA then defines impaired fasting glucose as > 100 mg/dL and diabetes as > or equal to 123 mg/dL  Specimen collection should occur prior to Sulfasalazine administration due to the potential for falsely depressed results  Specimen collection should occur prior to Sulfapyridine administration due to the potential for falsely elevated results  Calcium 9 0  8 3 - 10 1 mg/dL Final    Corrected Calcium 9 6  8 3 - 10 1 mg/dL Final    AST 18  5 - 45 U/L Final    Comment: Specimen collection should occur prior to Sulfasalazine administration due to the potential for falsely depressed results  ALT 28  12 - 78 U/L Final    Comment: Specimen collection should occur prior to Sulfasalazine administration due to the potential for falsely depressed results  Alkaline Phosphatase 95  46 - 116 U/L Final    Total Protein 6 7  6 4 - 8 2 g/dL Final    Albumin 3 2 (*) 3 5 - 5 0 g/dL Final    Total Bilirubin 0 45  0 20 - 1 00 mg/dL Final    Comment: Use of this assay is not recommended for patients undergoing treatment with eltrombopag due to the potential for falsely elevated results      eGFR 26  ml/min/1 73sq m Final    Narrative:     [place] guidelines for Chronic Kidney Disease (CKD):     Stage 1 with normal or high GFR (GFR > 90 mL/min/1 73 square meters)    Stage 2 Mild CKD (GFR = 60-89 mL/min/1 73 square meters)    Stage 3A Moderate CKD (GFR = 45-59 mL/min/1 73 square meters)    Stage 3B Moderate CKD (GFR = 30-44 mL/min/1 73 square meters)    Stage 4 Severe CKD (GFR = 15-29 mL/min/1 73 square meters)    Stage 5 End Stage CKD (GFR <15 mL/min/1 73 square meters)  Note: GFR calculation is accurate only with a steady state creatinine   NT-BNP PRO (BRAIN NATRIURETIC PEPTIDE) - Abnormal    NT-proBNP 1,451 (*) <125 pg/mL Final   CK - Abnormal    Total  (*) 39 - 308 U/L Final   CKMB - Abnormal    CK-MB Index 1 8  0 0 - 2 5 % Final    CK-MB 6 9 (*) 0 0 - 5 0 ng/mL Final   TSH, 3RD GENERATION - Abnormal    TSH 3RD GENERATON 4 627 (*) 0 358 - 3 740 uIU/mL Final    Narrative:     Patients undergoing fluorescein dye angiography may retain small amounts of fluorescein in the body for 48-72 hours post procedure  Samples containing fluorescein can produce falsely depressed TSH values  If the patient had this procedure,a specimen should be resubmitted post fluorescein clearance  BASIC METABOLIC PANEL - Abnormal    Sodium 142  136 - 145 mmol/L Final    Potassium 3 9  3 5 - 5 3 mmol/L Final    Chloride 107  100 - 108 mmol/L Final    CO2 29  21 - 32 mmol/L Final    ANION GAP 6  4 - 13 mmol/L Final    BUN 51 (*) 5 - 25 mg/dL Final    Creatinine 2 28 (*) 0 60 - 1 30 mg/dL Final    Comment: Standardized to IDMS reference method    Glucose 170 (*) 65 - 140 mg/dL Final    Comment: If the patient is fasting, the ADA then defines impaired fasting glucose as > 100 mg/dL and diabetes as > or equal to 123 mg/dL  Specimen collection should occur prior to Sulfasalazine administration due to the potential for falsely depressed results  Specimen collection should occur prior to Sulfapyridine administration due to the potential for falsely elevated results  Glucose, Fasting 170 (*) 65 - 99 mg/dL Final    Comment: Specimen collection should occur prior to Sulfasalazine administration due to the potential for falsely depressed results   Specimen collection should occur prior to Sulfapyridine administration due to the potential for falsely elevated results  Calcium 9 2  8 3 - 10 1 mg/dL Final    eGFR 27  ml/min/1 73sq m Final    Narrative:     [place] guidelines for Chronic Kidney Disease (CKD):     Stage 1 with normal or high GFR (GFR > 90 mL/min/1 73 square meters)    Stage 2 Mild CKD (GFR = 60-89 mL/min/1 73 square meters)    Stage 3A Moderate CKD (GFR = 45-59 mL/min/1 73 square meters)    Stage 3B Moderate CKD (GFR = 30-44 mL/min/1 73 square meters)    Stage 4 Severe CKD (GFR = 15-29 mL/min/1 73 square meters)    Stage 5 End Stage CKD (GFR <15 mL/min/1 73 square meters)  Note: GFR calculation is accurate only with a steady state creatinine   D-DIMER, QUANTITATIVE - Abnormal    D-Dimer, Quant 2 35 (*) <0 50 ug/ml FEU Final    Comment: Reference and upper limits to exclude DVT and PE are the same  Do not use to exclude if clinical symptoms are present  APTT - Abnormal     (*) 23 - 37 seconds Final   POCT GLUCOSE - Abnormal    POC Glucose 167 (*) 65 - 140 mg/dl Final   COVID19, INFLUENZA A/B, RSV PCR, [facility] - Normal    SARS-CoV-2 Negative  Negative Final    Comment:      INFLUENZA A PCR Negative  Negative Final    Comment:      INFLUENZA B PCR Negative  Negative Final    Comment:      RSV PCR Negative  Negative Final    Comment:      Narrative: This test has been authorized by FDA under an EUA (Emergency Use Assay) for use by authorized laboratories  Clinical caution and judgement should be used with the interpretation of these results with consideration of the clinical impression and other laboratory testing  Testing reported as "Positive" or "Negative" has been proven to be accurate according to standard laboratory validation requirements  All testing is performed with control materials showing appropriate reactivity at standard intervals     PROTIME-INR - Normal    Protime 14 4  11 6 - 14 5 seconds Final    INR 1 10  0 84 - 1 19 Final   APTT - Normal    PTT 26  23 - 37 seconds Final    Comment: Therapeutic Heparin Range =  60-90 seconds   APTT - Normal    PTT 25  23 - 37 seconds Final    Comment: Therapeutic Heparin Range =  60-90 seconds       All labs reviewed and utilized in the medical decision making process    Radiology results:    VAS lower limb venous duplex study, unilateral/limited   Final Result      CT head wo contrast   Final Result   No acute intracranial abnormality  Microangiopathic changes  Workstation performed: [id number]      CT spine lumbar wo contrast   Final Result   Status post lumbar spinal fusion without evidence for hardware complication  Streak artifact limits evaluation the spinal canal at the level of the orthopedic hardware  Multifactorial disease results in at least mild to moderate canal stenosis at the L2-L3 transitional level  Moderate bilateral foraminal narrowing is present at    this level  Workstation performed: [id number]      XR chest 1 view portable   Final Result   Mild vascular congestion   Mild cardiomegaly status post CABG   Workstation performed: [id number]          All radiology studies independently viewed by me and interpreted by the radiologist       PROCEDURES     ECG 12 Lead Documentation Only    Date/Time: 3/13/2021 12:08 AM  Performed by: Maritza Dutta PA-C  Authorized by:  Maritza Dutta PA-C     Indications / Diagnosis:  LE edema  ECG reviewed by me, the ED Provider: yes    Patient location:  ED  Previous ECG:     Previous ECG:  Compared to current    Comparison ECG info:  New pvcs    Similarity:  Changes noted    Comparison to cardiac monitor: Yes    Interpretation:     Interpretation: abnormal    Rate:     ECG rate assessment: normal    Rhythm:     Rhythm: sinus rhythm    Ectopy:     Ectopy: PVCs    QRS:     QRS axis:  Normal    QRS intervals:  Normal  Conduction:     Conduction: normal    ST segments:     ST segments:  Non-specific  T waves:     T waves: non-specific            Invasive Devices     None                 ASSESSMENT AND PLAN     MDM  Number of Diagnoses or Management Options  CHF (congestive heart failure) ([facility] [address] ): established, worsening  Peripheral edema: new, needed workup  Right leg pain: new, needed workup     Amount and/or Complexity of Data Reviewed  Clinical lab tests: ordered and reviewed  Tests in the radiology section of CPT®: ordered and reviewed  Tests in the medicine section of CPT®: reviewed and ordered  Obtain history from someone other than the patient: yes  Review and summarize past medical records: yes  Discuss the patient with other providers: yes  Independent visualization of images, tracings, or specimens: yes    Risk of Complications, Morbidity, and/or Mortality  Presenting problems: high  Diagnostic procedures: high  Management options: high    Patient Progress  Patient progress: improved      Initial ED assessment:  Yobani Mathis  is a 77 y o  male with significant PMH for CAD, MI s/p CABG with graft from RLE, CKD, HTN, IDDM, Neuropathy who presents with LE edema, leg pain, difficulty ambulating  Vitals signs reviewed and WNL  Physical examination is remarkable for bilateral pitting Edema R>L, venous stasis changes, decreased ROM RLE secondary to pain    Initial Ddx  includes but is not limited to:   metabolic abnormality, renal failure, thyroid disease, CHF, doubt DVT  Initial ED plan:   Plan will be to perform diagnostic testing of Blood labs, EKG and US RLE r/o DVT and treat symptomatically   Final ED summary/disposition: ADMIT MEDICINE: Discussed course of care with Patient and Family with Permission who is agreeable to admission for further eval, treatment  Called inpatient   Resident Hospitalist who is aware of the patient, case discussed including HPI, pertinent PMH, ED Course, and workup   Hospitalist agreed with plan and will accept for admission/observation under the medicine service    MDM  Reviewed: previous chart, nursing note and vitals  Reviewed previous: labs and ECG  Interpretation: labs and ECG          ED COURSE OF CARE AND REASSESSMENT          US AUDIT      Most Recent Value   Initial Alcohol Screen: US AUDIT-C    1  How often do you have a drink containing alcohol?  0 Filed at: 03/12/2021 0005   2  How many drinks containing alcohol do you have on a typical day you are drinking? 0 Filed at: 03/12/2021 0005   3a  Male UNDER 65: How often do you have five or more drinks on one occasion? 0 Filed at: 03/12/2021 0005   3b  FEMALE Any Age, or MALE 65+: How often do you have 4 or more drinks on one occassion? 0 Filed at: 03/12/2021 0005   Audit-C Score  0 Filed at: 03/12/2021 0005                  OSMAR/DAST-10      Most Recent Value   OSMAR: How many times in the past year have you    Used an illegal drug or used a prescription medication for non-medical reasons?   Never Filed at: 03/12/2021 0005                          Medications   furosemide (LASIX) injection 40 mg (40 mg Intravenous Given 3/12/21 0049)   heparin (porcine) injection 10,000 Units (10,000 Units Intravenous Given 3/12/21 0108)   furosemide (LASIX) injection 40 mg (40 mg Intravenous Given 3/12/21 0858)   furosemide (LASIX) injection 40 mg (40 mg Intravenous Given 3/12/21 1337)         FINAL IMPRESSION     Final diagnoses:   CHF (congestive heart failure) (HCC)   Peripheral edema   Right leg pain         DISPOSITION AND PLANNING     Time reflects when diagnosis was documented in both MDM as applicable and the Disposition within this note     Time User Action Codes Description Comment    3/12/2021 12:52 AM Dorene Curt Add [I50 9] CHF (congestive heart failure) ([facility] )     3/12/2021 12:52 AM Dorene Curt Add [R60 9] Peripheral edema     3/12/2021 12:52 AM Dorene Curt Add [M79 604] Right leg pain     3/14/2021 10:35 AM Shakila Fish Add [E11 22,  N18 4,  Z79 4] Type 2 diabetes mellitus with stage 4 chronic kidney disease, with long-term current use of insulin ([facility] )     3/14/2021 10:35 AM Shakila Fish Add [R60 0] Bilateral leg edema     3/14/2021 10:35 AM Earlis Rotunda Add [G47 30] Sleep apnea, unspecified type     3/14/2021 10:35 AM Earlis Rotunda Add [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[address] )     3/14/2021 10:35 AM Earlis Rotunda Add [I50 33] Acute on chronic diastolic congestive heart failure [facility])       ED Disposition     ED Disposition Condition Date/Time Comment    Admit Stable Fri Mar 12, 2021 12:53 AM Case was discussed with Resident and the patient's admission status was agreed to be Admission Status: observation status to the service of Dr Margi Hendricks           Follow-up Information     Follow up With Specialties Details Why Contact Info    Monica Foster MD Family Medicine Schedule an appointment as soon as possible for a visit in 1 week(s)  [address]      Yessenia Rudolph MD Endocrinology   [address] [facility] [place] [address] [facility]  Follow up  [address]  [address] [facility], [address] [facility]  [phone]    Schedule an appointment as soon as possible for a visit in 1 week(s)      Yessenia Rudolph MD  [address] [facility] [address] [facility] [place] [id number] [phone]  Follow up          [address]     Discharge Medication List as of 3/14/2021 12:11 PM      START taking these medications    Details   fluticasone (FLONASE) 50 mcg/act nasal spray 1 spray into each nostril 2 (two) times a day as needed for rhinitis, Starting Sun 3/14/2021, Normal         CONTINUE these medications which have NOT CHANGED    Details   Alpha-Lipoic Acid 600 MG CAPS TAKE 1 CAPSULE BY MOUTH EVERY DAY, Normal      Ascorbic Acid (VITAMIN C) 1000 MG tablet Take 1,000 mg by mouth daily, Historical Med      aspirin 325 mg tablet Take 1 tablet (325 mg total) by mouth daily, Starting Wed 6/26/2019, Normal      atorvastatin (LIPITOR) 80 mg tablet Take 1 tablet (80 mg total) by mouth daily with dinner, Starting Tue 3/24/2020, Normal      cholecalciferol (VITAMIN D3) 1,000 units tablet Take 1 tablet (1,000 Units total) by mouth daily, Starting Mon 10/21/2019, No Print      cyanocobalamin (VITAMIN B-12) 500 mcg tablet Take 500 mcg by mouth daily, Historical Med      Doxepin HCl (SILENOR) 6 MG TABS Take 10 mg by mouth as needed , Historical Med      finasteride (PROSCAR) 5 mg tablet Take 1 tablet (5 mg total) by mouth daily, Starting Thu 2/18/2021, Normal      gabapentin (NEURONTIN) 300 mg capsule Take 1 cap (300mg) by mouth in the morning and 1 cap (300mg) at noon, take 2 caps (600mg) at bedtime, Normal      insulin glargine (Toujeo SoloStar) 300 units/mL CONCETRATED U-300 injection pen (1-unit dial) INJECT 74 UNITS UNDER THE SKIN DAILY AT BEDTIME, No Print      losartan (COZAAR) 50 mg tablet Take 1 tablet (50 mg total) by mouth daily after dinner, Starting Wed 12/23/2020, Normal      metoprolol tartrate (LOPRESSOR) 25 mg tablet Take 1 tablet (25 mg total) by mouth every 12 (twelve) hours, Starting Tue 3/24/2020, Normal      omeprazole (PriLOSEC) 40 MG capsule Take 40 mg by mouth daily, Historical Med      tamsulosin (FLOMAX) 0 4 mg Take 2 capsules (0 8 mg total) by mouth daily with dinner, Starting Thu 2/18/2021, Normal      torsemide (DEMADEX) 20 mg tablet TAKE 1 TABLET DAILY, Normal      liraglutide (Victoza) injection INJECT 1 8 MG DAILY INTO SKIN, Normal      B-D ULTRAFINE III SHORT PEN 31G X 8 MM MISC INJECT INTO THE SKIN 4 (FOUR) TIMES A DAY, Starting Thu 12/17/2020, Normal      Continuous Blood Gluc  (FREESTYLE JOHANNE READER) MAMI Use device to scan blood glucose at least 4 times a day, Normal      Continuous Blood Gluc Sensor (FreeStyle Johanne 14 Day Sensor) MISC APPLY SENSOR EVERY 14 DAYS TO CHECK BLOOD GLUCOSE AT LEAST 4 TIMES A DAY, Normal docusate sodium (COLACE) 100 mg capsule Take 1 capsule (100 mg total) by mouth 2 (two) times a day, Starting Wed 6/26/2019, Normal      glucose blood test strip Check 4 times a day, Normal      insulin lispro (HumaLOG KwikPen) 100 units/mL injection pen INJECT 32-30-32 UNITS THREE TIMES A DAY PLUS SCALE MAXIMUM DOSE 130 UNITS, No Print      latanoprost (XALATAN) 0 005 % ophthalmic solution 1 drop daily at bedtime, Historical Med      Microlet Lancets MISC USE 3 TIMES DAY, Normal             Outpatient Discharge Orders   Ambulatory Referral to Home Health   Standing Status: Future Standing Exp   Date: 03/14/22      Discharge Diet     Activity as tolerated       PDMP Review     None          KG Hernandez, [place]  03/16/21 2861

## 2021-03-17 ENCOUNTER — OFFICE VISIT (OUTPATIENT)
Dept: NEPHROLOGY | Facility: CLINIC | Age: 76
End: 2021-03-17
Payer: MEDICARE

## 2021-03-17 VITALS
DIASTOLIC BLOOD PRESSURE: 72 MMHG | BODY MASS INDEX: 39.2 KG/M2 | HEIGHT: 71 IN | SYSTOLIC BLOOD PRESSURE: 114 MMHG | WEIGHT: 280 LBS

## 2021-03-17 DIAGNOSIS — E87.70 HYPERVOLEMIA ASSOCIATED WITH RENAL INSUFFICIENCY: ICD-10-CM

## 2021-03-17 DIAGNOSIS — R80.9 NON-NEPHROTIC RANGE PROTEINURIA: ICD-10-CM

## 2021-03-17 DIAGNOSIS — N28.9 HYPERVOLEMIA ASSOCIATED WITH RENAL INSUFFICIENCY: ICD-10-CM

## 2021-03-17 DIAGNOSIS — M89.9 CHRONIC KIDNEY DISEASE-MINERAL AND BONE DISORDER: ICD-10-CM

## 2021-03-17 DIAGNOSIS — E83.9 CHRONIC KIDNEY DISEASE-MINERAL AND BONE DISORDER: ICD-10-CM

## 2021-03-17 DIAGNOSIS — I12.9 BENIGN HYPERTENSION WITH CHRONIC KIDNEY DISEASE, STAGE IV (HCC): ICD-10-CM

## 2021-03-17 DIAGNOSIS — N18.4 CKD (CHRONIC KIDNEY DISEASE), STAGE IV (HCC): Primary | ICD-10-CM

## 2021-03-17 DIAGNOSIS — N18.4 BENIGN HYPERTENSION WITH CHRONIC KIDNEY DISEASE, STAGE IV (HCC): ICD-10-CM

## 2021-03-17 DIAGNOSIS — N18.9 CHRONIC KIDNEY DISEASE-MINERAL AND BONE DISORDER: ICD-10-CM

## 2021-03-17 PROBLEM — D50.0 BLOOD LOSS ANEMIA: Status: RESOLVED | Noted: 2019-06-21 | Resolved: 2021-03-17

## 2021-03-17 PROCEDURE — 1124F ACP DISCUSS-NO DSCNMKR DOCD: CPT | Performed by: INTERNAL MEDICINE

## 2021-03-17 PROCEDURE — 99214 OFFICE O/P EST MOD 30 MIN: CPT | Performed by: INTERNAL MEDICINE

## 2021-03-17 NOTE — LETTER
March 17, 2021     Jake Lambert, [address]  [address] [facility]    Patient: Adam Sethi  YOB: 1945   Date of Visit: 3/17/2021       Dear Dr Fatou Al: Thank you for referring Peejp Hanson to me for evaluation  Below are my notes for this consultation  If you have questions, please do not hesitate to call me  I look forward to following your patient along with you  Sincerely,        Marcelina Stanford MD        CC: No Recipients  Marcelina Stanford MD  3/17/2021  5:05 PM  Sign when Signing Visit  NEPHROLOGY OFFICE PROGRESS NOTE   Adam Sethi  77 y o  male MRN: [id number]  DATE: 03/17/21  Reason for visit: Continued evaluation of CKD    ASSESSMENT & PLAN:  1  Chronic kidney disease, stage IV  · Johan's baseline creatinine is around 2 1 to 2 3    · CKD etiology is diabetic nephropathy + sequelae from post op ATN from CABG  · His renal function in the hospital was slightly worse - SCr around 2 3 to 2 4  This may be due to progression of disease  · He is now considered stage IV CKD - will refer to CKD education  3  Hypertension  · BP is at goal    · Current regimen: Losartan 50 mg daily, Torsemide 40 mg daily and Metoprolol 25 mg BID  · Continue higher dose of Torsemide 40 mg daily  4  Bilateral leg edema  · Due to non compliance with low salt diet  · Continue higher dose of Torsemide 40 mg daily  · We discussed again the importance of a low salt diet  5  Mineral bone disease  · PTH is at goal - 63 5 in Aug 2020  · Ca and phos are at goal    · Vitamin D level is at goal - 32 9 on Aug 2020  Continue Vitamin D3 1000 units daily  5  Proteinuria:  · UPC is improved - was 2 36 and now down to 0 96 with higher dose of Losartan  · Continue Losartan 50 mg daily  6  Obesity, weight gain:  · Continues to be non compliant and has gained another 14 lbs since last visit  · He has gained over 40 lbs over the past year since surgery     · We discussed the importance of weight loss in terms of helping his BP and his diabetes control  · We discussed the need for diet and exercise - he said he is going to work on it  7  Anemia: Hgb stable at 11 0 on march 2021      8  Diabetes mellitus:   · Glycemic control is above goal    · Diabetic management is deferred to endocrinology or PCP  · Recommendations include targeting a HbA1c in the range of <6 5% to <8%  HbA1c goals are tailored for each patient at the discretion of endocrinology or PCP  9  Hyperlipidemia:  · Lipid management is deferred to PCP  10  CKD related parameters:  · Anemia: Hgb is at goal   · Acid-base: CO2 is at goal (> 22)  · Proteinuria: UPC ratio is at goal (< 1 0)  Patient Instructions   Stay on Torsemide 40 mg daily  Continue checking weights and BP at home  Check BMP in 1 week  Follow up in 6 weeks  SUBJECTIVE / INTERVAL HISTORY:  Britt Wheeler was last seen in the office by me in December 2020  He was [facility] to [facility] from 3/11/21 to 3/14/21 with right leg pain  He was found to be fluid overloaded and was afforded IV diuresis  He was discharged on Torsemide 20 mg daily (same dose as prior to admission)  His wife called me yesterday concerned about ongoing edema and we arranged for him to be seen urgently today  His weights have been 278 to 279 lbs prior to admission  They remained at 279 lbs after admission  I informed his wife, Gale Bolaños, to increase Torsemide to 40 mg daily yesterday and his weight is down to 275 lbs today  He denies any CP or SOB  PMH/PSH: HTN, DM, HLP, CKD, CAD s/p CABG, HARJIT, BPH, obesity, DDD s/p fusion surgery, cholecystectomy, hernia repair, tonsillectomy, urinary retention    Previous work up:   6/3/19 Renal US: R 10 6 cm, L 11 cm,  cc    ALLERGIES: No Known Allergies    REVIEW OF SYSTEMS:  Review of Systems   Constitutional: Negative for appetite change, chills, fatigue and fever     Respiratory: Negative for cough and shortness of breath  Cardiovascular: Positive for leg swelling  Negative for chest pain  Gastrointestinal: Negative for diarrhea, nausea and vomiting  Genitourinary: Negative for hematuria  Musculoskeletal: Negative for arthralgias  Neurological: Negative for dizziness and light-headedness  OBJECTIVE:  /72   Ht 5' 11" (1 803 m)   Wt 127 kg (280 lb)   BMI 39 05 kg/m²   Current Weight:   Body mass index is 39 05 kg/m²  Physical Exam  Constitutional:       General: He is not in acute distress  Appearance: Normal appearance  He is well-developed  He is obese  HENT:      Head: Normocephalic and atraumatic  Eyes:      General: No scleral icterus  Conjunctiva/sclera: Conjunctivae normal    Neck:      Musculoskeletal: Neck supple  Vascular: No JVD  Cardiovascular:      Rate and Rhythm: Normal rate and regular rhythm  Heart sounds: Normal heart sounds  Pulmonary:      Effort: Pulmonary effort is normal  No respiratory distress  Comments: Decreased breath sounds  Abdominal:      General: Bowel sounds are normal       Palpations: Abdomen is soft  Musculoskeletal:      Right lower leg: Edema (mild) present  Left lower leg: Edema (mild) present  Skin:     General: Skin is warm and dry  Neurological:      Mental Status: He is alert and oriented to person, place, and time     Psychiatric:         Behavior: Behavior normal      Medications:  Current Outpatient Medications:     Alpha-Lipoic Acid 600 MG CAPS, TAKE 1 CAPSULE BY MOUTH EVERY DAY, Disp: 30 capsule, Rfl: 5    Ascorbic Acid (VITAMIN C) 1000 MG tablet, Take 1,000 mg by mouth daily, Disp: , Rfl:     aspirin 325 mg tablet, Take 1 tablet (325 mg total) by mouth daily, Disp: 100 tablet, Rfl: 0    atorvastatin (LIPITOR) 80 mg tablet, Take 1 tablet (80 mg total) by mouth daily with dinner, Disp: 90 tablet, Rfl: 3    B-D ULTRAFINE III SHORT PEN 31G X 8 MM MISC, INJECT INTO THE SKIN 4 (FOUR) TIMES A DAY, Disp: 400 each, Rfl: 1    cholecalciferol (VITAMIN D3) 1,000 units tablet, Take 1 tablet (1,000 Units total) by mouth daily (Patient taking differently: Take 1,000 Units by mouth ), Disp: 1 tablet, Rfl: 1    Continuous Blood Gluc  (FREESTYLE JOHANNE READER) MAMI, Use device to scan blood glucose at least 4 times a day, Disp: 1 Device, Rfl: 0    Continuous Blood Gluc Sensor (FreeStyle Johanne 14 Day Sensor) [facility], APPLY SENSOR EVERY 14 DAYS TO CHECK BLOOD GLUCOSE AT LEAST 4 TIMES A DAY, Disp: 2 each, Rfl: 5    cyanocobalamin (VITAMIN B-12) 500 mcg tablet, Take 500 mcg by mouth daily, Disp: , Rfl:     docusate sodium (COLACE) 100 mg capsule, Take 1 capsule (100 mg total) by mouth 2 (two) times a day, Disp: 60 capsule, Rfl: 1    doxepin (SINEquan) 10 mg capsule, Take 10 mg by mouth daily at bedtime, Disp: , Rfl:     finasteride (PROSCAR) 5 mg tablet, Take 1 tablet (5 mg total) by mouth daily, Disp: 90 tablet, Rfl: 3    fluticasone (FLONASE) 50 mcg/act nasal spray, 1 spray into each nostril 2 (two) times a day as needed for rhinitis, Disp: 1 Bottle, Rfl: 0    gabapentin (NEURONTIN) 300 mg capsule, Take 1 cap (300mg) by mouth in the morning and 1 cap (300mg) at noon, take 2 caps (600mg) at bedtime, Disp: 28 capsule, Rfl: 0    glucose blood test strip, Check 4 times a day, Disp: 400 each, Rfl: 1    insulin glargine (Toujeo SoloStar) 300 units/mL CONCETRATED U-300 injection pen (1-unit dial), INJECT 74 UNITS UNDER THE SKIN DAILY AT BEDTIME, Disp: 22 mL, Rfl: 3    insulin lispro (HumaLOG KwikPen) 100 units/mL injection pen, INJECT 32-30-32 UNITS THREE TIMES A DAY PLUS SCALE MAXIMUM DOSE 130 UNITS, Disp: 105 mL, Rfl: 3    latanoprost (XALATAN) 0 005 % ophthalmic solution, 1 drop daily at bedtime, Disp: , Rfl:     liraglutide (Victoza) injection, Inject 0 3 mL (1 8 mg total) under the skin daily INJECT 1 8 MG DAILY INTO SKIN, Disp: 3 pen, Rfl: 3    losartan (COZAAR) 50 mg tablet, Take 1 tablet (50 mg total) by mouth daily after dinner, Disp: 90 tablet, Rfl: 3    metoprolol tartrate (LOPRESSOR) 25 mg tablet, Take 1 tablet (25 mg total) by mouth every 12 (twelve) hours, Disp: 180 tablet, Rfl: 3    Microlet Lancets MISC, USE 3 TIMES DAY, Disp: 300 each, Rfl: 1    omeprazole (PriLOSEC) 40 MG capsule, Take 40 mg by mouth daily, Disp: , Rfl:     tamsulosin (FLOMAX) 0 4 mg, Take 2 capsules (0 8 mg total) by mouth daily with dinner, Disp: 180 capsule, Rfl: 3    torsemide (DEMADEX) 20 mg tablet, TAKE 1 TABLET DAILY (Patient taking differently: 40 mg ), Disp: 90 tablet, Rfl: 3    Doxepin HCl (SILENOR) 6 MG TABS, Take 10 mg by mouth as needed , Disp: , Rfl:     Laboratory Results:  Lab Results   Component Value Date    WBC 7 77 03/11/2021    HGB 11 0 (L) 03/11/2021    HCT 34 9 (L) 03/11/2021    MCV 94 03/11/2021     03/11/2021     Lab Results   Component Value Date    SODIUM 137 03/14/2021    K 4 2 03/14/2021     03/14/2021    CO2 26 03/14/2021    AGAP 9 03/14/2021    BUN 55 (H) 03/14/2021    CREATININE 2 41 (H) 03/14/2021    GLUC 149 (H) 03/14/2021    GLUF 170 (H) 03/12/2021    CALCIUM 9 0 03/14/2021    AST 18 03/11/2021    ALT 28 03/11/2021    ALKPHOS 95 03/11/2021    TP 6 7 03/11/2021    TBILI 0 45 03/11/2021    EGFR 25 03/14/2021

## 2021-03-17 NOTE — PROGRESS NOTES
NEPHROLOGY OFFICE PROGRESS NOTE   Shiv Johnson  77 y o  male MRN: [id number]  DATE: 03/17/21  Reason for visit: Continued evaluation of CKD    ASSESSMENT & PLAN:  1  Chronic kidney disease, stage IV  · Johan's baseline creatinine is around 2 1 to 2 3    · CKD etiology is diabetic nephropathy + sequelae from post op ATN from CABG  · His renal function in the hospital was slightly worse - SCr around 2 3 to 2 4  This may be due to progression of disease  · He is now considered stage IV CKD - will refer to CKD education  3  Hypertension  · BP is at goal    · Current regimen: Losartan 50 mg daily, Torsemide 40 mg daily and Metoprolol 25 mg BID  · Continue higher dose of Torsemide 40 mg daily  4  Bilateral leg edema  · Due to non compliance with low salt diet  · Continue higher dose of Torsemide 40 mg daily  · We discussed again the importance of a low salt diet  5  Mineral bone disease  · PTH is at goal - 63 5 in Aug 2020  · Ca and phos are at goal    · Vitamin D level is at goal - 32 9 on Aug 2020  Continue Vitamin D3 1000 units daily  5  Proteinuria:  · UPC is improved - was 2 36 and now down to 0 96 with higher dose of Losartan  · Continue Losartan 50 mg daily  6  Obesity, weight gain:  · Continues to be non compliant and has gained another 14 lbs since last visit  · He has gained over 40 lbs over the past year since surgery  · We discussed the importance of weight loss in terms of helping his BP and his diabetes control  · We discussed the need for diet and exercise - he said he is going to work on it  7  Anemia: Hgb stable at 11 0 on march 2021      8  Diabetes mellitus:   · Glycemic control is above goal    · Diabetic management is deferred to endocrinology or PCP  · Recommendations include targeting a HbA1c in the range of <6 5% to <8%  HbA1c goals are tailored for each patient at the discretion of endocrinology or PCP       9  Hyperlipidemia:  · Lipid management is deferred to PCP  10  CKD related parameters:  · Anemia: Hgb is at goal   · Acid-base: CO2 is at goal (> 22)  · Proteinuria: UPC ratio is at goal (< 1 0)  Patient Instructions   Stay on Torsemide 40 mg daily  Continue checking weights and BP at home  Check BMP in 1 week  Follow up in 6 weeks  SUBJECTIVE / INTERVAL HISTORY:  Josey Cooper was last seen in the office by me in December 2020  He was [facility] to [facility] from 3/11/21 to 3/14/21 with right leg pain  He was found to be fluid overloaded and was afforded IV diuresis  He was discharged on Torsemide 20 mg daily (same dose as prior to admission)  His wife called me yesterday concerned about ongoing edema and we arranged for him to be seen urgently today  His weights have been 278 to 279 lbs prior to admission  They remained at 279 lbs after admission  I informed his wife, Carloz Tang, to increase Torsemide to 40 mg daily yesterday and his weight is down to 275 lbs today  He denies any CP or SOB  PMH/PSH: HTN, DM, HLP, CKD, CAD s/p CABG, HARJIT, BPH, obesity, DDD s/p fusion surgery, cholecystectomy, hernia repair, tonsillectomy, urinary retention    Previous work up:   6/3/19 Renal US: R 10 6 cm, L 11 cm,  cc    ALLERGIES: No Known Allergies    REVIEW OF SYSTEMS:  Review of Systems   Constitutional: Negative for appetite change, chills, fatigue and fever  Respiratory: Negative for cough and shortness of breath  Cardiovascular: Positive for leg swelling  Negative for chest pain  Gastrointestinal: Negative for diarrhea, nausea and vomiting  Genitourinary: Negative for hematuria  Musculoskeletal: Negative for arthralgias  Neurological: Negative for dizziness and light-headedness  OBJECTIVE:  /72   Ht 5' 11" (1 803 m)   Wt 127 kg (280 lb)   BMI 39 05 kg/m²   Current Weight:   Body mass index is 39 05 kg/m²  Physical Exam  Constitutional:       General: He is not in acute distress       Appearance: Normal appearance  He is well-developed  He is obese  HENT:      Head: Normocephalic and atraumatic  Eyes:      General: No scleral icterus  Conjunctiva/sclera: Conjunctivae normal    Neck:      Musculoskeletal: Neck supple  Vascular: No JVD  Cardiovascular:      Rate and Rhythm: Normal rate and regular rhythm  Heart sounds: Normal heart sounds  Pulmonary:      Effort: Pulmonary effort is normal  No respiratory distress  Comments: Decreased breath sounds  Abdominal:      General: Bowel sounds are normal       Palpations: Abdomen is soft  Musculoskeletal:      Right lower leg: Edema (mild) present  Left lower leg: Edema (mild) present  Skin:     General: Skin is warm and dry  Neurological:      Mental Status: He is alert and oriented to person, place, and time     Psychiatric:         Behavior: Behavior normal      Medications:  Current Outpatient Medications:     Alpha-Lipoic Acid 600 MG CAPS, TAKE 1 CAPSULE BY MOUTH EVERY DAY, Disp: 30 capsule, Rfl: 5    Ascorbic Acid (VITAMIN C) 1000 MG tablet, Take 1,000 mg by mouth daily, Disp: , Rfl:     aspirin 325 mg tablet, Take 1 tablet (325 mg total) by mouth daily, Disp: 100 tablet, Rfl: 0    atorvastatin (LIPITOR) 80 mg tablet, Take 1 tablet (80 mg total) by mouth daily with dinner, Disp: 90 tablet, Rfl: 3    B-D ULTRAFINE III SHORT PEN 31G X 8 MM MISC, INJECT INTO THE SKIN 4 (FOUR) TIMES A DAY, Disp: 400 each, Rfl: 1    cholecalciferol (VITAMIN D3) 1,000 units tablet, Take 1 tablet (1,000 Units total) by mouth daily (Patient taking differently: Take 1,000 Units by mouth ), Disp: 1 tablet, Rfl: 1    Continuous Blood Gluc  (FREESTYLE JOHANNE READER) MAMI, Use device to scan blood glucose at least 4 times a day, Disp: 1 Device, Rfl: 0    Continuous Blood Gluc Sensor (FreeStyle Johanne 14 Day Sensor) [facility], APPLY SENSOR EVERY 14 DAYS TO CHECK BLOOD GLUCOSE AT LEAST 4 TIMES A DAY, Disp: 2 each, Rfl: 5    cyanocobalamin (VITAMIN B-12) 500 mcg tablet, Take 500 mcg by mouth daily, Disp: , Rfl:     docusate sodium (COLACE) 100 mg capsule, Take 1 capsule (100 mg total) by mouth 2 (two) times a day, Disp: 60 capsule, Rfl: 1    doxepin (SINEquan) 10 mg capsule, Take 10 mg by mouth daily at bedtime, Disp: , Rfl:     finasteride (PROSCAR) 5 mg tablet, Take 1 tablet (5 mg total) by mouth daily, Disp: 90 tablet, Rfl: 3    fluticasone (FLONASE) 50 mcg/act nasal spray, 1 spray into each nostril 2 (two) times a day as needed for rhinitis, Disp: 1 Bottle, Rfl: 0    gabapentin (NEURONTIN) 300 mg capsule, Take 1 cap (300mg) by mouth in the morning and 1 cap (300mg) at noon, take 2 caps (600mg) at bedtime, Disp: 28 capsule, Rfl: 0    glucose blood test strip, Check 4 times a day, Disp: 400 each, Rfl: 1    insulin glargine (Toujeo SoloStar) 300 units/mL CONCETRATED U-300 injection pen (1-unit dial), INJECT 74 UNITS UNDER THE SKIN DAILY AT BEDTIME, Disp: 22 mL, Rfl: 3    insulin lispro (HumaLOG KwikPen) 100 units/mL injection pen, INJECT 32-30-32 UNITS THREE TIMES A DAY PLUS SCALE MAXIMUM DOSE 130 UNITS, Disp: 105 mL, Rfl: 3    latanoprost (XALATAN) 0 005 % ophthalmic solution, 1 drop daily at bedtime, Disp: , Rfl:     liraglutide (Victoza) injection, Inject 0 3 mL (1 8 mg total) under the skin daily INJECT 1 8 MG DAILY INTO SKIN, Disp: 3 pen, Rfl: 3    losartan (COZAAR) 50 mg tablet, Take 1 tablet (50 mg total) by mouth daily after dinner, Disp: 90 tablet, Rfl: 3    metoprolol tartrate (LOPRESSOR) 25 mg tablet, Take 1 tablet (25 mg total) by mouth every 12 (twelve) hours, Disp: 180 tablet, Rfl: 3    Microlet Lancets MISC, USE 3 TIMES DAY, Disp: 300 each, Rfl: 1    omeprazole (PriLOSEC) 40 MG capsule, Take 40 mg by mouth daily, Disp: , Rfl:     tamsulosin (FLOMAX) 0 4 mg, Take 2 capsules (0 8 mg total) by mouth daily with dinner, Disp: 180 capsule, Rfl: 3    torsemide (DEMADEX) 20 mg tablet, TAKE 1 TABLET DAILY (Patient taking differently: 40 mg ), Disp: 90 tablet, Rfl: 3    Doxepin HCl (SILENOR) 6 MG TABS, Take 10 mg by mouth as needed , Disp: , Rfl:     Laboratory Results:  Lab Results   Component Value Date    WBC 7 77 03/11/2021    HGB 11 0 (L) 03/11/2021    HCT 34 9 (L) 03/11/2021    MCV 94 03/11/2021     03/11/2021     Lab Results   Component Value Date    SODIUM 137 03/14/2021    K 4 2 03/14/2021     03/14/2021    CO2 26 03/14/2021    AGAP 9 03/14/2021    BUN 55 (H) 03/14/2021    CREATININE 2 41 (H) 03/14/2021    GLUC 149 (H) 03/14/2021    GLUF 170 (H) 03/12/2021    CALCIUM 9 0 03/14/2021    AST 18 03/11/2021    ALT 28 03/11/2021    ALKPHOS 95 03/11/2021    TP 6 7 03/11/2021    TBILI 0 45 03/11/2021    EGFR 25 03/14/2021

## 2021-03-17 NOTE — PATIENT INSTRUCTIONS
Stay on Torsemide 40 mg daily  Continue checking weights and BP at home  Check BMP in 1 week  Follow up in 6 weeks

## 2021-03-18 NOTE — PROGRESS NOTES
Hospital Follow Up   Office Visit Note -     Susana Weber    77 y o    male   MRN: [id number]  [address]  [facility] [place] [id number] [phone]    PCP: David Sears MD  Cardiologist :   Dr Darlene Carroll              Summary of Recommendations  A heart healthy diet recommended  Provided written material about how to reduce dietary sodium  Aggressive risk factor modification recommended  Today, it is not clear to me that the patient is very motivated to make the necessary lifestyle changes to improve his heart health  Echocardiogram  Continue torsemide at the higher dose 40 mg daily  He has labs scheduled tomorrow for his nephrologist as well as in a couple of weeks  Follow up will be scheduled with Dr Darlene Carroll 2- 3 months        Impression/plan  CAD , S/P NSTEMI 6/19>S/P CABG x 4, (LIMA--LAD, SVG-- D1,OM1, rPDA) 6/21/19; on  mg daily, high-intensity statin, beta-blocker  · Will get an updated echocardiogram  · Can decrease aspirin to 81 mg daily  Chronic diastolic HF, adm 3/88-2/32/16 with decompensation  Medical non adherence to diet and medications suspected  Wt Readings from Last 3 Encounters:   03/22/21 127 kg (280 lb)   03/17/21 127 kg (280 lb)   03/16/21 128 kg (282 lb)     07/09/19 101 kg (222 lb 12 8 oz)   07/03/19 101 kg (223 lb)   06/25/19 105 kg (231 lb 4 2 oz)      Beta-Blocker: metoprolol tartrate 25 mg Q 12   ACEi, ARB or ARNi: losartan 50 mg/d   Diuretic: torsemide 40 mg daily   Educated regarding adherence to a 1 5g -2 gram Na diet/ 1 5L(50oz)f luid restriction, daily weights and to call us if she gains  3 lbs/ 1 day or 5 lbs/ 1 week  Hypertension  /80, On  Beta-blocker, ARB, diuretic  Hyperlipidemia, mixed  On atorvastatin 80 mg/d    ·  lipids November 2020:  LDL 70, non     DM2,  Poorly controlled    March 2021:  HgA1c  8 3, prior 8 5  on liraglutide,insulin  HARJIT, Rx with CPAP  CKD, stage IV, Baseline 2 3--2 7, per nephr  Worsening  Last creatinine 2 41, followed closely by Nephrology  Obesity   BMI 39  PVD  · Left carotid artery stenosis   · US 3/2020:  <50% stenosis in the right ICA 50-69% stenosis in the left ICA  Repeat in 6 months  Cardiac testing  --cardiac cath 6/17: pLAD 75% stenosis, 1st diag 80% stenosis, pLCx 100% , pRCA 100%     --TTE 6/16/19:  Ejection fraction 60%  NO RWMA  Grade 1 DD  Moderate concentric hypertrophy  GERRI; L > R  -- cardiac catheterization June 2019 there is significant triple vessel coronary artery disease  RECOMMENDATIONS Consultation be obtained for coronary artery bypass grafting   ---TTE pending                  HPI:   Boom Huynh is a 68y o  year old male who has a history of uncontrolled type 2 diabetes, hypertension, CKD, and obstructive sleep apnea; no previously known coronary artery disease heart failure nor arrhythmias  He has a prior 60 pack year tobacco habit quitting in 1990 after 3 packs per day for 30 years  Recently, he [facility] room June 15th with shortness of breath and difficulty swallowing  He was found to have a type 1 non STEMI; cardiac catheterization showed multivessel disease  He was transferred to [facility] and underwent coronary bypass grafting x4 June 21st   He is being followed closely for his chronic kidney disease    7/11/19 He presents for cardiology follow-up  He feels well, no chest pain or shortness of breath  No lightheadedness no dizziness  Compliant with medications  Interval history   cardiology follow-up September 2019, March 2020, November 2020  In November notes indicate he was having some chest discomfort later in the day    Adm 3/11-3/14/21 RLE weakness  Found have bilateral lower extremity edema likely contributing to his weakness  He was diuresed with IV diuretics  He was not followed by Cardiology  discharge weight 279 lb    Discharge creatinine 2 41, on torsemide 20 mg daily- same diuretic as prior to admission    wife phoned Nephrology after discharge, and torsemide was increased to 40 mg daily    3/17/21 office visit with Nephrology,  Weight down to 275  Noted to gained 40 lb since  Open heart surgery  Recommend to continue on torsemide 40 mg daily  Reinforced adherence to a low-salt diet   BMP 1 week      3/22/21  Hospital follow-up  He is accompanied by his wife  She provides most of the history  When I asked the patient direct questions, he often pauses and hesitates  He denies chest pain  He denies shortness of breath  When asked if he exercises he tells me he exercises" in bed"  He does not go for walks or exercise otherwise  He did partake in cardiac rehab  He did not continued exercise after this he completed the program  Reports he is compliant with his medications and is taking the higher dose torsemide  He reports he weighs himself daily at home, and at home he is 270 lb  Today in the office he is 280 lb  His wife tells me he is Mr  Noncompliant recently he has been eating foods at home  She tells me when she gives him the car keys, he eats everything under the sun  His nephrologist has placed him on higher dose diuretic  Follow-up labs have been requested, and are scheduled for the next day or so, per visiting nurses  He has lab work scheduled in a few weeks again as well  Currently he is on aspirin 325 mg daily  He is due to have some dental work in the future  His dentist wondered whether he could be on a lower dose aspirin  I will decrease his ASA dose to 81 mg daily  I made no change in his medicines today  I did give him education regarding a low-salt diet  He was also provided information about how to read food labels to facilitate adherence  He was advised that he may have recurrent CAD, if his risk factors are not controlled                  I have spent 25 minutes with Patient and family today in which greater than 50% of this time was spent in counseling/coordination of care regarding Intructions for management, Patient and family education, Importance of tx compliance and Risk factor reductions  Assessment  Diagnoses and all orders for this visit:    [facility] ([facility] )  -     Echo complete with contrast if indicated; Future    S/P CABG (coronary artery bypass graft)  -     Echo complete with contrast if indicated; Future  -     aspirin (ECOTRIN LOW STRENGTH) 81 mg EC tablet; Take 1 tablet (81 mg total) by mouth daily    Benign hypertension with chronic kidney disease, stage IV (HCC)    Mixed hyperlipidemia    Stenosis of left internal carotid artery    Sleep apnea, unspecified type        Past Medical History:   Diagnosis Date    CHF (congestive heart failure) ([facility] )     Diabetes mellitus ([place] [address] )     Hyperlipidemia     Hypertension     Obesity     Renal disorder        Review of Systems   Constitution: Negative for chills  Cardiovascular: Negative for chest pain, claudication, cyanosis, dyspnea on exertion, irregular heartbeat, leg swelling, near-syncope, orthopnea, palpitations, paroxysmal nocturnal dyspnea and syncope  Respiratory: Negative for cough and shortness of breath  Gastrointestinal: Negative for heartburn and nausea  Neurological: Negative for dizziness, focal weakness, headaches, light-headedness and weakness  All other systems reviewed and are negative  No Known Allergies        Current Outpatient Medications:     Alpha-Lipoic Acid 600 MG CAPS, TAKE 1 CAPSULE BY MOUTH EVERY DAY, Disp: 30 capsule, Rfl: 5    Ascorbic Acid (VITAMIN C) 1000 MG tablet, Take 1,000 mg by mouth daily, Disp: , Rfl:     atorvastatin (LIPITOR) 80 mg tablet, Take 1 tablet (80 mg total) by mouth daily with dinner, Disp: 90 tablet, Rfl: 3    B-D ULTRAFINE III SHORT PEN 31G X 8 MM MISC, INJECT INTO THE SKIN 4 (FOUR) TIMES A DAY, Disp: 400 each, Rfl: 1    cholecalciferol (VITAMIN D3) 1,000 units tablet, Take 1 tablet (1,000 Units total) by mouth daily (Patient taking differently: Take 1,000 Units by mouth ), Disp: 1 tablet, Rfl: 1    Continuous Blood Gluc  (FREESTYLE JOHANNE READER) MAMI, Use device to scan blood glucose at least 4 times a day, Disp: 1 Device, Rfl: 0    Continuous Blood Gluc Sensor (FreeStyle Johanne 14 Day Sensor) [facility], APPLY SENSOR EVERY 14 DAYS TO CHECK BLOOD GLUCOSE AT LEAST 4 TIMES A DAY, Disp: 2 each, Rfl: 5    cyanocobalamin (VITAMIN B-12) 500 mcg tablet, Take 500 mcg by mouth daily, Disp: , Rfl:     docusate sodium (COLACE) 100 mg capsule, Take 1 capsule (100 mg total) by mouth 2 (two) times a day, Disp: 60 capsule, Rfl: 1    doxepin (SINEquan) 10 mg capsule, Take 10 mg by mouth daily at bedtime, Disp: , Rfl:     finasteride (PROSCAR) 5 mg tablet, Take 1 tablet (5 mg total) by mouth daily, Disp: 90 tablet, Rfl: 3    fluticasone (FLONASE) 50 mcg/act nasal spray, 1 spray into each nostril 2 (two) times a day as needed for rhinitis, Disp: 1 Bottle, Rfl: 0    gabapentin (NEURONTIN) 300 mg capsule, Take 1 cap (300mg) by mouth in the morning and 1 cap (300mg) at noon, take 2 caps (600mg) at bedtime, Disp: 28 capsule, Rfl: 0    glucose blood test strip, Check 4 times a day, Disp: 400 each, Rfl: 1    insulin glargine (Toujeo SoloStar) 300 units/mL CONCETRATED U-300 injection pen (1-unit dial), INJECT 74 UNITS UNDER THE SKIN DAILY AT BEDTIME, Disp: 22 mL, Rfl: 3    insulin lispro (HumaLOG KwikPen) 100 units/mL injection pen, INJECT 32-30-32 UNITS THREE TIMES A DAY PLUS SCALE MAXIMUM DOSE 130 UNITS, Disp: 105 mL, Rfl: 3    latanoprost (XALATAN) 0 005 % ophthalmic solution, 1 drop daily at bedtime, Disp: , Rfl:     liraglutide (Victoza) injection, Inject 0 3 mL (1 8 mg total) under the skin daily INJECT 1 8 MG DAILY INTO SKIN, Disp: 3 pen, Rfl: 3    losartan (COZAAR) 50 mg tablet, Take 1 tablet (50 mg total) by mouth daily after dinner, Disp: 90 tablet, Rfl: 3    metoprolol tartrate (LOPRESSOR) 25 mg tablet, Take 1 tablet (25 mg total) by mouth every 12 (twelve) hours, Disp: 180 tablet, Rfl: 3    Microlet Lancets MISC, USE 3 TIMES DAY, Disp: 300 each, Rfl: 1    omeprazole (PriLOSEC) 40 MG capsule, Take 40 mg by mouth daily, Disp: , Rfl:     tamsulosin (FLOMAX) 0 4 mg, Take 2 capsules (0 8 mg total) by mouth daily with dinner, Disp: 180 capsule, Rfl: 3    torsemide (DEMADEX) 20 mg tablet, TAKE 1 TABLET DAILY (Patient taking differently: 40 mg ), Disp: 90 tablet, Rfl: 3    aspirin (ECOTRIN LOW STRENGTH) 81 mg EC tablet, Take 1 tablet (81 mg total) by mouth daily, Disp: 30 tablet, Rfl: 1    Social History     Socioeconomic History    Marital status: /Civil Union     Spouse name: Not on file    Number of children: Not on file    Years of education: Not on file    Highest education level: Not on file   Occupational History    Occupation: RETIRED   Social Needs    Financial resource strain: Patient refused    Food insecurity     Worry: Patient refused     Inability: Patient refused    Transportation needs     Medical: No     Non-medical: No   Tobacco Use    Smoking status: Former Smoker     Packs/day: 3 00     Years: 30 00     Pack years: 90 00     Quit date:      Years since quittin 2    Smokeless tobacco: Never Used    Tobacco comment: Quit    Substance and Sexual Activity    Alcohol use: Not Currently     Comment: 1 drink every 6 months      Drug use: Never    Sexual activity: Not Currently   Lifestyle    Physical activity     Days per week: Patient refused     Minutes per session: Patient refused    Stress: Not on file   Relationships    Social connections     Talks on phone: Patient refused     Gets together: Patient refused     Attends [employer] service: Patient refused     Active member of club or organization: Patient refused     Attends meetings of clubs or organizations: Patient refused     Relationship status: Patient refused    Intimate partner violence     Fear of current or ex partner: Not on file     Emotionally abused: Not on file     Physically abused: Not on file     Forced sexual activity: Not on file   Other Topics Concern    Not on file   Social History Narrative    · Do you currently or have you served in the [employer]:   No      · Were you activated, into active duty, as a member of the [employer] or as a Reservist:   No      · Caffeine intake:   Occasional      · Diet:   Regular      · Live alone or with others:   with others      · Exercise level:   Occasional  swim in summertime  walk alot  · ·Guns present in home:   No      · Seat belts used routinely:   Yes      · Advance directive:   No      · Sunscreen used routinely:   No      · Smoke alarm in home: Yes      · Legally blind in one or both eyes:   No      · Hard of hearing or deaf in one or both ears:   No      ·        Family History   Problem Relation Age of Onset    Cancer Mother     Cancer Father     Diabetes Maternal Grandmother     Diabetes Paternal Aunt        Physical Exam   Constitutional: He is oriented to person, place, and time  No distress  HENT:   Head: Normocephalic and atraumatic  Eyes: Conjunctivae and EOM are normal    Neck: Normal range of motion  Neck supple  Cardiovascular: Normal rate, regular rhythm, normal heart sounds and intact distal pulses  Pulmonary/Chest: Effort normal  He has decreased breath sounds  Abdominal: Soft  Bowel sounds are normal    Protuberant abdomen   Musculoskeletal: Normal range of motion  General: Edema present  Comments: Plus one bilateral lower extremity edema   Neurological: He is alert and oriented to person, place, and time  Skin: Skin is warm and dry  Psychiatric: He has a normal mood and affect  Nursing note and vitals reviewed  Vitals: Blood pressure 118/80, pulse 72, resp  rate 18, height 5' 11" (1 803 m), weight 127 kg (280 lb), SpO2 98 %     Wt Readings from Last 3 Encounters:   21 127 kg (280 lb)   21 127 kg (280 lb)   21 128 kg (282 lb)         Labs & Results:  Lab Results   Component Value Date    WBC 7 77 2021    HGB 11 0 (L) 2021    HCT 34 9 (L) 2021    MCV 94 2021     2021     No results found for: BNP  No components found for: CHEM    Results for orders placed during the hospital encounter of 06/15/19   Echo complete with contrast if indicated    Narrative [facility], [address] [facility]  [phone]    Transthoracic Echocardiogram  2D, M-mode, Doppler, and Color Doppler    Study date:  2019    Patient: Jerome Mckinley  MR number: [id number]  Account number: [de-identified]  : 1945  Age: 68 years  Gender: Male  Status: Inpatient  Location: Bedside  Height: 71 in  Weight: 237 6 lb  BP: 119/ 63 mmHg    Indications: Pulmonary embolism  Diagnoses: I26 99 - Other pulmonary embolism without acute cor pulmonale    Sonographer:  Jacqueline Hobbs RDCS  Primary Physician:  Luciano Duque DO  Referring Physician:  Laurie Doe PA-C  Group:  [id number]  Interpreting Physician:  Janice Jansen MD    SUMMARY    LEFT VENTRICLE:  Systolic function was normal  Ejection fraction was estimated to be 60 %  There were no regional wall motion abnormalities  Wall thickness was moderately increased  There was moderate concentric hypertrophy  Doppler parameters were consistent with abnormal left ventricular relaxation (grade 1 diastolic dysfunction)  Doppler parameters were consistent with high ventricular filling pressure  RIGHT VENTRICLE:  The size was normal   Systolic function was normal     LEFT ATRIUM:  The atrium was moderately dilated  RIGHT ATRIUM:  The atrium was mildly dilated  HISTORY: PRIOR HISTORY: CKD3  Hypertension  DM2  HARJIT  PROCEDURE: The procedure was performed at the bedside  This was a routine study   The transthoracic approach was used  The study included complete 2D imaging, M-mode, complete spectral Doppler, and color Doppler  The heart rate was 71 bpm,  at the start of the study  Image quality was adequate  LEFT VENTRICLE: Size was normal  Systolic function was normal  Ejection fraction was estimated to be 60 %  There were no regional wall motion abnormalities  Wall thickness was moderately increased  There was moderate concentric  hypertrophy  DOPPLER: Doppler parameters were consistent with abnormal left ventricular relaxation (grade 1 diastolic dysfunction)  Doppler parameters were consistent with high ventricular filling pressure  RIGHT VENTRICLE: The size was normal  Systolic function was normal  Wall thickness was normal     LEFT ATRIUM: The atrium was moderately dilated  RIGHT ATRIUM: The atrium was mildly dilated  MITRAL VALVE: Valve structure was normal  There was normal leaflet separation  DOPPLER: The transmitral velocity was within the normal range  There was no evidence for stenosis  There was no significant regurgitation  AORTIC VALVE: The valve was trileaflet  Leaflets exhibited mildly increased thickness, normal cuspal separation, and sclerosis  DOPPLER: Transaortic velocity was within the normal range  There was no evidence for stenosis  There was no  significant regurgitation  TRICUSPID VALVE: The valve structure was normal  There was normal leaflet separation  DOPPLER: The transtricuspid velocity was within the normal range  There was no evidence for stenosis  There was no significant regurgitation  PULMONIC VALVE: Leaflets exhibited normal thickness, no calcification, and normal cuspal separation  DOPPLER: The transpulmonic velocity was within the normal range  There was no significant regurgitation  PERICARDIUM: There was no pericardial effusion  The pericardium was normal in appearance  AORTA: The root exhibited normal size  SYSTEMIC VEINS: IVC: The inferior vena cava was normal in size  Respirophasic changes were normal     MEASUREMENT TABLES    OTHER ECHO MEASUREMENTS  (Reference normals)  Estimated CVP   5 mmHg   (--)    SYSTEM MEASUREMENT TABLES    2D  %FS: 28 52 %  Ao Diam: 3 53 cm  EDV(Teich): 94 27 ml  EF(Teich): 55 1 %  ESV(Teich): 42 33 ml  IVSd: 1 37 cm  LA Area: 25 6 cm2  LA Diam: 3 77 cm  LVEDV MOD A4C: 79 22 ml  LVEF MOD A4C: 58 43 %  LVESV MOD A4C: 32 93 ml  LVIDd: 4 54 cm  LVIDs: 3 24 cm  LVLd A4C: 9 47 cm  LVLs A4C: 8 61 cm  LVOT Diam: 2 13 cm  LVPWd: 1 29 cm  RA Area: 18 18 cm2  RVIDd: 3 18 cm  SV MOD A4C: 46 29 ml  SV(Teich): 51 94 ml    MM  TAPSE: 2 13 cm    PW  E': 0 05 m/s  E/E': 18 25  MV A Nick: 1 19 m/s  MV Dec [place]: 4 16 m/s2  MV DecT: 240 11 ms  MV E Nick: 1 m/s  MV E/A Ratio: 0 84  MV PHT: 69 63 ms  MVA By PHT: 3 16 cm2    [facility]    Prepared and electronically signed by    Edith Graham MD  Signed 17-Jun-2019 14:56:09       No results found for this or any previous visit  This note was completed in part utilizing m-modal fluency direct voice recognition software  Grammatical errors, random word insertion, spelling mistakes, and incomplete sentences may be an occasional consequence of the system secondary to software limitations, ambient noise and hardware issues  At the time of dictation, efforts were made to edit, clarify and /or correct errors  Please read the chart carefully and recognize, using context, where substitutions have occurred    If you have any questions or concerns about the context, text or information contained within the body of this dictation, please contact myself, the provider, for further clarification

## 2021-03-21 DIAGNOSIS — Z79.4 TYPE 2 DIABETES MELLITUS WITH HYPERGLYCEMIA, WITH LONG-TERM CURRENT USE OF INSULIN (HCC): ICD-10-CM

## 2021-03-21 DIAGNOSIS — E11.65 TYPE 2 DIABETES MELLITUS WITH HYPERGLYCEMIA, WITH LONG-TERM CURRENT USE OF INSULIN (HCC): ICD-10-CM

## 2021-03-22 ENCOUNTER — OFFICE VISIT (OUTPATIENT)
Dept: CARDIOLOGY CLINIC | Facility: CLINIC | Age: 76
End: 2021-03-22
Payer: MEDICARE

## 2021-03-22 VITALS
SYSTOLIC BLOOD PRESSURE: 118 MMHG | HEIGHT: 71 IN | BODY MASS INDEX: 39.2 KG/M2 | WEIGHT: 280 LBS | HEART RATE: 72 BPM | RESPIRATION RATE: 18 BRPM | OXYGEN SATURATION: 98 % | DIASTOLIC BLOOD PRESSURE: 80 MMHG

## 2021-03-22 DIAGNOSIS — I65.22 STENOSIS OF LEFT INTERNAL CAROTID ARTERY: ICD-10-CM

## 2021-03-22 DIAGNOSIS — G47.30 SLEEP APNEA, UNSPECIFIED TYPE: ICD-10-CM

## 2021-03-22 DIAGNOSIS — I50.32 CHRONIC DIASTOLIC HEART FAILURE (HCC): Primary | ICD-10-CM

## 2021-03-22 DIAGNOSIS — I12.9 BENIGN HYPERTENSION WITH CHRONIC KIDNEY DISEASE, STAGE IV (HCC): ICD-10-CM

## 2021-03-22 DIAGNOSIS — Z95.1 S/P CABG (CORONARY ARTERY BYPASS GRAFT): ICD-10-CM

## 2021-03-22 DIAGNOSIS — N18.4 BENIGN HYPERTENSION WITH CHRONIC KIDNEY DISEASE, STAGE IV (HCC): ICD-10-CM

## 2021-03-22 DIAGNOSIS — E78.2 MIXED HYPERLIPIDEMIA: ICD-10-CM

## 2021-03-22 PROCEDURE — 99214 OFFICE O/P EST MOD 30 MIN: CPT | Performed by: NURSE PRACTITIONER

## 2021-03-22 RX ORDER — ASPIRIN 81 MG/1
81 TABLET ORAL DAILY
Qty: 30 TABLET | Refills: 1 | Status: SHIPPED | OUTPATIENT
Start: 2021-03-22 | End: 2021-04-14 | Stop reason: SDUPTHER

## 2021-03-22 RX ORDER — FLASH GLUCOSE SENSOR
KIT MISCELLANEOUS
Qty: 2 EACH | Refills: 5 | Status: SHIPPED | OUTPATIENT
Start: 2021-03-22 | End: 2021-08-02

## 2021-03-22 NOTE — PATIENT INSTRUCTIONS
DASH Eating Plan   WHAT YOU NEED TO KNOW:   The DASH (Dietary Approaches to Stop Hypertension) Eating Plan is designed to help prevent or lower high blood pressure  It can also help to lower LDL (bad) cholesterol and decrease your risk of heart disease  The plan is low in sodium, sugar, unhealthy fats, and total fat  It is high in potassium, calcium, magnesium, and fiber  These nutrients are added when you eat more fruits, vegetables, and whole grains  DISCHARGE INSTRUCTIONS:   Your sodium limit each day: Your dietitian will tell you how much sodium is safe for you to have each day  People with high blood pressure should have no more than 1,500 to 2,300 mg of sodium in a day  A teaspoon (tsp) of salt has 2,300 mg of sodium  This may seem like a difficult goal, but small changes to the foods you eat can make a big difference  Your healthcare provider or dietitian can help you create a meal plan that follows your sodium limit  How to limit sodium:   · Read food labels  Food labels can help you choose foods that are low in sodium  The amount of sodium is listed in milligrams (mg)  The % Daily Value (DV) column tells you how much of your daily needs are met by 1 serving of the food for each nutrient listed  Choose foods that have less than 5% of the DV of sodium  These foods are considered low in sodium  Foods that have 20% or more of the DV of sodium are considered high in sodium  Avoid foods that have more than 300 mg of sodium in each serving  Choose foods that say low-sodium, reduced-sodium, or no salt added on the food label  · Avoid salt  Do not salt food at the table, and add very little salt to foods during cooking  Use herbs and spices, such as onions, garlic, and salt-free seasonings to add flavor to foods  Try lemon or lime juice or vinegar to give foods a tart flavor  Use hot peppers or a small amount of hot pepper sauce to add a spicy flavor to foods  · Ask about salt substitutes    Ask your healthcare provider if you may use salt substitutes  Some salt substitutes have ingredients that can be harmful if you have certain health conditions  · Choose foods carefully at restaurants  Meals from restaurants, especially fast food restaurants, are often high in sodium  Some restaurants have nutrition information that tells you the amount of sodium in their foods  Ask to have your food prepared with less, or no salt  What you need to know about fats:   · Include healthy fats  Examples are unsaturated fats and omega-3 fatty acids  Unsaturated fats are found in soybean, canola, olive, or sunflower oil, and liquid and soft tub margarines  Omega-3 fatty acids are found in fatty fish, such as salmon, tuna, mackerel, and sardines  It is also found in flaxseed oil and ground flaxseed  · Avoid unhealthy fats  Do not eat unhealthy fats, such as saturated fats and trans fats  Saturated fats are found in foods that contain fat from animals  Examples are fatty meats, whole milk, butter, cream, and other dairy foods  It is also found in shortening, stick margarine, palm oil, and coconut oil  Trans fats are found in fried foods, crackers, chips, and baked goods made with margarine or shortening  Foods to include: With the DASH eating plan, you need to eat a certain number of servings from each food group  This will help you get enough of certain nutrients and limit others  The amount of servings you should eat depends on how many calories you need  Your dietitian can tell you how many calories you need  The number of servings listed next to the food groups below are for people who need about 2,000 calories each day  · Grains:  6 to 8 servings (3 of these servings should be whole-grain foods)    ? 1 slice of whole-grain bread     ? 1 ounce of dry cereal    ? ½ cup of cooked cereal, pasta, or brown rice    · Vegetables and fruits:  4 to 5 servings of fruits and 4 to 5 servings of vegetables    ?  1 medium fruit    ? ½ cup of frozen, canned (no added salt), or chopped fresh vegetables     ? ½ cup of fresh, frozen, dried, or canned fruit (canned in light syrup or fruit juice)    ? ½ cup of vegetable or fruit juice    · Dairy:  2 to 3 servings    ? 1 cup of nonfat (skim) or 1% milk    ? 1½ ounces of fat-free or low-fat cheese    ? 6 ounces of nonfat or low-fat yogurt    · Lean meat, poultry, and fish:  6 ounces or less    ? Poultry (chicken, turkey) with no skin    ? Fish (especially fatty fish, such as salmon, fresh tuna, or mackerel)    ? Lean beef and pork (loin, round, extra lean hamburger)    ? Egg whites and egg substitutes    · Nuts, seeds, and legumes:  4 to 5 servings each week    ? ½ cup of cooked beans and peas    ? 1½ ounces of unsalted nuts    ? 2 tablespoons of peanut butter or seeds    · Sweets and added sugars:  5 or less each week    ? 1 tablespoon of sugar, jelly, or jam    ? ½ cup of sorbet or gelatin    ? 1 cup of lemonade    · Fats:  2 to 3 servings each week    ? 1 teaspoon of soft margarine or vegetable oil    ? 1 tablespoon of mayonnaise    ? 2 tablespoons of salad dressing    Foods to avoid:   · Grains:      ? Baked goods, such as doughnuts, pastries, cookies, and biscuits (high in fat and sugar)    ? Mixes for cornbread and biscuits, packaged foods, such as bread stuffing, rice and pasta mixes, macaroni and cheese, and instant cereals (high in sodium)    · Fruits and vegetables:      ? Regular, canned vegetables (high in sodium)    ? Sauerkraut, pickled vegetables, and other foods prepared in brine (high in sodium)    ? Fried vegetables or vegetables in butter or high-fat sauces    ? Fruit in cream or butter sauce (high in fat)    · Dairy:      ? Whole milk, 2% milk, and cream (high in fat)    ?  Regular cheese and processed cheese (high in fat and sodium)    · Meats and protein foods:      ? Smoked or cured meat, such as corned beef, alva, ham, hot dogs, and sausage (high in fat and sodium)    ? Canned beans and canned meats or spreads, such as potted meats, sardines, anchovies, and imitation seafood (high in sodium)    ? Deli or lunch meats, such as bologna, ham, turkey, and roast beef (high in sodium)    ? High-fat meat (T-bone steak, regular hamburger, and ribs)    ? Whole eggs and egg yolks (high in fat)    · Other:      ? Seasonings made with salt, such as garlic salt, celery salt, onion salt, seasoned salt, meat tenderizers, and monosodium glutamate (MSG)    ? Miso soup and canned or dried soup mixes (high in sodium)    ? Regular soy sauce, barbecue sauce, teriyaki sauce, steak sauce, Worcestershire sauce, and most flavored vinegars (high in sodium)    ? Regular condiments, such as mustard, ketchup, and salad dressings (high in sodium)    ? Gravy and sauces, such as Lester or cheese sauces (high in sodium and fat)    ? Drinks high in sugar, such as soda or fruit drinks    ? Snack foods, such as salted chips, popcorn, pretzels, pork rinds, salted crackers, and salted nuts    ? Frozen foods, such as dinners, entrees, vegetables with sauces, and breaded meats (high in sodium)    Other guidelines to follow:   · Maintain a healthy weight  Your risk for heart disease is higher if you are overweight  Your healthcare provider may suggest that you lose weight if you are overweight  You can lose weight by eating fewer calories and foods that have added sugars and fat  The DASH meal plan can help you do this  Decrease calories by eating smaller portions at each meal and fewer snacks  Ask your healthcare provider for more information about how to lose weight  · Exercise regularly  Regular exercise can help you reach or maintain a healthy weight  Regular exercise can also help decrease your blood pressure and improve your cholesterol levels  Get 30 minutes or more of moderate exercise each day of the week  To lose weight, get at least 60 minutes of exercise   Talk to your healthcare provider about the best exercise program for you  · Limit alcohol  Women should limit alcohol to 1 drink a day  Men should limit alcohol to 2 drinks a day  A drink of alcohol is 12 ounces of beer, 5 ounces of wine, or 1½ ounces of liquor  © Copyright [address] Information is for End User's use only and may not be sold, redistributed or otherwise used for commercial purposes  All illustrations and images included in CareNotes® are the copyrighted property of [employer]  or [facility]   The above information is an  only  It is not intended as medical advice for individual conditions or treatments  Talk to your doctor, nurse or pharmacist before following any medical regimen to see if it is safe and effective for you

## 2021-03-22 NOTE — LETTER
March 22, 2021     Benson [facility] [address]    Patient: Susana Weber  YOB: 1945   Date of Visit: 3/22/2021       Dear Dr Harris Carty: Thank you for referring Gil Berry to me for evaluation  Below are my notes for this consultation  If you have questions, please do not hesitate to call me  I look forward to following your patient along with you  Sincerely,        ELEAZAR Rai        CC: MD Yen Ambriz, 10 [facility]  3/22/2021 10:10 AM  Sign when [facility] Follow Up   Office Visit Note -     Susana Weber    77 y o    male   MRN: [id number]  [address] [facility],[address]  [place] [id number] [phone]    PCP: David Sears MD  Cardiologist :   Dr Darlene Carroll              Summary of Recommendations  A heart healthy diet recommended  Provided written material about how to reduce dietary sodium  Aggressive risk factor modification recommended  Today, it is not clear to me that the patient is very motivated to make the necessary lifestyle changes to improve his heart health  Echocardiogram  Continue torsemide at the higher dose 40 mg daily  He has labs scheduled tomorrow for his nephrologist as well as in a couple of weeks  Follow up will be scheduled with Dr Darlene Carroll 2- 3 months        Impression/plan  CAD , S/P NSTEMI 6/19>S/P CABG x 4, (LIMA--LAD, SVG-- D1,OM1, rPDA) 6/21/19; on  mg daily, high-intensity statin, beta-blocker  · Will get an updated echocardiogram  · Can decrease aspirin to 81 mg daily  Chronic diastolic HF, adm 4/96-1/52/80 with decompensation    Medical non adherence to diet and medications suspected  Wt Readings from Last 3 Encounters:   03/22/21 127 kg (280 lb)   03/17/21 127 kg (280 lb)   03/16/21 128 kg (282 lb)     07/09/19 101 kg (222 lb 12 8 oz)   07/03/19 101 kg (223 lb)   06/25/19 105 kg (231 lb 4 2 oz)  Beta-Blocker: metoprolol tartrate 25 mg Q 12   ACEi, ARB or ARNi: losartan 50 mg/d   Diuretic: torsemide 40 mg daily   Educated regarding adherence to a 1 5g -2 gram Na diet/ 1 5L(50oz)f luid restriction, daily weights and to call us if she gains  3 lbs/ 1 day or 5 lbs/ 1 week  Hypertension  /80, On  Beta-blocker, ARB, diuretic  Hyperlipidemia, mixed  On atorvastatin 80 mg/d    ·  lipids November 2020:  LDL 70, non     DM2,  Poorly controlled  March 2021:  HgA1c  8 3, prior 8 5  on liraglutide,insulin  HARJIT, Rx with CPAP  CKD, stage IV,  Baseline 2 3--2 7, per nephr  Worsening  Last creatinine 2 41, followed closely by Nephrology  Obesity   BMI 39  PVD  · Left carotid artery stenosis   · US 3/2020:  <50% stenosis in the right ICA 50-69% stenosis in the left ICA  Repeat in 6 months  Cardiac testing  --cardiac cath 6/17: pLAD 75% stenosis, 1st diag 80% stenosis, pLCx 100% , pRCA 100%     --TTE 6/16/19:  Ejection fraction 60%  NO RWMA  Grade 1 DD  Moderate concentric hypertrophy  GERRI; L > R  -- cardiac catheterization June 2019 there is significant triple vessel coronary artery disease  RECOMMENDATIONS Consultation be obtained for coronary artery bypass grafting   ---TTE pending                  HPI:   Tiago Montano is a 68y o  year old male who has a history of uncontrolled type 2 diabetes, hypertension, CKD, and obstructive sleep apnea; no previously known coronary artery disease heart failure nor arrhythmias  He has a prior 60 pack year tobacco habit quitting in 1990 after 3 packs per day for 30 years  Recently, he presented to [facility] Emergency room June 15th with shortness of breath and difficulty swallowing  He was found to have a type 1 non STEMI; cardiac catheterization showed multivessel disease    He was transferred to [facility] and underwent coronary bypass grafting x4 June 21st   He is being followed closely for his chronic kidney disease    7/11/19 He presents for cardiology follow-up  He feels well, no chest pain or shortness of breath  No lightheadedness no dizziness  Compliant with medications  Interval history   cardiology follow-up September 2019, March 2020, November 2020  In November notes indicate he was having some chest discomfort later in the day    Adm 3/11-3/14/21 RLE weakness  Found have bilateral lower extremity edema likely contributing to his weakness  He was diuresed with IV diuretics  He was not followed by Cardiology  discharge weight 279 lb  Discharge creatinine 2 41, on torsemide 20 mg daily- same diuretic as prior to admission    wife phoned Nephrology after discharge, and torsemide was increased to 40 mg daily    3/17/21 office visit with Nephrology,  Weight down to 275  Noted to gained 40 lb since  Open heart surgery  Recommend to continue on torsemide 40 mg daily  Reinforced adherence to a low-salt diet   BMP 1 week      3/22/21  Hospital follow-up  He is accompanied by his wife  She provides most of the history  When I asked the patient direct questions, he often pauses and hesitates  He denies chest pain  He denies shortness of breath  When asked if he exercises he tells me he exercises" in bed"  He does not go for walks or exercise otherwise  He did partake in cardiac rehab  He did not continued exercise after this he completed the program  Reports he is compliant with his medications and is taking the higher dose torsemide  He reports he weighs himself daily at home, and at home he is 270 lb  Today in the office he is 280 lb  His wife tells me he is Mr  Noncompliant recently he has been eating foods at home  She tells me when she gives him the car keys, he eats everything under the sun  His nephrologist has placed him on higher dose diuretic  Follow-up labs have been requested, and are scheduled for the next day or so, per visiting nurses  He has lab work scheduled in a few weeks again as well  Currently he is on aspirin 325 mg daily  He is due to have some dental work in the future  His dentist wondered whether he could be on a lower dose aspirin  I will decrease his ASA dose to 81 mg daily  I made no change in his medicines today  I did give him education regarding a low-salt diet  He was also provided information about how to read food labels to facilitate adherence  He was advised that he may have recurrent CAD, if his risk factors are not controlled  I have spent 25 minutes with Patient and family today in which greater than 50% of this time was spent in counseling/coordination of care regarding Intructions for management, Patient and family education, Importance of tx compliance and Risk factor reductions  Assessment  Diagnoses and all orders for this visit:    Chronic diastolic heart failure ([facility] )  -     Echo complete with contrast if indicated; Future    S/P CABG (coronary artery bypass graft)  -     Echo complete with contrast if indicated; Future  -     aspirin (ECOTRIN LOW STRENGTH) 81 mg EC tablet; Take 1 tablet (81 mg total) by mouth daily    Benign hypertension with chronic kidney disease, stage IV (HCC)    Mixed hyperlipidemia    Stenosis of left internal carotid artery    Sleep apnea, unspecified type        Past Medical History:   Diagnosis Date    CHF (congestive heart failure) ([facility] )     Diabetes mellitus ([place] [address] )     Hyperlipidemia     Hypertension     Obesity     Renal disorder        Review of Systems   Constitution: Negative for chills  Cardiovascular: Negative for chest pain, claudication, cyanosis, dyspnea on exertion, irregular heartbeat, leg swelling, near-syncope, orthopnea, palpitations, paroxysmal nocturnal dyspnea and syncope  Respiratory: Negative for cough and shortness of breath  Gastrointestinal: Negative for heartburn and nausea  Neurological: Negative for dizziness, focal weakness, headaches, light-headedness and weakness     All other systems reviewed and are negative  No Known Allergies        Current Outpatient Medications:     Alpha-Lipoic Acid 600 MG CAPS, TAKE 1 CAPSULE BY MOUTH EVERY DAY, Disp: 30 capsule, Rfl: 5    Ascorbic Acid (VITAMIN C) 1000 MG tablet, Take 1,000 mg by mouth daily, Disp: , Rfl:     atorvastatin (LIPITOR) 80 mg tablet, Take 1 tablet (80 mg total) by mouth daily with dinner, Disp: 90 tablet, Rfl: 3    B-D ULTRAFINE III SHORT PEN 31G X 8 MM MISC, INJECT INTO THE SKIN 4 (FOUR) TIMES A DAY, Disp: 400 each, Rfl: 1    cholecalciferol (VITAMIN D3) 1,000 units tablet, Take 1 tablet (1,000 Units total) by mouth daily (Patient taking differently: Take 1,000 Units by mouth ), Disp: 1 tablet, Rfl: 1    Continuous Blood Gluc  (FREESTYLE JOHANNE READER) MAMI, Use device to scan blood glucose at least 4 times a day, Disp: 1 Device, Rfl: 0    Continuous Blood Gluc Sensor (FreeStyle Johanne 14 Day Sensor) [facility], APPLY SENSOR EVERY 14 DAYS TO CHECK BLOOD GLUCOSE AT LEAST 4 TIMES A DAY, Disp: 2 each, Rfl: 5    cyanocobalamin (VITAMIN B-12) 500 mcg tablet, Take 500 mcg by mouth daily, Disp: , Rfl:     docusate sodium (COLACE) 100 mg capsule, Take 1 capsule (100 mg total) by mouth 2 (two) times a day, Disp: 60 capsule, Rfl: 1    doxepin (SINEquan) 10 mg capsule, Take 10 mg by mouth daily at bedtime, Disp: , Rfl:     finasteride (PROSCAR) 5 mg tablet, Take 1 tablet (5 mg total) by mouth daily, Disp: 90 tablet, Rfl: 3    fluticasone (FLONASE) 50 mcg/act nasal spray, 1 spray into each nostril 2 (two) times a day as needed for rhinitis, Disp: 1 Bottle, Rfl: 0    gabapentin (NEURONTIN) 300 mg capsule, Take 1 cap (300mg) by mouth in the morning and 1 cap (300mg) at noon, take 2 caps (600mg) at bedtime, Disp: 28 capsule, Rfl: 0    glucose blood test strip, Check 4 times a day, Disp: 400 each, Rfl: 1    insulin glargine (Toujeo SoloStar) 300 units/mL CONCETRATED U-300 injection pen (1-unit dial), INJECT 74 UNITS UNDER THE SKIN DAILY AT BEDTIME, Disp: 22 mL, Rfl: 3    insulin lispro (HumaLOG KwikPen) 100 units/mL injection pen, INJECT 32-30-32 UNITS THREE TIMES A DAY PLUS SCALE MAXIMUM DOSE 130 UNITS, Disp: 105 mL, Rfl: 3    latanoprost (XALATAN) 0 005 % ophthalmic solution, 1 drop daily at bedtime, Disp: , Rfl:     liraglutide (Victoza) injection, Inject 0 3 mL (1 8 mg total) under the skin daily INJECT 1 8 MG DAILY INTO SKIN, Disp: 3 pen, Rfl: 3    losartan (COZAAR) 50 mg tablet, Take 1 tablet (50 mg total) by mouth daily after dinner, Disp: 90 tablet, Rfl: 3    metoprolol tartrate (LOPRESSOR) 25 mg tablet, Take 1 tablet (25 mg total) by mouth every 12 (twelve) hours, Disp: 180 tablet, Rfl: 3    Microlet Lancets MISC, USE 3 TIMES DAY, Disp: 300 each, Rfl: 1    omeprazole (PriLOSEC) 40 MG capsule, Take 40 mg by mouth daily, Disp: , Rfl:     tamsulosin (FLOMAX) 0 4 mg, Take 2 capsules (0 8 mg total) by mouth daily with dinner, Disp: 180 capsule, Rfl: 3    torsemide (DEMADEX) 20 mg tablet, TAKE 1 TABLET DAILY (Patient taking differently: 40 mg ), Disp: 90 tablet, Rfl: 3    aspirin (ECOTRIN LOW STRENGTH) 81 mg EC tablet, Take 1 tablet (81 mg total) by mouth daily, Disp: 30 tablet, Rfl: 1    Social History     Socioeconomic History    Marital status: /Civil Union     Spouse name: Not on file    Number of children: Not on file    Years of education: Not on file    Highest education level: Not on file   Occupational History    Occupation: RETIRED   Social Needs    Financial resource strain: Patient refused    Food insecurity     Worry: Patient refused     Inability: Patient refused    Transportation needs     Medical: No     Non-medical: No   Tobacco Use    Smoking status: Former Smoker     Packs/day: 3 00     Years: 30 00     Pack years:  00     Quit date:      Years since quittin 2    Smokeless tobacco: Never Used    Tobacco comment: Quit    Substance and Sexual Activity    Alcohol use: Not Currently     Comment: 1 drink every 6 months   Drug use: Never    Sexual activity: Not Currently   Lifestyle    Physical activity     Days per week: Patient refused     Minutes per session: Patient refused    Stress: Not on file   Relationships    Social connections     Talks on phone: Patient refused     Gets together: Patient refused     Attends Jain service: Patient refused     Active member of club or organization: Patient refused     Attends meetings of clubs or organizations: Patient refused     Relationship status: Patient refused    Intimate partner violence     Fear of current or ex partner: Not on file     Emotionally abused: Not on file     Physically abused: Not on file     Forced sexual activity: Not on file   Other Topics Concern    Not on file   Social History Narrative    · Do you currently or have you served in the [employer]:   No      · Were you activated, into active duty, as a member of the [email or web address] or as a Reservist:   No      · Caffeine intake:   Occasional      · Diet:   Regular      · Live alone or with others:   with others      · Exercise level:   Occasional  swim in summertime  walk alot  · ·Guns present in home:   No      · Seat belts used routinely:   Yes      · Advance directive:   No      · Sunscreen used routinely:   No      · Smoke alarm in home: Yes      · Legally blind in one or both eyes:   No      · Hard of hearing or deaf in one or both ears:   No      ·        Family History   Problem Relation Age of Onset    Cancer Mother     Cancer Father     Diabetes Maternal Grandmother     Diabetes Paternal Aunt        Physical Exam   Constitutional: He is oriented to person, place, and time  No distress  HENT:   Head: Normocephalic and atraumatic  Eyes: Conjunctivae and EOM are normal    Neck: Normal range of motion  Neck supple  Cardiovascular: Normal rate, regular rhythm, normal heart sounds and intact distal pulses     Pulmonary/Chest: Effort normal  He has decreased breath sounds  Abdominal: Soft  Bowel sounds are normal    Protuberant abdomen   Musculoskeletal: Normal range of motion  General: Edema present  Comments: Plus one bilateral lower extremity edema   Neurological: He is alert and oriented to person, place, and time  Skin: Skin is warm and dry  Psychiatric: He has a normal mood and affect  Nursing note and vitals reviewed  Vitals: Blood pressure 118/80, pulse 72, resp  rate 18, height 5' 11" (1 803 m), weight 127 kg (280 lb), SpO2 98 %  Wt Readings from Last 3 Encounters:   21 127 kg (280 lb)   21 127 kg (280 lb)   21 128 kg (282 lb)         Labs & Results:  Lab Results   Component Value Date    WBC 7 77 2021    HGB 11 0 (L) 2021    HCT 34 9 (L) 2021    MCV 94 2021     2021     No results found for: BNP  No components found for: CHEM    Results for orders placed during the hospital encounter of 06/15/19   Echo complete with contrast if indicated    Narrative [address]  [phone]    Transthoracic Echocardiogram  2D, M-mode, Doppler, and Color Doppler    Study date:  2019    Patient: Luana East  MR number: [id number]  Account number: [de-identified]  : 1945  Age: 68 years  Gender: Male  Status: Inpatient  Location: Bedside  Height: 71 in  Weight: 237 6 lb  BP: 119/ 63 mmHg    Indications: Pulmonary embolism  Diagnoses: I26 99 - Other pulmonary embolism without acute cor pulmonale    Sonographer:  Marge Michaud RDCS  Primary Physician:  Miguel Rogers DO  Referring Physician:  Mali Talbot PA-C  Group:  [id number]  Interpreting Physician:  Antoinette Rowan MD    SUMMARY    LEFT VENTRICLE:  Systolic function was normal  Ejection fraction was estimated to be 60 %  There were no regional wall motion abnormalities  Wall thickness was moderately increased    There was moderate concentric hypertrophy  Doppler parameters were consistent with abnormal left ventricular relaxation (grade 1 diastolic dysfunction)  Doppler parameters were consistent with high ventricular filling pressure  RIGHT VENTRICLE:  The size was normal   Systolic function was normal     LEFT ATRIUM:  The atrium was moderately dilated  RIGHT ATRIUM:  The atrium was mildly dilated  HISTORY: PRIOR HISTORY: CKD3  Hypertension  DM2  HARJIT  PROCEDURE: The procedure was performed at the bedside  This was a routine study  The transthoracic approach was used  The study included complete 2D imaging, M-mode, complete spectral Doppler, and color Doppler  The heart rate was 71 bpm,  at the start of the study  Image quality was adequate  LEFT VENTRICLE: Size was normal  Systolic function was normal  Ejection fraction was estimated to be 60 %  There were no regional wall motion abnormalities  Wall thickness was moderately increased  There was moderate concentric  hypertrophy  DOPPLER: Doppler parameters were consistent with abnormal left ventricular relaxation (grade 1 diastolic dysfunction)  Doppler parameters were consistent with high ventricular filling pressure  RIGHT VENTRICLE: The size was normal  Systolic function was normal  Wall thickness was normal     LEFT ATRIUM: The atrium was moderately dilated  RIGHT ATRIUM: The atrium was mildly dilated  MITRAL VALVE: Valve structure was normal  There was normal leaflet separation  DOPPLER: The transmitral velocity was within the normal range  There was no evidence for stenosis  There was no significant regurgitation  AORTIC VALVE: The valve was trileaflet  Leaflets exhibited mildly increased thickness, normal cuspal separation, and sclerosis  DOPPLER: Transaortic velocity was within the normal range  There was no evidence for stenosis  There was no  significant regurgitation      TRICUSPID VALVE: The valve structure was normal  There was normal leaflet separation  DOPPLER: The transtricuspid velocity was within the normal range  There was no evidence for stenosis  There was no significant regurgitation  PULMONIC VALVE: Leaflets exhibited normal thickness, no calcification, and normal cuspal separation  DOPPLER: The transpulmonic velocity was within the normal range  There was no significant regurgitation  PERICARDIUM: There was no pericardial effusion  The pericardium was normal in appearance  AORTA: The root exhibited normal size  SYSTEMIC VEINS: IVC: The inferior vena cava was normal in size  Respirophasic changes were normal     MEASUREMENT TABLES    OTHER ECHO MEASUREMENTS  (Reference normals)  Estimated CVP   5 mmHg   (--)    SYSTEM MEASUREMENT TABLES    2D  %FS: 28 52 %  Ao Diam: 3 53 cm  EDV(Teich): 94 27 ml  EF(Teich): 55 1 %  ESV(Teich): 42 33 ml  IVSd: 1 37 cm  LA Area: 25 6 cm2  LA Diam: 3 77 cm  LVEDV MOD A4C: 79 22 ml  LVEF MOD A4C: 58 43 %  LVESV MOD A4C: 32 93 ml  LVIDd: 4 54 cm  LVIDs: 3 24 cm  LVLd A4C: 9 47 cm  LVLs A4C: 8 61 cm  LVOT Diam: 2 13 cm  LVPWd: 1 29 cm  RA Area: 18 18 cm2  RVIDd: 3 18 cm  SV MOD A4C: 46 29 ml  SV(Teich): 51 94 ml    MM  TAPSE: 2 13 cm    PW  E': 0 05 m/s  E/E': 18 25  MV A Nick: 1 19 m/s  MV Dec Ozark: 4 16 m/s2  MV DecT: 240 11 ms  MV E Nick: 1 m/s  MV E/A Ratio: 0 84  MV PHT: 69 63 ms  MVA By PHT: 3 16 cm2    [employer] Accredited Echocardiography Laboratory    Prepared and electronically signed by    Yuan Munoz MD  Signed 17-Jun-2019 14:56:09       No results found for this or any previous visit  This note was completed in part utilizing [employer] direct voice recognition software  Grammatical errors, random word insertion, spelling mistakes, and incomplete sentences may be an occasional consequence of the system secondary to software limitations, ambient noise and hardware issues  At the time of dictation, efforts were made to edit, clarify and /or correct errors    Please read the chart carefully and recognize, using context, where substitutions have occurred    If you have any questions or concerns about the context, text or information contained within the body of this dictation, please contact myself, the provider, for further clarification

## 2021-03-23 ENCOUNTER — HOSPITAL ENCOUNTER (OUTPATIENT)
Dept: NON INVASIVE DIAGNOSTICS | Facility: CLINIC | Age: 76
Discharge: HOME | End: 2021-03-23
Payer: MEDICARE

## 2021-03-23 DIAGNOSIS — Z95.1 S/P CABG (CORONARY ARTERY BYPASS GRAFT): ICD-10-CM

## 2021-03-23 DIAGNOSIS — I50.32 CHRONIC DIASTOLIC HEART FAILURE (HCC): ICD-10-CM

## 2021-03-23 PROCEDURE — 93306 TTE W/DOPPLER COMPLETE: CPT | Performed by: INTERNAL MEDICINE

## 2021-03-23 PROCEDURE — 93306 TTE W/DOPPLER COMPLETE: CPT

## 2021-03-24 ENCOUNTER — PATIENT OUTREACH (OUTPATIENT)
Dept: FAMILY MEDICINE CLINIC | Facility: OTHER | Age: 76
End: 2021-03-24

## 2021-03-24 NOTE — PROGRESS NOTES
Pt had Echo done  Continues to receive home health services  Expecting a visit this week  Wife will call [address] home health to find out when next visit will be scheduled  Weight decreased 270lbs  (down 10 lbs)  Denies further questions at this time

## 2021-03-29 ENCOUNTER — TELEPHONE (OUTPATIENT)
Dept: NEPHROLOGY | Facility: CLINIC | Age: 76
End: 2021-03-29

## 2021-03-29 NOTE — TELEPHONE ENCOUNTER
Pt wife states he weighs himself daily; current weight is 270 lbs  Pulse runs between 52-62   (she states he had been 275 lbs previously, then went to 270 lbs  , 269 lbs , 268 lbs and now   270 lbs past couple days  ----- Message from Sandro Mahan MD sent at 3/29/2021 12:08 PM EDT -----  Please inform patient that his kidney function is stable  Please have him update us on his latest weights

## 2021-03-31 DIAGNOSIS — E11.65 UNCONTROLLED TYPE 2 DIABETES MELLITUS WITH HYPERGLYCEMIA (HCC): ICD-10-CM

## 2021-03-31 RX ORDER — INSULIN GLARGINE 300 U/ML
INJECTION, SOLUTION SUBCUTANEOUS
Qty: 22 ML | Refills: 3
Start: 2021-03-31 | End: 2021-05-13

## 2021-03-31 NOTE — TELEPHONE ENCOUNTER
Most BG levels range form 80s-200s mg/dl  Readings during the day are variable  Consistently on the higher end in the mornings  Increase Toujeo to 78 units  Keep carbohydrate intake consistent with meals to limit fluctuations in BG levels

## 2021-04-01 ENCOUNTER — TELEPHONE (OUTPATIENT)
Dept: FAMILY MEDICINE CLINIC | Facility: OTHER | Age: 76
End: 2021-04-01

## 2021-04-01 NOTE — TELEPHONE ENCOUNTER
Visiting nurse called to let you know patient [place] [facility]   Patient did not have any injuries from fall

## 2021-04-02 DIAGNOSIS — I25.10 TRIPLE VESSEL CORONARY ARTERY DISEASE: ICD-10-CM

## 2021-04-02 DIAGNOSIS — Z95.1 S/P CABG (CORONARY ARTERY BYPASS GRAFT): ICD-10-CM

## 2021-04-02 RX ORDER — ATORVASTATIN CALCIUM 80 MG/1
TABLET, FILM COATED ORAL
Qty: 90 TABLET | Refills: 3 | Status: SHIPPED | OUTPATIENT
Start: 2021-04-02 | End: 2022-07-05 | Stop reason: SDUPTHER

## 2021-04-07 DIAGNOSIS — G62.9 NEUROPATHY: ICD-10-CM

## 2021-04-08 ENCOUNTER — PATIENT OUTREACH (OUTPATIENT)
Dept: FAMILY MEDICINE CLINIC | Facility: OTHER | Age: 76
End: 2021-04-08

## 2021-04-08 NOTE — PROGRESS NOTES
Spoke with patients wife  He continues to be noncompliant with recommendations  She did verify that he continues to receive home health visits  Weight on home scale 269 lbs  Blood sugar range between 114-240 on Celia meter  Pt has returned to driving  Had a fall over the weekend outside of home but wife denies any injury  Wife awaiting refill of Gabapentin from PCP, message sent in my chart  Will continue to follow during bundled episode

## 2021-04-09 DIAGNOSIS — G62.9 NEUROPATHY: ICD-10-CM

## 2021-04-09 RX ORDER — GABAPENTIN 300 MG/1
CAPSULE ORAL
Qty: 120 CAPSULE | Refills: 1 | Status: SHIPPED | OUTPATIENT
Start: 2021-04-09 | End: 2021-04-09 | Stop reason: SDUPTHER

## 2021-04-09 NOTE — TELEPHONE ENCOUNTER
Patient's wife called and left message requesting a refill of gabapentin 300 mg 1 cap (300mg) by mouth in the morning and 1 cap (300mg) at noon, take 2 caps (600mg) at bedtime    Patient's wife would like a 14 day supply sent to [facility] on file  This was already sent in to the pharmacy by PCP  She would also like the 90 day supply sent to [facility]       Order pended below, please review and sign if agreeable

## 2021-04-10 RX ORDER — GABAPENTIN 300 MG/1
CAPSULE ORAL
Qty: 360 CAPSULE | Refills: 1 | Status: SHIPPED | OUTPATIENT
Start: 2021-04-10 | End: 2022-03-28 | Stop reason: SDUPTHER

## 2021-04-14 DIAGNOSIS — G62.9 NEUROPATHY: ICD-10-CM

## 2021-04-14 DIAGNOSIS — Z95.1 S/P CABG (CORONARY ARTERY BYPASS GRAFT): ICD-10-CM

## 2021-04-14 RX ORDER — ASPIRIN 81 MG/1
81 TABLET ORAL DAILY
Qty: 60 TABLET | Refills: 8 | Status: SHIPPED | OUTPATIENT
Start: 2021-04-14

## 2021-04-14 RX ORDER — PERPHENAZINE 16 MG
TABLET ORAL
Qty: 30 CAPSULE | Refills: 5 | Status: SHIPPED | OUTPATIENT
Start: 2021-04-14 | End: 2022-06-23

## 2021-05-06 ENCOUNTER — LAB (OUTPATIENT)
Dept: LAB | Facility: CLINIC | Age: 76
End: 2021-05-06
Payer: MEDICARE

## 2021-05-06 ENCOUNTER — TRANSCRIBE ORDERS (OUTPATIENT)
Dept: LAB | Facility: CLINIC | Age: 76
End: 2021-05-06

## 2021-05-06 DIAGNOSIS — I12.9 BENIGN HYPERTENSION WITH CHRONIC KIDNEY DISEASE, STAGE IV (HCC): ICD-10-CM

## 2021-05-06 DIAGNOSIS — N18.30 BENIGN HYPERTENSION WITH CHRONIC KIDNEY DISEASE, STAGE III (HCC): ICD-10-CM

## 2021-05-06 DIAGNOSIS — E11.65 TYPE 2 DIABETES MELLITUS WITH HYPERGLYCEMIA, WITH LONG-TERM CURRENT USE OF INSULIN (HCC): ICD-10-CM

## 2021-05-06 DIAGNOSIS — N18.4 BENIGN HYPERTENSION WITH CHRONIC KIDNEY DISEASE, STAGE IV (HCC): ICD-10-CM

## 2021-05-06 DIAGNOSIS — Z79.4 TYPE 2 DIABETES MELLITUS WITH HYPERGLYCEMIA, WITH LONG-TERM CURRENT USE OF INSULIN (HCC): ICD-10-CM

## 2021-05-06 DIAGNOSIS — I12.9 BENIGN HYPERTENSION WITH CHRONIC KIDNEY DISEASE, STAGE III (HCC): ICD-10-CM

## 2021-05-06 DIAGNOSIS — N18.4 CKD (CHRONIC KIDNEY DISEASE), STAGE IV (HCC): ICD-10-CM

## 2021-05-06 LAB
25(OH)D3 SERPL-MCNC: 26.4 NG/ML (ref 30–100)
ALBUMIN SERPL BCP-MCNC: 3.2 G/DL (ref 3.5–5)
ALP SERPL-CCNC: 105 U/L (ref 46–116)
ALT SERPL W P-5'-P-CCNC: 20 U/L (ref 12–78)
ANION GAP SERPL CALCULATED.3IONS-SCNC: 10 MMOL/L (ref 4–13)
AST SERPL W P-5'-P-CCNC: 11 U/L (ref 5–45)
BILIRUB SERPL-MCNC: 0.34 MG/DL (ref 0.2–1)
BUN SERPL-MCNC: 44 MG/DL (ref 5–25)
CALCIUM ALBUM COR SERPL-MCNC: 9.1 MG/DL (ref 8.3–10.1)
CALCIUM SERPL-MCNC: 8.5 MG/DL (ref 8.3–10.1)
CHLORIDE SERPL-SCNC: 108 MMOL/L (ref 100–108)
CO2 SERPL-SCNC: 26 MMOL/L (ref 21–32)
CREAT SERPL-MCNC: 2.49 MG/DL (ref 0.6–1.3)
CREAT UR-MCNC: 134 MG/DL
ERYTHROCYTE [DISTWIDTH] IN BLOOD BY AUTOMATED COUNT: 14.8 % (ref 11.6–15.1)
EST. AVERAGE GLUCOSE BLD GHB EST-MCNC: 189 MG/DL
GFR SERPL CREATININE-BSD FRML MDRD: 24 ML/MIN/1.73SQ M
GLUCOSE P FAST SERPL-MCNC: 141 MG/DL (ref 65–99)
HBA1C MFR BLD: 8.2 %
HCT VFR BLD AUTO: 34.9 % (ref 36.5–49.3)
HGB BLD-MCNC: 11.1 G/DL (ref 12–17)
MAGNESIUM SERPL-MCNC: 2.2 MG/DL (ref 1.6–2.6)
MCH RBC QN AUTO: 29.9 PG (ref 26.8–34.3)
MCHC RBC AUTO-ENTMCNC: 31.8 G/DL (ref 31.4–37.4)
MCV RBC AUTO: 94 FL (ref 82–98)
PHOSPHATE SERPL-MCNC: 4 MG/DL (ref 2.3–4.1)
PLATELET # BLD AUTO: 240 THOUSANDS/UL (ref 149–390)
PMV BLD AUTO: 11 FL (ref 8.9–12.7)
POTASSIUM SERPL-SCNC: 4 MMOL/L (ref 3.5–5.3)
PROT SERPL-MCNC: 7 G/DL (ref 6.4–8.2)
PROT UR-MCNC: 142 MG/DL
PROT/CREAT UR: 1.06 MG/G{CREAT} (ref 0–0.1)
PTH-INTACT SERPL-MCNC: 106.6 PG/ML (ref 18.4–80.1)
RBC # BLD AUTO: 3.71 MILLION/UL (ref 3.88–5.62)
SODIUM SERPL-SCNC: 144 MMOL/L (ref 136–145)
WBC # BLD AUTO: 5.23 THOUSAND/UL (ref 4.31–10.16)

## 2021-05-06 PROCEDURE — 85027 COMPLETE CBC AUTOMATED: CPT

## 2021-05-06 PROCEDURE — 83970 ASSAY OF PARATHORMONE: CPT

## 2021-05-06 PROCEDURE — 36415 COLL VENOUS BLD VENIPUNCTURE: CPT

## 2021-05-06 PROCEDURE — 84100 ASSAY OF PHOSPHORUS: CPT

## 2021-05-06 PROCEDURE — 82306 VITAMIN D 25 HYDROXY: CPT

## 2021-05-06 PROCEDURE — 83036 HEMOGLOBIN GLYCOSYLATED A1C: CPT

## 2021-05-06 PROCEDURE — 83735 ASSAY OF MAGNESIUM: CPT

## 2021-05-06 PROCEDURE — 84156 ASSAY OF PROTEIN URINE: CPT

## 2021-05-06 PROCEDURE — 82570 ASSAY OF URINE CREATININE: CPT

## 2021-05-06 PROCEDURE — 80053 COMPREHEN METABOLIC PANEL: CPT

## 2021-05-07 ENCOUNTER — PATIENT OUTREACH (OUTPATIENT)
Dept: FAMILY MEDICINE CLINIC | Facility: OTHER | Age: 76
End: 2021-05-07

## 2021-05-07 NOTE — PROGRESS NOTES
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

## 2021-05-11 DIAGNOSIS — R29.898 WEAKNESS OF RIGHT LOWER EXTREMITY: ICD-10-CM

## 2021-05-11 DIAGNOSIS — G62.9 NEUROPATHY: Primary | ICD-10-CM

## 2021-05-11 DIAGNOSIS — E74.04 MCARDLE DISEASE (HCC): ICD-10-CM

## 2021-05-11 NOTE — PROGRESS NOTES
The test is ordered today  I did go through OT as I couldn't find the actual fit to drive order by itself  If its not correct please let me know  Also please inform the patient so they can schedule the test       Thanks,  Dr Shashi Sanders

## 2021-05-12 ENCOUNTER — PATIENT OUTREACH (OUTPATIENT)
Dept: FAMILY MEDICINE CLINIC | Facility: OTHER | Age: 76
End: 2021-05-12

## 2021-05-12 NOTE — PROGRESS NOTES
Referral received by [address] [place] [address] "Fit to Drive" program    Spoke with patients wife, she is aware that they will be contacting her to schedule  If she does not hear back from them, provided phone number for her to schedule at [phone]

## 2021-05-13 ENCOUNTER — TELEPHONE (OUTPATIENT)
Dept: ENDOCRINOLOGY | Facility: CLINIC | Age: 76
End: 2021-05-13

## 2021-05-13 DIAGNOSIS — E11.65 UNCONTROLLED TYPE 2 DIABETES MELLITUS WITH HYPERGLYCEMIA (HCC): ICD-10-CM

## 2021-05-13 RX ORDER — INSULIN GLARGINE 300 U/ML
INJECTION, SOLUTION SUBCUTANEOUS
Qty: 22 ML | Refills: 3
Start: 2021-05-13 | End: 2021-05-17

## 2021-05-13 NOTE — TELEPHONE ENCOUNTER
I let the pts wife know  She stated pt is completely none compliant and does not follow what he is supposed to be doing for his diabetes  She states she will no longer come to appts with him b/c all he does is lie about his insulin regimen

## 2021-05-13 NOTE — TELEPHONE ENCOUNTER
Morning readings are often elevated  Increase Toujeo to 82 units     Readings prior to meals are in variable range  Follow diabetic diet and keep carbohydrate intake consistent and limited with meals to avoid fluctuations in BG levels

## 2021-05-14 DIAGNOSIS — E11.65 UNCONTROLLED TYPE 2 DIABETES MELLITUS WITH HYPERGLYCEMIA (HCC): ICD-10-CM

## 2021-05-14 RX ORDER — LIRAGLUTIDE 6 MG/ML
INJECTION SUBCUTANEOUS
Qty: 27 ML | Refills: 1 | Status: SHIPPED | OUTPATIENT
Start: 2021-05-14 | End: 2022-03-28 | Stop reason: SDUPTHER

## 2021-05-17 ENCOUNTER — OFFICE VISIT (OUTPATIENT)
Dept: ENDOCRINOLOGY | Facility: CLINIC | Age: 76
End: 2021-05-17
Payer: MEDICARE

## 2021-05-17 VITALS
BODY MASS INDEX: 40.32 KG/M2 | TEMPERATURE: 97 F | DIASTOLIC BLOOD PRESSURE: 76 MMHG | WEIGHT: 288 LBS | HEIGHT: 71 IN | SYSTOLIC BLOOD PRESSURE: 150 MMHG | HEART RATE: 76 BPM

## 2021-05-17 DIAGNOSIS — I12.9 BENIGN HYPERTENSION WITH CHRONIC KIDNEY DISEASE, STAGE IV (HCC): ICD-10-CM

## 2021-05-17 DIAGNOSIS — E78.2 MIXED HYPERLIPIDEMIA: ICD-10-CM

## 2021-05-17 DIAGNOSIS — E11.65 TYPE 2 DIABETES MELLITUS WITH HYPERGLYCEMIA, WITH LONG-TERM CURRENT USE OF INSULIN (HCC): Primary | ICD-10-CM

## 2021-05-17 DIAGNOSIS — Z79.4 TYPE 2 DIABETES MELLITUS WITH HYPERGLYCEMIA, WITH LONG-TERM CURRENT USE OF INSULIN (HCC): Primary | ICD-10-CM

## 2021-05-17 DIAGNOSIS — E55.9 VITAMIN D DEFICIENCY: ICD-10-CM

## 2021-05-17 DIAGNOSIS — N18.4 BENIGN HYPERTENSION WITH CHRONIC KIDNEY DISEASE, STAGE IV (HCC): ICD-10-CM

## 2021-05-17 PROCEDURE — 99214 OFFICE O/P EST MOD 30 MIN: CPT | Performed by: PHYSICIAN ASSISTANT

## 2021-05-17 RX ORDER — TORSEMIDE 20 MG/1
TABLET ORAL
COMMUNITY
Start: 2021-03-16 | End: 2021-05-20 | Stop reason: SDUPTHER

## 2021-05-17 RX ORDER — INSULIN GLARGINE 300 U/ML
85 INJECTION, SOLUTION SUBCUTANEOUS DAILY
Qty: 4 PEN | Refills: 5 | Status: SHIPPED | OUTPATIENT
Start: 2021-05-17 | End: 2021-06-24

## 2021-05-17 RX ORDER — CALCIUM CARBONATE/VITAMIN D3 600 MG-20
TABLET ORAL
COMMUNITY
Start: 2021-04-14 | End: 2021-07-14 | Stop reason: SDUPTHER

## 2021-05-17 NOTE — PROGRESS NOTES
Patient Progress Note      CC: DM      Referring Provider  No referring provider defined for this encounter  History of Present Illness:   Nuzhat Lovelace  is a 77 y o  male with a history of type 2 diabetes with long term use of insulin  Diabetes course has been stable  Complications of DM: CAD, CKD  He was hospitalized in March 2021 for CHF  Denies recent severe hypoglycemic or severe hyperglycemic episodes  Denies any issues with his current regimen  Home glucose monitoring: are performed regularly  Readings range from 98-200s mg/dl  Readings are variable, most readings in the 200s mg/dl  Occasionally readings are over 300 mg/dl  Current regimen: Toujeo 82 units QHS (he using 80 units), Humalog 32-30-32 units TID with meals, Victoza 1 8 mg daily  compliant most of the time, denies any side effects from medications  Injects in: abdomen  Rotates sites: Yes  Hypoglycemic episodes: No, rare  H/o of hypoglycemia causing hospitalization or Intervention such as glucagon injection or ambulance call : No  Hypoglycemia symptoms: jitteriness  Treatment of hypoglycemia: orange juice      Medic alert tag: recommended: Yes     Diabetes education: Yes  Diet: 3 meals per day, 1 snack per day  Timing of meals is predictable  Diabetic diet compliance: noncompliant most of the time  Activity: Daily activity is predictable: Yes  No routine exercise     Ophthamology: sees every 3 months per patient  Podiatry: sees podiatrist routinely  Dr Brea Clark  March 2021     Has HTN: on losartan  Has hyperlipidemia: on statin - tolerating well, no myalgias  compliant most of the time, denies any side effects from medications    Thyroid disorders: No  History of pancreatitis: No    Vitamin D deficiency: taking 1000 IU daily    Patient Active Problem List   Diagnosis    CKD (chronic kidney disease), stage IV (HCC)    Benign hypertension with chronic kidney disease, stage IV (HCC)    Chronic kidney disease-mineral and bone disorder    Type 2 diabetes mellitus, with long-term current use of insulin (HCC)    Sleep apnea    Triple vessel coronary artery disease    Hyperlipidemia    S/P CABG (coronary artery bypass graft)    Acute postoperative pulmonary insufficiency (HCC)    Other constipation    Iron deficiency anemia due to chronic blood loss    Non-nephrotic range proteinuria    Esophageal dysphagia    History of colonic polyps    Lumbar back pain    Lumbar discogenic pain syndrome    Obesity, unspecified    Pain of lower extremity    Trigger finger of left hand    Spondylolisthesis    Spinal stenosis    Vitamin D deficiency    Lymphadenopathy    [facility]    Stenosis of left internal carotid artery    Neuropathy    Diabetic polyneuropathy associated with type 2 diabetes mellitus ([facility]    Encounter for  medical examination (CDME)    ERRONEOUS ENCOUNTER--DISREGARD    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[facility]    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[facility]    Traumatic injury of skin    Hypervolemia associated with renal insufficiency      Past Medical History:   Diagnosis Date    [facility]     Diabetes mellitus ([facility]     Hyperlipidemia     Hypertension     Obesity     Renal disorder       Past Surgical History:   Procedure Laterality Date    BACK SURGERY      COLONOSCOPY      CORONARY ARTERY BYPASS GRAFT  2019    quad    GALLBLADDER SURGERY      HERNIA REPAIR      AR CABG, ARTERY-VEIN, FOUR N/A 6/21/2019    Procedure: CORONARY ARTERY BYPASS GRAFT (CABG) 4 VESSELS WITH SVG TO PDA, OM, DIAGONAL AND LIMA TO LAD; RIGHT LEG EVH;  Surgeon: Dino Gutierrez MD;  Location: [facility];  Service: Cardiac Surgery    RECTAL SURGERY        Family History   Problem Relation Age of Onset    Cancer Mother     Cancer Father     Diabetes Maternal Grandmother     Diabetes Paternal Aunt      Social History     Tobacco Use    Smoking status: Former Smoker     Packs/day: 3 00     Years: 30 00 Pack years: 90 00     Quit date:      Years since quittin 3    Smokeless tobacco: Never Used    Tobacco comment: Quit    Substance Use Topics    Alcohol use: Not Currently     Comment: 1 drink every 6 months       No Known Allergies      Current Outpatient Medications:     Alpha-Lipoic Acid 600 MG CAPS, TAKE 1 CAPSULE BY MOUTH EVERY DAY, Disp: 30 capsule, Rfl: 5    Ascorbic Acid (VITAMIN C) 1000 MG tablet, Take 1,000 mg by mouth daily, Disp: , Rfl:     aspirin (ECOTRIN LOW STRENGTH) 81 mg EC tablet, Take 1 tablet (81 mg total) by mouth daily, Disp: 60 tablet, Rfl: 8    atorvastatin (LIPITOR) 80 mg tablet, TAKE 1 TABLET DAILY WITH DINNER, Disp: 90 tablet, Rfl: 3    B-D ULTRAFINE III SHORT PEN 31G X 8 MM MISC, INJECT INTO THE SKIN 4 (FOUR) TIMES A DAY, Disp: 400 each, Rfl: 1    cholecalciferol (VITAMIN D3) 1,000 units tablet, Take 1 tablet (1,000 Units total) by mouth daily (Patient taking differently: Take 1,000 Units by mouth ), Disp: 1 tablet, Rfl: 1    Continuous Blood Gluc  (FREESTYLE JOHANNE READER) MAMI, Use device to scan blood glucose at least 4 times a day, Disp: 1 Device, Rfl: 0    Continuous Blood Gluc Sensor (FreeStyle Johanne 14 Day Sensor) [facility], APPLY SENSOR EVERY 14 DAYS TO CHECK BLOOD GLUCOSE AT LEAST 4 TIMES A DAY, Disp: 2 each, Rfl: 5    [employer] Aspirin 325 MG tablet, TAKE 1 TABLET BY MOUTH EVERY DAY, Disp: , Rfl:     cyanocobalamin (VITAMIN B-12) 500 mcg tablet, Take 500 mcg by mouth daily, Disp: , Rfl:     docusate sodium (COLACE) 100 mg capsule, Take 1 capsule (100 mg total) by mouth 2 (two) times a day, Disp: 60 capsule, Rfl: 1    doxepin (SINEquan) 10 mg capsule, Take 10 mg by mouth daily at bedtime, Disp: , Rfl:     finasteride (PROSCAR) 5 mg tablet, Take 1 tablet (5 mg total) by mouth daily, Disp: 90 tablet, Rfl: 3    fluticasone (FLONASE) 50 mcg/act nasal spray, 1 spray into each nostril 2 (two) times a day as needed for rhinitis, Disp: 1 Bottle, Rfl: 0   gabapentin (NEURONTIN) 300 mg capsule, Take 1 cap (300mg) by mouth in the morning and 1 cap (300mg) at noon, take 2 caps (600mg) at bedtime, Disp: 360 capsule, Rfl: 1    glucose blood test strip, Check 4 times a day, Disp: 400 each, Rfl: 1    insulin lispro (HumaLOG KwikPen) 100 units/mL injection pen, INJECT 32-30-32 UNITS THREE TIMES A DAY PLUS SCALE MAXIMUM DOSE 130 UNITS, Disp: 105 mL, Rfl: 3    latanoprost (XALATAN) 0 005 % ophthalmic solution, 1 drop daily at bedtime, Disp: , Rfl:     losartan (COZAAR) 50 mg tablet, Take 1 tablet (50 mg total) by mouth daily after dinner, Disp: 90 tablet, Rfl: 3    metoprolol tartrate (LOPRESSOR) 25 mg tablet, TAKE 1 TABLET EVERY 12 HOURS, Disp: 180 tablet, Rfl: 3    Microlet Lancets MISC, USE 3 TIMES DAY, Disp: 300 each, Rfl: 1    omeprazole (PriLOSEC) 40 MG capsule, Take 40 mg by mouth daily, Disp: , Rfl:     tamsulosin (FLOMAX) 0 4 mg, Take 2 capsules (0 8 mg total) by mouth daily with dinner, Disp: 180 capsule, Rfl: 3    torsemide (DEMADEX) 20 mg tablet, TAKE 1 TABLET DAILY (Patient taking differently: 40 mg ), Disp: 90 tablet, Rfl: 3    torsemide (DEMADEX) 20 mg tablet, , Disp: , Rfl:     Victoza injection, INJECT 1 8 MG DAILY INTO SKIN, Disp: 27 mL, Rfl: 1    insulin glargine (Toujeo Max SoloStar) 300 units/mL CONCENTRATED U-300 injection pen (2-unit dial), Inject 85 Units under the skin daily, Disp: 4 pen, Rfl: 5  Review of Systems   Constitutional: Negative for activity change, appetite change, fatigue and unexpected weight change  HENT: Negative for trouble swallowing  Eyes: Positive for visual disturbance  Respiratory: Negative for shortness of breath  Cardiovascular: Negative for chest pain and palpitations  Gastrointestinal: Positive for diarrhea (occasional)  Negative for constipation  Endocrine: Negative for polydipsia and polyuria  Musculoskeletal: Negative  Skin: Positive for wound (right ankle, managed by podiatry  )  Neurological: Negative for numbness  Psychiatric/Behavioral: Negative  Physical Exam:  Body mass index is 40 17 kg/m²  /76   Pulse 76   Temp (!) 97 °F (36 1 °C)   Ht 5' 11" (1 803 m)   Wt 131 kg (288 lb)   BMI 40 17 kg/m²    Wt Readings from Last 3 Encounters:   05/17/21 131 kg (288 lb)   03/22/21 127 kg (280 lb)   03/17/21 127 kg (280 lb)       Physical Exam  Vitals signs and nursing note reviewed  Constitutional:       Appearance: He is well-developed  HENT:      Head: Normocephalic  Eyes:      General: No scleral icterus  Pupils: Pupils are equal, round, and reactive to light  Neck:      Musculoskeletal: Neck supple  Thyroid: No thyromegaly  Cardiovascular:      Rate and Rhythm: Normal rate and regular rhythm  Pulses:           Radial pulses are 2+ on the right side and 2+ on the left side  Heart sounds: No murmur  Pulmonary:      Effort: Pulmonary effort is normal  No respiratory distress  Breath sounds: Normal breath sounds  No wheezing  Skin:     General: Skin is warm and dry  Neurological:      Mental Status: He is alert  Diabetic Foot Exam    Labs:   Component      Latest Ref Rng & Units 3/16/2021 5/6/2021   Sodium      136 - 145 mmol/L  144   Potassium      3 5 - 5 3 mmol/L  4 0   Chloride      100 - 108 mmol/L  108   CO2      21 - 32 mmol/L  26   Anion Gap      4 - 13 mmol/L  10   BUN      5 - 25 mg/dL  44 (H)   Creatinine      0 60 - 1 30 mg/dL  2 49 (H)   GLUCOSE FASTING      65 - 99 mg/dL  141 (H)   Calcium      8 3 - 10 1 mg/dL  8 5   CORRECTED CALCIUM      8 3 - 10 1 mg/dL  9 1   AST      5 - 45 U/L  11   ALT      12 - 78 U/L  20   Alkaline Phosphatase      46 - 116 U/L  105   Total Protein      6 4 - 8 2 g/dL  7 0   Albumin      3 5 - 5 0 g/dL  3 2 (L)   TOTAL BILIRUBIN      0 20 - 1 00 mg/dL  0 34   eGFR      ml/min/1 73sq m  24   Hemoglobin A1C      Normal 3 8-5 6%; PreDiabetic 5 7-6 4%;  Diabetic >=6 5%; Glycemic control for adults with diabetes <7 0% % 8 3 (A) 8 2 (H)   eAG, EST AVG Glucose      mg/dl  189   PARATHYROID HORMONE      18 4 - 80 1 pg/mL  106 6 (H)   Vit D, 25-Hydroxy      30 0 - 100 0 ng/mL  26 4 (L)       Plan:    Diagnoses and all orders for this visit:    Type 2 diabetes mellitus with hyperglycemia, with long-term current use of insulin ([facility])  HGA1C 8 2%  Improved  Treatment regimen: morning BG levels are elevated  Increase Toujeo to 85 units and change to [employer]  Discussed dietary changes  Discussed intensive insulin regimen does increase risk for hypoglycemia  Episodes of hypoglycemia can lead to permanent disability and death  Discussed risks/complications associated with uncontrolled diabetes  Advised to adhere to diabetic diet, and recommended staying active/exercising routinely as tolerated  Keep carbohydrates consistent to limit blood glucose fluctuations  Advised to call if blood sugars less than 70 mg/dl or over 300 mg/dl  Check blood glucose 4 times a day  Discussed symptoms and treatment of hypoglycemia  Recommended routine follow-up with podiatry and ophthalmology  Send log in 2 weeks  Ordered blood work to complete prior to next visit  -     Hemoglobin A1C; Future  -     Basic metabolic panel; Future  -     insulin glargine (Toujeo Max SoloStar) 300 units/mL CONCENTRATED U-300 injection pen (2-unit dial); Inject 85 Units under the skin daily    Benign hypertension with chronic kidney disease, stage IV ([facility])  Blood pressure elevated  Advised to monitor BP at home and follow-up with PCP if remaining elevated  For now continue current treatment  -     Basic metabolic panel; Future    Mixed hyperlipidemia  LDL 78  Continue statin therapy    Vitamin D deficiency  Vitamin D is low at 26 4  Take vitamin D3 2000 IU daily       Discussed with the patient diagnosis and treatment and all questions fully answered  He will call me if any problems arise      Counseled patient on diagnostic results, prognosis, risk and benefit of treatment options, instruction for management, importance of treatment compliance, risk factor reduction and impressions        Farnaz Crooks PA-C

## 2021-05-17 NOTE — PATIENT INSTRUCTIONS
Hypoglycemia instructions   Jaylen Client   5/17/2021  [id number]    Low Blood Sugar    Steps to treat low blood sugar  1  Test blood sugar if you have symptoms of low blood sugar:   Low Blood Sugar Symptoms:  o Sweaty  o Dizzy  o Rapid heartbeat  o Shaky  o Bad mood  o Hungry      2  Treat blood sugar less than 70 with 15 grams of fast-acting carbohydrate:   Examples of 15 grams Fast-Acting Carbohydrate:  o 4 oz juice  o 4 oz regular soda  o 3-4 glucose tablets (chew)  o 3-4 hard candies (chew)          3  Wait 15 minutes and test your blood sugar again     4   If blood sugar is less than 100, repeat steps 2-3     5  When your blood sugar is 100 or more, eat a snack if it will be longer than one hour until your next meal  The snack should be 15 grams of carbohydrate and a protein:   Examples of snacks:  o ½ sandwich  o 6 crackers with cheese  o Piece of fruit with cheese or peanut butter  o 6 crackers with peanut butter

## 2021-05-18 ENCOUNTER — OFFICE VISIT (OUTPATIENT)
Dept: UROLOGY | Facility: CLINIC | Age: 76
End: 2021-05-18
Payer: MEDICARE

## 2021-05-18 VITALS
DIASTOLIC BLOOD PRESSURE: 68 MMHG | WEIGHT: 288 LBS | BODY MASS INDEX: 40.32 KG/M2 | SYSTOLIC BLOOD PRESSURE: 122 MMHG | HEIGHT: 71 IN | HEART RATE: 78 BPM

## 2021-05-18 DIAGNOSIS — R33.9 URINARY RETENTION: Primary | ICD-10-CM

## 2021-05-18 DIAGNOSIS — N40.1 BPH WITH OBSTRUCTION/LOWER URINARY TRACT SYMPTOMS: ICD-10-CM

## 2021-05-18 DIAGNOSIS — N13.8 BPH WITH OBSTRUCTION/LOWER URINARY TRACT SYMPTOMS: ICD-10-CM

## 2021-05-18 DIAGNOSIS — Z87.898 HISTORY OF ELEVATED PSA: ICD-10-CM

## 2021-05-18 DIAGNOSIS — N31.9 NEUROMUSCULAR DYSFUNCTION OF BLADDER, UNSPECIFIED: ICD-10-CM

## 2021-05-18 LAB — POST-VOID RESIDUAL VOLUME, ML POC: 194 ML

## 2021-05-18 PROCEDURE — 99213 OFFICE O/P EST LOW 20 MIN: CPT | Performed by: PHYSICIAN ASSISTANT

## 2021-05-18 PROCEDURE — 51798 US URINE CAPACITY MEASURE: CPT | Performed by: PHYSICIAN ASSISTANT

## 2021-05-18 NOTE — PROGRESS NOTES
UROLOGY PROGRESS NOTE   Patient Identifiers: Al Hooper (MRN [id number])  Date of Service: 5/18/2021    Subjective:     71-year-old man history of BPH and urinary retention  He had been placed on tamsulosin and finasteride   mL  I increased his tamsulosin in February  I recommended a cystoscopy at that time which he declined  There was only a nominal improvement in his residual volume  He has no burning hematuria or incontinence        Reason for visit:  BPH with obstruction follow-up    Objective:     VITALS:    Vitals:    05/18/21 0834   BP: 122/68   Pulse: 78           LABS:  Lab Results   Component Value Date    HGB 11 1 (L) 05/06/2021    HCT 34 9 (L) 05/06/2021    WBC 5 23 05/06/2021     05/06/2021   ]    Lab Results   Component Value Date    K 4 0 05/06/2021     05/06/2021    CO2 26 05/06/2021    BUN 44 (H) 05/06/2021    CREATININE 2 49 (H) 05/06/2021    CALCIUM 8 5 05/06/2021    GLUCOSE 141 (H) 06/21/2019   ]        INPATIENT MEDS:    Current Outpatient Medications:     Alpha-Lipoic Acid 600 MG CAPS, TAKE 1 CAPSULE BY MOUTH EVERY DAY, Disp: 30 capsule, Rfl: 5    Ascorbic Acid (VITAMIN C) 1000 MG tablet, Take 1,000 mg by mouth daily, Disp: , Rfl:     aspirin (ECOTRIN LOW STRENGTH) 81 mg EC tablet, Take 1 tablet (81 mg total) by mouth daily, Disp: 60 tablet, Rfl: 8    atorvastatin (LIPITOR) 80 mg tablet, TAKE 1 TABLET DAILY WITH DINNER, Disp: 90 tablet, Rfl: 3    B-D ULTRAFINE III SHORT PEN 31G X 8 MM MISC, INJECT INTO THE SKIN 4 (FOUR) TIMES A DAY, Disp: 400 each, Rfl: 1    cholecalciferol (VITAMIN D3) 1,000 units tablet, Take 1 tablet (1,000 Units total) by mouth daily (Patient taking differently: Take 1,000 Units by mouth ), Disp: 1 tablet, Rfl: 1    Continuous Blood Gluc  (FREESTYLE JOHANNE READER) MAMI, Use device to scan blood glucose at least 4 times a day, Disp: 1 Device, Rfl: 0    Continuous Blood Gluc Sensor (FreeStyle Johanne 14 Day Sensor) MISC, APPLY SENSOR EVERY 14 DAYS TO CHECK BLOOD GLUCOSE AT LEAST 4 TIMES A DAY, Disp: 2 each, Rfl: 5    cyanocobalamin (VITAMIN B-12) 500 mcg tablet, Take 500 mcg by mouth daily, Disp: , Rfl:     docusate sodium (COLACE) 100 mg capsule, Take 1 capsule (100 mg total) by mouth 2 (two) times a day, Disp: 60 capsule, Rfl: 1    doxepin (SINEquan) 10 mg capsule, Take 10 mg by mouth daily at bedtime, Disp: , Rfl:     finasteride (PROSCAR) 5 mg tablet, Take 1 tablet (5 mg total) by mouth daily, Disp: 90 tablet, Rfl: 3    gabapentin (NEURONTIN) 300 mg capsule, Take 1 cap (300mg) by mouth in the morning and 1 cap (300mg) at noon, take 2 caps (600mg) at bedtime, Disp: 360 capsule, Rfl: 1    glucose blood test strip, Check 4 times a day, Disp: 400 each, Rfl: 1    insulin glargine (Toujeo Max SoloStar) 300 units/mL CONCENTRATED U-300 injection pen (2-unit dial), Inject 85 Units under the skin daily, Disp: 4 pen, Rfl: 5    insulin lispro (HumaLOG KwikPen) 100 units/mL injection pen, INJECT 32-30-32 UNITS THREE TIMES A DAY PLUS SCALE MAXIMUM DOSE 130 UNITS, Disp: 105 mL, Rfl: 3    latanoprost (XALATAN) 0 005 % ophthalmic solution, 1 drop daily at bedtime, Disp: , Rfl:     losartan (COZAAR) 50 mg tablet, Take 1 tablet (50 mg total) by mouth daily after dinner, Disp: 90 tablet, Rfl: 3    metoprolol tartrate (LOPRESSOR) 25 mg tablet, TAKE 1 TABLET EVERY 12 HOURS, Disp: 180 tablet, Rfl: 3    Microlet Lancets MISC, USE 3 TIMES DAY, Disp: 300 each, Rfl: 1    omeprazole (PriLOSEC) 40 MG capsule, Take 40 mg by mouth daily, Disp: , Rfl:     tamsulosin (FLOMAX) 0 4 mg, Take 2 capsules (0 8 mg total) by mouth daily with dinner, Disp: 180 capsule, Rfl: 3    torsemide (DEMADEX) 20 mg tablet, , Disp: , Rfl:     Victoza injection, INJECT 1 8 MG DAILY INTO SKIN, Disp: 27 mL, Rfl: 1    [employer]S Aspirin 325 MG tablet, TAKE 1 TABLET BY MOUTH EVERY DAY, Disp: , Rfl:     fluticasone (FLONASE) 50 mcg/act nasal spray, 1 spray into each nostril 2 (two) times a day as needed for rhinitis (Patient not taking: Reported on 5/18/2021), Disp: 1 Bottle, Rfl: 0      Physical Exam:   /68   Pulse 78   Ht 5' 11" (1 803 m)   Wt 131 kg (288 lb)   BMI 40 17 kg/m²   GEN: no acute distress    RESP: breathing comfortably with no accessory muscle use    ABD: soft, non-tender, non-distended   INCISION:    EXT: no significant peripheral edema       RADIOLOGY:    none     Assessment:    1   BPH with obstruction     Plan:   - will schedule cystoscopy uroflow and transrectal ultrasound  - consider options of management including Urolift or TURP  -  -

## 2021-05-20 ENCOUNTER — OFFICE VISIT (OUTPATIENT)
Dept: NEPHROLOGY | Facility: CLINIC | Age: 76
End: 2021-05-20
Payer: MEDICARE

## 2021-05-20 VITALS
WEIGHT: 288.8 LBS | DIASTOLIC BLOOD PRESSURE: 86 MMHG | HEIGHT: 71 IN | BODY MASS INDEX: 40.43 KG/M2 | SYSTOLIC BLOOD PRESSURE: 152 MMHG

## 2021-05-20 DIAGNOSIS — N28.9 HYPERVOLEMIA ASSOCIATED WITH RENAL INSUFFICIENCY: ICD-10-CM

## 2021-05-20 DIAGNOSIS — M89.9 CHRONIC KIDNEY DISEASE-MINERAL AND BONE DISORDER: ICD-10-CM

## 2021-05-20 DIAGNOSIS — E83.9 CHRONIC KIDNEY DISEASE-MINERAL AND BONE DISORDER: ICD-10-CM

## 2021-05-20 DIAGNOSIS — N18.4 BENIGN HYPERTENSION WITH CHRONIC KIDNEY DISEASE, STAGE IV (HCC): ICD-10-CM

## 2021-05-20 DIAGNOSIS — R80.9 NON-NEPHROTIC RANGE PROTEINURIA: ICD-10-CM

## 2021-05-20 DIAGNOSIS — I12.9 BENIGN HYPERTENSION WITH CHRONIC KIDNEY DISEASE, STAGE IV (HCC): ICD-10-CM

## 2021-05-20 DIAGNOSIS — N18.4 CKD (CHRONIC KIDNEY DISEASE), STAGE IV (HCC): Primary | ICD-10-CM

## 2021-05-20 DIAGNOSIS — N18.9 CHRONIC KIDNEY DISEASE-MINERAL AND BONE DISORDER: ICD-10-CM

## 2021-05-20 DIAGNOSIS — E87.70 HYPERVOLEMIA ASSOCIATED WITH RENAL INSUFFICIENCY: ICD-10-CM

## 2021-05-20 PROCEDURE — 99214 OFFICE O/P EST MOD 30 MIN: CPT | Performed by: INTERNAL MEDICINE

## 2021-05-20 RX ORDER — TORSEMIDE 20 MG/1
40 TABLET ORAL 2 TIMES DAILY
Qty: 360 TABLET | Refills: 1 | Status: SHIPPED | OUTPATIENT
Start: 2021-05-20 | End: 2021-10-29

## 2021-05-20 NOTE — LETTER
May 20, 2021     B[facility] [address]    Patient: Aiden Iglesias  YOB: 1945   Date of Visit: 5/20/2021       Dear Dr Hunter Beth: Thank you for referring Anniejaime Herbie to me for evaluation  Below are my notes for this consultation  If you have questions, please do not hesitate to call me  I look forward to following your patient along with you  Sincerely,        Mannie Phalen, MD        CC: No Recipients  Mannie Phalen, MD  5/20/2021  2:23 PM  Sign when Signing Visit  NEPHROLOGY OFFICE PROGRESS NOTE   Aiden Iglesias  77 y o  male MRN: [id number]  DATE: 05/20/21  Reason for visit: Continued evaluation of CKD    ASSESSMENT & PLAN:  1  Chronic kidney disease, stage IV  · Johan's baseline creatinine is now closer to around 2 3 to 2 5    · CKD etiology is diabetic nephropathy + sequelae from post op ATN from CABG  · His creatinine is up to 2 49 with a higher diuretic dosing  · Unfortunately, he remains fluid overloaded - see below  · He was referred for CKD education - unclear if he went  3  Hypertension  · BP is above goal   · Current regimen: Losartan 50 mg daily, Torsemide 40 mg daily and Metoprolol 25 mg BID  · He appears volume expanded on exam - increase Torsemide to 40 mg BID  4  Bilateral leg edema  · He remains non complianct with low salt diet  · Increase Torsemide to 40 mg BID      5  Mineral bone disease  · PTH is at goal - 106 6 in May 2021  Treat Vitamin D def  · Ca and phos are at goal    · Vitamin D level is below goal - 26 4 in May 2021 - his Vitamin D was increased to 2000 units daily  5  Proteinuria:  · UPC is stable at 1 06    · Continue Losartan 50 mg daily  · Increase Losartan once euvolemic  6  Obesity, weight gain:  · No improvement since last visit  7  Anemia:   · Hgb stable  8  Diabetes mellitus:   · Diabetic management is deferred to endocrinology or PCP    · Recommendations include targeting a HbA1c in the range of <6 5% to <8%  HbA1c goals are tailored for each patient at the discretion of endocrinology or PCP  9  Hyperlipidemia:  · Lipid management is deferred to PCP  10  CKD related parameters:  · Anemia: Hgb is at goal   · Acid-base: CO2 is at goal (> 22)  · Proteinuria: UPC ratio close to goal (< 1 0)  Patient Instructions   Increase Torsemide to 40 mg twice a day  Check BMP in 2 weeks  Follow up in 4 months  SUBJECTIVE / INTERVAL HISTORY:  Alyssa Smith was last seen in the office by me in March 2021  Since then, there have been no recent admissions to the hospital or ER visits  He, unfortunately, has not lost any weight  His weight at home today was 279 lbs  He reports that he rarely goes above 280 lbs  He continues to eat out because his significant other gives him small portions and he remains hungry  He claims that his leg swelling is better - they look the same to me  PMH/PSH: HTN, DM, HLP, CKD, CAD s/p CABG, HARJIT, BPH, obesity, DDD s/p fusion surgery, cholecystectomy, hernia repair, tonsillectomy, urinary retention    Previous work up:   6/3/19 Renal US: R 10 6 cm, L 11 cm,  cc    ALLERGIES: No Known Allergies    REVIEW OF SYSTEMS:  Review of Systems   Constitutional: Negative for appetite change, chills, fatigue and fever  Respiratory: Negative for cough and shortness of breath  Cardiovascular: Positive for leg swelling  Negative for chest pain  Gastrointestinal: Negative for abdominal pain, diarrhea, nausea and vomiting  Genitourinary: Negative for dysuria and hematuria  Musculoskeletal: Negative for arthralgias and back pain  Neurological: Negative for dizziness and light-headedness  OBJECTIVE:  /86   Ht 5' 11" (1 803 m)   Wt 131 kg (288 lb 12 8 oz)   BMI 40 28 kg/m²   Current Weight:   Body mass index is 40 28 kg/m²  Physical Exam  Constitutional:       General: He is not in acute distress       Appearance: He is well-developed  He is obese  He is not ill-appearing or toxic-appearing  HENT:      Head: Normocephalic and atraumatic  Eyes:      General: No scleral icterus  Conjunctiva/sclera: Conjunctivae normal    Neck:      Musculoskeletal: Neck supple  Vascular: No JVD  Cardiovascular:      Rate and Rhythm: Normal rate and regular rhythm  Heart sounds: Normal heart sounds  Pulmonary:      Effort: Pulmonary effort is normal  No respiratory distress  Abdominal:      General: Bowel sounds are normal       Palpations: Abdomen is soft  Musculoskeletal:      Right lower leg: Edema present  Left lower leg: Edema present  Skin:     General: Skin is warm and dry  Neurological:      Mental Status: He is alert and oriented to person, place, and time     Psychiatric:         Mood and Affect: Mood normal          Behavior: Behavior normal          Judgment: Judgment normal      Medications:  Current Outpatient Medications:     Alpha-Lipoic Acid 600 MG CAPS, TAKE 1 CAPSULE BY MOUTH EVERY DAY, Disp: 30 capsule, Rfl: 5    Ascorbic Acid (VITAMIN C) 1000 MG tablet, Take 1,000 mg by mouth daily, Disp: , Rfl:     aspirin (ECOTRIN LOW STRENGTH) 81 mg EC tablet, Take 1 tablet (81 mg total) by mouth daily, Disp: 60 tablet, Rfl: 8    atorvastatin (LIPITOR) 80 mg tablet, TAKE 1 TABLET DAILY WITH DINNER, Disp: 90 tablet, Rfl: 3    B-D ULTRAFINE III SHORT PEN 31G X 8 MM MISC, INJECT INTO THE SKIN 4 (FOUR) TIMES A DAY, Disp: 400 each, Rfl: 1    cholecalciferol (VITAMIN D3) 1,000 units tablet, Take 1 tablet (1,000 Units total) by mouth daily (Patient taking differently: Take 1,000 Units by mouth ), Disp: 1 tablet, Rfl: 1    Continuous Blood Gluc  (FREESTYLE JOHANNE READER) MAMI, Use device to scan blood glucose at least 4 times a day, Disp: 1 Device, Rfl: 0    Continuous Blood Gluc Sensor (FreeStyle Johanne 14 Day Sensor) [facility], APPLY SENSOR EVERY 14 DAYS TO CHECK BLOOD GLUCOSE AT LEAST 4 TIMES A DAY, Disp: 2 each, Rfl: 5    [employer] Aspirin 325 MG tablet, TAKE 1 TABLET BY MOUTH EVERY DAY, Disp: , Rfl:     cyanocobalamin (VITAMIN B-12) 500 mcg tablet, Take 500 mcg by mouth daily, Disp: , Rfl:     docusate sodium (COLACE) 100 mg capsule, Take 1 capsule (100 mg total) by mouth 2 (two) times a day, Disp: 60 capsule, Rfl: 1    doxepin (SINEquan) 10 mg capsule, Take 10 mg by mouth daily at bedtime, Disp: , Rfl:     finasteride (PROSCAR) 5 mg tablet, Take 1 tablet (5 mg total) by mouth daily, Disp: 90 tablet, Rfl: 3    fluticasone (FLONASE) 50 mcg/act nasal spray, 1 spray into each nostril 2 (two) times a day as needed for rhinitis (Patient not taking: Reported on 5/18/2021), Disp: 1 Bottle, Rfl: 0    gabapentin (NEURONTIN) 300 mg capsule, Take 1 cap (300mg) by mouth in the morning and 1 cap (300mg) at noon, take 2 caps (600mg) at bedtime, Disp: 360 capsule, Rfl: 1    glucose blood test strip, Check 4 times a day, Disp: 400 each, Rfl: 1    insulin glargine (Toujeo Max SoloStar) 300 units/mL CONCENTRATED U-300 injection pen (2-unit dial), Inject 85 Units under the skin daily, Disp: 4 pen, Rfl: 5    insulin lispro (HumaLOG KwikPen) 100 units/mL injection pen, INJECT 32-30-32 UNITS THREE TIMES A DAY PLUS SCALE MAXIMUM DOSE 130 UNITS, Disp: 105 mL, Rfl: 3    latanoprost (XALATAN) 0 005 % ophthalmic solution, 1 drop daily at bedtime, Disp: , Rfl:     losartan (COZAAR) 50 mg tablet, Take 1 tablet (50 mg total) by mouth daily after dinner, Disp: 90 tablet, Rfl: 3    metoprolol tartrate (LOPRESSOR) 25 mg tablet, TAKE 1 TABLET EVERY 12 HOURS, Disp: 180 tablet, Rfl: 3    Microlet Lancets MISC, USE 3 TIMES DAY, Disp: 300 each, Rfl: 1    omeprazole (PriLOSEC) 40 MG capsule, Take 40 mg by mouth daily, Disp: , Rfl:     tamsulosin (FLOMAX) 0 4 mg, Take 2 capsules (0 8 mg total) by mouth daily with dinner, Disp: 180 capsule, Rfl: 3    torsemide (DEMADEX) 20 mg tablet, , Disp: , Rfl:     Victoza injection, INJECT 1 8 MG DAILY INTO SKIN, Disp: 27 mL, Rfl: 1    Laboratory Results:  Lab Results   Component Value Date    WBC 5 23 05/06/2021    HGB 11 1 (L) 05/06/2021    HCT 34 9 (L) 05/06/2021    MCV 94 05/06/2021     05/06/2021     Lab Results   Component Value Date    SODIUM 144 05/06/2021    K 4 0 05/06/2021     05/06/2021    CO2 26 05/06/2021    AGAP 10 05/06/2021    BUN 44 (H) 05/06/2021    CREATININE 2 49 (H) 05/06/2021    GLUC 149 (H) 03/14/2021    GLUF 141 (H) 05/06/2021    CALCIUM 8 5 05/06/2021    AST 11 05/06/2021    ALT 20 05/06/2021    ALKPHOS 105 05/06/2021    TP 7 0 05/06/2021    TBILI 0 34 05/06/2021    EGFR 24 05/06/2021

## 2021-05-20 NOTE — PROGRESS NOTES
NEPHROLOGY OFFICE PROGRESS NOTE   Cornell Watson  77 y o  male MRN: [id number]  DATE: 05/20/21  Reason for visit: Continued evaluation of CKD    ASSESSMENT & PLAN:  1  Chronic kidney disease, stage IV  · Johan's baseline creatinine is now closer to around 2 3 to 2 5    · CKD etiology is diabetic nephropathy + sequelae from post op ATN from CABG  · His creatinine is up to 2 49 with a higher diuretic dosing  · Unfortunately, he remains fluid overloaded - see below  · He was referred for CKD education - unclear if he went  3  Hypertension  · BP is above goal   · Current regimen: Losartan 50 mg daily, Torsemide 40 mg daily and Metoprolol 25 mg BID  · He appears volume expanded on exam - increase Torsemide to 40 mg BID  4  Bilateral leg edema  · He remains non complianct with low salt diet  · Increase Torsemide to 40 mg BID      5  Mineral bone disease  · PTH is at goal - 106 6 in May 2021  Treat Vitamin D def  · Ca and phos are at goal    · Vitamin D level is below goal - 26 4 in May 2021 - his Vitamin D was increased to 2000 units daily  5  Proteinuria:  · UPC is stable at 1 06    · Continue Losartan 50 mg daily  · Increase Losartan once euvolemic  6  Obesity, weight gain:  · No improvement since last visit  7  Anemia:   · Hgb stable  8  Diabetes mellitus:   · Diabetic management is deferred to endocrinology or PCP  · Recommendations include targeting a HbA1c in the range of <6 5% to <8%  HbA1c goals are tailored for each patient at the discretion of endocrinology or PCP  9  Hyperlipidemia:  · Lipid management is deferred to PCP  10  CKD related parameters:  · Anemia: Hgb is at goal   · Acid-base: CO2 is at goal (> 22)  · Proteinuria: UPC ratio close to goal (< 1 0)  Patient Instructions   Increase Torsemide to 40 mg twice a day  Check BMP in 2 weeks  Follow up in 4 months  SUBJECTIVE / INTERVAL HISTORY:  Jimenez Meade was last seen in the office by me in March 2021  Since then, there have been no recent admissions to the hospital or ER visits  He, unfortunately, has not lost any weight  His weight at home today was 279 lbs  He reports that he rarely goes above 280 lbs  He continues to eat out because his significant other gives him small portions and he remains hungry  He claims that his leg swelling is better - they look the same to me  PMH/PSH: HTN, DM, HLP, CKD, CAD s/p CABG, HARJIT, BPH, obesity, DDD s/p fusion surgery, cholecystectomy, hernia repair, tonsillectomy, urinary retention    Previous work up:   6/3/19 Renal US: R 10 6 cm, L 11 cm,  cc    ALLERGIES: No Known Allergies    REVIEW OF SYSTEMS:  Review of Systems   Constitutional: Negative for appetite change, chills, fatigue and fever  Respiratory: Negative for cough and shortness of breath  Cardiovascular: Positive for leg swelling  Negative for chest pain  Gastrointestinal: Negative for abdominal pain, diarrhea, nausea and vomiting  Genitourinary: Negative for dysuria and hematuria  Musculoskeletal: Negative for arthralgias and back pain  Neurological: Negative for dizziness and light-headedness  OBJECTIVE:  /86   Ht 5' 11" (1 803 m)   Wt 131 kg (288 lb 12 8 oz)   BMI 40 28 kg/m²   Current Weight:   Body mass index is 40 28 kg/m²  Physical Exam  Constitutional:       General: He is not in acute distress  Appearance: He is well-developed  He is obese  He is not ill-appearing or toxic-appearing  HENT:      Head: Normocephalic and atraumatic  Eyes:      General: No scleral icterus  Conjunctiva/sclera: Conjunctivae normal    Neck:      Musculoskeletal: Neck supple  Vascular: No JVD  Cardiovascular:      Rate and Rhythm: Normal rate and regular rhythm  Heart sounds: Normal heart sounds  Pulmonary:      Effort: Pulmonary effort is normal  No respiratory distress  Abdominal:      General: Bowel sounds are normal       Palpations: Abdomen is soft  Musculoskeletal:      Right lower leg: Edema present  Left lower leg: Edema present  Skin:     General: Skin is warm and dry  Neurological:      Mental Status: He is alert and oriented to person, place, and time     Psychiatric:         Mood and Affect: Mood normal          Behavior: Behavior normal          Judgment: Judgment normal      Medications:  Current Outpatient Medications:     Alpha-Lipoic Acid 600 MG CAPS, TAKE 1 CAPSULE BY MOUTH EVERY DAY, Disp: 30 capsule, Rfl: 5    Ascorbic Acid (VITAMIN C) 1000 MG tablet, Take 1,000 mg by mouth daily, Disp: , Rfl:     aspirin (ECOTRIN LOW STRENGTH) 81 mg EC tablet, Take 1 tablet (81 mg total) by mouth daily, Disp: 60 tablet, Rfl: 8    atorvastatin (LIPITOR) 80 mg tablet, TAKE 1 TABLET DAILY WITH DINNER, Disp: 90 tablet, Rfl: 3    B-D ULTRAFINE III SHORT PEN 31G X 8 MM MISC, INJECT INTO THE SKIN 4 (FOUR) TIMES A DAY, Disp: 400 each, Rfl: 1    cholecalciferol (VITAMIN D3) 1,000 units tablet, Take 1 tablet (1,000 Units total) by mouth daily (Patient taking differently: Take 1,000 Units by mouth ), Disp: 1 tablet, Rfl: 1    Continuous Blood Gluc  (FREESTYLE JOHANNE READER) MAMI, Use device to scan blood glucose at least 4 times a day, Disp: 1 Device, Rfl: 0    Continuous Blood Gluc Sensor (FreeStyle Johanne 14 Day Sensor) [facility], APPLY SENSOR EVERY 14 DAYS TO CHECK BLOOD GLUCOSE AT LEAST 4 TIMES A DAY, Disp: 2 each, Rfl: 5    [employer] Aspirin 325 MG tablet, TAKE 1 TABLET BY MOUTH EVERY DAY, Disp: , Rfl:     cyanocobalamin (VITAMIN B-12) 500 mcg tablet, Take 500 mcg by mouth daily, Disp: , Rfl:     docusate sodium (COLACE) 100 mg capsule, Take 1 capsule (100 mg total) by mouth 2 (two) times a day, Disp: 60 capsule, Rfl: 1    doxepin (SINEquan) 10 mg capsule, Take 10 mg by mouth daily at bedtime, Disp: , Rfl:     finasteride (PROSCAR) 5 mg tablet, Take 1 tablet (5 mg total) by mouth daily, Disp: 90 tablet, Rfl: 3    fluticasone (FLONASE) 50 mcg/act nasal spray, 1 spray into each nostril 2 (two) times a day as needed for rhinitis (Patient not taking: Reported on 5/18/2021), Disp: 1 Bottle, Rfl: 0    gabapentin (NEURONTIN) 300 mg capsule, Take 1 cap (300mg) by mouth in the morning and 1 cap (300mg) at noon, take 2 caps (600mg) at bedtime, Disp: 360 capsule, Rfl: 1    glucose blood test strip, Check 4 times a day, Disp: 400 each, Rfl: 1    insulin glargine (Toujeo Max SoloStar) 300 units/mL CONCENTRATED U-300 injection pen (2-unit dial), Inject 85 Units under the skin daily, Disp: 4 pen, Rfl: 5    insulin lispro (HumaLOG KwikPen) 100 units/mL injection pen, INJECT 32-30-32 UNITS THREE TIMES A DAY PLUS SCALE MAXIMUM DOSE 130 UNITS, Disp: 105 mL, Rfl: 3    latanoprost (XALATAN) 0 005 % ophthalmic solution, 1 drop daily at bedtime, Disp: , Rfl:     losartan (COZAAR) 50 mg tablet, Take 1 tablet (50 mg total) by mouth daily after dinner, Disp: 90 tablet, Rfl: 3    metoprolol tartrate (LOPRESSOR) 25 mg tablet, TAKE 1 TABLET EVERY 12 HOURS, Disp: 180 tablet, Rfl: 3    Microlet Lancets MISC, USE 3 TIMES DAY, Disp: 300 each, Rfl: 1    omeprazole (PriLOSEC) 40 MG capsule, Take 40 mg by mouth daily, Disp: , Rfl:     tamsulosin (FLOMAX) 0 4 mg, Take 2 capsules (0 8 mg total) by mouth daily with dinner, Disp: 180 capsule, Rfl: 3    torsemide (DEMADEX) 20 mg tablet, , Disp: , Rfl:     Victoza injection, INJECT 1 8 MG DAILY INTO SKIN, Disp: 27 mL, Rfl: 1    Laboratory Results:  Lab Results   Component Value Date    WBC 5 23 05/06/2021    HGB 11 1 (L) 05/06/2021    HCT 34 9 (L) 05/06/2021    MCV 94 05/06/2021     05/06/2021     Lab Results   Component Value Date    SODIUM 144 05/06/2021    K 4 0 05/06/2021     05/06/2021    CO2 26 05/06/2021    AGAP 10 05/06/2021    BUN 44 (H) 05/06/2021    CREATININE 2 49 (H) 05/06/2021    GLUC 149 (H) 03/14/2021    GLUF 141 (H) 05/06/2021    CALCIUM 8 5 05/06/2021    AST 11 05/06/2021    ALT 20 05/06/2021    ALKPHOS 105 05/06/2021    TP 7 0 05/06/2021    TBILI 0 34 05/06/2021    EGFR 24 05/06/2021

## 2021-06-09 ENCOUNTER — OFFICE VISIT (OUTPATIENT)
Dept: OCCUPATIONAL THERAPY | Facility: CLINIC | Age: 76
End: 2021-06-09
Payer: MEDICARE

## 2021-06-09 DIAGNOSIS — E74.04 MCARDLE'S DISEASE (HCC): Primary | ICD-10-CM

## 2021-06-09 DIAGNOSIS — G62.9 NEUROPATHY: ICD-10-CM

## 2021-06-09 PROCEDURE — 96125 COGNITIVE TEST BY HC PRO: CPT

## 2021-06-09 PROCEDURE — 97167 OT EVAL HIGH COMPLEX 60 MIN: CPT

## 2021-06-09 NOTE — PROGRESS NOTES
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    2  Neuropathy  G62 9            Assessment/Plan    Skilled Analysis:  Pt is a 77 y o  male referred to Occupational Therapy for Fitness to Drive Evaluation to assess Pt's cognitive, visual, and motor abilities to drive safely in community environment  Pt scoring cognitively at an 90% predicted probability of failing  a specialized on-road test   This indicates  a cognitive competence for driving is outside the range of normal with a higher probability of performing hazardous or extremely dangerous maneuvers  Until there is a significant change in medical condition or a change in medication use resulting in an improvement in cognitive function, driving suspension or cessation should be seriously considered  Should there be a significant positive change in medical condition, reassessment at  that time would be recommended  MD to discuss with Pt  Subjective    HISTORY OF PRESENT ILLNESS:     Pt is a 77 y o  male who was referred to Occupational Therapy s/p  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]       PMH:   Past Medical History:   Diagnosis Date    CHF (congestive heart failure) ([facility] )     Diabetes mellitus ([facility] )     Hyperlipidemia     Hypertension     Obesity     Renal disorder          Pain Levels:     Restin    With Activity:  0    Objective    IMPAIRMENT SECTION:  Driving History:  Vehicle Type:   Truck, Cars, tractor trailer;    Visual History:   Trifocals Glasses,    No hx cataracts,     No hx however uses eye drops Glaucoma,        Last Eye Dr  Appointment 4 months ago  Communication Status:   English,       Driving History:   Trip tickets from [employer],     Pt denies History of getting lost,    Parking Tickets,     No history DUI  License Status:   Active,      Last Drove:   2021  Last Accident:   Pt reports no accidents  Initial License Date:   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, and has gotten CDL   Driving Goals:   [address]  Car Transfer:   Independent     Objective  Functional/Cognitive Impairments:  1  DCAT: (see attached report for further details) Pt scoring an 90% likelihood on failing on road    Unfit  Recommend On Road Assessment:   No  Recommend to [employer]   No,       2   vision screen:    Far acuity: 20/25   Near acuity: 20/70  4  Rapid Pace Walk Test:  7 44 seconds: Those with greater than 9 seconds had a 3 fold increase risk of being in an at fault auto accident  5   Physical findings:  cervical rotation R 38, L 39; UE 5/5 on MMT and LE AROM full with 4-/5 strength   6  Probability of creating a hazardous situation on specialized on-road test: 72%; see attached report for further details  7   Pt engaged in [place] Cognitive Level Screening LACLS and scored the followin  Recommendations:     Until there is a significant change in medical condition or a change in medication use resulting in improvement in cognitive functional, driving suspension should be seriously considered  Should there be a significant positive change in medical condition, reassessment at that time would be recommended  MD to discuss with Pt regarding results  Based on the [facility] indicating pt needs  5 0    Administered Andriy [facility] Cognitive Level Screen (ACLS)  The patient scored 5 0/6 0 indicating that they may live alone with weekly checks to monitor safety and check problem solving effectiveness  May be able to work in supported employment with   Behavior:  Stops working to talk  Questions need to do activity  Knows need to keep scheduled appointments however may be apt for forget or miss appointments  Tangential   Manipulative  May blame others when mistakes are made  May argue with suggestions  May be inconsistent with carryover    May resist attempts to provide assistance  Inflexible  May insist upon using an inefficient way of doing things  Conversations may be concrete and self centered  Abstract reasoning may not be understood  Grooming: Completes grooming activity independently  May forget newly learned techniques  May fail to anticipate need to purchase grooming supplies  May not read directions before using new products  Dressing:  May not consider social standards  May argue with suggestions to change selections  Bathing:  Completes bathing routine independently  May solve problems  May fail to anticipate secondary effects of new products like allergies  Walking/Exercising:  May forget newly discovered routes and may have to rediscover them  May refuse to comply with exercise program   Eating:  Eats and talks with others  May not see crumbs or small spills  May need help to comply with dietary restrictions  Toileting:  Independently performs all toileting  Can explore a new environment to locate a bathroom without assistance  May not anticipate needs for new toileting solutions  Medications:  Opens new containers  May understand medication schedules but has trouble taking them on time  May choose to discontinue medications  May recognize side effects like tremors or sedation  Utilize daily pill box  Use of adaptive equipment:  May choose not to use equipment  May utilize in unsafe manner  Housekeeping:  Able to learn new sequence of actions  Does not change or alter pace  May set and follow a weekly schedule  Identifies potential hazards posed by electric, gas, toxins, poisons or improper storage/disposal of items  Food preparation:  May make reservations to eat out but may fail to remember and arrive late  Passes heavy serving dishes safely  May forget discovered solutions to problems  Spending money:  May be able to identify monthly budget of routine expenses but may have trouble adhering to it    May run up bills or overextend credit  May have trouble balancing checkbook  Does not plan for long term financial security  Shopping:  May make daily or weekly schedule of shopping needs with trouble adhering to it  May be impulsive in spending  May discriminate between stores, labels and brands  Laundry:  May set and follow weekly schedule with difficulty adhering to it  Learns new sequence of actions  Uses iron effectively  Traveling: Can make a schedule for traveling to new locations but has difficulty adhering to it  May miss bus  May need assistance to read and understand new schedules  Telephone:  Learns new telephone procedures by initiating several steps at a time  May look up new numbers  May not use yellow pages  May become confused by new options or ignore call waiting  May run up large phone bills  Driving:  Should NOT operate a motor vehicle              INTERVENTION COMMENTS:  Diagnosis: 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

## 2021-06-09 NOTE — LETTER
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    2  Neuropathy  G62 9        Dear Dr Gonzalo Walsh: Thank you for your recent referral of Gera Canales    Please review the attached evaluation summary from Michael's recent visit  Please verify that you agree with the plan of care by signing the attached order  If you have any questions or concerns, please do not hesitate to call  I sincerely appreciate the opportunity to share in the care of one of your patients and hope to have another opportunity to work with you in the near future  Sincerely,    Molly Carranza, OT      Referring Provider:     I certify that I have read the below Plan of Care and certify the need for these services furnished under this plan of treatment while under my care  Sneha Wallace MD  [address]  [address] [place] [address]  [address] [place] [address]  EVALUATIONS:    Gera Canales   1945  [id number]  Markel Duke MD   Diagnosis ICD-10-CM Associated Orders   1  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    2  Neuropathy  G62 9            Assessment/Plan    Skilled Analysis:  Pt is a 77 y o  male referred to Occupational Therapy for Fitness to Drive Evaluation to assess Pt's cognitive, visual, and motor abilities to drive safely in community environment  Pt scoring cognitively at an 90% predicted probability of failing  a specialized on-road test   This indicates  a cognitive competence for driving is outside the range of normal with a higher probability of performing hazardous or extremely dangerous maneuvers    Until there is a significant change in medical condition or a change in medication use resulting in an improvement in cognitive function, driving suspension or cessation should be seriously considered  Should there be a significant positive change in medical condition, reassessment at  that time would be recommended  MD to discuss with Pt  Subjective    HISTORY OF PRESENT ILLNESS:     Pt is a 77 y o  male who was referred to Occupational Therapy s/p  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[facility] ) [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 04]  PMH:   Past Medical History:   Diagnosis Date    CHF (congestive heart failure) ([facility] [address] )     Diabetes mellitus ([facility] )     Hyperlipidemia     Hypertension     Obesity     Renal disorder          Pain Levels:     Restin    With Activity:  0    Objective    IMPAIRMENT SECTION:  Driving History:  Vehicle Type:   Truck, Cars, tractor trailer;    Visual History:   Trifocals Glasses,    No hx cataracts,     No hx however uses eye drops Glaucoma,        Last Eye Dr  Appointment 4 months ago  Communication Status:   English,       Driving History:   Trip tickets from [employer] / [employer],     Pt denies History of getting lost,    Parking Tickets,     No history DUI  License Status:   Active,      Last Drove:   2021  Last Accident:   Pt reports no accidents  Initial License Date:   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, and has gotten [facility]   Driving Goals:   [address]  Car Transfer:   Independent     Objective  Functional/Cognitive Impairments:  1  DCAT: (see attached report for further details) Pt scoring an 90% likelihood on failing on road    Unfit  Recommend On Road Assessment:   No  Recommend to [employer] [place] [employer] [place]   No,       2   vision screen:    Far acuity: 20/25   Near acuity: 20/70  4  Rapid Pace Walk Test:  7 44 seconds: Those with greater than 9 seconds had a 3 fold increase risk of being in an at fault auto accident  5   Physical findings:  cervical rotation R 38, L 39; UE 5/5 on MMT and LE AROM full with 4-/5 strength   6  Probability of creating a hazardous situation on specialized on-road test: 72%; see attached report for further details    7   Pt engaged in [place] Cognitive Level Screening LACLS and scored the followin  Recommendations:     Until there is a significant change in medical condition or a change in medication use resulting in improvement in cognitive functional, driving suspension should be seriously considered  Should there be a significant positive change in medical condition, reassessment at that time would be recommended  MD to discuss with Pt regarding results  Based on the [facility] indicating pt needs  5 0    Administered [place] Cognitive Level Screen (ACLS)  The patient scored 5 0/6 0 indicating that they may live alone with weekly checks to monitor safety and check problem solving effectiveness  May be able to work in supported employment with   Behavior:  Stops working to talk  Questions need to do activity  Knows need to keep scheduled appointments however may be apt for forget or miss appointments  Tangential   Manipulative  May blame others when mistakes are made  May argue with suggestions  May be inconsistent with carryover  May resist attempts to provide assistance  Inflexible  May insist upon using an inefficient way of doing things  Conversations may be concrete and self centered  Abstract reasoning may not be understood  Grooming: Completes grooming activity independently  May forget newly learned techniques  May fail to anticipate need to purchase grooming supplies  May not read directions before using new products  Dressing:  May not consider social standards  May argue with suggestions to change selections  Bathing:  Completes bathing routine independently  May solve problems  May fail to anticipate secondary effects of new products like allergies  Walking/Exercising:  May forget newly discovered routes and may have to rediscover them  May refuse to comply with exercise program   Eating:  Eats and talks with others  May not see crumbs or small spills    May need help to comply with dietary restrictions  Toileting:  Independently performs all toileting  Can explore a new environment to locate a bathroom without assistance  May not anticipate needs for new toileting solutions  Medications:  Opens new containers  May understand medication schedules but has trouble taking them on time  May choose to discontinue medications  May recognize side effects like tremors or sedation  Utilize daily pill box  Use of adaptive equipment:  May choose not to use equipment  May utilize in unsafe manner  Housekeeping:  Able to learn new sequence of actions  Does not change or alter pace  May set and follow a weekly schedule  Identifies potential hazards posed by electric, gas, toxins, poisons or improper storage/disposal of items  Food preparation:  May make reservations to eat out but may fail to remember and arrive late  Passes heavy serving dishes safely  May forget discovered solutions to problems  Spending money:  May be able to identify monthly budget of routine expenses but may have trouble adhering to it  May run up bills or overextend credit  May have trouble balancing checkbook  Does not plan for long term financial security  Shopping:  May make daily or weekly schedule of shopping needs with trouble adhering to it  May be impulsive in spending  May discriminate between stores, labels and brands  Laundry:  May set and follow weekly schedule with difficulty adhering to it  Learns new sequence of actions  Uses iron effectively  Traveling: Can make a schedule for traveling to new locations but has difficulty adhering to it  May miss bus  May need assistance to read and understand new schedules  Telephone:  Learns new telephone procedures by initiating several steps at a time  May look up new numbers  May not use yellow pages  May become confused by new options or ignore call waiting  May run up large phone bills    Driving:  Should NOT operate a motor vehicle              INTERVENTION COMMENTS:  Diagnosis: 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

## 2021-06-10 ENCOUNTER — TRANSCRIBE ORDERS (OUTPATIENT)
Dept: PHYSICAL THERAPY | Facility: CLINIC | Age: 76
End: 2021-06-10

## 2021-06-10 DIAGNOSIS — E74.04 MCARDLE'S DISEASE (HCC): Primary | ICD-10-CM

## 2021-06-10 DIAGNOSIS — G62.9 NEUROPATHY: ICD-10-CM

## 2021-06-11 ENCOUNTER — PATIENT OUTREACH (OUTPATIENT)
Dept: FAMILY MEDICINE CLINIC | Facility: OTHER | Age: 76
End: 2021-06-11

## 2021-06-11 NOTE — PROGRESS NOTES
Spoke with wife, end of bundled episode  She has my contact information if future assistance needed  Will close to complex care management at this time

## 2021-06-12 DIAGNOSIS — G62.9 NEUROPATHY: ICD-10-CM

## 2021-06-14 ENCOUNTER — TELEPHONE (OUTPATIENT)
Dept: ENDOCRINOLOGY | Facility: CLINIC | Age: 76
End: 2021-06-14

## 2021-06-15 RX ORDER — GABAPENTIN 300 MG/1
CAPSULE ORAL
Qty: 120 CAPSULE | Refills: 1 | OUTPATIENT
Start: 2021-06-15

## 2021-06-18 ENCOUNTER — APPOINTMENT (OUTPATIENT)
Dept: LAB | Facility: CLINIC | Age: 76
End: 2021-06-18
Payer: MEDICARE

## 2021-06-18 DIAGNOSIS — N28.9 HYPERVOLEMIA ASSOCIATED WITH RENAL INSUFFICIENCY: ICD-10-CM

## 2021-06-18 DIAGNOSIS — N18.4 TYPE 2 DIABETES MELLITUS WITH STAGE 4 CHRONIC KIDNEY DISEASE, WITH LONG-TERM CURRENT USE OF INSULIN (HCC): ICD-10-CM

## 2021-06-18 DIAGNOSIS — Z79.4 TYPE 2 DIABETES MELLITUS WITH STAGE 4 CHRONIC KIDNEY DISEASE, WITH LONG-TERM CURRENT USE OF INSULIN (HCC): ICD-10-CM

## 2021-06-18 DIAGNOSIS — E11.22 TYPE 2 DIABETES MELLITUS WITH STAGE 4 CHRONIC KIDNEY DISEASE, WITH LONG-TERM CURRENT USE OF INSULIN (HCC): ICD-10-CM

## 2021-06-18 DIAGNOSIS — E87.70 HYPERVOLEMIA ASSOCIATED WITH RENAL INSUFFICIENCY: ICD-10-CM

## 2021-06-18 LAB
ALBUMIN SERPL BCP-MCNC: 3.3 G/DL (ref 3.5–5)
ALP SERPL-CCNC: 107 U/L (ref 46–116)
ALT SERPL W P-5'-P-CCNC: 23 U/L (ref 12–78)
ANION GAP SERPL CALCULATED.3IONS-SCNC: 9 MMOL/L (ref 4–13)
AST SERPL W P-5'-P-CCNC: 15 U/L (ref 5–45)
BASOPHILS # BLD AUTO: 0.04 THOUSANDS/ΜL (ref 0–0.1)
BASOPHILS NFR BLD AUTO: 1 % (ref 0–1)
BILIRUB SERPL-MCNC: 0.45 MG/DL (ref 0.2–1)
BUN SERPL-MCNC: 51 MG/DL (ref 5–25)
CALCIUM ALBUM COR SERPL-MCNC: 9.8 MG/DL (ref 8.3–10.1)
CALCIUM SERPL-MCNC: 9.2 MG/DL (ref 8.3–10.1)
CHLORIDE SERPL-SCNC: 107 MMOL/L (ref 100–108)
CHOLEST SERPL-MCNC: 149 MG/DL (ref 50–200)
CO2 SERPL-SCNC: 31 MMOL/L (ref 21–32)
CREAT SERPL-MCNC: 2.5 MG/DL (ref 0.6–1.3)
EOSINOPHIL # BLD AUTO: 0.37 THOUSAND/ΜL (ref 0–0.61)
EOSINOPHIL NFR BLD AUTO: 7 % (ref 0–6)
ERYTHROCYTE [DISTWIDTH] IN BLOOD BY AUTOMATED COUNT: 14.9 % (ref 11.6–15.1)
EST. AVERAGE GLUCOSE BLD GHB EST-MCNC: 206 MG/DL
GFR SERPL CREATININE-BSD FRML MDRD: 24 ML/MIN/1.73SQ M
GLUCOSE P FAST SERPL-MCNC: 179 MG/DL (ref 65–99)
HBA1C MFR BLD: 8.8 %
HCT VFR BLD AUTO: 36.1 % (ref 36.5–49.3)
HDLC SERPL-MCNC: 42 MG/DL
HGB BLD-MCNC: 11.4 G/DL (ref 12–17)
IMM GRANULOCYTES # BLD AUTO: 0.02 THOUSAND/UL (ref 0–0.2)
IMM GRANULOCYTES NFR BLD AUTO: 0 % (ref 0–2)
LDLC SERPL CALC-MCNC: 75 MG/DL (ref 0–100)
LYMPHOCYTES # BLD AUTO: 0.9 THOUSANDS/ΜL (ref 0.6–4.47)
LYMPHOCYTES NFR BLD AUTO: 18 % (ref 14–44)
MCH RBC QN AUTO: 29.6 PG (ref 26.8–34.3)
MCHC RBC AUTO-ENTMCNC: 31.6 G/DL (ref 31.4–37.4)
MCV RBC AUTO: 94 FL (ref 82–98)
MONOCYTES # BLD AUTO: 0.37 THOUSAND/ΜL (ref 0.17–1.22)
MONOCYTES NFR BLD AUTO: 7 % (ref 4–12)
NEUTROPHILS # BLD AUTO: 3.4 THOUSANDS/ΜL (ref 1.85–7.62)
NEUTS SEG NFR BLD AUTO: 67 % (ref 43–75)
NONHDLC SERPL-MCNC: 107 MG/DL
NRBC BLD AUTO-RTO: 0 /100 WBCS
PLATELET # BLD AUTO: 197 THOUSANDS/UL (ref 149–390)
PMV BLD AUTO: 11.4 FL (ref 8.9–12.7)
POTASSIUM SERPL-SCNC: 4.2 MMOL/L (ref 3.5–5.3)
PROT SERPL-MCNC: 7.2 G/DL (ref 6.4–8.2)
RBC # BLD AUTO: 3.85 MILLION/UL (ref 3.88–5.62)
SODIUM SERPL-SCNC: 147 MMOL/L (ref 136–145)
T4 FREE SERPL-MCNC: 0.77 NG/DL (ref 0.76–1.46)
TRIGL SERPL-MCNC: 160 MG/DL
TSH SERPL DL<=0.05 MIU/L-ACNC: 3.82 UIU/ML (ref 0.36–3.74)
WBC # BLD AUTO: 5.1 THOUSAND/UL (ref 4.31–10.16)

## 2021-06-18 PROCEDURE — 80061 LIPID PANEL: CPT

## 2021-06-18 PROCEDURE — 83036 HEMOGLOBIN GLYCOSYLATED A1C: CPT

## 2021-06-18 PROCEDURE — 36415 COLL VENOUS BLD VENIPUNCTURE: CPT

## 2021-06-18 PROCEDURE — 84439 ASSAY OF FREE THYROXINE: CPT

## 2021-06-18 PROCEDURE — 84443 ASSAY THYROID STIM HORMONE: CPT

## 2021-06-18 PROCEDURE — 80053 COMPREHEN METABOLIC PANEL: CPT

## 2021-06-18 PROCEDURE — 85025 COMPLETE CBC W/AUTO DIFF WBC: CPT

## 2021-06-23 ENCOUNTER — TELEPHONE (OUTPATIENT)
Dept: ENDOCRINOLOGY | Facility: CLINIC | Age: 76
End: 2021-06-23

## 2021-06-23 DIAGNOSIS — Z79.4 TYPE 2 DIABETES MELLITUS WITH HYPERGLYCEMIA, WITH LONG-TERM CURRENT USE OF INSULIN (HCC): ICD-10-CM

## 2021-06-23 DIAGNOSIS — E11.65 TYPE 2 DIABETES MELLITUS WITH HYPERGLYCEMIA, WITH LONG-TERM CURRENT USE OF INSULIN (HCC): ICD-10-CM

## 2021-06-23 PROBLEM — E66.01 MORBID OBESITY (HCC): Status: ACTIVE | Noted: 2019-09-05

## 2021-06-23 NOTE — TELEPHONE ENCOUNTER
bg log + phyllis     Pt is also interested in a med called Golo for insulin resistance  I attached the website - www golo  com  He keeps seeing ads for it on tv that it is suppose to help with weight loss

## 2021-06-24 RX ORDER — INSULIN GLARGINE 300 U/ML
90 INJECTION, SOLUTION SUBCUTANEOUS DAILY
Qty: 4 PEN | Refills: 5
Start: 2021-06-24 | End: 2021-07-19 | Stop reason: SDUPTHER

## 2021-06-24 NOTE — TELEPHONE ENCOUNTER
Morning BG levels are elevated  Increase Toujeo to 90 units  The Golo supplement seems to be mostly a magnesium + zinc + chromium supplement with some other herbal extracts  Considering his heart history and that he is on multiple medications, I would suggest avoiding it  I recommend he work on low carb diet and routine exercise as tolerated for weight loss

## 2021-07-08 ENCOUNTER — TELEPHONE (OUTPATIENT)
Dept: FAMILY MEDICINE CLINIC | Facility: OTHER | Age: 76
End: 2021-07-08

## 2021-07-08 NOTE — TELEPHONE ENCOUNTER
----- Message from Micah Lozano MD sent at 7/8/2021  2:27 PM EDT -----  Regarding: FW: Test Results Question  Contact: [phone]  Please inform patient that this evaluation is done periodically to ensure his functional status is optimized and also to find out if he need help with therapy  Thanks,  Dr Elías Steele   ----- Message -----  From: Magen Evans MA  Sent: 7/7/2021   9:19 AM EDT  To: Micah Lozano MD  Subject: FW: Test Results Question                          ----- Message -----  From: Nayana Hernandez  Sent: 7/7/2021  12:00 AM EDT  To: [facility]  Subject: Test Results Question                            What is this OT re-certification all about?

## 2021-07-14 ENCOUNTER — TELEPHONE (OUTPATIENT)
Dept: NEUROLOGY | Facility: CLINIC | Age: 76
End: 2021-07-14

## 2021-07-14 ENCOUNTER — OFFICE VISIT (OUTPATIENT)
Dept: NEUROLOGY | Facility: CLINIC | Age: 76
End: 2021-07-14
Payer: MEDICARE

## 2021-07-14 VITALS
RESPIRATION RATE: 18 BRPM | TEMPERATURE: 98.2 F | HEART RATE: 75 BPM | HEIGHT: 71 IN | WEIGHT: 285 LBS | SYSTOLIC BLOOD PRESSURE: 138 MMHG | BODY MASS INDEX: 39.9 KG/M2 | DIASTOLIC BLOOD PRESSURE: 74 MMHG

## 2021-07-14 DIAGNOSIS — E11.42 DIABETIC POLYNEUROPATHY ASSOCIATED WITH TYPE 2 DIABETES MELLITUS (HCC): Primary | ICD-10-CM

## 2021-07-14 DIAGNOSIS — G62.9 NEUROPATHY: ICD-10-CM

## 2021-07-14 DIAGNOSIS — I65.22 STENOSIS OF LEFT INTERNAL CAROTID ARTERY: ICD-10-CM

## 2021-07-14 PROCEDURE — 99213 OFFICE O/P EST LOW 20 MIN: CPT | Performed by: NURSE PRACTITIONER

## 2021-07-14 NOTE — PATIENT INSTRUCTIONS
1) Perform carotid ultrasound again for monitoring of left carotid stenosis  2) Decrease gabapentin to 1 capsule in am, 1 capsule in afternoon, and 1 capsule at night (900mg total for the day)  This is because of your decreased kidney function  Let us know if symptoms worsen in any way  3) Follow up in 6 months or sooner if needed  Diabetic Peripheral Neuropathy   WHAT YOU NEED TO KNOW:   Diabetic peripheral neuropathy (DPN) is damage to the nerves in your arms, hands, legs, and feet  DPN is most common in the legs and feet and can increase your risk for foot ulcers  Nerve pain caused by DPN can limit your mobility, and affect your quality of life  DISCHARGE INSTRUCTIONS:   Return to the emergency department if:   · Your legs or feet start to turn blue or black  · You have a wound that does not heal or is red, swollen, or draining fluid  Call your care team provider if:   · You begin to have symptoms  · Your blood sugar level is higher or lower than care team providers have told you it should be  · You have redness, calluses, or sores on your feet  · You have questions or concerns about your condition or care  Medicines:   · Medicine  may be given to decrease nerve pain  · Take your medicine as directed  Contact your healthcare provider if you think your medicine is not helping or if you have side effects  Tell him or her if you are allergic to any medicine  Keep a list of the medicines, vitamins, and herbs you take  Include the amounts, and when and why you take them  Bring the list or the pill bottles to follow-up visits  Carry your medicine list with you in case of an emergency  Control your blood sugar:  Keep your blood sugar levels as close to normal as possible by taking your medicines as directed  Check your blood sugar levels as often as directed  Contact your healthcare provider if your levels are higher than they should be       · Follow the meal plan  that your care team provider or dietitian gave you  This meal plan can help you control your blood sugar and decrease your symptoms  · Be physically active  Physical activity, such as exercise, can help keep your blood sugar level steady and help you manage your weight  Be active for at least 30 minutes, 5 days a week  Ask your care team provider about the best activity plan for you  Use caution when you exercise if you have decreased feeling in your feet  · Maintain a healthy weight  Ask your care team provider how much you should weigh  A healthy weight can help you control your diabetes  Ask him to help you create a weight loss plan if you are overweight  Even a 10 to 15 pound weight loss can help you manage your blood sugar level  · Know the risks if you choose to drink alcohol  Alcohol can make it harder to manage diabetes  Alcohol can cause your blood sugar levels to be low if you use insulin  Alcohol can cause high blood sugar levels and weight gain if you drink too much  Women 21 years or older and men 72 years or older should limit alcohol to 1 drink a day  Men aged 24 to 59 years should limit alcohol to 2 drinks a day  A drink of alcohol is 12 ounces of beer, 5 ounces of wine, or 1½ ounces of liquor  Care for your feet:  Check your feet each day for cuts, scratches, calluses, or other wounds  Look for redness and swelling, and feel for warmth  Wear shoes that fit well  Check your shoes for rocks or other objects that can hurt your feet  Do not walk barefoot or wear shoes without socks  Wear cotton socks to help keep your feet dry  Do not smoke:  Nicotine can worsen your symptoms and make it more difficult to manage your diabetes  Do not use e-cigarettes or smokeless tobacco in place of cigarettes or to help you quit  They still contain nicotine  Ask your care team provider for information if you currently smoke and need help quitting  Follow up with your care team provider as directed:   You will need to have your feet checked at least once each year  Write down your questions so you remember to ask them during your visits  © Copyright [address] Information is for End User's use only and may not be sold, redistributed or otherwise used for commercial purposes  All illustrations and images included in CareNotes® are the copyrighted property of [employer]  or [facility] [place] [employer]   The above information is an  only  It is not intended as medical advice for individual conditions or treatments  Talk to your doctor, nurse or pharmacist before following any medical regimen to see if it is safe and effective for you

## 2021-07-14 NOTE — PROGRESS NOTES
Patient ID: Lakesha Cortez  is a 77 y o  male  Assessment/Plan:    Sleep apnea  Continue cpap   Work with sleep specialist to discuss concerns you have about the pressure and the humidifier  Diabetic polyneuropathy associated with type 2 diabetes mellitus ([facility] )    Lab Results   Component Value Date    HGBA1C 8 8 (H) 06/18/2021   Uncontrolled DM  Recently had insulin increased  Discussed healthy lifestyle and weight management to help with blood sugar control  He understands controlling blood sugars will be the best way to prevent progression of neuropathy  He is currently at 1200mg of gabapentin a day- he denies any significant pain/symptoms at this time- I suggest he decrease to 900mg/day because of his kidney disease with creatinine clearance of 48ml/min  Stenosis of left internal carotid artery  Asymptomatic  Continue asa and lipitor  Reordered vas carotid duplex for monitoring  Diagnoses and all orders for this visit:    Diabetic polyneuropathy associated with type 2 diabetes mellitus ([facility] )    Stenosis of left internal carotid artery  -     VAS carotid complete study; Future    Neuropathy         Subjective:    Calvin Brady is a 77year old with a past medical history of CAD, CKD, DM (dx 10 years ago) with neuropathy, carotid stenosis, TMJ dysfunction, obesity, HARJIT, Mcardles disease, who follows up in this office for management of neuropathy and is here for a regular follow up exam  He really has no new neurological symptoms to report  He has had trouble managing his blood sugars and recently had an increase in his insulin- shows me a blood glucose log- mainly in the 200's  His legs do not bother him much, only sometimes at night  He is using his cpap but needs to get the humidifier fixed  He tells me he is having trouble with his tooth which he lost a crown on and needs this fixed   He was unaware that his wife had cancelled his appointment today- states they do disagree at times (she wants him to be in better health), but he is more than willing to be evaluated today  He denies any recent falls/injury  He states he has good foot care  Denies any focal weakness; walks without an assistive device  Denies chest pain/shortness of breath  Recent CT head unremarkable  EF 55% on recent echo  EMG results - These findings are best interpreted as moderate to severe, predominantly axonal sensory-motor polyneuropathy  There was no definite electrophysiologic evidence to support a superimposed lumbar radiculopathy or focal neuropathy in the right lower or upper extremity  The following portions of the patient's history were reviewed and updated as appropriate: allergies, current medications, past family history, past medical history, past social history, past surgical history and problem list        Objective:    Blood pressure 138/74, pulse 75, temperature 98 2 °F (36 8 °C), temperature source Temporal, resp  rate 18, height 5' 11" (1 803 m), weight 129 kg (285 lb)  Physical Exam  Vitals reviewed  Constitutional:       General: He is not in acute distress  Appearance: He is obese  HENT:      Head: Normocephalic and atraumatic  Mouth/Throat:      Mouth: Mucous membranes are moist       Pharynx: Oropharynx is clear  Comments: Has poor dentition- has tooth that needs fixed  Eyes:      Extraocular Movements: Extraocular movements intact  Pupils: Pupils are equal, round, and reactive to light  Cardiovascular:      Rate and Rhythm: Normal rate and regular rhythm  Pulses: Normal pulses  Heart sounds: Normal heart sounds  Pulmonary:      Effort: Pulmonary effort is normal       Breath sounds: Normal breath sounds  Musculoskeletal:         General: Swelling present  Normal range of motion  Cervical back: Normal range of motion        Comments: Mild bilateral lower extremity swelling and skin changes suggestive of peripheral vascular disease   Skin:     General: Skin is warm and dry  Capillary Refill: Capillary refill takes less than 2 seconds  Neurological:      General: No focal deficit present  Mental Status: He is alert  Mental status is at baseline  Cranial Nerves: No cranial nerve deficit  Motor: No weakness  Coordination: Coordination normal       Deep Tendon Reflexes: Strength normal       Reflex Scores:       Bicep reflexes are 1+ on the right side and 1+ on the left side  Brachioradialis reflexes are 1+ on the right side and 1+ on the left side  Achilles reflexes are 0 on the right side and 0 on the left side  Psychiatric:         Mood and Affect: Mood is anxious  Speech: Speech normal          Behavior: Behavior normal  Behavior is cooperative  Cognition and Memory: Cognition is not impaired  Neurological Exam  Mental Status  Alert  Speech is normal     Cranial Nerves  CN II: Right visual acuity: counts fingers  Left visual acuity: counts fingers  CN III, IV, VI: Extraocular movements intact bilaterally  Pupils equal round and reactive to light bilaterally  CN V: Facial sensation is normal   CN VII: Full and symmetric facial movement  CN VIII: Hearing is normal   CN IX, X: Palate elevates symmetrically  CN XI: Shoulder shrug strength is normal   CN XII: Tongue midline without atrophy or fasciculations  Motor  Normal muscle bulk throughout  Normal muscle tone  Strength is 5/5 throughout all four extremities  Sensory  Sensation: Decreased sensation to bilateral lower extremities, symmetrical, not progressing since previous, decreased vibration sense to bilateral ankles         Reflexes                                           Right                      Left  Brachioradialis                    1+                         1+  Biceps                                 1+                         1+  Patellar                                Tr                         Tr  Achilles 0                         0    Coordination  Right: Finger-to-nose normal   Left: Finger-to-nose normal     Gait  Casual gait: Wide stance  Normal stride length  Unable to rise from chair without using arms  Does not use cane; steady gait  ROS:    Review of Systems   Constitutional: Negative  Negative for appetite change and fever  HENT: Negative  Negative for hearing loss, tinnitus, trouble swallowing and voice change  Eyes: Negative  Negative for photophobia and pain  Respiratory: Negative  Negative for shortness of breath  Cardiovascular: Negative  Negative for palpitations  Gastrointestinal: Negative  Negative for nausea and vomiting  Endocrine: Negative  Negative for cold intolerance  Genitourinary: Negative  Negative for dysuria, frequency and urgency  Musculoskeletal: Negative  Negative for myalgias and neck pain  Skin: Negative  Negative for rash  Neurological: Negative  Negative for dizziness, tremors, seizures, syncope, facial asymmetry, speech difficulty, weakness, light-headedness, numbness and headaches  Hematological: Negative  Does not bruise/bleed easily  Psychiatric/Behavioral: Negative  Negative for confusion, hallucinations and sleep disturbance       ROS was reviewed and updated as appropriate

## 2021-07-14 NOTE — ASSESSMENT & PLAN NOTE
Continue cpap   Work with sleep specialist to discuss concerns you have about the pressure and the humidifier

## 2021-07-14 NOTE — ASSESSMENT & PLAN NOTE
Lab Results   Component Value Date    HGBA1C 8 8 (H) 06/18/2021   Uncontrolled DM  Recently had insulin increased  Discussed healthy lifestyle and weight management to help with blood sugar control  He understands controlling blood sugars will be the best way to prevent progression of neuropathy  He is currently at 1200mg of gabapentin a day- he denies any significant pain/symptoms at this time- I suggest he decrease to 900mg/day because of his kidney disease with creatinine clearance of 48ml/min

## 2021-07-14 NOTE — TELEPHONE ENCOUNTER
Patient arrived for 21  with [facility] today was not aware canceled appoint thru my chart   CK can see patient

## 2021-07-14 NOTE — TELEPHONE ENCOUNTER
Spoke with patients wife regarding appt reschedule  She states pateint is in a bad place at the particular time and is not wanting to see or hear anything any of the physicians want to say  I informed her I would document in the patients chart  That we would wait for him to call the office when he is ready to reschedule as this would eliminate further calls from our office

## 2021-07-16 ENCOUNTER — TELEPHONE (OUTPATIENT)
Dept: ENDOCRINOLOGY | Facility: CLINIC | Age: 76
End: 2021-07-16

## 2021-07-16 DIAGNOSIS — Z79.4 TYPE 2 DIABETES MELLITUS WITH HYPERGLYCEMIA, WITH LONG-TERM CURRENT USE OF INSULIN (HCC): ICD-10-CM

## 2021-07-16 DIAGNOSIS — E11.65 TYPE 2 DIABETES MELLITUS WITH HYPERGLYCEMIA, WITH LONG-TERM CURRENT USE OF INSULIN (HCC): ICD-10-CM

## 2021-07-16 NOTE — TELEPHONE ENCOUNTER
Please inform pt to take Toujeo 95 units at bedtime and increase humalog to 40 units with meals +Scale   He needs to keep carbs consistent with meals       Nikhil Collins MD

## 2021-07-19 RX ORDER — INSULIN LISPRO 100 [IU]/ML
INJECTION, SOLUTION INTRAVENOUS; SUBCUTANEOUS
Qty: 105 ML | Refills: 3
Start: 2021-07-19 | End: 2021-08-23

## 2021-07-19 RX ORDER — INSULIN GLARGINE 300 U/ML
95 INJECTION, SOLUTION SUBCUTANEOUS DAILY
Qty: 4 PEN | Refills: 5
Start: 2021-07-19 | End: 2021-07-22 | Stop reason: SDUPTHER

## 2021-07-22 DIAGNOSIS — Z79.4 TYPE 2 DIABETES MELLITUS WITH HYPERGLYCEMIA, WITH LONG-TERM CURRENT USE OF INSULIN (HCC): ICD-10-CM

## 2021-07-22 DIAGNOSIS — E11.65 TYPE 2 DIABETES MELLITUS WITH HYPERGLYCEMIA, WITH LONG-TERM CURRENT USE OF INSULIN (HCC): ICD-10-CM

## 2021-07-22 RX ORDER — INSULIN GLARGINE 300 U/ML
95 INJECTION, SOLUTION SUBCUTANEOUS DAILY
Qty: 28 PEN | Refills: 1 | Status: SHIPPED | OUTPATIENT
Start: 2021-07-22 | End: 2021-12-06

## 2021-08-02 ENCOUNTER — HOSPITAL ENCOUNTER (OUTPATIENT)
Dept: VASCULAR ULTRASOUND | Facility: HOSPITAL | Age: 76
Discharge: HOME | End: 2021-08-02
Payer: MEDICARE

## 2021-08-02 DIAGNOSIS — E11.65 TYPE 2 DIABETES MELLITUS WITH HYPERGLYCEMIA, WITH LONG-TERM CURRENT USE OF INSULIN (HCC): ICD-10-CM

## 2021-08-02 DIAGNOSIS — Z79.4 TYPE 2 DIABETES MELLITUS WITH HYPERGLYCEMIA, WITH LONG-TERM CURRENT USE OF INSULIN (HCC): ICD-10-CM

## 2021-08-02 DIAGNOSIS — I65.22 STENOSIS OF LEFT INTERNAL CAROTID ARTERY: ICD-10-CM

## 2021-08-02 PROCEDURE — 93880 EXTRACRANIAL BILAT STUDY: CPT

## 2021-08-02 PROCEDURE — 93880 EXTRACRANIAL BILAT STUDY: CPT | Performed by: SURGERY

## 2021-08-02 RX ORDER — FLASH GLUCOSE SENSOR
KIT MISCELLANEOUS
Qty: 2 EACH | Refills: 5 | Status: SHIPPED | OUTPATIENT
Start: 2021-08-02 | End: 2021-12-06

## 2021-08-11 ENCOUNTER — TELEPHONE (OUTPATIENT)
Dept: FAMILY MEDICINE CLINIC | Facility: OTHER | Age: 76
End: 2021-08-11

## 2021-08-11 ENCOUNTER — APPOINTMENT (OUTPATIENT)
Dept: LAB | Facility: CLINIC | Age: 76
End: 2021-08-11
Payer: MEDICARE

## 2021-08-11 DIAGNOSIS — N18.4 BENIGN HYPERTENSION WITH CHRONIC KIDNEY DISEASE, STAGE IV (HCC): ICD-10-CM

## 2021-08-11 DIAGNOSIS — I12.9 BENIGN HYPERTENSION WITH CHRONIC KIDNEY DISEASE, STAGE IV (HCC): ICD-10-CM

## 2021-08-11 DIAGNOSIS — E11.65 TYPE 2 DIABETES MELLITUS WITH HYPERGLYCEMIA, WITH LONG-TERM CURRENT USE OF INSULIN (HCC): ICD-10-CM

## 2021-08-11 DIAGNOSIS — Z79.4 TYPE 2 DIABETES MELLITUS WITH HYPERGLYCEMIA, WITH LONG-TERM CURRENT USE OF INSULIN (HCC): ICD-10-CM

## 2021-08-11 DIAGNOSIS — N18.4 CKD (CHRONIC KIDNEY DISEASE), STAGE IV (HCC): ICD-10-CM

## 2021-08-11 LAB
ALBUMIN SERPL BCP-MCNC: 3.4 G/DL (ref 3.5–5)
ANION GAP SERPL CALCULATED.3IONS-SCNC: 10 MMOL/L (ref 4–13)
BUN SERPL-MCNC: 45 MG/DL (ref 5–25)
CALCIUM ALBUM COR SERPL-MCNC: 8.9 MG/DL (ref 8.3–10.1)
CALCIUM SERPL-MCNC: 8.4 MG/DL (ref 8.3–10.1)
CHLORIDE SERPL-SCNC: 105 MMOL/L (ref 100–108)
CO2 SERPL-SCNC: 27 MMOL/L (ref 21–32)
CREAT SERPL-MCNC: 2.57 MG/DL (ref 0.6–1.3)
CREAT UR-MCNC: 78.8 MG/DL
CREAT UR-MCNC: 80 MG/DL
EST. AVERAGE GLUCOSE BLD GHB EST-MCNC: 212 MG/DL
GFR SERPL CREATININE-BSD FRML MDRD: 24 ML/MIN/1.73SQ M
GLUCOSE P FAST SERPL-MCNC: 159 MG/DL (ref 65–99)
HBA1C MFR BLD: 9 %
MAGNESIUM SERPL-MCNC: 2.1 MG/DL (ref 1.6–2.6)
MICROALBUMIN UR-MCNC: 616 MG/L (ref 0–20)
MICROALBUMIN/CREAT 24H UR: 782 MG/G CREATININE (ref 0–30)
PHOSPHATE SERPL-MCNC: 3.4 MG/DL (ref 2.3–4.1)
POTASSIUM SERPL-SCNC: 4 MMOL/L (ref 3.5–5.3)
PROT UR-MCNC: 78 MG/DL
PROT/CREAT UR: 0.98 MG/G{CREAT} (ref 0–0.1)
SODIUM SERPL-SCNC: 142 MMOL/L (ref 136–145)

## 2021-08-11 PROCEDURE — 82570 ASSAY OF URINE CREATININE: CPT

## 2021-08-11 PROCEDURE — 82043 UR ALBUMIN QUANTITATIVE: CPT

## 2021-08-11 PROCEDURE — 84156 ASSAY OF PROTEIN URINE: CPT

## 2021-08-11 PROCEDURE — 80069 RENAL FUNCTION PANEL: CPT

## 2021-08-11 PROCEDURE — 83036 HEMOGLOBIN GLYCOSYLATED A1C: CPT

## 2021-08-11 PROCEDURE — 83735 ASSAY OF MAGNESIUM: CPT

## 2021-08-11 PROCEDURE — 36415 COLL VENOUS BLD VENIPUNCTURE: CPT

## 2021-08-23 ENCOUNTER — OFFICE VISIT (OUTPATIENT)
Dept: ENDOCRINOLOGY | Facility: CLINIC | Age: 76
End: 2021-08-23
Payer: MEDICARE

## 2021-08-23 VITALS
SYSTOLIC BLOOD PRESSURE: 130 MMHG | BODY MASS INDEX: 39.48 KG/M2 | HEIGHT: 71 IN | WEIGHT: 282 LBS | HEART RATE: 92 BPM | DIASTOLIC BLOOD PRESSURE: 80 MMHG | TEMPERATURE: 97.3 F

## 2021-08-23 DIAGNOSIS — E78.2 MIXED HYPERLIPIDEMIA: ICD-10-CM

## 2021-08-23 DIAGNOSIS — N18.4 BENIGN HYPERTENSION WITH CHRONIC KIDNEY DISEASE, STAGE IV (HCC): ICD-10-CM

## 2021-08-23 DIAGNOSIS — R79.89 ELEVATED TSH: ICD-10-CM

## 2021-08-23 DIAGNOSIS — Z79.4 TYPE 2 DIABETES MELLITUS WITH HYPERGLYCEMIA, WITH LONG-TERM CURRENT USE OF INSULIN (HCC): Primary | ICD-10-CM

## 2021-08-23 DIAGNOSIS — I12.9 BENIGN HYPERTENSION WITH CHRONIC KIDNEY DISEASE, STAGE IV (HCC): ICD-10-CM

## 2021-08-23 DIAGNOSIS — E11.65 TYPE 2 DIABETES MELLITUS WITH HYPERGLYCEMIA, WITH LONG-TERM CURRENT USE OF INSULIN (HCC): Primary | ICD-10-CM

## 2021-08-23 PROCEDURE — 99214 OFFICE O/P EST MOD 30 MIN: CPT | Performed by: PHYSICIAN ASSISTANT

## 2021-08-23 RX ORDER — INSULIN LISPRO 100 [IU]/ML
INJECTION, SOLUTION INTRAVENOUS; SUBCUTANEOUS
Qty: 105 ML | Refills: 3
Start: 2021-08-23 | End: 2021-10-14

## 2021-08-23 NOTE — PATIENT INSTRUCTIONS
Hypoglycemia instructions   Francisca Carrion   8/23/2021  [id number]    Low Blood Sugar    Steps to treat low blood sugar  1  Test blood sugar if you have symptoms of low blood sugar:   Low Blood Sugar Symptoms:  o Sweaty  o Dizzy  o Rapid heartbeat  o Shaky  o Bad mood  o Hungry      2  Treat blood sugar less than 70 with 15 grams of fast-acting carbohydrate:   Examples of 15 grams Fast-Acting Carbohydrate:  o 4 oz juice  o 4 oz regular soda  o 3-4 glucose tablets (chew)  o 3-4 hard candies (chew)          3  Wait 15 minutes and test your blood sugar again     4   If blood sugar is less than 100, repeat steps 2-3     5  When your blood sugar is 100 or more, eat a snack if it will be longer than one hour until your next meal  The snack should be 15 grams of carbohydrate and a protein:   Examples of snacks:  o ½ sandwich  o 6 crackers with cheese  o Piece of fruit with cheese or peanut butter  o 6 crackers with peanut butter

## 2021-08-23 NOTE — PROGRESS NOTES
Patient Progress Note      CC: DM   Referring Provider  Norah Matt Md  [address]  [facility],  [facility]     History of Present Illness:   Carmelita Lundy  is a 77 y o  male with a history of type 2 diabetes with long term use of insulin  Diabetes course has been stable  Complications of DM: CAD, CKD  He was hospitalized in March 2021 for CHF  Denies any issues with his current regimen  Home glucose monitoring: are performed regularly  Readings range from 89-200s mg/dl  Most readings in the 100-200s range  Current regimen: Toujeo 95 units QHS, Humalog 40-40-40 units TID with meals, Victoza 1 8 mg daily  compliant most of the time, denies any side effects from medications  Injects in: abdomen, thigh  Rotates sites: Yes  Hypoglycemic episodes: No, rare  H/o of hypoglycemia causing hospitalization or Intervention such as glucagon injection or ambulance call : No  Hypoglycemia symptoms: jitteriness  Treatment of hypoglycemia: glucose tablets     Medic alert tag: recommended: Yes     Diabetes education: Yes  Diet: 3 meals per day, 1 snack per day  Timing of meals is predictable  Diabetic diet compliance: noncompliant most of the time  Activity: Daily activity is predictable: Yes  No routine exercise     Ophthamology: sees every 3 months per patient; Dr Rama Aguayo  Podiatry: sees podiatrist routinely  Dr Eron Bower  March 2021     Has HTN: on losartan  Has hyperlipidemia: on statin - tolerating well, no myalgias  compliant most of the time, denies any side effects from medications  Thyroid disorders: History of mildly elevated TSH    History of pancreatitis: No     Vitamin D deficiency: taking 1000 IU daily    Patient Active Problem List   Diagnosis    CKD (chronic kidney disease), stage IV (HCC)    Benign hypertension with chronic kidney disease, stage IV (HCC)    Chronic kidney disease-mineral and bone disorder    Type 2 diabetes mellitus, with long-term current use of insulin ([facility])    Sleep apnea    Triple vessel coronary artery disease    Hyperlipidemia    S/P CABG (coronary artery bypass graft)    Acute postoperative pulmonary insufficiency (HCC)    Other constipation    Iron deficiency anemia due to chronic blood loss    Non-nephrotic range proteinuria    Esophageal dysphagia    History of colonic polyps    Lumbar back pain    Lumbar discogenic pain syndrome    Morbid obesity (HCC)    Pain of lower extremity    Trigger finger of left hand    Spondylolisthesis    Spinal stenosis    Vitamin D deficiency    Lymphadenopathy    [facility]    Stenosis of left internal carotid artery    Neuropathy    Diabetic polyneuropathy associated with type 2 diabetes mellitus ([facility]    Encounter for  medical examination (CDME)    ERRONEOUS ENCOUNTER--DISREGARD    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    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[facility] )    Traumatic injury of skin    Hypervolemia associated with renal insufficiency    Elevated TSH      Past Medical History:   Diagnosis Date    CHF (congestive heart failure) ([facility] )     [facility] mellitus [facility]     Hyperlipidemia     Hypertension     Obesity     Renal disorder       Past Surgical History:   Procedure Laterality Date    BACK SURGERY      COLONOSCOPY      CORONARY ARTERY BYPASS GRAFT  2019    quad    GALLBLADDER SURGERY      HERNIA REPAIR      MS CABG, ARTERY-VEIN, FOUR N/A 6/21/2019    Procedure: CORONARY ARTERY BYPASS GRAFT (CABG) 4 VESSELS WITH SVG TO PDA, OM, DIAGONAL AND LIMA TO LAD; RIGHT LEG EVH;  Surgeon: Tracy Nassar MD;  [facility]: [facility];  Service: Cardiac Surgery    RECTAL SURGERY        Family History   Problem Relation Age of Onset    Cancer Mother     Cancer Father     Diabetes Maternal Grandmother     Diabetes Paternal Aunt      Social History     Tobacco Use    Smoking status: Former Smoker     Packs/day: 3 00     Years: 30 00     Pack years: 90 00     Quit date: 1990     Years since quitting: 31 6    Smokeless tobacco: Never Used    Tobacco comment: Quit 1991   Substance Use Topics    Alcohol use: Not Currently     Comment: 1 drink every 6 months       No Known Allergies      Current Outpatient Medications:     Alpha-Lipoic Acid 600 MG CAPS, TAKE 1 CAPSULE BY MOUTH EVERY DAY (Patient taking differently: Take 1 capsule by mouth TAKE 1 CAPSULE BY MOUTH EVERY DAY), Disp: 30 capsule, Rfl: 5    Ascorbic Acid (VITAMIN C) 1000 MG tablet, Take 1,000 mg by mouth daily, Disp: , Rfl:     aspirin (ECOTRIN LOW STRENGTH) 81 mg EC tablet, Take 1 tablet (81 mg total) by mouth daily, Disp: 60 tablet, Rfl: 8    atorvastatin (LIPITOR) 80 mg tablet, TAKE 1 TABLET DAILY WITH DINNER, Disp: 90 tablet, Rfl: 3    B-D ULTRAFINE III SHORT PEN 31G X 8 MM MISC, INJECT INTO THE SKIN 4 (FOUR) TIMES A DAY, Disp: 400 each, Rfl: 1    cholecalciferol (VITAMIN D3) 1,000 units tablet, Take 1 tablet (1,000 Units total) by mouth daily, Disp: 1 tablet, Rfl: 1    Continuous Blood Gluc  (FREESTYLE JOHANNE READER) MAMI, Use device to scan blood glucose at least 4 times a day, Disp: 1 Device, Rfl: 0    Continuous Blood Gluc Sensor (FreeStyle Johanne 14 Day Sensor) [facility] APPLY SENSOR EVERY 14 DAYS TO CHECK BLOOD GLUCOSE AT LEAST 4 TIMES A DAY, Disp: 2 each, Rfl: 5    cyanocobalamin (VITAMIN B-12) 500 mcg tablet, Take 500 mcg by mouth daily, Disp: , Rfl:     finasteride (PROSCAR) 5 mg tablet, Take 1 tablet (5 mg total) by mouth daily, Disp: 90 tablet, Rfl: 3    gabapentin (NEURONTIN) 300 mg capsule, Take 1 cap (300mg) by mouth in the morning and 1 cap (300mg) at noon, take 2 caps (600mg) at bedtime (Patient taking differently: Take 1 cap (300mg) by mouth in the morning and 1 cap (300mg) at noon, take 2 caps (600mg) at bedtime- he is to reduce this dose to 1 cap three times/day that is 300mg TID), Disp: 360 capsule, Rfl: 1    glucose blood test strip, Check 4 times a day, Disp: 400 each, Rfl: 1    insulin glargine (Toujeo Max SoloStar) 300 units/mL CONCENTRATED U-300 injection pen (2-unit dial), Inject 95 Units under the skin daily, Disp: 28 pen, Rfl: 1    insulin lispro (HumaLOG KwikPen) 100 units/mL injection pen, INJECT 42-42-42  UNITS THREE TIMES A DAY PLUS SCALE MAXIMUM DOSE 140 UNITS, Disp: 105 mL, Rfl: 3    latanoprost (XALATAN) 0 005 % ophthalmic solution, 1 drop daily at bedtime, Disp: , Rfl:     losartan (COZAAR) 50 mg tablet, Take 1 tablet (50 mg total) by mouth daily after dinner, Disp: 90 tablet, Rfl: 3    metoprolol tartrate (LOPRESSOR) 25 mg tablet, TAKE 1 TABLET EVERY 12 HOURS, Disp: 180 tablet, Rfl: 3    Microlet Lancets MISC, USE 3 TIMES DAY, Disp: 300 each, Rfl: 1    omeprazole (PriLOSEC) 40 MG capsule, Take 40 mg by mouth daily, Disp: , Rfl:     tamsulosin (FLOMAX) 0 4 mg, Take 2 capsules (0 8 mg total) by mouth daily with dinner, Disp: 180 capsule, Rfl: 3    torsemide (DEMADEX) 20 mg tablet, Take 2 tablets (40 mg total) by mouth 2 (two) times a day, Disp: 360 tablet, Rfl: 1    Victoza injection, INJECT 1 8 MG DAILY INTO SKIN, Disp: 27 mL, Rfl: 1    docusate sodium (COLACE) 100 mg capsule, Take 1 capsule (100 mg total) by mouth 2 (two) times a day, Disp: 60 capsule, Rfl: 1    doxepin (SINEquan) 10 mg capsule, Take 10 mg by mouth daily at bedtime, Disp: , Rfl:   Review of Systems   Constitutional: Negative for activity change, appetite change, fatigue and unexpected weight change  HENT: Negative for trouble swallowing  Eyes: Negative for visual disturbance  Respiratory: Negative for shortness of breath  Cardiovascular: Negative for chest pain and palpitations  Gastrointestinal: Negative for constipation and diarrhea  Endocrine: Negative for polydipsia and polyuria  Musculoskeletal: Negative  Neurological: Negative for numbness  Psychiatric/Behavioral: Negative  Physical Exam:  Body mass index is 39 33 kg/m²    /80   Pulse 92   Temp (!) 97 3 °F (36 3 °C) (Tympanic)   Ht 5' 11" (1 803 m)   Wt 128 kg (282 lb)   BMI 39 33 kg/m²    Wt Readings from Last 3 Encounters:   08/23/21 128 kg (282 lb)   07/14/21 129 kg (285 lb)   05/20/21 131 kg (288 lb 12 8 oz)       Physical Exam  Vitals and nursing note reviewed  Constitutional:       Appearance: He is well-developed  HENT:      Head: Normocephalic  Eyes:      General: No scleral icterus  Pupils: Pupils are equal, round, and reactive to light  Neck:      Thyroid: No thyromegaly  Cardiovascular:      Rate and Rhythm: Normal rate and regular rhythm  Pulses:           Radial pulses are 2+ on the right side and 2+ on the left side  Heart sounds: No murmur heard  Pulmonary:      Effort: Pulmonary effort is normal  No respiratory distress  Breath sounds: Normal breath sounds  No wheezing  Musculoskeletal:      Cervical back: Neck supple  Skin:     General: Skin is warm and dry  Neurological:      Mental Status: He is alert  Patient's shoes and socks were not removed        Labs:   Component      Latest Ref Rng & Units 6/18/2021 8/11/2021   Sodium      136 - 145 mmol/L 147 (H) 142   Potassium      3 5 - 5 3 mmol/L 4 2 4 0   Chloride      100 - 108 mmol/L 107 105   CO2      21 - 32 mmol/L 31 27   Anion Gap      4 - 13 mmol/L 9 10   BUN      5 - 25 mg/dL 51 (H) 45 (H)   Creatinine      0 60 - 1 30 mg/dL 2 50 (H) 2 57 (H)   GLUCOSE FASTING      65 - 99 mg/dL 179 (H) 159 (H)   Calcium      8 3 - 10 1 mg/dL 9 2 8 4   CORRECTED CALCIUM      8 3 - 10 1 mg/dL 9 8 8 9   AST      5 - 45 U/L 15    ALT      12 - 78 U/L 23    Alkaline Phosphatase      46 - 116 U/L 107    Total Protein      6 4 - 8 2 g/dL 7 2    Albumin      3 5 - 5 0 g/dL 3 3 (L) 3 4 (L)   TOTAL BILIRUBIN      0 20 - 1 00 mg/dL 0 45    eGFR      ml/min/1 73sq m 24 24   Phosphorus      2 3 - 4 1 mg/dL  3 4   Cholesterol      50 - 200 mg/dL 149    Triglycerides      <=150 mg/dL 160 (H)    HDL      >=40 mg/dL 42    LDL Calculated      0 - 100 mg/dL 75 Non-HDL Cholesterol      mg/dl 107    EXT Creatinine Urine      mg/dL  78 8   MICROALBUM ,U,RANDOM      0 0 - 20 0 mg/L  616 0 (H)   MICROALBUMIN/CREATININE RATIO      0 - 30 mg/g creatinine  782 (H)   Hemoglobin A1C      Normal 3 8-5 6%; PreDiabetic 5 7-6 4%; Diabetic >=6 5%; Glycemic control for adults with diabetes <7 0% % 8 8 (H) 9 0 (H)   eAG, EST AVG Glucose      mg/dl 206 212   TSH 3RD GENERATON      0 358 - 3 740 uIU/mL 3 822 (H)    Free T4      0 76 - 1 46 ng/dL 0 77        Plan:    Diagnoses and all orders for this visit:    Type 2 diabetes mellitus with hyperglycemia, with long-term current use of insulin ([facility])  HGA1C 9 0%  Worsened  Treatment regimen: increase Humalog to 42 units TID with meals plus scale  Discussed intensive insulin regimen does increase risk for hypoglycemia  Episodes of hypoglycemia can lead to permanent disability and death  Discussed risks/complications associated with uncontrolled diabetes  Advised to adhere to diabetic diet, and recommended staying active/exercising routinely  Keep carbohydrates consistent to limit blood glucose fluctuations  Advised to call if blood sugars less than 70 mg/dl or over 300 mg/dl  Check blood glucose 3+ times a day  Discussed symptoms and treatment of hypoglycemia  Recommended routine follow-up with podiatry and ophthalmology  Send log in 1-2 weeks  Ordered blood work to complete prior to next visit  -     Hemoglobin A1C; Future  -     Basic metabolic panel; Future  -     T4, free; Future  -     Thyroid Antibodies Panel; Future  -     TSH, 3rd generation; Future  -     insulin lispro (HumaLOG KwikPen) 100 units/mL injection pen; INJECT 42-42-42  UNITS THREE TIMES A DAY PLUS SCALE MAXIMUM DOSE 140 UNITS    Benign hypertension with chronic kidney disease, stage IV ([facility])  Blood pressure adequately controlled, continue current treatment   -     Basic metabolic panel;  Future    Mixed hyperlipidemia  LDL previously 75  On statin therapy  Managed by cardiology    Elevated TSH  TSH previously elevated at 3 822, free T4 normal at 0 77  Monitor labs and check thyroid antibodies  Not on thyroid medication  -     T4, free; Future  -     Thyroid Antibodies Panel; Future  -     TSH, 3rd generation; Future        Discussed with the patient diagnosis and treatment and all questions fully answered  He will call me if any problems arise  Counseled patient on diagnostic results, prognosis, risk and benefit of treatment options, instruction for management, importance of treatment compliance, risk factor reduction and impressions        Farnaz Crooks PA-C

## 2021-08-26 ENCOUNTER — TELEPHONE (OUTPATIENT)
Dept: ENDOCRINOLOGY | Facility: CLINIC | Age: 76
End: 2021-08-26

## 2021-08-26 NOTE — TELEPHONE ENCOUNTER
Pt dropped off [place] department of [place] form to be filled out  Handed to Farnaz to fill out   Attached incomplete form in media

## 2021-08-27 ENCOUNTER — TELEPHONE (OUTPATIENT)
Dept: ENDOCRINOLOGY | Facility: CLINIC | Age: 76
End: 2021-08-27
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
## 2021-08-30 ENCOUNTER — TELEPHONE (OUTPATIENT)
Dept: ENDOCRINOLOGY | Facility: CLINIC | Age: 76
End: 2021-08-30

## 2021-08-30 NOTE — TELEPHONE ENCOUNTER
Pt dropping off new forms from [employer]  Must be filled out /and sent to [employer]  Wife stated these are the forms that determine if he can drive or not

## 2021-08-30 NOTE — TELEPHONE ENCOUNTER
Please let them know that this message needs to be conveyed to PCP and DOT provider as they the DOT provider will be the one to make the determination for patients clearance

## 2021-08-31 NOTE — TELEPHONE ENCOUNTER
Pt came in and I explained to him that the paperwork needs to be filled out from his PCP  He said he would do that  He also gave me a new form specific to diabetes and I gave that to [facility]

## 2021-09-03 NOTE — TELEPHONE ENCOUNTER
Another form was dropped off  It was filled out and put in the scan pile  I called pt to let him know

## 2021-09-14 ENCOUNTER — OFFICE VISIT (OUTPATIENT)
Dept: NEPHROLOGY | Facility: CLINIC | Age: 76
End: 2021-09-14
Payer: MEDICARE

## 2021-09-14 VITALS
WEIGHT: 288.4 LBS | BODY MASS INDEX: 40.38 KG/M2 | SYSTOLIC BLOOD PRESSURE: 124 MMHG | HEIGHT: 71 IN | DIASTOLIC BLOOD PRESSURE: 70 MMHG

## 2021-09-14 DIAGNOSIS — N18.4 BENIGN HYPERTENSION WITH CHRONIC KIDNEY DISEASE, STAGE IV (HCC): ICD-10-CM

## 2021-09-14 DIAGNOSIS — N18.9 CHRONIC KIDNEY DISEASE-MINERAL AND BONE DISORDER: ICD-10-CM

## 2021-09-14 DIAGNOSIS — E55.9 VITAMIN D DEFICIENCY: ICD-10-CM

## 2021-09-14 DIAGNOSIS — N18.4 CKD (CHRONIC KIDNEY DISEASE), STAGE IV (HCC): Primary | ICD-10-CM

## 2021-09-14 DIAGNOSIS — I12.9 BENIGN HYPERTENSION WITH CHRONIC KIDNEY DISEASE, STAGE IV (HCC): ICD-10-CM

## 2021-09-14 DIAGNOSIS — R80.9 NON-NEPHROTIC RANGE PROTEINURIA: ICD-10-CM

## 2021-09-14 DIAGNOSIS — E87.70 HYPERVOLEMIA ASSOCIATED WITH RENAL INSUFFICIENCY: ICD-10-CM

## 2021-09-14 DIAGNOSIS — N28.9 HYPERVOLEMIA ASSOCIATED WITH RENAL INSUFFICIENCY: ICD-10-CM

## 2021-09-14 DIAGNOSIS — M89.9 CHRONIC KIDNEY DISEASE-MINERAL AND BONE DISORDER: ICD-10-CM

## 2021-09-14 DIAGNOSIS — E83.9 CHRONIC KIDNEY DISEASE-MINERAL AND BONE DISORDER: ICD-10-CM

## 2021-09-14 PROCEDURE — 99214 OFFICE O/P EST MOD 30 MIN: CPT | Performed by: INTERNAL MEDICINE

## 2021-09-14 NOTE — PROGRESS NOTES
NEPHROLOGY OFFICE PROGRESS NOTE   Poonam Knee  77 y o  male MRN: [id number]  DATE: 09/14/21  Reason for visit: Continued evaluation of CKD    ASSESSMENT & PLAN:  1  Chronic kidney disease, stage IV  · Johan's baseline creatinine is now closer to around 2 3 to 2 5    · CKD etiology is diabetic nephropathy + sequelae from post op ATN from CABG  · His creatinine is on the high end of baseline but this is acceptable overall  Creatinine 2 57   · He completed CKD education  3  Hypertension  · BP is at goal  Does not check at home  · Current regimen: Losartan 50 mg daily, Torsemide 40 mg BID and Metoprolol 25 mg BID  · No changes  4  Bilateral leg edema  · Stable with Torsemide 40 mg BID  · He remains non compliant to low salt diet per his wife  5  Mineral bone disease  · PTH is at goal - 106 6 in May 2021  Recheck PTH  · Ca and phos are at goal    · Vitamin D level is below goal - 26 4 in May 2021 - his Vitamin D was increased to 2000 units daily  Check Vit D      5  Proteinuria:  · UPC is stable at 0 98  · Continue Losartan 50 mg daily  6  Obesity, weight gain:  · No improvement since last visit  · We discussed this at length again today  I informed him that his blood pressure and glycemic medications may be cut back if he lost a fair amount of weight  Also, he needs to lose weight to be even considered for a kidney transplant  7  Anemia:   · Recheck CBC  8  Diabetes mellitus:   · HbA1c is worse  · Diabetic management is deferred to endocrinology or PCP  · Recommendations include targeting a HbA1c in the range of <6 5% to <8%  HbA1c goals are tailored for each patient at the discretion of endocrinology or PCP  9  Hyperlipidemia:  · Lipid management is deferred to PCP  10  CKD related parameters:  · Anemia: Hgb is at goal   · Acid-base: CO2 is at goal (> 22)  · Proteinuria: UPC ratio at goal (< 1 0)       Patient Instructions   Please continue to work with your endocrinologist to get your diabetes under control  No changes from my standpoint  Follow up in 4 months  SUBJECTIVE / INTERVAL HISTORY:  Meryle Course was last seen in the office by me in May 2021  No new hospital stays or ER visits  Unfortunately, he has not lost weight  His Hba1c is worse and is up to 9 0  His wife reports that he is non compliant with a low salt diet  He does not know his medications and provided the wrong information today  PMH/PSH: HTN, DM, HLP, CKD, CAD s/p CABG, HARJIT, BPH, obesity, DDD s/p fusion surgery, cholecystectomy, hernia repair, tonsillectomy, urinary retention    Previous work up:   6/3/19 Renal US: R 10 6 cm, L 11 cm,  cc    ALLERGIES: No Known Allergies    REVIEW OF SYSTEMS:  Review of Systems   Constitutional: Negative for appetite change, chills, fatigue and fever  Respiratory: Negative for cough and shortness of breath  Cardiovascular: Positive for leg swelling  Negative for chest pain  Gastrointestinal: Negative for abdominal pain, diarrhea, nausea and vomiting  Genitourinary: Negative for dysuria and hematuria  Musculoskeletal: Negative for arthralgias and back pain  Neurological: Negative for dizziness and light-headedness  OBJECTIVE:  /70   Ht 5' 11" (1 803 m)   Wt 131 kg (288 lb 6 4 oz)   BMI 40 22 kg/m²   Current Weight: Weight - Scale: 131 kg (288 lb 6 4 oz) Body mass index is 40 22 kg/m²  Physical Exam  Constitutional:       General: He is not in acute distress  Appearance: Normal appearance  He is well-developed  He is obese  He is not ill-appearing  HENT:      Head: Normocephalic and atraumatic  Eyes:      General: No scleral icterus  Conjunctiva/sclera: Conjunctivae normal    Neck:      Vascular: No JVD  Cardiovascular:      Rate and Rhythm: Normal rate and regular rhythm  Heart sounds: Normal heart sounds  Pulmonary:      Effort: Pulmonary effort is normal  No respiratory distress        Breath sounds: Normal breath sounds  Abdominal:      General: Bowel sounds are normal       Palpations: Abdomen is soft  Musculoskeletal:      Cervical back: Neck supple  Right lower leg: Edema present  Left lower leg: Edema present  Comments: Leg swelling  Skin:     General: Skin is warm and dry  Neurological:      Mental Status: He is alert and oriented to person, place, and time     Psychiatric:         Behavior: Behavior normal      Medications:  Current Outpatient Medications:     Alpha-Lipoic Acid 600 MG CAPS, TAKE 1 CAPSULE BY MOUTH EVERY DAY (Patient taking differently: TAKE ONE CAPSULE DAILY), Disp: 30 capsule, Rfl: 5    Ascorbic Acid (VITAMIN C) 1000 MG tablet, Take 1,000 mg by mouth daily, Disp: , Rfl:     aspirin (ECOTRIN LOW STRENGTH) 81 mg EC tablet, Take 1 tablet (81 mg total) by mouth daily, Disp: 60 tablet, Rfl: 8    atorvastatin (LIPITOR) 80 mg tablet, TAKE 1 TABLET DAILY WITH DINNER, Disp: 90 tablet, Rfl: 3    cholecalciferol (VITAMIN D3) 1,000 units tablet, Take 1 tablet (1,000 Units total) by mouth daily, Disp: 1 tablet, Rfl: 1    cyanocobalamin (VITAMIN B-12) 500 mcg tablet, Take 500 mcg by mouth daily, Disp: , Rfl:     Doxepin HCl (SILENOR PO), Take by mouth as needed, Disp: , Rfl:     finasteride (PROSCAR) 5 mg tablet, Take 1 tablet (5 mg total) by mouth daily, Disp: 90 tablet, Rfl: 3    gabapentin (NEURONTIN) 300 mg capsule, Take 1 cap (300mg) by mouth in the morning and 1 cap (300mg) at noon, take 2 caps (600mg) at bedtime (Patient taking differently: Take 1 cap (300mg) by mouth in the morning and 1 cap (300mg) at noon, take 2 caps (600mg) at bedtime- he is to reduce this dose to 1 cap three times/day that is 300mg TID), Disp: 360 capsule, Rfl: 1    insulin glargine (Toujeo Max SoloStar) 300 units/mL CONCENTRATED U-300 injection pen (2-unit dial), Inject 95 Units under the skin daily, Disp: 28 pen, Rfl: 1    insulin lispro (HumaLOG KwikPen) 100 units/mL injection pen, INJECT 42-42-42  UNITS THREE TIMES A DAY PLUS SCALE MAXIMUM DOSE 140 UNITS, Disp: 105 mL, Rfl: 3    latanoprost (XALATAN) 0 005 % ophthalmic solution, 1 drop daily at bedtime, Disp: , Rfl:     losartan (COZAAR) 50 mg tablet, Take 1 tablet (50 mg total) by mouth daily after dinner, Disp: 90 tablet, Rfl: 3    metoprolol tartrate (LOPRESSOR) 25 mg tablet, TAKE 1 TABLET EVERY 12 HOURS, Disp: 180 tablet, Rfl: 3    tamsulosin (FLOMAX) 0 4 mg, Take 2 capsules (0 8 mg total) by mouth daily with dinner (Patient taking differently: Take 0 4 mg by mouth daily with dinner ), Disp: 180 capsule, Rfl: 3    torsemide (DEMADEX) 20 mg tablet, Take 2 tablets (40 mg total) by mouth 2 (two) times a day (Patient taking differently: Take 20 mg by mouth daily ), Disp: 360 tablet, Rfl: 1    Victoza injection, INJECT 1 8 MG DAILY INTO SKIN, Disp: 27 mL, Rfl: 1    B-D ULTRAFINE III SHORT PEN 31G X 8 MM MISC, INJECT INTO THE SKIN 4 (FOUR) TIMES A DAY, Disp: 400 each, Rfl: 1    Continuous Blood Gluc  (FREESTYLE JOHANNE READER) MAMI, Use device to scan blood glucose at least 4 times a day, Disp: 1 Device, Rfl: 0    Continuous Blood Gluc Sensor (FreeStyle Joh[place] 14 Day Sensor) [facility] APPLY SENSOR EVERY 14 DAYS TO CHECK BLOOD GLUCOSE AT LEAST 4 TIMES A DAY, Disp: 2 each, Rfl: 5    docusate sodium (COLACE) 100 mg capsule, Take 1 capsule (100 mg total) by mouth 2 (two) times a day (Patient not taking: Reported on 9/14/2021), Disp: 60 capsule, Rfl: 1    doxepin (SINEquan) 10 mg capsule, Take 10 mg by mouth daily at bedtime, Disp: , Rfl:     glucose blood test strip, Check 4 times a day, Disp: 400 each, Rfl: 1    Microlet Lancets MISC, USE 3 TIMES DAY, Disp: 300 each, Rfl: 1    omeprazole (PriLOSEC) 40 MG capsule, Take 40 mg by mouth daily, Disp: , Rfl:     Laboratory Results:  Results for orders placed or performed in visit on 08/11/21   Hemoglobin A1C   Result Value Ref Range    Hemoglobin A1C 9 0 (H) Normal 3 8-5 6%; PreDiabetic 5 7-6 4%;  Diabetic >=6 5%; Glycemic control for adults with diabetes <7 0% %     mg/dl   Renal function panel   Result Value Ref Range    Albumin 3 4 (L) 3 5 - 5 0 g/dL    Calcium 8 4 8 3 - 10 1 mg/dL    Corrected Calcium 8 9 8 3 - 10 1 mg/dL    Phosphorus 3 4 2 3 - 4 1 mg/dL    BUN 45 (H) 5 - 25 mg/dL    Creatinine 2 57 (H) 0 60 - 1 30 mg/dL    Sodium 142 136 - 145 mmol/L    Potassium 4 0 3 5 - 5 3 mmol/L    Chloride 105 100 - 108 mmol/L    CO2 27 21 - 32 mmol/L    ANION GAP 10 4 - 13 mmol/L    eGFR 24 ml/min/1 73sq m    Glucose, Fasting 159 (H) 65 - 99 mg/dL   Protein / creatinine ratio, urine   Result Value Ref Range    Creatinine, Ur 80 0 mg/dL    Protein Urine Random 78 mg/dL    Prot/Creat Ratio, Ur 0 98 (H) 0 00 - 0 10   Magnesium   Result Value Ref Range    Magnesium 2 1 1 6 - 2 6 mg/dL

## 2021-09-14 NOTE — PATIENT INSTRUCTIONS
Please continue to work with your endocrinologist to get your diabetes under control  No changes from my standpoint  Follow up in 4 months

## 2021-09-14 NOTE — LETTER
September 14, 2021     B[facility] [address]ark[address]    Patient: Albert Rose  YOB: 1945   Date of Visit: 9/14/2021       Dear Dr Errol Sánchez: Thank you for referring Henry Mike to me for evaluation  Below are my notes for this consultation  If you have questions, please do not hesitate to call me  I look forward to following your patient along with you  Sincerely,        Nancy Brasher MD        CC: No Recipients  Nancy Brasher MD  9/14/2021 11:03 AM  Sign when Signing Visit  NEPHROLOGY OFFICE PROGRESS NOTE   Albert Rose  77 y o  male MRN: [id number]  DATE: 09/14/21  Reason for visit: Continued evaluation of CKD    ASSESSMENT & PLAN:  1  Chronic kidney disease, stage IV  · Johan's baseline creatinine is now closer to around 2 3 to 2 5    · CKD etiology is diabetic nephropathy + sequelae from post op ATN from CABG  · His creatinine is on the high end of baseline but this is acceptable overall  Creatinine 2 57   · He completed CKD education  3  Hypertension  · BP is at goal  Does not check at home  · Current regimen: Losartan 50 mg daily, Torsemide 40 mg BID and Metoprolol 25 mg BID  · No changes  4  Bilateral leg edema  · Stable with Torsemide 40 mg BID  · He remains non compliant to low salt diet per his wife  5  Mineral bone disease  · PTH is at goal - 106 6 in May 2021  Recheck PTH  · Ca and phos are at goal    · Vitamin D level is below goal - 26 4 in May 2021 - his Vitamin D was increased to 2000 units daily  Check Vit D      5  Proteinuria:  · UPC is stable at 0 98  · Continue Losartan 50 mg daily  6  Obesity, weight gain:  · No improvement since last visit  · We discussed this at length again today  I informed him that his blood pressure and glycemic medications may be cut back if he lost a fair amount of weight  Also, he needs to lose weight to be even considered for a kidney transplant      7  Anemia:   · Recheck CBC      8  Diabetes mellitus:   · HbA1c is worse  · Diabetic management is deferred to endocrinology or PCP  · Recommendations include targeting a HbA1c in the range of <6 5% to <8%  HbA1c goals are tailored for each patient at the discretion of endocrinology or PCP  9  Hyperlipidemia:  · Lipid management is deferred to PCP  10  CKD related parameters:  · Anemia: Hgb is at goal   · Acid-base: CO2 is at goal (> 22)  · Proteinuria: UPC ratio at goal (< 1 0)  Patient Instructions   Please continue to work with your endocrinologist to get your diabetes under control  No changes from my standpoint  Follow up in 4 months  SUBJECTIVE / INTERVAL HISTORY:  Konrad Velasco was last seen in the office by me in May 2021  No new hospital stays or ER visits  Unfortunately, he has not lost weight  His Hba1c is worse and is up to 9 0  His wife reports that he is non compliant with a low salt diet  He does not know his medications and provided the wrong information today  PMH/PSH: HTN, DM, HLP, CKD, CAD s/p CABG, HARJIT, BPH, obesity, DDD s/p fusion surgery, cholecystectomy, hernia repair, tonsillectomy, urinary retention    Previous work up:   6/3/19 Renal US: R 10 6 cm, L 11 cm,  cc    ALLERGIES: No Known Allergies    REVIEW OF SYSTEMS:  Review of Systems   Constitutional: Negative for appetite change, chills, fatigue and fever  Respiratory: Negative for cough and shortness of breath  Cardiovascular: Positive for leg swelling  Negative for chest pain  Gastrointestinal: Negative for abdominal pain, diarrhea, nausea and vomiting  Genitourinary: Negative for dysuria and hematuria  Musculoskeletal: Negative for arthralgias and back pain  Neurological: Negative for dizziness and light-headedness  OBJECTIVE:  /70   Ht 5' 11" (1 803 m)   Wt 131 kg (288 lb 6 4 oz)   BMI 40 22 kg/m²   Current Weight: Weight - Scale: 131 kg (288 lb 6 4 oz) Body mass index is 40 22 kg/m²    Physical Exam  Constitutional:       General: He is not in acute distress  Appearance: Normal appearance  He is well-developed  He is obese  He is not ill-appearing  HENT:      Head: Normocephalic and atraumatic  Eyes:      General: No scleral icterus  Conjunctiva/sclera: Conjunctivae normal    Neck:      Vascular: No JVD  Cardiovascular:      Rate and Rhythm: Normal rate and regular rhythm  Heart sounds: Normal heart sounds  Pulmonary:      Effort: Pulmonary effort is normal  No respiratory distress  Breath sounds: Normal breath sounds  Abdominal:      General: Bowel sounds are normal       Palpations: Abdomen is soft  Musculoskeletal:      Cervical back: Neck supple  Right lower leg: Edema present  Left lower leg: Edema present  Comments: Leg swelling  Skin:     General: Skin is warm and dry  Neurological:      Mental Status: He is alert and oriented to person, place, and time     Psychiatric:         Behavior: Behavior normal      Medications:  Current Outpatient Medications:     Alpha-Lipoic Acid 600 MG CAPS, TAKE 1 CAPSULE BY MOUTH EVERY DAY (Patient taking differently: TAKE ONE CAPSULE DAILY), Disp: 30 capsule, Rfl: 5    Ascorbic Acid (VITAMIN C) 1000 MG tablet, Take 1,000 mg by mouth daily, Disp: , Rfl:     aspirin (ECOTRIN LOW STRENGTH) 81 mg EC tablet, Take 1 tablet (81 mg total) by mouth daily, Disp: 60 tablet, Rfl: 8    atorvastatin (LIPITOR) 80 mg tablet, TAKE 1 TABLET DAILY WITH DINNER, Disp: 90 tablet, Rfl: 3    cholecalciferol (VITAMIN D3) 1,000 units tablet, Take 1 tablet (1,000 Units total) by mouth daily, Disp: 1 tablet, Rfl: 1    cyanocobalamin (VITAMIN B-12) 500 mcg tablet, Take 500 mcg by mouth daily, Disp: , Rfl:     Doxepin HCl (SILENOR PO), Take by mouth as needed, Disp: , Rfl:     finasteride (PROSCAR) 5 mg tablet, Take 1 tablet (5 mg total) by mouth daily, Disp: 90 tablet, Rfl: 3    gabapentin (NEURONTIN) 300 mg capsule, Take 1 cap (300mg) by mouth in the morning and 1 cap (300mg) at noon, take 2 caps (600mg) at bedtime (Patient taking differently: Take 1 cap (300mg) by mouth in the morning and 1 cap (300mg) at noon, take 2 caps (600mg) at bedtime- he is to reduce this dose to 1 cap three times/day that is 300mg TID), Disp: 360 capsule, Rfl: 1    insulin glargine (Toujeo Max SoloStar) 300 units/mL CONCENTRATED U-300 injection pen (2-unit dial), Inject 95 Units under the skin daily, Disp: 28 pen, Rfl: 1    insulin lispro (HumaLOG KwikPen) 100 units/mL injection pen, INJECT [id number]  UNITS THREE TIMES A DAY PLUS SCALE MAXIMUM DOSE 140 UNITS, Disp: 105 mL, Rfl: 3    latanoprost (XALATAN) 0 005 % ophthalmic solution, 1 drop daily at bedtime, Disp: , Rfl:     losartan (COZAAR) 50 mg tablet, Take 1 tablet (50 mg total) by mouth daily after dinner, Disp: 90 tablet, Rfl: 3    metoprolol tartrate (LOPRESSOR) 25 mg tablet, TAKE 1 TABLET EVERY 12 HOURS, Disp: 180 tablet, Rfl: 3    tamsulosin (FLOMAX) 0 4 mg, Take 2 capsules (0 8 mg total) by mouth daily with dinner (Patient taking differently: Take 0 4 mg by mouth daily with dinner ), Disp: 180 capsule, Rfl: 3    torsemide (DEMADEX) 20 mg tablet, Take 2 tablets (40 mg total) by mouth 2 (two) times a day (Patient taking differently: Take 20 mg by mouth daily ), Disp: 360 tablet, Rfl: 1    Victoza injection, INJECT 1 8 MG DAILY INTO SKIN, Disp: 27 mL, Rfl: 1    B-D ULTRAFINE III SHORT PEN 31G X 8 MM MISC, INJECT INTO THE SKIN 4 (FOUR) TIMES A DAY, Disp: 400 each, Rfl: 1    Continuous Blood Gluc  (FREESTYLE JOHANNE READER) MAMI, Use device to scan blood glucose at least 4 times a day, Disp: 1 Device, Rfl: 0    Continuous Blood Gluc Sensor (FreeStyle Johanne 14 Day Sensor) [facility] APPLY SENSOR EVERY 14 DAYS TO CHECK BLOOD GLUCOSE AT LEAST 4 TIMES A DAY, Disp: 2 each, Rfl: 5    docusate sodium (COLACE) 100 mg capsule, Take 1 capsule (100 mg total) by mouth 2 (two) times a day (Patient not taking: Reported on 9/14/2021), Disp: 60 capsule, Rfl: 1    doxepin (SINEquan) 10 mg capsule, Take 10 mg by mouth daily at bedtime, Disp: , Rfl:     glucose blood test strip, Check 4 times a day, Disp: 400 each, Rfl: 1    Microlet Lancets MISC, USE 3 TIMES DAY, Disp: 300 each, Rfl: 1    omeprazole (PriLOSEC) 40 MG capsule, Take 40 mg by mouth daily, Disp: , Rfl:     Laboratory Results:  Results for orders placed or performed in visit on 08/11/21   Hemoglobin A1C   Result Value Ref Range    Hemoglobin A1C 9 0 (H) Normal 3 8-5 6%; PreDiabetic 5 7-6 4%;  Diabetic >=6 5%; Glycemic control for adults with diabetes <7 0% %     mg/dl   Renal function panel   Result Value Ref Range    Albumin 3 4 (L) 3 5 - 5 0 g/dL    Calcium 8 4 8 3 - 10 1 mg/dL    Corrected Calcium 8 9 8 3 - 10 1 mg/dL    Phosphorus 3 4 2 3 - 4 1 mg/dL    BUN 45 (H) 5 - 25 mg/dL    Creatinine 2 57 (H) 0 60 - 1 30 mg/dL    Sodium 142 136 - 145 mmol/L    Potassium 4 0 3 5 - 5 3 mmol/L    Chloride 105 100 - 108 mmol/L    CO2 27 21 - 32 mmol/L    ANION GAP 10 4 - 13 mmol/L    eGFR 24 ml/min/1 73sq m    Glucose, Fasting 159 (H) 65 - 99 mg/dL   Protein / creatinine ratio, urine   Result Value Ref Range    Creatinine, Ur 80 0 mg/dL    Protein Urine Random 78 mg/dL    Prot/Creat Ratio, Ur 0 98 (H) 0 00 - 0 10   Magnesium   Result Value Ref Range    Magnesium 2 1 1 6 - 2 6 mg/dL

## 2021-09-21 ENCOUNTER — OFFICE VISIT (OUTPATIENT)
Dept: FAMILY MEDICINE CLINIC | Facility: CLINIC | Age: 76
End: 2021-09-21
Payer: MEDICARE

## 2021-09-21 ENCOUNTER — TELEPHONE (OUTPATIENT)
Dept: OBGYN CLINIC | Facility: HOSPITAL | Age: 76
End: 2021-09-21

## 2021-09-21 VITALS
BODY MASS INDEX: 39.9 KG/M2 | WEIGHT: 285 LBS | OXYGEN SATURATION: 97 % | SYSTOLIC BLOOD PRESSURE: 162 MMHG | HEIGHT: 71 IN | DIASTOLIC BLOOD PRESSURE: 80 MMHG | TEMPERATURE: 98.2 F | HEART RATE: 82 BPM | RESPIRATION RATE: 18 BRPM

## 2021-09-21 DIAGNOSIS — Z79.4 TYPE 2 DIABETES MELLITUS WITH HYPERGLYCEMIA, WITH LONG-TERM CURRENT USE OF INSULIN (HCC): ICD-10-CM

## 2021-09-21 DIAGNOSIS — E66.09 CLASS 2 OBESITY DUE TO EXCESS CALORIES WITHOUT SERIOUS COMORBIDITY WITH BODY MASS INDEX (BMI) OF 39.0 TO 39.9 IN ADULT: ICD-10-CM

## 2021-09-21 DIAGNOSIS — I65.22 STENOSIS OF LEFT INTERNAL CAROTID ARTERY: ICD-10-CM

## 2021-09-21 DIAGNOSIS — E11.65 TYPE 2 DIABETES MELLITUS WITH HYPERGLYCEMIA, WITH LONG-TERM CURRENT USE OF INSULIN (HCC): ICD-10-CM

## 2021-09-21 DIAGNOSIS — I10 ESSENTIAL HYPERTENSION: ICD-10-CM

## 2021-09-21 DIAGNOSIS — Z95.1 S/P CABG (CORONARY ARTERY BYPASS GRAFT): ICD-10-CM

## 2021-09-21 DIAGNOSIS — E74.04 MCARDLE DISEASE (HCC): ICD-10-CM

## 2021-09-21 DIAGNOSIS — N18.4 BENIGN HYPERTENSION WITH CHRONIC KIDNEY DISEASE, STAGE IV (HCC): Primary | ICD-10-CM

## 2021-09-21 DIAGNOSIS — M51.26 LUMBAR DISCOGENIC PAIN SYNDROME: ICD-10-CM

## 2021-09-21 DIAGNOSIS — M48.00 SPINAL STENOSIS, UNSPECIFIED SPINAL REGION: ICD-10-CM

## 2021-09-21 DIAGNOSIS — E66.01 CLASS 2 SEVERE OBESITY DUE TO EXCESS CALORIES WITH SERIOUS COMORBIDITY AND BODY MASS INDEX (BMI) OF 39.0 TO 39.9 IN ADULT (HCC): ICD-10-CM

## 2021-09-21 DIAGNOSIS — E78.2 MIXED HYPERLIPIDEMIA: ICD-10-CM

## 2021-09-21 DIAGNOSIS — I12.9 BENIGN HYPERTENSION WITH CHRONIC KIDNEY DISEASE, STAGE IV (HCC): Primary | ICD-10-CM

## 2021-09-21 PROBLEM — I50.9 CONGESTIVE HEART FAILURE (CHF) (HCC): Status: ACTIVE | Noted: 2021-03-11

## 2021-09-21 PROCEDURE — 99204 OFFICE O/P NEW MOD 45 MIN: CPT | Performed by: FAMILY MEDICINE

## 2021-09-21 RX ORDER — LOSARTAN POTASSIUM 25 MG/1
25 TABLET ORAL DAILY
Qty: 90 TABLET | Refills: 1 | Status: SHIPPED | OUTPATIENT
Start: 2021-09-21 | End: 2021-12-21

## 2021-09-21 NOTE — TELEPHONE ENCOUNTER
Patient called to schedule with ortho to have a state form filled out  Patient states he has NO orthopedic issue, but needs the form completed and signed by an orthopedic doctor  Call was lost while I was consulting team leads for guidance
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
## 2021-10-13 ENCOUNTER — PROCEDURE VISIT (OUTPATIENT)
Dept: UROLOGY | Facility: CLINIC | Age: 76
End: 2021-10-13
Payer: MEDICARE

## 2021-10-13 VITALS
DIASTOLIC BLOOD PRESSURE: 62 MMHG | BODY MASS INDEX: 40.32 KG/M2 | WEIGHT: 288 LBS | SYSTOLIC BLOOD PRESSURE: 106 MMHG | HEART RATE: 69 BPM | HEIGHT: 71 IN

## 2021-10-13 DIAGNOSIS — R33.9 URINARY RETENTION: Primary | ICD-10-CM

## 2021-10-13 PROCEDURE — 52000 CYSTOURETHROSCOPY: CPT | Performed by: UROLOGY

## 2021-10-15 ENCOUNTER — TELEPHONE (OUTPATIENT)
Dept: ENDOCRINOLOGY | Facility: CLINIC | Age: 76
End: 2021-10-15

## 2021-10-19 ENCOUNTER — APPOINTMENT (EMERGENCY)
Dept: CT IMAGING | Facility: HOSPITAL | Age: 76
DRG: 683 | End: 2021-10-19
Payer: MEDICARE

## 2021-10-19 ENCOUNTER — HOSPITAL ENCOUNTER (INPATIENT)
Facility: HOSPITAL | Age: 76
LOS: 2 days | Discharge: NURSING, REHAB OR LONG-TERM CARE | DRG: 683 | End: 2021-10-21
Attending: EMERGENCY MEDICINE | Admitting: INTERNAL MEDICINE
Payer: MEDICARE

## 2021-10-19 ENCOUNTER — APPOINTMENT (EMERGENCY)
Dept: RADIOLOGY | Facility: HOSPITAL | Age: 76
DRG: 683 | End: 2021-10-19
Payer: MEDICARE

## 2021-10-19 DIAGNOSIS — N13.4 HYDROURETER ON LEFT: ICD-10-CM

## 2021-10-19 DIAGNOSIS — I49.3 PVC (PREMATURE VENTRICULAR CONTRACTION): ICD-10-CM

## 2021-10-19 DIAGNOSIS — A04.72 CLOSTRIDIUM DIFFICILE DIARRHEA: ICD-10-CM

## 2021-10-19 DIAGNOSIS — R73.9 HYPERGLYCEMIA: ICD-10-CM

## 2021-10-19 DIAGNOSIS — R77.8 ELEVATED TROPONIN I LEVEL: Primary | ICD-10-CM

## 2021-10-19 DIAGNOSIS — E11.42 DIABETIC POLYNEUROPATHY ASSOCIATED WITH TYPE 2 DIABETES MELLITUS (HCC): ICD-10-CM

## 2021-10-19 PROBLEM — N17.9 AKI (ACUTE KIDNEY INJURY) (HCC): Status: ACTIVE | Noted: 2021-10-19

## 2021-10-19 PROBLEM — R19.7 DIARRHEA: Status: ACTIVE | Noted: 2021-10-19

## 2021-10-19 PROBLEM — E11.9 DIABETES MELLITUS (HCC): Status: RESOLVED | Noted: 2020-01-27 | Resolved: 2021-10-19

## 2021-10-19 PROBLEM — R53.1 GENERALIZED WEAKNESS: Status: ACTIVE | Noted: 2021-10-19

## 2021-10-19 LAB
ALBUMIN SERPL BCP-MCNC: 3.6 G/DL (ref 3.4–4.8)
ALP SERPL-CCNC: 90.4 U/L (ref 10–129)
ALT SERPL W P-5'-P-CCNC: 33 U/L (ref 5–63)
ANION GAP SERPL CALCULATED.3IONS-SCNC: 13 MMOL/L (ref 4–13)
AST SERPL W P-5'-P-CCNC: 34 U/L (ref 15–41)
BACTERIA UR QL AUTO: ABNORMAL /HPF
BASE EX.OXY STD BLDV CALC-SCNC: 69.7 % (ref 60–80)
BASE EXCESS BLDV CALC-SCNC: -1.1 MMOL/L
BETA-HYDROXYBUTYRATE: 0.6 MMOL/L
BILIRUB SERPL-MCNC: 0.77 MG/DL (ref 0.3–1.2)
BILIRUB UR QL STRIP: NEGATIVE
BNP SERPL-MCNC: 120.3 PG/ML (ref 1–100)
BUN SERPL-MCNC: 89 MG/DL (ref 6–20)
CALCIUM SERPL-MCNC: 9.2 MG/DL (ref 8.4–10.2)
CHLORIDE SERPL-SCNC: 95 MMOL/L (ref 96–108)
CK MB SERPL-MCNC: 18.6 NG/ML (ref 0.1–5.9)
CK MB SERPL-MCNC: 2.2 % (ref 0–2.5)
CK SERPL-CCNC: 853.7 U/L (ref 49–397)
CLARITY UR: ABNORMAL
CO2 SERPL-SCNC: 25 MMOL/L (ref 22–33)
COLOR UR: YELLOW
CREAT SERPL-MCNC: 3.04 MG/DL (ref 0.5–1.2)
ERYTHROCYTE [DISTWIDTH] IN BLOOD BY AUTOMATED COUNT: 13.5 % (ref 11.6–15.1)
FLUAV RNA RESP QL NAA+PROBE: NEGATIVE
FLUBV RNA RESP QL NAA+PROBE: NEGATIVE
GFR SERPL CREATININE-BSD FRML MDRD: 19 ML/MIN/1.73SQ M
GLUCOSE SERPL-MCNC: 444 MG/DL (ref 65–140)
GLUCOSE SERPL-MCNC: 459 MG/DL (ref 65–140)
GLUCOSE SERPL-MCNC: 459 MG/DL (ref 65–140)
GLUCOSE SERPL-MCNC: 483 MG/DL (ref 65–140)
GLUCOSE SERPL-MCNC: 499 MG/DL (ref 65–140)
GLUCOSE UR STRIP-MCNC: NEGATIVE MG/DL
HCO3 BLDV-SCNC: 25.1 MMOL/L (ref 24–30)
HCT VFR BLD AUTO: 34 % (ref 36.5–49.3)
HGB BLD-MCNC: 11.5 G/DL (ref 12–17)
HGB UR QL STRIP.AUTO: ABNORMAL
KETONES UR STRIP-MCNC: NEGATIVE MG/DL
LACTATE SERPL-SCNC: 1.3 MMOL/L (ref 0–2)
LEUKOCYTE ESTERASE UR QL STRIP: ABNORMAL
LIPASE SERPL-CCNC: 39 U/L (ref 13–60)
MAGNESIUM SERPL-MCNC: 2.3 MG/DL (ref 1.6–2.6)
MCH RBC QN AUTO: 30.7 PG (ref 26.8–34.3)
MCHC RBC AUTO-ENTMCNC: 33.8 G/DL (ref 31.4–37.4)
MCV RBC AUTO: 91 FL (ref 82–98)
NITRITE UR QL STRIP: NEGATIVE
NON-SQ EPI CELLS URNS QL MICRO: ABNORMAL /HPF
O2 CT BLDV-SCNC: 12.7 ML/DL
PCO2 BLDV: 48 MM HG (ref 42–50)
PH BLDV: 7.34 [PH] (ref 7.3–7.4)
PH UR STRIP.AUTO: 5.5 [PH]
PHOSPHATE SERPL-MCNC: 3.3 MG/DL (ref 2.5–5)
PLATELET # BLD AUTO: 207 THOUSANDS/UL (ref 149–390)
PLATELET # BLD AUTO: 225 THOUSANDS/UL (ref 149–390)
PMV BLD AUTO: 11.9 FL (ref 8.9–12.7)
PMV BLD AUTO: 12.1 FL (ref 8.9–12.7)
PO2 BLDV: 39 MM HG (ref 35–45)
POTASSIUM SERPL-SCNC: 4.1 MMOL/L (ref 3.5–5)
PROT SERPL-MCNC: 7.1 G/DL (ref 6.4–8.3)
PROT UR STRIP-MCNC: ABNORMAL MG/DL
RBC # BLD AUTO: 3.74 MILLION/UL (ref 3.88–5.62)
RBC #/AREA URNS AUTO: ABNORMAL /HPF
RSV RNA RESP QL NAA+PROBE: NEGATIVE
SARS-COV-2 RNA RESP QL NAA+PROBE: NEGATIVE
SODIUM SERPL-SCNC: 133 MMOL/L (ref 133–145)
SP GR UR STRIP.AUTO: 1.01 (ref 1–1.03)
TROPONIN I SERPL-MCNC: 0.12 NG/ML (ref 0–0.07)
TROPONIN I SERPL-MCNC: 0.13 NG/ML (ref 0–0.07)
TROPONIN I SERPL-MCNC: 0.13 NG/ML (ref 0–0.07)
UROBILINOGEN UR QL STRIP.AUTO: 0.2 E.U./DL
WBC # BLD AUTO: 8.67 THOUSAND/UL (ref 4.31–10.16)
WBC #/AREA URNS AUTO: ABNORMAL /HPF

## 2021-10-19 PROCEDURE — 87186 SC STD MICRODIL/AGAR DIL: CPT | Performed by: PHYSICIAN ASSISTANT

## 2021-10-19 PROCEDURE — 85027 COMPLETE CBC AUTOMATED: CPT | Performed by: PHYSICIAN ASSISTANT

## 2021-10-19 PROCEDURE — 99285 EMERGENCY DEPT VISIT HI MDM: CPT

## 2021-10-19 PROCEDURE — 81001 URINALYSIS AUTO W/SCOPE: CPT | Performed by: PHYSICIAN ASSISTANT

## 2021-10-19 PROCEDURE — G0008 ADMIN INFLUENZA VIRUS VAC: HCPCS | Performed by: INTERNAL MEDICINE

## 2021-10-19 PROCEDURE — 84484 ASSAY OF TROPONIN QUANT: CPT | Performed by: PHYSICIAN ASSISTANT

## 2021-10-19 PROCEDURE — 96365 THER/PROPH/DIAG IV INF INIT: CPT

## 2021-10-19 PROCEDURE — 0241U HB NFCT DS VIR RESP RNA 4 TRGT: CPT | Performed by: PHYSICIAN ASSISTANT

## 2021-10-19 PROCEDURE — 70450 CT HEAD/BRAIN W/O DYE: CPT

## 2021-10-19 PROCEDURE — 99285 EMERGENCY DEPT VISIT HI MDM: CPT | Performed by: PHYSICIAN ASSISTANT

## 2021-10-19 PROCEDURE — 85049 AUTOMATED PLATELET COUNT: CPT | Performed by: INTERNAL MEDICINE

## 2021-10-19 PROCEDURE — 36415 COLL VENOUS BLD VENIPUNCTURE: CPT | Performed by: PHYSICIAN ASSISTANT

## 2021-10-19 PROCEDURE — 87077 CULTURE AEROBIC IDENTIFY: CPT | Performed by: PHYSICIAN ASSISTANT

## 2021-10-19 PROCEDURE — 90662 IIV NO PRSV INCREASED AG IM: CPT | Performed by: INTERNAL MEDICINE

## 2021-10-19 PROCEDURE — 82805 BLOOD GASES W/O2 SATURATION: CPT | Performed by: PHYSICIAN ASSISTANT

## 2021-10-19 PROCEDURE — 82010 KETONE BODYS QUAN: CPT | Performed by: PHYSICIAN ASSISTANT

## 2021-10-19 PROCEDURE — 87493 C DIFF AMPLIFIED PROBE: CPT | Performed by: INTERNAL MEDICINE

## 2021-10-19 PROCEDURE — 82550 ASSAY OF CK (CPK): CPT | Performed by: PHYSICIAN ASSISTANT

## 2021-10-19 PROCEDURE — G1004 CDSM NDSC: HCPCS

## 2021-10-19 PROCEDURE — 93005 ELECTROCARDIOGRAM TRACING: CPT

## 2021-10-19 PROCEDURE — 80053 COMPREHEN METABOLIC PANEL: CPT | Performed by: PHYSICIAN ASSISTANT

## 2021-10-19 PROCEDURE — 83690 ASSAY OF LIPASE: CPT | Performed by: PHYSICIAN ASSISTANT

## 2021-10-19 PROCEDURE — 87086 URINE CULTURE/COLONY COUNT: CPT | Performed by: PHYSICIAN ASSISTANT

## 2021-10-19 PROCEDURE — 74176 CT ABD & PELVIS W/O CONTRAST: CPT

## 2021-10-19 PROCEDURE — 84484 ASSAY OF TROPONIN QUANT: CPT | Performed by: INTERNAL MEDICINE

## 2021-10-19 PROCEDURE — 82553 CREATINE MB FRACTION: CPT | Performed by: PHYSICIAN ASSISTANT

## 2021-10-19 PROCEDURE — 84100 ASSAY OF PHOSPHORUS: CPT | Performed by: PHYSICIAN ASSISTANT

## 2021-10-19 PROCEDURE — 83880 ASSAY OF NATRIURETIC PEPTIDE: CPT | Performed by: PHYSICIAN ASSISTANT

## 2021-10-19 PROCEDURE — 87505 NFCT AGENT DETECTION GI: CPT | Performed by: INTERNAL MEDICINE

## 2021-10-19 PROCEDURE — 71046 X-RAY EXAM CHEST 2 VIEWS: CPT

## 2021-10-19 PROCEDURE — 1123F ACP DISCUSS/DSCN MKR DOCD: CPT | Performed by: PHYSICIAN ASSISTANT

## 2021-10-19 PROCEDURE — 82948 REAGENT STRIP/BLOOD GLUCOSE: CPT

## 2021-10-19 PROCEDURE — 84145 PROCALCITONIN (PCT): CPT | Performed by: PHYSICIAN ASSISTANT

## 2021-10-19 PROCEDURE — 99222 1ST HOSP IP/OBS MODERATE 55: CPT | Performed by: INTERNAL MEDICINE

## 2021-10-19 PROCEDURE — 81003 URINALYSIS AUTO W/O SCOPE: CPT | Performed by: PHYSICIAN ASSISTANT

## 2021-10-19 PROCEDURE — 87040 BLOOD CULTURE FOR BACTERIA: CPT | Performed by: PHYSICIAN ASSISTANT

## 2021-10-19 PROCEDURE — 83735 ASSAY OF MAGNESIUM: CPT | Performed by: PHYSICIAN ASSISTANT

## 2021-10-19 PROCEDURE — 83605 ASSAY OF LACTIC ACID: CPT | Performed by: PHYSICIAN ASSISTANT

## 2021-10-19 PROCEDURE — 96375 TX/PRO/DX INJ NEW DRUG ADDON: CPT

## 2021-10-19 RX ORDER — INSULIN GLARGINE 100 [IU]/ML
40 INJECTION, SOLUTION SUBCUTANEOUS EVERY 12 HOURS SCHEDULED
Status: DISCONTINUED | OUTPATIENT
Start: 2021-10-19 | End: 2021-10-19

## 2021-10-19 RX ORDER — FINASTERIDE 5 MG/1
5 TABLET, FILM COATED ORAL DAILY
Status: DISCONTINUED | OUTPATIENT
Start: 2021-10-20 | End: 2021-10-21 | Stop reason: HOSPADM

## 2021-10-19 RX ORDER — SODIUM CHLORIDE, SODIUM LACTATE, POTASSIUM CHLORIDE, CALCIUM CHLORIDE 600; 310; 30; 20 MG/100ML; MG/100ML; MG/100ML; MG/100ML
75 INJECTION, SOLUTION INTRAVENOUS CONTINUOUS
Status: DISPENSED | OUTPATIENT
Start: 2021-10-19 | End: 2021-10-20

## 2021-10-19 RX ORDER — GABAPENTIN 300 MG/1
300 CAPSULE ORAL 3 TIMES DAILY
Status: DISCONTINUED | OUTPATIENT
Start: 2021-10-19 | End: 2021-10-21 | Stop reason: HOSPADM

## 2021-10-19 RX ORDER — SODIUM CHLORIDE 9 MG/ML
3 INJECTION INTRAVENOUS
Status: DISCONTINUED | OUTPATIENT
Start: 2021-10-19 | End: 2021-10-21 | Stop reason: HOSPADM

## 2021-10-19 RX ORDER — INSULIN GLARGINE 100 [IU]/ML
80 INJECTION, SOLUTION SUBCUTANEOUS
Status: DISCONTINUED | OUTPATIENT
Start: 2021-10-19 | End: 2021-10-19

## 2021-10-19 RX ORDER — CEFTRIAXONE 1 G/50ML
1000 INJECTION, SOLUTION INTRAVENOUS EVERY 24 HOURS
Status: DISCONTINUED | OUTPATIENT
Start: 2021-10-19 | End: 2021-10-21 | Stop reason: HOSPADM

## 2021-10-19 RX ORDER — ASPIRIN 81 MG/1
81 TABLET ORAL DAILY
Status: DISCONTINUED | OUTPATIENT
Start: 2021-10-20 | End: 2021-10-21 | Stop reason: HOSPADM

## 2021-10-19 RX ORDER — MELATONIN
1000 DAILY
Status: DISCONTINUED | OUTPATIENT
Start: 2021-10-20 | End: 2021-10-21 | Stop reason: HOSPADM

## 2021-10-19 RX ORDER — TAMSULOSIN HYDROCHLORIDE 0.4 MG/1
0.4 CAPSULE ORAL
Status: DISCONTINUED | OUTPATIENT
Start: 2021-10-19 | End: 2021-10-21 | Stop reason: HOSPADM

## 2021-10-19 RX ORDER — INSULIN GLARGINE 100 [IU]/ML
40 INJECTION, SOLUTION SUBCUTANEOUS EVERY 12 HOURS SCHEDULED
Status: DISCONTINUED | OUTPATIENT
Start: 2021-10-20 | End: 2021-10-20

## 2021-10-19 RX ORDER — ATORVASTATIN CALCIUM 40 MG/1
80 TABLET, FILM COATED ORAL
Status: DISCONTINUED | OUTPATIENT
Start: 2021-10-19 | End: 2021-10-21 | Stop reason: HOSPADM

## 2021-10-19 RX ORDER — DOXEPIN HYDROCHLORIDE 10 MG/1
10 CAPSULE ORAL
Status: DISCONTINUED | OUTPATIENT
Start: 2021-10-19 | End: 2021-10-21 | Stop reason: HOSPADM

## 2021-10-19 RX ORDER — INSULIN GLARGINE 100 [IU]/ML
80 INJECTION, SOLUTION SUBCUTANEOUS ONCE
Status: COMPLETED | OUTPATIENT
Start: 2021-10-19 | End: 2021-10-19

## 2021-10-19 RX ORDER — HEPARIN SODIUM 5000 [USP'U]/ML
5000 INJECTION, SOLUTION INTRAVENOUS; SUBCUTANEOUS EVERY 8 HOURS SCHEDULED
Status: DISCONTINUED | OUTPATIENT
Start: 2021-10-19 | End: 2021-10-21 | Stop reason: HOSPADM

## 2021-10-19 RX ADMIN — SODIUM CHLORIDE, SODIUM LACTATE, POTASSIUM CHLORIDE, AND CALCIUM CHLORIDE 75 ML/HR: .6; .31; .03; .02 INJECTION, SOLUTION INTRAVENOUS at 21:08

## 2021-10-19 RX ADMIN — ATORVASTATIN CALCIUM 80 MG: 40 TABLET, FILM COATED ORAL at 19:31

## 2021-10-19 RX ADMIN — INSULIN HUMAN 20 UNITS: 100 INJECTION, SOLUTION PARENTERAL at 17:06

## 2021-10-19 RX ADMIN — INFLUENZA A VIRUS A/VICTORIA/2570/2019 IVR-215 (H1N1) ANTIGEN (FORMALDEHYDE INACTIVATED), INFLUENZA A VIRUS A/TASMANIA/503/2020 IVR-221 (H3N2) ANTIGEN (FORMALDEHYDE INACTIVATED), INFLUENZA B VIRUS B/PHUKET/3073/2013 ANTIGEN (FORMALDEHYDE INACTIVATED), AND INFLUENZA B VIRUS B/WASHINGTON/02/2019 ANTIGEN (FORMALDEHYDE INACTIVATED) 0.7 ML: 60; 60; 60; 60 INJECTION, SUSPENSION INTRAMUSCULAR at 21:00

## 2021-10-19 RX ADMIN — INSULIN GLARGINE 80 UNITS: 100 INJECTION, SOLUTION SUBCUTANEOUS at 21:22

## 2021-10-19 RX ADMIN — INSULIN LISPRO 30 UNITS: 100 INJECTION, SOLUTION INTRAVENOUS; SUBCUTANEOUS at 21:41

## 2021-10-19 RX ADMIN — METOPROLOL TARTRATE 25 MG: 25 TABLET, FILM COATED ORAL at 19:32

## 2021-10-19 RX ADMIN — GABAPENTIN 300 MG: 300 CAPSULE ORAL at 19:30

## 2021-10-19 RX ADMIN — SODIUM CHLORIDE, SODIUM LACTATE, POTASSIUM CHLORIDE, AND CALCIUM CHLORIDE 1000 ML: .6; .31; .03; .02 INJECTION, SOLUTION INTRAVENOUS at 14:30

## 2021-10-19 RX ADMIN — CEFTRIAXONE 1000 MG: 1 INJECTION, SOLUTION INTRAVENOUS at 21:27

## 2021-10-19 RX ADMIN — INSULIN LISPRO 12 UNITS: 100 INJECTION, SOLUTION INTRAVENOUS; SUBCUTANEOUS at 21:42

## 2021-10-19 RX ADMIN — HEPARIN SODIUM 5000 UNITS: 5000 INJECTION INTRAVENOUS; SUBCUTANEOUS at 19:29

## 2021-10-19 RX ADMIN — TAMSULOSIN HYDROCHLORIDE 0.4 MG: 0.4 CAPSULE ORAL at 19:31

## 2021-10-19 RX ADMIN — INSULIN HUMAN 10 UNITS: 100 INJECTION, SOLUTION PARENTERAL at 14:38

## 2021-10-20 PROBLEM — A04.72 CLOSTRIDIUM DIFFICILE DIARRHEA: Status: ACTIVE | Noted: 2021-10-19

## 2021-10-20 LAB
ALBUMIN SERPL BCP-MCNC: 3.2 G/DL (ref 3.4–4.8)
ALP SERPL-CCNC: 84.9 U/L (ref 10–129)
ALT SERPL W P-5'-P-CCNC: 35 U/L (ref 5–63)
ANION GAP SERPL CALCULATED.3IONS-SCNC: 9 MMOL/L (ref 4–13)
AST SERPL W P-5'-P-CCNC: 30 U/L (ref 15–41)
BASOPHILS # BLD AUTO: 0.01 THOUSANDS/ΜL (ref 0–0.1)
BASOPHILS NFR BLD AUTO: 0 % (ref 0–1)
BILIRUB SERPL-MCNC: 0.49 MG/DL (ref 0.3–1.2)
BUN SERPL-MCNC: 84 MG/DL (ref 6–20)
C DIFF TOX A+B STL QL IA: NEGATIVE
C DIFF TOX GENS STL QL NAA+PROBE: POSITIVE
CALCIUM ALBUM COR SERPL-MCNC: 9.4 MG/DL (ref 8.3–10.1)
CALCIUM SERPL-MCNC: 8.8 MG/DL (ref 8.4–10.2)
CAMPYLOBACTER DNA SPEC NAA+PROBE: NORMAL
CHLORIDE SERPL-SCNC: 100 MMOL/L (ref 96–108)
CO2 SERPL-SCNC: 27 MMOL/L (ref 22–33)
CREAT SERPL-MCNC: 2.66 MG/DL (ref 0.5–1.2)
EOSINOPHIL # BLD AUTO: 0.11 THOUSAND/ΜL (ref 0–0.61)
EOSINOPHIL NFR BLD AUTO: 1 % (ref 0–6)
ERYTHROCYTE [DISTWIDTH] IN BLOOD BY AUTOMATED COUNT: 13.8 % (ref 11.6–15.1)
GFR SERPL CREATININE-BSD FRML MDRD: 23 ML/MIN/1.73SQ M
GLUCOSE SERPL-MCNC: 290 MG/DL (ref 65–140)
GLUCOSE SERPL-MCNC: 291 MG/DL (ref 65–140)
GLUCOSE SERPL-MCNC: 320 MG/DL (ref 65–140)
GLUCOSE SERPL-MCNC: 325 MG/DL (ref 65–140)
GLUCOSE SERPL-MCNC: 339 MG/DL (ref 65–140)
GLUCOSE SERPL-MCNC: 390 MG/DL (ref 65–140)
GLUCOSE SERPL-MCNC: 408 MG/DL (ref 65–140)
HCT VFR BLD AUTO: 31.7 % (ref 36.5–49.3)
HGB BLD-MCNC: 10.7 G/DL (ref 12–17)
IMM GRANULOCYTES # BLD AUTO: 0.04 THOUSAND/UL (ref 0–0.2)
IMM GRANULOCYTES NFR BLD AUTO: 1 % (ref 0–2)
LYMPHOCYTES # BLD AUTO: 0.47 THOUSANDS/ΜL (ref 0.6–4.47)
LYMPHOCYTES NFR BLD AUTO: 6 % (ref 14–44)
MCH RBC QN AUTO: 31 PG (ref 26.8–34.3)
MCHC RBC AUTO-ENTMCNC: 33.8 G/DL (ref 31.4–37.4)
MCV RBC AUTO: 92 FL (ref 82–98)
MONOCYTES # BLD AUTO: 0.72 THOUSAND/ΜL (ref 0.17–1.22)
MONOCYTES NFR BLD AUTO: 9 % (ref 4–12)
NEUTROPHILS # BLD AUTO: 6.74 THOUSANDS/ΜL (ref 1.85–7.62)
NEUTS SEG NFR BLD AUTO: 83 % (ref 43–75)
PLATELET # BLD AUTO: 197 THOUSANDS/UL (ref 149–390)
PMV BLD AUTO: 12.4 FL (ref 8.9–12.7)
POTASSIUM SERPL-SCNC: 3.8 MMOL/L (ref 3.5–5)
PROCALCITONIN SERPL-MCNC: 1.77 NG/ML
PROCALCITONIN SERPL-MCNC: 2.51 NG/ML
PROT SERPL-MCNC: 6.5 G/DL (ref 6.4–8.3)
RBC # BLD AUTO: 3.45 MILLION/UL (ref 3.88–5.62)
SALMONELLA DNA SPEC QL NAA+PROBE: NORMAL
SHIGA TOXIN STX GENE SPEC NAA+PROBE: NORMAL
SHIGELLA DNA SPEC QL NAA+PROBE: NORMAL
SODIUM SERPL-SCNC: 136 MMOL/L (ref 133–145)
TROPONIN I SERPL-MCNC: 0.13 NG/ML (ref 0–0.07)
WBC # BLD AUTO: 8.09 THOUSAND/UL (ref 4.31–10.16)

## 2021-10-20 PROCEDURE — 97116 GAIT TRAINING THERAPY: CPT

## 2021-10-20 PROCEDURE — 84484 ASSAY OF TROPONIN QUANT: CPT | Performed by: INTERNAL MEDICINE

## 2021-10-20 PROCEDURE — 82948 REAGENT STRIP/BLOOD GLUCOSE: CPT

## 2021-10-20 PROCEDURE — 84145 PROCALCITONIN (PCT): CPT | Performed by: PHYSICIAN ASSISTANT

## 2021-10-20 PROCEDURE — 85025 COMPLETE CBC W/AUTO DIFF WBC: CPT | Performed by: INTERNAL MEDICINE

## 2021-10-20 PROCEDURE — 80053 COMPREHEN METABOLIC PANEL: CPT | Performed by: INTERNAL MEDICINE

## 2021-10-20 PROCEDURE — 99232 SBSQ HOSP IP/OBS MODERATE 35: CPT | Performed by: INTERNAL MEDICINE

## 2021-10-20 PROCEDURE — 97163 PT EVAL HIGH COMPLEX 45 MIN: CPT

## 2021-10-20 RX ORDER — METOCLOPRAMIDE HYDROCHLORIDE 5 MG/ML
10 INJECTION INTRAMUSCULAR; INTRAVENOUS ONCE
Status: COMPLETED | OUTPATIENT
Start: 2021-10-20 | End: 2021-10-20

## 2021-10-20 RX ORDER — INSULIN GLARGINE 100 [IU]/ML
50 INJECTION, SOLUTION SUBCUTANEOUS EVERY 12 HOURS SCHEDULED
Status: DISCONTINUED | OUTPATIENT
Start: 2021-10-20 | End: 2021-10-21

## 2021-10-20 RX ADMIN — INSULIN GLARGINE 50 UNITS: 100 INJECTION, SOLUTION SUBCUTANEOUS at 10:19

## 2021-10-20 RX ADMIN — ASPIRIN 81 MG: 81 TABLET, COATED ORAL at 10:19

## 2021-10-20 RX ADMIN — METOPROLOL TARTRATE 25 MG: 25 TABLET, FILM COATED ORAL at 21:16

## 2021-10-20 RX ADMIN — TAMSULOSIN HYDROCHLORIDE 0.4 MG: 0.4 CAPSULE ORAL at 16:35

## 2021-10-20 RX ADMIN — CYANOCOBALAMIN TAB 500 MCG 500 MCG: 500 TAB at 10:19

## 2021-10-20 RX ADMIN — CEFTRIAXONE 1000 MG: 1 INJECTION, SOLUTION INTRAVENOUS at 17:15

## 2021-10-20 RX ADMIN — INSULIN LISPRO 6 UNITS: 100 INJECTION, SOLUTION INTRAVENOUS; SUBCUTANEOUS at 21:15

## 2021-10-20 RX ADMIN — ATORVASTATIN CALCIUM 80 MG: 40 TABLET, FILM COATED ORAL at 16:35

## 2021-10-20 RX ADMIN — METOCLOPRAMIDE HYDROCHLORIDE 10 MG: 5 INJECTION INTRAMUSCULAR; INTRAVENOUS at 00:49

## 2021-10-20 RX ADMIN — FINASTERIDE 5 MG: 5 TABLET, FILM COATED ORAL at 10:19

## 2021-10-20 RX ADMIN — DOXEPIN HYDROCHLORIDE 10 MG: 10 CAPSULE ORAL at 21:16

## 2021-10-20 RX ADMIN — INSULIN LISPRO 10 UNITS: 100 INJECTION, SOLUTION INTRAVENOUS; SUBCUTANEOUS at 12:30

## 2021-10-20 RX ADMIN — METOPROLOL TARTRATE 25 MG: 25 TABLET, FILM COATED ORAL at 10:19

## 2021-10-20 RX ADMIN — HEPARIN SODIUM 5000 UNITS: 5000 INJECTION INTRAVENOUS; SUBCUTANEOUS at 21:16

## 2021-10-20 RX ADMIN — INSULIN LISPRO 8 UNITS: 100 INJECTION, SOLUTION INTRAVENOUS; SUBCUTANEOUS at 10:21

## 2021-10-20 RX ADMIN — GABAPENTIN 300 MG: 300 CAPSULE ORAL at 21:16

## 2021-10-20 RX ADMIN — Medication 1000 UNITS: at 10:20

## 2021-10-20 RX ADMIN — GABAPENTIN 300 MG: 300 CAPSULE ORAL at 16:35

## 2021-10-20 RX ADMIN — Medication 125 MG: at 15:12

## 2021-10-20 RX ADMIN — Medication 125 MG: at 12:30

## 2021-10-20 RX ADMIN — INSULIN GLARGINE 50 UNITS: 100 INJECTION, SOLUTION SUBCUTANEOUS at 21:16

## 2021-10-20 RX ADMIN — HEPARIN SODIUM 5000 UNITS: 5000 INJECTION INTRAVENOUS; SUBCUTANEOUS at 04:35

## 2021-10-20 RX ADMIN — INSULIN LISPRO 30 UNITS: 100 INJECTION, SOLUTION INTRAVENOUS; SUBCUTANEOUS at 10:21

## 2021-10-20 RX ADMIN — Medication 125 MG: at 23:24

## 2021-10-20 RX ADMIN — INSULIN LISPRO 12 UNITS: 100 INJECTION, SOLUTION INTRAVENOUS; SUBCUTANEOUS at 16:36

## 2021-10-20 RX ADMIN — GABAPENTIN 300 MG: 300 CAPSULE ORAL at 10:19

## 2021-10-20 RX ADMIN — HEPARIN SODIUM 5000 UNITS: 5000 INJECTION INTRAVENOUS; SUBCUTANEOUS at 14:55

## 2021-10-21 VITALS
RESPIRATION RATE: 18 BRPM | BODY MASS INDEX: 37.96 KG/M2 | HEIGHT: 71 IN | OXYGEN SATURATION: 96 % | TEMPERATURE: 98 F | WEIGHT: 271.17 LBS | DIASTOLIC BLOOD PRESSURE: 79 MMHG | SYSTOLIC BLOOD PRESSURE: 141 MMHG | HEART RATE: 82 BPM

## 2021-10-21 PROBLEM — N17.9 AKI (ACUTE KIDNEY INJURY) (HCC): Status: RESOLVED | Noted: 2021-10-19 | Resolved: 2021-10-21

## 2021-10-21 PROBLEM — N39.0 UTI (URINARY TRACT INFECTION): Status: ACTIVE | Noted: 2021-10-19

## 2021-10-21 LAB
ANION GAP SERPL CALCULATED.3IONS-SCNC: 10 MMOL/L (ref 4–13)
BASOPHILS # BLD AUTO: 0.06 THOUSANDS/ΜL (ref 0–0.1)
BASOPHILS NFR BLD AUTO: 1 % (ref 0–1)
BUN SERPL-MCNC: 80 MG/DL (ref 6–20)
CALCIUM SERPL-MCNC: 8.8 MG/DL (ref 8.4–10.2)
CHLORIDE SERPL-SCNC: 104 MMOL/L (ref 96–108)
CO2 SERPL-SCNC: 27 MMOL/L (ref 22–33)
CREAT SERPL-MCNC: 2.37 MG/DL (ref 0.5–1.2)
EOSINOPHIL # BLD AUTO: 0.59 THOUSAND/ΜL (ref 0–0.61)
EOSINOPHIL NFR BLD AUTO: 8 % (ref 0–6)
ERYTHROCYTE [DISTWIDTH] IN BLOOD BY AUTOMATED COUNT: 14 % (ref 11.6–15.1)
GFR SERPL CREATININE-BSD FRML MDRD: 26 ML/MIN/1.73SQ M
GLUCOSE SERPL-MCNC: 109 MG/DL (ref 65–140)
GLUCOSE SERPL-MCNC: 128 MG/DL (ref 65–140)
GLUCOSE SERPL-MCNC: 136 MG/DL (ref 65–140)
HCT VFR BLD AUTO: 32.4 % (ref 36.5–49.3)
HGB BLD-MCNC: 10.9 G/DL (ref 12–17)
IMM GRANULOCYTES # BLD AUTO: 0.16 THOUSAND/UL (ref 0–0.2)
IMM GRANULOCYTES NFR BLD AUTO: 2 % (ref 0–2)
LYMPHOCYTES # BLD AUTO: 1.26 THOUSANDS/ΜL (ref 0.6–4.47)
LYMPHOCYTES NFR BLD AUTO: 16 % (ref 14–44)
MCH RBC QN AUTO: 31.1 PG (ref 26.8–34.3)
MCHC RBC AUTO-ENTMCNC: 33.6 G/DL (ref 31.4–37.4)
MCV RBC AUTO: 92 FL (ref 82–98)
MONOCYTES # BLD AUTO: 0.83 THOUSAND/ΜL (ref 0.17–1.22)
MONOCYTES NFR BLD AUTO: 11 % (ref 4–12)
NEUTROPHILS # BLD AUTO: 4.79 THOUSANDS/ΜL (ref 1.85–7.62)
NEUTS SEG NFR BLD AUTO: 62 % (ref 43–75)
PLATELET # BLD AUTO: 225 THOUSANDS/UL (ref 149–390)
PMV BLD AUTO: 12.1 FL (ref 8.9–12.7)
POTASSIUM SERPL-SCNC: 3.6 MMOL/L (ref 3.5–5)
RBC # BLD AUTO: 3.51 MILLION/UL (ref 3.88–5.62)
SODIUM SERPL-SCNC: 141 MMOL/L (ref 133–145)
WBC # BLD AUTO: 7.69 THOUSAND/UL (ref 4.31–10.16)

## 2021-10-21 PROCEDURE — 99239 HOSP IP/OBS DSCHRG MGMT >30: CPT | Performed by: INTERNAL MEDICINE

## 2021-10-21 PROCEDURE — 97116 GAIT TRAINING THERAPY: CPT

## 2021-10-21 PROCEDURE — 82948 REAGENT STRIP/BLOOD GLUCOSE: CPT

## 2021-10-21 PROCEDURE — 97530 THERAPEUTIC ACTIVITIES: CPT

## 2021-10-21 PROCEDURE — 85025 COMPLETE CBC W/AUTO DIFF WBC: CPT | Performed by: INTERNAL MEDICINE

## 2021-10-21 PROCEDURE — 80048 BASIC METABOLIC PNL TOTAL CA: CPT | Performed by: INTERNAL MEDICINE

## 2021-10-21 RX ORDER — CEPHALEXIN 500 MG/1
500 CAPSULE ORAL EVERY 8 HOURS SCHEDULED
Refills: 0
Start: 2021-10-21 | End: 2021-10-26

## 2021-10-21 RX ORDER — INSULIN GLARGINE 100 [IU]/ML
45 INJECTION, SOLUTION SUBCUTANEOUS EVERY 12 HOURS SCHEDULED
Status: DISCONTINUED | OUTPATIENT
Start: 2021-10-21 | End: 2021-10-21 | Stop reason: HOSPADM

## 2021-10-21 RX ORDER — SACCHAROMYCES BOULARDII 250 MG
250 CAPSULE ORAL 2 TIMES DAILY
Refills: 0
Start: 2021-10-21 | End: 2021-11-04

## 2021-10-21 RX ADMIN — HEPARIN SODIUM 5000 UNITS: 5000 INJECTION INTRAVENOUS; SUBCUTANEOUS at 14:05

## 2021-10-21 RX ADMIN — FINASTERIDE 5 MG: 5 TABLET, FILM COATED ORAL at 08:16

## 2021-10-21 RX ADMIN — Medication 1000 UNITS: at 08:17

## 2021-10-21 RX ADMIN — METOPROLOL TARTRATE 25 MG: 25 TABLET, FILM COATED ORAL at 08:16

## 2021-10-21 RX ADMIN — Medication 125 MG: at 06:30

## 2021-10-21 RX ADMIN — HEPARIN SODIUM 5000 UNITS: 5000 INJECTION INTRAVENOUS; SUBCUTANEOUS at 06:30

## 2021-10-21 RX ADMIN — ASPIRIN 81 MG: 81 TABLET, COATED ORAL at 08:16

## 2021-10-21 RX ADMIN — CYANOCOBALAMIN TAB 500 MCG 500 MCG: 500 TAB at 08:17

## 2021-10-21 RX ADMIN — INSULIN GLARGINE 50 UNITS: 100 INJECTION, SOLUTION SUBCUTANEOUS at 08:15

## 2021-10-21 RX ADMIN — GABAPENTIN 300 MG: 300 CAPSULE ORAL at 08:16

## 2021-10-21 RX ADMIN — Medication 125 MG: at 12:07

## 2021-10-22 LAB
ATRIAL RATE: 104 BPM
BACTERIA UR CULT: ABNORMAL
P AXIS: 11 DEGREES
PR INTERVAL: 209 MS
QRS AXIS: -14 DEGREES
QRSD INTERVAL: 104 MS
QT INTERVAL: 363 MS
QTC INTERVAL: 475 MS
T WAVE AXIS: 163 DEGREES
VENTRICULAR RATE: 103 BPM

## 2021-10-22 PROCEDURE — 93010 ELECTROCARDIOGRAM REPORT: CPT | Performed by: INTERNAL MEDICINE

## 2021-10-24 LAB
BACTERIA BLD CULT: NORMAL
BACTERIA BLD CULT: NORMAL

## 2021-10-29 DIAGNOSIS — E87.70 HYPERVOLEMIA ASSOCIATED WITH RENAL INSUFFICIENCY: ICD-10-CM

## 2021-10-29 DIAGNOSIS — N28.9 HYPERVOLEMIA ASSOCIATED WITH RENAL INSUFFICIENCY: ICD-10-CM

## 2021-10-29 RX ORDER — TORSEMIDE 20 MG/1
TABLET ORAL
Qty: 360 TABLET | Refills: 3 | Status: SHIPPED | OUTPATIENT
Start: 2021-10-29 | End: 2021-12-06 | Stop reason: ALTCHOICE

## 2021-11-22 DIAGNOSIS — Z79.4 TYPE 2 DIABETES MELLITUS WITH HYPERGLYCEMIA, WITH LONG-TERM CURRENT USE OF INSULIN (HCC): Primary | ICD-10-CM

## 2021-11-22 DIAGNOSIS — E11.65 TYPE 2 DIABETES MELLITUS WITH HYPERGLYCEMIA, WITH LONG-TERM CURRENT USE OF INSULIN (HCC): Primary | ICD-10-CM

## 2021-11-22 RX ORDER — FLASH GLUCOSE SCANNING READER
EACH MISCELLANEOUS
Qty: 1 EACH | Refills: 0 | Status: SHIPPED | OUTPATIENT
Start: 2021-11-22

## 2021-11-23 ENCOUNTER — TELEPHONE (OUTPATIENT)
Dept: FAMILY MEDICINE CLINIC | Facility: OTHER | Age: 76
End: 2021-11-23

## 2021-11-23 ENCOUNTER — TRANSITIONAL CARE MANAGEMENT (OUTPATIENT)
Dept: FAMILY MEDICINE CLINIC | Facility: OTHER | Age: 76
End: 2021-11-23

## 2021-11-29 ENCOUNTER — LAB (OUTPATIENT)
Dept: LAB | Facility: HOSPITAL | Age: 76
End: 2021-11-29
Payer: MEDICARE

## 2021-11-29 DIAGNOSIS — I12.9 BENIGN HYPERTENSION WITH CHRONIC KIDNEY DISEASE, STAGE IV (HCC): ICD-10-CM

## 2021-11-29 DIAGNOSIS — E11.65 TYPE 2 DIABETES MELLITUS WITH HYPERGLYCEMIA, WITH LONG-TERM CURRENT USE OF INSULIN (HCC): ICD-10-CM

## 2021-11-29 DIAGNOSIS — R79.89 ELEVATED TSH: ICD-10-CM

## 2021-11-29 DIAGNOSIS — Z79.4 TYPE 2 DIABETES MELLITUS WITH HYPERGLYCEMIA, WITH LONG-TERM CURRENT USE OF INSULIN (HCC): ICD-10-CM

## 2021-11-29 DIAGNOSIS — N18.4 BENIGN HYPERTENSION WITH CHRONIC KIDNEY DISEASE, STAGE IV (HCC): ICD-10-CM

## 2021-11-29 DIAGNOSIS — N17.9 ACUTE RENAL FAILURE, UNSPECIFIED ACUTE RENAL FAILURE TYPE (HCC): ICD-10-CM

## 2021-11-29 LAB
ANION GAP SERPL CALCULATED.3IONS-SCNC: 7 MMOL/L (ref 4–13)
BUN SERPL-MCNC: 33 MG/DL (ref 6–20)
CALCIUM SERPL-MCNC: 9 MG/DL (ref 8.4–10.2)
CHLORIDE SERPL-SCNC: 111 MMOL/L (ref 96–108)
CO2 SERPL-SCNC: 24 MMOL/L (ref 22–33)
CREAT SERPL-MCNC: 1.78 MG/DL (ref 0.5–1.2)
GFR SERPL CREATININE-BSD FRML MDRD: 37 ML/MIN/1.73SQ M
GLUCOSE SERPL-MCNC: 144 MG/DL (ref 65–140)
POTASSIUM SERPL-SCNC: 4.5 MMOL/L (ref 3.5–5)
SODIUM SERPL-SCNC: 142 MMOL/L (ref 133–145)
T4 FREE SERPL-MCNC: 0.93 NG/DL (ref 0.76–1.46)
TSH SERPL DL<=0.05 MIU/L-ACNC: 3.09 UIU/ML (ref 0.34–5.6)

## 2021-11-29 PROCEDURE — 80048 BASIC METABOLIC PNL TOTAL CA: CPT

## 2021-11-29 PROCEDURE — 86376 MICROSOMAL ANTIBODY EACH: CPT

## 2021-11-29 PROCEDURE — 84443 ASSAY THYROID STIM HORMONE: CPT

## 2021-11-29 PROCEDURE — 84439 ASSAY OF FREE THYROXINE: CPT

## 2021-11-29 PROCEDURE — 83036 HEMOGLOBIN GLYCOSYLATED A1C: CPT

## 2021-11-29 PROCEDURE — 86800 THYROGLOBULIN ANTIBODY: CPT

## 2021-11-29 PROCEDURE — 36415 COLL VENOUS BLD VENIPUNCTURE: CPT

## 2021-11-30 LAB
EST. AVERAGE GLUCOSE BLD GHB EST-MCNC: 189 MG/DL
HBA1C MFR BLD: 8.2 %

## 2021-12-01 ENCOUNTER — OFFICE VISIT (OUTPATIENT)
Dept: FAMILY MEDICINE CLINIC | Facility: OTHER | Age: 76
End: 2021-12-01
Payer: MEDICARE

## 2021-12-01 VITALS
DIASTOLIC BLOOD PRESSURE: 68 MMHG | WEIGHT: 268 LBS | OXYGEN SATURATION: 99 % | RESPIRATION RATE: 16 BRPM | SYSTOLIC BLOOD PRESSURE: 110 MMHG | HEIGHT: 71 IN | BODY MASS INDEX: 37.52 KG/M2 | TEMPERATURE: 98 F | HEART RATE: 68 BPM

## 2021-12-01 DIAGNOSIS — I12.9 BENIGN HYPERTENSION WITH CHRONIC KIDNEY DISEASE, STAGE IV (HCC): ICD-10-CM

## 2021-12-01 DIAGNOSIS — E87.70 HYPERVOLEMIA ASSOCIATED WITH RENAL INSUFFICIENCY: ICD-10-CM

## 2021-12-01 DIAGNOSIS — N18.32 STAGE 3B CHRONIC KIDNEY DISEASE (HCC): ICD-10-CM

## 2021-12-01 DIAGNOSIS — Z79.4 TYPE 2 DIABETES MELLITUS WITH STAGE 3B CHRONIC KIDNEY DISEASE, WITH LONG-TERM CURRENT USE OF INSULIN (HCC): Primary | ICD-10-CM

## 2021-12-01 DIAGNOSIS — N28.9 HYPERVOLEMIA ASSOCIATED WITH RENAL INSUFFICIENCY: ICD-10-CM

## 2021-12-01 DIAGNOSIS — N18.32 TYPE 2 DIABETES MELLITUS WITH STAGE 3B CHRONIC KIDNEY DISEASE, WITH LONG-TERM CURRENT USE OF INSULIN (HCC): Primary | ICD-10-CM

## 2021-12-01 DIAGNOSIS — N18.4 BENIGN HYPERTENSION WITH CHRONIC KIDNEY DISEASE, STAGE IV (HCC): ICD-10-CM

## 2021-12-01 DIAGNOSIS — E11.22 TYPE 2 DIABETES MELLITUS WITH STAGE 3B CHRONIC KIDNEY DISEASE, WITH LONG-TERM CURRENT USE OF INSULIN (HCC): Primary | ICD-10-CM

## 2021-12-01 LAB
THYROGLOB AB SERPL-ACNC: <1 IU/ML (ref 0–0.9)
THYROPEROXIDASE AB SERPL-ACNC: 9 IU/ML (ref 0–34)

## 2021-12-01 PROCEDURE — 99495 TRANSJ CARE MGMT MOD F2F 14D: CPT | Performed by: FAMILY MEDICINE

## 2021-12-01 RX ORDER — BUSPIRONE HYDROCHLORIDE 5 MG/1
TABLET ORAL
COMMUNITY
Start: 2021-11-12 | End: 2021-12-21 | Stop reason: SDUPTHER

## 2021-12-06 ENCOUNTER — OFFICE VISIT (OUTPATIENT)
Dept: ENDOCRINOLOGY | Facility: CLINIC | Age: 76
End: 2021-12-06
Payer: MEDICARE

## 2021-12-06 ENCOUNTER — TELEPHONE (OUTPATIENT)
Dept: FAMILY MEDICINE CLINIC | Facility: OTHER | Age: 76
End: 2021-12-06

## 2021-12-06 VITALS
BODY MASS INDEX: 36.54 KG/M2 | WEIGHT: 261 LBS | HEART RATE: 72 BPM | DIASTOLIC BLOOD PRESSURE: 60 MMHG | SYSTOLIC BLOOD PRESSURE: 106 MMHG | TEMPERATURE: 97.6 F | HEIGHT: 71 IN

## 2021-12-06 DIAGNOSIS — R79.89 ELEVATED TSH: ICD-10-CM

## 2021-12-06 DIAGNOSIS — E78.2 MIXED HYPERLIPIDEMIA: ICD-10-CM

## 2021-12-06 DIAGNOSIS — Z79.4 TYPE 2 DIABETES MELLITUS WITH HYPERGLYCEMIA, WITH LONG-TERM CURRENT USE OF INSULIN (HCC): Primary | ICD-10-CM

## 2021-12-06 DIAGNOSIS — I12.9 BENIGN HYPERTENSION WITH CHRONIC KIDNEY DISEASE, STAGE III (HCC): ICD-10-CM

## 2021-12-06 DIAGNOSIS — E11.65 TYPE 2 DIABETES MELLITUS WITH HYPERGLYCEMIA, WITH LONG-TERM CURRENT USE OF INSULIN (HCC): Primary | ICD-10-CM

## 2021-12-06 DIAGNOSIS — N18.30 BENIGN HYPERTENSION WITH CHRONIC KIDNEY DISEASE, STAGE III (HCC): ICD-10-CM

## 2021-12-06 PROCEDURE — 99214 OFFICE O/P EST MOD 30 MIN: CPT | Performed by: PHYSICIAN ASSISTANT

## 2021-12-06 RX ORDER — INSULIN GLARGINE 300 U/ML
80 INJECTION, SOLUTION SUBCUTANEOUS DAILY
Qty: 6 ML
Start: 2021-12-06 | End: 2022-01-31 | Stop reason: SDUPTHER

## 2021-12-06 RX ORDER — INSULIN LISPRO 100 [IU]/ML
27 INJECTION, SOLUTION INTRAVENOUS; SUBCUTANEOUS
COMMUNITY
End: 2022-01-31 | Stop reason: SDUPTHER

## 2021-12-06 RX ORDER — INSULIN GLARGINE 300 U/ML
90 INJECTION, SOLUTION SUBCUTANEOUS DAILY
Start: 2021-12-06 | End: 2021-12-06

## 2021-12-06 RX ORDER — METOPROLOL SUCCINATE 50 MG/1
50 TABLET, EXTENDED RELEASE ORAL DAILY
COMMUNITY
End: 2022-01-07

## 2021-12-07 RX ORDER — TORSEMIDE 20 MG/1
20 TABLET ORAL ONCE
Qty: 360 TABLET | Refills: 3 | Status: SHIPPED | OUTPATIENT
Start: 2021-12-01 | End: 2022-08-04

## 2021-12-10 ENCOUNTER — TELEPHONE (OUTPATIENT)
Dept: FAMILY MEDICINE CLINIC | Facility: OTHER | Age: 76
End: 2021-12-10

## 2021-12-14 ENCOUNTER — TELEPHONE (OUTPATIENT)
Dept: FAMILY MEDICINE CLINIC | Facility: OTHER | Age: 76
End: 2021-12-14

## 2021-12-15 ENCOUNTER — TELEPHONE (OUTPATIENT)
Dept: FAMILY MEDICINE CLINIC | Facility: OTHER | Age: 76
End: 2021-12-15

## 2021-12-15 DIAGNOSIS — F41.9 ANXIETY: ICD-10-CM

## 2021-12-15 DIAGNOSIS — K22.70 BARRETT'S ESOPHAGUS DETERMINED BY BIOPSY: Primary | ICD-10-CM

## 2021-12-21 PROBLEM — K22.70 BARRETT'S ESOPHAGUS DETERMINED BY BIOPSY: Status: ACTIVE | Noted: 2021-09-29

## 2021-12-21 PROBLEM — N18.30 TYPE 2 DIABETES MELLITUS WITH STAGE 3 CHRONIC KIDNEY DISEASE, WITH LONG-TERM CURRENT USE OF INSULIN (HCC): Status: ACTIVE | Noted: 2019-06-15

## 2021-12-21 PROBLEM — N40.0 BPH (BENIGN PROSTATIC HYPERPLASIA): Status: ACTIVE | Noted: 2021-09-29

## 2021-12-21 PROBLEM — K59.09 OTHER CONSTIPATION: Status: RESOLVED | Noted: 2019-06-26 | Resolved: 2021-12-21

## 2021-12-21 RX ORDER — BUSPIRONE HYDROCHLORIDE 5 MG/1
5 TABLET ORAL 3 TIMES DAILY
Qty: 180 TABLET | Refills: 0 | Status: SHIPPED | OUTPATIENT
Start: 2021-12-21 | End: 2022-03-02 | Stop reason: SDUPTHER

## 2021-12-21 RX ORDER — OMEPRAZOLE 40 MG/1
40 CAPSULE, DELAYED RELEASE ORAL DAILY
Qty: 90 CAPSULE | Refills: 1 | Status: SHIPPED | OUTPATIENT
Start: 2021-12-21 | End: 2022-06-23

## 2021-12-21 RX ORDER — METOPROLOL TARTRATE 50 MG/1
50 TABLET, FILM COATED ORAL 2 TIMES DAILY
COMMUNITY
Start: 2021-11-08 | End: 2022-05-28 | Stop reason: SDUPTHER

## 2022-01-06 ENCOUNTER — LAB (OUTPATIENT)
Dept: LAB | Facility: HOSPITAL | Age: 77
End: 2022-01-06
Attending: INTERNAL MEDICINE
Payer: COMMERCIAL

## 2022-01-06 DIAGNOSIS — N18.32 TYPE 2 DIABETES MELLITUS WITH STAGE 3B CHRONIC KIDNEY DISEASE, WITH LONG-TERM CURRENT USE OF INSULIN (HCC): ICD-10-CM

## 2022-01-06 DIAGNOSIS — E11.22 TYPE 2 DIABETES MELLITUS WITH STAGE 3B CHRONIC KIDNEY DISEASE, WITH LONG-TERM CURRENT USE OF INSULIN (HCC): ICD-10-CM

## 2022-01-06 DIAGNOSIS — Z79.4 TYPE 2 DIABETES MELLITUS WITH STAGE 3B CHRONIC KIDNEY DISEASE, WITH LONG-TERM CURRENT USE OF INSULIN (HCC): ICD-10-CM

## 2022-01-06 DIAGNOSIS — N18.4 CKD (CHRONIC KIDNEY DISEASE), STAGE IV (HCC): ICD-10-CM

## 2022-01-06 DIAGNOSIS — N18.32 STAGE 3B CHRONIC KIDNEY DISEASE (HCC): ICD-10-CM

## 2022-01-06 LAB
25(OH)D3 SERPL-MCNC: 39.4 NG/ML (ref 30–100)
ALBUMIN SERPL BCP-MCNC: 3.6 G/DL (ref 3.4–4.8)
ALP SERPL-CCNC: 95.8 U/L (ref 10–129)
ALT SERPL W P-5'-P-CCNC: 13 U/L (ref 5–63)
ANION GAP SERPL CALCULATED.3IONS-SCNC: 6 MMOL/L (ref 4–13)
AST SERPL W P-5'-P-CCNC: 12 U/L (ref 15–41)
BILIRUB SERPL-MCNC: 0.45 MG/DL (ref 0.3–1.2)
BUN SERPL-MCNC: 38 MG/DL (ref 6–20)
CALCIUM SERPL-MCNC: 9.2 MG/DL (ref 8.4–10.2)
CHLORIDE SERPL-SCNC: 105 MMOL/L (ref 96–108)
CO2 SERPL-SCNC: 31 MMOL/L (ref 22–33)
CREAT SERPL-MCNC: 2.29 MG/DL (ref 0.5–1.2)
CREAT UR-MCNC: 132 MG/DL
ERYTHROCYTE [DISTWIDTH] IN BLOOD BY AUTOMATED COUNT: 14.2 % (ref 11.6–15.1)
GFR SERPL CREATININE-BSD FRML MDRD: 26 ML/MIN/1.73SQ M
GLUCOSE P FAST SERPL-MCNC: 127 MG/DL (ref 70–105)
HCT VFR BLD AUTO: 36.4 % (ref 36.5–49.3)
HGB BLD-MCNC: 11.3 G/DL (ref 12–17)
MAGNESIUM SERPL-MCNC: 2.2 MG/DL (ref 1.6–2.6)
MCH RBC QN AUTO: 29.9 PG (ref 26.8–34.3)
MCHC RBC AUTO-ENTMCNC: 31 G/DL (ref 31.4–37.4)
MCV RBC AUTO: 96 FL (ref 82–98)
PHOSPHATE SERPL-MCNC: 3.7 MG/DL (ref 2.5–5)
PLATELET # BLD AUTO: 267 THOUSANDS/UL (ref 149–390)
PMV BLD AUTO: 11.3 FL (ref 8.9–12.7)
POTASSIUM SERPL-SCNC: 4.5 MMOL/L (ref 3.5–5)
PROT SERPL-MCNC: 7.1 G/DL (ref 6.4–8.3)
PROT UR-MCNC: 85 MG/DL
PROT/CREAT UR: 0.64 MG/G{CREAT} (ref 0–0.1)
PTH-INTACT SERPL-MCNC: 104.9 PG/ML (ref 18.4–80.1)
RBC # BLD AUTO: 3.78 MILLION/UL (ref 3.88–5.62)
SODIUM SERPL-SCNC: 142 MMOL/L (ref 133–145)
WBC # BLD AUTO: 6.4 THOUSAND/UL (ref 4.31–10.16)

## 2022-01-06 PROCEDURE — 83036 HEMOGLOBIN GLYCOSYLATED A1C: CPT

## 2022-01-06 PROCEDURE — 80053 COMPREHEN METABOLIC PANEL: CPT

## 2022-01-06 PROCEDURE — 84100 ASSAY OF PHOSPHORUS: CPT

## 2022-01-06 PROCEDURE — 82306 VITAMIN D 25 HYDROXY: CPT

## 2022-01-06 PROCEDURE — 3061F NEG MICROALBUMINURIA REV: CPT | Performed by: INTERNAL MEDICINE

## 2022-01-06 PROCEDURE — 84156 ASSAY OF PROTEIN URINE: CPT

## 2022-01-06 PROCEDURE — 82570 ASSAY OF URINE CREATININE: CPT

## 2022-01-06 PROCEDURE — 83970 ASSAY OF PARATHORMONE: CPT

## 2022-01-06 PROCEDURE — 36415 COLL VENOUS BLD VENIPUNCTURE: CPT

## 2022-01-06 PROCEDURE — 85027 COMPLETE CBC AUTOMATED: CPT

## 2022-01-06 PROCEDURE — 3066F NEPHROPATHY DOC TX: CPT | Performed by: INTERNAL MEDICINE

## 2022-01-06 PROCEDURE — 83735 ASSAY OF MAGNESIUM: CPT

## 2022-01-06 NOTE — PROGRESS NOTES
Assessment and Plan:    Tawana Ibrahim was seen today for follow-up and chronic kidney disease  Diagnoses and all orders for this visit:    CKD (chronic kidney disease), stage IV ([address] )  -     Basic metabolic panel; Future  -     Magnesium; Future  -     Phosphorus; Future  -     Protein / creatinine ratio, urine; Future  -     PTH, intact; Future  -     CBC; Future  -     Basic metabolic panel; Future    Benign hypertension with chronic kidney disease, stage IV (HCC)    Chronic kidney disease-mineral and bone disorder    Hypervolemia associated with renal insufficiency    Non-nephrotic range proteinuria    Acute Kidney Injury- Resolved  Chronic Kidney Disease stage IV- Baseline creatinine is 2 3-2 5  Presumed etiology is due to hypertensive nephrosclerosis and sequelae of post-op ATN s/p CABG  Current creatinine from yesterday is at baseline with electrolytes within normal limits  Kidney Smart: completed  Modality: unsure  Transplant: would like evaluation once GFR <20%    Hypertension- Antihypertensive regimen includes torsemide 20mg daily (previously on 40mg BID) and metoprolol 50 mg BID  Avoid salt in your diet  Avoid nonsteroidal medications / NSAIDs  Lose weight  Home /50 to 130/80  He is no longer on losartan  Leg Edema- Continue torsemide  Follow a low sodium diet  Proteinuria- UPC ratio is at goal at 0 64 from 1/6/22  Bone Mineral Disorder- PTH is 105  Vitamin D is 39 4  Will continue to monitor without changes for now  Anemia- HGB at goal     Follow up with Dr Bernarda Branch in 4 months  Please call the office with any questions or concerns  Reason for Visit: Follow-up and Chronic Kidney Disease    HPI: Krystina Ladd  is a 77 y o  male who is here for follow up of chronic kidney disease  He last saw Dr Bernarda Branch in September  To note, he was hospitalized with a UTI and acute kidney injury in October however our group did not see him during his hospital stay    He improved with antibiotics for the UTI and IVF for DUANE and was restarted on his home diuretics upon discharge  At the nursing facility, he was taken off diuretics due to frequent and significant urination without LE edema  He then developed mild edema and his PCP placed him back on low dose torsemide 20mg daily  He denies SOB currently  He has minimal LE edema  He has lost some weight and his sugars are becoming better controlled  His wife manages his diet for him  He denies N/V/D, lightheadedness, dizziness, urinary complaints  ROS: A complete review of systems was performed and was negative unless otherwise noted in the history of present illness  Allergies:   Patient has no known allergies      Medications:     Current Outpatient Medications:     Alpha-Lipoic Acid 600 MG CAPS, TAKE 1 CAPSULE BY MOUTH EVERY DAY (Patient taking differently: TAKE ONE CAPSULE DAILY), Disp: 30 capsule, Rfl: 5    Ascorbic Acid (VITAMIN C) 1000 MG tablet, Take 1,000 mg by mouth daily, Disp: , Rfl:     aspirin (ECOTRIN LOW STRENGTH) 81 mg EC tablet, Take 1 tablet (81 mg total) by mouth daily, Disp: 60 tablet, Rfl: 8    atorvastatin (LIPITOR) 80 mg tablet, TAKE 1 TABLET DAILY WITH DINNER, Disp: 90 tablet, Rfl: 3    busPIRone (BUSPAR) 5 mg tablet, Take 1 tablet (5 mg total) by mouth 3 (three) times a day, Disp: 180 tablet, Rfl: 0    cholecalciferol (VITAMIN D3) 1,000 units tablet, Take 1 tablet (1,000 Units total) by mouth daily, Disp: 1 tablet, Rfl: 1    cyanocobalamin (VITAMIN B-12) 500 mcg tablet, Take 500 mcg by mouth daily, Disp: , Rfl:     finasteride (PROSCAR) 5 mg tablet, Take 1 tablet (5 mg total) by mouth daily, Disp: 90 tablet, Rfl: 3    gabapentin (NEURONTIN) 300 mg capsule, Take 1 cap (300mg) by mouth in the morning and 1 cap (300mg) at noon, take 2 caps (600mg) at bedtime (Patient taking differently: Take 1 cap (300mg) by mouth in the morning and 1 cap (300mg) at noon, take 2 caps (600mg) at bedtime- he is to reduce this dose to 1 cap three times/day that is 300mg TID), Disp: 360 capsule, Rfl: 1    insulin glargine (Toujeo Max SoloStar) 300 units/mL CONCENTRATED U-300 injection pen (2-unit dial), Inject 80 Units under the skin daily, Disp: 6 mL, Rfl:     insulin lispro (HumaLOG KwikPen) 100 units/mL injection pen, Inject 27 Units under the skin 3 (three) times a day with meals , Disp: , Rfl:     latanoprost (XALATAN) 0 005 % ophthalmic solution, 1 drop daily at bedtime, Disp: , Rfl:     metoprolol tartrate (LOPRESSOR) 50 mg tablet, Take 50 mg by mouth 2 (two) times a day  , Disp: , Rfl:     omeprazole (PriLOSEC) 40 MG capsule, Take 1 capsule (40 mg total) by mouth daily, Disp: 90 capsule, Rfl: 1    sertraline (Zoloft) 50 mg tablet, Take 1 tablet (50 mg total) by mouth daily, Disp: 90 tablet, Rfl: 0    tamsulosin (FLOMAX) 0 4 mg, Take 2 capsules (0 8 mg total) by mouth daily with dinner, Disp: 180 capsule, Rfl: 3    torsemide (DEMADEX) 20 mg tablet, Take 1 tablet (20 mg total) by mouth 1 (one) time for 1 dose (Patient taking differently: Take 20 mg by mouth daily  ), Disp: 360 tablet, Rfl: 3    Victoza injection, INJECT 1 8 MG DAILY INTO SKIN, Disp: 27 mL, Rfl: 1    B-D ULTRAFINE III SHORT PEN 31G X 8 MM MISC, INJECT INTO THE SKIN 4 (FOUR) TIMES A DAY, Disp: 400 each, Rfl: 1    Continuous Blood Gluc  (FreeStyle Celia 2 [place]) MAMI, Use to check blood sugar daily, Disp: 1 each, Rfl: 0    glucose blood test strip, Check 4 times a day, Disp: 400 each, Rfl: 1    Microlet Lancets MISC, USE 3 TIMES DAY, Disp: 300 each, Rfl: 1    Past Medical History:   Diagnosis Date    CHF (congestive heart failure) (HCC)     Chronic kidney disease 18 - 24 months?     Dr Rossy Anderson - stage 3    Diabetes mellitus ([facility] [address] )     Hyperlipidemia     Hypertension     Myocardial infarction [facility]) June 2019    Open heart surgery with quad bypass    Obesity     Renal disorder     Visual impairment 1991    Dr Scotty Almeida     Past Surgical History:   Procedure Laterality Date    APPENDECTOMY  1977    Done in conjunction with cholecystectomy    BACK SURGERY      CHOLECYSTECTOMY  1977    COLONOSCOPY      CORONARY ARTERY BYPASS GRAFT  2019    quad    GALLBLADDER SURGERY      HERNIA REPAIR      [place] CABG, ARTERY-VEIN, FOUR N/A 6/21/2019    Procedure: CORONARY ARTERY BYPASS GRAFT (CABG) 4 VESSELS WITH SVG TO PDA, OM, DIAGONAL AND LIMA TO LAD; RIGHT LEG EVH;  Surgeon: Tyron Schmidt MD;  [facility];  Service: Cardiac Surgery    RECTAL SURGERY      SPINE SURGERY  2012    Fusion L3-L5? Family History   Problem Relation Age of Onset    Cancer Mother     Alcohol abuse Mother     Cancer Father     Alcohol abuse Father     Diabetes Maternal Grandmother     Diabetes Paternal Aunt       reports that he quit smoking about 30 years ago  His smoking use included cigarettes and cigars  He started smoking about 62 years ago  He has a 105 00 pack-year smoking history  He has never used smokeless tobacco  He reports current alcohol use of about 10 0 standard drinks of alcohol per week  He reports that he does not use drugs  Physical Exam:   Vitals:    01/07/22 1105 01/07/22 1125   BP:  (!) 120/46   BP Location:  Right arm   Patient Position:  Sitting   Cuff Size:  Large   Pulse:  60   Weight: 114 kg (252 lb 6 4 oz)    Height: 5' 11" (1 803 m)      Body mass index is 35 2 kg/m²  General: NAD  Neuro: AAO  Skin: no rash  Eyes: anicteric  ENMT: mm moist  Neck: no masses  Respiratory: CTAB  Cardiovascular: RRR  Extremities: trace right LE edema, no LLE edema  Gastrointestinal: soft nt nd    Procedure:  No results found for this or any previous visit      Lab Results   Component Value Date    GLUCOSE 141 (H) 06/21/2019    CALCIUM 9 2 01/06/2022    K 4 5 01/06/2022    CO2 31 01/06/2022     01/06/2022    BUN 38 (H) 01/06/2022    CREATININE 2 29 (H) 01/06/2022       Results from last 7 days   Lab Units 01/06/22  1103   WBC Thousand/uL 6 40   HEMOGLOBIN g/dL 11 3*   HEMATOCRIT % 36 4*   PLATELETS Thousands/uL 267   POTASSIUM mmol/L 4 5   CHLORIDE mmol/L 105   CO2 mmol/L 31   BUN mg/dL 38*   CREATININE mg/dL 2 29*   CALCIUM mg/dL 9 2   MAGNESIUM mg/dL 2 2   PHOSPHORUS mg/dL 3 7     I have personally reviewed the blood work as stated above and in my note  I have personally reviewed last renal note

## 2022-01-07 ENCOUNTER — OFFICE VISIT (OUTPATIENT)
Dept: NEPHROLOGY | Facility: CLINIC | Age: 77
End: 2022-01-07
Payer: COMMERCIAL

## 2022-01-07 ENCOUNTER — TELEPHONE (OUTPATIENT)
Dept: NEUROLOGY | Facility: CLINIC | Age: 77
End: 2022-01-07

## 2022-01-07 VITALS
DIASTOLIC BLOOD PRESSURE: 46 MMHG | HEART RATE: 60 BPM | SYSTOLIC BLOOD PRESSURE: 120 MMHG | HEIGHT: 71 IN | BODY MASS INDEX: 35.34 KG/M2 | WEIGHT: 252.4 LBS

## 2022-01-07 DIAGNOSIS — E87.70 HYPERVOLEMIA ASSOCIATED WITH RENAL INSUFFICIENCY: ICD-10-CM

## 2022-01-07 DIAGNOSIS — R80.9 NON-NEPHROTIC RANGE PROTEINURIA: ICD-10-CM

## 2022-01-07 DIAGNOSIS — N28.9 HYPERVOLEMIA ASSOCIATED WITH RENAL INSUFFICIENCY: ICD-10-CM

## 2022-01-07 DIAGNOSIS — N18.4 CKD (CHRONIC KIDNEY DISEASE), STAGE IV (HCC): Primary | ICD-10-CM

## 2022-01-07 DIAGNOSIS — E83.9 CHRONIC KIDNEY DISEASE-MINERAL AND BONE DISORDER: ICD-10-CM

## 2022-01-07 DIAGNOSIS — I12.9 BENIGN HYPERTENSION WITH CHRONIC KIDNEY DISEASE, STAGE IV (HCC): ICD-10-CM

## 2022-01-07 DIAGNOSIS — N18.9 CHRONIC KIDNEY DISEASE-MINERAL AND BONE DISORDER: ICD-10-CM

## 2022-01-07 DIAGNOSIS — N18.4 BENIGN HYPERTENSION WITH CHRONIC KIDNEY DISEASE, STAGE IV (HCC): ICD-10-CM

## 2022-01-07 DIAGNOSIS — M89.9 CHRONIC KIDNEY DISEASE-MINERAL AND BONE DISORDER: ICD-10-CM

## 2022-01-07 LAB
EST. AVERAGE GLUCOSE BLD GHB EST-MCNC: 146 MG/DL
HBA1C MFR BLD: 6.7 %

## 2022-01-07 PROCEDURE — 3044F HG A1C LEVEL LT 7.0%: CPT | Performed by: INTERNAL MEDICINE

## 2022-01-07 PROCEDURE — 99214 OFFICE O/P EST MOD 30 MIN: CPT | Performed by: PHYSICIAN ASSISTANT

## 2022-01-07 NOTE — PATIENT INSTRUCTIONS
Acute Kidney Injury- Resolved  Chronic Kidney Disease stage IV- Baseline creatinine is 2 3-2 5  Presumed etiology is due to hypertensive nephrosclerosis and sequelae of post-op ATN s/p CABG  Current creatinine from yesterday is at baseline with electrolytes within normal limits  Kidney Smart: completed    Hypertension- Antihypertensive regimen includes torsemide 20mg daily (previously on 40mg BID) and metoprolol 50 mg BID  Avoid salt in your diet  Avoid nonsteroidal medications / NSAIDs  Lose weight  Home /50 to 130/80  He is no longer on losartan  Leg Edema- Continue torsemide  Follow a low sodium diet  Proteinuria- UPC ratio is at goal at 0 64 from 1/6/22  Bone Mineral Disorder- PTH is 105  Vitamin D is 39 4  Will continue to monitor without changes for now  Anemia- HGB at goal     Follow up with Dr Kamla Tello in 4 months  Please call the office with any questions or concerns

## 2022-01-18 ENCOUNTER — TELEMEDICINE (OUTPATIENT)
Dept: NEUROLOGY | Facility: CLINIC | Age: 77
End: 2022-01-18
Payer: COMMERCIAL

## 2022-01-18 VITALS — BODY MASS INDEX: 35.93 KG/M2 | WEIGHT: 251 LBS | HEIGHT: 70 IN

## 2022-01-18 DIAGNOSIS — E11.42 DIABETIC POLYNEUROPATHY ASSOCIATED WITH TYPE 2 DIABETES MELLITUS (HCC): Primary | ICD-10-CM

## 2022-01-18 DIAGNOSIS — E74.04 MCARDLE'S DISEASE (HCC): ICD-10-CM

## 2022-01-18 DIAGNOSIS — N18.4 CKD (CHRONIC KIDNEY DISEASE), STAGE IV (HCC): ICD-10-CM

## 2022-01-18 DIAGNOSIS — I65.22 STENOSIS OF LEFT INTERNAL CAROTID ARTERY: ICD-10-CM

## 2022-01-18 DIAGNOSIS — I65.23 ASYMPTOMATIC BILATERAL CAROTID ARTERY STENOSIS: ICD-10-CM

## 2022-01-18 DIAGNOSIS — R26.81 GAIT INSTABILITY: ICD-10-CM

## 2022-01-18 PROCEDURE — 99214 OFFICE O/P EST MOD 30 MIN: CPT | Performed by: PHYSICIAN ASSISTANT
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
## 2022-01-19 DIAGNOSIS — E11.22 TYPE 2 DIABETES MELLITUS WITH STAGE 3A CHRONIC KIDNEY DISEASE, WITH LONG-TERM CURRENT USE OF INSULIN (HCC): Primary | ICD-10-CM

## 2022-01-19 DIAGNOSIS — N18.31 TYPE 2 DIABETES MELLITUS WITH STAGE 3A CHRONIC KIDNEY DISEASE, WITH LONG-TERM CURRENT USE OF INSULIN (HCC): Primary | ICD-10-CM

## 2022-01-19 DIAGNOSIS — Z79.4 TYPE 2 DIABETES MELLITUS WITH STAGE 3A CHRONIC KIDNEY DISEASE, WITH LONG-TERM CURRENT USE OF INSULIN (HCC): Primary | ICD-10-CM

## 2022-01-19 RX ORDER — FLASH GLUCOSE SENSOR
KIT MISCELLANEOUS
COMMUNITY
End: 2022-01-19 | Stop reason: SDUPTHER

## 2022-01-19 RX ORDER — FLASH GLUCOSE SENSOR
KIT MISCELLANEOUS
Qty: 6 EACH | Refills: 1 | Status: SHIPPED | OUTPATIENT
Start: 2022-01-19 | End: 2022-06-27

## 2022-01-20 PROBLEM — R26.81 GAIT INSTABILITY: Status: ACTIVE | Noted: 2022-01-20

## 2022-01-26 ENCOUNTER — OFFICE VISIT (OUTPATIENT)
Dept: CARDIOLOGY CLINIC | Facility: CLINIC | Age: 77
End: 2022-01-26
Payer: COMMERCIAL

## 2022-01-26 VITALS
DIASTOLIC BLOOD PRESSURE: 68 MMHG | HEART RATE: 80 BPM | HEIGHT: 70 IN | WEIGHT: 244.9 LBS | OXYGEN SATURATION: 99 % | BODY MASS INDEX: 35.06 KG/M2 | SYSTOLIC BLOOD PRESSURE: 118 MMHG

## 2022-01-26 DIAGNOSIS — I25.10 CORONARY ARTERY DISEASE INVOLVING NATIVE CORONARY ARTERY OF NATIVE HEART WITHOUT ANGINA PECTORIS: ICD-10-CM

## 2022-01-26 DIAGNOSIS — E78.2 MIXED HYPERLIPIDEMIA: ICD-10-CM

## 2022-01-26 DIAGNOSIS — N18.4 CKD (CHRONIC KIDNEY DISEASE), STAGE IV (HCC): ICD-10-CM

## 2022-01-26 DIAGNOSIS — Z95.1 S/P CABG (CORONARY ARTERY BYPASS GRAFT): ICD-10-CM

## 2022-01-26 DIAGNOSIS — I10 ESSENTIAL HYPERTENSION: Primary | ICD-10-CM

## 2022-01-26 PROCEDURE — 1036F TOBACCO NON-USER: CPT | Performed by: INTERNAL MEDICINE

## 2022-01-26 PROCEDURE — 1160F RVW MEDS BY RX/DR IN RCRD: CPT | Performed by: INTERNAL MEDICINE

## 2022-01-26 PROCEDURE — 3078F DIAST BP <80 MM HG: CPT | Performed by: INTERNAL MEDICINE

## 2022-01-26 PROCEDURE — 3074F SYST BP LT 130 MM HG: CPT | Performed by: INTERNAL MEDICINE

## 2022-01-26 PROCEDURE — 99214 OFFICE O/P EST MOD 30 MIN: CPT | Performed by: INTERNAL MEDICINE

## 2022-01-26 NOTE — PROGRESS NOTES
Cardiology   Maggie Johnson  77 y o  male MRN: [id number]        Impression:  1  Diastolic heart failure - compensated at this time  However, reluctant to take medications  2  CAD s/p CABG x 4 - 6/19  3  HTN - controlled  4  Dyslipidemia - on statin  5  CKD - follows with nephrology          Recommendations:  1  Continue current medications  2  Follow up in 4 months        HPI: Maggie Johnson  is a 77y o  year old male with coronary artery disease s/p CABG 6/19, hypertension, CKD, and dyslipidemia who returns for follow up  Was admitted to hospital 3/21 with CHF - echo demonstrated normal LV systolic function and mild MR  Hasn't been taking medications reliably - appears depressed  Review of Systems   Constitutional: Negative  HENT: Negative  Eyes: Negative  Respiratory: Negative for chest tightness and shortness of breath  Cardiovascular: Negative for chest pain, palpitations and leg swelling  Gastrointestinal: Negative  Endocrine: Negative  Genitourinary: Negative  Musculoskeletal: Negative  Skin: Negative  Allergic/Immunologic: Negative  Neurological: Negative  Hematological: Negative  Psychiatric/Behavioral: Positive for dysphoric mood  All other systems reviewed and are negative  Past Medical History:   Diagnosis Date    CHF (congestive heart failure) (HCC)     Chronic kidney disease 18 - 24 months?     Dr Herb Aguayo - stage 3    Diabetes mellitus ([facility] [address] )     Hyperlipidemia     Hypertension     Myocardial infarction [facility]) June 2019    Open heart surgery with quad bypass    Obesity     Renal disorder     Visual impairment 1991    Dr Tania Leon     Past Surgical History:   Procedure Laterality Date    APPENDECTOMY  1977    Done in conjunction with cholecystectomy    BACK SURGERY      CHOLECYSTECTOMY  1977    COLONOSCOPY      CORONARY ARTERY BYPASS GRAFT  2019    quad    GALLBLADDER SURGERY      HERNIA REPAIR      SD CABG, ARTERY-VEIN, FOUR N/A 2019    Procedure: CORONARY ARTERY BYPASS GRAFT (CABG) 4 VESSELS WITH SVG TO PDA, OM, DIAGONAL AND LIMA TO LAD; RIGHT LEG EVH;  Surgeon: Jarvis Butcher MD;  Location: [facility];  Service: Cardiac Surgery    RECTAL SURGERY      SPINE SURGERY  2012    Fusion L3-L5? Social History     Substance and Sexual Activity   Alcohol Use Yes    Alcohol/week: 10 0 standard drinks    Types: 10 Shots of liquor per week    Comment: 1 drink every 6 months       Social History     Substance and Sexual Activity   Drug Use Never     Social History     Tobacco Use   Smoking Status Former Smoker    Packs/day: 3 00    Years: 35 00    Pack years: 105 00    Types: Cigarettes, Cigars    Start date: 1959   Jordan Shea Quit date: 12    Years since quittin 0   Smokeless Tobacco Never Used   Tobacco Comment    Quit many times     Family History   Problem Relation Age of Onset    Cancer Mother     Alcohol abuse Mother     Cancer Father     Alcohol abuse Father     Diabetes Maternal Grandmother     Diabetes Paternal Aunt        Allergies:  No Known Allergies    Medications:     Current Outpatient Medications:     Alpha-Lipoic Acid 600 MG CAPS, TAKE 1 CAPSULE BY MOUTH EVERY DAY (Patient taking differently: TAKE ONE CAPSULE DAILY), Disp: 30 capsule, Rfl: 5    Ascorbic Acid (VITAMIN C) 1000 MG tablet, Take 1,000 mg by mouth daily, Disp: , Rfl:     aspirin (ECOTRIN LOW STRENGTH) 81 mg EC tablet, Take 1 tablet (81 mg total) by mouth daily, Disp: 60 tablet, Rfl: 8    atorvastatin (LIPITOR) 80 mg tablet, TAKE 1 TABLET DAILY WITH DINNER, Disp: 90 tablet, Rfl: 3    B-D ULTRAFINE III SHORT PEN 31G X 8 MM MISC, INJECT INTO THE SKIN 4 (FOUR) TIMES A DAY, Disp: 400 each, Rfl: 1    busPIRone (BUSPAR) 5 mg tablet, Take 1 tablet (5 mg total) by mouth 3 (three) times a day, Disp: 180 tablet, Rfl: 0    cholecalciferol (VITAMIN D3) 1,000 units tablet, Take 1 tablet (1,000 Units total) by mouth daily, Disp: 1 tablet, Rfl: 1    Continuous Blood Gluc  (FreeStyle Celia 2 [place]) MAMI, Use to check blood sugar daily, Disp: 1 each, Rfl: 0    Continuous Blood Gluc Sensor (FreeStyle Celia 2 Sensor) MISC, Change every 14 days, Disp: 6 each, Rfl: 1    cyanocobalamin (VITAMIN B-12) 500 mcg tablet, Take 500 mcg by mouth daily, Disp: , Rfl:     finasteride (PROSCAR) 5 mg tablet, Take 1 tablet (5 mg total) by mouth daily, Disp: 90 tablet, Rfl: 3    gabapentin (NEURONTIN) 300 mg capsule, Take 1 cap (300mg) by mouth in the morning and 1 cap (300mg) at noon, take 2 caps (600mg) at bedtime (Patient taking differently: Take 1 cap (300mg) by mouth in the morning and 1 cap (300mg) at noon, take 2 caps (600mg) at bedtime- he is to reduce this dose to 1 cap three times/day that is 300mg TID), Disp: 360 capsule, Rfl: 1    glucose blood test strip, Check 4 times a day, Disp: 400 each, Rfl: 1    insulin glargine (Toujeo Max SoloStar) 300 units/mL CONCENTRATED U-300 injection pen (2-unit dial), Inject 80 Units under the skin daily, Disp: 6 mL, Rfl:     insulin lispro (HumaLOG KwikPen) 100 units/mL injection pen, Inject 27 Units under the skin 3 (three) times a day with meals , Disp: , Rfl:     latanoprost (XALATAN) 0 005 % ophthalmic solution, 1 drop daily at bedtime, Disp: , Rfl:     metoprolol tartrate (LOPRESSOR) 50 mg tablet, Take 50 mg by mouth 2 (two) times a day  , Disp: , Rfl:     Microlet Lancets MISC, USE 3 TIMES DAY, Disp: 300 each, Rfl: 1    omeprazole (PriLOSEC) 40 MG capsule, Take 1 capsule (40 mg total) by mouth daily, Disp: 90 capsule, Rfl: 1    sertraline (Zoloft) 50 mg tablet, Take 1 tablet (50 mg total) by mouth daily, Disp: 90 tablet, Rfl: 0    tamsulosin (FLOMAX) 0 4 mg, Take 2 capsules (0 8 mg total) by mouth daily with dinner, Disp: 180 capsule, Rfl: 3    torsemide (DEMADEX) 20 mg tablet, Take 1 tablet (20 mg total) by mouth 1 (one) time for 1 dose (Patient taking differently: Take 20 mg by mouth daily  ), Disp: 360 tablet, Rfl: 3    Victoza injection, INJECT 1 8 MG DAILY INTO SKIN, Disp: 27 mL, Rfl: 1      Wt Readings from Last 3 Encounters:   01/26/22 111 kg (244 lb 14 4 oz)   01/18/22 114 kg (251 lb)   01/07/22 114 kg (252 lb 6 4 oz)     Temp Readings from Last 3 Encounters:   12/06/21 97 6 °F (36 4 °C) (Tympanic)   12/01/21 98 °F (36 7 °C)   10/21/21 98 °F (36 7 °C) (Oral)     BP Readings from Last 3 Encounters:   01/26/22 118/68   01/07/22 (!) 120/46   12/06/21 106/60     Pulse Readings from Last 3 Encounters:   01/26/22 80   01/07/22 60   12/06/21 72         Physical Exam  HENT:      Head: Atraumatic  Mouth/Throat:      Mouth: Mucous membranes are moist    Eyes:      Extraocular Movements: Extraocular movements intact  Cardiovascular:      Rate and Rhythm: Normal rate and regular rhythm  Heart sounds: Normal heart sounds  Pulmonary:      Effort: Pulmonary effort is normal       Breath sounds: Normal breath sounds  Abdominal:      General: Abdomen is flat  Musculoskeletal:         General: Normal range of motion  Cervical back: Normal range of motion  Skin:     General: Skin is warm  Neurological:      General: No focal deficit present  Mental Status: He is alert and oriented to person, place, and time     Psychiatric:         Mood and Affect: Mood normal          Behavior: Behavior normal            Laboratory Studies:  CMP:  Lab Results   Component Value Date    K 4 5 01/06/2022     01/06/2022    CO2 31 01/06/2022    BUN 38 (H) 01/06/2022    CREATININE 2 29 (H) 01/06/2022    GLUCOSE 141 (H) 06/21/2019    AST 12 (L) 01/06/2022    ALT 13 01/06/2022    EGFR 26 01/06/2022       Lipid Profile:   No results found for: CHOL  Lab Results   Component Value Date    HDL 42 06/18/2021     Lab Results   Component Value Date    LDLCALC 75 06/18/2021     Lab Results   Component Value Date    TRIG 160 (H) 06/18/2021       Cardiac testing:   EKG reviewed personally:   Results for orders placed during the hospital encounter of 21    Echo complete with contrast if indicated    Narrative  [address] [facility]  [phone]    Transthoracic Echocardiogram  2D, M-mode, Doppler, and Color Doppler    Study date:  23-Mar-2021    Patient: Esther Noel  MR number: [id number]  Account number: [de-identified]  : 1945  Age: 77 years  Gender: Male  Status: Outpatient  Location: [address] [place] [address]  Height: 71 in  Weight: 279 4 lb  BP: 118/ 80 mmHg    Indications: Assess congestive heart failure  Diagnoses: I50 9 - Heart failure, unspecified    Sonographer:  LISA Pastor  Primary Physician:  Jacquelin Graf MD  Referring Physician:  ELEAZAR Wheat  Group:  [facility]  Interpreting Physician:  Alva Sam MD    SUMMARY    LEFT VENTRICLE:  Systolic function was normal by visual assessment  Ejection fraction was estimated to be 55 %  Although no diagnostic regional wall motion abnormality was identified, this possibility cannot be completely excluded on the basis of this study  Wall thickness was markedly increased  Doppler parameters were consistent with abnormal left ventricular relaxation (grade 1 diastolic dysfunction)  MITRAL VALVE:  There was moderate annular calcification  There was mild regurgitation  AORTIC VALVE:  Transaortic velocity was increased due to increased transvalvular flow  TRICUSPID VALVE:  There was mild regurgitation  PULMONIC VALVE:  There was trace regurgitation  AORTA:  There was mild dilatation of the ascending aorta  HISTORY: PRIOR HISTORY: Heart failure, CAD, NSTEMI s/p CABG, DMt2, Noncompliance    PROCEDURE: The study was performed in the [address] [place] [address]  This was a routine study  The transthoracic approach was used  The study included complete 2D imaging, M-mode, complete spectral Doppler, and color Doppler   The  heart rate was 75 bpm, at the start of the study  Images were obtained from the parasternal, apical, subcostal, and suprasternal notch acoustic windows  Image quality was adequate  LEFT VENTRICLE: Size was normal  Systolic function was normal by visual assessment  Ejection fraction was estimated to be 55 %  Although no diagnostic regional wall motion abnormality was identified, this possibility cannot be completely  excluded on the basis of this study  Wall thickness was markedly increased  DOPPLER: There was an increased relative contribution of atrial contraction to ventricular filling  Doppler parameters were consistent with abnormal left  ventricular relaxation (grade 1 diastolic dysfunction)  RIGHT VENTRICLE: The size was normal  Systolic function was normal  DOPPLER: Systolic pressure was within the normal range  Estimated peak pressure was 27 mmHg  LEFT ATRIUM: Size was normal     RIGHT ATRIUM: Size was normal     MITRAL VALVE: There was moderate annular calcification  Valve structure was normal  There was mild thickening  There was normal leaflet separation  DOPPLER: The transmitral velocity was within the normal range  There was no evidence for  stenosis  There was mild regurgitation  AORTIC VALVE: The valve was probably trileaflet  Leaflets exhibited normal thickness, mild to moderate calcification, and normal cuspal separation  DOPPLER: Transaortic velocity was increased due to increased transvalvular flow  There was  no evidence for stenosis  There was no regurgitation  TRICUSPID VALVE: The valve structure was normal  There was normal leaflet separation  DOPPLER: The transtricuspid velocity was within the normal range  There was no evidence for stenosis  There was mild regurgitation  PULMONIC VALVE: Leaflets exhibited normal thickness, no calcification, and normal cuspal separation  DOPPLER: The transpulmonic velocity was within the normal range  There was trace regurgitation      PERICARDIUM: There was no pericardial effusion  AORTA: The root exhibited normal size  There was mild dilatation of the ascending aorta  SYSTEMIC VEINS: IVC: The inferior vena cava was normal in size and course  Respirophasic changes were normal     SYSTEM MEASUREMENT TABLES    2D  %FS: 28 99 %  Ao Diam: 3 6 cm  Ao asc: 4 1 cm  EDV(Teich): 72 68 ml  EF Biplane: 40 %  EF(Teich): 56 22 %  ESV(Teich): 31 82 ml  HR_2Ch_Q: 75 13 BPM  HR_4Ch_Q: 58 35 BPM  IVSd: 1 58 cm  LA Area: 15 86 cm2  LA Diam: 4 2 cm  LVCO_2Ch_Q: 6 L/min  LVCO_4Ch_Q: 4 03 L/min  LVCO_BiP_Q: 5 01 L/min  LVEDV MOD A2C: 107 2 ml  LVEDV MOD A4C: 110 87 ml  LVEDV MOD BP: 110 33 ml  LVEDVInd MOD BP: 45 41 ml/m2  LVEF MOD A2C: 46 64 %  LVEF MOD A4C: 35 88 %  LVEF_2Ch_Q: 45 18 %  LVEF_4Ch_Q: 48 5 %  LVEF_BiP_Q: 47 38 %  LVESV MOD A2C: 57 21 ml  LVESV MOD A4C: 71 09 ml  LVESV MOD BP: 66 2 ml  LVESVInd MOD BP: 27 24 ml/m2  LVIDd: 4 06 cm  LVIDs: 2 89 cm  LVLd A2C: 9 94 cm  LVLd A4C: 10 15 cm  LVLd_2Ch_Q: 9 99 cm  LVLd_4Ch_Q: 10 38 cm  LVLs A2C: 8 18 cm  LVLs A4C: 9 31 cm  LVLs_2Ch_Q: 9 31 cm  LVLs_4Ch_Q: 9 63 cm  LVPWd: 1 5 cm  LVSV_2Ch_Q: 79 87 ml  LVSV_4Ch_Q: 69 ml  LVSV_BiP_Q: 76 16 ml  LVVED_2Ch_Q: 176 77 ml  LVVED_4Ch_Q: 142 25 ml  LVVED_BiP_Q: 160 75 ml  LVVES_2Ch_Q: 96 9 ml  LVVES_4Ch_Q: 73 25 ml  LVVES_BiP_Q: 84 59 ml  RA Area: 13 82 cm2  RVIDd: 3 28 cm  SV MOD A2C: 50 ml  SV MOD A4C: 39 78 ml  SV(Teich): 40 86 ml    CW  TR MaxP 49 mmHg  TR Vmax: 2 42 m/s    MM  TAPSE: 2 41 cm    PW  E' Sept: 0 06 m/s  E/E' Sept: 18 92  LVOT Env  Ti: 281 64 ms  LVOT VTI: 19 74 cm  LVOT Vmax: 1 04 m/s  LVOT Vmean: 0 7 m/s  LVOT maxP 34 mmHg  LVOT meanP 34 mmHg  MV A Nick: 1 05 m/s  MV Dec [place]: 4 32 m/s2  MV DecT: 250 42 ms  MV E Nick: 1 05 m/s  MV E/A Ratio: 1    [employer] Accredited Echocardiography Laboratory    Prepared and electronically signed by    Lolly Bhatia MD  Signed 23-Mar-2021 11:06:22    No results found for this or any previous visit  Results for orders placed during the hospital encounter of 06/15/19    Cardiac catheterization    Narrative  [address] [facility]  [phone]    [facility]    Invasive Cardiovascular Lab Complete Report    Patient: Arvind Regan  MR number: [id number]  Account number: [de-identified]  Study date: 2019  Gender: Male  : 1945  Height: 70 9 in  Weight: 237 6 lb  BSA: 2 27 mï¾²    Allergies: NO KNOWN ALLERGIES    Diagnostic Cardiologist:  Shanae Barone MD  Primary Physician:  Brigida Pizano DO    SUMMARY    CORONARY CIRCULATION:  Proximal LAD: There was a diffuse 75 % stenosis  It appears amenable to percutaneous intervention  1st diagonal: There was a 80 % stenosis  Proximal circumflex: There was a 100 % stenosis  This lesion is a chronic total occlusion  Proximal RCA: There was a 100 % stenosis  There was JEFFRY grade 1 flow through the vessel (slow flow without perfusion)  This lesion is a chronic total occlusion  INDICATIONS:  --  Possible CAD: myocardial infarction without ST elevation (NSTEMI)  --  Congestive heart failure with dyspnea  PROCEDURES PERFORMED    --  Left coronary angiography  --  Right coronary angiography  --  Inpatient  --  Mod Sedation Same Physician Initial 15min  --  Coronary Catheterization (w/ LHC)  PROCEDURE: The risks and alternatives of the procedures and conscious sedation were explained to the patient and informed consent was obtained  The patient was brought to the cath lab and placed on the table  The planned puncture sites  were prepped and draped in the usual sterile fashion  --  Right radial artery access  After performing an Clint's test to verify adequate ulnar artery supply to the hand, the radial site was prepped  The puncture site was infiltrated with local anesthetic   The vessel was accessed using the  modified Seldinger technique, a wire was advanced into the vessel, and a sheath was advanced over the wire into the vessel  --  Left coronary artery angiography  A catheter was advanced over a guidewire into the aorta and positioned in the left coronary artery ostium under fluoroscopic guidance  Angiography was performed  --  Right coronary artery angiography  A catheter was advanced over a guidewire into the aorta and positioned in the right coronary artery ostium under fluoroscopic guidance  Angiography was performed  --  Inpatient  --  Mod Sedation Same Physician Initial 15min  --  Coronary Catheterization (w/ LHC)  PROCEDURE COMPLETION: The patient tolerated the procedure well and was discharged from the cath lab  TIMING: Test started at 11:31  Test concluded at 11:52  HEMOSTASIS: The sheath was removed  The site was compressed with a Hemoband  device  Hemostasis was obtained  MEDICATIONS GIVEN: Fentanyl (1OOmcg/2 ml), 50 mcg, IV, at 11:37  Versed (2mg/2ml), 2 mg, IV, at 11:37  1% Lidocaine, 1 ml, subcutaneously, at 11:37  Verapamil (5mg/2ml), 2 5 mg, IV, at 11:39  Heparin 1000  units/ml, 4,000 units, IV, at 11:39  Nitroglycerin (200mcg/ml), 200 mcg, at 11:39  CONTRAST GIVEN: 30 ml Visipaque 320mgl/ml  RADIATION EXPOSURE: Fluoroscopy time: 2 4 min  CORONARY VESSELS:   --  The coronary circulation is right dominant  --  Left main: The vessel was medium to large sized and heavily calcified  Angiography showed moderate atherosclerosis  --  LAD: The vessel was medium to large sized and heavily calcified  Angiography showed the vessel to wrap around the cardiac apex and moderate atherosclerosis  There was one major diagonal branch  --  Proximal LAD: There was a diffuse 75 % stenosis  It appears amenable to percutaneous intervention  --  1st diagonal: The vessel was small to medium sized  Angiography showed moderate atherosclerosis  There was a 80 % stenosis  --  Proximal circumflex: There was a 100 % stenosis  This lesion is a chronic total occlusion    --  1st obtuse marginal: The vessel was small to medium sized  The artery was supplied by collaterals  --  RCA: The vessel was medium sized and heavily calcified  Angiography showed severe atherosclerosis  --  Proximal RCA: There was a 100 % stenosis  There was JEFFRY grade 1 flow through the vessel (slow flow without perfusion)  This lesion is a chronic total occlusion  --  Right PDA: The vessel was medium sized  The artery was supplied by collaterals  IMPRESSIONS:  There is significant triple vessel coronary artery disease  RECOMMENDATIONS  Consultation be obtained for coronary artery bypass grafting  DISPOSITION:  The patient left the catheterization laboratory in stable condition  Prepared and signed by    Melissa Tavarez MD  Signed 2019 11:55:29    Study diagram    Angiographic findings  Native coronary lesions:  ï¾·Proximal LAD: Lesion 1: diffuse, 75 % stenosis  ï¾·D1: Lesion 1: 80 % stenosis  ï¾·Proximal circumflex: Lesion 1: 100 % stenosis  ï¾·Proximal RCA: Lesion 1: 100 % stenosis  Hemodynamic tables    Pressures:  Baseline  Pressures:  - HR: 53  Pressures:  - Rhythm:  Pressures:  -- Aortic Pressure (S/D/M): 111/58/71  Pressures:  -- Left Ventricle (s/edp): 97/17/--    Outputs:  Baseline  Outputs:  -- CALCULATIONS: [age]  Outputs:  -- CALCULATIONS: Body Surface Area: 2 27  Outputs:  -- CALCULATIONS: Height in cm: 180 00  Outputs:  -- CALCULATIONS: Sex: Male  Outputs:  -- CALCULATIONS: Weight in k 00    No results found for this or any previous visit

## 2022-01-30 ENCOUNTER — HOSPITAL ENCOUNTER (EMERGENCY)
Facility: HOSPITAL | Age: 77
Discharge: HOME | End: 2022-01-30
Attending: EMERGENCY MEDICINE | Admitting: EMERGENCY MEDICINE
Payer: COMMERCIAL

## 2022-01-30 VITALS
HEART RATE: 69 BPM | TEMPERATURE: 98.5 F | DIASTOLIC BLOOD PRESSURE: 62 MMHG | SYSTOLIC BLOOD PRESSURE: 151 MMHG | HEIGHT: 70 IN | OXYGEN SATURATION: 96 % | BODY MASS INDEX: 35.14 KG/M2 | RESPIRATION RATE: 16 BRPM

## 2022-01-30 DIAGNOSIS — E16.2 HYPOGLYCEMIA: Primary | ICD-10-CM

## 2022-01-30 LAB
ALBUMIN SERPL BCP-MCNC: 3.7 G/DL (ref 3.4–4.8)
ALP SERPL-CCNC: 84 U/L (ref 10–129)
ALT SERPL W P-5'-P-CCNC: 13 U/L (ref 5–63)
ANION GAP SERPL CALCULATED.3IONS-SCNC: 10 MMOL/L (ref 4–13)
AST SERPL W P-5'-P-CCNC: 23 U/L (ref 15–41)
BASOPHILS # BLD AUTO: 0.02 THOUSANDS/ΜL (ref 0–0.1)
BASOPHILS NFR BLD AUTO: 0 % (ref 0–1)
BILIRUB SERPL-MCNC: 0.61 MG/DL (ref 0.3–1.2)
BUN SERPL-MCNC: 43 MG/DL (ref 6–20)
CALCIUM SERPL-MCNC: 9 MG/DL (ref 8.4–10.2)
CHLORIDE SERPL-SCNC: 103 MMOL/L (ref 96–108)
CO2 SERPL-SCNC: 23 MMOL/L (ref 22–33)
CREAT SERPL-MCNC: 3.4 MG/DL (ref 0.5–1.2)
EOSINOPHIL # BLD AUTO: 0.15 THOUSAND/ΜL (ref 0–0.61)
EOSINOPHIL NFR BLD AUTO: 2 % (ref 0–6)
ERYTHROCYTE [DISTWIDTH] IN BLOOD BY AUTOMATED COUNT: 14.2 % (ref 11.6–15.1)
GFR SERPL CREATININE-BSD FRML MDRD: 16 ML/MIN/1.73SQ M
GLUCOSE SERPL-MCNC: 110 MG/DL (ref 65–140)
GLUCOSE SERPL-MCNC: 112 MG/DL (ref 65–140)
GLUCOSE SERPL-MCNC: 142 MG/DL (ref 65–140)
GLUCOSE SERPL-MCNC: 99 MG/DL (ref 65–140)
HCT VFR BLD AUTO: 33 % (ref 36.5–49.3)
HGB BLD-MCNC: 10.8 G/DL (ref 12–17)
IMM GRANULOCYTES # BLD AUTO: 0.02 THOUSAND/UL (ref 0–0.2)
IMM GRANULOCYTES NFR BLD AUTO: 0 % (ref 0–2)
LYMPHOCYTES # BLD AUTO: 0.93 THOUSANDS/ΜL (ref 0.6–4.47)
LYMPHOCYTES NFR BLD AUTO: 11 % (ref 14–44)
MCH RBC QN AUTO: 30 PG (ref 26.8–34.3)
MCHC RBC AUTO-ENTMCNC: 32.7 G/DL (ref 31.4–37.4)
MCV RBC AUTO: 92 FL (ref 82–98)
MONOCYTES # BLD AUTO: 0.6 THOUSAND/ΜL (ref 0.17–1.22)
MONOCYTES NFR BLD AUTO: 7 % (ref 4–12)
NEUTROPHILS # BLD AUTO: 6.46 THOUSANDS/ΜL (ref 1.85–7.62)
NEUTS SEG NFR BLD AUTO: 80 % (ref 43–75)
NRBC BLD AUTO-RTO: 0 /100 WBCS
PLATELET # BLD AUTO: 239 THOUSANDS/UL (ref 149–390)
PMV BLD AUTO: 10.9 FL (ref 8.9–12.7)
POTASSIUM SERPL-SCNC: 3.7 MMOL/L (ref 3.5–5)
PROT SERPL-MCNC: 7.1 G/DL (ref 6.4–8.3)
RBC # BLD AUTO: 3.6 MILLION/UL (ref 3.88–5.62)
SODIUM SERPL-SCNC: 136 MMOL/L (ref 133–145)
WBC # BLD AUTO: 8.18 THOUSAND/UL (ref 4.31–10.16)

## 2022-01-30 PROCEDURE — 82948 REAGENT STRIP/BLOOD GLUCOSE: CPT

## 2022-01-30 PROCEDURE — 96360 HYDRATION IV INFUSION INIT: CPT

## 2022-01-30 PROCEDURE — 80053 COMPREHEN METABOLIC PANEL: CPT | Performed by: EMERGENCY MEDICINE

## 2022-01-30 PROCEDURE — 85025 COMPLETE CBC W/AUTO DIFF WBC: CPT | Performed by: EMERGENCY MEDICINE

## 2022-01-30 PROCEDURE — 93005 ELECTROCARDIOGRAM TRACING: CPT

## 2022-01-30 PROCEDURE — 99284 EMERGENCY DEPT VISIT MOD MDM: CPT | Performed by: EMERGENCY MEDICINE

## 2022-01-30 PROCEDURE — 99285 EMERGENCY DEPT VISIT HI MDM: CPT

## 2022-01-30 PROCEDURE — 36415 COLL VENOUS BLD VENIPUNCTURE: CPT | Performed by: EMERGENCY MEDICINE

## 2022-01-30 RX ADMIN — SODIUM CHLORIDE 1000 ML: 0.9 INJECTION, SOLUTION INTRAVENOUS at 16:51

## 2022-01-30 NOTE — ED PROVIDER NOTES
History  Chief Complaint   Patient presents with    Hypoglycemia - Symptomatic     brought in by EMS took insulin x2 last night without eating, slid out of chair, BG 35 upon EMS arrival, 15g oral gluocse given PTA, D10 running, continued confusion, chronic noncompliance     73-year-old male brought in for hypoglycemia  Patient is a known diabetic who is noncompliant with his medications  Apparently he took his insulin twice  without eating and became weak so he slid out of a chair EMS was initially called for lift assist however because they know the patient they checked his blood sugar and found it to be 35  They gave him 15 grams of oral glucose as well as D10  They say he is less confused when they 1st got there but still not to his baseline  Patient's only complaint is of being cold      History provided by:  Patient and EMS personnel   used: No    Hypoglycemia - Symptomatic  Initial blood sugar:  35  Blood sugar after intervention:  65  Severity:  Severe  Onset quality:  Unable to specify  Progression:  Waxing and waning  Chronicity:  Recurrent  Diabetic status:  Controlled with insulin  Context: decreased oral intake and treatment noncompliance    Associated symptoms: altered mental status, decreased responsiveness, speech difficulty and weakness    Associated symptoms: no dizziness and no seizures    Risk factors: no alcohol abuse, no hypothyroidism and no recent surgery        Prior to Admission Medications   Prescriptions Last Dose Informant Patient Reported? Taking?    Alpha-Lipoic Acid 600 MG CAPS  Self No No   Sig: TAKE 1 CAPSULE BY MOUTH EVERY DAY   Patient taking differently: TAKE ONE CAPSULE DAILY   Ascorbic Acid (VITAMIN C) 1000 MG tablet  Self Yes No   Sig: Take 1,000 mg by mouth daily   B-D ULTRAFINE III SHORT PEN 31G X 8 MM MISC  Self No No   Sig: INJECT INTO THE SKIN 4 (FOUR) TIMES A DAY   Continuous Blood Gluc  (FreeStyle Celia 2 [place]) MAMI  Self No No   Sig: Use to check blood sugar daily   Continuous Blood Gluc Sensor (FreeStyle Celia 2 Sensor) MISC  Self No No   Sig: Change every 14 days   Microlet Lancets MISC  Self No No   Sig: USE 3 TIMES DAY   Victoza injection  Self No No   Sig: INJECT 1 8 MG DAILY INTO SKIN   aspirin (ECOTRIN LOW STRENGTH) 81 mg EC tablet  Self No No   Sig: Take 1 tablet (81 mg total) by mouth daily   atorvastatin (LIPITOR) 80 mg tablet  Self No No   Sig: TAKE 1 TABLET DAILY WITH DINNER   busPIRone (BUSPAR) 5 mg tablet  Self No No   Sig: Take 1 tablet (5 mg total) by mouth 3 (three) times a day   cholecalciferol (VITAMIN D3) 1,000 units tablet  Self No No   Sig: Take 1 tablet (1,000 Units total) by mouth daily   cyanocobalamin (VITAMIN B-12) 500 mcg tablet  Self Yes No   Sig: Take 500 mcg by mouth daily   finasteride (PROSCAR) 5 mg tablet  Self No No   Sig: Take 1 tablet (5 mg total) by mouth daily   gabapentin (NEURONTIN) 300 mg capsule  Self No No   Sig: Take 1 cap (300mg) by mouth in the morning and 1 cap (300mg) at noon, take 2 caps (600mg) at bedtime   Patient taking differently: Take 1 cap (300mg) by mouth in the morning and 1 cap (300mg) at noon, take 2 caps (600mg) at bedtime- he is to reduce this dose to 1 cap three times/day that is 300mg TID   glucose blood test strip  Self No No   Sig: Check 4 times a day   insulin glargine (Toujeo Max SoloStar) 300 units/mL CONCENTRATED U-300 injection pen (2-unit dial)  Self No No   Sig: Inject 80 Units under the skin daily   insulin lispro (HumaLOG KwikPen) 100 units/mL injection pen  Self Yes No   Sig: Inject 27 Units under the skin 3 (three) times a day with meals    latanoprost (XALATAN) 0 005 % ophthalmic solution  Self Yes No   Si drop daily at bedtime   metoprolol tartrate (LOPRESSOR) 50 mg tablet  Self Yes No   Sig: Take 50 mg by mouth 2 (two) times a day     omeprazole (PriLOSEC) 40 MG capsule  Self No No   Sig: Take 1 capsule (40 mg total) by mouth daily   sertraline (Zoloft) 50 mg tablet  Self No No   Sig: Take 1 tablet (50 mg total) by mouth daily   tamsulosin (FLOMAX) 0 4 mg  Self No No   Sig: Take 2 capsules (0 8 mg total) by mouth daily with dinner   torsemide (DEMADEX) 20 mg tablet  Self No No   Sig: Take 1 tablet (20 mg total) by mouth 1 (one) time for 1 dose   Patient taking differently: Take 20 mg by mouth daily        Facility-Administered Medications: None       Past Medical History:   Diagnosis Date    [facility]cong[facility]) [facility])     Chronic kidney disease 18 - 24 months? Dr Rossy Anderson - stage 3    Diabetes mellitus ([facility] [address] )     Hyperlipidemia     Hypertension     Myocardial infarction [facility]) June 2019    Open heart surgery with quad bypass    Obesity     Renal disorder     Visual impairment 1991    Dr Scotty Almeida       Past Surgical History:   Procedure Laterality Date    APPENDECTOMY  1977    Done in conjunction with cholecystectomy    BACK SURGERY      CHOLECYSTECTOMY  1977    COLONOSCOPY      CORONARY ARTERY BYPASS GRAFT  2019    quad    GALLBLADDER SURGERY      HERNIA REPAIR      MD CABG, ARTERY-VEIN, FOUR N/A 6/21/2019    Procedure: CORONARY ARTERY BYPASS GRAFT (CABG) 4 VESSELS WITH SVG TO PDA, OM, DIAGONAL AND LIMA TO LAD; RIGHT LEG EVH;  Surgeon: Roddy Blackwell MD;  [facility];  Service: Cardiac Surgery    RECTAL SURGERY      SPINE SURGERY  2012    Fusion L3-L5? Family History   Problem Relation Age of Onset    Cancer Mother     Alcohol abuse Mother     Cancer Father     Alcohol abuse Father     Diabetes Maternal Grandmother     Diabetes Paternal Aunt      I have reviewed and agree with the history as documented      E-Cigarette/Vaping    E-Cigarette Use Never User      E-Cigarette/Vaping Substances    Nicotine No     THC No     CBD No     Flavoring No     Other No     Unknown No      Social History     Tobacco Use    Smoking status: Former Smoker     Packs/day: 3 00     Years: 35 00     Pack years: 105 00 Types: Cigarettes, Cigars     Start date: 1959     Quit date:      Years since quittin 1    Smokeless tobacco: Never Used    Tobacco comment: Quit many times   Vaping Use    Vaping Use: Never used   Substance Use Topics    Alcohol use: Yes     Alcohol/week: 10 0 standard drinks     Types: 10 Shots of liquor per week     Comment: 1 drink every 6 months   Drug use: Never       Review of Systems   Constitutional: Positive for decreased responsiveness  Negative for activity change and appetite change  HENT: Negative for congestion and facial swelling  Eyes: Negative for discharge and redness  Respiratory: Negative for cough and wheezing  Cardiovascular: Negative for chest pain and leg swelling  Gastrointestinal: Negative for abdominal distention, abdominal pain and blood in stool  Endocrine: Negative for cold intolerance and polydipsia  Genitourinary: Negative for difficulty urinating and hematuria  Musculoskeletal: Negative for arthralgias and gait problem  Skin: Negative for color change and rash  Allergic/Immunologic: Negative for food allergies and immunocompromised state  Neurological: Positive for speech difficulty and weakness  Negative for dizziness, seizures and headaches  Hematological: Negative for adenopathy  Does not bruise/bleed easily  Psychiatric/Behavioral: Positive for confusion and decreased concentration  Negative for agitation  All other systems reviewed and are negative  Physical Exam  Physical Exam  Vitals and nursing note reviewed  Constitutional:       Appearance: He is well-developed  He is not toxic-appearing  HENT:      Head: Normocephalic and atraumatic  Right Ear: Tympanic membrane normal       Left Ear: Tympanic membrane normal       Nose: Nose normal    Eyes:      General: Lids are normal       Conjunctiva/sclera: Conjunctivae normal       Pupils: Pupils are equal, round, and reactive to light     Neck:      Vascular: No carotid bruit or JVD  Trachea: Trachea normal    Cardiovascular:      Rate and Rhythm: Normal rate and regular rhythm  No extrasystoles are present  Heart sounds: Normal heart sounds  Pulmonary:      Effort: Pulmonary effort is normal       Breath sounds: No decreased breath sounds, wheezing, rhonchi or rales  Chest:      Chest wall: No deformity or tenderness  Abdominal:      General: Bowel sounds are normal       Palpations: Abdomen is soft  Tenderness: There is no abdominal tenderness  There is no guarding or rebound  Musculoskeletal:      Right shoulder: No swelling, deformity or tenderness  Normal range of motion  Cervical back: Normal range of motion and neck supple  No deformity, tenderness or bony tenderness  Lymphadenopathy:      Cervical: No cervical adenopathy  Skin:     General: Skin is warm and dry  Neurological:      Mental Status: He is alert and oriented to person, place, and time  Cranial Nerves: No cranial nerve deficit  Sensory: No sensory deficit  Deep Tendon Reflexes: Reflexes are normal and symmetric  Psychiatric:         Speech: Speech normal          Behavior: Behavior normal          Thought Content:  Thought content normal          Judgment: Judgment normal          Vital Signs  ED Triage Vitals [01/30/22 1501]   Temperature Pulse Respirations Blood Pressure SpO2   98 5 °F (36 9 °C) 80 20 142/84 100 %      Temp Source Heart Rate Source Patient Position - Orthostatic VS BP Location FiO2 (%)   Oral Monitor Lying Right arm --      Pain Score       --           Vitals:    01/30/22 1501 01/30/22 1637   BP: 142/84 151/62   Pulse: 80 69   Patient Position - Orthostatic VS: Lying Lying         Visual Acuity      ED Medications  Medications   glucagon (GLUCAGEN) injection 1 mg (0 mg Intravenous Hold 1/30/22 1606)   sodium chloride 0 9 % bolus 1,000 mL (1,000 mL Intravenous New Bag 1/30/22 1651)       Diagnostic Studies  Results Reviewed     Procedure Component Value Units Date/Time    Fingerstick Glucose (POCT) [[id number]]  (Normal) Collected: 01/30/22 1731    Lab Status: Final result Updated: 01/30/22 1732     POC Glucose 110 mg/dl     Comprehensive metabolic panel [[id number]]  (Abnormal) Collected: 01/30/22 1554    Lab Status: Final result Specimen: Blood from Arm, Left Updated: 01/30/22 1620     Sodium 136 mmol/L      Potassium 3 7 mmol/L      Chloride 103 mmol/L      CO2 23 mmol/L      ANION GAP 10 mmol/L      BUN 43 mg/dL      Creatinine 3 40 mg/dL      Glucose 112 mg/dL      Calcium 9 0 mg/dL      AST 23 U/L      ALT 13 U/L      Alkaline Phosphatase 84 0 U/L      Total Protein 7 1 g/dL      Albumin 3 7 g/dL      Total Bilirubin 0 61 mg/dL      eGFR 16 ml/min/1 73sq m     Narrative:      National Kidney Disease Foundation guidelines for Chronic Kidney Disease (CKD):     Stage 1 with normal or high GFR (GFR > 90 mL/min/1 73 square meters)    Stage 2 Mild CKD (GFR = 60-89 mL/min/1 73 square meters)    Stage 3A Moderate CKD (GFR = 45-59 mL/min/1 73 square meters)    Stage 3B Moderate CKD (GFR = 30-44 mL/min/1 73 square meters)    Stage 4 Severe CKD (GFR = 15-29 mL/min/1 73 square meters)    Stage 5 End Stage CKD (GFR <15 mL/min/1 73 square meters)  Note: GFR calculation is accurate only with a steady state creatinine    Fingerstick Glucose (POCT) [[id number]]  (Abnormal) Collected: 01/30/22 1603    Lab Status: Final result Updated: 01/30/22 1606     POC Glucose 142 mg/dl     CBC and differential [[id number]]  (Abnormal) Collected: 01/30/22 1554    Lab Status: Final result Specimen: Blood from Arm, Left Updated: 01/30/22 1559     WBC 8 18 Thousand/uL      RBC 3 60 Million/uL      Hemoglobin 10 8 g/dL      Hematocrit 33 0 %      MCV 92 fL      MCH 30 0 pg      MCHC 32 7 g/dL      RDW 14 2 %      MPV 10 9 fL      Platelets 291 Thousands/uL      nRBC 0 /100 WBCs      Neutrophils Relative 80 %      Immat GRANS % 0 %      Lymphocytes Relative 11 %      Monocytes Relative 7 %      Eosinophils Relative 2 %      Basophils Relative 0 %      Neutrophils Absolute 6 46 Thousands/µL      Immature Grans Absolute 0 02 Thousand/uL      Lymphocytes Absolute 0 93 Thousands/µL      Monocytes Absolute 0 60 Thousand/µL      Eosinophils Absolute 0 15 Thousand/µL      Basophils Absolute 0 02 Thousands/µL     Fingerstick Glucose (POCT) [[id number]]  (Normal) Collected: 01/30/22 1501    Lab Status: Final result Updated: 01/30/22 1503     POC Glucose 99 mg/dl                  No orders to display              Procedures  Procedures         ED Course                                             MDM  Number of Diagnoses or Management Options  Hypoglycemia: new and requires workup     Amount and/or Complexity of Data Reviewed  Clinical lab tests: ordered and reviewed  Tests in the radiology section of CPT®: ordered and reviewed  Tests in the medicine section of CPT®: ordered and reviewed  Independent visualization of images, tracings, or specimens: yes    Patient Progress  Patient progress: stable      Disposition  Final diagnoses:   Hypoglycemia     Time reflects when diagnosis was documented in both MDM as applicable and the Disposition within this note     Time User Action Codes Description Comment    1/30/2022  5:33 PM Veneda Rash Add [E16 2] Hypoglycemia       ED Disposition     ED Disposition Condition Date/Time Comment    Discharge Stable Sun Jan 30, 2022  5:33 PM Helen Weiss  discharge to home/self care              Follow-up Information     Follow up With Specialties Details Why Contact Info Additional Information    [facility] Schedule an appointment as soon as possible for a visit   [address] [place] [id number] [phone] SARAH KIM [facility]O [facility], [address] [place] [address], [place], [place], [address] [phone]          Patient's Medications   Discharge Prescriptions    No medications on file       No discharge procedures on file      PDMP Review     None          ED Provider  Electronically Signed by           Alex Blunt DO  01/30/22 6440

## 2022-01-31 DIAGNOSIS — E11.65 TYPE 2 DIABETES MELLITUS WITH HYPERGLYCEMIA, WITH LONG-TERM CURRENT USE OF INSULIN (HCC): ICD-10-CM

## 2022-01-31 DIAGNOSIS — Z79.4 TYPE 2 DIABETES MELLITUS WITH HYPERGLYCEMIA, WITH LONG-TERM CURRENT USE OF INSULIN (HCC): ICD-10-CM

## 2022-01-31 LAB
ATRIAL RATE: 75 BPM
P AXIS: 0 DEGREES
PR INTERVAL: 71 MS
QRS AXIS: -13 DEGREES
QRSD INTERVAL: 104 MS
QT INTERVAL: 427 MS
QTC INTERVAL: 474 MS
T WAVE AXIS: -14 DEGREES
VENTRICULAR RATE: 74 BPM

## 2022-01-31 PROCEDURE — 93010 ELECTROCARDIOGRAM REPORT: CPT | Performed by: INTERNAL MEDICINE

## 2022-01-31 RX ORDER — INSULIN GLARGINE 300 U/ML
INJECTION, SOLUTION SUBCUTANEOUS
Qty: 6 ML
Start: 2022-01-31 | End: 2022-02-07

## 2022-01-31 RX ORDER — INSULIN LISPRO 100 [IU]/ML
INJECTION, SOLUTION INTRAVENOUS; SUBCUTANEOUS
Qty: 15 ML | Refills: 1
Start: 2022-01-31 | End: 2022-02-07

## 2022-01-31 NOTE — TELEPHONE ENCOUNTER
Please ask patient to decrease Humalog from 27 units to 24 units with meals  Decrease Toujeo to 74 units  Send log in 1 week and sooner if persistent lows  He should take Humalog right before eating  If he skips a meal, he should skip dose of Humalog  Toujeo is only supposed to be used once a day  He needs to meet with Vic to review insulin injection / MNT

## 2022-01-31 NOTE — TELEPHONE ENCOUNTER
SPOKE WITH PATIENT'S WIFE AND REVIEWED CHANGES IN MEDICATION  SHE UNDERSTOOD  WILL SEND IN LOG IN 1 WEEK SOONER IF LOW BG'S PERSIST

## 2022-01-31 NOTE — TELEPHONE ENCOUNTER
Wife called just at ED yesterday for low bs of 35  Today his sugar was low again it was 61 with phyllis and fingerstick 41 after eating and humalog  She states she is not giving him any more humalog today  Friday he took toujeo 2x  Please call wife's cell phone at [phone] and tell her what to do?

## 2022-02-07 DIAGNOSIS — E11.65 TYPE 2 DIABETES MELLITUS WITH HYPERGLYCEMIA, WITH LONG-TERM CURRENT USE OF INSULIN (HCC): ICD-10-CM

## 2022-02-07 DIAGNOSIS — Z79.4 TYPE 2 DIABETES MELLITUS WITH HYPERGLYCEMIA, WITH LONG-TERM CURRENT USE OF INSULIN (HCC): ICD-10-CM

## 2022-02-07 NOTE — TELEPHONE ENCOUNTER
BG levels remain tightly controlled and sometimes low  Decrease Toujeo to 68 units if currently using 74 units  Decrease Humalog to 21 units TID with meals, if currently using 24 units

## 2022-02-08 RX ORDER — INSULIN LISPRO 100 [IU]/ML
INJECTION, SOLUTION INTRAVENOUS; SUBCUTANEOUS
Qty: 15 ML | Refills: 1
Start: 2022-02-08 | End: 2022-03-16

## 2022-02-08 RX ORDER — INSULIN GLARGINE 300 U/ML
INJECTION, SOLUTION SUBCUTANEOUS
Qty: 6 ML
Start: 2022-02-08 | End: 2022-03-16

## 2022-02-23 NOTE — RESULT ENCOUNTER NOTE
Patients A1C is within an acceptable range  Elevated BUN and creatinine in the setting of CKD and/or dehydration

## 2022-02-28 ENCOUNTER — LAB (OUTPATIENT)
Dept: LAB | Facility: HOSPITAL | Age: 77
End: 2022-02-28
Payer: COMMERCIAL

## 2022-02-28 DIAGNOSIS — N18.30 BENIGN HYPERTENSION WITH CHRONIC KIDNEY DISEASE, STAGE III (HCC): ICD-10-CM

## 2022-02-28 DIAGNOSIS — I12.9 BENIGN HYPERTENSION WITH CHRONIC KIDNEY DISEASE, STAGE III (HCC): ICD-10-CM

## 2022-02-28 DIAGNOSIS — Z79.4 TYPE 2 DIABETES MELLITUS WITH HYPERGLYCEMIA, WITH LONG-TERM CURRENT USE OF INSULIN (HCC): ICD-10-CM

## 2022-02-28 DIAGNOSIS — N18.4 CKD (CHRONIC KIDNEY DISEASE), STAGE IV (HCC): ICD-10-CM

## 2022-02-28 DIAGNOSIS — E11.65 TYPE 2 DIABETES MELLITUS WITH HYPERGLYCEMIA, WITH LONG-TERM CURRENT USE OF INSULIN (HCC): ICD-10-CM

## 2022-02-28 LAB
ANION GAP SERPL CALCULATED.3IONS-SCNC: 7 MMOL/L (ref 4–13)
BUN SERPL-MCNC: 51 MG/DL (ref 6–20)
CALCIUM SERPL-MCNC: 9 MG/DL (ref 8.4–10.2)
CHLORIDE SERPL-SCNC: 110 MMOL/L (ref 96–108)
CO2 SERPL-SCNC: 29 MMOL/L (ref 22–33)
CREAT SERPL-MCNC: 2.36 MG/DL (ref 0.5–1.2)
GFR SERPL CREATININE-BSD FRML MDRD: 25 ML/MIN/1.73SQ M
GLUCOSE P FAST SERPL-MCNC: 132 MG/DL (ref 70–105)
POTASSIUM SERPL-SCNC: 4.4 MMOL/L (ref 3.5–5)
SODIUM SERPL-SCNC: 146 MMOL/L (ref 133–145)

## 2022-02-28 PROCEDURE — 80048 BASIC METABOLIC PNL TOTAL CA: CPT

## 2022-02-28 PROCEDURE — 36415 COLL VENOUS BLD VENIPUNCTURE: CPT

## 2022-02-28 PROCEDURE — 83036 HEMOGLOBIN GLYCOSYLATED A1C: CPT

## 2022-02-28 PROCEDURE — 3066F NEPHROPATHY DOC TX: CPT | Performed by: PHYSICIAN ASSISTANT

## 2022-03-01 LAB
EST. AVERAGE GLUCOSE BLD GHB EST-MCNC: 123 MG/DL
HBA1C MFR BLD: 5.9 %

## 2022-03-01 PROCEDURE — 3044F HG A1C LEVEL LT 7.0%: CPT | Performed by: FAMILY MEDICINE

## 2022-03-02 ENCOUNTER — OFFICE VISIT (OUTPATIENT)
Dept: FAMILY MEDICINE CLINIC | Facility: OTHER | Age: 77
End: 2022-03-02
Payer: COMMERCIAL

## 2022-03-02 VITALS
OXYGEN SATURATION: 98 % | WEIGHT: 243 LBS | TEMPERATURE: 98 F | SYSTOLIC BLOOD PRESSURE: 112 MMHG | HEIGHT: 70 IN | RESPIRATION RATE: 16 BRPM | DIASTOLIC BLOOD PRESSURE: 62 MMHG | BODY MASS INDEX: 34.79 KG/M2 | HEART RATE: 74 BPM

## 2022-03-02 DIAGNOSIS — F41.9 ANXIETY: ICD-10-CM

## 2022-03-02 DIAGNOSIS — E11.42 DIABETIC POLYNEUROPATHY ASSOCIATED WITH TYPE 2 DIABETES MELLITUS (HCC): ICD-10-CM

## 2022-03-02 DIAGNOSIS — G62.9 NEUROPATHY: ICD-10-CM

## 2022-03-02 DIAGNOSIS — E66.09 CLASS 1 OBESITY DUE TO EXCESS CALORIES WITH SERIOUS COMORBIDITY AND BODY MASS INDEX (BMI) OF 34.0 TO 34.9 IN ADULT: ICD-10-CM

## 2022-03-02 DIAGNOSIS — E78.1 PURE HYPERTRIGLYCERIDEMIA: Primary | ICD-10-CM

## 2022-03-02 DIAGNOSIS — I50.32 CHRONIC DIASTOLIC CONGESTIVE HEART FAILURE (HCC): ICD-10-CM

## 2022-03-02 DIAGNOSIS — I10 ESSENTIAL HYPERTENSION: ICD-10-CM

## 2022-03-02 PROBLEM — N18.4 TYPE 2 DIABETES MELLITUS WITH STAGE 4 CHRONIC KIDNEY DISEASE, WITH LONG-TERM CURRENT USE OF INSULIN (HCC): Status: ACTIVE | Noted: 2019-06-15

## 2022-03-02 PROCEDURE — 1160F RVW MEDS BY RX/DR IN RCRD: CPT | Performed by: FAMILY MEDICINE

## 2022-03-02 PROCEDURE — 1036F TOBACCO NON-USER: CPT | Performed by: FAMILY MEDICINE

## 2022-03-02 PROCEDURE — 99213 OFFICE O/P EST LOW 20 MIN: CPT | Performed by: FAMILY MEDICINE

## 2022-03-02 RX ORDER — BUSPIRONE HYDROCHLORIDE 5 MG/1
5 TABLET ORAL 3 TIMES DAILY
Qty: 180 TABLET | Refills: 0 | Status: SHIPPED | OUTPATIENT
Start: 2022-03-02 | End: 2022-07-05

## 2022-03-02 RX ORDER — GABAPENTIN 300 MG/1
CAPSULE ORAL
COMMUNITY
Start: 2022-01-18 | End: 2022-03-29 | Stop reason: SDUPTHER

## 2022-03-02 NOTE — PROGRESS NOTES
Assessment/Plan:    Type 2 diabetes mellitus with stage 4 chronic kidney disease, with long-term current use of insulin ([facility])    Lab Results   Component Value Date    HGBA1C 5 9 (H) 02/28/2022     Diabetes currently under control  CKD currently stable at 25  Plan  - Continue current insulin regimen  - F/u with Endo  - Repeat A1c in 6 months    Congestive heart failure (CHF) ([facility])  Wt Readings from Last 3 Encounters:   03/02/22 110 kg (243 lb)   01/26/22 111 kg (244 lb 14 4 oz)   01/18/22 114 kg (251 lb)     EF 55% from echo March '21  Patient is currently without significant edema in LE b/l  Denies any worsening SOB, decreased energy, chest pain, dizziness/lightheadedness or orthopnea  Plan  - Continue torsemide 20 mg daily  - Continue metoprolol 50 mg BID   - F/u w/ Cardiology      Neuropathy  Patient has recently had his Gabapentin dosage reduced  He is without any pain, burning or worsening numbness in his lower extremities  Plan  - Continue current gabapentin  - Continue regular diabetic foot exams      Essential hypertension  BP currently well controlled at 112/62 in office today    Plan  - Continue current meds    Class 1 obesity due to excess calories with serious comorbidity and body mass index (BMI) of 34 0 to 34 9 in adult  Patient is showing improvement  Is noted to have lost 8 lbs in 2 months  Is eating a more well balanced diet at home, avoiding salt  Plan  - Continue to encourage weight loss  - Continue to monitor   - Lipid panel       Diagnoses and all orders for this visit:    Pure hypertriglyceridemia  -     Lipid Panel with Direct LDL reflex; Future    Anxiety  -     sertraline (Zoloft) 50 mg tablet; Take 1 tablet (50 mg total) by mouth daily  -     busPIRone (BUSPAR) 5 mg tablet;  Take 1 tablet (5 mg total) by mouth 3 (three) times a day    Chronic diastolic congestive heart failure (HCC)    Diabetic polyneuropathy associated with type 2 diabetes mellitus ([facility] [address] )    Neuropathy    Essential hypertension    Class 1 obesity due to excess calories with serious comorbidity and body mass index (BMI) of 34 0 to 34 9 in adult    Other orders  -     gabapentin (NEURONTIN) 300 mg capsule          Subjective:      Patient ID: Raina Awan  is a 77 y o  male  PMH of T2DM currently well controlled, CDK stage 4, CHF s/p CABG w/ EF of 55% (Echo March '21), Sleep apnea, Mcardles presenting for recheck of labs and to discuss current health status  Patient was seen in the ED a little over a month ago due to a hypoglycemic event in which is blood sugar dropped to 35  Patient reportedly took his dose of insulin without eating  He is otherwise doing well despite chronic medical conditions  Patients recent labs are very reassuring A1c is currently 5 9  He has lost 8lbs over the last 2 months  BP is currently well controlled at 112/62  Is following up with Nephrology regarding CKD stage 4, Cardiology for CHF and Endocrinology for T2DM  Has had his amount of insulin needed reduced given recent labs  Patient is without any current complains or concerns  The following portions of the patient's history were reviewed and updated as appropriate: allergies, current medications, past family history, past medical history, past social history, past surgical history and problem list     Review of Systems   Constitutional: Negative for activity change, appetite change, fatigue and fever  HENT: Negative for congestion and rhinorrhea  Eyes: Negative for discharge, redness and itching  Respiratory: Negative for chest tightness, shortness of breath and wheezing  Cardiovascular: Negative for chest pain and palpitations  Gastrointestinal: Negative for abdominal distention  Endocrine: Positive for polyuria  Genitourinary: Negative for decreased urine volume and difficulty urinating  Musculoskeletal: Negative for arthralgias  Skin: Negative for color change     Neurological: Positive for numbness (in lower extremities, chronic)  Negative for dizziness, weakness, light-headedness and headaches  Psychiatric/Behavioral: Negative for confusion  Objective:      /62   Pulse 74   Temp 98 °F (36 7 °C)   Resp 16   Ht 5' 10" (1 778 m)   Wt 110 kg (243 lb)   SpO2 98%   BMI 34 87 kg/m²          Physical Exam  Constitutional:       Appearance: Normal appearance  HENT:      Head: Normocephalic and atraumatic  Right Ear: External ear normal       Left Ear: External ear normal       Nose: No congestion or rhinorrhea  Mouth/Throat:      Pharynx: Oropharynx is clear  Eyes:      Extraocular Movements: Extraocular movements intact  Conjunctiva/sclera: Conjunctivae normal    Cardiovascular:      Rate and Rhythm: Normal rate and regular rhythm  Pulses:           Popliteal pulses are 1+ on the right side and 1+ on the left side  Dorsalis pedis pulses are 1+ on the right side and 1+ on the left side  Posterior tibial pulses are 1+ on the right side and 1+ on the left side  Heart sounds: Normal heart sounds  Pulmonary:      Effort: Pulmonary effort is normal       Breath sounds: Normal breath sounds  Abdominal:      General: Abdomen is flat  Palpations: Abdomen is soft  Musculoskeletal:      Right lower leg: No edema  Left lower leg: No edema  Skin:     Coloration: Skin is pale (lower extremities noticeable for decrease perfusion with dry scaly skin)  Neurological:      Mental Status: He is alert and oriented to person, place, and time  Sensory: Sensory deficit (b/l lower extremities) present  Motor: No weakness  Psychiatric:         Mood and Affect: Mood normal          Behavior: Behavior normal          Thought Content:  Thought content normal          Judgment: Judgment normal

## 2022-03-03 NOTE — ASSESSMENT & PLAN NOTE
Wt Readings from Last 3 Encounters:   03/02/22 110 kg (243 lb)   01/26/22 111 kg (244 lb 14 4 oz)   01/18/22 114 kg (251 lb)     EF 55% from echo March '21  Patient is currently without significant edema in LE b/l  Denies any worsening SOB, decreased energy, chest pain, dizziness/lightheadedness or orthopnea       Plan  - Continue torsemide 20 mg daily  - Continue metoprolol 50 mg BID   - F/u w/ Cardiology

## 2022-03-03 NOTE — ASSESSMENT & PLAN NOTE
Patient is showing improvement  Is noted to have lost 8 lbs in 2 months  Is eating a more well balanced diet at home, avoiding salt       Plan  - Continue to encourage weight loss  - Continue to monitor   - Lipid panel

## 2022-03-03 NOTE — ASSESSMENT & PLAN NOTE
Lab Results   Component Value Date    HGBA1C 5 9 (H) 02/28/2022     Diabetes currently under control  CKD currently stable at 25      Plan  - Continue current insulin regimen  - F/u with Endo  - Repeat A1c in 6 months

## 2022-03-03 NOTE — ASSESSMENT & PLAN NOTE
BP currently well controlled at [id number] [address] in office today    Plan  - Continue current meds

## 2022-03-03 NOTE — ASSESSMENT & PLAN NOTE
Patient has recently had his Gabapentin dosage reduced  He is without any pain, burning or worsening numbness in his lower extremities       Plan  - Continue current gabapentin  - Continue regular diabetic foot exams

## 2022-03-16 ENCOUNTER — OFFICE VISIT (OUTPATIENT)
Dept: ENDOCRINOLOGY | Facility: CLINIC | Age: 77
End: 2022-03-16
Payer: COMMERCIAL

## 2022-03-16 VITALS
WEIGHT: 240 LBS | SYSTOLIC BLOOD PRESSURE: 130 MMHG | HEIGHT: 70 IN | DIASTOLIC BLOOD PRESSURE: 80 MMHG | TEMPERATURE: 97.3 F | HEART RATE: 72 BPM | BODY MASS INDEX: 34.36 KG/M2

## 2022-03-16 DIAGNOSIS — I10 ESSENTIAL HYPERTENSION: ICD-10-CM

## 2022-03-16 DIAGNOSIS — E11.22 TYPE 2 DIABETES MELLITUS WITH STAGE 4 CHRONIC KIDNEY DISEASE, WITH LONG-TERM CURRENT USE OF INSULIN (HCC): Primary | ICD-10-CM

## 2022-03-16 DIAGNOSIS — N18.4 TYPE 2 DIABETES MELLITUS WITH STAGE 4 CHRONIC KIDNEY DISEASE, WITH LONG-TERM CURRENT USE OF INSULIN (HCC): Primary | ICD-10-CM

## 2022-03-16 DIAGNOSIS — E78.2 MIXED HYPERLIPIDEMIA: ICD-10-CM

## 2022-03-16 DIAGNOSIS — Z79.4 TYPE 2 DIABETES MELLITUS WITH STAGE 4 CHRONIC KIDNEY DISEASE, WITH LONG-TERM CURRENT USE OF INSULIN (HCC): Primary | ICD-10-CM

## 2022-03-16 PROCEDURE — 3066F NEPHROPATHY DOC TX: CPT | Performed by: FAMILY MEDICINE

## 2022-03-16 PROCEDURE — 99214 OFFICE O/P EST MOD 30 MIN: CPT | Performed by: PHYSICIAN ASSISTANT

## 2022-03-16 RX ORDER — INSULIN GLARGINE 300 U/ML
INJECTION, SOLUTION SUBCUTANEOUS
Qty: 6 ML
Start: 2022-03-16 | End: 2022-05-04

## 2022-03-16 RX ORDER — INSULIN LISPRO 100 [IU]/ML
INJECTION, SOLUTION INTRAVENOUS; SUBCUTANEOUS
Qty: 15 ML | Refills: 1
Start: 2022-03-16 | End: 2022-05-04

## 2022-03-16 NOTE — PROGRESS NOTES
Patient Progress Note      CC: DM      Referring Provider  No referring provider defined for this encounter  History of Present Illness:   Vikram Rivers  is a 77 y o  male with a history of type 2 diabetes with long term use of insulin  Diabetes course has been stable  Complications of DM: CAD, CKD  Denies any issues with his current regimen  Home glucose monitoring: are performed regularly  Average glucose 122 mg/dl  In target range 83%, above range 9%, below range 8%     Current regimen: Toujeo 56 units QHS, Humalog 18 units TID with meals, Victoza 1 8 mg daily  Compliant most of the time, denies any side effects from medications  Injects in: abdomen  Rotates sites: Yes  Hypoglycemic episodes: Yes, occasional  H/o of hypoglycemia causing hospitalization or Intervention such as glucagon injection or ambulance call : Yes  Hypoglycemia symptoms: sweaty, jittery  Treatment of hypoglycemia: glucose tablets     Medic alert tag: recommended: Yes     Diabetes education: Yes  Diet: 3 meals per day, 1-2 snacks per day  Timing of meals is predictable  Diabetic diet compliance: compliant some of the time  Activity: Daily activity is predictable: Yes  Does do some stationary cycling  Ophthamology: sees every 3 months per patient; Dr Miriam Anand  Podiatry: sees podiatrist routinely  Dr Estelle Rowe  March 2021  Has HTN: no longer on losartan  Has hyperlipidemia: on statin - tolerating well, no myalgias  compliant most of the time, denies any side effects from medications  Thyroid disorders: History of mildly elevated TSH    History of pancreatitis: No     Vitamin D deficiency: taking 1000 IU daily    Patient Active Problem List   Diagnosis    CKD (chronic kidney disease), stage IV (HCC)    Benign hypertension with chronic kidney disease, stage IV (HCC)    Chronic kidney disease-mineral and bone disorder    Type 2 diabetes mellitus with stage 4 chronic kidney disease, with long-term current use of insulin ([facility] )    Sleep apnea    Coronary artery disease involving native coronary artery of native heart without angina pectoris    Mixed hyperlipidemia    S/P CABG (coronary artery bypass graft)    Acute postoperative pulmonary insufficiency (HCC)    Iron deficiency anemia due to chronic blood loss    Non-nephrotic range proteinuria    Esophageal dysphagia    History of colonic polyps    Lumbar back pain    Lumbar discogenic pain syndrome    Class 1 obesity due to excess calories with serious comorbidity and body mass index (BMI) of 34 0 to 34 9 in adult    Pain of lower extremity    Trigger finger of left hand    Spondylolisthesis    Spinal stenosis    Vitamin D deficiency    Lymphadenopathy    Stenosis of left internal carotid artery    Neuropathy    Diabetic polyneuropathy associated with type 2 diabetes mellitus ([facility] )    Encounter for  medical examination (CDME)    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    Traumatic injury of skin    Hypervolemia associated with renal insufficiency    Elevated TSH    Essential hypertension    Congestive heart failure (CHF) (HCC)    UTI (urinary tract infection)    Clostridium difficile diarrhea    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    BPH (benign prostatic hyperplasia)    Asymptomatic bilateral carotid artery stenosis    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     Dr Yuly Hardwick - stage 3    Diabetes mellitus ([facility] )     Hyperlipidemia     Hypertension     Myocardial infarction [facility]) June 2019    Open heart surgery with quad bypass    Obesity     Renal disorder     Visual impairment 1991    Dr Teressa Panda      Past Surgical History:   Procedure Laterality Date    APPENDECTOMY  1977    Done in conjunction with cholecystectomy    BACK SURGERY      CHOLECYSTECTOMY  1977    COLONOSCOPY      CORONARY ARTERY BYPASS GRAFT      quad    GALLBLADDER SURGERY      HERNIA REPAIR      CA CABG, ARTERY-VEIN, FOUR N/A 2019    Procedure: CORONARY ARTERY BYPASS GRAFT (CABG) 4 VESSELS WITH SVG TO PDA, OM, DIAGONAL AND LIMA TO LAD; RIGHT LEG EVH;  Surgeon: Rubi Garcia MD;  [facility]: [facility];  Service: Cardiac Surgery    RECTAL SURGERY      SPINE SURGERY      Fusion L3-L5? Family History   Problem Relation Age of Onset   [facility] Cancer Mother     Alcohol abuse Mother     Cancer Father     Alcohol abuse Father     Diabetes Maternal Grandmother     Diabetes Paternal Aunt      Social History     Tobacco Use    Smoking status: Former Smoker     Packs/day: 3 00     Years: 35 00     Pack years: 105 00     Types: Cigarettes, Cigars     Start date: 1959     Quit date:      Years since quittin 2    Smokeless tobacco: Never Used    Tobacco comment: Quit many times   Substance Use Topics    Alcohol use: Yes     Alcohol/week: 10 0 standard drinks     Types: 10 Shots of liquor per week     Comment: 1 drink every 6 months       No Known Allergies      Current Outpatient Medications:     Alpha-Lipoic Acid 600 MG CAPS, TAKE 1 CAPSULE BY MOUTH EVERY DAY (Patient taking differently: TAKE ONE CAPSULE DAILY), Disp: 30 capsule, Rfl: 5    Ascorbic Acid (VITAMIN C) 1000 MG tablet, Take 1,000 mg by mouth daily, Disp: , Rfl:     aspirin (ECOTRIN LOW STRENGTH) 81 mg EC tablet, Take 1 tablet (81 mg total) by mouth daily, Disp: 60 tablet, Rfl: 8    atorvastatin (LIPITOR) 80 mg tablet, TAKE 1 TABLET DAILY WITH DINNER, Disp: 90 tablet, Rfl: 3    B-D ULTRAFINE III SHORT PEN 31G X 8 MM MISC, INJECT INTO THE SKIN 4 (FOUR) TIMES A DAY, Disp: 400 each, Rfl: 1    busPIRone (BUSPAR) 5 mg tablet, Take 1 tablet (5 mg total) by mouth 3 (three) times a day, Disp: 180 tablet, Rfl: 0    cholecalciferol (VITAMIN D3) 1,000 units tablet, Take 1 tablet (1,000 Units total) by mouth daily, Disp: 1 tablet, Rfl: 1   Continuous Blood Gluc  (FreeStyle Diaz 2 [place]) [facility], Use to check blood sugar daily, Disp: 1 each, Rfl: 0    Continuous Blood Gluc Sensor (FreeStyle Celia 2 Sensor) MISC, Change every 14 days, Disp: 6 each, Rfl: 1    cyanocobalamin (VITAMIN B-12) 500 mcg tablet, Take 500 mcg by mouth daily, Disp: , Rfl:     finasteride (PROSCAR) 5 mg tablet, Take 1 tablet (5 mg total) by mouth daily, Disp: 90 tablet, Rfl: 3    gabapentin (NEURONTIN) 300 mg capsule, Take 1 cap (300mg) by mouth in the morning and 1 cap (300mg) at noon, take 2 caps (600mg) at bedtime (Patient taking differently: Take 1 cap (300mg) by mouth in the morning and 1 cap (300mg) at noon, take 2 caps (600mg) at bedtime- he is to reduce this dose to 1 cap three times/day that is 300mg TID), Disp: 360 capsule, Rfl: 1    gabapentin (NEURONTIN) 300 mg capsule, , Disp: , Rfl:     glucose blood test strip, Check 4 times a day, Disp: 400 each, Rfl: 1    insulin glargine (Toujeo Max SoloStar) 300 units/mL CONCENTRATED U-300 injection pen (2-unit dial), INJECT 52 UNITS UNDER THE SKIN EVERY BEDTIME, Disp: 6 mL, Rfl:     insulin lispro (HumaLOG KwikPen) 100 units/mL injection pen, INJECT 16 UNITS UNDER THE SKIN 3 TIMES A DAY BEFORE MEALS, Disp: 15 mL, Rfl: 1    latanoprost (XALATAN) 0 005 % ophthalmic solution, 1 drop daily at bedtime, Disp: , Rfl:     metoprolol tartrate (LOPRESSOR) 50 mg tablet, Take 50 mg by mouth 2 (two) times a day  , Disp: , Rfl:     Microlet Lancets MISC, USE 3 TIMES DAY, Disp: 300 each, Rfl: 1    omeprazole (PriLOSEC) 40 MG capsule, Take 1 capsule (40 mg total) by mouth daily, Disp: 90 capsule, Rfl: 1    sertraline (Zoloft) 50 mg tablet, Take 1 tablet (50 mg total) by mouth daily, Disp: 90 tablet, Rfl: 0    tamsulosin (FLOMAX) 0 4 mg, Take 2 capsules (0 8 mg total) by mouth daily with dinner, Disp: 180 capsule, Rfl: 3    torsemide (DEMADEX) 20 mg tablet, Take 1 tablet (20 mg total) by mouth 1 (one) time for 1 dose (Patient taking differently: Take 20 mg by mouth daily  ), Disp: 360 tablet, Rfl: 3    Victoza injection, INJECT 1 8 MG DAILY INTO SKIN, Disp: 27 mL, Rfl: 1  Review of Systems   Constitutional: Negative for activity change, appetite change, fatigue and unexpected weight change  HENT: Negative for trouble swallowing  Eyes: Negative for visual disturbance  Respiratory: Negative for shortness of breath  Cardiovascular: Negative for chest pain and palpitations  Gastrointestinal: Negative for constipation and diarrhea  Endocrine: Negative for polydipsia and polyuria  Musculoskeletal: Negative  Skin: Positive for wound (shin)  Neurological: Negative for numbness  Psychiatric/Behavioral: Negative  Physical Exam:  Body mass index is 34 44 kg/m²  /80   Pulse 72   Temp (!) 97 3 °F (36 3 °C) (Tympanic)   Ht 5' 10" (1 778 m)   Wt 109 kg (240 lb)   BMI 34 44 kg/m²    Wt Readings from Last 3 Encounters:   03/16/22 109 kg (240 lb)   03/02/22 110 kg (243 lb)   01/26/22 111 kg (244 lb 14 4 oz)       Physical Exam  Vitals and nursing note reviewed  Constitutional:       Appearance: He is well-developed  HENT:      Head: Normocephalic  Eyes:      General: No scleral icterus  Pupils: Pupils are equal, round, and reactive to light  Neck:      Thyroid: No thyromegaly  Cardiovascular:      Rate and Rhythm: Normal rate and regular rhythm  Pulses:           Radial pulses are 2+ on the right side and 2+ on the left side  Heart sounds: No murmur heard  Pulmonary:      Effort: Pulmonary effort is normal  No respiratory distress  Breath sounds: Normal breath sounds  No wheezing  Musculoskeletal:      Cervical back: Neck supple  Skin:     General: Skin is warm and dry  Neurological:      Mental Status: He is alert  Patient's shoes and socks were not removed            Labs:   Lab Results   Component Value Date    HGBA1C 5 9 (H) 02/28/2022     Lab Results Component Value Date    GLUCOSE 141 (H) 06/21/2019    CALCIUM 9 0 02/28/2022    K 4 4 02/28/2022    CO2 29 02/28/2022     (H) 02/28/2022    BUN 51 (H) 02/28/2022    CREATININE 2 36 (H) 02/28/2022     No results found for: Vivienne Lilly  eGFR   Date Value Ref Range Status   02/28/2022 25 ml/min/1 73sq m Final     No components found for: [facility]  Lab Results   Component Value Date    HDL 42 06/18/2021    TRIG 160 (H) 06/18/2021     Lab Results   Component Value Date    ALT 13 01/30/2022    AST 23 01/30/2022    ALKPHOS 84 0 01/30/2022     Lab Results   Component Value Date    ETM3BETMBMZY 3 092 11/29/2021       Impression:  1  Type 2 diabetes mellitus with stage 4 chronic kidney disease, with long-term current use of insulin ([facility] )    2  Essential hypertension    3  Mixed hyperlipidemia           Plan:    Diagnoses and all orders for this visit:    Type 2 diabetes mellitus with stage 4 chronic kidney disease, with long-term current use of insulin ([facility])  HGA1C 5 9%  Treatment regimen: due to occasional low BG in the mornings and during the daytime, reduce Toujeo to 52 units and Humalog to 16 units TID with meals  Discussed intensive insulin regimen does increase risk for hypoglycemia  Episodes of hypoglycemia can lead to permanent disability and death  Discussed risks/complications associated with uncontrolled diabetes  Advised to adhere to diabetic diet, and recommended staying active/exercising routinely as tolerated  Keep carbohydrates consistent to limit blood glucose fluctuations  Advised to call if blood sugars less than 70 mg/dl or over 300 mg/dl  Check blood glucose 3+ times a day  Discussed symptoms and treatment of hypoglycemia  Discussed use of CGM to collect additional blood glucose data to reveal trends and patterns that can be used to optimize treatment plan  Referred to diabetes/nutrition education  Recommended routine follow-up with podiatry and ophthalmology     Send CGM report in 2 weeks  Ordered blood work to complete prior to next visit  -     Hemoglobin A1C; Future  -     Basic metabolic panel; Future  -     insulin glargine (Toujeo Max SoloStar) 300 units/mL CONCENTRATED U-300 injection pen (2-unit dial); INJECT 52 UNITS UNDER THE SKIN EVERY BEDTIME  -     insulin lispro (HumaLOG KwikPen) 100 units/mL injection pen; INJECT 16 UNITS UNDER THE SKIN 3 TIMES A DAY BEFORE MEALS    Essential hypertension  Blood pressure well controlled, continue current treatment  -     Basic metabolic panel; Future    Mixed hyperlipidemia  LDL previously 75  Continue atorvastatin   Managed by cardiology           Discussed with the patient diagnosis and treatment and all questions fully answered  He will call me if any problems arise  Counseled patient on diagnostic results, prognosis, risk and benefit of treatment options, instruction for management, importance of treatment compliance, risk factor reduction and impressions        Farnaz Crooks PA-C

## 2022-03-16 NOTE — PATIENT INSTRUCTIONS
Hypoglycemia instructions   Gerardo Giron   3/16/2022  [id number]    Low Blood Sugar    Steps to treat low blood sugar  1  Test blood sugar if you have symptoms of low blood sugar:   Low Blood Sugar Symptoms:  o Sweaty  o Dizzy  o Rapid heartbeat  o Shaky  o Bad mood  o Hungry      2  Treat blood sugar less than 70 with 15 grams of fast-acting carbohydrate:   Examples of 15 grams Fast-Acting Carbohydrate:  o 4 oz juice  o 4 oz regular soda  o 3-4 glucose tablets (chew)  o 3-4 hard candies (chew)          3  Wait 15 minutes and test your blood sugar again     4   If blood sugar is less than 100, repeat steps 2-3     5  When your blood sugar is 100 or more, eat a snack if it will be longer than one hour until your next meal  The snack should be 15 grams of carbohydrate and a protein:   Examples of snacks:  o ½ sandwich  o 6 crackers with cheese  o Piece of fruit with cheese or peanut butter  o 6 crackers with peanut butter

## 2022-03-28 DIAGNOSIS — E11.65 UNCONTROLLED TYPE 2 DIABETES MELLITUS WITH HYPERGLYCEMIA (HCC): ICD-10-CM

## 2022-03-28 DIAGNOSIS — I25.10 TRIPLE VESSEL CORONARY ARTERY DISEASE: ICD-10-CM

## 2022-03-28 DIAGNOSIS — Z95.1 S/P CABG (CORONARY ARTERY BYPASS GRAFT): ICD-10-CM

## 2022-03-28 DIAGNOSIS — R33.9 URINARY RETENTION: ICD-10-CM

## 2022-03-29 RX ORDER — LIRAGLUTIDE 6 MG/ML
1.8 INJECTION SUBCUTANEOUS DAILY
Qty: 27 ML | Refills: 1 | Status: SHIPPED | OUTPATIENT
Start: 2022-03-29

## 2022-03-29 RX ORDER — FINASTERIDE 5 MG/1
5 TABLET, FILM COATED ORAL DAILY
Qty: 90 TABLET | Refills: 0 | Status: SHIPPED | OUTPATIENT
Start: 2022-03-29 | End: 2022-07-05 | Stop reason: SDUPTHER

## 2022-03-29 RX ORDER — TAMSULOSIN HYDROCHLORIDE 0.4 MG/1
0.8 CAPSULE ORAL
Qty: 180 CAPSULE | Refills: 0 | Status: SHIPPED | OUTPATIENT
Start: 2022-03-29 | End: 2022-06-27

## 2022-04-04 ENCOUNTER — APPOINTMENT (OUTPATIENT)
Dept: LAB | Facility: HOSPITAL | Age: 77
End: 2022-04-04
Payer: COMMERCIAL

## 2022-04-04 ENCOUNTER — TELEPHONE (OUTPATIENT)
Dept: ICU | Facility: HOSPITAL | Age: 77
End: 2022-04-04

## 2022-04-04 DIAGNOSIS — E78.1 PURE HYPERTRIGLYCERIDEMIA: ICD-10-CM

## 2022-04-04 LAB
CHOLEST SERPL-MCNC: 87 MG/DL
HDLC SERPL-MCNC: 33 MG/DL
LDLC SERPL CALC-MCNC: 34 MG/DL (ref 0–100)
TRIGL SERPL-MCNC: 98 MG/DL

## 2022-04-04 PROCEDURE — 36415 COLL VENOUS BLD VENIPUNCTURE: CPT

## 2022-04-04 PROCEDURE — 80061 LIPID PANEL: CPT

## 2022-05-02 ENCOUNTER — TELEPHONE (OUTPATIENT)
Dept: NEPHROLOGY | Facility: CLINIC | Age: 77
End: 2022-05-02

## 2022-05-02 NOTE — TELEPHONE ENCOUNTER
Spoke with patient's wife reminding them to go for lab work before his appt on 5/4  She expressed understanding and will try to get him there

## 2022-05-03 ENCOUNTER — APPOINTMENT (OUTPATIENT)
Dept: LAB | Facility: HOSPITAL | Age: 77
End: 2022-05-03
Payer: COMMERCIAL

## 2022-05-03 DIAGNOSIS — N18.4 CKD (CHRONIC KIDNEY DISEASE), STAGE IV (HCC): ICD-10-CM

## 2022-05-03 LAB
ANION GAP SERPL CALCULATED.3IONS-SCNC: 7 MMOL/L (ref 4–13)
BUN SERPL-MCNC: 56 MG/DL (ref 5–25)
CALCIUM SERPL-MCNC: 8.9 MG/DL (ref 8.4–10.2)
CHLORIDE SERPL-SCNC: 108 MMOL/L (ref 96–108)
CO2 SERPL-SCNC: 25 MMOL/L (ref 21–32)
CREAT SERPL-MCNC: 2.3 MG/DL (ref 0.6–1.3)
CREAT UR-MCNC: 61.5 MG/DL
ERYTHROCYTE [DISTWIDTH] IN BLOOD BY AUTOMATED COUNT: 14.8 % (ref 11.6–15.1)
GFR SERPL CREATININE-BSD FRML MDRD: 26 ML/MIN/1.73SQ M
GLUCOSE P FAST SERPL-MCNC: 146 MG/DL (ref 65–99)
HCT VFR BLD AUTO: 34.2 % (ref 36.5–49.3)
HGB BLD-MCNC: 10.9 G/DL (ref 12–17)
MAGNESIUM SERPL-MCNC: 1.9 MG/DL (ref 1.9–2.7)
MCH RBC QN AUTO: 29.5 PG (ref 26.8–34.3)
MCHC RBC AUTO-ENTMCNC: 31.9 G/DL (ref 31.4–37.4)
MCV RBC AUTO: 92 FL (ref 82–98)
PHOSPHATE SERPL-MCNC: 4.5 MG/DL (ref 2.3–4.1)
PLATELET # BLD AUTO: 238 THOUSANDS/UL (ref 149–390)
PMV BLD AUTO: 11.1 FL (ref 8.9–12.7)
POTASSIUM SERPL-SCNC: 4.3 MMOL/L (ref 3.5–5.3)
PROT UR-MCNC: 98 MG/DL
PROT/CREAT UR: 1.59 MG/G{CREAT} (ref 0–0.1)
PTH-INTACT SERPL-MCNC: 163.6 PG/ML (ref 18.4–80.1)
RBC # BLD AUTO: 3.7 MILLION/UL (ref 3.88–5.62)
SODIUM SERPL-SCNC: 140 MMOL/L (ref 135–147)
WBC # BLD AUTO: 6.94 THOUSAND/UL (ref 4.31–10.16)

## 2022-05-03 PROCEDURE — 80048 BASIC METABOLIC PNL TOTAL CA: CPT

## 2022-05-03 PROCEDURE — 84100 ASSAY OF PHOSPHORUS: CPT

## 2022-05-03 PROCEDURE — 83970 ASSAY OF PARATHORMONE: CPT

## 2022-05-03 PROCEDURE — 82570 ASSAY OF URINE CREATININE: CPT

## 2022-05-03 PROCEDURE — 83735 ASSAY OF MAGNESIUM: CPT

## 2022-05-03 PROCEDURE — 85027 COMPLETE CBC AUTOMATED: CPT

## 2022-05-03 PROCEDURE — 84156 ASSAY OF PROTEIN URINE: CPT

## 2022-05-03 PROCEDURE — 36415 COLL VENOUS BLD VENIPUNCTURE: CPT

## 2022-05-04 DIAGNOSIS — Z79.4 TYPE 2 DIABETES MELLITUS WITH STAGE 4 CHRONIC KIDNEY DISEASE, WITH LONG-TERM CURRENT USE OF INSULIN (HCC): ICD-10-CM

## 2022-05-04 DIAGNOSIS — E11.22 TYPE 2 DIABETES MELLITUS WITH STAGE 4 CHRONIC KIDNEY DISEASE, WITH LONG-TERM CURRENT USE OF INSULIN (HCC): ICD-10-CM

## 2022-05-04 DIAGNOSIS — N18.4 TYPE 2 DIABETES MELLITUS WITH STAGE 4 CHRONIC KIDNEY DISEASE, WITH LONG-TERM CURRENT USE OF INSULIN (HCC): ICD-10-CM

## 2022-05-04 RX ORDER — INSULIN GLARGINE 300 U/ML
INJECTION, SOLUTION SUBCUTANEOUS
Qty: 6 ML
Start: 2022-05-04 | End: 2022-07-28 | Stop reason: SDUPTHER

## 2022-05-04 RX ORDER — INSULIN LISPRO 100 [IU]/ML
INJECTION, SOLUTION INTRAVENOUS; SUBCUTANEOUS
Qty: 15 ML | Refills: 1
Start: 2022-05-04 | End: 2022-07-28 | Stop reason: SDUPTHER

## 2022-05-04 NOTE — TELEPHONE ENCOUNTER
Reviewed continues glucose monitor sensor by Celia  86% blood sugars are in target range, patient is having some low blood sugars around 6 or 7:00 p m  Please inform patient to reduce Toujeo, 45 units at bedtime, Humalog 12 units with meals  Continue Victoza

## 2022-05-13 ENCOUNTER — TELEPHONE (OUTPATIENT)
Dept: NEPHROLOGY | Facility: CLINIC | Age: 77
End: 2022-05-13

## 2022-05-13 DIAGNOSIS — E87.70 HYPERVOLEMIA ASSOCIATED WITH RENAL INSUFFICIENCY: ICD-10-CM

## 2022-05-13 DIAGNOSIS — N28.9 HYPERVOLEMIA ASSOCIATED WITH RENAL INSUFFICIENCY: ICD-10-CM

## 2022-05-13 DIAGNOSIS — M89.9 CHRONIC KIDNEY DISEASE-MINERAL AND BONE DISORDER: ICD-10-CM

## 2022-05-13 DIAGNOSIS — E11.22 TYPE 2 DIABETES MELLITUS WITH STAGE 4 CHRONIC KIDNEY DISEASE, WITH LONG-TERM CURRENT USE OF INSULIN (HCC): Primary | ICD-10-CM

## 2022-05-13 DIAGNOSIS — Z79.4 TYPE 2 DIABETES MELLITUS WITH STAGE 4 CHRONIC KIDNEY DISEASE, WITH LONG-TERM CURRENT USE OF INSULIN (HCC): Primary | ICD-10-CM

## 2022-05-13 DIAGNOSIS — N18.4 CKD (CHRONIC KIDNEY DISEASE), STAGE IV (HCC): ICD-10-CM

## 2022-05-13 DIAGNOSIS — E83.9 CHRONIC KIDNEY DISEASE-MINERAL AND BONE DISORDER: ICD-10-CM

## 2022-05-13 DIAGNOSIS — N18.9 CHRONIC KIDNEY DISEASE-MINERAL AND BONE DISORDER: ICD-10-CM

## 2022-05-13 DIAGNOSIS — E55.9 VITAMIN D DEFICIENCY: ICD-10-CM

## 2022-05-13 DIAGNOSIS — N18.4 TYPE 2 DIABETES MELLITUS WITH STAGE 4 CHRONIC KIDNEY DISEASE, WITH LONG-TERM CURRENT USE OF INSULIN (HCC): Primary | ICD-10-CM

## 2022-05-13 NOTE — TELEPHONE ENCOUNTER
----- Message from Tyrese sent at 5/13/2022  2:22 PM EDT -----  Labs were recently forwarded to me for review  Please let Daniel Pinon know that Creatinine is stable at this time, near prior levels  No changes to plan of care at this time  Follow-up office visit already scheduled  Check renal function panel, CBC, PTH, vitamin D before next appointment which I believe is in August   Thank you

## 2022-05-13 NOTE — TELEPHONE ENCOUNTER
Called patient and spoke with his wife Dick Marcano about the following:    Patients creatinine is stable at this time, near prior levels  No changes to plan of care at this time  Labs have been ordered and she had no further questions for me at this time

## 2022-05-28 ENCOUNTER — NURSE TRIAGE (OUTPATIENT)
Dept: FAMILY MEDICINE CLINIC | Facility: OTHER | Age: 77
End: 2022-05-28

## 2022-05-28 DIAGNOSIS — I10 PRIMARY HYPERTENSION: Primary | ICD-10-CM

## 2022-05-28 RX ORDER — METOPROLOL TARTRATE 50 MG/1
50 TABLET, FILM COATED ORAL EVERY 12 HOURS SCHEDULED
Qty: 14 TABLET | Refills: 0 | Status: SHIPPED | OUTPATIENT
Start: 2022-05-28 | End: 2022-05-29 | Stop reason: SDUPTHER

## 2022-05-28 NOTE — TELEPHONE ENCOUNTER
One week's worth of Lopressor per protocol sent to [facility] per wife's instructions  Reorder placed for this medication for when office reopens  Wife very appreciative

## 2022-05-28 NOTE — TELEPHONE ENCOUNTER
Wife/Tracee states that [facility] was to get her  a refill of his Lopressor but here they were calling the nursing home Dr  that ordered it last  He now has none left after today  I told her that I will place a reorder for this medication now and that I can send in one week's worth of the Lopressor to get him through until the office opens next week  She was very appreciative

## 2022-05-28 NOTE — TELEPHONE ENCOUNTER
Regarding: Out of metoprolol tartrate  ----- Message from Herbert Pallas sent at 5/28/2022  5:01 PM EDT -----  Patient's wife is trying to get a short supply of metoprolol tartrate (LOPRESSOR) 50 mg tablet  There was a mix up with the medication, since he was discharged from the nursing facility    She still might need help getting a new prescription before the pharmacy closes at 6 pm     [id number] [place] [facility] [id number] [facility]   [id number] [place] [address], [address] [place] [address]   Phone:  [phone]  Fax:  [phone]

## 2022-05-29 RX ORDER — METOPROLOL TARTRATE 50 MG/1
50 TABLET, FILM COATED ORAL 2 TIMES DAILY
Qty: 60 TABLET | Refills: 0 | Status: ON HOLD | OUTPATIENT
Start: 2022-05-29 | End: 2022-06-22

## 2022-06-20 ENCOUNTER — HOSPITAL ENCOUNTER (INPATIENT)
Facility: HOSPITAL | Age: 77
LOS: 3 days | Discharge: HOME | DRG: 378 | End: 2022-06-23
Attending: INTERNAL MEDICINE | Admitting: INTERNAL MEDICINE
Payer: COMMERCIAL

## 2022-06-20 ENCOUNTER — APPOINTMENT (EMERGENCY)
Dept: RADIOLOGY | Facility: HOSPITAL | Age: 77
End: 2022-06-20
Payer: COMMERCIAL

## 2022-06-20 ENCOUNTER — APPOINTMENT (EMERGENCY)
Dept: CT IMAGING | Facility: HOSPITAL | Age: 77
End: 2022-06-20
Payer: COMMERCIAL

## 2022-06-20 ENCOUNTER — HOSPITAL ENCOUNTER (EMERGENCY)
Facility: HOSPITAL | Age: 77
End: 2022-06-20
Attending: EMERGENCY MEDICINE
Payer: COMMERCIAL

## 2022-06-20 VITALS
HEART RATE: 60 BPM | TEMPERATURE: 97.5 F | OXYGEN SATURATION: 98 % | SYSTOLIC BLOOD PRESSURE: 145 MMHG | RESPIRATION RATE: 18 BRPM | DIASTOLIC BLOOD PRESSURE: 71 MMHG

## 2022-06-20 DIAGNOSIS — K92.2 GI BLEED: Primary | ICD-10-CM

## 2022-06-20 DIAGNOSIS — K92.2 GASTROINTESTINAL HEMORRHAGE, UNSPECIFIED GASTROINTESTINAL HEMORRHAGE TYPE: Primary | ICD-10-CM

## 2022-06-20 DIAGNOSIS — R82.71 BACTERIURIA: ICD-10-CM

## 2022-06-20 PROBLEM — I50.32 CHRONIC DIASTOLIC (CONGESTIVE) HEART FAILURE (HCC): Status: ACTIVE | Noted: 2021-03-11

## 2022-06-20 LAB
2HR DELTA HS TROPONIN: -6 NG/L
ABO GROUP BLD: NORMAL
ALBUMIN SERPL BCP-MCNC: 4 G/DL (ref 3.5–5)
ALP SERPL-CCNC: 99 U/L (ref 34–104)
ALT SERPL W P-5'-P-CCNC: 16 U/L (ref 7–52)
ANION GAP SERPL CALCULATED.3IONS-SCNC: 9 MMOL/L (ref 4–13)
APTT PPP: 28 SECONDS (ref 23–37)
AST SERPL W P-5'-P-CCNC: 12 U/L (ref 13–39)
BACTERIA UR QL AUTO: ABNORMAL /HPF
BASOPHILS # BLD AUTO: 0.03 THOUSANDS/ΜL (ref 0–0.1)
BASOPHILS NFR BLD AUTO: 0 % (ref 0–1)
BILIRUB SERPL-MCNC: 0.42 MG/DL (ref 0.2–1)
BILIRUB UR QL STRIP: NEGATIVE
BLD GP AB SCN SERPL QL: NEGATIVE
BNP SERPL-MCNC: 386 PG/ML (ref 0–100)
BUN SERPL-MCNC: 67 MG/DL (ref 5–25)
CALCIUM SERPL-MCNC: 9.1 MG/DL (ref 8.4–10.2)
CARDIAC TROPONIN I PNL SERPL HS: 21 NG/L
CARDIAC TROPONIN I PNL SERPL HS: 27 NG/L
CHLORIDE SERPL-SCNC: 107 MMOL/L (ref 96–108)
CK MB SERPL-MCNC: 4.8 % (ref 0–2.5)
CK MB SERPL-MCNC: 8.3 NG/ML (ref 0.6–6.3)
CK SERPL-CCNC: 172 U/L (ref 39–308)
CLARITY UR: ABNORMAL
CO2 SERPL-SCNC: 25 MMOL/L (ref 21–32)
COLOR UR: YELLOW
CREAT SERPL-MCNC: 2.38 MG/DL (ref 0.6–1.3)
EOSINOPHIL # BLD AUTO: 0.14 THOUSAND/ΜL (ref 0–0.61)
EOSINOPHIL NFR BLD AUTO: 2 % (ref 0–6)
ERYTHROCYTE [DISTWIDTH] IN BLOOD BY AUTOMATED COUNT: 14.9 % (ref 11.6–15.1)
GFR SERPL CREATININE-BSD FRML MDRD: 25 ML/MIN/1.73SQ M
GLUCOSE SERPL-MCNC: 206 MG/DL (ref 65–140)
GLUCOSE SERPL-MCNC: 217 MG/DL (ref 65–140)
GLUCOSE UR STRIP-MCNC: NEGATIVE MG/DL
HCT VFR BLD AUTO: 26.7 % (ref 36.5–49.3)
HEMOCCULT STL QL IA: POSITIVE
HGB BLD-MCNC: 8.5 G/DL (ref 12–17)
HGB UR QL STRIP.AUTO: ABNORMAL
HYALINE CASTS #/AREA URNS LPF: ABNORMAL /LPF
IMM GRANULOCYTES # BLD AUTO: 0.01 THOUSAND/UL (ref 0–0.2)
IMM GRANULOCYTES NFR BLD AUTO: 0 % (ref 0–2)
INR PPP: 1.18 (ref 0.84–1.19)
KETONES UR STRIP-MCNC: NEGATIVE MG/DL
LACTATE SERPL-SCNC: 1.1 MMOL/L (ref 0.5–2)
LEUKOCYTE ESTERASE UR QL STRIP: ABNORMAL
LIPASE SERPL-CCNC: 37 U/L (ref 11–82)
LYMPHOCYTES # BLD AUTO: 0.87 THOUSANDS/ΜL (ref 0.6–4.47)
LYMPHOCYTES NFR BLD AUTO: 12 % (ref 14–44)
MCH RBC QN AUTO: 30.4 PG (ref 26.8–34.3)
MCHC RBC AUTO-ENTMCNC: 31.8 G/DL (ref 31.4–37.4)
MCV RBC AUTO: 95 FL (ref 82–98)
MONOCYTES # BLD AUTO: 0.43 THOUSAND/ΜL (ref 0.17–1.22)
MONOCYTES NFR BLD AUTO: 6 % (ref 4–12)
NEUTROPHILS # BLD AUTO: 5.84 THOUSANDS/ΜL (ref 1.85–7.62)
NEUTS SEG NFR BLD AUTO: 80 % (ref 43–75)
NITRITE UR QL STRIP: NEGATIVE
NON-SQ EPI CELLS URNS QL MICRO: ABNORMAL /HPF
NRBC BLD AUTO-RTO: 0 /100 WBCS
PH UR STRIP.AUTO: 6 [PH]
PLATELET # BLD AUTO: 207 THOUSANDS/UL (ref 149–390)
PMV BLD AUTO: 11.3 FL (ref 8.9–12.7)
POTASSIUM SERPL-SCNC: 4.8 MMOL/L (ref 3.5–5.3)
PROT SERPL-MCNC: 7.1 G/DL (ref 6.4–8.4)
PROT UR STRIP-MCNC: ABNORMAL MG/DL
PROTHROMBIN TIME: 14.8 SECONDS (ref 11.6–14.5)
RBC # BLD AUTO: 2.8 MILLION/UL (ref 3.88–5.62)
RBC #/AREA URNS AUTO: ABNORMAL /HPF
RH BLD: POSITIVE
SODIUM SERPL-SCNC: 141 MMOL/L (ref 135–147)
SP GR UR STRIP.AUTO: 1.01 (ref 1–1.03)
SPECIMEN EXPIRATION DATE: NORMAL
UROBILINOGEN UR QL STRIP.AUTO: 0.2 E.U./DL
WBC # BLD AUTO: 7.32 THOUSAND/UL (ref 4.31–10.16)
WBC #/AREA URNS AUTO: ABNORMAL /HPF

## 2022-06-20 PROCEDURE — 96361 HYDRATE IV INFUSION ADD-ON: CPT

## 2022-06-20 PROCEDURE — 99285 EMERGENCY DEPT VISIT HI MDM: CPT

## 2022-06-20 PROCEDURE — 80053 COMPREHEN METABOLIC PANEL: CPT | Performed by: PHYSICIAN ASSISTANT

## 2022-06-20 PROCEDURE — 85610 PROTHROMBIN TIME: CPT | Performed by: PHYSICIAN ASSISTANT

## 2022-06-20 PROCEDURE — 83690 ASSAY OF LIPASE: CPT | Performed by: PHYSICIAN ASSISTANT

## 2022-06-20 PROCEDURE — 85730 THROMBOPLASTIN TIME PARTIAL: CPT | Performed by: PHYSICIAN ASSISTANT

## 2022-06-20 PROCEDURE — 82948 REAGENT STRIP/BLOOD GLUCOSE: CPT

## 2022-06-20 PROCEDURE — 71045 X-RAY EXAM CHEST 1 VIEW: CPT

## 2022-06-20 PROCEDURE — 86900 BLOOD TYPING SEROLOGIC ABO: CPT | Performed by: PHYSICIAN ASSISTANT

## 2022-06-20 PROCEDURE — 82550 ASSAY OF CK (CPK): CPT | Performed by: PHYSICIAN ASSISTANT

## 2022-06-20 PROCEDURE — 99285 EMERGENCY DEPT VISIT HI MDM: CPT | Performed by: PHYSICIAN ASSISTANT

## 2022-06-20 PROCEDURE — 87086 URINE CULTURE/COLONY COUNT: CPT | Performed by: PHYSICIAN ASSISTANT

## 2022-06-20 PROCEDURE — G0328 FECAL BLOOD SCRN IMMUNOASSAY: HCPCS | Performed by: PHYSICIAN ASSISTANT

## 2022-06-20 PROCEDURE — 36415 COLL VENOUS BLD VENIPUNCTURE: CPT | Performed by: PHYSICIAN ASSISTANT

## 2022-06-20 PROCEDURE — 83880 ASSAY OF NATRIURETIC PEPTIDE: CPT | Performed by: PHYSICIAN ASSISTANT

## 2022-06-20 PROCEDURE — 83605 ASSAY OF LACTIC ACID: CPT | Performed by: PHYSICIAN ASSISTANT

## 2022-06-20 PROCEDURE — 81001 URINALYSIS AUTO W/SCOPE: CPT | Performed by: PHYSICIAN ASSISTANT

## 2022-06-20 PROCEDURE — C9113 INJ PANTOPRAZOLE SODIUM, VIA: HCPCS | Performed by: PHYSICIAN ASSISTANT

## 2022-06-20 PROCEDURE — 74176 CT ABD & PELVIS W/O CONTRAST: CPT

## 2022-06-20 PROCEDURE — 86850 RBC ANTIBODY SCREEN: CPT | Performed by: PHYSICIAN ASSISTANT

## 2022-06-20 PROCEDURE — 96365 THER/PROPH/DIAG IV INF INIT: CPT

## 2022-06-20 PROCEDURE — 86901 BLOOD TYPING SEROLOGIC RH(D): CPT | Performed by: PHYSICIAN ASSISTANT

## 2022-06-20 PROCEDURE — 87077 CULTURE AEROBIC IDENTIFY: CPT | Performed by: PHYSICIAN ASSISTANT

## 2022-06-20 PROCEDURE — 82553 CREATINE MB FRACTION: CPT | Performed by: PHYSICIAN ASSISTANT

## 2022-06-20 PROCEDURE — G1004 CDSM NDSC: HCPCS

## 2022-06-20 PROCEDURE — 84484 ASSAY OF TROPONIN QUANT: CPT | Performed by: PHYSICIAN ASSISTANT

## 2022-06-20 PROCEDURE — 93005 ELECTROCARDIOGRAM TRACING: CPT

## 2022-06-20 PROCEDURE — 85025 COMPLETE CBC W/AUTO DIFF WBC: CPT | Performed by: PHYSICIAN ASSISTANT

## 2022-06-20 PROCEDURE — 87186 SC STD MICRODIL/AGAR DIL: CPT | Performed by: PHYSICIAN ASSISTANT

## 2022-06-20 PROCEDURE — 99223 1ST HOSP IP/OBS HIGH 75: CPT | Performed by: INTERNAL MEDICINE

## 2022-06-20 RX ORDER — PANTOPRAZOLE SODIUM 40 MG/1
40 INJECTION, POWDER, FOR SOLUTION INTRAVENOUS ONCE
Status: DISCONTINUED | OUTPATIENT
Start: 2022-06-20 | End: 2022-06-20

## 2022-06-20 RX ORDER — FINASTERIDE 5 MG/1
5 TABLET, FILM COATED ORAL DAILY
Status: DISCONTINUED | OUTPATIENT
Start: 2022-06-21 | End: 2022-06-23 | Stop reason: HOSPADM

## 2022-06-20 RX ORDER — ONDANSETRON 2 MG/ML
4 INJECTION INTRAMUSCULAR; INTRAVENOUS EVERY 6 HOURS PRN
Status: DISCONTINUED | OUTPATIENT
Start: 2022-06-20 | End: 2022-06-23 | Stop reason: HOSPADM

## 2022-06-20 RX ORDER — INSULIN GLARGINE 100 [IU]/ML
22 INJECTION, SOLUTION SUBCUTANEOUS
Status: DISCONTINUED | OUTPATIENT
Start: 2022-06-21 | End: 2022-06-23 | Stop reason: HOSPADM

## 2022-06-20 RX ORDER — METOPROLOL TARTRATE 50 MG/1
50 TABLET, FILM COATED ORAL 2 TIMES DAILY
Status: DISCONTINUED | OUTPATIENT
Start: 2022-06-21 | End: 2022-06-23 | Stop reason: HOSPADM

## 2022-06-20 RX ORDER — GABAPENTIN 300 MG/1
300 CAPSULE ORAL EVERY EVENING
Status: DISCONTINUED | OUTPATIENT
Start: 2022-06-21 | End: 2022-06-23 | Stop reason: HOSPADM

## 2022-06-20 RX ORDER — ATORVASTATIN CALCIUM 80 MG/1
80 TABLET, FILM COATED ORAL
Status: DISCONTINUED | OUTPATIENT
Start: 2022-06-21 | End: 2022-06-23 | Stop reason: HOSPADM

## 2022-06-20 RX ORDER — INSULIN LISPRO 100 [IU]/ML
1-6 INJECTION, SOLUTION INTRAVENOUS; SUBCUTANEOUS EVERY 6 HOURS
Status: DISCONTINUED | OUTPATIENT
Start: 2022-06-20 | End: 2022-06-22

## 2022-06-20 RX ORDER — GABAPENTIN 300 MG/1
300 CAPSULE ORAL 2 TIMES DAILY
Status: DISCONTINUED | OUTPATIENT
Start: 2022-06-21 | End: 2022-06-23 | Stop reason: HOSPADM

## 2022-06-20 RX ORDER — TAMSULOSIN HYDROCHLORIDE 0.4 MG/1
0.8 CAPSULE ORAL
Status: DISCONTINUED | OUTPATIENT
Start: 2022-06-21 | End: 2022-06-23 | Stop reason: HOSPADM

## 2022-06-20 RX ORDER — SODIUM CHLORIDE 9 MG/ML
75 INJECTION, SOLUTION INTRAVENOUS CONTINUOUS
Status: DISCONTINUED | OUTPATIENT
Start: 2022-06-20 | End: 2022-06-21

## 2022-06-20 RX ORDER — BUSPIRONE HYDROCHLORIDE 5 MG/1
5 TABLET ORAL 3 TIMES DAILY
Status: DISCONTINUED | OUTPATIENT
Start: 2022-06-21 | End: 2022-06-23 | Stop reason: HOSPADM

## 2022-06-20 RX ADMIN — SODIUM CHLORIDE 75 ML/HR: 0.9 INJECTION, SOLUTION INTRAVENOUS at 23:56

## 2022-06-20 RX ADMIN — SODIUM CHLORIDE 8 MG/HR: 9 INJECTION, SOLUTION INTRAVENOUS at 20:48

## 2022-06-20 RX ADMIN — SODIUM CHLORIDE 8 MG/HR: 9 INJECTION, SOLUTION INTRAVENOUS at 23:55

## 2022-06-20 RX ADMIN — SODIUM CHLORIDE 1000 ML: 0.9 INJECTION, SOLUTION INTRAVENOUS at 18:36

## 2022-06-20 RX ADMIN — SODIUM CHLORIDE 80 MG: 9 INJECTION, SOLUTION INTRAVENOUS at 20:21

## 2022-06-20 NOTE — QUICK NOTE
GI On-Call Note:    Messaged by PACS for patient transfer for GI bleed  Chart reviewed and case discussed with ED provider  Patient presented with reported one week of hematochezia but more melena today  Some abdominal cramping  Hemodynamically stable  2 5 gm drop in Hb compared to approximately 6 weeks ago  CT without contrast obtained with diverticulosis but no other significant acute findings  Patient warrants endoscopic evaluation  No bed availability at [facility]  Advised transfer to [facility] which has bed availability  Per ED, appropriate for med-surg level of care  Informed PACS patient should be transferred under medicine service with GI consult  GI to see in AM for formal consultation and determination for procedure  Continue care per ED and accepting medicine team     KEYONA Henderson  Chief Gastroenterology Fellow  [facility]  Available on [employer]  [email or web address]

## 2022-06-21 LAB
ABO GROUP BLD: NORMAL
ANION GAP SERPL CALCULATED.3IONS-SCNC: 9 MMOL/L (ref 4–13)
ATRIAL RATE: 61 BPM
BLD GP AB SCN SERPL QL: NEGATIVE
BUN SERPL-MCNC: 65 MG/DL (ref 5–25)
CALCIUM SERPL-MCNC: 8.4 MG/DL (ref 8.3–10.1)
CHLORIDE SERPL-SCNC: 108 MMOL/L (ref 100–108)
CO2 SERPL-SCNC: 26 MMOL/L (ref 21–32)
CREAT SERPL-MCNC: 2.31 MG/DL (ref 0.6–1.3)
GFR SERPL CREATININE-BSD FRML MDRD: 26 ML/MIN/1.73SQ M
GLUCOSE SERPL-MCNC: 160 MG/DL (ref 65–140)
GLUCOSE SERPL-MCNC: 169 MG/DL (ref 65–140)
GLUCOSE SERPL-MCNC: 180 MG/DL (ref 65–140)
GLUCOSE SERPL-MCNC: 199 MG/DL (ref 65–140)
GLUCOSE SERPL-MCNC: 231 MG/DL (ref 65–140)
HCT VFR BLD AUTO: 24.3 % (ref 36.5–49.3)
HCT VFR BLD AUTO: 24.3 % (ref 36.5–49.3)
HCT VFR BLD AUTO: 28.8 % (ref 36.5–49.3)
HGB BLD-MCNC: 7.4 G/DL (ref 12–17)
HGB BLD-MCNC: 7.7 G/DL (ref 12–17)
HGB BLD-MCNC: 9 G/DL (ref 12–17)
P AXIS: 48 DEGREES
POTASSIUM SERPL-SCNC: 4 MMOL/L (ref 3.5–5.3)
PR INTERVAL: 244 MS
QRS AXIS: -10 DEGREES
QRSD INTERVAL: 130 MS
QT INTERVAL: 458 MS
QTC INTERVAL: 461 MS
RH BLD: POSITIVE
SODIUM SERPL-SCNC: 143 MMOL/L (ref 136–145)
SPECIMEN EXPIRATION DATE: NORMAL
T WAVE AXIS: 4 DEGREES
VENTRICULAR RATE: 61 BPM

## 2022-06-21 PROCEDURE — 93010 ELECTROCARDIOGRAM REPORT: CPT | Performed by: INTERNAL MEDICINE

## 2022-06-21 PROCEDURE — 82948 REAGENT STRIP/BLOOD GLUCOSE: CPT

## 2022-06-21 PROCEDURE — 30233N1 TRANSFUSION OF NONAUTOLOGOUS RED BLOOD CELLS INTO PERIPHERAL VEIN, PERCUTANEOUS APPROACH: ICD-10-PCS | Performed by: INTERNAL MEDICINE

## 2022-06-21 PROCEDURE — 85014 HEMATOCRIT: CPT | Performed by: PHYSICIAN ASSISTANT

## 2022-06-21 PROCEDURE — 86900 BLOOD TYPING SEROLOGIC ABO: CPT | Performed by: INTERNAL MEDICINE

## 2022-06-21 PROCEDURE — 85018 HEMOGLOBIN: CPT | Performed by: PHYSICIAN ASSISTANT

## 2022-06-21 PROCEDURE — 86901 BLOOD TYPING SEROLOGIC RH(D): CPT | Performed by: INTERNAL MEDICINE

## 2022-06-21 PROCEDURE — 85014 HEMATOCRIT: CPT | Performed by: INTERNAL MEDICINE

## 2022-06-21 PROCEDURE — P9016 RBC LEUKOCYTES REDUCED: HCPCS

## 2022-06-21 PROCEDURE — 80048 BASIC METABOLIC PNL TOTAL CA: CPT | Performed by: PHYSICIAN ASSISTANT

## 2022-06-21 PROCEDURE — 86850 RBC ANTIBODY SCREEN: CPT | Performed by: INTERNAL MEDICINE

## 2022-06-21 PROCEDURE — 86920 COMPATIBILITY TEST SPIN: CPT

## 2022-06-21 PROCEDURE — 99223 1ST HOSP IP/OBS HIGH 75: CPT | Performed by: INTERNAL MEDICINE

## 2022-06-21 PROCEDURE — 85018 HEMOGLOBIN: CPT | Performed by: INTERNAL MEDICINE

## 2022-06-21 PROCEDURE — 99291 CRITICAL CARE FIRST HOUR: CPT | Performed by: INTERNAL MEDICINE

## 2022-06-21 PROCEDURE — C9113 INJ PANTOPRAZOLE SODIUM, VIA: HCPCS | Performed by: PHYSICIAN ASSISTANT

## 2022-06-21 RX ORDER — DIPHENHYDRAMINE HCL 25 MG
25 TABLET ORAL ONCE
Status: COMPLETED | OUTPATIENT
Start: 2022-06-21 | End: 2022-06-21

## 2022-06-21 RX ORDER — ACETAMINOPHEN 325 MG/1
650 TABLET ORAL ONCE
Status: COMPLETED | OUTPATIENT
Start: 2022-06-21 | End: 2022-06-21

## 2022-06-21 RX ADMIN — METOPROLOL TARTRATE 50 MG: 50 TABLET, FILM COATED ORAL at 21:25

## 2022-06-21 RX ADMIN — FINASTERIDE 5 MG: 5 TABLET, FILM COATED ORAL at 09:26

## 2022-06-21 RX ADMIN — ACETAMINOPHEN 650 MG: 325 TABLET, FILM COATED ORAL at 16:42

## 2022-06-21 RX ADMIN — BUSPIRONE HYDROCHLORIDE 5 MG: 5 TABLET ORAL at 21:25

## 2022-06-21 RX ADMIN — DIPHENHYDRAMINE HCL 25 MG: 25 TABLET, COATED ORAL at 16:42

## 2022-06-21 RX ADMIN — INSULIN LISPRO 1 UNITS: 100 INJECTION, SOLUTION INTRAVENOUS; SUBCUTANEOUS at 05:41

## 2022-06-21 RX ADMIN — SODIUM CHLORIDE 8 MG/HR: 9 INJECTION, SOLUTION INTRAVENOUS at 10:47

## 2022-06-21 RX ADMIN — TAMSULOSIN HYDROCHLORIDE 0.8 MG: 0.4 CAPSULE ORAL at 16:15

## 2022-06-21 RX ADMIN — BUSPIRONE HYDROCHLORIDE 5 MG: 5 TABLET ORAL at 00:30

## 2022-06-21 RX ADMIN — INSULIN LISPRO 1 UNITS: 100 INJECTION, SOLUTION INTRAVENOUS; SUBCUTANEOUS at 11:32

## 2022-06-21 RX ADMIN — GABAPENTIN 300 MG: 300 CAPSULE ORAL at 09:26

## 2022-06-21 RX ADMIN — GABAPENTIN 300 MG: 300 CAPSULE ORAL at 00:30

## 2022-06-21 RX ADMIN — POLYETHYLENE GLYCOL 3350, SODIUM SULFATE ANHYDROUS, SODIUM BICARBONATE, SODIUM CHLORIDE, POTASSIUM CHLORIDE 4000 ML: 236; 22.74; 6.74; 5.86; 2.97 POWDER, FOR SOLUTION ORAL at 16:16

## 2022-06-21 RX ADMIN — SERTRALINE HYDROCHLORIDE 50 MG: 50 TABLET ORAL at 09:26

## 2022-06-21 RX ADMIN — GABAPENTIN 300 MG: 300 CAPSULE ORAL at 17:03

## 2022-06-21 RX ADMIN — INSULIN GLARGINE 22 UNITS: 100 INJECTION, SOLUTION SUBCUTANEOUS at 00:30

## 2022-06-21 RX ADMIN — ATORVASTATIN CALCIUM 80 MG: 80 TABLET, FILM COATED ORAL at 16:15

## 2022-06-21 RX ADMIN — INSULIN LISPRO 2 UNITS: 100 INJECTION, SOLUTION INTRAVENOUS; SUBCUTANEOUS at 00:30

## 2022-06-21 RX ADMIN — BUSPIRONE HYDROCHLORIDE 5 MG: 5 TABLET ORAL at 16:15

## 2022-06-21 RX ADMIN — INSULIN LISPRO 3 UNITS: 100 INJECTION, SOLUTION INTRAVENOUS; SUBCUTANEOUS at 17:03

## 2022-06-21 RX ADMIN — METOPROLOL TARTRATE 50 MG: 50 TABLET, FILM COATED ORAL at 00:30

## 2022-06-21 RX ADMIN — SODIUM CHLORIDE 8 MG/HR: 9 INJECTION, SOLUTION INTRAVENOUS at 20:06

## 2022-06-21 RX ADMIN — BUSPIRONE HYDROCHLORIDE 5 MG: 5 TABLET ORAL at 09:26

## 2022-06-21 RX ADMIN — METOPROLOL TARTRATE 50 MG: 50 TABLET, FILM COATED ORAL at 09:26

## 2022-06-21 RX ADMIN — INSULIN GLARGINE 22 UNITS: 100 INJECTION, SOLUTION SUBCUTANEOUS at 22:05

## 2022-06-21 NOTE — ED NOTES
[address]  Accepting: Dr Shamar Eugene   Report: [phone]     Karan Hills RN  06/20/22 2037

## 2022-06-21 NOTE — CONSULTS
Consultation -  [facility]  Beth Nash  77 y o  male MRN: [id number]  Unit/Bed#: E2 -01 Encounter: [id number]        Consults    Reason for Consult / Principal Problem:     1  GI bleed      ASSESSMENT AND PLAN:      Shannon Woo is a 76 y/o male who is s/p cholecystectomy with CAD s/p CABG, DM2, CKD3, CHF, HTN, HLD [facility]  Pt was transferred to [facility] for further evaluation of this due to bed shortage  1  GI bleed  2  Normocytic anemia  3  Diarrhea   4  Family hx of colon cancer  Pt notes that he has had a mix of hematochezia and melena over the last 1 5-2 weeks; pt explains that on days that he has diarrhea, his BMs appear to have dark BRB mixed throughout but says that when his BMs are formed, they appear black/tarry  Pt denies any associated abdominal or rectal pain, new meds or supplements, sick contacts, recent travel, or antibiotic use  Lat colonoscopy was in 2019 where he was noted to have diverticulosis, internal hemorrhoids and 1 polyp (unknown pathology)  EGD done 2019 depicted "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," 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 and gastric erosion but again, we do not have pathology reports and pt is unsure of results  Pt has had ongoing anemia since 2019 at least with baseline anywhere between 8-11 however Hgb is down to 7 7 today from 8 5 yesterday  Elevated BUN in the setting of CKD  Pt's father dx with colon cancer in his 76s   -monitor H&H and transfuse as necessary  -monitor stool output and color  -clear liquid diet; NPO midnight   -continue PPI infusion for now  -EGD and colonoscopy tomorrow to rule out ulcer disease (gastric, duodenal, colonic), AVMs (gastric, duodenal, colonic), etc  ______________________________________________________________________    HPI:  Shannon Woo is a 76 y/o male who is s/p cholecystectomy with CAD s/p CABG, DM2, CKD3, CHF, HTN, HLD [facility]   Pt was transferred to [address] [place] [address] for further evaluation of this  Pt notes that over the last 1 5-2 weeks, he has had diarrhea intermittently  Pt says he gets diarrhea at times depending on what he eats, however this has been different as it has been occurring at least every other day  Pt explains that when he has diarrhea, he sees some dark red mixed throughout  However, he says that when his BMs are formed, they looks black/tarry  Pt notes that this started abruptly and denies any new meds or supplements prior to this  Pt also denies antibiotic use, sick contacts, recent travel  Pt denies any abdominal or rectal pain, constipation, n/v, heartburn or reflux  Pt does admit to weight loss but says this is intentional as he is eating less/mroe healthy  Pt is not on blood thinners but says he takes baby ASA daily  Pt does have family hx of colon cancer in his father, who was dx in his 76s  Last EGD and colonoscopy were in 2019 but pt is unaware of the results  Pt has prior abdominal surgical hx of cholecystectomy  REVIEW OF SYSTEMS:    CONSTITUTIONAL: Denies any fever, chills, rigors, and weight loss  HEENT: No earache or tinnitus  Denies hearing loss or visual disturbances  CARDIOVASCULAR: No chest pain or palpitations  RESPIRATORY: Denies any cough, hemoptysis, shortness of breath or dyspnea on exertion  GASTROINTESTINAL: As noted in the History of Present Illness  GENITOURINARY: No problems with urination  Denies any hematuria or dysuria  NEUROLOGIC: No dizziness or vertigo, denies headaches  MUSCULOSKELETAL: Denies any muscle or joint pain  SKIN: Denies skin rashes or itching  ENDOCRINE: Denies excessive thirst  Denies intolerance to heat or cold  PSYCHOSOCIAL: Denies depression or anxiety  Denies any recent memory loss  Historical Information   Past Medical History:   Diagnosis Date    CHF (congestive heart failure) (HCC)     Chronic kidney disease 18 - 24 months?     Dr Jennifer Nguyen - stage 3    Diabetes mellitus ([facility] [address] )     Hyperlipidemia     Hypertension     Myocardial infarction [facility]) 2019    Open heart surgery with quad bypass    Obesity     Renal disorder     Visual impairment     Dr Perla Mandujano     Past Surgical History:   Procedure Laterality Date    APPENDECTOMY      Done in conjunction with cholecystectomy    BACK SURGERY      CHOLECYSTECTOMY      COLONOSCOPY      CORONARY ARTERY BYPASS GRAFT      quad    GALLBLADDER SURGERY      HERNIA REPAIR      NH CABG, ARTERY-VEIN, FOUR N/A 2019    Procedure: CORONARY ARTERY BYPASS GRAFT (CABG) 4 VESSELS WITH SVG TO PDA, OM, DIAGONAL AND LIMA TO LAD; RIGHT LEG EVH;  Surgeon: Caden Wagner MD;  [facility]: [facility];  Service: Cardiac Surgery    RECTAL SURGERY      SPINE SURGERY  2012    Fusion L3-L5? Social History   Social History     Substance and Sexual Activity   Alcohol Use Yes    Alcohol/week: 10 0 standard drinks    Types: 10 Shots of liquor per week    Comment: 1 drink every 6 months       Social History     Substance and Sexual Activity   Drug Use Never     Social History     Tobacco Use   Smoking Status Former Smoker    Packs/day: 3 00    Years: 35 00    Pack years: 105 00    Types: Cigarettes, Cigars    Start date: 1959   Zain Flower Quit date: 13    Years since quittin 4   Smokeless Tobacco Never Used   Tobacco Comment    Quit many times     Family History   Problem Relation Age of Onset    Cancer Mother     Alcohol abuse Mother     Cancer Father     Alcohol abuse Father     Diabetes Maternal Grandmother     Diabetes Paternal Aunt        Meds/Allergies     Medications Prior to Admission   Medication    Alpha-Lipoic Acid 600 MG CAPS    Ascorbic Acid (VITAMIN C) 1000 MG tablet    aspirin (ECOTRIN LOW STRENGTH) 81 mg EC tablet    atorvastatin (LIPITOR) 80 mg tablet    B-D ULTRAFINE III SHORT PEN 31G X 8 MM MISC    busPIRone (BUSPAR) 5 mg tablet    cholecalciferol (VITAMIN D3) 1,000 units tablet    Continuous Blood Gluc  (EggCartel 2 Hope) MAMI    Continuous Blood Gluc Sensor (FreeStyle Celia 2 Sensor) MISC    cyanocobalamin (VITAMIN B-12) 500 mcg tablet    finasteride (PROSCAR) 5 mg tablet    gabapentin (NEURONTIN) 300 mg capsule    glucose blood test strip    insulin glargine (Toujeo Max SoloStar) 300 units/mL CONCENTRATED U-300 injection pen (2-unit dial)    insulin lispro (HumaLOG KwikPen) 100 units/mL injection pen    latanoprost (XALATAN) 0 005 % ophthalmic solution    liraglutide (Victoza) injection    metoprolol tartrate (LOPRESSOR) 50 mg tablet    Microlet Lancets MISC    omeprazole (PriLOSEC) 40 MG capsule    sertraline (Zoloft) 50 mg tablet    tamsulosin (FLOMAX) 0 4 mg    torsemide (DEMADEX) 20 mg tablet     Current Facility-Administered Medications   Medication Dose Route Frequency    atorvastatin (LIPITOR) tablet 80 mg  80 mg Oral Daily With Dinner    busPIRone (BUSPAR) tablet 5 mg  5 mg Oral TID    finasteride (PROSCAR) tablet 5 mg  5 mg Oral Daily    gabapentin (NEURONTIN) capsule 300 mg  300 mg Oral BID    gabapentin (NEURONTIN) capsule 300 mg  300 mg Oral QPM    insulin glargine (LANTUS) subcutaneous injection 22 Units 0 22 mL  22 Units Subcutaneous HS    insulin lispro (HumaLOG) 100 units/mL subcutaneous injection 1-6 Units  1-6 Units Subcutaneous Q6H    metoprolol tartrate (LOPRESSOR) tablet 50 mg  50 mg Oral BID    ondansetron (ZOFRAN) injection 4 mg  4 mg Intravenous Q6H PRN    pantoprazole (PROTONIX) 80 mg in sodium chloride 0 9 % 100 mL infusion  8 mg/hr Intravenous Continuous    sertraline (ZOLOFT) tablet 50 mg  50 mg Oral Daily    tamsulosin (FLOMAX) capsule 0 8 mg  0 8 mg Oral Daily With Dinner       No Known Allergies        Objective     Blood pressure 147/81, pulse 56, temperature 97 6 °F (36 4 °C), temperature source Temporal, resp  rate 18, SpO2 98 %  There is no height or weight on file to calculate BMI        Intake/Output Summary (Last 24 hours) at 6/21/2022 0956  Last data filed at 6/21/2022 0908  Gross per 24 hour   Intake 690 ml   Output 1830 ml   Net -1140 ml         PHYSICAL EXAM:      General Appearance:   Alert, cooperative, no distress   HEENT:   Normocephalic, atraumatic, anicteric      Neck:  Supple, symmetrical, trachea midline   Lungs:   Clear to auscultation bilaterally; no rales, rhonchi or wheezing; respirations unlabored    Heart[de-identified]   Regular rate and rhythm; no murmur, rub, or gallop  Abdomen:   Soft, non-tender, non-distended; normal bowel sounds; no masses, no organomegaly    Genitalia:   Deferred    Rectal:   Deferred    Extremities:  No cyanosis, clubbing or edema    Pulses:  2+ and symmetric all extremities    Skin:  No jaundice, rashes, or lesions    Lymph nodes:  No palpable cervical lymphadenopathy        Lab Results:   Admission on 06/20/2022   Component Date Value    Hemoglobin 06/21/2022 7 4 (A)    Hematocrit 06/21/2022 24 3 (A)    POC Glucose 06/20/2022 206 (A)    Hemoglobin 06/21/2022 7 7 (A)    Hematocrit 06/21/2022 24 3 (A)    Sodium 06/21/2022 143     Potassium 06/21/2022 4 0     Chloride 06/21/2022 108     CO2 06/21/2022 26     ANION GAP 06/21/2022 9     BUN 06/21/2022 65 (A)    Creatinine 06/21/2022 2 31 (A)    Glucose 06/21/2022 160 (A)    Calcium 06/21/2022 8 4     eGFR 06/21/2022 26     POC Glucose 06/21/2022 169 (A)       Imaging Studies: I have personally reviewed pertinent imaging studies

## 2022-06-21 NOTE — ASSESSMENT & PLAN NOTE
Lab Results   Component Value Date    EGFR 25 06/20/2022    EGFR 26 05/03/2022    EGFR 25 02/28/2022    CREATININE 2 38 (H) 06/20/2022    CREATININE 2 30 (H) 05/03/2022    CREATININE 2 36 (H) 02/28/2022   creatinine near baseline  Monitor w/treatment for gib

## 2022-06-21 NOTE — UTILIZATION REVIEW
Initial Clinical Review    TRANSFER FROM [place] ED    Admission: Date/Time/Statement:   Admission Orders (From admission, onward)     Ordered        06/20/22 2222  Inpatient Admission  Once                      Orders Placed This Encounter   Procedures    Inpatient Admission     Standing Status:   Standing     Number of Occurrences:   1     Order Specific Question:   Level of Care     Answer:   Med Surg [16]     Order Specific Question:   Estimated length of stay     Answer:   More than 2 Midnights     Order Specific Question:   Certification     Answer:   I certify that inpatient services are medically necessary for this patient for a duration of greater than two midnights  See H&P and MD Progress Notes for additional information about the patient's course of treatment  Initial Presentation: 77 y o  male   Initially presented to ED at  [facility] with  Melena and  BRBPR for past  1 5  Weeks  Pt notes diarrhea and watery loose stools both melanotic with component of red in them over the last week/half  Found with hem positive stool in ED  Transferred to [facility] for further care  PMH  Is  CAD, S/P  CABG on aspirin,  DM, CKD stage 3, chronic CHF and HTN  Admit  Ip with GIB and plan is  Monitor labs, GI consult, protonix drip and continue home meds  Date:    6/21      Day 2:   GI consult  Has mix of hematochezia and melena for past  1 5 weeks  Denies abdominal or rectal pain  Baseline hemoglobin  8 - 11, hemoglobin today 7 7 from 8 5 on admission  Planning   EGD and colonoscopy   6/22  Progress note   ( 6/22)    Transfuse  1 U PRBC  Today  Waiting endoscopy  Endoscopy  Of high risk  Had large black tarry stool          Wt Readings from Last 1 Encounters:   03/16/22 109 kg (240 lb)     Additional Vital Signs:   97 6 °F (36 4 °C) 56 18 147/81 -- 98 % None (Room air) Lying    06/21/22 0301 96 9 °F (36 1 °C) Abnormal  53 Abnormal  18 123/63 88 97 % -- --   06/20/22 2331 -- 61 -- 157/72 104 -- -- --   06/20/22 2227 97 5 °F (36 4 °C) 64 18 171/73 Abnormal  -- 99 % None (Room air) Sitting       Pertinent Labs/Diagnostic Test Results:       Results from last 7 days   Lab Units 06/21/22  0534 06/21/22  0001 06/20/22  1833   WBC Thousand/uL  --   --  7 32   HEMOGLOBIN g/dL 7 7* 7 4* 8 5*   HEMATOCRIT % 24 3* 24 3* 26 7*   PLATELETS Thousands/uL  --   --  207   NEUTROS ABS Thousands/µL  --   --  5 84         Results from last 7 days   Lab Units 06/21/22  0534 06/20/22  1833   SODIUM mmol/L 143 141   POTASSIUM mmol/L 4 0 4 8   CHLORIDE mmol/L 108 107   CO2 mmol/L 26 25   ANION GAP mmol/L 9 9   BUN mg/dL 65* 67*   CREATININE mg/dL 2 31* 2 38*   EGFR ml/min/1 73sq m 26 25   CALCIUM mg/dL 8 4 9 1     Results from last 7 days   Lab Units 06/20/22  1833   AST U/L 12*   ALT U/L 16   ALK PHOS U/L 99   TOTAL PROTEIN g/dL 7 1   ALBUMIN g/dL 4 0   TOTAL BILIRUBIN mg/dL 0 42     Results from last 7 days   Lab Units 06/21/22  0530 06/20/22  2337   POC GLUCOSE mg/dl 169* 206*     Results from last 7 days   Lab Units 06/21/22  0534 06/20/22  1833   GLUCOSE RANDOM mg/dL 160* 217*               Results from last 7 days   Lab Units 06/20/22  1833   CK TOTAL U/L 172   CK MB INDEX % 4 8*   CK MB ng/mL 8 3*     Results from last 7 days   Lab Units 06/20/22  2019 06/20/22  1833   HS TNI 0HR ng/L  --  27   HS TNI 2HR ng/L 21  --    HSTNI D2 ng/L -6  --          Results from last 7 days   Lab Units 06/20/22  1833   PROTIME seconds 14 8*   INR  1 18   PTT seconds 28             Results from last 7 days   Lab Units 06/20/22  1833   LACTIC ACID mmol/L 1 1             Results from last 7 days   Lab Units 06/20/22  1833   BNP pg/mL 386*             Results from last 7 days   Lab Units 06/20/22  1833   LIPASE u/L 37                 Results from last 7 days   Lab Units 06/20/22  2131   CLARITY UA  Cloudy*   COLOR UA  Yellow   SPEC GRAV UA  1 015   PH UA  6 0   GLUCOSE UA mg/dl Negative   KETONES UA mg/dl Negative   BLOOD UA  Trace-Intact* PROTEIN UA mg/dl Trace*   NITRITE UA  Negative   BILIRUBIN UA  Negative   UROBILINOGEN UA E U /dl 0 2   LEUKOCYTES UA  2+*   WBC UA /hpf Innumerable*   RBC UA /hpf 2-4   BACTERIA UA /hpf Moderate*   EPITHELIAL CELLS WET PREP /hpf Occasional           Present on Admission:   CKD (chronic kidney disease), stage IV (HCC)   Coronary artery disease involving native coronary artery of native heart without angina pectoris   Essential hypertension   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HCC)   Chronic diastolic (congestive) heart failure (HCC)      Admitting Diagnosis: GI bleed [K92 2]  Age/Sex: 77 y o  male  Admission Orders:  Scheduled Medications:  atorvastatin, 80 mg, Oral, Daily With Dinner  busPIRone, 5 mg, Oral, TID  finasteride, 5 mg, Oral, Daily  gabapentin, 300 mg, Oral, BID  gabapentin, 300 mg, Oral, QPM  insulin glargine, 22 Units, Subcutaneous, HS  insulin lispro, 1-6 Units, Subcutaneous, Q6H  metoprolol tartrate, 50 mg, Oral, BID  polyethylene glycol, 4,000 mL, Oral, Once  sertraline, 50 mg, Oral, Daily  tamsulosin, 0 8 mg, Oral, Daily With Dinner      Continuous IV Infusions:  pantoprozole (PROTONIX) infusion (Continuous), 8 mg/hr, Intravenous, Continuous      PRN Meds:  ondansetron, 4 mg, Intravenous, Q6H PRN        IP CONSULT TO GASTROENTEROLOGY    Network Utilization Review Department  ATTENTION: Please call with any questions or concerns to [phone] and carefully listen to the prompts so that you are directed to the right person  All voicemails are confidential   Aury Schultz all requests for admission clinical reviews, approved or denied determinations and any other requests to dedicated fax number below belonging to the campus where the patient is receiving treatment   List of dedicated fax numbers for the Facilities:  [address] DENIALS (Administrative/Medical Necessity) [phone]   [address] [place] [address] (Maternity/NICU/Pediatrics) 261 [facility] 40 125 [facility]  [phone]   [facility] 50 [address] [facility] [address] [facility] 2517 [address] [place] [address] [address] 1481 [address] [phone]

## 2022-06-21 NOTE — PROGRESS NOTES
[facility]  Progress Note - Beth Goes  1945, 77 y o  male MRN: [id number]  Unit/Bed#: E2 -01 Encounter: [id number]  Primary Care Provider: Celsa Cho DO   Date and time admitted to hospital: 6/20/2022 10:20 PM    * GI bleed  Assessment & Plan  Melena mixed w/some brbpr  Suspect upper gib  Given diarrheal component r/o infectious etiology  Suspect upper GI bleed given evidence of melena  Transfuse 1 unit of packed red blood cells  Has acute blood loss anemia secondary to gastrointestinal bleed  Continue clear diet  Endoscopy colonoscopy scheduled by the GI service  Situation is of high risk    Chronic diastolic (congestive) heart failure (HCC)  Assessment & Plan  Wt Readings from Last 3 Encounters:   03/16/22 109 kg (240 lb)   03/02/22 110 kg (243 lb)   01/26/22 111 kg (244 lb 14 4 oz)     Euvolemic by physical exam  Daily weights I/os continue bb hold torsemide        Essential hypertension  Assessment & Plan  Continue lopressor hold torsemide    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[address] )  Assessment & Plan  Ck wnl  Monitor for myalgias    Coronary artery disease involving native coronary artery of native heart without angina pectoris  Assessment & Plan  W/hx of cabg in 2019  Continue bb statin, hold asa    Type 2 diabetes mellitus with stage 4 chronic kidney disease, with long-term current use of insulin [facility])  Assessment & Plan  Lab Results   Component Value Date    HGBA1C 5 9 (H) 02/28/2022       Recent Labs     06/20/22  2337 06/21/22  0530 06/21/22  1101   POCGLU 206* 169* 180*       Blood Sugar Average: Last 72 hrs:  Home regimen reviewed  Hold Metformin if applicable    Start SSI and Basal bolus protocol    CKD (chronic kidney disease), stage IV [facility])  Assessment & Plan  Lab Results   Component Value Date    EGFR 26 06/21/2022    EGFR 25 06/20/2022    EGFR 26 05/03/2022    CREATININE 2 31 (H) 06/21/2022    CREATININE 2 38 (H) 06/20/2022    CREATININE 2 30 (H) 05/03/2022   Monitor renal function appears around baseline  BUN elevated in the setting of potential GI bleed        Total critical care time 60 minutes  Total critical care time documented does not include time spent on separately billed procedures or the services of  nurse practioners or physician assistants  I have personally seen and examined the patient  I have reviewed all diagnostic interpretations and treatment plans as written  I was present for the key portions of any procedures performed and the inclusive time noted in any critical care statement  Critical care time includes patient management by me, time spent at the patients bedside, time to review lab and imaging results, discussing patient care, documentation in the medical record, and time spent with the family or caregiver      Meets Critical care criteria based on  Organ System affected gastrointestinal, hematologic  Time Spent 60 minutes  Action taken is blood transfusion for acute blood loss anemia, without this the patient has a life threatening condition for which rapid deterioation is imminent and will lead to potential death if untreated      [facility] Internal Medicine Progress Note  Patient: Kenzie Holbrook  77 y o  male   MRN: [id number]  PCP: Ella Morin DO  Unit/Bed#: E2 -01 Encounter: [id number]  Date Of Visit: 06/21/22    Assessment:    Principal Problem:    GI bleed  Active Problems:    CKD (chronic kidney disease), stage IV ([facility])    Type 2 diabetes mellitus with stage 4 chronic kidney disease, with long-term current use of insulin ([facility] )    Coronary artery disease involving native coronary artery of native heart without angina pectoris    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[address] )    Essential hypertension    Chronic diastolic (congestive) heart failure ([facility] [address] )      Plan:    · Transfuse 1 unit packed red blood cell  · Await endoscopy  ·        VTE Pharmacologic Prophylaxis:   Pharmacologic: Pharmacologic VTE Prophylaxis contraindicated due to Gastrointestinal hemorrhage  Mechanical VTE Prophylaxis in Place: Yes    Patient Centered Rounds: I have performed bedside rounds with nursing staff today  Discussions with Specialists or Other Care Team Provider:  Case management Education and Discussions with Family / Patient:  Patient update family    Time Spent for Care: 45 minutes  More than 50% of total time spent on counseling and coordination of care as described above  Current Length of Stay: 1 day(s)    Current Patient Status: Inpatient   Certification Statement: The patient will continue to require additional inpatient hospital stay due to Endoscopy    Discharge Plan / Estimated Discharge Date:  48 hours    Code Status: Level 1 - Full Code      Subjective:   Seen and examined, reported having large black tarry stool  Denies any abdominal pain, no chest pain no shortness of breath or difficulty breathing    A complete and comprehensive 14 point organ system review has been performed and all other systems are negative other than stated above  Objective:     Vitals:   Temp (24hrs), Av 3 °F (36 3 °C), Min:96 9 °F (36 1 °C), Max:97 6 °F (36 4 °C)    Temp:  [96 9 °F (36 1 °C)-97 6 °F (36 4 °C)] 96 9 °F (36 1 °C)  HR:  [51-64] 51  Resp:  [18] 18  BP: (117-171)/(57-81) 117/57  SpO2:  [97 %-100 %] 100 %  There is no height or weight on file to calculate BMI  Input and Output Summary (last 24 hours):        Intake/Output Summary (Last 24 hours) at 2022 1214  Last data filed at 2022 0908  Gross per 24 hour   Intake 690 ml   Output 1830 ml   Net -1140 ml       Physical Exam:     General: well appearing, no acute distress  HEENT: atraumatic, PERRLA, moist mucosa, normal pharynx, normal tonsils and adenoids, normal tongue, no fluid in sinuses  Neck: Trachea midline, no carotid bruit, no masses  Respiratory: normal chest wall expansion, CTA B, no r/r/w, no rubs  Cardiovascular: RRR, no m/r/g, Normal S1 and S2  Abdomen: Soft, non-tender, non-distended, normal bowel sounds in all quadrants, no hepatosplenomegaly, no tympany  Rectal: deferred  Musculoskeletal: normal ROM in upper and lower extremities  Integumentary: warm, dry, and pink, with no rash, purpura, or petechia  Heme/Lymph: no lymphadenopathy, no bruises  Neurological: Cranial Nerves II-XII grossly intact, no tics, normal sensation to pressure and light touch  Psychiatric: cooperative with normal mood, affect, and cognition      Additional Data:     Labs:    Results from last 7 days   Lab Units 06/21/22  0534 06/21/22  0001 06/20/22  1833   WBC Thousand/uL  --   --  7 32   HEMOGLOBIN g/dL 7 7*   < > 8 5*   HEMATOCRIT % 24 3*   < > 26 7*   PLATELETS Thousands/uL  --   --  207   NEUTROS PCT %  --   --  80*   LYMPHS PCT %  --   --  12*   MONOS PCT %  --   --  6   EOS PCT %  --   --  2    < > = values in this interval not displayed  Results from last 7 days   Lab Units 06/21/22  0534 06/20/22  1833   POTASSIUM mmol/L 4 0 4 8   CHLORIDE mmol/L 108 107   CO2 mmol/L 26 25   BUN mg/dL 65* 67*   CREATININE mg/dL 2 31* 2 38*   CALCIUM mg/dL 8 4 9 1   ALK PHOS U/L  --  99   ALT U/L  --  16   AST U/L  --  12*     Results from last 7 days   Lab Units 06/20/22  1833   INR  1 18       * I Have Reviewed All Lab Data Listed Above  * Additional Pertinent Lab Tests Reviewed:  [address] Admission Reviewed    Imaging:    Imaging Reports Reviewed Today Include:  Reviewed  Imaging Personally Reviewed by Myself Includes:  Reviewed    Recent Cultures (last 7 days):           Last 24 Hours Medication List:   Current Facility-Administered Medications   Medication Dose Route Frequency Provider Last Rate    acetaminophen  650 mg Oral Once Miguel Angel Seaman, DO      atorvastatin  80 mg Oral Daily With Vamshi Brothers KG Bacon      busPIRone  5 mg Oral TID Torie Bacon PA-C      diphenhydrAMINE  25 mg Oral Once Miguel Angel Seaman, DO      finasteride  5 mg Oral Daily Edy Palm PA-C      gabapentin  300 mg Oral BID Torie Bacon PA-C      gabapentin  300 mg Oral QPM Torie Bacon PA-C      insulin glargine  22 Units Subcutaneous HS Torie Bacon PA-C      insulin lispro  1-6 Units Subcutaneous Q6H Torie Bacon PA-C      metoprolol tartrate  50 mg Oral BID Torie Bacon PA-C      ondansetron  4 mg Intravenous Q6H PRN Torie Bacon PA-C      pantoprozole (PROTONIX) infusion (Continuous)  8 mg/hr Intravenous Continuous Torie Bacon PA-C 8 mg/hr (06/21/22 1047)    polyethylene glycol  4,000 mL Oral Once Britney Cazares PA-C      sertraline  50 mg Oral Daily Torie Bacon PA-C      tamsulosin  0 8 mg Oral Daily With Jennie Hamilton PA-C          Today, Patient Was Seen By: Poonam Singh DO    ** Please Note: This note was completed in part utilizing [email or web address] Software  Grammatical errors, random word insertions, spelling mistakes, and incomplete sentences may be an occasional consequence of this system secondary to software limitations, ambient noise, and hardware issues  If you have any questions or concerns about the content, text, or information contained within the body of this dictation, please contact the provider for clarification   **

## 2022-06-21 NOTE — ED PROVIDER NOTES
History  Chief Complaint   Patient presents with    Black or Bloody Stool     Pt reports black tarry stool 1 week, now bright red  Pt's wife states it is odorous  Denies pain  +aspirin daily  This is a 67-year-old male with past medical history significant for CKD, status post CABG on aspirin, colonic polyps, CHF, and Clostridium difficile diarrhea last year adequately treated with antibiotics presenting to the emergency department today for rectal bleeding for approximately 1 week  He notes initially that he had bright red blood per rectum that transitioned to black stool  He is now having more bright red blood per rectum  He denies any abdominal pain  He does note some chest pain that is midline and nonradiating and is not associated with shortness of breath  It is nonpleuritic  He does note diarrhea but denies any recent antibiotic use  No NVDC  No urinary symptoms  Most recent colonoscopy in 2019  He denies any dizziness or lightheadedness though his wife does believe that he looks pale  The patient denies other complaints at this time  History provided by:  Patient   used: No    Black or Bloody Stool  Quality:  Black and tarry and bright red  Amount: Moderate  Duration:  1 week  Timing:  Intermittent  Chronicity:  New  Similar prior episodes: no    Relieved by:  Nothing  Worsened by:  Nothing  Ineffective treatments:  None tried  Associated symptoms: no abdominal pain, no dizziness, no epistaxis, no fever, no hematemesis, no light-headedness, no loss of consciousness, no recent illness and no vomiting        Prior to Admission Medications   Prescriptions Last Dose Informant Patient Reported? Taking?    Alpha-Lipoic Acid 600 MG CAPS  Self No No   Sig: TAKE 1 CAPSULE BY MOUTH EVERY DAY   Patient taking differently: TAKE ONE CAPSULE DAILY   Ascorbic Acid (VITAMIN C) 1000 MG tablet  Self Yes No   Sig: Take 1,000 mg by mouth daily   B-D ULTRAFINE III SHORT PEN 31G X 8 MM MISC Self No No   Sig: INJECT INTO THE SKIN 4 (FOUR) TIMES A DAY   Continuous Blood Gluc  (FreeStyle Maris [place] 2 [place]) MAMI  Self No No   Sig: Use to check blood sugar daily   Continuous Blood Gluc Sensor (FreeStyle Celia 2 Sensor) MISC  Self No No   Sig: Change every 14 days   Microlet Lancets MISC  Self No No   Sig: USE 3 TIMES DAY   aspirin (ECOTRIN LOW STRENGTH) 81 mg EC tablet 2022 at Unknown time Self No Yes   Sig: Take 1 tablet (81 mg total) by mouth daily   atorvastatin (LIPITOR) 80 mg tablet  Self No No   Sig: TAKE 1 TABLET DAILY WITH DINNER   busPIRone (BUSPAR) 5 mg tablet   No No   Sig: Take 1 tablet (5 mg total) by mouth 3 (three) times a day   cholecalciferol (VITAMIN D3) 1,000 units tablet  Self No No   Sig: Take 1 tablet (1,000 Units total) by mouth daily   cyanocobalamin (VITAMIN B-12) 500 mcg tablet  Self Yes No   Sig: Take 500 mcg by mouth daily   finasteride (PROSCAR) 5 mg tablet   No No   Sig: Take 1 tablet (5 mg total) by mouth daily   gabapentin (NEURONTIN) 300 mg capsule   No No   Sig: Take 1 cap (300mg) by mouth in the morning and take 2 caps (600mg) at bedtime   glucose blood test strip  Self No No   Sig: Check 4 times a day   insulin glargine (Toujeo Max SoloStar) 300 units/mL CONCENTRATED U-300 injection pen (2-unit dial)   No No   Sig: INJECT 45 UNITS UNDER THE SKIN EVERY BEDTIME   insulin lispro (HumaLOG KwikPen) 100 units/mL injection pen   No No   Sig: INJECT 12 UNITS UNDER THE SKIN 3 TIMES A DAY BEFORE MEALS   latanoprost (XALATAN) 0 005 % ophthalmic solution  Self Yes No   Si drop daily at bedtime   liraglutide (Victoza) injection   No No   Sig: Inject 0 3 mL (1 8 mg total) under the skin daily   metoprolol tartrate (LOPRESSOR) 50 mg tablet   No No   Sig: Take 1 tablet (50 mg total) by mouth 2 (two) times a day   omeprazole (PriLOSEC) 40 MG capsule  Self No No   Sig: Take 1 capsule (40 mg total) by mouth daily   sertraline (Zoloft) 50 mg tablet   No No   Sig: Take 1 tablet (50 mg total) by mouth daily   tamsulosin (FLOMAX) 0 4 mg   No No   Sig: Take 2 capsules (0 8 mg total) by mouth daily with dinner   torsemide (DEMADEX) 20 mg tablet  Self No No   Sig: Take 1 tablet (20 mg total) by mouth 1 (one) time for 1 dose   Patient taking differently: Take 20 mg by mouth daily        Facility-Administered Medications: None       Past Medical History:   Diagnosis Date    [facility]     Chronic kidney disease 18 - 24 months? Dr David Small - stage 3    Diabetes mellitus ([facility] [address] [facility]     Hyperlipidemia     Hypertension     [facility] June 2019    Open heart surgery with quad bypass    Obesity     Renal disorder     Visual impairment 1991    Dr Hunter King       Past Surgical History:   Procedure Laterality Date    APPENDECTOMY  1977    Done in conjunction with cholecystectomy    BACK SURGERY      CHOLECYSTECTOMY  1977    COLONOSCOPY      CORONARY ARTERY BYPASS GRAFT  2019    quad    GALLBLADDER SURGERY      HERNIA REPAIR      MD CABG, ARTERY-VEIN, FOUR N/A 6/21/2019    Procedure: CORONARY ARTERY BYPASS GRAFT (CABG) 4 VESSELS WITH SVG TO PDA, OM, DIAGONAL AND LIMA TO LAD; RIGHT LEG EVH;  Surgeon: Sherice Stone MD;  [facility];  Service: Cardiac Surgery    RECTAL SURGERY      SPINE SURGERY  2012    Fusion L3-L5? Family History   Problem Relation Age of Onset    Cancer Mother     Alcohol abuse Mother     Cancer Father     Alcohol abuse Father     Diabetes Maternal Grandmother     Diabetes Paternal Aunt      I have reviewed and agree with the history as documented      E-Cigarette/Vaping    E-Cigarette Use Never User      E-Cigarette/Vaping Substances    Nicotine No     THC No     CBD No     Flavoring No     Other No     Unknown No      Social History     Tobacco Use    Smoking status: Former Smoker     Packs/day: 3 00     Years: 35 00     Pack years: 105 00     Types: Cigarettes, Cigars     Start date: 1959     Quit date:      Years since quittin 4    Smokeless tobacco: Never Used    Tobacco comment: Quit many times   Vaping Use    Vaping Use: Never used   Substance Use Topics    Alcohol use: Yes     Alcohol/week: 10 0 standard drinks     Types: 10 Shots of liquor per week     Comment: 1 drink every 6 months   Drug use: Never       Review of Systems   Constitutional: Positive for fatigue  Negative for appetite change, chills, diaphoresis and fever  HENT: Negative for nosebleeds  Eyes: Negative for visual disturbance  Respiratory: Positive for chest tightness  Negative for cough, shortness of breath and wheezing  Cardiovascular: Positive for chest pain  Negative for palpitations and leg swelling  Gastrointestinal: Positive for blood in stool and diarrhea  Negative for abdominal distention, abdominal pain, constipation, hematemesis, nausea and vomiting  Genitourinary: Negative for dysuria  Musculoskeletal: Negative for neck pain and neck stiffness  Skin: Negative for rash and wound  Neurological: Negative for dizziness, seizures, loss of consciousness, syncope, weakness, light-headedness, numbness and headaches  Psychiatric/Behavioral: Negative for confusion  All other systems reviewed and are negative  Physical Exam  Physical Exam  Vitals and nursing note reviewed  Exam conducted with a chaperone present (Brenna Gill RN)  Constitutional:       General: He is not in acute distress  Appearance: Normal appearance  He is obese  He is not ill-appearing, toxic-appearing or diaphoretic  HENT:      Head: Normocephalic and atraumatic  Nose: Nose normal  No congestion or rhinorrhea  Mouth/Throat:      Mouth: Mucous membranes are moist       Pharynx: No oropharyngeal exudate or posterior oropharyngeal erythema  Eyes:      General: No scleral icterus  Right eye: No discharge  Left eye: No discharge        Conjunctiva/sclera: Conjunctivae normal    Cardiovascular:      Rate and Rhythm: Normal rate and regular rhythm  Pulses: Normal pulses  Heart sounds: Normal heart sounds  No murmur heard  No friction rub  No gallop  Pulmonary:      Effort: Pulmonary effort is normal  No respiratory distress  Breath sounds: Normal breath sounds  No stridor  No wheezing, rhonchi or rales  Chest:      Chest wall: No tenderness  Abdominal:      General: Abdomen is flat  There is no distension  Palpations: Abdomen is soft  Tenderness: There is no abdominal tenderness  There is no right CVA tenderness, left CVA tenderness, guarding or rebound  Comments: Soft, nontender, nondistended, and without organomegaly   Genitourinary:     Rectum: Normal  Guaiac result positive  Comments: No hemorrhoids noted on examination  Musculoskeletal:         General: Normal range of motion  Cervical back: Normal range of motion  No rigidity  Right lower leg: No edema  Left lower leg: No edema  Skin:     General: Skin is warm and dry  Capillary Refill: Capillary refill takes less than 2 seconds  Coloration: Skin is not jaundiced or pale  Neurological:      General: No focal deficit present  Mental Status: He is alert and oriented to person, place, and time  Mental status is at baseline     Psychiatric:         Mood and Affect: Mood normal          Behavior: Behavior normal          Vital Signs  ED Triage Vitals [06/20/22 1740]   Temperature Pulse Respirations Blood Pressure SpO2   97 5 °F (36 4 °C) (!) 52 18 131/60 100 %      Temp Source Heart Rate Source Patient Position - Orthostatic VS BP Location FiO2 (%)   Oral Monitor Sitting Left arm --      Pain Score       --           Vitals:    06/20/22 1740 06/20/22 2022   BP: 131/60 145/71   Pulse: (!) 52 60   Patient Position - Orthostatic VS: Sitting Lying         Visual Acuity      ED Medications  Medications   sodium chloride 0 9 % bolus 1,000 mL (0 mL Intravenous Stopped 6/20/22 2000)       Diagnostic Studies  Results Reviewed     Procedure Component Value Units Date/Time    Urine Microscopic [[id number]]  (Abnormal) Collected: 06/20/22 2131    Lab Status: Final result Specimen: Urine, Clean Catch Updated: 06/20/22 2205     RBC, UA 2-4 /hpf      WBC, UA Innumerable /hpf      Epithelial Cells Occasional /hpf      Bacteria, UA Moderate /hpf      Hyaline Casts, UA 0-1 /lpf     Urine culture [[id number]] Collected: 06/20/22 2131    Lab Status: In process Specimen: Urine, Clean Catch Updated: 06/20/22 2205    UA w Reflex to Microscopic w Reflex to Culture [[id number]]  (Abnormal) Collected: 06/20/22 2131    Lab Status: Final result Specimen: Urine, Clean Catch Updated: 06/20/22 2148     Color, UA Yellow     Clarity, UA Cloudy     Specific Gravity, UA 1 015     pH, UA 6 0     Leukocytes, UA 2+     Nitrite, UA Negative     Protein, UA Trace mg/dl      Glucose, UA Negative mg/dl      Ketones, UA Negative mg/dl      Urobilinogen, UA 0 2 E U /dl      Bilirubin, UA Negative     Blood, UA Trace-Intact    HS Troponin I 2hr [[id number]]  (Normal) Collected: 06/20/22 2019    Lab Status: Final result Specimen: Blood from Line, Venous Updated: 06/20/22 2053     hs TnI 2hr 21 ng/L      Delta 2hr hsTnI -6 ng/L     CKMB [[id number]]  (Abnormal) Collected: 06/20/22 1833    Lab Status: Final result Specimen: Blood from Arm, Left Updated: 06/20/22 1930     CK-MB Index 4 8 %      CK-MB 8 3 ng/mL     Lactic acid [[id number]]  (Normal) Collected: 06/20/22 1833    Lab Status: Final result Specimen: Blood from Arm, Left Updated: 06/20/22 1913     LACTIC ACID 1 1 mmol/L     Narrative:      Result may be elevated if tourniquet was used during collection      HS Troponin 0hr (reflex protocol) [[id number]]  (Normal) Collected: 06/20/22 1833    Lab Status: Final result Specimen: Blood from Arm, Left Updated: 06/20/22 1910     hs TnI 0hr 27 ng/L     Occult Bloood,Fecal Immunochemical [[id number]]  (Abnormal) Collected: 06/20/22 1858    Lab Status: Final result Specimen: Stool from Per Rectum Updated: 06/20/22 1909     OCCULT BLD, FECAL IMMUNOLOGICAL Positive    Narrative:        Performed by Fecal Immunochemical Test     B-Type Natriuretic Peptide(BNP) AN, CA, EA Campuses Only [[id number]]  (Abnormal) Collected: 06/20/22 1833    Lab Status: Final result Specimen: Blood from Arm, Left Updated: 06/20/22 1908      pg/mL     Comprehensive metabolic panel [[id number]]  (Abnormal) Collected: 06/20/22 1833    Lab Status: Final result Specimen: Blood from Arm, Left Updated: 06/20/22 1904     Sodium 141 mmol/L      Potassium 4 8 mmol/L      Chloride 107 mmol/L      CO2 25 mmol/L      ANION GAP 9 mmol/L      BUN 67 mg/dL      Creatinine 2 38 mg/dL      Glucose 217 mg/dL      Calcium 9 1 mg/dL      AST 12 U/L      ALT 16 U/L      Alkaline Phosphatase 99 U/L      Total Protein 7 1 g/dL      Albumin 4 0 g/dL      Total Bilirubin 0 42 mg/dL      eGFR 25 ml/min/1 73sq m     Narrative:      [place] guidelines for Chronic Kidney Disease (CKD):     Stage 1 with normal or high GFR (GFR > 90 mL/min/1 73 square meters)    Stage 2 Mild CKD (GFR = 60-89 mL/min/1 73 square meters)    Stage 3A Moderate CKD (GFR = 45-59 mL/min/1 73 square meters)    Stage 3B Moderate CKD (GFR = 30-44 mL/min/1 73 square meters)    Stage 4 Severe CKD (GFR = 15-29 mL/min/1 73 square meters)    Stage 5 End Stage CKD (GFR <15 mL/min/1 73 square meters)  Note: GFR calculation is accurate only with a steady state creatinine    Lipase [[id number]]  (Normal) Collected: 06/20/22 1833    Lab Status: Final result Specimen: Blood from Arm, Left Updated: 06/20/22 1904     Lipase 37 u/L     CK Total with Reflex CKMB [[id number]]  (Normal) Collected: 06/20/22 1833    Lab Status: Final result Specimen: Blood from Arm, Left Updated: 06/20/22 1904     Total  U/L     Protime-INR [[id number]]  (Abnormal) Collected: 06/20/22 1833    Lab Status: Final result Specimen: Blood from Arm, Left Updated: 06/20/22 1900     Protime 14 8 seconds      INR 1 18    APTT [[id number]]  (Normal) Collected: 06/20/22 1833    Lab Status: Final result Specimen: Blood from Arm, Left Updated: 06/20/22 1900     PTT 28 seconds     CBC and differential [[id number]]  (Abnormal) Collected: 06/20/22 1833    Lab Status: Final result Specimen: Blood from Arm, Left Updated: 06/20/22 1845     WBC 7 32 Thousand/uL      RBC 2 80 Million/uL      Hemoglobin 8 5 g/dL      Hematocrit 26 7 %      MCV 95 fL      MCH 30 4 pg      MCHC 31 8 g/dL      RDW 14 9 %      MPV 11 3 fL      Platelets 092 Thousands/uL      nRBC 0 /100 WBCs      Neutrophils Relative 80 %      Immat GRANS % 0 %      Lymphocytes Relative 12 %      Monocytes Relative 6 %      Eosinophils Relative 2 %      Basophils Relative 0 %      Neutrophils Absolute 5 84 Thousands/µL      Immature Grans Absolute 0 01 Thousand/uL      Lymphocytes Absolute 0 87 Thousands/µL      Monocytes Absolute 0 43 Thousand/µL      Eosinophils Absolute 0 14 Thousand/µL      Basophils Absolute 0 03 Thousands/µL                  CT abdomen pelvis wo contrast   Final Result by Umberto Nielsen MD (06/20 1854)      1  Mild diffuse bladder wall thickening with asymmetric thickening of the right bladder wall, new since 10/19/2021  Correlate with urinalysis and cystoscopy  2   Otherwise, no acute abnormality in the abdomen or pelvis  The study was marked in [facility] for immediate notification  Workstation performed: [id number]         XR chest 1 view portable   Final Result by Flor Emerson MD (06/21 7017)   Stable cardiomegaly without acute pulmonary findings        Workstation performed: [id number]                    Procedures  ECG 12 Lead Documentation Only    Date/Time: 6/20/2022 6:03 PM  Performed by: Kinjal Mosqueda PA-C  Authorized by: Kinjal Mosqueda PA-C     Indications / Diagnosis:  Chest Pain  Patient location: ED  Interpretation:     Interpretation: normal    Rate:     ECG rate:  61    ECG rate assessment: normal    Rhythm:     Rhythm: sinus rhythm    Ectopy:     Ectopy: none    QRS:     QRS axis:  Left  ST segments:     ST segments:  Normal  T waves:     T waves: non-specific    Comments:      Normal sinus rhythm with a first-degree AV block with nonspecific T-wave inversions  Left axis deviation  No ectopy  No acute ST segment changes or signs of ischemia             ED Course  ED Course as of 06/21/22 1010   Mon Jun 20, 2022   0685 This patient was evaluated during a time of global shortage of iodinated contrast media  Based on guidance from the [employer] of Radiology, best practices, and local institutional approaches an alternative path for evaluating and managing the patient may have been employed in order to provide optimal care during this shortage  The current situation has been discussed with the patient  Ivy Meza Dr Mer Newby with gastroenterology  He recommends Protonix in addition to transfer to a higher level of care for EGD and colonoscopy  Will facilitate transfer  2020 Patient accepted to [facility]  EMTALA completed  SBIRT 22yo+    Flowsheet Row Most Recent Value   SBIRT (25 yo +)    In order to provide better care to our patients, we are screening all of our patients for alcohol and drug use  Would it be okay to ask you these screening questions? No Filed at: 06/20/2022 1741                    MDM  Number of Diagnoses or Management Options  Gastrointestinal hemorrhage, unspecified gastrointestinal hemorrhage type: new and requires workup  Diagnosis management comments: This is a 76-year-old male presenting to the emergency department today for gastrointestinal bleeding  Ongoing for 1 week  Does note some fatigue and chest pain but denies any abdominal pain, nausea, vomiting, diarrhea, constipation, or urinary symptoms  Vital signs are stable    On physical examination, the patient's abdomen is nontender to palpation  Rectal exam was chaperoned by Bridget VALLES  Normal rectal tone but stool appears dark and did test heme-positive  Patient's hemoglobin is 8 5 which is down from his baseline at 11  Initial troponin is 27 with repeat draw at 21  No lactic acidosis or leukocytosis  CT abdomen and pelvis nondiagnostic for any sources of gastrointestinal bleeding  This patient was evaluated during a time of global shortage of iodinated contrast media  Based on guidance from the [employer] of Radiology, best practices, and local institutional approaches an alternative path for evaluating and managing the patient may have been employed in order to provide optimal care during this shortage  The current situation has been discussed with the patient  I discussed the case with Dr Teressa Singh with GI who recommends Protonix drip in addition to transfer for colonoscopy and EGD  I discussed the case with Oliver Rico PA-C who accepts for transfer over to [facility]  Protonix gtt initiated  The patient and/or patient's proxy verify their understanding and agree to the plan at this time  All questions answered to the patient and/or their proxy's satisfaction  All labs reviewed and utilized in the medical decision making process  All radiology studies independently viewed by me and interpreted by the radiologist  Portions of the record may have been created with voice recognition software   Occasional wrong word or "sound a like" substitutions may have occurred due to the inherent limitations of voice recognition software   Read the chart carefully and recognize, using context, where substitutions have occurred         Amount and/or Complexity of Data Reviewed  Clinical lab tests: ordered and reviewed  Tests in the radiology section of CPT®: ordered and reviewed  Review and summarize past medical records: yes  Discuss the patient with other providers: yes (Beth Gunderson - GI)    Patient Progress  Patient progress: stable      Disposition  Final diagnoses:   Gastrointestinal hemorrhage, unspecified gastrointestinal hemorrhage type     Time reflects when diagnosis was documented in both MDM as applicable and the Disposition within this note     Time User Action Codes Description Comment    6/21/2022  8:08 AM Fredrick Zelaya Add [K92 2] Gastrointestinal hemorrhage, unspecified gastrointestinal hemorrhage type       ED Disposition     ED Disposition   Transfer to Another Facility-In Network    Condition   --    Date/Time   Mon Jun 20, 2022  8:18 PM    Comment   [place]  should be transferred out to [facility]             MD Documentation    Sudhir Ramos Most Recent Value   Patient Condition The patient has been stabilized such that within reasonable medical probability, no material deterioration of the patient condition or the condition of the unborn child(carlos) is likely to result from the transfer   Reason for Transfer Level of Care needed not available at this facility   Benefits of Transfer Specialized equipment and/or services available at the receiving facility (Include comment)________________________   Risks of Transfer Potential for delay in receiving treatment, Potential deterioration of medical condition, Loss of IV, Possible worsening of condition or death during transfer, Increased discomfort during transfer   Accepting Physician Dr Katarzyna Butcher Name, [place], [place])    (Name & Tel number) Joey Iyer RN   Transported by (Company and Unit #) [id number]   Sending MD Dr Ryan Carbajal and Wilfredo WYLIE-C   Provider Certification General risk, such as traffic hazards, adverse weather conditions, rough terrain or turbulence, possible failure of equipment (including vehicle or aircraft), or consequences of actions of persons outside the control of the transport personnel, Unanticipated needs of medical equipment and personnel during transport, Risk of worsening condition, The possibility of a transport vehicle being unavailable      RN Documentation    [address] Stein Name, Sascha Guardado ([place], [place])    (Name & Tel number) Fransico Martinez RN   Transported by [employer] and Unit #) [id number]      Follow-up Information    None         Discharge Medication List as of 6/20/2022  9:52 PM      CONTINUE these medications which have NOT CHANGED    Details   Alpha-Lipoic Acid 600 MG CAPS TAKE 1 CAPSULE BY MOUTH EVERY DAY, Normal      Ascorbic Acid (VITAMIN C) 1000 MG tablet Take 1,000 mg by mouth daily, Historical Med      aspirin (ECOTRIN LOW STRENGTH) 81 mg EC tablet Take 1 tablet (81 mg total) by mouth daily, Starting Wed 4/14/2021, Normal      atorvastatin (LIPITOR) 80 mg tablet TAKE 1 TABLET DAILY WITH DINNER, Normal      B-D ULTRAFINE III SHORT PEN 31G X 8 MM MISC INJECT INTO THE SKIN 4 (FOUR) TIMES A DAY, Starting Thu 12/17/2020, Normal      busPIRone (BUSPAR) 5 mg tablet Take 1 tablet (5 mg total) by mouth 3 (three) times a day, Starting Wed 3/2/2022, Normal      cholecalciferol (VITAMIN D3) 1,000 units tablet Take 1 tablet (1,000 Units total) by mouth daily, Starting Mon 10/21/2019, No Print      Continuous Blood Gluc  (FreeStyle [place] 2 [place]) MAMI Use to check blood sugar daily, Normal      Continuous Blood Gluc Sensor (FreeStyle Celia 2 Sensor) MISC Change every 14 days, Normal      cyanocobalamin (VITAMIN B-12) 500 mcg tablet Take 500 mcg by mouth daily, Historical Med      finasteride (PROSCAR) 5 mg tablet Take 1 tablet (5 mg total) by mouth daily, Starting Tue 3/29/2022, Normal      gabapentin (NEURONTIN) 300 mg capsule Take 1 cap (300mg) by mouth in the morning and take 2 caps (600mg) at bedtime, Normal      glucose blood test strip Check 4 times a day, Normal      insulin glargine (Toujeo Max SoloStar) 300 units/mL CONCENTRATED U-300 injection pen (2-unit dial) INJECT 45 UNITS UNDER THE SKIN EVERY BEDTIME, No Print      insulin lispro (HumaLOG KwikPen) 100 units/mL injection pen INJECT 12 UNITS UNDER THE SKIN 3 TIMES A DAY BEFORE MEALS, No Print      latanoprost (XALATAN) 0 005 % ophthalmic solution 1 drop daily at bedtime, Historical Med      liraglutide (Victoza) injection Inject 0 3 mL (1 8 mg total) under the skin daily, Starting Tue 3/29/2022, Normal      metoprolol tartrate (LOPRESSOR) 50 mg tablet Take 1 tablet (50 mg total) by mouth 2 (two) times a day, Starting Sun 5/29/2022, Normal      Microlet Lancets MISC USE 3 TIMES DAY, Normal      omeprazole (PriLOSEC) 40 MG capsule Take 1 capsule (40 mg total) by mouth daily, Starting Tue 12/21/2021, Normal      sertraline (Zoloft) 50 mg tablet Take 1 tablet (50 mg total) by mouth daily, Starting Wed 3/2/2022, Normal      tamsulosin (FLOMAX) 0 4 mg Take 2 capsules (0 8 mg total) by mouth daily with dinner, Starting Tue 3/29/2022, Normal      torsemide (DEMADEX) 20 mg tablet Take 1 tablet (20 mg total) by mouth 1 (one) time for 1 dose, Starting Wed 12/1/2021, Normal             No discharge procedures on file      PDMP Review     None          ED Provider  Electronically Signed by           Madeline Gallagher PA-C  06/21/22 0268       Madeline Gallagher PA-C  06/21/22 1529

## 2022-06-21 NOTE — EMTALA/ACUTE CARE TRANSFER
[facility] EMERGENCY DEPARTMENT  [address] [facility] [address]  Dept: [phone]      EMTALA TRANSFER CONSENT    NAME Yobani JONES 1945                              MRN [id number]    I have been informed of my rights regarding examination, treatment, and transfer   by Dr Alphonso Harmon DO and Lianna Zuniga PA-C    Benefits: Specialized equipment and/or services available at the receiving facility (Include comment)________________________    Risks: Potential for delay in receiving treatment, Potential deterioration of medical condition, Loss of IV, Possible worsening of condition or death during transfer, Increased discomfort during transfer      Transfer Request   I acknowledge that my medical condition has been evaluated and explained to me by the emergency department physician or other qualified medical person and/or my attending physician who has recommended and offered to me further medical examination and treatment  I understand the Hospital's obligation with respect to the treatment and stabilization of my emergency medical condition  I nevertheless request to be transferred  I release the Hospital, the doctor, and any other persons caring for me from all responsibility or liability for any injury or ill effects that may result from my transfer and agree to accept all responsibility for the consequences of my choice to transfer, rather than receive stabilizing treatment at the Hospital  I understand that because the transfer is my request, my insurance may not provide reimbursement for the services  The Hospital will assist and direct me and my family in how to make arrangements for transfer, but the hospital is not liable for any fees charged by the transport service    In spite of this understanding, I refuse to consent to further medical examination and treatment which has been offered to me, and request transfer to Accepting Facility Name, [address] [place], [place])  I authorize the performance of emergency medical procedures and treatments upon me in both transit and upon arrival at the receiving facility  Additionally, I authorize the release of any and all medical records to the receiving facility and request they be transported with me, if possible  I authorize the performance of emergency medical procedures and treatments upon me in both transit and upon arrival at the receiving facility  Additionally, I authorize the release of any and all medical records to the receiving facility and request they be transported with me, if possible  I understand that the safest mode of transportation during a medical emergency is an ambulance and that the Hospital advocates the use of this mode of transport  Risks of traveling to the receiving facility by car, including absence of medical control, life sustaining equipment, such as oxygen, and medical personnel has been explained to me and I fully understand them  (LLOYD CORRECT BOX BELOW)  [  ]  I consent to the stated transfer and to be transported by ambulance/helicopter  [  ]  I consent to the stated transfer, but refuse transportation by ambulance and accept full responsibility for my transportation by car  I understand the risks of non-ambulance transfers and I exonerate the Hospital and its staff from any deterioration in my condition that results from this refusal     X___________________________________________    DATE  22  TIME________  Signature of patient or legally responsible individual signing on patient behalf           RELATIONSHIP TO PATIENT_________________________          Provider Certification    NAME Rose Garcia  [facility] 1945                              MRN [id number]    A medical screening exam was performed on the above named patient    Based on the examination:    Condition Necessitating Transfer There were no encounter diagnoses  Patient Condition: The patient has been stabilized such that within reasonable medical probability, no material deterioration of the patient condition or the condition of the unborn child(carlos) is likely to result from the transfer    Reason for Transfer: Level of Care needed not available at this facility    Transfer Requirements: [facility] [address] [place] [address]   · Space available and qualified personnel available for treatment as acknowledged by Ramirez Jewell RN  · Agreed to accept transfer and to provide appropriate medical treatment as acknowledged by       Dr Alfredo Gilliam  · Appropriate medical records of the examination and treatment of the patient are provided at the time of transfer   [address] _______  · Transfer will be performed by qualified personnel from [facility]  and appropriate transfer equipment as required, including the use of necessary and appropriate life support measures      Provider Certification: I have examined the patient and explained the following risks and benefits of being transferred/refusing transfer to the patient/family:  General risk, such as traffic hazards, adverse weather conditions, rough terrain or turbulence, possible failure of equipment (including vehicle or aircraft), or consequences of actions of persons outside the control of the transport personnel, Unanticipated needs of medical equipment and personnel during transport, Risk of worsening condition, The possibility of a transport vehicle being unavailable      Based on these reasonable risks and benefits to the patient and/or the unborn child(carlos), and based upon the information available at the time of the patients examination, I certify that the medical benefits reasonably to be expected from the provision of appropriate medical treatments at another medical facility outweigh the increasing risks, if any, to the individuals medical condition, and in the case of labor to the unborn child, from effecting the transfer      X____________________________________________ DATE 06/20/22        TIME_______      ORIGINAL - SEND TO MEDICAL RECORDS   COPY - SEND WITH PATIENT DURING TRANSFER

## 2022-06-21 NOTE — ASSESSMENT & PLAN NOTE
Melena mixed w/some brbpr  Suspect upper gib  Given diarrheal component r/o infectious etiology      Suspect upper GI bleed given evidence of melena  Transfuse 1 unit of packed red blood cells  Has acute blood loss anemia secondary to gastrointestinal bleed  Continue clear diet  Endoscopy colonoscopy scheduled by the GI service  Situation is of high risk

## 2022-06-21 NOTE — H&P
[address] [facility]  H&P- Loraine Gonzales  1945, 77 y o  male MRN: [id number]  Unit/Bed#: E2 -01 Encounter: [id number]  Primary Care Provider: Griselda Quiroz DO   Date and time admitted to hospital: 6/20/2022 10:20 PM    * GI bleed  Assessment & Plan  Melena mixed w/some brbpr  Suspect upper gib  Transferred from [facility] for GI evaluation  Serial h/h, iv protonix gtt  Monitor vs, npo consult gi    Chronic diastolic (congestive) heart failure (HCC)  Assessment & Plan  Wt Readings from Last 3 Encounters:   03/16/22 109 kg (240 lb)   03/02/22 110 kg (243 lb)   01/26/22 111 kg (244 lb 14 4 oz)     Appears slightly euvolemic  Daily weights I/os continue bb hold torsemide        Essential hypertension  Assessment & Plan  Continue lopressor hold torsemide    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[address] )  Assessment & Plan  Ck wnl  Monitor for myalgias    Coronary artery disease involving native coronary artery of native heart without angina pectoris  Assessment & Plan  W/hx of cabg in 2019  Continue bb statin, hold asa    Type 2 diabetes mellitus with stage 4 chronic kidney disease, with long-term current use of insulin (HCC)  Assessment & Plan  Lab Results   Component Value Date    HGBA1C 5 9 (H) 02/28/2022       No results for input(s): POCGLU in the last 72 hours  Blood Sugar Average: Last 72 hrs:  hold humalog  1/2 lanttus as substitute for toujeo    Ssi/poc bs    CKD (chronic kidney disease), stage IV [facility])  Assessment & Plan  Lab Results   Component Value Date    EGFR 25 06/20/2022    EGFR 26 05/03/2022    EGFR 25 02/28/2022    CREATININE 2 38 (H) 06/20/2022    CREATININE 2 30 (H) 05/03/2022    CREATININE 2 36 (H) 02/28/2022   creatinine near baseline  Monitor w/treatment for gib      VTE Prophylaxis: Pharmacologic VTE Prophylaxis contraindicated due to gib  / sequential compression device   Code Status: fc  POLST: There is no POLST form on file for this patient (pre-hospital)  Discussion with family:     Anticipated Length of Stay:  Patient will be admitted on an Inpatient basis with an anticipated length of stay of  Greater than 2 midnights  Justification for Hospital Stay: gib    Total Time for Visit, including Counseling / Coordination of Care: 45 minutes  Greater than 50% of this total time spent on direct patient counseling and coordination of care  Chief Complaint:   Brbpr/melena and diarrhea    History of Present Illness:    Yoon Fay  is a 77 y o  male who presents with pmh of cad s/p cabg 2019 on asa, dm, ckd stage 3 chronic diastolic chf, htn, hld comijng to hospital for brbpr and melena x 1 5 weeks  Pt notes diarrhea and watery loose stools both melanotic with component of red in them over the last week/half  He initially noted they were perhaps from red velvet cake  However they did not improve after his diet remained cake free  He endorses that he likes beets but has had none recently nor pepto bismol  No one else has diarrhea and he has no n/v/f/c  He notes his wife is a nurse or previously was a nurse and has placed several medical chucks around the home in his bed as he has trouble getting to the bathroom in time  After a week and a half his wife convinced him to go to the ER where he was found to be heme positive and case was d/w GI who recommended transfer  As there were no beds available in [facility] he was accepted to [facility] as a transfer from [facility] for GIB  Review of Systems:    Review of Systems   Constitutional: Negative for appetite change, chills and fever  Respiratory: Negative for cough and shortness of breath  Cardiovascular: Negative for chest pain  Gastrointestinal: Positive for abdominal pain and blood in stool  Negative for diarrhea, nausea and vomiting  All other systems reviewed and are negative        Past Medical and Surgical History:     Past Medical History:   Diagnosis Date    CHF (congestive heart failure) (HCC)     Chronic kidney disease 18 - 24 months? Dr Bartolo Louis - stage 3    Diabetes mellitus ([facility] [address] [address] )     Hyperlipidemia     Hypertension     Myocardial infarction [facility]) June 2019    Open heart surgery with quad bypass    Obesity     Renal disorder     Visual impairment 1991    Dr Lyn Guevara       Past Surgical History:   Procedure Laterality Date    APPENDECTOMY  1977    Done in conjunction with cholecystectomy    BACK SURGERY      CHOLECYSTECTOMY  1977    COLONOSCOPY      CORONARY ARTERY BYPASS GRAFT  2019    quad    GALLBLADDER SURGERY      HERNIA REPAIR      SD CABG, ARTERY-VEIN, FOUR N/A 6/21/2019    Procedure: CORONARY ARTERY BYPASS GRAFT (CABG) 4 VESSELS WITH SVG TO PDA, OM, DIAGONAL AND LIMA TO LAD; RIGHT LEG EVH;  Surgeon: Buffy Currie MD;  Location: [facility];  Service: Cardiac Surgery    RECTAL SURGERY      SPINE SURGERY  2012    Fusion L3-L5? Meds/Allergies:    Prior to Admission medications    Medication Sig Start Date End Date Taking?  Authorizing Provider   Alpha-Lipoic Acid 600 MG CAPS TAKE 1 CAPSULE BY MOUTH EVERY DAY  Patient taking differently: TAKE ONE CAPSULE DAILY 4/14/21   Tequila Cross PA-C   Ascorbic Acid (VITAMIN C) 1000 MG tablet Take 1,000 mg by mouth daily    Historical Provider, MD   aspirin (ECOTRIN LOW STRENGTH) 81 mg EC tablet Take 1 tablet (81 mg total) by mouth daily 4/14/21   Reuben Curtis MD   atorvastatin (LIPITOR) 80 mg tablet TAKE 1 TABLET DAILY WITH DINNER 4/2/21   MD FANNIE OakleyD ULTRAFINE III SHORT PEN 31G X 8 MM MISC INJECT INTO THE SKIN 4 (FOUR) TIMES A DAY 12/17/20   Farnaz Crooks PA-C   busPIRone (BUSPAR) 5 mg tablet Take 1 tablet (5 mg total) by mouth 3 (three) times a day 3/2/22   Betito Silva DO   cholecalciferol (VITAMIN D3) 1,000 units tablet Take 1 tablet (1,000 Units total) by mouth daily 10/21/19   Lacey Bassett MD   Continuous Blood Gluc  ([facility] [address] [place]) MAMI Use to check blood sugar daily 11/22/21   Farnaz Crooks PA-C   Continuous Blood Gluc Sensor (FreeStyle Celia 2 Sensor) MISC Change every 14 days 1/19/22   Farnaz Crooks PA-C   cyanocobalamin (VITAMIN B-12) 500 mcg tablet Take 500 mcg by mouth daily    Historical Provider, MD   finasteride (PROSCAR) 5 mg tablet Take 1 tablet (5 mg total) by mouth daily 3/29/22   Raven Toney PA-C   gabapentin (NEURONTIN) 300 mg capsule Take 1 cap (300mg) by mouth in the morning and take 2 caps (600mg) at bedtime 3/29/22   Alhaji Garcia PA-C   glucose blood test strip Check 4 times a day 11/16/20   Farnaz Crooks PA-C   insulin glargine (Toujeo Max SoloStar) 300 units/mL CONCENTRATED U-300 injection pen (2-unit dial) INJECT 45 UNITS UNDER THE SKIN EVERY BEDTIME 5/4/22   James Egan MD   insulin lispro (HumaLOG KwikPen) 100 units/mL injection pen INJECT 12 UNITS UNDER THE SKIN 3 TIMES A DAY BEFORE MEALS 5/4/22   James Egan MD   latanoprost (XALATAN) 0 005 % ophthalmic solution 1 drop daily at bedtime    Historical Provider, MD   liraglutide (Victoza) injection Inject 0 3 mL (1 8 mg total) under the skin daily 3/29/22   Farnaz Crooks PA-C   metoprolol tartrate (LOPRESSOR) 50 mg tablet Take 1 tablet (50 mg total) by mouth 2 (two) times a day 5/29/22   Fernando Callahan DO   Microlet Lancets MISC USE 3 TIMES DAY 11/18/20   Farnaz Crooks PA-C   omeprazole (PriLOSEC) 40 MG capsule Take 1 capsule (40 mg total) by mouth daily 12/21/21   Maris Lemons DO   sertraline (Zoloft) 50 mg tablet Take 1 tablet (50 mg total) by mouth daily 3/2/22   Fernando Callahan DO   tamsulosin [facility]) 0 4 mg Take 2 capsules (0 8 mg total) by mouth daily with dinner 3/29/22   Raven Toney PA-C   torsemide (DEMADEX) 20 mg tablet Take 1 tablet (20 mg total) by mouth 1 (one) time for 1 dose  Patient taking differently: Take 20 mg by mouth daily   12/1/21 3/16/22  Fernando Callahan DO I have reviewed home medications with patient personally  Allergies: No Known Allergies    Social History:     Marital Status: /Civil Union   Occupation:   Patient Pre-hospital Living Situation:   Patient Pre-hospital Level of Mobility:   Patient Pre-hospital Diet Restrictions:   Substance Use History:   Social History     Substance and Sexual Activity   Alcohol Use Yes    Alcohol/week: 10 0 standard drinks    Types: 10 Shots of liquor per week    Comment: 1 drink every 6 months  Social History     Tobacco Use   Smoking Status Former Smoker    Packs/day: 3 00    Years: 35 00    Pack years: 105 00    Types: Cigarettes, Cigars    Start date: 1959   Susan Sang Quit date: 12    Years since quittin 4   Smokeless Tobacco Never Used   Tobacco Comment    Quit many times     Social History     Substance and Sexual Activity   Drug Use Never       Family History:       Family History   Problem Relation Age of Onset    Cancer Mother     Alcohol abuse Mother     Cancer Father     Alcohol abuse Father     Diabetes Maternal Grandmother     Diabetes Paternal Aunt        Physical Exam:     Vitals:   Blood Pressure: 157/72 (22 2331)  Pulse: 61 (22 2331)  Temperature: 97 5 °F (36 4 °C) (22)  Temp Source: Temporal (22)  Respirations: 18 (22)  SpO2: 99 % (22)    Physical Exam  Vitals reviewed  Constitutional:       General: He is not in acute distress  Appearance: He is not ill-appearing, toxic-appearing or diaphoretic  HENT:      Head: Normocephalic and atraumatic  Right Ear: External ear normal       Left Ear: External ear normal       Nose: Nose normal    Eyes:      Extraocular Movements: Extraocular movements intact  Cardiovascular:      Rate and Rhythm: Normal rate and regular rhythm  Heart sounds: No murmur heard  No friction rub  No gallop  Pulmonary:      Effort: No respiratory distress  Breath sounds:  No stridor  No wheezing, rhonchi or rales  Abdominal:      General: There is no distension  Palpations: Abdomen is soft  There is no mass  Tenderness: There is no abdominal tenderness  There is no guarding or rebound  Hernia: No hernia is present  Musculoskeletal:      Cervical back: Normal range of motion  Right lower leg: No edema  Left lower leg: No edema  Skin:     General: Skin is warm and dry  Neurological:      Mental Status: He is alert  Mental status is at baseline  Psychiatric:         Mood and Affect: Mood normal              Additional Data:     Lab Results: I have personally reviewed pertinent reports  Results from last 7 days   Lab Units 06/20/22  1833   WBC Thousand/uL 7 32   HEMOGLOBIN g/dL 8 5*   HEMATOCRIT % 26 7*   PLATELETS Thousands/uL 207   NEUTROS PCT % 80*   LYMPHS PCT % 12*   MONOS PCT % 6   EOS PCT % 2     Results from last 7 days   Lab Units 06/20/22  1833   SODIUM mmol/L 141   POTASSIUM mmol/L 4 8   CHLORIDE mmol/L 107   CO2 mmol/L 25   BUN mg/dL 67*   CREATININE mg/dL 2 38*   ANION GAP mmol/L 9   CALCIUM mg/dL 9 1   ALBUMIN g/dL 4 0   TOTAL BILIRUBIN mg/dL 0 42   ALK PHOS U/L 99   ALT U/L 16   AST U/L 12*   GLUCOSE RANDOM mg/dL 217*     Results from last 7 days   Lab Units 06/20/22  1833   INR  1 18             Results from last 7 days   Lab Units 06/20/22  1833   LACTIC ACID mmol/L 1 1       Imaging: I have personally reviewed pertinent reports  No orders to display       EKG, Pathology, and Other Studies Reviewed on Admission:   · EKG:      Allscripts / Epic Records Reviewed: Yes     ** Please Note: This note has been constructed using a voice recognition system   **

## 2022-06-21 NOTE — ASSESSMENT & PLAN NOTE
Lab Results   Component Value Date    HGBA1C 5 9 (H) 02/28/2022       Recent Labs     06/20/22  2337 06/21/22  0530 06/21/22  1101   POCGLU 206* 169* 180*       Blood Sugar Average: Last 72 hrs:  Home regimen reviewed  Hold Metformin if applicable    Start SSI and Basal bolus protocol

## 2022-06-21 NOTE — ASSESSMENT & PLAN NOTE
Lab Results   Component Value Date    HGBA1C 5 9 (H) 02/28/2022       No results for input(s): POCGLU in the last 72 hours  Blood Sugar Average: Last 72 hrs:  hold humalog  1/2 lanttus as substitute for toujeo    Ssi/poc bs

## 2022-06-21 NOTE — ASSESSMENT & PLAN NOTE
Wt Readings from Last 3 Encounters:   03/16/22 109 kg (240 lb)   03/02/22 110 kg (243 lb)   01/26/22 111 kg (244 lb 14 4 oz)     Euvolemic by physical exam  Daily weights I/os continue bb hold torsemide

## 2022-06-21 NOTE — ASSESSMENT & PLAN NOTE
Melena mixed w/some brbpr  Suspect upper gib  Given diarrheal component r/o infectious etiology  Hold asa from cad  Transferred from [facility] for GI evaluation  Check enteric pathogen panel/c diff  Serial h/h, iv protonix gtt    Monitor vs, npo consult gi

## 2022-06-21 NOTE — ASSESSMENT & PLAN NOTE
Lab Results   Component Value Date    EGFR 26 06/21/2022    EGFR 25 06/20/2022    EGFR 26 05/03/2022    CREATININE 2 31 (H) 06/21/2022    CREATININE 2 38 (H) 06/20/2022    CREATININE 2 30 (H) 05/03/2022   Monitor renal function appears around baseline  BUN elevated in the setting of potential GI bleed

## 2022-06-21 NOTE — ASSESSMENT & PLAN NOTE
Wt Readings from Last 3 Encounters:   03/16/22 109 kg (240 lb)   03/02/22 110 kg (243 lb)   01/26/22 111 kg (244 lb 14 4 oz)     Appears slightly euvolemic  Daily weights I/os continue bb hold torsemide

## 2022-06-22 ENCOUNTER — ANESTHESIA (INPATIENT)
Dept: GASTROENTEROLOGY | Facility: HOSPITAL | Age: 77
DRG: 378 | End: 2022-06-22
Payer: COMMERCIAL

## 2022-06-22 ENCOUNTER — APPOINTMENT (INPATIENT)
Dept: GASTROENTEROLOGY | Facility: HOSPITAL | Age: 77
DRG: 378 | End: 2022-06-22
Payer: COMMERCIAL

## 2022-06-22 ENCOUNTER — ANESTHESIA EVENT (INPATIENT)
Dept: GASTROENTEROLOGY | Facility: HOSPITAL | Age: 77
DRG: 378 | End: 2022-06-22
Payer: COMMERCIAL

## 2022-06-22 DIAGNOSIS — I10 PRIMARY HYPERTENSION: ICD-10-CM

## 2022-06-22 PROBLEM — E66.9 OBESITY: Status: RESOLVED | Noted: 2019-09-05 | Resolved: 2022-06-22

## 2022-06-22 LAB
ABO GROUP BLD BPU: NORMAL
BPU ID: NORMAL
CROSSMATCH: NORMAL
GLUCOSE SERPL-MCNC: 124 MG/DL (ref 65–140)
GLUCOSE SERPL-MCNC: 137 MG/DL (ref 65–140)
GLUCOSE SERPL-MCNC: 138 MG/DL (ref 65–140)
GLUCOSE SERPL-MCNC: 185 MG/DL (ref 65–140)
GLUCOSE SERPL-MCNC: 214 MG/DL (ref 65–140)
HCT VFR BLD AUTO: 25.3 % (ref 36.5–49.3)
HGB BLD-MCNC: 8 G/DL (ref 12–17)
UNIT DISPENSE STATUS: NORMAL
UNIT PRODUCT CODE: NORMAL
UNIT PRODUCT VOLUME: 350 ML
UNIT RH: NORMAL

## 2022-06-22 PROCEDURE — C9113 INJ PANTOPRAZOLE SODIUM, VIA: HCPCS | Performed by: PHYSICIAN ASSISTANT

## 2022-06-22 PROCEDURE — 85018 HEMOGLOBIN: CPT | Performed by: INTERNAL MEDICINE

## 2022-06-22 PROCEDURE — 43239 EGD BIOPSY SINGLE/MULTIPLE: CPT | Performed by: INTERNAL MEDICINE

## 2022-06-22 PROCEDURE — 85014 HEMATOCRIT: CPT | Performed by: INTERNAL MEDICINE

## 2022-06-22 PROCEDURE — 0DB38ZX EXCISION OF LOWER ESOPHAGUS, VIA NATURAL OR ARTIFICIAL OPENING ENDOSCOPIC, DIAGNOSTIC: ICD-10-PCS | Performed by: INTERNAL MEDICINE

## 2022-06-22 PROCEDURE — 88305 TISSUE EXAM BY PATHOLOGIST: CPT | Performed by: PATHOLOGY

## 2022-06-22 PROCEDURE — 99233 SBSQ HOSP IP/OBS HIGH 50: CPT | Performed by: INTERNAL MEDICINE

## 2022-06-22 PROCEDURE — 45380 COLONOSCOPY AND BIOPSY: CPT | Performed by: INTERNAL MEDICINE

## 2022-06-22 PROCEDURE — 0DB68ZX EXCISION OF STOMACH, VIA NATURAL OR ARTIFICIAL OPENING ENDOSCOPIC, DIAGNOSTIC: ICD-10-PCS | Performed by: INTERNAL MEDICINE

## 2022-06-22 PROCEDURE — 0DBL8ZZ EXCISION OF TRANSVERSE COLON, VIA NATURAL OR ARTIFICIAL OPENING ENDOSCOPIC: ICD-10-PCS | Performed by: INTERNAL MEDICINE

## 2022-06-22 PROCEDURE — 0DB98ZX EXCISION OF DUODENUM, VIA NATURAL OR ARTIFICIAL OPENING ENDOSCOPIC, DIAGNOSTIC: ICD-10-PCS | Performed by: INTERNAL MEDICINE

## 2022-06-22 PROCEDURE — 45385 COLONOSCOPY W/LESION REMOVAL: CPT | Performed by: INTERNAL MEDICINE

## 2022-06-22 PROCEDURE — 0DB68ZZ EXCISION OF STOMACH, VIA NATURAL OR ARTIFICIAL OPENING ENDOSCOPIC: ICD-10-PCS | Performed by: INTERNAL MEDICINE

## 2022-06-22 PROCEDURE — 82948 REAGENT STRIP/BLOOD GLUCOSE: CPT

## 2022-06-22 PROCEDURE — 43251 EGD REMOVE LESION SNARE: CPT | Performed by: INTERNAL MEDICINE

## 2022-06-22 PROCEDURE — 0DBH8ZZ EXCISION OF CECUM, VIA NATURAL OR ARTIFICIAL OPENING ENDOSCOPIC: ICD-10-PCS | Performed by: INTERNAL MEDICINE

## 2022-06-22 RX ORDER — PANTOPRAZOLE SODIUM 40 MG/1
40 TABLET, DELAYED RELEASE ORAL
Status: DISCONTINUED | OUTPATIENT
Start: 2022-06-22 | End: 2022-06-23 | Stop reason: HOSPADM

## 2022-06-22 RX ORDER — PROPOFOL 10 MG/ML
INJECTION, EMULSION INTRAVENOUS AS NEEDED
Status: DISCONTINUED | OUTPATIENT
Start: 2022-06-22 | End: 2022-06-22

## 2022-06-22 RX ORDER — METOPROLOL TARTRATE 50 MG/1
TABLET, FILM COATED ORAL
Qty: 180 TABLET | Refills: 1 | Status: SHIPPED | OUTPATIENT
Start: 2022-06-22

## 2022-06-22 RX ORDER — INSULIN LISPRO 100 [IU]/ML
1-6 INJECTION, SOLUTION INTRAVENOUS; SUBCUTANEOUS
Status: DISCONTINUED | OUTPATIENT
Start: 2022-06-22 | End: 2022-06-23 | Stop reason: HOSPADM

## 2022-06-22 RX ORDER — OXYCODONE HYDROCHLORIDE 5 MG/1
5 TABLET ORAL EVERY 6 HOURS PRN
Status: DISCONTINUED | OUTPATIENT
Start: 2022-06-22 | End: 2022-06-23 | Stop reason: HOSPADM

## 2022-06-22 RX ORDER — SODIUM CHLORIDE 9 MG/ML
125 INJECTION, SOLUTION INTRAVENOUS CONTINUOUS
Status: DISCONTINUED | OUTPATIENT
Start: 2022-06-22 | End: 2022-06-22

## 2022-06-22 RX ORDER — EPHEDRINE SULFATE 50 MG/ML
INJECTION INTRAVENOUS AS NEEDED
Status: DISCONTINUED | OUTPATIENT
Start: 2022-06-22 | End: 2022-06-22

## 2022-06-22 RX ORDER — PROPOFOL 10 MG/ML
INJECTION, EMULSION INTRAVENOUS CONTINUOUS PRN
Status: DISCONTINUED | OUTPATIENT
Start: 2022-06-22 | End: 2022-06-22

## 2022-06-22 RX ORDER — SODIUM CHLORIDE 9 MG/ML
125 INJECTION, SOLUTION INTRAVENOUS CONTINUOUS
Status: CANCELLED | OUTPATIENT
Start: 2022-06-22

## 2022-06-22 RX ADMIN — BUSPIRONE HYDROCHLORIDE 5 MG: 5 TABLET ORAL at 17:20

## 2022-06-22 RX ADMIN — BUSPIRONE HYDROCHLORIDE 5 MG: 5 TABLET ORAL at 08:16

## 2022-06-22 RX ADMIN — GABAPENTIN 300 MG: 300 CAPSULE ORAL at 17:20

## 2022-06-22 RX ADMIN — SODIUM CHLORIDE 8 MG/HR: 9 INJECTION, SOLUTION INTRAVENOUS at 06:41

## 2022-06-22 RX ADMIN — PROPOFOL 140 MCG/KG/MIN: 10 INJECTION, EMULSION INTRAVENOUS at 15:02

## 2022-06-22 RX ADMIN — PANTOPRAZOLE SODIUM 40 MG: 40 TABLET, DELAYED RELEASE ORAL at 17:20

## 2022-06-22 RX ADMIN — METOPROLOL TARTRATE 50 MG: 50 TABLET, FILM COATED ORAL at 22:14

## 2022-06-22 RX ADMIN — FINASTERIDE 5 MG: 5 TABLET, FILM COATED ORAL at 08:16

## 2022-06-22 RX ADMIN — EPHEDRINE SULFATE 5 MG: 50 INJECTION, SOLUTION INTRAVENOUS at 15:30

## 2022-06-22 RX ADMIN — INSULIN GLARGINE 22 UNITS: 100 INJECTION, SOLUTION SUBCUTANEOUS at 22:14

## 2022-06-22 RX ADMIN — TAMSULOSIN HYDROCHLORIDE 0.8 MG: 0.4 CAPSULE ORAL at 17:20

## 2022-06-22 RX ADMIN — GABAPENTIN 300 MG: 300 CAPSULE ORAL at 22:14

## 2022-06-22 RX ADMIN — SODIUM CHLORIDE 125 ML/HR: 0.9 INJECTION, SOLUTION INTRAVENOUS at 14:23

## 2022-06-22 RX ADMIN — INSULIN LISPRO 1 UNITS: 100 INJECTION, SOLUTION INTRAVENOUS; SUBCUTANEOUS at 00:39

## 2022-06-22 RX ADMIN — BUSPIRONE HYDROCHLORIDE 5 MG: 5 TABLET ORAL at 22:14

## 2022-06-22 RX ADMIN — GABAPENTIN 300 MG: 300 CAPSULE ORAL at 08:16

## 2022-06-22 RX ADMIN — METOPROLOL TARTRATE 50 MG: 50 TABLET, FILM COATED ORAL at 08:16

## 2022-06-22 RX ADMIN — SERTRALINE HYDROCHLORIDE 50 MG: 50 TABLET ORAL at 08:16

## 2022-06-22 RX ADMIN — ATORVASTATIN CALCIUM 80 MG: 80 TABLET, FILM COATED ORAL at 17:20

## 2022-06-22 RX ADMIN — PROPOFOL 140 MG: 10 INJECTION, EMULSION INTRAVENOUS at 15:01

## 2022-06-22 NOTE — ASSESSMENT & PLAN NOTE
Lab Results   Component Value Date    HGBA1C 5 9 (H) 02/28/2022       Recent Labs     06/21/22  2135 06/22/22  0023 06/22/22  0609 06/22/22  1133   POCGLU 199* 185* 137 138       Blood Sugar Average: Last 72 hrs:  Home regimen reviewed  Hold Metformin if applicable    Start SSI and Basal bolus protocol

## 2022-06-22 NOTE — NURSING NOTE
Incontinent of moderate amount of liquid brown stool upon arrival to [facility] for procedures

## 2022-06-22 NOTE — ASSESSMENT & PLAN NOTE
Melena mixed w/some brbpr  Suspect upper gib  Given diarrheal component r/o infectious etiology      Suspect upper GI bleed given evidence of melena  Transfuse 1 unit of packed red blood cells  Has acute blood loss anemia secondary to gastrointestinal bleed  Continue clear diet  Endoscopy colonoscopy scheduled by the GI service for tomorrow  Situation is of high risk    Recent Labs     06/21/22  0534 06/21/22  2043 06/22/22  0536   HGB 7 7* 9 0* 8 0*

## 2022-06-22 NOTE — PLAN OF CARE
Problem: Potential for Falls  Goal: Patient will remain free of falls  Description: INTERVENTIONS:  - Educate patient/family on patient safety including physical limitations  - Instruct patient to call for assistance with activity   - Consult OT/PT to assist with strengthening/mobility   - Keep Call bell within reach  - Keep bed low and locked with side rails adjusted as appropriate  - Keep care items and personal belongings within reach  - Initiate and maintain comfort rounds  - Make Fall Risk Sign visible to staff  - Offer Toileting every 2 Hours, in advance of need  - Initiate/Maintain bed/ chair alarm  - Obtain necessary fall risk management equipment: patient ambulates independently   - Apply yellow socks and bracelet for high fall risk patients  - Consider moving patient to room near nurses station  Outcome: Progressing     Problem: METABOLIC, FLUID AND ELECTROLYTES - ADULT  Goal: Electrolytes maintained within normal limits  Description: INTERVENTIONS:  - Monitor labs and assess patient for signs and symptoms of electrolyte imbalances  - Administer electrolyte replacement as ordered  - Monitor response to electrolyte replacements, including repeat lab results as appropriate  - Instruct patient on fluid and nutrition as appropriate  Outcome: Progressing  Goal: Fluid balance maintained  Description: INTERVENTIONS:  - Monitor labs   - Monitor I/O and WT  - Instruct patient on fluid and nutrition as appropriate  - Assess for signs & symptoms of volume excess or deficit  Outcome: Progressing  Goal: Glucose maintained within target range  Description: INTERVENTIONS:  - Monitor Blood Glucose as ordered  - Assess for signs and symptoms of hyperglycemia and hypoglycemia  - Administer ordered medications to maintain glucose within target range  - Assess nutritional intake and initiate nutrition service referral as needed  Outcome: Progressing     Problem: HEMATOLOGIC - ADULT  Goal: Maintains hematologic stability  Description: INTERVENTIONS  - Assess for signs and symptoms of bleeding or hemorrhage  - Monitor labs  - Administer supportive blood products/factors as ordered and appropriate  Outcome: Progressing

## 2022-06-22 NOTE — ANESTHESIA PREPROCEDURE EVALUATION
Procedure:  COLONOSCOPY  EGD    Relevant Problems   CARDIO   (+) Coronary artery disease involving native coronary artery of native heart without angina pectoris   (+) Essential hypertension   (+) Mixed hyperlipidemia   (+) S/P CABG (coronary artery bypass graft)   (+) Stenosis of left internal carotid artery      ENDO   (+) Type 2 diabetes mellitus with stage 4 chronic kidney disease, with long-term current use of insulin (HCC)      GI/HEPATIC   (+) Esophageal dysphagia   (+) GI bleed      /RENAL   (+) BPH (benign prostatic hyperplasia)   (+) Benign hypertension with chronic kidney disease, stage IV (HCC)   (+) CKD (chronic kidney disease), stage IV (HCC)   (+) Chronic kidney disease-mineral and bone disorder      HEMATOLOGY   (+) Iron deficiency anemia due to chronic blood loss      MUSCULOSKELETAL   (+) Lumbar back pain   (+) 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HCC)   (+) 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[address] )      NEURO/PSYCH   (+) Diabetic polyneuropathy associated with type 2 diabetes mellitus (HCC)   (+) History of colonic polyps      PULMONARY   (+) Sleep apnea      Cardiovascular and Mediastinum   (+) Chronic diastolic (congestive) heart failure (HCC)      Other   (+) Obesity   3/21/Echo  LEFT VENTRICLE:  Systolic function was normal by visual assessment  Ejection fraction was estimated to be 55 %  Although no diagnostic regional wall motion abnormality was identified, this possibility cannot be completely excluded on the basis of this study  Wall thickness was markedly increased  Doppler parameters were consistent with abnormal left ventricular relaxation (grade 1 diastolic dysfunction)         Physical Exam    Airway    Mallampati score: IV  TM Distance: <3 FB  Neck ROM: full     Dental   Comment: Generally worn, few missing premolars and molars, denies dentures,     Cardiovascular  Rhythm: regular, Rate: normal,     Pulmonary  Breath sounds clear to auscultation,     Other Findings        Anesthesia Plan  ASA Score- 3 Emergent    Anesthesia Type- general with ASA Monitors  Additional Monitors:   Airway Plan:     Comment: 105 pk yr smoking hx but since quit, denies home O2 use butis on 2 L NC here, uses CPAP @ home   Plan Factors-Exercise tolerance (METS): >4 METS  Patient is not a current smoker  Obstructive sleep apnea risk education given perioperatively  Induction- intravenous  Postoperative Plan-     Informed Consent- Anesthetic plan and risks discussed with patient

## 2022-06-22 NOTE — ANESTHESIA POSTPROCEDURE EVALUATION
Post-Op Assessment Note    CV Status:  Stable    Pain management: adequate     Mental Status:  Alert and awake   Hydration Status:  Euvolemic   PONV Controlled:  Controlled   Airway Patency:  Patent      Post Op Vitals Reviewed: Yes      Staff: Anesthesiologist         No complications documented      BP      Temp     Pulse     Resp      SpO2      /74 (BP Location: Left arm)   Pulse 55   Temp (!) 96 4 °F (35 8 °C) (Temporal)   Resp 16   Ht 5' 10" (1 778 m)   Wt 112 kg (246 lb 14 6 oz)   SpO2 98%   BMI 35 43 kg/m²

## 2022-06-22 NOTE — PROGRESS NOTES
[facility]  Progress Note - Debbie Burdick  1945, 77 y o  male MRN: [id number]  Unit/Bed#: E2 -01 Encounter: [id number]  Primary Care Provider: Gianna Cast DO   Date and time admitted to hospital: 6/20/2022 10:20 PM    * GI bleed  Assessment & Plan  Melena mixed w/some brbpr  Suspect upper gib  Given diarrheal component r/o infectious etiology  Suspect upper GI bleed given evidence of melena  Transfuse 1 unit of packed red blood cells  Has acute blood loss anemia secondary to gastrointestinal bleed  Continue clear diet  Endoscopy colonoscopy scheduled by the GI service for tomorrow  Situation is of high risk    Recent Labs     06/21/22  0534 06/21/22  2043 06/22/22  0536   HGB 7 7* 9 0* 8 0*         Chronic diastolic (congestive) heart failure (HCC)  Assessment & Plan  Wt Readings from Last 3 Encounters:   06/22/22 112 kg (246 lb 14 6 oz)   03/16/22 109 kg (240 lb)   03/02/22 110 kg (243 lb)     Euvolemic by physical exam  Daily weights I/os continue bb hold torsemide        Essential hypertension  Assessment & Plan  Continue lopressor hold torsemide    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[facility] [address] )  Assessment & Plan  Ck wnl  Monitor for myalgias    Coronary artery disease involving native coronary artery of native heart without angina pectoris  Assessment & Plan  W/hx of cabg in 2019  Continue bb statin, hold asa    Type 2 diabetes mellitus with stage 4 chronic kidney disease, with long-term current use of insulin [facility])  Assessment & Plan  Lab Results   Component Value Date    HGBA1C 5 9 (H) 02/28/2022       Recent Labs     06/21/22  2135 06/22/22  0023 06/22/22  0609 06/22/22  1133   POCGLU 199* 185* 137 138       Blood Sugar Average: Last 72 hrs:  Home regimen reviewed  Hold Metformin if applicable    Start SSI and Basal bolus protocol    CKD (chronic kidney disease), stage IV [facility])  Assessment & Plan  Lab Results   Component Value Date    EGFR 26 06/21/2022    EGFR 25 06/20/2022    EGFR 26 05/03/2022    CREATININE 2 31 (H) 06/21/2022    CREATININE 2 38 (H) 06/20/2022    CREATININE 2 30 (H) 05/03/2022   Monitor renal function appears around baseline  BUN elevated in the setting of potential GI bleed        [facility] Internal Medicine Progress Note  Patient: Alyssa Charles  77 y o  male   MRN: [id number]  PCP: Radha Weinberg DO  Unit/Bed#: E2 -01 Encounter: [id number]  Date Of Visit: 06/22/22    Assessment:    Principal Problem:    GI bleed  Active Problems:    CKD (chronic kidney disease), stage IV ([facility])    Type 2 diabetes mellitus with stage 4 chronic kidney disease, with long-term current use of insulin ([facility] [address] )    Coronary artery disease involving native coronary artery of native heart without angina pectoris    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[facility] [address] )    Essential hypertension    Chronic diastolic (congestive) heart failure ([facility] [address] )      Plan:    · Endoscopy and colonoscopy for later today  · Monitor hemoglobin       VTE Pharmacologic Prophylaxis:   Pharmacologic: Pharmacologic VTE Prophylaxis contraindicated due to Gastrointestinal bleed  Mechanical VTE Prophylaxis in Place: Yes    Patient Centered Rounds: I have performed bedside rounds with nursing staff today  Discussions with Specialists or Other Care Team Provider:  Discussed with gastroenterology physician assistant    Education and Discussions with Family / Patient:  Contacted wife and updated her on current care plan    Time Spent for Care: 30 minutes  More than 50% of total time spent on counseling and coordination of care as described above  Current Length of Stay: 2 day(s)    Current Patient Status: Inpatient   Certification Statement: The patient will continue to require additional inpatient hospital stay due to Endoscopy    Discharge Plan / Estimated Discharge Date:  48 hours    Code Status: Level 1 - Full Code      Subjective:   Seen examined  Patient was resting comfortably in a chair    He reports he has not had any black or tarry bowel movements since yesterday  He denies any chest pain, no nausea no vomiting  He completed colonoscopy prep yesterday with no issues    A complete and comprehensive 14 point organ system review has been performed and all other systems are negative other than stated above  Objective:     Vitals:   Temp (24hrs), Av 7 °F (35 9 °C), Min:96 °F (35 6 °C), Max:97 9 °F (36 6 °C)    Temp:  [96 °F (35 6 °C)-97 9 °F (36 6 °C)] 97 5 °F (36 4 °C)  HR:  [51-99] 53  Resp:  [16-20] 16  BP: (129-183)/(59-82) 183/78  SpO2:  [96 %-100 %] 100 %  Body mass index is 35 43 kg/m²  Input and Output Summary (last 24 hours):        Intake/Output Summary (Last 24 hours) at 2022 1520  Last data filed at 2022 1028  Gross per 24 hour   Intake 395 83 ml   Output 1950 ml   Net -1554 17 ml       Physical Exam:     General: well appearing, no acute distress  HEENT: atraumatic, PERRLA, moist mucosa, normal pharynx, normal tonsils and adenoids, normal tongue, no fluid in sinuses  Neck: Trachea midline, no carotid bruit, no masses  Respiratory: normal chest wall expansion, CTA B, no r/r/w, no rubs  Cardiovascular: RRR, no m/r/g, Normal S1 and S2  Abdomen: Soft, non-tender, non-distended, normal bowel sounds in all quadrants, no hepatosplenomegaly, no tympany  Rectal: deferred  Musculoskeletal: normal ROM in upper and lower extremities  Integumentary: warm, dry, and pink, with no rash, purpura, or petechia  Heme/Lymph: no lymphadenopathy, no bruises  Neurological: Cranial Nerves II-XII grossly intact, no tics, normal sensation to pressure and light touch  Psychiatric: cooperative with normal mood, affect, and cognition      Additional Data:     Labs:    Results from last 7 days   Lab Units 22  0536 22  0001 22  1833   WBC Thousand/uL  --   --  7 32   HEMOGLOBIN g/dL 8 0*   < > 8 5*   HEMATOCRIT % 25 3*   < > 26 7*   PLATELETS Thousands/uL  --   --  207   NEUTROS PCT %  --   --  80* LYMPHS PCT %  --   --  12*   MONOS PCT %  --   --  6   EOS PCT %  --   --  2    < > = values in this interval not displayed  Results from last 7 days   Lab Units 06/21/22  0534 06/20/22  1833   POTASSIUM mmol/L 4 0 4 8   CHLORIDE mmol/L 108 107   CO2 mmol/L 26 25   BUN mg/dL 65* 67*   CREATININE mg/dL 2 31* 2 38*   CALCIUM mg/dL 8 4 9 1   ALK PHOS U/L  --  99   ALT U/L  --  16   AST U/L  --  12*     Results from last 7 days   Lab Units 06/20/22  1833   INR  1 18       * I Have Reviewed All Lab Data Listed Above  * Additional Pertinent Lab Tests Reviewed:  [address] Admission Reviewed    Imaging:    Imaging Reports Reviewed Today Include:  Endoscopy and colonoscopy pending  Imaging Personally Reviewed by Myself Includes:  Endoscopy and colonoscopy pending    Recent Cultures (last 7 days):     Results from last 7 days   Lab Units 06/20/22  2131   URINE CULTURE  Culture too young- will reincubate       Last 24 Hours Medication List:   Current Facility-Administered Medications   Medication Dose Route Frequency Provider Last Rate    atorvastatin  80 mg Oral Daily With Vamshi Pérez KG Bacon      busPIRone  5 mg Oral TID Eli Goldmann, PA-C      finasteride  5 mg Oral Daily Torie Bacon PA-C      gabapentin  300 mg Oral BID Torie Bacon PA-C      gabapentin  300 mg Oral QPM Torie Bacon PA-C      insulin glargine  22 Units Subcutaneous HS Torie Bacon PA-C      insulin lispro  1-6 Units Subcutaneous Q6H Torie Bacon PA-C      metoprolol tartrate  50 mg Oral BID Torie Bacon PA-C      ondansetron  4 mg Intravenous Q6H PRN Eli Goldmann, PA-C      oxyCODONE  5 mg Oral Q6H PRN Tobe Hodgkins, CRNP      pantoprozole (PROTONIX) infusion (Continuous)  8 mg/hr Intravenous Continuous Torie Bacon PA-C 8 mg/hr (06/22/22 0641)    sertraline  50 mg Oral Daily Torie Bacon PA-C      sodium chloride  125 mL/hr Intravenous Continuous Bartolo Clemente,  mL/hr (06/22/22 1455)    tamsulosin  0 8 mg Oral Daily With [employer] MONIQUE Bacon PA-C       Facility-Administered Medications Ordered in Other Encounters   Medication Dose Route Frequency Provider Last Rate    propofol   Intravenous Continuous PRN Dwayne Fuentes  mcg/kg/min (06/22/22 1514)    propofol   Intravenous PRN Farida Alexandra DO          Today, Patient Was Seen By: Dorie Steinberg DO    ** Please Note: This note was completed in part utilizing [employer] Fluency Direct Software  Grammatical errors, random word insertions, spelling mistakes, and incomplete sentences may be an occasional consequence of this system secondary to software limitations, ambient noise, and hardware issues  If you have any questions or concerns about the content, text, or information contained within the body of this dictation, please contact the provider for clarification   **

## 2022-06-22 NOTE — ASSESSMENT & PLAN NOTE
Wt Readings from Last 3 Encounters:   06/22/22 112 kg (246 lb 14 6 oz)   03/16/22 109 kg (240 lb)   03/02/22 110 kg (243 lb)     Euvolemic by physical exam  Daily weights I/os continue bb hold torsemide

## 2022-06-23 VITALS
DIASTOLIC BLOOD PRESSURE: 69 MMHG | SYSTOLIC BLOOD PRESSURE: 154 MMHG | RESPIRATION RATE: 18 BRPM | OXYGEN SATURATION: 98 % | HEART RATE: 58 BPM | TEMPERATURE: 96.8 F | WEIGHT: 244.93 LBS | HEIGHT: 70 IN | BODY MASS INDEX: 35.07 KG/M2

## 2022-06-23 LAB
ANION GAP SERPL CALCULATED.3IONS-SCNC: 5 MMOL/L (ref 4–13)
BUN SERPL-MCNC: 41 MG/DL (ref 5–25)
CALCIUM SERPL-MCNC: 8.1 MG/DL (ref 8.3–10.1)
CHLORIDE SERPL-SCNC: 108 MMOL/L (ref 100–108)
CO2 SERPL-SCNC: 27 MMOL/L (ref 21–32)
CREAT SERPL-MCNC: 1.99 MG/DL (ref 0.6–1.3)
ERYTHROCYTE [DISTWIDTH] IN BLOOD BY AUTOMATED COUNT: 15.7 % (ref 11.6–15.1)
GFR SERPL CREATININE-BSD FRML MDRD: 31 ML/MIN/1.73SQ M
GLUCOSE SERPL-MCNC: 201 MG/DL (ref 65–140)
GLUCOSE SERPL-MCNC: 231 MG/DL (ref 65–140)
GLUCOSE SERPL-MCNC: 290 MG/DL (ref 65–140)
HCT VFR BLD AUTO: 27 % (ref 36.5–49.3)
HGB BLD-MCNC: 8.5 G/DL (ref 12–17)
MCH RBC QN AUTO: 30.5 PG (ref 26.8–34.3)
MCHC RBC AUTO-ENTMCNC: 31.5 G/DL (ref 31.4–37.4)
MCV RBC AUTO: 97 FL (ref 82–98)
PLATELET # BLD AUTO: 201 THOUSANDS/UL (ref 149–390)
PMV BLD AUTO: 11.7 FL (ref 8.9–12.7)
POTASSIUM SERPL-SCNC: 3.8 MMOL/L (ref 3.5–5.3)
RBC # BLD AUTO: 2.79 MILLION/UL (ref 3.88–5.62)
SODIUM SERPL-SCNC: 140 MMOL/L (ref 136–145)
WBC # BLD AUTO: 7.83 THOUSAND/UL (ref 4.31–10.16)

## 2022-06-23 PROCEDURE — 85027 COMPLETE CBC AUTOMATED: CPT | Performed by: INTERNAL MEDICINE

## 2022-06-23 PROCEDURE — 99239 HOSP IP/OBS DSCHRG MGMT >30: CPT | Performed by: INTERNAL MEDICINE

## 2022-06-23 PROCEDURE — 82948 REAGENT STRIP/BLOOD GLUCOSE: CPT

## 2022-06-23 PROCEDURE — 80048 BASIC METABOLIC PNL TOTAL CA: CPT | Performed by: INTERNAL MEDICINE

## 2022-06-23 RX ORDER — PANTOPRAZOLE SODIUM 40 MG/1
40 TABLET, DELAYED RELEASE ORAL
Qty: 120 TABLET | Refills: 0 | Status: SHIPPED | OUTPATIENT
Start: 2022-06-23 | End: 2022-08-22

## 2022-06-23 RX ADMIN — METOPROLOL TARTRATE 50 MG: 50 TABLET, FILM COATED ORAL at 08:19

## 2022-06-23 RX ADMIN — PANTOPRAZOLE SODIUM 40 MG: 40 TABLET, DELAYED RELEASE ORAL at 07:18

## 2022-06-23 RX ADMIN — GABAPENTIN 300 MG: 300 CAPSULE ORAL at 08:19

## 2022-06-23 RX ADMIN — FINASTERIDE 5 MG: 5 TABLET, FILM COATED ORAL at 08:19

## 2022-06-23 RX ADMIN — SERTRALINE HYDROCHLORIDE 50 MG: 50 TABLET ORAL at 08:19

## 2022-06-23 RX ADMIN — BUSPIRONE HYDROCHLORIDE 5 MG: 5 TABLET ORAL at 08:19

## 2022-06-23 RX ADMIN — INSULIN LISPRO 4 UNITS: 100 INJECTION, SOLUTION INTRAVENOUS; SUBCUTANEOUS at 11:27

## 2022-06-23 RX ADMIN — INSULIN LISPRO 3 UNITS: 100 INJECTION, SOLUTION INTRAVENOUS; SUBCUTANEOUS at 07:19

## 2022-06-23 NOTE — DISCHARGE INSTR - AVS FIRST PAGE
Dear Mona Vee ,     It was our pleasure to care for you here at [facility], [facility]  It is our hope that we were always able to exceed the expected standards for your care during your stay  You were hospitalized due to gastrointestinal bleed felt to be secondary to diverticulosis with combination of ulcer in the stomach  You were cared for on the 2nd floor by Herb Walsh DO with the [facility] who covers for your primary care physician (PCP), Kurtis Lopez DO, while you were hospitalized  If you have any questions or concerns related to this hospitalization, you may contact us at [phone]  For follow up as well as any medication refills, we recommend that you follow up with your primary care physician  A registered nurse will reach out to you by phone within a few days after your discharge to answer any additional questions that you may have after going home  However, at this time we provide for you here, the most important instructions / recommendations at discharge:     Notable Medication Adjustments -   Protonix twice a day for the next 3 months  Testing Required after Discharge -   Repeat endoscopy in 2 months with Gastroenterology  Important follow up information -   Please follow-up with urology for continue of urinary retention  Other Instructions -   He will need repeat colonoscopy in 3 years given history of polyps  Please review this entire after visit summary as additional general instructions including medication list, appointments, activity, diet, any pertinent wound care, and other additional recommendations from your care team that may be provided for you        Sincerely,     Herb Walsh DO and Nurse Peggy Rivera

## 2022-06-23 NOTE — ASSESSMENT & PLAN NOTE
Lab Results   Component Value Date    HGBA1C 5 9 (H) 02/28/2022       Recent Labs     06/22/22  1133 06/22/22  1637 06/22/22  2141 06/23/22  0537   POCGLU 138 124 214* 231*       Blood Sugar Average: Last 72 hrs:  Home regimen reviewed  Hold Metformin if applicable    Start SSI and Basal bolus protocol

## 2022-06-23 NOTE — ASSESSMENT & PLAN NOTE
Melena mixed w/some brbpr  Suspect upper gib  Given diarrheal component r/o infectious etiology      Suspect upper GI bleed given evidence of melena  Transfuse 1 unit of packed red blood cells  Has acute blood loss anemia secondary to gastrointestinal bleed  Hemoglobin remains stable    Endoscopy with the following results:  · Single 10 mm x 6 mm superficial ulcer in the antrum  · Sessile polyp measuring smaller than 5 mm in the antrum; removed by cold snare  · C0M2 Zuñiga's esophagus with no associated nodule; narrow band imaging (NBI) used; performed 4 cold forceps biopsies  · The duodenum appeared normal   · Performed multiple forceps biopsies in the stomach and duodenum    Will need PPI twice a day, repeat endoscopy in 2 months  Also found to have diverticulosis on colonoscopy- polyps removed  Recent Labs     06/21/22  2043 06/22/22  0536 06/23/22  0433   HGB 9 0* 8 0* 8 5*

## 2022-06-23 NOTE — CASE MANAGEMENT
Case Management Discharge Planning Note    Patient name Regine Park    Location [facility] /E2 -* MRN [id number]  : 1945 Date 2022       Current Admission Date: 2022  Current Admission Diagnosis:GI bleed   Patient Active Problem List    Diagnosis Date Noted    GI bleed 2022    Gait instability 2022    Asymptomatic bilateral carotid artery stenosis 2022    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[facility] [address] ) 2022    UTI (urinary tract infection) 10/19/2021    Clostridium difficile diarrhea 10/19/2021    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    BPH (benign prostatic hyperplasia) 2021    Essential hypertension 2021    Elevated TSH 2021    Hypervolemia associated with renal insufficiency 2021    Chronic diastolic (congestive) heart failure ([address] ) 2021    Traumatic injury of skin 2021    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[facility] [address] ) 2020    Encounter for  medical examination (CDME) 2020    Diabetic polyneuropathy associated with type 2 diabetes mellitus ([address] )     Stenosis of left internal carotid artery 2020    Neuropathy 2020    Lymphadenopathy 10/22/2019    Vitamin D deficiency 10/21/2019    Iron deficiency anemia due to chronic blood loss 2019    S/P CABG (coronary artery bypass graft) 2019    Acute postoperative pulmonary insufficiency ([address] ) 2019    Coronary artery disease involving native coronary artery of native heart without angina pectoris 2019    Mixed hyperlipidemia 2019    Type 2 diabetes mellitus with stage 4 chronic kidney disease, with long-term current use of insulin ([address] ) 06/15/2019    Sleep apnea 06/15/2019    Benign hypertension with chronic kidney disease, stage IV ([address] ) 2019    Chronic kidney disease-mineral and bone disorder 2019    Esophageal dysphagia 2019    History of colonic polyps 2019    CKD (chronic kidney disease), stage IV ([facility] [address] ) 08/18/2017    Non-nephrotic range proteinuria 08/18/2017    Trigger finger of left hand 04/05/2017    Lumbar back pain 02/07/2012    Lumbar discogenic pain syndrome 02/07/2012    Pain of lower extremity 02/07/2012    Spondylolisthesis 02/07/2012    Spinal stenosis 02/07/2012      LOS (days): 3  Geometric Mean LOS (GMLOS) (days): 3 00  Days to GMLOS:0 5     OBJECTIVE:  Risk of Unplanned Readmission Score: 21 5         Current admission status: Inpatient   Preferred Pharmacy:   [address] [employer] [facility], [address]  [address] [place] [address]  Phone: [phone] Fax: [phone]    Primary Care Provider: Larisa Wong DO    Primary Insurance: Jos Ochoa [address]  Secondary Insurance:     DISCHARGE DETAILS:    Discharge planning discussed with[de-identified] patient        CM contacted family/caregiver?: No- see comments (pt declined wanting CM to reach out to spouse)  Were Treatment Team discharge recommendations reviewed with patient/caregiver?: Yes  Did patient/caregiver verbalize understanding of patient care needs?: Yes  Were patient/caregiver advised of the risks associated with not following Treatment Team discharge recommendations?: Yes                             Treatment Team Recommendation: Home  Discharge Destination Plan[de-identified] Home  Transport at Discharge : Family        Transported by [employer] and Unit #): Family  ETA of Transport (Date): 06/23/22                 IMM Given (Date):: 06/23/22  IMM Given to[de-identified] Patient     Additional Comments: CM met w/ pt at bedside to go over Medicare rights  Pt verbally agreeable & signed IMM understanding his rights  Pt has no questions or concerns at this time  Pt also is being d/c w/ no needs  OP f/u  CM to continue to follow pt care & d/c planning needs

## 2022-06-23 NOTE — ASSESSMENT & PLAN NOTE
Lab Results   Component Value Date    EGFR 31 06/23/2022    EGFR 26 06/21/2022    EGFR 25 06/20/2022    CREATININE 1 99 (H) 06/23/2022    CREATININE 2 31 (H) 06/21/2022    CREATININE 2 38 (H) 06/20/2022   Monitor renal function appears around baseline  BUN elevated in the setting of potential GI bleed

## 2022-06-23 NOTE — ASSESSMENT & PLAN NOTE
Wt Readings from Last 3 Encounters:   06/23/22 111 kg (244 lb 14 9 oz)   03/16/22 109 kg (240 lb)   03/02/22 110 kg (243 lb)     Euvolemic by physical exam  Daily weights I/os continue bb hold torsemide

## 2022-06-23 NOTE — PLAN OF CARE
Problem: MOBILITY - ADULT  Goal: Maintain or return to baseline ADL function  Description: INTERVENTIONS:  -  Assess patient's ability to carry out ADLs; assess patient's baseline for ADL function and identify physical deficits which impact ability to perform ADLs (bathing, care of mouth/teeth, toileting, grooming, dressing, etc )  - Assess/evaluate cause of self-care deficits   - Assess range of motion  - Assess patient's mobility; develop plan if impaired  - Assess patient's need for assistive devices and provide as appropriate  - Encourage maximum independence but intervene and supervise when necessary  - Involve family in performance of ADLs  - Assess for home care needs following discharge   - Consider OT consult to assist with ADL evaluation and planning for discharge  - Provide patient education as appropriate  Outcome: Progressing  Goal: Maintains/Returns to pre admission functional level  Description: INTERVENTIONS:  - Perform BMAT or MOVE assessment daily    - Set and communicate daily mobility goal to care team and patient/family/caregiver  - Collaborate with rehabilitation services on mobility goals if consulted  - Perform Range of Motion 3 times a day  - Reposition patient every 2 hours    - Dangle patient 3 times a day  - Stand patient 3 times a day  - Ambulate patient 3 times a day  - Out of bed to chair 3 times a day   - Out of bed for meals 3 times a day  - Out of bed for toileting  - Record patient progress and toleration of activity level   Outcome: Progressing     Problem: Potential for Falls  Goal: Patient will remain free of falls  Description: INTERVENTIONS:  - Educate patient/family on patient safety including physical limitations  - Instruct patient to call for assistance with activity   - Consult OT/PT to assist with strengthening/mobility   - Keep Call bell within reach  - Keep bed low and locked with side rails adjusted as appropriate  - Keep care items and personal belongings within reach  - Initiate and maintain comfort rounds  - Make Fall Risk Sign visible to staff  - Offer Toileting every 2 Hours, in advance of need  - Initiate/Maintain bed alarm  - Obtain necessary fall risk management equipment:   - Apply yellow socks and bracelet for high fall risk patients  - Consider moving patient to room near nurses station  Outcome: Progressing     Problem: DISCHARGE PLANNING  Goal: Discharge to home or other facility with appropriate resources  Description: INTERVENTIONS:  - Identify barriers to discharge w/patient and caregiver  - Arrange for needed discharge resources and transportation as appropriate  - Identify discharge learning needs (meds, wound care, etc )  - Arrange for interpretive services to assist at discharge as needed  - Refer to Case Management Department for coordinating discharge planning if the patient needs post-hospital services based on physician/advanced practitioner order or complex needs related to functional status, cognitive ability, or social support system  Outcome: Progressing     Problem: Knowledge Deficit  Goal: Patient/family/caregiver demonstrates understanding of disease process, treatment plan, medications, and discharge instructions  Description: Complete learning assessment and assess knowledge base  Interventions:  - Provide teaching at level of understanding  - Provide teaching via preferred learning methods  Outcome: Progressing     Problem: Nutrition/Hydration-ADULT  Goal: Nutrient/Hydration intake appropriate for improving, restoring or maintaining nutritional needs  Description: Monitor and assess patient's nutrition/hydration status for malnutrition  Collaborate with interdisciplinary team and initiate plan and interventions as ordered  Monitor patient's weight and dietary intake as ordered or per policy  Utilize nutrition screening tool and intervene as necessary   Determine patient's food preferences and provide high-protein, high-caloric foods as appropriate       INTERVENTIONS:  - Monitor oral intake, urinary output, labs, and treatment plans  - Assess nutrition and hydration status and recommend course of action  - Evaluate amount of meals eaten  - Assist patient with eating if necessary   - Allow adequate time for meals  - Recommend/ encourage appropriate diets, oral nutritional supplements, and vitamin/mineral supplements  - Order, calculate, and assess calorie counts as needed  - Recommend, monitor, and adjust tube feedings and TPN/PPN based on assessed needs  - Assess need for intravenous fluids  - Provide specific nutrition/hydration education as appropriate  - Include patient/family/caregiver in decisions related to nutrition  Outcome: Progressing     Problem: METABOLIC, FLUID AND ELECTROLYTES - ADULT  Goal: Electrolytes maintained within normal limits  Description: INTERVENTIONS:  - Monitor labs and assess patient for signs and symptoms of electrolyte imbalances  - Administer electrolyte replacement as ordered  - Monitor response to electrolyte replacements, including repeat lab results as appropriate  - Instruct patient on fluid and nutrition as appropriate  Outcome: Progressing  Goal: Fluid balance maintained  Description: INTERVENTIONS:  - Monitor labs   - Monitor I/O and WT  - Instruct patient on fluid and nutrition as appropriate  - Assess for signs & symptoms of volume excess or deficit  Outcome: Progressing  Goal: Glucose maintained within target range  Description: INTERVENTIONS:  - Monitor Blood Glucose as ordered  - Assess for signs and symptoms of hyperglycemia and hypoglycemia  - Administer ordered medications to maintain glucose within target range  - Assess nutritional intake and initiate nutrition service referral as needed  Outcome: Progressing     Problem: HEMATOLOGIC - ADULT  Goal: Maintains hematologic stability  Description: INTERVENTIONS  - Assess for signs and symptoms of bleeding or hemorrhage  - Monitor labs  - Administer supportive blood products/factors as ordered and appropriate  Outcome: Progressing

## 2022-06-23 NOTE — PLAN OF CARE
Problem: Potential for Falls  Goal: Patient will remain free of falls  Description: INTERVENTIONS:  - Educate patient/family on patient safety including physical limitations  - Instruct patient to call for assistance with activity   - Consult OT/PT to assist with strengthening/mobility   - Keep Call bell within reach  - Keep bed low and locked with side rails adjusted as appropriate  - Keep care items and personal belongings within reach  - Initiate and maintain comfort rounds  - Make Fall Risk Sign visible to staff  - Offer Toileting every 2 Hours, in advance of need  - Initiate/Maintain bed alarm  - Obtain necessary fall risk management equipment: standby assistance   - Apply yellow socks and bracelet for high fall risk patients  - Consider moving patient to room near nurses station  Outcome: Progressing     Problem: METABOLIC, FLUID AND ELECTROLYTES - ADULT  Goal: Electrolytes maintained within normal limits  Description: INTERVENTIONS:  - Monitor labs and assess patient for signs and symptoms of electrolyte imbalances  - Administer electrolyte replacement as ordered  - Monitor response to electrolyte replacements, including repeat lab results as appropriate  - Instruct patient on fluid and nutrition as appropriate  Outcome: Progressing  Goal: Fluid balance maintained  Description: INTERVENTIONS:  - Monitor labs   - Monitor I/O and WT  - Instruct patient on fluid and nutrition as appropriate  - Assess for signs & symptoms of volume excess or deficit  Outcome: Progressing  Goal: Glucose maintained within target range  Description: INTERVENTIONS:  - Monitor Blood Glucose as ordered  - Assess for signs and symptoms of hyperglycemia and hypoglycemia  - Administer ordered medications to maintain glucose within target range  - Assess nutritional intake and initiate nutrition service referral as needed  Outcome: Progressing     Problem: HEMATOLOGIC - ADULT  Goal: Maintains hematologic stability  Description: INTERVENTIONS  - Assess for signs and symptoms of bleeding or hemorrhage  - Monitor labs  - Administer supportive blood products/factors as ordered and appropriate  Outcome: Progressing

## 2022-06-23 NOTE — DISCHARGE SUMMARY
[address] [facility]  Discharge- Rose Garcia  1945, 77 y o  male MRN: [id number]  Unit/Bed#: E2 -01 Encounter: [id number]  Primary Care Provider: Hreb Barros DO   Date and time admitted to hospital: 6/20/2022 10:20 PM    Admitting Provider:  Bharat Grijalva MD  Discharge Provider:  Yari Lott DO  Admission Date: 6/20/2022       Discharge Date: 06/23/22   LOS: 3  Primary Care Physician at Discharge: Herb Barros, [address] [place] [address] Drive:  Rose Garcia  is a 77 y o  male who presented bright red blood per rectum as well as melena  His past medical history is notable for coronary disease status post CABG, diabetes mellitus, chronic kidney disease stage 3, congestive heart failure, hypertension, hyperlipidemia as well as a history of Mcardle disease  The patient was evaluated in consultation by the gastroenterology service  He had noted a mix of hematochezia and melena over the past 1 5-2 weeks  The patient underwent endoscopy with evidence of ulcer  He was started on PPI twice a day  He was monitored overnight and no further bleeding  He was also found to have diverticulosis on colonoscopy as well as evidence of polyps which were biopsied  The patient clinically remains stable, he was discharged with planned outpatient follow-up with Gastroenterology  His renal function stabilized and he will be discharged with planned outpatient follow-up  At the time of discharge patient was tolerating oral diet is without acute complaint and medically cleared for discharge  All questions answered the patient's satisfaction and he was in agreement with the discharge plan      DISCHARGE DIAGNOSES  * GI bleed  Assessment & Plan  Melena mixed w/some brbpr  Suspect upper gib  Given diarrheal component r/o infectious etiology      Suspect upper GI bleed given evidence of melena  Transfuse 1 unit of packed red blood cells  Has acute blood loss anemia secondary to gastrointestinal bleed  Hemoglobin remains stable    Endoscopy with the following results:  · Single 10 mm x 6 mm superficial ulcer in the antrum  · Sessile polyp measuring smaller than 5 mm in the antrum; removed by cold snare  · C0M2 Zuñiga's esophagus with no associated nodule; narrow band imaging (NBI) used; performed 4 cold forceps biopsies  · The duodenum appeared normal   · Performed multiple forceps biopsies in the stomach and duodenum    Will need PPI twice a day, repeat endoscopy in 2 months  Also found to have diverticulosis on colonoscopy- polyps removed  Recent Labs     06/21/22  2043 06/22/22  0536 06/23/22  0433   HGB 9 0* 8 0* 8 5*         Chronic diastolic (congestive) heart failure (HCC)  Assessment & Plan  Wt Readings from Last 3 Encounters:   06/23/22 111 kg (244 lb 14 9 oz)   03/16/22 109 kg (240 lb)   03/02/22 110 kg (243 lb)     Euvolemic by physical exam  Daily weights I/os continue bb hold torsemide        Essential hypertension  Assessment & Plan  Continue lopressor hold torsemide    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[facility] [address] )  Assessment & Plan  Ck wnl  Monitor for myalgias    Coronary artery disease involving native coronary artery of native heart without angina pectoris  Assessment & Plan  W/hx of cabg in 2019  Continue bb statin, resume aspirin    Type 2 diabetes mellitus with stage 4 chronic kidney disease, with long-term current use of insulin [facility])  Assessment & Plan  Lab Results   Component Value Date    HGBA1C 5 9 (H) 02/28/2022       Recent Labs     06/22/22  1133 06/22/22  1637 06/22/22  2141 06/23/22  0537   POCGLU 138 124 214* 231*       Blood Sugar Average: Last 72 hrs:  Home regimen reviewed  Hold Metformin if applicable    Start SSI and Basal bolus protocol    CKD (chronic kidney disease), stage IV [facility])  Assessment & Plan  Lab Results   Component Value Date    EGFR 31 06/23/2022    EGFR 26 06/21/2022    EGFR 25 06/20/2022    CREATININE 1 99 (H) 06/23/2022 CREATININE 2 31 (H) 06/21/2022    CREATININE 2 38 (H) 06/20/2022   Monitor renal function appears around baseline  BUN elevated in the setting of potential GI bleed          CONSULTING PROVIDERS   IP CONSULT TO GASTROENTEROLOGY    PROCEDURES PERFORMED  * No surgery found *    RADIOLOGY RESULTS  EGD    Result Date: 6/22/2022  Narrative: [address] Endoscopy [address] [facility] [phone] DATE OF SERVICE: 6/22/22 PHYSICIAN(S): Attending: Momo Fuentes MD Fellow: No Staff Documented INDICATION: GI bleed POST-OP DIAGNOSIS: See the impression below  PREPROCEDURE: Informed consent was obtained for the procedure, including sedation  Risks of perforation, hemorrhage, adverse drug reaction and aspiration were discussed  The patient was placed in the left lateral decubitus position  Patient was explained about the risks and benefits of the procedure  Risks including but not limited to bleeding, infection, and perforation were explained in detail  Also explained about less than 100% sensitivity with the exam and other alternatives  DETAILS OF PROCEDURE: Patient was taken to the procedure room where a time out was performed to confirm correct patient and correct procedure  The patient underwent monitored anesthesia care, which was administered by an anesthesia professional  The patient's blood pressure, heart rate, level of consciousness, respirations and oxygen were monitored throughout the procedure  The scope was advanced to the second part of the duodenum  Retroflexion was performed in the fundus  The patient experienced no blood loss  The procedure was not difficult  The patient tolerated the procedure well  There were no apparent complications   ANESTHESIA INFORMATION: ASA: III Anesthesia Type: General MEDICATIONS: No administrations occurring from 1456 to 1511 on 06/22/22 FINDINGS: Single 10 mm x 6 mm superficial ulcer in the antrum Sessile polyp measuring smaller than 5 mm in the antrum; removed by cold snare C0M2 Zuñiga's esophagus with no associated nodule; narrow band imaging (NBI) used; performed 4 cold forceps biopsies The duodenum appeared normal  Performed multiple forceps biopsies in the stomach and duodenum SPECIMENS: ID Type Source Tests Collected by Time Destination 1 : r/o celiac Tissue Duodenum TISSUE EXAM Carlota Aguilera MD 6/22/2022  3:04 PM  2 : r/o h pylori Tissue Stomach TISSUE EXAM Carlota Aguilera MD 6/22/2022  3:06 PM  3 : gastric polyp  Tissue Polyp, Stomach/Small Intestine TISSUE EXAM Carltoa Aguilera MD 6/22/2022  3:07 PM  4 : barretts Tissue Esophagus TISSUE EXAM Carlota Aguilera MD 6/22/2022  3:08 PM      Impression: Single 10 mm x 6 mm superficial ulcer in the antrum Sessile polyp measuring smaller than 5 mm in the antrum; removed by cold snare C0M2 Zuñiga's esophagus with no associated nodule; narrow band imaging (NBI) used; performed 4 cold forceps biopsies The duodenum appeared normal  Performed multiple forceps biopsies in the stomach and duodenum RECOMMENDATION: Await pathology results Protonix BID Repeat EGD in 2 months to document healing of ulcer Colonoscopy to follow   Carlota Aguilera MD     Colonoscopy    Result Date: 6/22/2022  Narrative: [address] [facility] Endoscopy [address] [place] [address] [phone] DATE OF SERVICE: 6/22/22 PHYSICIAN(S): Attending: Carlota Aguilera MD Fellow: No Staff Documented INDICATION: GI bleed POST-OP DIAGNOSIS: See the impression below  HISTORY: Prior colonoscopy: 3 years ago  BOWEL PREPARATION: Golytely/Colyte/Trilyte PREPROCEDURE: Informed consent was obtained for the procedure, including sedation  Risks including but not limited to bleeding, infection, perforation, adverse drug reaction and aspiration were explained in detail  Also explained about less than 100% sensitivity with the exam and other alternatives  The patient was placed in the left lateral decubitus position   DETAILS OF PROCEDURE: Patient was taken to the procedure room where a time out was performed to confirm correct patient and correct procedure  The patient underwent monitored anesthesia care, which was administered by an anesthesia professional  The patient's blood pressure, heart rate, level of consciousness, oxygen and respirations were monitored throughout the procedure  A digital rectal exam was performed  The scope was introduced through the anus and advanced to the cecum  Retroflexion was performed in the rectum  The quality of bowel preparation was evaluated using the [facility] Bowel Preparation Scale with scores of: right colon = 2, transverse colon = 2, left colon = 2  The total BBPS score was 6  Bowel prep was adequate  The patient experienced no blood loss  The procedure was not difficult  The patient tolerated the procedure well  There were no apparent complications   ANESTHESIA INFORMATION: ASA: III Anesthesia Type: General MEDICATIONS: No administrations occurring from 1456 to 1546 on 06/22/22 FINDINGS: Sessile polyp measuring smaller than 5 mm in the cecum; performed cold forceps biopsy Three sessile polyps measuring from 3 mm up to 8 mm in the transverse colon; performed cold forceps biopsy; removed by cold snare Moderate pancolonic diverticula EVENTS: Procedure Events Event Event Time ENDO CECUM REACHED 6/22/2022  3:24 PM SPECIMENS: ID Type Source Tests Collected by Time Destination 1 : r/o celiac Tissue Duodenum TISSUE EXAM Diane Montano MD 6/22/2022  3:04 PM  2 : r/o h pylori Tissue Stomach TISSUE EXAM Diane Montano MD 6/22/2022  3:06 PM  3 : gastric polyp  Tissue Polyp, Stomach/Small Intestine TISSUE EXAM Diane Montano MD 6/22/2022  3:07 PM  4 : barretts Tissue Esophagus TISSUE EXAM Diane Montano MD 6/22/2022  3:08 PM  5 : cecal polyp Tissue Polyp, Colorectal TISSUE EXAM Diane Montano MD 6/22/2022  3:24 PM  6 : transverse colon polyp x3 Tissue Polyp, Colorectal TISSUE EXAM Diane Montano MD 6/22/2022  3:31 PM  EQUIPMENT: Colonoscope - Impression: Four colon polyps removed Diverticulosis RECOMMENDATION: Repeat colonoscopy in 3 years due to a personal history of colon polyps  Wali Rodriguez MD     CT abdomen pelvis wo contrast    Result Date: 6/20/2022  Narrative: CT ABDOMEN AND PELVIS WITHOUT IV CONTRAST INDICATION:   GI bleed Rectal Bleeding  COMPARISON:  CT abdomen/pelvis 10/19/2021  TECHNIQUE:  CT examination of the abdomen and pelvis was performed without intravenous contrast  This examination was performed without intravenous contrast in the context of the critical nationwide Omnipaque shortage  Axial, sagittal, and coronal 2D reformatted images were created from the source data and submitted for interpretation  Radiation dose length product (DLP) for this visit:  925 8 mGy-cm   This examination, like all CT scans performed in the [facility], was performed utilizing techniques to minimize radiation dose exposure, including the use of iterative reconstruction and automated exposure control  Enteric contrast was not administered  FINDINGS: ABDOMEN LOWER CHEST:  CT findings of anemia  Coronary artery calcifications  LIVER/BILIARY TREE:  Unremarkable  GALLBLADDER:  Gallbladder is surgically absent  SPLEEN:  Unremarkable  PANCREAS:  Unremarkable  ADRENAL GLANDS:  Unremarkable  KIDNEYS/URETERS:  No hydronephrosis or urinary tract calculus  One or more simple cysts and sharply circumscribed subcentimeter renal hypodensities are present, too small to accurately characterize, and statistically most likely benign findings  According to recent literature (Radiology 2019) no further workup of these findings is recommended  Mild fat stranding surrounding the mid left ureter, unchanged since 2021  STOMACH AND BOWEL:  There is colonic diverticulosis without evidence of acute diverticulitis  APPENDIX:  No findings to suggest appendicitis  ABDOMINOPELVIC CAVITY:  No ascites  No pneumoperitoneum  No lymphadenopathy   VESSELS: Extensive atherosclerotic changes are present  No evidence of aneurysm  PELVIS REPRODUCTIVE ORGANS:  Unremarkable for patient's age  URINARY BLADDER:  Mild diffuse bladder wall thickening, with asymmetric thickening of the right bladder wall, new since 10/19/2021  ABDOMINAL WALL/INGUINAL REGIONS:  Small fat-containing left inguinal hernia  OSSEOUS STRUCTURES:  No acute fracture or destructive osseous lesion  Spinal degenerative changes are noted  Posterior paired sigrid and pedicle screw fixation from L3 through L5  Mild-to-moderate right glenohumeral joint osteoarthritis  Impression: 1  Mild diffuse bladder wall thickening with asymmetric thickening of the right bladder wall, new since 10/19/2021  Correlate with urinalysis and cystoscopy  2   Otherwise, no acute abnormality in the abdomen or pelvis  The study was marked in [facility] for immediate notification  Workstation performed: [id number]     XR chest 1 view portable    Result Date: 6/21/2022  Narrative: CHEST INDICATION:   chest pain  COMPARISON:  10/19/2021 EXAM PERFORMED/VIEWS:  XR CHEST PORTABLE FINDINGS: Heart shadow is enlarged but unchanged from prior exam  The lungs are clear  No pneumothorax or pleural effusion  Osseous structures appear within normal limits for patient age  Impression: Stable cardiomegaly without acute pulmonary findings   Workstation performed: [id number]       LABS  Results from last 7 days   Lab Units 06/23/22  0433 06/22/22  0536 06/21/22  2043 06/21/22  0534 06/21/22  0001 06/20/22  1833   WBC Thousand/uL 7 83  --   --   --   --  7 32   HEMOGLOBIN g/dL 8 5* 8 0* 9 0* 7 7* 7 4* 8 5*   HEMATOCRIT % 27 0* 25 3* 28 8* 24 3* 24 3* 26 7*   MCV fL 97  --   --   --   --  95   PLATELETS Thousands/uL 201  --   --   --   --  207   INR   --   --   --   --   --  1 18     Results from last 7 days   Lab Units 06/23/22  0433 06/21/22  0534 06/20/22  1833   SODIUM mmol/L 140 143 141   POTASSIUM mmol/L 3 8 4 0 4 8   CHLORIDE mmol/L 108 108 107   CO2 mmol/L 27 26 25   BUN mg/dL 41* 65* 67*   CREATININE mg/dL 1 99* 2 31* 2 38*   CALCIUM mg/dL 8 1* 8 4 9 1   ALBUMIN g/dL  --   --  4 0   TOTAL BILIRUBIN mg/dL  --   --  0 42   ALK PHOS U/L  --   --  99   ALT U/L  --   --  16   AST U/L  --   --  12*   EGFR ml/min/1 73sq m 31 26 25   GLUCOSE RANDOM mg/dL 201* 160* 217*     Results from last 7 days   Lab Units 06/20/22 2019 06/20/22  1833   HS TNI 0HR ng/L  --  27   HS TNI 2HR ng/L 21  --               Results from last 7 days   Lab Units 06/23/22  0537 06/22/22  2141 06/22/22  1637 06/22/22  1133 06/22/22  0609 06/22/22  0023 06/21/22  2135 06/21/22  1604 06/21/22  1101 06/21/22  0530   POC GLUCOSE mg/dl 231* 214* 124 138 137 185* 199* 231* 180* 169*             Results from last 7 days   Lab Units 06/20/22  1833   LACTIC ACID mmol/L 1 1           Cultures:   Results from last 7 days   Lab Units 06/20/22 2131   COLOR UA  Yellow   CLARITY UA  Cloudy*   SPEC GRAV UA  1 015   PH UA  6 0   LEUKOCYTES UA  2+*   NITRITE UA  Negative   GLUCOSE UA mg/dl Negative   KETONES UA mg/dl Negative   BILIRUBIN UA  Negative   BLOOD UA  Trace-Intact*      Results from last 7 days   Lab Units 06/20/22 2131   RBC UA /hpf 2-4   WBC UA /hpf Innumerable*   EPITHELIAL CELLS WET PREP /hpf Occasional   BACTERIA UA /hpf Moderate*      Results from last 7 days   Lab Units 06/20/22  2131   URINE CULTURE  Culture too young- will reincubate       PHYSICAL EXAM:  Vitals:   Blood Pressure: 154/69 (06/23/22 0736)  Pulse: 58 (06/23/22 0736)  Temperature: (!) 96 8 °F (36 °C) (06/22/22 2230)  Temp Source: Temporal (06/22/22 2230)  Respirations: 18 (06/23/22 0736)  Height: 5' 10" (177 8 cm) (06/21/22 1215)  Weight - Scale: 111 kg (244 lb 14 9 oz) (06/23/22 0600)  SpO2: 98 % (06/23/22 0736)      General: well appearing, no acute distress  HEENT: atraumatic, PERRLA, moist mucosa, normal pharynx, normal tonsils and adenoids, normal tongue, no fluid in sinuses  Neck: Trachea midline, no carotid bruit, no masses  Respiratory: normal chest wall expansion, CTA B, no r/r/w, no rubs  Cardiovascular: RRR, no m/r/g, Normal S1 and S2  Abdomen: Soft, non-tender, non-distended, normal bowel sounds in all quadrants, no hepatosplenomegaly, no tympany  Rectal: deferred  Musculoskeletal: normal ROM in upper and lower extremities  Integumentary: warm, dry, and pink, with no rash, purpura, or petechia  Heme/Lymph: no lymphadenopathy, no bruises  Neurological: Cranial Nerves II-XII grossly intact, no tics, normal sensation to pressure and light touch  Psychiatric: cooperative with normal mood, affect, and cognition      [facility]      Test Results Pending at Discharge:   Pending Labs     Order Current Status    Tissue Exam In process              Medications   · Summary of Medication Adjustments made as a result of this hospitalization:  Protonix twice a day  · Medication Dosing Tapers - Please refer to Discharge Medication List for details on any medication dosing tapers (if applicable to patient)  · Discharge Medication List: See after visit summary for reconciled discharge medications  Diet restrictions:         Diet Orders   (From admission, onward)             Start     Ordered    06/22/22 1625  Diet Lazarus/CHO Controlled; Consistent Carbohydrate Diet Level 2 (5 carb servings/75 grams CHO/meal); Cardiac  Diet effective now        References:    Nutrtion Support Algorithm Enteral vs  Parenteral   Question Answer Comment   Diet Type Lazarus/CHO Controlled    Lazarus/CHO Controlled Consistent Carbohydrate Diet Level 2 (5 carb servings/75 grams CHO/meal)    Other Restriction(s): Cardiac    RD to adjust diet per protocol?  Yes        06/22/22 1624              Activity restrictions: No strenuous activity  Discharge Condition: good    Outpatient Follow-Up and Discharge Instructions  See after visit summary section titled Discharge Instructions for information provided to patient and family  Code Status: Level 1 - Full Code  Discharge Statement   I spent 35 minutes discharging the patient  This time was spent on the day of discharge  Greater than 50% of total time was spent with the patient and / or family counseling and / or coordination of care  ** Please Note: This note was completed in part utilizing M-Modal Fluency Direct Software  Grammatical errors, random word insertions, spelling mistakes, and incomplete sentences may be an occasional consequence of this system secondary to software limitations, ambient noise, and hardware issues  If you have any questions or concerns about the content, text, or information contained within the body of this dictation, please contact the provider for clarification  **

## 2022-06-24 ENCOUNTER — TELEPHONE (OUTPATIENT)
Dept: GASTROENTEROLOGY | Facility: AMBULARY SURGERY CENTER | Age: 77
End: 2022-06-24

## 2022-06-24 LAB — BACTERIA UR CULT: ABNORMAL

## 2022-06-24 RX ORDER — NITROFURANTOIN 25; 75 MG/1; MG/1
100 CAPSULE ORAL 2 TIMES DAILY
Qty: 10 CAPSULE | Refills: 0
Start: 2022-06-24 | End: 2022-06-24 | Stop reason: SDUPTHER

## 2022-06-24 RX ORDER — NITROFURANTOIN 25; 75 MG/1; MG/1
100 CAPSULE ORAL 2 TIMES DAILY
Qty: 10 CAPSULE | Refills: 0
Start: 2022-06-24 | End: 2022-07-05

## 2022-06-24 NOTE — TELEPHONE ENCOUNTER
Patients GI provider:  Dr Jun Cruz     Number to return call: [phone]     Reason for call: Pt calling to schedule repeat EGD       Scheduled procedure/appointment date if applicable: Apt/procedure   9/9/22

## 2022-06-24 NOTE — UTILIZATION REVIEW
Notification of Discharge   This is a Notification of Discharge from our facility [address] [place] [address]  Please be advised that this patient has been discharge from our facility  Below you will find the admission and discharge date and time including the patients disposition  UTILIZATION REVIEW CONTACT:  Gerald Pablo MA  Utilization   Network Utilization Review Department  Phone: [phone] x carefully listen to the prompts  All voicemails are confidential   Email: [email or web address]     PHYSICIAN ADVISORY SERVICES:  FOR [id number] REVIEW - MEDICAL NECESSITY DENIAL  Phone: [phone]  Fax: [phone]  Email: [email or web address]     PRESENTATION DATE: 6/20/2022 10:20 PM  OBERVATION ADMISSION DATE:   INPATIENT ADMISSION DATE: 6/20/22 10:20 PM   DISCHARGE DATE: 6/23/2022  1:29 PM  DISPOSITION: Home/Self Care Home/Self Care      IMPORTANT INFORMATION:  Send all requests for admission clinical reviews, approved or denied determinations and any other requests to dedicated fax number below belonging to the campus where the patient is receiving treatment   List of dedicated fax numbers:  [address] DENIALS (Administrative/Medical Necessity) [phone]   [address] (Maternity/NICU/Pediatrics) [phone]   [facility] [phone]   130 [facility] [phone]   [address] [place] [address] [phone]   [address] [phone]   [facility]  [phone]   [address] [facility]  [address] [facility] 1000 [address] [facility] [phone]

## 2022-06-27 ENCOUNTER — TRANSITIONAL CARE MANAGEMENT (OUTPATIENT)
Dept: FAMILY MEDICINE CLINIC | Facility: OTHER | Age: 77
End: 2022-06-27

## 2022-06-27 NOTE — TELEPHONE ENCOUNTER
TC to pt's wife to schedule repeat EGD in 2 months to assess for ulcer healing      Pt is diabetic    Scheduled date of EGD(as of today):  8/29/22  Physician performing EGD:  Dr Fabio Hansen  Location of EGD:  [facility]  Instructions reviewed with patient by:  George Barraza - mailed to pt's home  Clearances: n/a

## 2022-07-05 ENCOUNTER — OFFICE VISIT (OUTPATIENT)
Dept: FAMILY MEDICINE CLINIC | Facility: OTHER | Age: 77
End: 2022-07-05
Payer: COMMERCIAL

## 2022-07-05 VITALS
SYSTOLIC BLOOD PRESSURE: 116 MMHG | OXYGEN SATURATION: 98 % | HEIGHT: 70 IN | DIASTOLIC BLOOD PRESSURE: 72 MMHG | RESPIRATION RATE: 18 BRPM | BODY MASS INDEX: 34.79 KG/M2 | HEART RATE: 67 BPM | WEIGHT: 243 LBS | TEMPERATURE: 98 F

## 2022-07-05 DIAGNOSIS — N18.32 TYPE 2 DIABETES MELLITUS WITH STAGE 3B CHRONIC KIDNEY DISEASE, WITH LONG-TERM CURRENT USE OF INSULIN (HCC): ICD-10-CM

## 2022-07-05 DIAGNOSIS — E11.22 TYPE 2 DIABETES MELLITUS WITH STAGE 3B CHRONIC KIDNEY DISEASE, WITH LONG-TERM CURRENT USE OF INSULIN (HCC): ICD-10-CM

## 2022-07-05 DIAGNOSIS — E78.2 MIXED HYPERLIPIDEMIA: ICD-10-CM

## 2022-07-05 DIAGNOSIS — K25.4 GASTROINTESTINAL HEMORRHAGE ASSOCIATED WITH GASTRIC ULCER: Primary | ICD-10-CM

## 2022-07-05 DIAGNOSIS — Z79.4 TYPE 2 DIABETES MELLITUS WITH STAGE 3B CHRONIC KIDNEY DISEASE, WITH LONG-TERM CURRENT USE OF INSULIN (HCC): ICD-10-CM

## 2022-07-05 DIAGNOSIS — Z02.4 ENCOUNTER FOR COMMERCIAL DRIVER MEDICAL EXAMINATION (CDME): ICD-10-CM

## 2022-07-05 DIAGNOSIS — Z95.1 S/P CABG (CORONARY ARTERY BYPASS GRAFT): ICD-10-CM

## 2022-07-05 DIAGNOSIS — K57.90 DIVERTICULOSIS: ICD-10-CM

## 2022-07-05 DIAGNOSIS — I25.10 TRIPLE VESSEL CORONARY ARTERY DISEASE: ICD-10-CM

## 2022-07-05 DIAGNOSIS — R33.9 URINARY RETENTION: ICD-10-CM

## 2022-07-05 PROCEDURE — 3066F NEPHROPATHY DOC TX: CPT | Performed by: NURSE PRACTITIONER

## 2022-07-05 PROCEDURE — 99495 TRANSJ CARE MGMT MOD F2F 14D: CPT | Performed by: FAMILY MEDICINE

## 2022-07-05 PROCEDURE — 1111F DSCHRG MED/CURRENT MED MERGE: CPT | Performed by: FAMILY MEDICINE

## 2022-07-05 RX ORDER — FINASTERIDE 5 MG/1
5 TABLET, FILM COATED ORAL DAILY
Qty: 90 TABLET | Refills: 0 | Status: SHIPPED | OUTPATIENT
Start: 2022-07-05

## 2022-07-05 RX ORDER — ATORVASTATIN CALCIUM 80 MG/1
80 TABLET, FILM COATED ORAL
Qty: 90 TABLET | Refills: 3 | Status: SHIPPED | OUTPATIENT
Start: 2022-07-05

## 2022-07-05 NOTE — PROGRESS NOTES
Assessment/Plan:    Diverticulosis  As discovered on patients recent hospitalization from 6/20 - 6/23 on colonoscopy  Patient is without abdominal pain or discomfort at this time  He is without further episodes of BRBPR or melena  He denies any constipation, diarrhea  Plan  - Suggest fiber supplementation with Metamucil or Psyllium; goal of 40g of fiber per day    Gastric ulcer  As discovered on recent hospitalization from 6/20 - 6/23 on EGD  Patient found to have 10 mm x 6 mm superficial ulcer in the antrum  Aside from taking daily aspirin due to history of CABG patient is without other known risk factors for gastric ulcer  He denies taking any other NSAID's chronically  He is without chronic steroid use  He denies any abdominal pain/discomfort, bloating, or further episodes of melena or BRBPR at this time  Plan  - Continue Protonix 40 mg BID  - F/u with GI  - Repeat EGD in 2 months  - Continue daily Enteric coated Aspirin  - Advised to avoid caffeine and acidic foods        Diagnoses and all orders for this visit:    Gastrointestinal hemorrhage associated with gastric ulcer  -     CBC and differential; Future    Diverticulosis    Urinary retention  -     finasteride (PROSCAR) 5 mg tablet; Take 1 tablet (5 mg total) by mouth daily    Triple vessel coronary artery disease  -     atorvastatin (LIPITOR) 80 mg tablet; Take 1 tablet (80 mg total) by mouth daily with dinner    S/P CABG (coronary artery bypass graft)  -     atorvastatin (LIPITOR) 80 mg tablet; Take 1 tablet (80 mg total) by mouth daily with dinner    Type 2 diabetes mellitus with stage 3b chronic kidney disease, with long-term current use of insulin (HCC)  -     HEMOGLOBIN A1C W/ EAG ESTIMATION; Future  -     Microalbumin / creatinine urine ratio  -     Basic metabolic panel; Future    Mixed hyperlipidemia  -     Lipid Panel with Direct LDL reflex;  Future    Encounter for  medical examination (CDME)  -     Ambulatory Referral to Physical Therapy; Future          Subjective:      Patient ID: Arron Rodriguez  is a 77 y o  male  Patient PMHx of T2DM currently well controlled, CKD stage 4, CHF s/p CABG on aspirin, CHF w/ EF of 55% (Echo March '21), sleep apnea, Mcardles presenting for TCM after being hospitalized from 6/20 - 6/23 due to GI bleed  Patient presented to ED with 1 week history of BRBPR and melena that was ongoing for 1 week  On lab analysis patients Hgb was found to to drop from initial 8 5 on admission to 7 7 from his baseline 11  Patient was transfused 1 unit of blood  He was started on a Protonix drip and underwent EGD and colonoscopy  EGD findings were significant for a single 10 mm x 6 mm superficial ulcer in the antrum as well as a sessile polyp measuring <5 mm in the antrum that was removed  The duodenum appeared normal and biopsies were obtained  Patient was classified with COM2 Zuñiga's esophagus using [place] Classification  On colonoscopy patient was also found to have diverticulosis  Patient was discharged on Protonix 40 mg BID GI follow up with a planned repeat endoscopy in 2 months  On presentation in office patient continues to take his Protonix twice daily  He is without any complaints of ongoing melena, BRBPR, dyspepsia, chest or abdominal pain/discomfort  Patient also denies any symptoms of nausea, vomiting, lightheadedness, dizziness or increasing fatigue at this time  The following portions of the patient's history were reviewed and updated as appropriate: allergies, current medications, past family history, past medical history, past social history, past surgical history and problem list     Review of Systems   Constitutional: Negative for appetite change and fatigue  Respiratory: Negative for chest tightness and shortness of breath  Cardiovascular: Negative for chest pain and palpitations     Gastrointestinal: Negative for abdominal distention, abdominal pain, anal bleeding, constipation, diarrhea, nausea, rectal pain and vomiting  Endocrine: Negative for polyuria  Genitourinary: Negative for dysuria and hematuria  Neurological: Negative for dizziness, weakness, light-headedness and headaches  Objective:      /72   Pulse 67   Temp 98 °F (36 7 °C)   Resp 18   Ht 5' 10" (1 778 m)   Wt 110 kg (243 lb)   SpO2 98%   BMI 34 87 kg/m²          Physical Exam  Constitutional:       General: He is not in acute distress  Appearance: Normal appearance  He is not ill-appearing  HENT:      Right Ear: External ear normal       Left Ear: External ear normal       Nose: Nose normal    Eyes:      Extraocular Movements: Extraocular movements intact  Conjunctiva/sclera: Conjunctivae normal    Cardiovascular:      Rate and Rhythm: Normal rate and regular rhythm  Pulses: Normal pulses  Heart sounds: Normal heart sounds  Pulmonary:      Effort: Pulmonary effort is normal       Breath sounds: Normal breath sounds  Abdominal:      Palpations: Abdomen is soft  Musculoskeletal:         General: No swelling  Right lower leg: Edema (1+) present  Left lower leg: Edema (1+) present  Neurological:      Mental Status: He is alert and oriented to person, place, and time

## 2022-07-09 PROBLEM — K25.9 GASTRIC ULCER: Status: ACTIVE | Noted: 2022-07-09

## 2022-07-09 PROBLEM — K57.90 DIVERTICULOSIS: Status: ACTIVE | Noted: 2022-07-09

## 2022-07-09 NOTE — ASSESSMENT & PLAN NOTE
As discovered on patients recent hospitalization from 6/20 - 6/23 on colonoscopy  Patient is without abdominal pain or discomfort at this time  He is without further episodes of BRBPR or melena  He denies any constipation, diarrhea       Plan  - Suggest fiber supplementation with Metamucil or Psyllium; goal of 40g of fiber per day

## 2022-07-09 NOTE — ASSESSMENT & PLAN NOTE
As discovered on recent hospitalization from 6/20 - 6/23 on EGD  Patient found to have 10 mm x 6 mm superficial ulcer in the antrum  Aside from taking daily aspirin due to history of CABG patient is without other known risk factors for gastric ulcer  He denies taking any other NSAID's chronically  He is without chronic steroid use  He denies any abdominal pain/discomfort, bloating, or further episodes of melena or BRBPR at this time       Plan  - Continue Protonix 40 mg BID  - F/u with GI  - Repeat EGD in 2 months  - Continue daily Enteric coated Aspirin  - Advised to avoid caffeine and acidic foods

## 2022-07-13 NOTE — RESULT ENCOUNTER NOTE
Polyps were adenomas (precancerous but no cancer) so repeat colonoscopy in 3 years  He has Zuñiga's esophagus and should have repeat upper endoscopy in 2 to 3 months to document healing of his gastric ulcer  This was communicated via [address]

## 2022-07-25 ENCOUNTER — LAB (OUTPATIENT)
Dept: LAB | Facility: HOSPITAL | Age: 77
End: 2022-07-25
Payer: COMMERCIAL

## 2022-07-25 DIAGNOSIS — E11.22 TYPE 2 DIABETES MELLITUS WITH STAGE 4 CHRONIC KIDNEY DISEASE, WITH LONG-TERM CURRENT USE OF INSULIN (HCC): ICD-10-CM

## 2022-07-25 DIAGNOSIS — N18.9 CHRONIC KIDNEY DISEASE-MINERAL AND BONE DISORDER: ICD-10-CM

## 2022-07-25 DIAGNOSIS — N18.4 TYPE 2 DIABETES MELLITUS WITH STAGE 4 CHRONIC KIDNEY DISEASE, WITH LONG-TERM CURRENT USE OF INSULIN (HCC): ICD-10-CM

## 2022-07-25 DIAGNOSIS — E87.70 HYPERVOLEMIA ASSOCIATED WITH RENAL INSUFFICIENCY: ICD-10-CM

## 2022-07-25 DIAGNOSIS — M89.9 CHRONIC KIDNEY DISEASE-MINERAL AND BONE DISORDER: ICD-10-CM

## 2022-07-25 DIAGNOSIS — N18.4 CHRONIC KIDNEY DISEASE, STAGE IV (SEVERE) (HCC): ICD-10-CM

## 2022-07-25 DIAGNOSIS — I10 ESSENTIAL HYPERTENSION: ICD-10-CM

## 2022-07-25 DIAGNOSIS — E55.9 VITAMIN D DEFICIENCY: ICD-10-CM

## 2022-07-25 DIAGNOSIS — E83.9 CHRONIC KIDNEY DISEASE-MINERAL AND BONE DISORDER: ICD-10-CM

## 2022-07-25 DIAGNOSIS — Z79.4 TYPE 2 DIABETES MELLITUS WITH STAGE 4 CHRONIC KIDNEY DISEASE, WITH LONG-TERM CURRENT USE OF INSULIN (HCC): ICD-10-CM

## 2022-07-25 DIAGNOSIS — N18.4 CKD (CHRONIC KIDNEY DISEASE), STAGE IV (HCC): ICD-10-CM

## 2022-07-25 DIAGNOSIS — N28.9 HYPERVOLEMIA ASSOCIATED WITH RENAL INSUFFICIENCY: ICD-10-CM

## 2022-07-25 LAB
25(OH)D3 SERPL-MCNC: 35.6 NG/ML (ref 30–100)
ALBUMIN SERPL BCP-MCNC: 3.6 G/DL (ref 3.5–5)
ANION GAP SERPL CALCULATED.3IONS-SCNC: 9 MMOL/L (ref 4–13)
BUN SERPL-MCNC: 74 MG/DL (ref 5–25)
CALCIUM SERPL-MCNC: 8.8 MG/DL (ref 8.4–10.2)
CHLORIDE SERPL-SCNC: 109 MMOL/L (ref 96–108)
CO2 SERPL-SCNC: 25 MMOL/L (ref 21–32)
CREAT SERPL-MCNC: 3.29 MG/DL (ref 0.6–1.3)
CREAT UR-MCNC: 90.9 MG/DL
ERYTHROCYTE [DISTWIDTH] IN BLOOD BY AUTOMATED COUNT: 14.8 % (ref 11.6–15.1)
GFR SERPL CREATININE-BSD FRML MDRD: 17 ML/MIN/1.73SQ M
GLUCOSE P FAST SERPL-MCNC: 164 MG/DL (ref 65–99)
HCT VFR BLD AUTO: 29.6 % (ref 36.5–49.3)
HGB BLD-MCNC: 9.5 G/DL (ref 12–17)
MAGNESIUM SERPL-MCNC: 1.7 MG/DL (ref 1.9–2.7)
MCH RBC QN AUTO: 29.7 PG (ref 26.8–34.3)
MCHC RBC AUTO-ENTMCNC: 32.1 G/DL (ref 31.4–37.4)
MCV RBC AUTO: 93 FL (ref 82–98)
MICROALBUMIN UR-MCNC: 203 MG/L (ref 0–20)
MICROALBUMIN/CREAT 24H UR: 223 MG/G CREATININE (ref 0–30)
PHOSPHATE SERPL-MCNC: 4 MG/DL (ref 2.3–4.1)
PLATELET # BLD AUTO: 258 THOUSANDS/UL (ref 149–390)
PMV BLD AUTO: 11.4 FL (ref 8.9–12.7)
POTASSIUM SERPL-SCNC: 4.2 MMOL/L (ref 3.5–5.3)
PTH-INTACT SERPL-MCNC: 203.7 PG/ML (ref 18.4–80.1)
RBC # BLD AUTO: 3.2 MILLION/UL (ref 3.88–5.62)
SODIUM SERPL-SCNC: 143 MMOL/L (ref 135–147)
WBC # BLD AUTO: 6.9 THOUSAND/UL (ref 4.31–10.16)

## 2022-07-25 PROCEDURE — 3060F POS MICROALBUMINURIA REV: CPT | Performed by: NURSE PRACTITIONER

## 2022-07-25 PROCEDURE — 82306 VITAMIN D 25 HYDROXY: CPT

## 2022-07-25 PROCEDURE — 83036 HEMOGLOBIN GLYCOSYLATED A1C: CPT

## 2022-07-25 PROCEDURE — 80069 RENAL FUNCTION PANEL: CPT

## 2022-07-25 PROCEDURE — 85027 COMPLETE CBC AUTOMATED: CPT

## 2022-07-25 PROCEDURE — 83735 ASSAY OF MAGNESIUM: CPT

## 2022-07-25 PROCEDURE — 82570 ASSAY OF URINE CREATININE: CPT | Performed by: FAMILY MEDICINE

## 2022-07-25 PROCEDURE — 36415 COLL VENOUS BLD VENIPUNCTURE: CPT

## 2022-07-25 PROCEDURE — 83970 ASSAY OF PARATHORMONE: CPT

## 2022-07-25 PROCEDURE — 82043 UR ALBUMIN QUANTITATIVE: CPT | Performed by: FAMILY MEDICINE

## 2022-07-25 NOTE — PROGRESS NOTES
NEPHROLOGY OFFICE VISIT   Cornell Watson  77 y o  male MRN: [id number]  7/26/2022    Reason for Visit:  Follow-up    INTERVAL HISTORY and SUBJECTIVE:    I had the pleasure of seeing Frida Zamarripa today in the renal clinic  He is a 67-year-old male who presents for follow-up  He is followed by Dr Yury Cedillo for management of CKD stage 4  Was recently hospitalized from 06/26/2023 due to GI bleed/BRBPR/melena and required EGD  Superficial ulcer in the antrum discovered  He was not seen by our service  He had urinary retention during hospitalization required straight catheterization for urine volume over 500 mL  At Hospital discharge creatinine was at baseline  He resumed torsemide which was held during hospitalization  He is taking 20 mg daily  His wife reports that he is no longer driving therefore he tends to be eating a healthier diet and has lost a lot of weight, approximately 50 lb  He has no acute complaints  No headaches, dizziness  No change in urine output  He generally is unaware of retaining urine according to his wife  He has had prior episodes of urinary retention  He was at a picnic over the weekend and was primarily drinking soda  He had no excessive sweating reported  He was last seen in the Nephrology Clinic January 2022  Follow-up labs obtained on 07/25/2022 reviewed with [place] Portechana Bardales    ASSESSMENT AND PLAN:    Acute kidney injury:  · 7/25: Follow-up Creatinine up to 3 29  · Creatinine 2 38 on admission, Creatinine was down to 1 99 at hospital discharge on 06/23  · Hospitalized with GI bleed/received a unit of packed cells  · Unclear etiology of increased creatinine  · Concern for urinary retention  · Plan:  · Recheck blood work tomorrow    Hold torsemide  · Renal ultrasound with PVR stat    Chronic kidney disease, stage IV with proteinuria:  · Etiology:  Likely nephrosclerosis, prior DUANE/ATN post CABG  · Baseline creatinine:  2 3-2 5 mg/dL  · Completed kidney smart class  · Tentative follow-up with Dr Antonio Malave in 3 months  May need to be seen sooner  Proteinuria:  · Urine protein creatinine ratio:  0 5, stable/improved from prior level of 1 59  · Longer on losartan discontinued quite a long time ago and he was in a nursing home    Blood Pressure/volume status:  · History of essential hypertension  · Volume status: Euvolemic  · Diuretic: Torsemide 20 mg daily  (Previously on higher dose 40 mg b i d )  · Medications:  Metoprolol 50 mg b i d , diuretic  · No longer on losartan stopped approximately 1 year ago  · Blood pressure acceptable    Lower extremity edema:  · On torsemide  · No edema  Appears euvolemic  Electrolytes/acid base:  · Potassium acceptable  · Magnesium slightly low, 1 7  · Bicarbonate within normal limits, 25    CKD MBD/secondary hyperparathyroidism of renal origin  · On vitamin-D supplement  ·  7, increasing prior levels 163< 105  · Current vitamin-D level 35 6  · Continue to monitor studies    Anemia:  · Current hemoglobin 9 5 which improved from 8 5 on 06/23 in the setting of GI bleed  · Transfuse with 1 unit of packed cells during recent hospitalization    GI bleed:  · EGD revealed superficial ulcer in the antrum  · On Protonix 40 mg b i d  Diabetes mellitus:  · Management per endocrinologist/PCP  · A1c 5 9%  Control    Hyperlipidemia:  · On Lipitor    Other:  · CAD status post CABG:  On aspirin, beta-blocker and statin  · Chronic dCHF ejection fraction 55%  · Diverticulosis  · McArdle disease: CK within normal limits during recent hospitalization    PATIENT INSTRUCTIONS:    Patient Instructions   You were seen today for management of chronic kidney disease  Unfortunately recent labs show an acute elevation in creatinine which is concerning  Plan/recommendations:   Repeat blood work    Hold torsemide   Renal ultrasound to assess for urinary retention   We will call you with the results   No other changes in medications at this time        Orders Placed This Encounter   Procedures    US kidney and bladder with pvr     Standing Status:   Future     Standing Expiration Date:   7/26/2026     Scheduling Instructions:      13 years and Up - 1)  Full bladder required  2)  Please drink 24-32 oz of water 1 hour prior to appointment time  3)  No voiding 1 hour prior to appointment time  "Prep required if being scheduled in conjunction withother studies, refer to those examination's Preps first before scheduling  Patient must drink 24 oz of water 60 minutes before your scheduled appointment time  This test requires you to have a FULL bladder  Please donot urinate 1 hour before your appointment time  Patient is not to urinate until their Ultrasound is completed  Bladder filling is a key part of this study       and needs to be full for accurate imaging during this exam             Please bring your insurance cards, a form of photo ID and a list of your medications with you  Arrive 15 minutes prior to your appointment time in order to register  If you need to have lab work or aurinalysis, please do this AFTER your ultrasound  "     Order Specific Question:   Is a Renal Artery Doppler also being requested in addition to the Kidney/Renal ultrasound ? Answer:   No    Basic metabolic panel     This is a patient instruction: Patient fasting for 8 hours or longer recommended  Standing Status:   Future     Standing Expiration Date:   8/26/2022       OBJECTIVE:  Current Weight: Weight - Scale: 105 kg (231 lb)  Vitals:    07/26/22 1054 07/26/22 1114   BP:  120/60   BP Location:  Right arm   Patient Position:  Sitting   Cuff Size:  Standard   Pulse:  62   Weight: 105 kg (231 lb)    Height: 5' 10" (1 778 m)     Body mass index is 33 15 kg/m²  REVIEW OF SYSTEMS:    Review of Systems   Constitutional: Negative for activity change, appetite change, chills, fever and unexpected weight change     HENT: Negative for congestion and sore throat  Eyes: Negative for visual disturbance  Respiratory: Negative for cough and shortness of breath  Cardiovascular: Negative for chest pain, palpitations and leg swelling  Gastrointestinal: Negative for abdominal pain, constipation, diarrhea, nausea and vomiting  Genitourinary: Negative for difficulty urinating, dysuria and hematuria  No suprapubic tenderness upon palpation   Musculoskeletal: Negative for arthralgias and back pain  Unusual arthralgias or myalgias   Skin: Negative for color change, pallor, rash and wound  Neurological: Negative for dizziness, seizures, syncope, light-headedness and headaches  Psychiatric/Behavioral: The patient is not nervous/anxious  All other systems reviewed and are negative  PHYSICAL EXAM:      Physical Exam  Constitutional:       General: He is not in acute distress  Appearance: He is well-developed  He is not ill-appearing, toxic-appearing or diaphoretic  HENT:      Head: Normocephalic and atraumatic  Nose: Nose normal  No congestion  Mouth/Throat:      Mouth: Mucous membranes are moist       Pharynx: No oropharyngeal exudate  Eyes:      General: No scleral icterus  Right eye: No discharge  Left eye: No discharge  Extraocular Movements: Extraocular movements intact  Conjunctiva/sclera: Conjunctivae normal    Neck:      Thyroid: No thyromegaly  Vascular: No carotid bruit or JVD  Trachea: No tracheal deviation  Cardiovascular:      Rate and Rhythm: Normal rate and regular rhythm  Heart sounds: No murmur heard  No friction rub  No gallop  Pulmonary:      Effort: Pulmonary effort is normal       Breath sounds: Normal breath sounds  Abdominal:      General: Abdomen is protuberant  Bowel sounds are normal  There is no distension  Palpations: Abdomen is soft  There is no mass  Tenderness: There is no abdominal tenderness  There is no guarding or rebound  Musculoskeletal:         General: No tenderness or signs of injury  Normal range of motion  Cervical back: Normal range of motion and neck supple  No rigidity or tenderness  Right lower leg: No edema  Left lower leg: No edema  Skin:     General: Skin is warm and dry  Capillary Refill: Capillary refill takes less than 2 seconds  Coloration: Skin is not jaundiced or pale  Findings: No erythema or rash  Neurological:      General: No focal deficit present  Mental Status: He is alert and oriented to person, place, and time  Psychiatric:         Mood and Affect: Mood normal          Behavior: Behavior normal          Thought Content:  Thought content normal          Judgment: Judgment normal          Medications:    Current Outpatient Medications:     Ascorbic Acid (VITAMIN C) 1000 MG tablet, Take 1,000 mg by mouth daily, Disp: , Rfl:     aspirin (ECOTRIN LOW STRENGTH) 81 mg EC tablet, Take 1 tablet (81 mg total) by mouth daily, Disp: 60 tablet, Rfl: 8    atorvastatin (LIPITOR) 80 mg tablet, Take 1 tablet (80 mg total) by mouth daily with dinner, Disp: 90 tablet, Rfl: 3    B-D ULTRAFINE III SHORT PEN 31G X 8 MM MISC, INJECT INTO THE SKIN 4 (FOUR) TIMES A DAY, Disp: 400 each, Rfl: 1    cholecalciferol (VITAMIN D3) 1,000 units tablet, Take 1 tablet (1,000 Units total) by mouth daily, Disp: 1 tablet, Rfl: 1    Continuous Blood Gluc  (FreeStyle Celia 2 Apex) MAMI, Use to check blood sugar daily, Disp: 1 each, Rfl: 0    Continuous Blood Gluc Sensor (FreeStyle Celia 2 Sensor) MISC, CHANGE EVERY 14 DAYS, Disp: 2 each, Rfl: 5    cyanocobalamin (VITAMIN B-12) 500 mcg tablet, Take 500 mcg by mouth daily, Disp: , Rfl:     finasteride (PROSCAR) 5 mg tablet, Take 1 tablet (5 mg total) by mouth daily, Disp: 90 tablet, Rfl: 0    gabapentin (NEURONTIN) 300 mg capsule, Take 1 cap (300mg) by mouth in the morning and take 2 caps (600mg) at bedtime, Disp: 270 capsule, Rfl: 1   glucose blood test strip, Check 4 times a day, Disp: 400 each, Rfl: 1    insulin glargine (Toujeo Max SoloStar) 300 units/mL CONCENTRATED U-300 injection pen (2-unit dial), INJECT 45 UNITS UNDER THE SKIN EVERY BEDTIME, Disp: 6 mL, Rfl:     insulin lispro (HumaLOG KwikPen) 100 units/mL injection pen, INJECT 12 UNITS UNDER THE SKIN 3 TIMES A DAY BEFORE MEALS, Disp: 15 mL, Rfl: 1    latanoprost (XALATAN) 0 005 % ophthalmic solution, 1 drop daily at bedtime, Disp: , Rfl:     liraglutide (Victoza) injection, Inject 0 3 mL (1 8 mg total) under the skin daily, Disp: 27 mL, Rfl: 1    metoprolol tartrate (LOPRESSOR) 50 mg tablet, TAKE 1 TABLET BY MOUTH TWICE A DAY, Disp: 180 tablet, Rfl: 1    Microlet Lancets MISC, USE 3 TIMES DAY, Disp: 300 each, Rfl: 1    pantoprazole (PROTONIX) 40 mg tablet, Take 1 tablet (40 mg total) by mouth 2 (two) times a day before meals, Disp: 120 tablet, Rfl: 0    sertraline (Zoloft) 50 mg tablet, Take 1 tablet (50 mg total) by mouth daily, Disp: 90 tablet, Rfl: 0    tamsulosin (FLOMAX) 0 4 mg, TAKE 2 CAPSULES BY MOUTH DAILY WITH DINNER   , Disp: 180 capsule, Rfl: 0    torsemide (DEMADEX) 20 mg tablet, Take 1 tablet (20 mg total) by mouth 1 (one) time for 1 dose (Patient taking differently: Take 20 mg by mouth daily), Disp: 360 tablet, Rfl: 3    Laboratory Results:  Results from last 7 days   Lab Units 07/25/22  1042   WBC Thousand/uL 6 90   HEMOGLOBIN g/dL 9 5*   HEMATOCRIT % 29 6*   PLATELETS Thousands/uL 258   POTASSIUM mmol/L 4 2   CHLORIDE mmol/L 109*   CO2 mmol/L 25   BUN mg/dL 74*   CREATININE mg/dL 3 29*   CALCIUM mg/dL 8 8   MAGNESIUM mg/dL 1 7*   PHOSPHORUS mg/dL 4 0       Results for orders placed or performed in visit on 07/25/22   Hemoglobin A1C   Result Value Ref Range    Hemoglobin A1C 7 1 (H) Normal 3 8-5 6%; PreDiabetic 5 7-6 4%;  Diabetic >=6 5%; Glycemic control for adults with diabetes <7 0% %     mg/dl   Renal function panel   Result Value Ref Range    Albumin 3 6 3 5 - 5 0 g/dL    Calcium 8 8 8 4 - 10 2 mg/dL    Phosphorus 4 0 2 3 - 4 1 mg/dL    BUN 74 (H) 5 - 25 mg/dL    Creatinine 3 29 (H) 0 60 - 1 30 mg/dL    Sodium 143 135 - 147 mmol/L    Potassium 4 2 3 5 - 5 3 mmol/L    Chloride 109 (H) 96 - 108 mmol/L    CO2 25 21 - 32 mmol/L    ANION GAP 9 4 - 13 mmol/L    eGFR 17 ml/min/1 73sq m    Glucose, Fasting 164 (H) 65 - 99 mg/dL   CBC and Platelet   Result Value Ref Range    WBC 6 90 4 31 - 10 16 Thousand/uL    RBC 3 20 (L) 3 88 - 5 62 Million/uL    Hemoglobin 9 5 (L) 12 0 - 17 0 g/dL    Hematocrit 29 6 (L) 36 5 - 49 3 %    MCV 93 82 - 98 fL    MCH 29 7 26 8 - 34 3 pg    MCHC 32 1 31 4 - 37 4 g/dL    RDW 14 8 11 6 - 15 1 %    Platelets 118 178 - 470 Thousands/uL    MPV 11 4 8 9 - 12 7 fL   PTH, intact   Result Value Ref Range     7 (H) 18 4 - 80 1 pg/mL   Vitamin D 25 hydroxy   Result Value Ref Range    Vit D, 25-Hydroxy 35 6 30 0 - 100 0 ng/mL   Magnesium   Result Value Ref Range    Magnesium 1 7 (L) 1 9 - 2 7 mg/dL

## 2022-07-26 ENCOUNTER — OFFICE VISIT (OUTPATIENT)
Dept: NEPHROLOGY | Facility: CLINIC | Age: 77
End: 2022-07-26
Payer: COMMERCIAL

## 2022-07-26 VITALS
SYSTOLIC BLOOD PRESSURE: 120 MMHG | WEIGHT: 231 LBS | DIASTOLIC BLOOD PRESSURE: 60 MMHG | BODY MASS INDEX: 33.07 KG/M2 | HEIGHT: 70 IN | HEART RATE: 62 BPM

## 2022-07-26 DIAGNOSIS — N17.9 ACUTE KIDNEY INJURY (HCC): ICD-10-CM

## 2022-07-26 DIAGNOSIS — N18.9 CHRONIC KIDNEY DISEASE-MINERAL AND BONE DISORDER: ICD-10-CM

## 2022-07-26 DIAGNOSIS — N18.4 TYPE 2 DIABETES MELLITUS WITH STAGE 4 CHRONIC KIDNEY DISEASE, WITH LONG-TERM CURRENT USE OF INSULIN (HCC): ICD-10-CM

## 2022-07-26 DIAGNOSIS — M89.9 CHRONIC KIDNEY DISEASE-MINERAL AND BONE DISORDER: ICD-10-CM

## 2022-07-26 DIAGNOSIS — I12.9 BENIGN HYPERTENSION WITH CHRONIC KIDNEY DISEASE, STAGE IV (HCC): ICD-10-CM

## 2022-07-26 DIAGNOSIS — R33.9 URINARY RETENTION: ICD-10-CM

## 2022-07-26 DIAGNOSIS — Z79.4 TYPE 2 DIABETES MELLITUS WITH STAGE 4 CHRONIC KIDNEY DISEASE, WITH LONG-TERM CURRENT USE OF INSULIN (HCC): ICD-10-CM

## 2022-07-26 DIAGNOSIS — E78.2 MIXED HYPERLIPIDEMIA: ICD-10-CM

## 2022-07-26 DIAGNOSIS — D50.0 IRON DEFICIENCY ANEMIA DUE TO CHRONIC BLOOD LOSS: ICD-10-CM

## 2022-07-26 DIAGNOSIS — E11.22 TYPE 2 DIABETES MELLITUS WITH STAGE 4 CHRONIC KIDNEY DISEASE, WITH LONG-TERM CURRENT USE OF INSULIN (HCC): ICD-10-CM

## 2022-07-26 DIAGNOSIS — I25.10 CORONARY ARTERY DISEASE INVOLVING NATIVE CORONARY ARTERY OF NATIVE HEART WITHOUT ANGINA PECTORIS: ICD-10-CM

## 2022-07-26 DIAGNOSIS — N18.4 CKD (CHRONIC KIDNEY DISEASE), STAGE IV (HCC): Primary | ICD-10-CM

## 2022-07-26 DIAGNOSIS — Z95.1 S/P CABG (CORONARY ARTERY BYPASS GRAFT): ICD-10-CM

## 2022-07-26 DIAGNOSIS — E66.01 CLASS 2 SEVERE OBESITY DUE TO EXCESS CALORIES WITH SERIOUS COMORBIDITY AND BODY MASS INDEX (BMI) OF 39.0 TO 39.9 IN ADULT (HCC): ICD-10-CM

## 2022-07-26 DIAGNOSIS — R80.9 NON-NEPHROTIC RANGE PROTEINURIA: ICD-10-CM

## 2022-07-26 DIAGNOSIS — N18.4 BENIGN HYPERTENSION WITH CHRONIC KIDNEY DISEASE, STAGE IV (HCC): ICD-10-CM

## 2022-07-26 DIAGNOSIS — E83.9 CHRONIC KIDNEY DISEASE-MINERAL AND BONE DISORDER: ICD-10-CM

## 2022-07-26 LAB
EST. AVERAGE GLUCOSE BLD GHB EST-MCNC: 157 MG/DL
HBA1C MFR BLD: 7.1 %

## 2022-07-26 PROCEDURE — 3051F HG A1C>EQUAL 7.0%<8.0%: CPT | Performed by: NURSE PRACTITIONER

## 2022-07-26 PROCEDURE — 3078F DIAST BP <80 MM HG: CPT | Performed by: NURSE PRACTITIONER

## 2022-07-26 PROCEDURE — 99214 OFFICE O/P EST MOD 30 MIN: CPT | Performed by: NURSE PRACTITIONER

## 2022-07-26 PROCEDURE — 1160F RVW MEDS BY RX/DR IN RCRD: CPT | Performed by: NURSE PRACTITIONER

## 2022-07-26 PROCEDURE — 3074F SYST BP LT 130 MM HG: CPT | Performed by: NURSE PRACTITIONER

## 2022-07-26 NOTE — PATIENT INSTRUCTIONS
You were seen today for management of chronic kidney disease  Unfortunately recent labs show an acute elevation in creatinine which is concerning  Plan/recommendations:  Repeat blood work    Hold torsemide  Renal ultrasound to assess for urinary retention  We will call you with the results  No other changes in medications at this time

## 2022-07-27 ENCOUNTER — APPOINTMENT (OUTPATIENT)
Dept: LAB | Facility: HOSPITAL | Age: 77
End: 2022-07-27
Payer: COMMERCIAL

## 2022-07-27 ENCOUNTER — HOSPITAL ENCOUNTER (OUTPATIENT)
Dept: ULTRASOUND IMAGING | Facility: HOSPITAL | Age: 77
Discharge: HOME | End: 2022-07-27
Payer: COMMERCIAL

## 2022-07-27 DIAGNOSIS — N17.9 ACUTE KIDNEY INJURY (HCC): ICD-10-CM

## 2022-07-27 DIAGNOSIS — N18.9 CHRONIC KIDNEY DISEASE-MINERAL AND BONE DISORDER: ICD-10-CM

## 2022-07-27 DIAGNOSIS — I12.9 BENIGN HYPERTENSION WITH CHRONIC KIDNEY DISEASE, STAGE IV (HCC): ICD-10-CM

## 2022-07-27 DIAGNOSIS — R33.9 URINARY RETENTION: ICD-10-CM

## 2022-07-27 DIAGNOSIS — M89.9 CHRONIC KIDNEY DISEASE-MINERAL AND BONE DISORDER: ICD-10-CM

## 2022-07-27 DIAGNOSIS — N18.4 BENIGN HYPERTENSION WITH CHRONIC KIDNEY DISEASE, STAGE IV (HCC): ICD-10-CM

## 2022-07-27 DIAGNOSIS — N18.4 CKD (CHRONIC KIDNEY DISEASE), STAGE IV (HCC): ICD-10-CM

## 2022-07-27 DIAGNOSIS — E83.9 CHRONIC KIDNEY DISEASE-MINERAL AND BONE DISORDER: ICD-10-CM

## 2022-07-27 LAB
ANION GAP SERPL CALCULATED.3IONS-SCNC: 9 MMOL/L (ref 4–13)
BUN SERPL-MCNC: 67 MG/DL (ref 5–25)
CALCIUM SERPL-MCNC: 9 MG/DL (ref 8.4–10.2)
CHLORIDE SERPL-SCNC: 103 MMOL/L (ref 96–108)
CO2 SERPL-SCNC: 26 MMOL/L (ref 21–32)
CREAT SERPL-MCNC: 2.62 MG/DL (ref 0.6–1.3)
GFR SERPL CREATININE-BSD FRML MDRD: 22 ML/MIN/1.73SQ M
GLUCOSE P FAST SERPL-MCNC: 205 MG/DL (ref 65–99)
POTASSIUM SERPL-SCNC: 4.3 MMOL/L (ref 3.5–5.3)
SODIUM SERPL-SCNC: 138 MMOL/L (ref 135–147)

## 2022-07-27 PROCEDURE — 36415 COLL VENOUS BLD VENIPUNCTURE: CPT

## 2022-07-27 PROCEDURE — 76770 US EXAM ABDO BACK WALL COMP: CPT

## 2022-07-27 PROCEDURE — 80048 BASIC METABOLIC PNL TOTAL CA: CPT

## 2022-07-28 ENCOUNTER — TELEPHONE (OUTPATIENT)
Dept: NEPHROLOGY | Facility: CLINIC | Age: 77
End: 2022-07-28

## 2022-07-28 ENCOUNTER — OFFICE VISIT (OUTPATIENT)
Dept: ENDOCRINOLOGY | Facility: CLINIC | Age: 77
End: 2022-07-28
Payer: COMMERCIAL

## 2022-07-28 VITALS
SYSTOLIC BLOOD PRESSURE: 124 MMHG | TEMPERATURE: 98 F | BODY MASS INDEX: 33.58 KG/M2 | DIASTOLIC BLOOD PRESSURE: 60 MMHG | HEART RATE: 55 BPM | WEIGHT: 234 LBS

## 2022-07-28 DIAGNOSIS — E78.2 MIXED HYPERLIPIDEMIA: ICD-10-CM

## 2022-07-28 DIAGNOSIS — E11.65 UNCONTROLLED TYPE 2 DIABETES MELLITUS WITH HYPERGLYCEMIA (HCC): ICD-10-CM

## 2022-07-28 DIAGNOSIS — N18.4 TYPE 2 DIABETES MELLITUS WITH STAGE 4 CHRONIC KIDNEY DISEASE, WITH LONG-TERM CURRENT USE OF INSULIN (HCC): Primary | ICD-10-CM

## 2022-07-28 DIAGNOSIS — I12.9 BENIGN HYPERTENSION WITH CHRONIC KIDNEY DISEASE, STAGE III (HCC): ICD-10-CM

## 2022-07-28 DIAGNOSIS — Z79.4 TYPE 2 DIABETES MELLITUS WITH STAGE 4 CHRONIC KIDNEY DISEASE, WITH LONG-TERM CURRENT USE OF INSULIN (HCC): Primary | ICD-10-CM

## 2022-07-28 DIAGNOSIS — E11.22 TYPE 2 DIABETES MELLITUS WITH STAGE 4 CHRONIC KIDNEY DISEASE, WITH LONG-TERM CURRENT USE OF INSULIN (HCC): Primary | ICD-10-CM

## 2022-07-28 DIAGNOSIS — N28.9 HYPERVOLEMIA ASSOCIATED WITH RENAL INSUFFICIENCY: ICD-10-CM

## 2022-07-28 DIAGNOSIS — N18.30 BENIGN HYPERTENSION WITH CHRONIC KIDNEY DISEASE, STAGE III (HCC): ICD-10-CM

## 2022-07-28 DIAGNOSIS — E55.9 VITAMIN D DEFICIENCY: ICD-10-CM

## 2022-07-28 DIAGNOSIS — E87.70 HYPERVOLEMIA ASSOCIATED WITH RENAL INSUFFICIENCY: ICD-10-CM

## 2022-07-28 PROCEDURE — 99214 OFFICE O/P EST MOD 30 MIN: CPT | Performed by: INTERNAL MEDICINE

## 2022-07-28 RX ORDER — INSULIN GLARGINE 300 U/ML
INJECTION, SOLUTION SUBCUTANEOUS
Qty: 6 ML
Start: 2022-07-28

## 2022-07-28 RX ORDER — INSULIN LISPRO 100 [IU]/ML
INJECTION, SOLUTION INTRAVENOUS; SUBCUTANEOUS
Qty: 15 ML | Refills: 1
Start: 2022-07-28

## 2022-07-28 NOTE — TELEPHONE ENCOUNTER
----- Message from Tyrese sent at 7/28/2022  7:40 AM EDT -----  I received the results of Michael's labs  Thankfully creatinine is down to 2 6 which is closer to prior baseline  He has been off of the water pill  Please find out if he is exhibiting any signs of volume overload/shortness of breath or increased swelling  I am waiting to be able to view the images for the ultrasound/waiting for report also  I can call them later and discuss this further  If swelling is increasing please have the take a dose of diuretic (water pill) today  It may be more advantageous to speak to his wife concerning the results and directions  She seems to take the lead with healthcare matters

## 2022-07-28 NOTE — TELEPHONE ENCOUNTER
I called Michael's wife and spoke to her about follow-up labs and renal ultrasound since she seems to take the lead on medical matters  Follow-up creatinine down to 2 6  We were holding the torsemide the last few days until labs were resulted  May be an element of volume depletion causing increasing creatinine  He tends to drink soda and not hydrate well  At this time reinitiate torsemide 20 mg every Monday, Wednesday, Friday  Perform daily weights and monitor volume status  Wife was in agreement with this plan of care    Regarding renal ultrasound I checked renal ultrasound due to history of urinary retention  Modest PVR of approximately 170 mL  Kidneys normal echogenicity and contour bilaterally  No hydronephrosis  Follow-up as previously planned

## 2022-07-28 NOTE — PROGRESS NOTES
Nuzhat Lovelace  77 y o  male MRN: [id number]    Encounter: [id number]      Assessment/Plan     Assessment: This is a 77y o -year-old male with diabetes with hyperglycemia  Plan:    Diagnoses and all orders for this visit:    Type 2 diabetes mellitus with stage 4 chronic kidney disease, with long-term current use of insulin (HCC)  Lab Results   Component Value Date    HGBA1C 7 1 (H) 07/25/2022   A1c 7 1%, at goal   Blood sugars sometimes tightly controlled in the morning  Reduce Lantus to 42 units at bedtime  Change Humalog to 12 units with breakfast and 14 units at dinner, as postprandial blood sugars after dinner are elevated  Discussed to continue to use CGMS, to prevent hyperglycemia and hypoglycemia  Discussed hypoglycemia symptoms and treatment  Discussed to follow-up in 3-4 months      -     insulin glargine (Toujeo Max SoloStar) 300 units/mL CONCENTRATED U-300 injection pen (2-unit dial); INJECT 42 UNITS UNDER THE SKIN EVERY BEDTIME  -     insulin lispro (HumaLOG KwikPen) 100 units/mL injection pen; Inject 12 units with breakfast and 14 units with dinner +scale  -     Hemoglobin A1C; Future  -     Basic metabolic panel; Future  -     T4, free; Future  -     TSH, 3rd generation; Future    Uncontrolled type 2 diabetes mellitus with hyperglycemia (HCC)  Adjustment made to insulin regimen    Mixed hyperlipidemia  Continue statins as per PCP    Benign hypertension with chronic kidney disease, stage III ([address] )  Blood pressure well controlled     Continue current management    Vitamin D deficiency  Continue vitamin-D 3, 2000 International Units daily  CC: Diabetes    History of Present Illness     HPI:    Nuzhat Lovelace  is 67-year-old gentleman with medical history of type 2 diabetes with long-term insulin use with complications such as coronary artery disease, CKD is here for follow-up  He is accompanied by his wife, he does not have a driving license so he does not drive    Uses continues glucose monitor sensor all the time  More than 72 hours of data reviewed  July 15 to July 28, 2022  Average glucose 187 mg/dL, glucose variability is 32%  50% blood sugars are in target range 70 to 1 80 mg/dL, 0% blood sugars are low, 28% blood sugars are high more than 250 mg/dL, 22% blood sugars are elevated more than 250 mg/dL  Patient has pattern of elevated blood sugar during the day mostly after lunch and dinner  Current regimen is Toujeo 56 units at bedtime, Humalog 18 units with meals, Victoza 1 8 mg daily  He denies side effects from medications  Lab Results   Component Value Date    HGBA1C 7 1 (H) 07/25/2022 /27/22 11:26 AM 7/25/22 10:42 AM 6/23/22  4:33 AM 6/21/22  5:34 AM 6/20/22  6:33 PM 5/3/22  9:54 AM 2/28/22 10:02 AM     Sodium 135 - 147 mmol/L 138  143  140 R  143 R  141  140  146 High  R    Potassium 3 5 - 5 3 mmol/L 4 3  4 2  3 8  4 0  4 8  4 3  4 4 R    Chloride 96 - 108 mmol/L 103  109 High   108 R  108 R  107  108  110 High     CO2 21 - 32 mmol/L 26  25  27  26  25  25  29 R    ANION GAP 4 - 13 mmol/L 9  9  5  9  9  7  7    BUN 5 - 25 mg/dL 67 High   74 High   41 High   65 High   67 High   56 High   51 High  R    Creatinine 0 60 - 1 30 mg/dL 2 62 High   3 29 High  CM  1 99 High  CM  2 31 High  CM  2 38 High  CM  2 30 High  CM  2 36 High  R, CM    Comment: Standardized to IDMS reference method   Glucose, Fasting 65 - 99 mg/dL 205 High   164 High  CM     146 High  CM  132 High  R, CM    Comment: Specimen collection should occur prior to Sulfasalazine administration due to the potential for falsely depressed results  Specimen collection should occur prior to Sulfapyridine administration due to the potential for falsely elevated results     Calcium 8 4 - 10 2 mg/dL 9 0  8 8  8 1 Low  R  8 4 R  9 1  8 9  9 0    eGFR ml/min/1 73sq m 22  17  31  26  25  26  25        Component      Latest Ref Rng & Units 7/27/2022             Albumin      3 5 - 5 0 g/dL    Calcium      8 4 - 10 2 mg/dL 9 0   Phosphorus      2 3 - 4 1 mg/dL    BUN      5 - 25 mg/dL 67 (H)   Creatinine      0 60 - 1 30 mg/dL 2 62 (H)   Sodium      135 - 147 mmol/L 138   Potassium      3 5 - 5 3 mmol/L 4 3   Chloride      96 - 108 mmol/L 103   CO2      21 - 32 mmol/L 26   Anion Gap      4 - 13 mmol/L 9   eGFR      ml/min/1 73sq m 22   GLUCOSE FASTING      65 - 99 mg/dL 205 (H)       Review of Systems   Constitutional: Negative for activity change, diaphoresis, fatigue, fever and unexpected weight change  HENT: Negative  Eyes: Negative for visual disturbance  Respiratory: Negative for cough, chest tightness and shortness of breath  Cardiovascular: Negative for chest pain, palpitations and leg swelling  Gastrointestinal: Negative for abdominal pain, blood in stool, constipation, diarrhea, nausea and vomiting  Endocrine: Negative for cold intolerance, heat intolerance, polydipsia, polyphagia and polyuria  Genitourinary: Negative for dysuria, enuresis, frequency and urgency  Musculoskeletal: Negative for arthralgias and myalgias  Skin: Negative for pallor, rash and wound  Allergic/Immunologic: Negative  Neurological: Negative for dizziness, tremors, weakness and numbness  Hematological: Negative  Psychiatric/Behavioral: Negative  Historical Information   Past Medical History:   Diagnosis Date    CHF (congestive heart failure) (HCC)     Chronic kidney disease 18 - 24 months?     Dr Alycia Hutchison - stage 3    Colon polyp     Diabetes mellitus ([facility] [address] )     History of transfusion     Hyperlipidemia     Hypertension     [facility]    Open heart surgery with quad bypass    Obesity     Renal disorder     Visual impairment 1991    Dr Renetta Calix     Past Surgical History:   Procedure Laterality Date    APPENDECTOMY  1977    Done in conjunction with cholecystectomy    BACK SURGERY      CHOLECYSTECTOMY  1977    COLONOSCOPY      CORONARY ARTERY BYPASS GRAFT  2019    quad    EGD      GALLBLADDER SURGERY      HERNIA REPAIR      MT CABG, ARTERY-VEIN, FOUR N/A 2019    Procedure: CORONARY ARTERY BYPASS GRAFT (CABG) 4 VESSELS WITH SVG TO PDA, OM, DIAGONAL AND LIMA TO LAD; RIGHT LEG EVH;  Surgeon: iDno Gutierrez MD;  Location: [facility];  Service: Cardiac Surgery    RECTAL SURGERY      SPINE SURGERY      Fusion L3-L5? Social History   Social History     Substance and Sexual Activity   Alcohol Use Yes    Alcohol/week: 10 0 standard drinks    Types: 10 Shots of liquor per week    Comment: 1 drink every 6 months       Social History     Substance and Sexual Activity   Drug Use Never     Social History     Tobacco Use   Smoking Status Former Smoker    Packs/day: 3 00    Years: 35 00    Pack years: 105 00    Types: Cigarettes, Cigars    Start date: 1959   Clifm Willie Quit date: 12    Years since quittin 5   Smokeless Tobacco Never Used   Tobacco Comment    Quit many times     Family History:   Family History   Problem Relation Age of Onset    Cancer Mother     Alcohol abuse Mother     Cancer Father     Alcohol abuse Father     Diabetes Maternal Grandmother     Diabetes Paternal Aunt        Meds/Allergies   Current Outpatient Medications   Medication Sig Dispense Refill    Ascorbic Acid (VITAMIN C) 1000 MG tablet Take 1,000 mg by mouth daily      aspirin (ECOTRIN LOW STRENGTH) 81 mg EC tablet Take 1 tablet (81 mg total) by mouth daily 60 tablet 8    atorvastatin (LIPITOR) 80 mg tablet Take 1 tablet (80 mg total) by mouth daily with dinner 90 tablet 3    B-D ULTRAFINE III SHORT PEN 31G X 8 MM MISC INJECT INTO THE SKIN 4 (FOUR) TIMES A  each 1    cholecalciferol (VITAMIN D3) 1,000 units tablet Take 1 tablet (1,000 Units total) by mouth daily 1 tablet 1    Continuous Blood Gluc  (FreeStyle Celia 2 Dunlevy) MAMI Use to check blood sugar daily 1 each 0    Continuous Blood Gluc Sensor (FreeStyle Celia 2 Sensor) MISC CHANGE EVERY 14 DAYS 2 each 5    cyanocobalamin (VITAMIN B-12) 500 mcg tablet Take 500 mcg by mouth daily      finasteride (PROSCAR) 5 mg tablet Take 1 tablet (5 mg total) by mouth daily 90 tablet 0    gabapentin (NEURONTIN) 300 mg capsule Take 1 cap (300mg) by mouth in the morning and take 2 caps (600mg) at bedtime 270 capsule 1    glucose blood test strip Check 4 times a day 400 each 1    insulin glargine (Toujeo Max SoloStar) 300 units/mL CONCENTRATED U-300 injection pen (2-unit dial) INJECT 42 UNITS UNDER THE SKIN EVERY BEDTIME 6 mL     insulin lispro (HumaLOG KwikPen) 100 units/mL injection pen Inject 12 units with breakfast and 14 units with dinner +scale 15 mL 1    latanoprost (XALATAN) 0 005 % ophthalmic solution 1 drop daily at bedtime      liraglutide (Victoza) injection Inject 0 3 mL (1 8 mg total) under the skin daily 27 mL 1    metoprolol tartrate (LOPRESSOR) 50 mg tablet TAKE 1 TABLET BY MOUTH TWICE A  tablet 1    Microlet Lancets MISC USE 3 TIMES  each 1    pantoprazole (PROTONIX) 40 mg tablet Take 1 tablet (40 mg total) by mouth 2 (two) times a day before meals 120 tablet 0    sertraline (Zoloft) 50 mg tablet Take 1 tablet (50 mg total) by mouth daily 90 tablet 0    tamsulosin (FLOMAX) 0 4 mg TAKE 2 CAPSULES BY MOUTH DAILY WITH DINNER    180 capsule 0    torsemide (DEMADEX) 20 mg tablet Take 1 tablet (20 mg total) by mouth 1 (one) time for 1 dose (Patient taking differently: Take 20 mg by mouth daily) 360 tablet 3     No current facility-administered medications for this visit  No Known Allergies    Objective   Vitals: Blood pressure 124/60, pulse 55, temperature 98 °F (36 7 °C), weight 106 kg (234 lb)  Physical Exam  Vitals reviewed  Constitutional:       General: He is not in acute distress  Appearance: He is well-developed  HENT:      Head: Normocephalic and atraumatic  Eyes:      Pupils: Pupils are equal, round, and reactive to light  Neck:      Thyroid: No thyromegaly  Cardiovascular:      Rate and Rhythm: Normal rate and regular rhythm  Heart sounds: Normal heart sounds  Pulmonary:      Effort: Pulmonary effort is normal  No respiratory distress  Breath sounds: Normal breath sounds  Musculoskeletal:         General: No deformity  Normal range of motion  Cervical back: Normal range of motion and neck supple  Skin:     General: Skin is warm and dry  Findings: No erythema  Neurological:      Mental Status: He is alert and oriented to person, place, and time  The history was obtained from the review of the chart, patient      Lab Results:   Lab Results   Component Value Date/Time    Hemoglobin A1C 7 1 (H) 07/25/2022 10:42 AM    Hemoglobin A1C 5 9 (H) 02/28/2022 10:02 AM    Hemoglobin A1C 6 7 (H) 01/06/2022 11:03 AM    WBC 6 90 07/25/2022 10:42 AM    WBC 7 83 06/23/2022 04:33 AM    WBC 7 32 06/20/2022 06:33 PM    Hemoglobin 9 5 (L) 07/25/2022 10:42 AM    Hemoglobin 8 5 (L) 06/23/2022 04:33 AM    Hemoglobin 8 0 (L) 06/22/2022 05:36 AM    Hematocrit 29 6 (L) 07/25/2022 10:42 AM    Hematocrit 27 0 (L) 06/23/2022 04:33 AM    Hematocrit 25 3 (L) 06/22/2022 05:36 AM    MCV 93 07/25/2022 10:42 AM    MCV 97 06/23/2022 04:33 AM    MCV 95 06/20/2022 06:33 PM    Platelets 733 15/79/6523 10:42 AM    Platelets 866 12/54/6397 04:33 AM    Platelets 797 09/77/6938 06:33 PM    BUN 67 (H) 07/27/2022 11:26 AM    BUN 74 (H) 07/25/2022 10:42 AM    BUN 41 (H) 06/23/2022 04:33 AM    Potassium 4 3 07/27/2022 11:26 AM    Potassium 4 2 07/25/2022 10:42 AM    Potassium 3 8 06/23/2022 04:33 AM    Chloride 103 07/27/2022 11:26 AM    Chloride 109 (H) 07/25/2022 10:42 AM    Chloride 108 06/23/2022 04:33 AM    CO2 26 07/27/2022 11:26 AM    CO2 25 07/25/2022 10:42 AM    CO2 27 06/23/2022 04:33 AM    Creatinine 2 62 (H) 07/27/2022 11:26 AM    Creatinine 3 29 (H) 07/25/2022 10:42 AM    Creatinine 1 99 (H) 06/23/2022 04:33 AM    AST 12 (L) 06/20/2022 06:33 PM    AST 23 01/30/2022 03:54 PM    AST 12 (L) 01/06/2022 11:03 AM    ALT 16 06/20/2022 06:33 PM    ALT 13 01/30/2022 03:54 PM    ALT 13 01/06/2022 11:03 AM    Albumin 3 6 07/25/2022 10:42 AM    Albumin 4 0 06/20/2022 06:33 PM    Albumin 3 7 01/30/2022 03:54 PM    HDL, Direct 33 (L) 04/04/2022 10:22 AM    Triglycerides 98 04/04/2022 10:22 AM           Imaging Studies: I have personally reviewed pertinent reports  Portions of the record may have been created with voice recognition software  Occasional wrong word or "sound a like" substitutions may have occurred due to the inherent limitations of voice recognition software  Read the chart carefully and recognize, using context, where substitutions have occurred

## 2022-08-03 ENCOUNTER — OFFICE VISIT (OUTPATIENT)
Dept: CARDIOLOGY CLINIC | Facility: CLINIC | Age: 77
End: 2022-08-03
Payer: COMMERCIAL

## 2022-08-03 VITALS
DIASTOLIC BLOOD PRESSURE: 48 MMHG | SYSTOLIC BLOOD PRESSURE: 115 MMHG | HEART RATE: 50 BPM | WEIGHT: 231.4 LBS | BODY MASS INDEX: 33.2 KG/M2 | OXYGEN SATURATION: 99 %

## 2022-08-03 DIAGNOSIS — I12.9 BENIGN HYPERTENSION WITH CHRONIC KIDNEY DISEASE, STAGE IV (HCC): ICD-10-CM

## 2022-08-03 DIAGNOSIS — I50.32 CHRONIC DIASTOLIC (CONGESTIVE) HEART FAILURE (HCC): ICD-10-CM

## 2022-08-03 DIAGNOSIS — E78.2 MIXED HYPERLIPIDEMIA: ICD-10-CM

## 2022-08-03 DIAGNOSIS — Z95.1 S/P CABG (CORONARY ARTERY BYPASS GRAFT): ICD-10-CM

## 2022-08-03 DIAGNOSIS — N18.4 BENIGN HYPERTENSION WITH CHRONIC KIDNEY DISEASE, STAGE IV (HCC): ICD-10-CM

## 2022-08-03 DIAGNOSIS — I10 ESSENTIAL HYPERTENSION: ICD-10-CM

## 2022-08-03 DIAGNOSIS — I25.10 CORONARY ARTERY DISEASE INVOLVING NATIVE CORONARY ARTERY OF NATIVE HEART WITHOUT ANGINA PECTORIS: Primary | ICD-10-CM

## 2022-08-03 PROCEDURE — 3066F NEPHROPATHY DOC TX: CPT | Performed by: PHYSICIAN ASSISTANT

## 2022-08-03 PROCEDURE — 99214 OFFICE O/P EST MOD 30 MIN: CPT | Performed by: INTERNAL MEDICINE

## 2022-08-03 PROCEDURE — 3074F SYST BP LT 130 MM HG: CPT | Performed by: INTERNAL MEDICINE

## 2022-08-03 PROCEDURE — 3078F DIAST BP <80 MM HG: CPT | Performed by: INTERNAL MEDICINE

## 2022-08-03 NOTE — PROGRESS NOTES
Cardiology   Vi Garcia  77 y o  male MRN: [id number]          Impression:  1  Diastolic heart failure - compensated at this time  Diuretics regulated by nephrology  2  CAD s/p CABG x 4 - 6/19  3  HTN - controlled  4  Dyslipidemia - on statin  5  CKD - follows with nephrology          Recommendations:  1  Continue current medications  2  Follow up in 6 months        HPI: Vi Garcia  is a 77y o  year old male with coronary artery disease s/p CABG 6/19, hypertension, CKD, and dyslipidemia who returns for follow up  Was admitted to hospital 3/21 with CHF - echo demonstrated normal LV systolic function and mild MR  Recently admitted for GI bleed with gastric ulcer  Currently asymptomatic  No chest pain, shortness of breath, or palpitations  Edema stable  Review of Systems   Constitutional: Negative  HENT: Negative  Eyes: Negative  Respiratory: Negative for chest tightness and shortness of breath  Cardiovascular: Negative for chest pain, palpitations and leg swelling  Gastrointestinal: Negative  Endocrine: Negative  Genitourinary: Negative  Musculoskeletal: Negative  Skin: Negative  Allergic/Immunologic: Negative  Neurological: Negative  Hematological: Negative  Psychiatric/Behavioral: Negative  All other systems reviewed and are negative  Past Medical History:   Diagnosis Date    CHF (congestive heart failure) (HCC)     Chronic kidney disease 18 - 24 months?     Dr Kristin Hardwick - stage 3    Colon polyp     Diabetes mellitus ([facility] [address] )     History of transfusion     Hyperlipidemia     Hypertension     Myocardial infarction ([facility] [address] ) 06/2019    Open heart surgery with quad bypass    Obesity     Renal disorder     Visual impairment 1991    Dr Chandrakant Winters     Past Surgical History:   Procedure Laterality Date    APPENDECTOMY  1977    Done in conjunction with cholecystectomy    BACK SURGERY      CHOLECYSTECTOMY  1977    COLONOSCOPY      CORONARY ARTERY BYPASS GRAFT  2019    quad    EGD      GALLBLADDER SURGERY      HERNIA REPAIR      ND CABG, ARTERY-VEIN, FOUR N/A 2019    Procedure: CORONARY ARTERY BYPASS GRAFT (CABG) 4 VESSELS WITH SVG TO PDA, OM, DIAGONAL AND LIMA TO LAD; RIGHT LEG EVH;  Surgeon: Debria Hatchet, MD;  Location: [facility];  Service: Cardiac Surgery    RECTAL SURGERY      SPINE SURGERY  2012    Fusion L3-L5? Social History     Substance and Sexual Activity   Alcohol Use Yes    Alcohol/week: 10 0 standard drinks    Types: 10 Shots of liquor per week    Comment: 1 drink every 6 months       Social History     Substance and Sexual Activity   Drug Use Never     Social History     Tobacco Use   Smoking Status Former Smoker    Packs/day: 3 00    Years: 35 00    Pack years: 105 00    Types: Cigarettes, Cigars    Start date: 1959   Marcela Hodgson Quit date: 12    Years since quittin 6   Smokeless Tobacco Never Used   Tobacco Comment    Quit many times     Family History   Problem Relation Age of Onset    Cancer Mother     Alcohol abuse Mother     Cancer Father     Alcohol abuse Father     Diabetes Maternal Grandmother     Diabetes Paternal Aunt        Allergies:  No Known Allergies    Medications:     Current Outpatient Medications:     Ascorbic Acid (VITAMIN C) 1000 MG tablet, Take 1,000 mg by mouth daily, Disp: , Rfl:     aspirin (ECOTRIN LOW STRENGTH) 81 mg EC tablet, Take 1 tablet (81 mg total) by mouth daily, Disp: 60 tablet, Rfl: 8    atorvastatin (LIPITOR) 80 mg tablet, Take 1 tablet (80 mg total) by mouth daily with dinner, Disp: 90 tablet, Rfl: 3    B-D ULTRAFINE III SHORT PEN 31G X 8 MM MISC, INJECT INTO THE SKIN 4 (FOUR) TIMES A DAY, Disp: 400 each, Rfl: 1    cholecalciferol (VITAMIN D3) 1,000 units tablet, Take 1 tablet (1,000 Units total) by mouth daily, Disp: 1 tablet, Rfl: 1    Continuous Blood Gluc  (FreeStyle Celia 2 [place]) MAMI, Use to check blood sugar daily, Disp: 1 each, Rfl: 0    Continuous Blood Gluc Sensor (FreeStyle Celia 2 Sensor) MISC, CHANGE EVERY 14 DAYS, Disp: 2 each, Rfl: 5    cyanocobalamin (VITAMIN B-12) 500 mcg tablet, Take 500 mcg by mouth daily, Disp: , Rfl:     finasteride (PROSCAR) 5 mg tablet, Take 1 tablet (5 mg total) by mouth daily, Disp: 90 tablet, Rfl: 0    gabapentin (NEURONTIN) 300 mg capsule, Take 1 cap (300mg) by mouth in the morning and take 2 caps (600mg) at bedtime, Disp: 270 capsule, Rfl: 1    glucose blood test strip, Check 4 times a day, Disp: 400 each, Rfl: 1    insulin glargine (Toujeo Max SoloStar) 300 units/mL CONCENTRATED U-300 injection pen (2-unit dial), INJECT 42 UNITS UNDER THE SKIN EVERY BEDTIME, Disp: 6 mL, Rfl:     insulin lispro (HumaLOG KwikPen) 100 units/mL injection pen, Inject 12 units with breakfast and 14 units with dinner +scale, Disp: 15 mL, Rfl: 1    latanoprost (XALATAN) 0 005 % ophthalmic solution, 1 drop daily at bedtime, Disp: , Rfl:     liraglutide (Victoza) injection, Inject 0 3 mL (1 8 mg total) under the skin daily, Disp: 27 mL, Rfl: 1    metoprolol tartrate (LOPRESSOR) 50 mg tablet, TAKE 1 TABLET BY MOUTH TWICE A DAY, Disp: 180 tablet, Rfl: 1    Microlet Lancets MISC, USE 3 TIMES DAY, Disp: 300 each, Rfl: 1    pantoprazole (PROTONIX) 40 mg tablet, Take 1 tablet (40 mg total) by mouth 2 (two) times a day before meals, Disp: 120 tablet, Rfl: 0    sertraline (Zoloft) 50 mg tablet, Take 1 tablet (50 mg total) by mouth daily, Disp: 90 tablet, Rfl: 0    tamsulosin (FLOMAX) 0 4 mg, TAKE 2 CAPSULES BY MOUTH DAILY WITH DINNER   , Disp: 180 capsule, Rfl: 0    torsemide (DEMADEX) 20 mg tablet, Take 1 tablet (20 mg total) by mouth 1 (one) time for 1 dose (Patient taking differently: Take 20 mg by mouth daily), Disp: 360 tablet, Rfl: 3      Wt Readings from Last 3 Encounters:   08/03/22 105 kg (231 lb 6 4 oz)   07/28/22 106 kg (234 lb)   07/26/22 105 kg (231 lb)     Temp Readings from Last 3 Encounters:   07/28/22 98 °F (36 7 °C)   07/05/22 98 °F (36 7 °C)   06/22/22 (!) 96 8 °F (36 °C) (Temporal)     BP Readings from Last 3 Encounters:   08/03/22 (!) 115/48   07/28/22 124/60   07/26/22 120/60     Pulse Readings from Last 3 Encounters:   08/03/22 (!) 50   07/28/22 55   07/26/22 62         Physical Exam  HENT:      Head: Atraumatic  Mouth/Throat:      Mouth: Mucous membranes are moist    Eyes:      Extraocular Movements: Extraocular movements intact  Cardiovascular:      Rate and Rhythm: Normal rate and regular rhythm  Heart sounds: Normal heart sounds  Pulmonary:      Effort: Pulmonary effort is normal       Breath sounds: Normal breath sounds  Abdominal:      General: Abdomen is flat  Musculoskeletal:         General: Normal range of motion  Cervical back: Normal range of motion  Skin:     General: Skin is warm  Neurological:      General: No focal deficit present  Mental Status: He is alert and oriented to person, place, and time     Psychiatric:         Mood and Affect: Mood normal          Behavior: Behavior normal            Laboratory Studies:  CMP:  Lab Results   Component Value Date    K 4 3 07/27/2022     07/27/2022    CO2 26 07/27/2022    BUN 67 (H) 07/27/2022    CREATININE 2 62 (H) 07/27/2022    GLUCOSE 141 (H) 06/21/2019    AST 12 (L) 06/20/2022    ALT 16 06/20/2022    EGFR 22 07/27/2022       Lipid Profile:   No results found for: CHOL  Lab Results   Component Value Date    HDL 33 (L) 04/04/2022     Lab Results   Component Value Date    LDLCALC 34 04/04/2022     Lab Results   Component Value Date    TRIG 98 04/04/2022       Cardiac testing:   EKG reviewed personally:   Results for orders placed during the hospital encounter of 03/23/21    Echo complete with contrast if indicated    Narrative  [facility], [address] [facility]  [phone]    Transthoracic Echocardiogram  2D, M-mode, Doppler, and Color Doppler    Study date:  23-Mar-2021    Patient: [facility]  MR number: [id number]  Account number: [de-identified]  : 1945  Age: 77 years  Gender: Male  Status: Outpatient  Location: [address] [place] [address]  Height: 71 in  Weight: 279 4 lb  BP: 118/ 80 mmHg    Indications: Assess congestive heart failure  Diagnoses: I50 9 - Heart failure, unspecified    Sonographer:  LISA Olson  Primary Physician:  Kayli Mendez MD  Referring Physician:  ELEAZAR Frost  Group:  [facility]  Interpreting Physician:  Lolly Bhatia MD    SUMMARY    LEFT VENTRICLE:  Systolic function was normal by visual assessment  Ejection fraction was estimated to be 55 %  Although no diagnostic regional wall motion abnormality was identified, this possibility cannot be completely excluded on the basis of this study  Wall thickness was markedly increased  Doppler parameters were consistent with abnormal left ventricular relaxation (grade 1 diastolic dysfunction)  MITRAL VALVE:  There was moderate annular calcification  There was mild regurgitation  AORTIC VALVE:  Transaortic velocity was increased due to increased transvalvular flow  TRICUSPID VALVE:  There was mild regurgitation  PULMONIC VALVE:  There was trace regurgitation  AORTA:  There was mild dilatation of the ascending aorta  HISTORY: PRIOR HISTORY: Heart failure, CAD, NSTEMI s/p CABG, DMt2, Noncompliance    PROCEDURE: The study was performed in the [address] [place] [address]  This was a routine study  The transthoracic approach was used  The study included complete 2D imaging, M-mode, complete spectral Doppler, and color Doppler  The  heart rate was 75 bpm, at the start of the study  Images were obtained from the parasternal, apical, subcostal, and suprasternal notch acoustic windows  Image quality was adequate  LEFT VENTRICLE: Size was normal  Systolic function was normal by visual assessment  Ejection fraction was estimated to be 55 %  Although no diagnostic regional wall motion abnormality was identified, this possibility cannot be completely  excluded on the basis of this study  Wall thickness was markedly increased  DOPPLER: There was an increased relative contribution of atrial contraction to ventricular filling  Doppler parameters were consistent with abnormal left  ventricular relaxation (grade 1 diastolic dysfunction)  RIGHT VENTRICLE: The size was normal  Systolic function was normal  DOPPLER: Systolic pressure was within the normal range  Estimated peak pressure was 27 mmHg  LEFT ATRIUM: Size was normal     RIGHT ATRIUM: Size was normal     MITRAL VALVE: There was moderate annular calcification  Valve structure was normal  There was mild thickening  There was normal leaflet separation  DOPPLER: The transmitral velocity was within the normal range  There was no evidence for  stenosis  There was mild regurgitation  AORTIC VALVE: The valve was probably trileaflet  Leaflets exhibited normal thickness, mild to moderate calcification, and normal cuspal separation  DOPPLER: Transaortic velocity was increased due to increased transvalvular flow  There was  no evidence for stenosis  There was no regurgitation  TRICUSPID VALVE: The valve structure was normal  There was normal leaflet separation  DOPPLER: The transtricuspid velocity was within the normal range  There was no evidence for stenosis  There was mild regurgitation  PULMONIC VALVE: Leaflets exhibited normal thickness, no calcification, and normal cuspal separation  DOPPLER: The transpulmonic velocity was within the normal range  There was trace regurgitation  PERICARDIUM: There was no pericardial effusion  AORTA: The root exhibited normal size  There was mild dilatation of the ascending aorta  SYSTEMIC VEINS: IVC: The inferior vena cava was normal in size and course   Respirophasic changes were normal     SYSTEM MEASUREMENT TABLES    2D  %FS: 28 99 %  Ao Diam: 3 6 cm  Ao asc: 4 1 cm  EDV(Teich): 72 68 ml  EF Biplane: 40 %  EF(Teich): 56 22 %  ESV(Teich): 31 82 ml  HR_2Ch_Q: 75 13 BPM  HR_4Ch_Q: 58 35 BPM  IVSd: 1 58 cm  LA Area: 15 86 cm2  LA Diam: 4 2 cm  LVCO_2Ch_Q: 6 L/min  LVCO_4Ch_Q: 4 03 L/min  LVCO_BiP_Q: 5 01 L/min  LVEDV MOD A2C: 107 2 ml  LVEDV MOD A4C: 110 87 ml  LVEDV MOD BP: 110 33 ml  LVEDVInd MOD BP: 45 41 ml/m2  LVEF MOD A2C: 46 64 %  LVEF MOD A4C: 35 88 %  LVEF_2Ch_Q: 45 18 %  LVEF_4Ch_Q: 48 5 %  LVEF_BiP_Q: 47 38 %  LVESV MOD A2C: 57 21 ml  LVESV MOD A4C: 71 09 ml  LVESV MOD BP: 66 2 ml  LVESVInd MOD BP: 27 24 ml/m2  LVIDd: 4 06 cm  LVIDs: 2 89 cm  LVLd A2C: 9 94 cm  LVLd A4C: 10 15 cm  LVLd_2Ch_Q: 9 99 cm  LVLd_4Ch_Q: 10 38 cm  LVLs A2C: 8 18 cm  LVLs A4C: 9 31 cm  LVLs_2Ch_Q: 9 31 cm  LVLs_4Ch_Q: 9 63 cm  LVPWd: 1 5 cm  LVSV_2Ch_Q: 79 87 ml  LVSV_4Ch_Q: 69 ml  LVSV_BiP_Q: 76 16 ml  LVVED_2Ch_Q: 176 77 ml  LVVED_4Ch_Q: 142 25 ml  LVVED_BiP_Q: 160 75 ml  LVVES_2Ch_Q: 96 9 ml  LVVES_4Ch_Q: 73 25 ml  LVVES_BiP_Q: 84 59 ml  RA Area: 13 82 cm2  RVIDd: 3 28 cm  SV MOD A2C: 50 ml  SV MOD A4C: 39 78 ml  SV(Teich): 40 86 ml    CW  TR MaxP 49 mmHg  TR Vmax: 2 42 m/s    MM  TAPSE: 2 41 cm    PW  E' Sept: 0 06 m/s  E/E' Sept: 18 92  LVOT Env  Ti: 281 64 ms  LVOT VTI: 19 74 cm  LVOT Vmax: 1 04 m/s  LVOT Vmean: 0 7 m/s  LVOT maxP 34 mmHg  LVOT meanP 34 mmHg  MV A Nick: 1 05 m/s  MV Dec [place]: 4 32 m/s2  MV DecT: 250 42 ms  MV E Nick: 1 05 m/s  MV E/A Ratio: 1    [facility] Accredited Echocardiography Laboratory    Prepared and electronically signed by    Shiela Contreras MD  Signed 23-Mar-2021 11:06:22    No results found for this or any previous visit      Results for orders placed during the hospital encounter of 06/15/19    Cardiac catheterization    Narrative  [place] [address], [address] [facility]  [phone]    [facility])    Invasive Cardiovascular Lab Complete Report    Patient: [facility]  MR number: [id number]  Account number: [de-identified]  Study date: 2019  Gender: Male  : 1945  Height: 70 9 in  Weight: 237 6 lb  BSA: 2 27 mï¾²    Allergies: NO KNOWN ALLERGIES    Diagnostic Cardiologist:  Golden Rivera MD  Primary Physician:  DO SEVERINO Simmons    CORONARY CIRCULATION:  Proximal LAD: There was a diffuse 75 % stenosis  It appears amenable to percutaneous intervention  1st diagonal: There was a 80 % stenosis  Proximal circumflex: There was a 100 % stenosis  This lesion is a chronic total occlusion  Proximal RCA: There was a 100 % stenosis  There was JEFFRY grade 1 flow through the vessel (slow flow without perfusion)  This lesion is a chronic total occlusion  INDICATIONS:  --  Possible CAD: myocardial infarction without ST elevation (NSTEMI)  --  Congestive heart failure with dyspnea  PROCEDURES PERFORMED    --  Left coronary angiography  --  Right coronary angiography  --  Inpatient  --  Mod Sedation Same Physician Initial 15min  --  Coronary Catheterization (w/ LHC)  PROCEDURE: The risks and alternatives of the procedures and conscious sedation were explained to the patient and informed consent was obtained  The patient was brought to the cath lab and placed on the table  The planned puncture sites  were prepped and draped in the usual sterile fashion  --  Right radial artery access  After performing an Clint's test to verify adequate ulnar artery supply to the hand, the radial site was prepped  The puncture site was infiltrated with local anesthetic  The vessel was accessed using the  modified Seldinger technique, a wire was advanced into the vessel, and a sheath was advanced over the wire into the vessel  --  Left coronary artery angiography  A catheter was advanced over a guidewire into the aorta and positioned in the left coronary artery ostium under fluoroscopic guidance  Angiography was performed  --  Right coronary artery angiography   A catheter was advanced over a guidewire into the aorta and positioned in the right coronary artery ostium under fluoroscopic guidance  Angiography was performed  --  Inpatient  --  Mod Sedation Same Physician Initial 15min  --  Coronary Catheterization (/ [facility])  PROCEDURE COMPLETION: The patient tolerated the procedure well and was discharged from the cath lab  TIMING: Test started at 11:31  Test concluded at 11:52  HEMOSTASIS: The sheath was removed  The site was compressed with a Hemoband  device  Hemostasis was obtained  MEDICATIONS GIVEN: Fentanyl (1OOmcg/2 ml), 50 mcg, IV, at 11:37  Versed (2mg/2ml), 2 mg, IV, at 11:37  1% Lidocaine, 1 ml, subcutaneously, at 11:37  Verapamil (5mg/2ml), 2 5 mg, IV, at 11:39  Heparin 1000  units/ml, 4,000 units, IV, at 11:39  Nitroglycerin (200mcg/ml), 200 mcg, at 11:39  CONTRAST GIVEN: 30 ml Visipaque 320mgl/ml  RADIATION EXPOSURE: Fluoroscopy time: 2 4 min  CORONARY VESSELS:   --  The coronary circulation is right dominant  --  Left main: The vessel was medium to large sized and heavily calcified  Angiography showed moderate atherosclerosis  --  LAD: The vessel was medium to large sized and heavily calcified  Angiography showed the vessel to wrap around the cardiac apex and moderate atherosclerosis  There was one major diagonal branch  --  Proximal LAD: There was a diffuse 75 % stenosis  It appears amenable to percutaneous intervention  --  1st diagonal: The vessel was small to medium sized  Angiography showed moderate atherosclerosis  There was a 80 % stenosis  --  Proximal circumflex: There was a 100 % stenosis  This lesion is a chronic total occlusion  --  1st obtuse marginal: The vessel was small to medium sized  The artery was supplied by collaterals  --  RCA: The vessel was medium sized and heavily calcified  Angiography showed severe atherosclerosis  --  Proximal RCA: There was a 100 % stenosis   There was JEFFRY grade 1 flow through the vessel (slow flow without perfusion)  This lesion is a chronic total occlusion  --  Right PDA: The vessel was medium sized  The artery was supplied by collaterals  IMPRESSIONS:  There is significant triple vessel coronary artery disease  RECOMMENDATIONS  Consultation be obtained for coronary artery bypass grafting  DISPOSITION:  The patient left the catheterization laboratory in stable condition  Prepared and signed by    Anahi Wagner MD  Signed 2019 11:55:29    Study diagram    Angiographic findings  Native coronary lesions:  ï¾·Proximal LAD: Lesion 1: diffuse, 75 % stenosis  ï¾·D1: Lesion 1: 80 % stenosis  ï¾·Proximal circumflex: Lesion 1: 100 % stenosis  ï¾·Proximal RCA: Lesion 1: 100 % stenosis  Hemodynamic tables    Pressures:  Baseline  Pressures:  - HR: 53  Pressures:  - Rhythm:  Pressures:  -- Aortic Pressure (S/D/M): 111/58/71  Pressures:  -- Left Ventricle (s/edp): 97/17/--    Outputs:  Baseline  Outputs:  -- CALCULATIONS: [age]  Outputs:  -- CALCULATIONS: Body Surface Area: 2 27  Outputs:  -- CALCULATIONS: Height in cm: 180 00  Outputs:  -- CALCULATIONS: Sex: Male  Outputs:  -- CALCULATIONS: Weight in k 00    No results found for this or any previous visit

## 2022-08-04 RX ORDER — TORSEMIDE 20 MG/1
20 TABLET ORAL 3 TIMES WEEKLY
Qty: 360 TABLET | Refills: 0 | Status: SHIPPED | OUTPATIENT
Start: 2022-08-05

## 2022-08-09 ENCOUNTER — OFFICE VISIT (OUTPATIENT)
Dept: NEUROLOGY | Facility: CLINIC | Age: 77
End: 2022-08-09
Payer: COMMERCIAL

## 2022-08-09 VITALS
SYSTOLIC BLOOD PRESSURE: 116 MMHG | TEMPERATURE: 97.3 F | HEIGHT: 68 IN | RESPIRATION RATE: 20 BRPM | BODY MASS INDEX: 35.31 KG/M2 | HEART RATE: 68 BPM | WEIGHT: 233 LBS | DIASTOLIC BLOOD PRESSURE: 60 MMHG

## 2022-08-09 DIAGNOSIS — E74.04 MCARDLE'S DISEASE (HCC): ICD-10-CM

## 2022-08-09 DIAGNOSIS — I65.23 ASYMPTOMATIC BILATERAL CAROTID ARTERY STENOSIS: ICD-10-CM

## 2022-08-09 DIAGNOSIS — R26.81 GAIT INSTABILITY: ICD-10-CM

## 2022-08-09 DIAGNOSIS — N18.4 CKD (CHRONIC KIDNEY DISEASE), STAGE IV (HCC): ICD-10-CM

## 2022-08-09 DIAGNOSIS — E11.42 DIABETIC POLYNEUROPATHY ASSOCIATED WITH TYPE 2 DIABETES MELLITUS (HCC): Primary | ICD-10-CM

## 2022-08-09 DIAGNOSIS — E78.2 MIXED HYPERLIPIDEMIA: ICD-10-CM

## 2022-08-09 DIAGNOSIS — I65.22 STENOSIS OF LEFT INTERNAL CAROTID ARTERY: ICD-10-CM

## 2022-08-09 PROCEDURE — 1160F RVW MEDS BY RX/DR IN RCRD: CPT | Performed by: PHYSICIAN ASSISTANT

## 2022-08-09 PROCEDURE — 99215 OFFICE O/P EST HI 40 MIN: CPT | Performed by: PHYSICIAN ASSISTANT
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
## 2022-08-09 NOTE — PATIENT INSTRUCTIONS
Gabapentin- 300 mg q12 hours x 1 week, then 300 mg qam only x 1 week, then stop  If you notice itching, foot pain, back pain getting worse, contact me

## 2022-08-29 ENCOUNTER — ANESTHESIA EVENT (OUTPATIENT)
Dept: GASTROENTEROLOGY | Facility: AMBULARY SURGERY CENTER | Age: 77
End: 2022-08-29

## 2022-08-29 ENCOUNTER — ANESTHESIA (OUTPATIENT)
Dept: GASTROENTEROLOGY | Facility: AMBULARY SURGERY CENTER | Age: 77
End: 2022-08-29

## 2022-08-29 ENCOUNTER — HOSPITAL ENCOUNTER (OUTPATIENT)
Dept: GASTROENTEROLOGY | Facility: AMBULARY SURGERY CENTER | Age: 77
Setting detail: OUTPATIENT SURGERY
Discharge: HOME | End: 2022-08-29
Payer: COMMERCIAL

## 2022-08-29 VITALS
SYSTOLIC BLOOD PRESSURE: 128 MMHG | HEIGHT: 71 IN | RESPIRATION RATE: 20 BRPM | TEMPERATURE: 97.6 F | WEIGHT: 234 LBS | DIASTOLIC BLOOD PRESSURE: 67 MMHG | BODY MASS INDEX: 32.76 KG/M2 | OXYGEN SATURATION: 96 % | HEART RATE: 68 BPM

## 2022-08-29 DIAGNOSIS — K25.9 GASTRIC ULCER, UNSPECIFIED CHRONICITY, UNSPECIFIED WHETHER GASTRIC ULCER HEMORRHAGE OR PERFORATION PRESENT: ICD-10-CM

## 2022-08-29 LAB — GLUCOSE SERPL-MCNC: 183 MG/DL (ref 65–140)

## 2022-08-29 PROCEDURE — 82948 REAGENT STRIP/BLOOD GLUCOSE: CPT

## 2022-08-29 PROCEDURE — 43235 EGD DIAGNOSTIC BRUSH WASH: CPT | Performed by: INTERNAL MEDICINE

## 2022-08-29 RX ORDER — SODIUM CHLORIDE, SODIUM LACTATE, POTASSIUM CHLORIDE, CALCIUM CHLORIDE 600; 310; 30; 20 MG/100ML; MG/100ML; MG/100ML; MG/100ML
75 INJECTION, SOLUTION INTRAVENOUS CONTINUOUS
Status: DISCONTINUED | OUTPATIENT
Start: 2022-08-29 | End: 2022-09-02 | Stop reason: HOSPADM

## 2022-08-29 RX ORDER — PROPOFOL 10 MG/ML
INJECTION, EMULSION INTRAVENOUS AS NEEDED
Status: DISCONTINUED | OUTPATIENT
Start: 2022-08-29 | End: 2022-08-29

## 2022-08-29 RX ORDER — LIDOCAINE HYDROCHLORIDE 10 MG/ML
INJECTION, SOLUTION EPIDURAL; INFILTRATION; INTRACAUDAL; PERINEURAL AS NEEDED
Status: DISCONTINUED | OUTPATIENT
Start: 2022-08-29 | End: 2022-08-29

## 2022-08-29 RX ADMIN — PROPOFOL 100 MG: 10 INJECTION, EMULSION INTRAVENOUS at 07:38

## 2022-08-29 RX ADMIN — SODIUM CHLORIDE, SODIUM LACTATE, POTASSIUM CHLORIDE, AND CALCIUM CHLORIDE: .6; .31; .03; .02 INJECTION, SOLUTION INTRAVENOUS at 07:34

## 2022-08-29 RX ADMIN — LIDOCAINE HYDROCHLORIDE 50 MG: 10 INJECTION, SOLUTION EPIDURAL; INFILTRATION; INTRACAUDAL; PERINEURAL at 07:38

## 2022-08-29 RX ADMIN — PROPOFOL 50 MG: 10 INJECTION, EMULSION INTRAVENOUS at 07:39

## 2022-08-29 NOTE — ANESTHESIA PREPROCEDURE EVALUATION
Procedure:  EGD    Relevant Problems   CARDIO   (+) Asymptomatic bilateral carotid artery stenosis   (+) Coronary artery disease involving native coronary artery of native heart without angina pectoris   (+) Essential hypertension   (+) Mixed hyperlipidemia   (+) S/P CABG (coronary artery bypass graft)      ENDO   (+) Type 2 diabetes mellitus with stage 4 chronic kidney disease, with long-term current use of insulin (HCC)      GI/HEPATIC   (+) Esophageal dysphagia   (+) GI bleed   (+) Gastric ulcer      /RENAL   (+) BPH (benign prostatic hyperplasia)   (+) Benign hypertension with chronic kidney disease, stage IV (HCC)   (+) CKD (chronic kidney disease), stage IV (HCC)   (+) Chronic kidney disease-mineral and bone disorder      HEMATOLOGY   (+) Iron deficiency anemia due to chronic blood loss      MUSCULOSKELETAL   (+) Lumbar back pain   (+) 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HCC)   (+) 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HCC)      NEURO/PSYCH   (+) Diabetic polyneuropathy associated with type 2 diabetes mellitus (HCC)   (+) History of colonic polyps      PULMONARY   (+) Sleep apnea      Cardiovascular and Mediastinum   (+) Chronic diastolic (congestive) heart failure (HCC)      Digestive   (+) 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   (+) Clostridium difficile diarrhea      Nervous and Auditory   (+) Neuropathy      Other   (+) Lumbar discogenic pain syndrome   (+) Non-nephrotic range proteinuria   (+) Spinal stenosis        Physical Exam    Airway    Mallampati score: II  TM Distance: >3 FB  Neck ROM: full     Dental   No notable dental hx     Cardiovascular  Cardiovascular exam normal    Pulmonary  Pulmonary exam normal     Other Findings        Anesthesia Plan  ASA Score- 3     Anesthesia Type- IV sedation with anesthesia with ASA Monitors  Additional Monitors:   Airway Plan:           Plan Factors-Exercise tolerance (METS): >4 METS  Chart reviewed  EKG reviewed  Imaging results reviewed  Existing labs reviewed   Patient summary reviewed  Patient is not a current smoker  Induction- intravenous  Postoperative Plan-     Informed Consent- Anesthetic plan and risks discussed with patient  I personally reviewed this patient with the CRNA  Discussed and agreed on the Anesthesia Plan with the CRNA  Migdalia Cisneros

## 2022-08-29 NOTE — ANESTHESIA POSTPROCEDURE EVALUATION
Post-Op Assessment Note    CV Status:  Stable  Pain Score: 0    Pain management: adequate     Mental Status:  Sleepy and arousable   Hydration Status:  Euvolemic   PONV Controlled:  Controlled   Airway Patency:  Patent      Post Op Vitals Reviewed: Yes      Staff: CRNA         No complications documented      BP   133/65   Temp  97 6   Pulse  64   Resp   13   SpO2   95%

## 2022-08-29 NOTE — H&P
History and Physical - [facility]  Renan Veronica  77 y o  male MRN: [id number]    HPI: Renan Veronica  is a 77y o  year old male who presents for follow-up EGD for history of gastric ulcer      Review of Systems    Historical Information   Past Medical History:   Diagnosis Date    CHF (congestive heart failure) (HCC)     Chronic kidney disease 18 - 24 months? Dr Ronald Santos - stage 3    Colon polyp     CPAP (continuous positive airway pressure) dependence     Diabetes mellitus ([facility] [address] )     History of transfusion     Hyperlipidemia     Hypertension     Myocardial infarction ([facility] [address] ) 2019    Open heart surgery with quad bypass    Obesity     Renal disorder     Sleep apnea     Visual impairment     Dr Emil Buckley     Past Surgical History:   Procedure Laterality Date    APPENDECTOMY      Done in conjunction with cholecystectomy    BACK SURGERY      CHOLECYSTECTOMY      COLONOSCOPY      CORONARY ARTERY BYPASS GRAFT      quad    EGD      GALLBLADDER SURGERY      HERNIA REPAIR      ND CABG, ARTERY-VEIN, FOUR N/A 2019    Procedure: CORONARY ARTERY BYPASS GRAFT (CABG) 4 VESSELS WITH SVG TO PDA, OM, DIAGONAL AND LIMA TO LAD; RIGHT LEG EVH;  Surgeon: Lacho Boyce MD;  Location: [facility];  Service: Cardiac Surgery    RECTAL SURGERY      SPINE SURGERY  2012    Fusion L3-L5?  TONSILLECTOMY       Social History   Social History     Substance and Sexual Activity   Alcohol Use Not Currently    Alcohol/week: 10 0 standard drinks    Types: 10 Shots of liquor per week    Comment: 1 drink every 6 months       Social History     Substance and Sexual Activity   Drug Use Never     Social History     Tobacco Use   Smoking Status Former Smoker    Packs/day: 3 00    Years: 35 00    Pack years: 105 00    Types: Cigarettes, Cigars    Start date: 1959   Mariah Morris Quit date: 13    Years since quittin 6   Smokeless Tobacco Never Used   Tobacco Comment Quit many times     Family History   Problem Relation Age of Onset   Ellen Nine Cancer Mother     Alcohol abuse Mother     Cancer Father     Alcohol abuse Father     Diabetes Maternal Grandmother     Diabetes Paternal Aunt        Meds/Allergies     (Not in a hospital admission)      No Known Allergies    Objective     /73   Pulse 67   Temp (!) 97 2 °F (36 2 °C) (Temporal)   Resp 18   Ht 5' 11" (1 803 m)   Wt 106 kg (234 lb)   SpO2 98%   BMI 32 64 kg/m²       PHYSICAL EXAM    Gen: NAD  CV: RRR  CHEST: Clear  ABD: soft, NT/ND  EXT: no edema  Neuro: AAO      ASSESSMENT/PLAN:  This is a 77y o  year old male here for EGD for evaluation of gastric ulcer    PLAN:   Procedure:  EGD with biopsy

## 2022-09-06 DIAGNOSIS — Z79.4 TYPE 2 DIABETES MELLITUS WITH HYPERGLYCEMIA, WITH LONG-TERM CURRENT USE OF INSULIN (HCC): ICD-10-CM

## 2022-09-06 DIAGNOSIS — E11.65 TYPE 2 DIABETES MELLITUS WITH HYPERGLYCEMIA, WITH LONG-TERM CURRENT USE OF INSULIN (HCC): ICD-10-CM

## 2022-09-07 RX ORDER — PEN NEEDLE, DIABETIC 31 GX5/16"
NEEDLE, DISPOSABLE MISCELLANEOUS
Qty: 400 EACH | Refills: 1 | Status: SHIPPED | OUTPATIENT
Start: 2022-09-07

## 2022-09-13 ENCOUNTER — OFFICE VISIT (OUTPATIENT)
Dept: FAMILY MEDICINE CLINIC | Facility: OTHER | Age: 77
End: 2022-09-13
Payer: COMMERCIAL

## 2022-09-13 VITALS
DIASTOLIC BLOOD PRESSURE: 80 MMHG | RESPIRATION RATE: 18 BRPM | HEIGHT: 71 IN | SYSTOLIC BLOOD PRESSURE: 130 MMHG | BODY MASS INDEX: 32.96 KG/M2 | HEART RATE: 79 BPM | WEIGHT: 235.4 LBS | TEMPERATURE: 98 F | OXYGEN SATURATION: 98 %

## 2022-09-13 DIAGNOSIS — K92.2 GI BLEED: ICD-10-CM

## 2022-09-13 DIAGNOSIS — Z00.00 ENCOUNTER FOR ANNUAL WELLNESS VISIT (AWV) IN MEDICARE PATIENT: Primary | ICD-10-CM

## 2022-09-13 DIAGNOSIS — Z79.4 TYPE 2 DIABETES MELLITUS WITH STAGE 3B CHRONIC KIDNEY DISEASE, WITH LONG-TERM CURRENT USE OF INSULIN (HCC): ICD-10-CM

## 2022-09-13 DIAGNOSIS — Z00.00 MEDICARE ANNUAL WELLNESS VISIT, SUBSEQUENT: ICD-10-CM

## 2022-09-13 DIAGNOSIS — E11.42 DIABETIC POLYNEUROPATHY ASSOCIATED WITH TYPE 2 DIABETES MELLITUS (HCC): ICD-10-CM

## 2022-09-13 DIAGNOSIS — E11.22 TYPE 2 DIABETES MELLITUS WITH STAGE 3B CHRONIC KIDNEY DISEASE, WITH LONG-TERM CURRENT USE OF INSULIN (HCC): ICD-10-CM

## 2022-09-13 DIAGNOSIS — Z13.820 SCREENING FOR OSTEOPOROSIS: ICD-10-CM

## 2022-09-13 DIAGNOSIS — N18.32 TYPE 2 DIABETES MELLITUS WITH STAGE 3B CHRONIC KIDNEY DISEASE, WITH LONG-TERM CURRENT USE OF INSULIN (HCC): ICD-10-CM

## 2022-09-13 PROCEDURE — G0439 PPPS, SUBSEQ VISIT: HCPCS | Performed by: FAMILY MEDICINE

## 2022-09-13 PROCEDURE — 3079F DIAST BP 80-89 MM HG: CPT | Performed by: FAMILY MEDICINE

## 2022-09-13 PROCEDURE — 1160F RVW MEDS BY RX/DR IN RCRD: CPT | Performed by: FAMILY MEDICINE

## 2022-09-13 PROCEDURE — 3075F SYST BP GE 130 - 139MM HG: CPT | Performed by: FAMILY MEDICINE

## 2022-09-13 PROCEDURE — 1170F FXNL STATUS ASSESSED: CPT | Performed by: FAMILY MEDICINE

## 2022-09-13 PROCEDURE — 1125F AMNT PAIN NOTED PAIN PRSNT: CPT | Performed by: FAMILY MEDICINE

## 2022-09-13 PROCEDURE — 3288F FALL RISK ASSESSMENT DOCD: CPT | Performed by: FAMILY MEDICINE

## 2022-09-13 PROCEDURE — 3066F NEPHROPATHY DOC TX: CPT | Performed by: FAMILY MEDICINE

## 2022-09-13 NOTE — PROGRESS NOTES
Assessment and Plan:     Problem List Items Addressed This Visit        Digestive    GI bleed    Relevant Medications    pantoprazole (PROTONIX) 40 mg tablet       Endocrine    Diabetic polyneuropathy associated with type 2 diabetes mellitus ([facility] )      Other Visit Diagnoses     Encounter for annual wellness visit (AWV) in Medicare patient    -  Primary    Type 2 diabetes mellitus with stage 3b chronic kidney disease, with long-term current use of insulin ([facility] )            BMI Counseling: Body mass index is 32 83 kg/m²  The BMI is above normal  Nutrition recommendations include decreasing portion sizes, encouraging healthy choices of fruits and vegetables, moderation in carbohydrate intake, increasing intake of lean protein, reducing intake of saturated and trans fat and reducing intake of cholesterol  Exercise recommendations include moderate physical activity 150 minutes/week and exercising 3-5 times per week  Rationale for BMI follow-up plan is due to patient being overweight or obese  Preventive health issues were discussed with patient, and age appropriate screening tests were ordered as noted in patient's After Visit Summary  Personalized health advice and appropriate referrals for health education or preventive services given if needed, as noted in patient's After Visit Summary  History of Present Illness:     Patient presents for a Medicare Wellness Visit    Patient PMHx of T2DM currently well controlled, CKD stage 4, CHF s/p CABG on aspirin, CHF w/ EF of 55% (Echo March '21), sleep apnea, possible Mcardles presenting for AWV  In terms ongoing management patient reports that he is currently being weaned off of Gabapentin and wife reports noticing an improvement in patients gait        Patient Care Team:  Yobani Jane DO as PCP - General (Family Medicine)  Dago Middleton MD as PCP - Endocrinology (Endocrinology)  Ishmael Moss MD (Nephrology)  Arjun Aguilar DO (Internal Medicine)  [facility] (Dermatology)  Kelly Kim (Dermatology)  Betina Leslie MD as Surgeon (Surgical Oncology)  Leverette Cooks, MD (Cardiology)  Joe Thompson MD (Sleep Medicine)  [facility])     Review of Systems:     Review of Systems   Constitutional: Negative for activity change, appetite change, chills, fever and unexpected weight change  HENT: Negative for congestion and rhinorrhea  Eyes: Negative for visual disturbance  Respiratory: Negative for cough and shortness of breath  Cardiovascular: Negative for chest pain, palpitations and leg swelling  Gastrointestinal: Negative for abdominal distention, abdominal pain and diarrhea  Endocrine: Negative for polyuria  Genitourinary: Negative for decreased urine volume, difficulty urinating and dysuria  Musculoskeletal: Negative for arthralgias, back pain and myalgias  Neurological: Negative for dizziness, weakness, light-headedness, numbness and headaches          Problem List:     Patient Active Problem List   Diagnosis    CKD (chronic kidney disease), stage IV (HCC)    Benign hypertension with chronic kidney disease, stage IV (HCC)    Chronic kidney disease-mineral and bone disorder    Type 2 diabetes mellitus with stage 4 chronic kidney disease, with long-term current use of insulin ([facility])    Sleep apnea    Coronary artery disease involving native coronary artery of native heart without angina pectoris    Mixed hyperlipidemia    S/P CABG (coronary artery bypass graft)    Acute postoperative pulmonary insufficiency ([facility])    Iron deficiency anemia due to chronic blood loss    Non-nephrotic range proteinuria    Esophageal dysphagia    History of colonic polyps    Lumbar back pain    Lumbar discogenic pain syndrome    Pain of lower extremity    Trigger finger of left hand    Spondylolisthesis    Spinal stenosis    Vitamin D deficiency    Lymphadenopathy    Stenosis of left internal carotid artery    Neuropathy    Diabetic polyneuropathy associated with type 2 diabetes mellitus ([facility] )    Encounter for  medical examination (CDME)    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    Traumatic injury of skin    Hypervolemia associated with renal insufficiency    Elevated TSH    Essential hypertension    Chronic diastolic (congestive) heart failure (HCC)    UTI (urinary tract infection)    Clostridium difficile diarrhea    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    BPH (benign prostatic hyperplasia)    Asymptomatic bilateral carotid artery stenosis    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 Dr Allegra Nelson - stage 3    Colon polyp     CPAP (continuous positive airway pressure) dependence     Diabetes mellitus ([place] [address] )     History of transfusion     Hyperlipidemia     Hypertension     [facility] infarction ([email or web address] ) 06/2019    Open heart surgery with quad bypass    Obesity     Renal disorder     Sleep apnea     Visual impairment 1991    Dr Damien James     Past Surgical History:   Procedure Laterality Date    APPENDECTOMY  1977    Done in conjunction with cholecystectomy    BACK SURGERY      CHOLECYSTECTOMY  1977    COLONOSCOPY      CORONARY ARTERY BYPASS GRAFT  2019    quad    EGD      GALLBLADDER SURGERY      HERNIA REPAIR      AK CABG, ARTERY-VEIN, FOUR N/A 06/21/2019    Procedure: CORONARY ARTERY BYPASS GRAFT (CABG) 4 VESSELS WITH SVG TO PDA, OM, DIAGONAL AND LIMA TO LAD; RIGHT LEG EVH;  Surgeon: Rubi Garcia MD;  [facility];  Service: Cardiac Surgery    RECTAL SURGERY      SPINE SURGERY  2012    Fusion L3-L5?     TONSILLECTOMY        Family History:     Family History   Problem Relation Age of Onset    Cancer Mother    Margie Ag Alcohol abuse Mother     Cancer Father     Alcohol abuse Father     Diabetes Maternal Grandmother     Diabetes Paternal Aunt       Social History:     Social History     Socioeconomic History    Marital status: /Civil Union     Spouse name: None    Number of children: None    Years of education: None    Highest education level: None   Occupational History    Occupation: RETIRED   Tobacco Use    Smoking status: Former Smoker     Packs/day: 3 00     Years: 35 00     Pack years: 105 00     Types: Cigarettes, Cigars     Start date: 1959     Quit date:      Years since quittin 7    Smokeless tobacco: Never Used    Tobacco comment: Quit many times   Vaping Use    Vaping Use: Never used   Substance and Sexual Activity    Alcohol use: Not Currently     Alcohol/week: 10 0 standard drinks     Types: 10 Shots of liquor per week     Comment: 1 drink every 6 months   Drug use: Never    Sexual activity: Not Currently     Partners: Female     Birth control/protection: Male Sterilization   Other Topics Concern    None   Social History Narrative    · Do you currently or have you served in the [employer]:   No      · Were you activated, into active duty, as a member of the [employer] or as a Reservist:   No      · Caffeine intake:   Occasional      · Diet:   Regular      · Live alone or with others:   with others      · Exercise level:   Occasional  swim in summertime  walk alot  · ·Guns present in home:   No      · Seat belts used routinely:   Yes      · Advance directive:   No      · Sunscreen used routinely:   No      · Smoke alarm in home:    Yes      · Legally blind in one or both eyes:   No      · Hard of hearing or deaf in one or both ears:   No      ·      Social Determinants of Health     Financial Resource Strain: Not on file   Food Insecurity: Not on file   Transportation Needs: Not on file   Physical Activity: Not on file   Stress: Not on file   Social Connections: Not on file   Intimate Partner Violence: Not on file   Housing Stability: Not on file      Medications and Allergies:     Current Outpatient Medications   Medication Sig Dispense Refill    Ascorbic Acid (VITAMIN C) 1000 MG tablet Take 1,000 mg by mouth daily      aspirin (ECOTRIN LOW STRENGTH) 81 mg EC tablet Take 1 tablet (81 mg total) by mouth daily 60 tablet 8    atorvastatin (LIPITOR) 80 mg tablet Take 1 tablet (80 mg total) by mouth daily with dinner 90 tablet 3    B-D ULTRAFINE III SHORT PEN 31G X 8 MM MISC INJECT INTO THE SKIN 4 TIMES DAILY 400 each 1    cholecalciferol (VITAMIN D3) 1,000 units tablet Take 1 tablet (1,000 Units total) by mouth daily 1 tablet 1    Continuous Blood Gluc  (FreeStyle Celia 2 [place]) MAMI Use to check blood sugar daily 1 each 0    Continuous Blood Gluc Sensor (FreeStyle Celia 2 Sensor) MISC CHANGE EVERY 14 DAYS 2 each 5    cyanocobalamin (VITAMIN B-12) 500 mcg tablet Take 500 mcg by mouth daily      finasteride (PROSCAR) 5 mg tablet Take 1 tablet (5 mg total) by mouth daily 90 tablet 0    glucose blood test strip Check 4 times a day 400 each 1    insulin glargine (Toujeo Max SoloStar) 300 units/mL CONCENTRATED U-300 injection pen (2-unit dial) INJECT 42 UNITS UNDER THE SKIN EVERY BEDTIME 6 mL     insulin lispro (HumaLOG KwikPen) 100 units/mL injection pen Inject 12 units with breakfast and 14 units with dinner +scale 15 mL 1    latanoprost (XALATAN) 0 005 % ophthalmic solution 1 drop daily at bedtime      liraglutide (Victoza) injection Inject 0 3 mL (1 8 mg total) under the skin daily 27 mL 1    metoprolol tartrate (LOPRESSOR) 50 mg tablet TAKE 1 TABLET BY MOUTH TWICE A  tablet 1    Microlet Lancets MISC USE 3 TIMES  each 1    pantoprazole (PROTONIX) 40 mg tablet Take 1 tablet (40 mg total) by mouth daily 60 tablet 0    tamsulosin (FLOMAX) 0 4 mg TAKE 2 CAPSULES BY MOUTH DAILY WITH DINNER    180 capsule 0    torsemide (DEMADEX) 20 mg tablet Take 1 tablet (20 mg total) by mouth 3 (three) times a week Take every Monday, Wednesday, Friday  Take additional doses as needed if weight increases by 2-3 lb in 1 day 360 tablet 0     No current facility-administered medications for this visit  No Known Allergies   Immunizations:     Immunization History   Administered Date(s) Administered    COVID-19 PFIZER VACCINE 0 3 ML IM 02/15/2021, 03/08/2021, 01/03/2022    COVID-19 Pfizer vac (Maximo-sucrose, gray cap) 12 yr+ IM 05/13/2022    INFLUENZA 09/18/2018    Influenza LAIV (Nasal) 10/17/2016    Influenza Split High Dose Preservative Free IM 11/13/2017, 09/18/2018, 10/18/2019    Influenza, high dose seasonal 0 7 mL 08/25/2020, 10/19/2021    Pneumococcal Conjugate 13-Valent 09/05/2019, 10/18/2019    Pneumococcal Polysaccharide PPV23 09/27/2012    Tdap 05/29/2013, 08/25/2020    Zoster Vaccine Recombinant 12/04/2019, 02/27/2020    influenza, trivalent, adjuvanted 09/09/2019      Health Maintenance:         Topic Date Due    Colorectal Cancer Screening  06/21/2025    Hepatitis C Screening  Completed         Topic Date Due    Influenza Vaccine (1) 09/01/2022      Medicare Screening Tests and Risk Assessments:     Last Medicare Wellness visit information reviewed, patient interviewed and updates made to the record today  Health Risk Assessment:   Patient rates overall health as good  Patient feels that their physical health rating is slightly better  Patient is satisfied with their life  Eyesight was rated as same  Hearing was rated as same  Patient feels that their emotional and mental health rating is same  Patients states they are sometimes angry  Patient states they are often unusually tired/fatigued  Pain experienced in the last 7 days has been none  Patient states that he has experienced no weight loss or gain in last 6 months  Fall Risk Screening:    In the past year, patient has experienced: history of falling in past year      Home Safety:  Patient has trouble with stairs inside or outside of their home  Patient has working smoke alarms and has working carbon monoxide detector  Home safety hazards include: none  Nutrition:   Current diet is No Added Salt  Medications:   Patient is currently taking over-the-counter supplements  OTC medications include: Vit B12, C, D  Patient is able to manage medications  My wife double checks them    Activities of Daily Living (ADLs)/Instrumental Activities of Daily Living (IADLs):   Walk and transfer into and out of bed and chair?: Yes  Dress and groom yourself?: Yes    Bathe or shower yourself?: Yes    Feed yourself? Yes  Do your laundry/housekeeping?: Yes  Manage your money, pay your bills and track your expenses?: No  Make your own meals?: No    Do your own shopping?: No    Previous Hospitalizations:   Any hospitalizations or ED visits within the last 12 months?: Yes    How many hospitalizations have you had in the last year?: 3-4    Advance Care Planning:   Living will: Yes    Durable POA for healthcare: Yes    Advanced directive: Yes      PREVENTIVE SCREENINGS      Cardiovascular Screening:    General: Screening Not Indicated and History Lipid Disorder      Diabetes Screening:     General: Screening Not Indicated and History Diabetes      Colorectal Cancer Screening:     General: Screening Current      Prostate Cancer Screening:    General: Screening Not Indicated      Abdominal Aortic Aneurysm (AAA) Screening:    Risk factors include: age between 73-67 yo and tobacco use        Lung Cancer Screening:     General: Screening Not Indicated      Hepatitis C Screening:    General: Screening Current    Screening, Brief Intervention, and Referral to Treatment (SBIRT)    Screening  Typical number of drinks in a day: 0  Typical number of drinks in a week: 0  Interpretation: Low risk drinking behavior      AUDIT-C Screenin) How often did you have a drink containing alcohol in the past year? 2 to 3 times a week  2) How many drinks did you have on a typical day when you were drinking in the past year? 3 to 4  3) How often did you have 6 or more drinks on one occasion in the past year? less than monthly    AUDIT-C Score: 4  Interpretation: Score 4-12 (male): POSITIVE screen for alcohol misuse    AUDIT Screenin) How often during the last year have you found that you were not able to stop drinking once you had started? 1 - less than monthly  5) How often during the last year have you failed to do what was normally expected from you because of drinking? 1 - less than monthly  6) How often during the last year have you needed a first drink in the morning to get yourself going after a heavy drinking session? 0 - never  7) How often during the last year have you had a feeling of guilt or remorse after drinking? 0 - never  8) How often during the last year have you been unable to remember what happened the night before because you had been drinking? 1 - less than monthly  9) Have you or someone else been injured as a result of your drinking? 0 - no  10) Has a relative or friend or a doctor or another health worker been concerned about your drinking or suggested you cut down? 4 - yes, during the last year    AUDIT Score: 11  Interpretation: Harmful or hazardous alcohol consumption    Single Item Drug Screening:  How often have you used an illegal drug (including marijuana) or a prescription medication for non-medical reasons in the past year? never    Single Item Drug Screen Score: 0  Interpretation: Negative screen for possible drug use disorder    No exam data present     Physical Exam:     /80   Pulse 79   Temp 98 °F (36 7 °C)   Resp 18   Ht 5' 11" (1 803 m)   Wt 107 kg (235 lb 6 4 oz)   SpO2 98%   BMI 32 83 kg/m²     Physical Exam  Constitutional:       General: He is not in acute distress  Appearance: Normal appearance  He is obese  He is not ill-appearing     HENT:      Head: Normocephalic and atraumatic  Right Ear: External ear normal       Left Ear: External ear normal       Nose: Nose normal    Eyes:      General: Scleral icterus: dxa  Extraocular Movements: Extraocular movements intact  Conjunctiva/sclera: Conjunctivae normal    Cardiovascular:      Rate and Rhythm: Normal rate and regular rhythm  Pulses: Normal pulses  Heart sounds: Normal heart sounds  Pulmonary:      Effort: Pulmonary effort is normal       Breath sounds: Normal breath sounds  Abdominal:      Palpations: Abdomen is soft  Tenderness: There is no abdominal tenderness  Musculoskeletal:         General: No swelling or tenderness  Normal range of motion  Right lower leg: No edema  Left lower leg: No edema  Skin:     General: Skin is warm and dry  Neurological:      Mental Status: He is alert and oriented to person, place, and time  Motor: No weakness        Gait: Gait normal           Nyasia Sesay DO

## 2022-09-13 NOTE — PATIENT INSTRUCTIONS
Chronic Kidney Disease   WHAT YOU NEED TO KNOW:   Chronic kidney disease (CKD) is the gradual and permanent loss of kidney function  It is also called chronic kidney failure, or chronic renal insufficiency  Normally, the kidneys remove fluid, chemicals, and waste from your blood  These wastes are turned into urine by your kidneys  CKD may worsen over time and lead to kidney failure  Your CKD team will help you and your family plan for your care at home  The team will help you create goals and find ways to meet your goals  Your care plan may change over time as your needs change  DISCHARGE INSTRUCTIONS:   Call your local emergency number (911 in the [address]) if:   · You have a seizure  · You have shortness of breath  Return to the emergency department if:   · You are confused and very drowsy  Call your doctor or nephrologist if:   · You suddenly gain or lose more weight than your healthcare provider has told you is okay  · You have itchy skin or a rash  · You urinate more or less than you normally do  · You have blood in your urine  · You have nausea and are vomiting  · You have fatigue or muscle weakness  · You have hiccups that will not stop  · You have questions or concerns about your condition or care  Medicines:   · Medicines  may be given to decrease blood pressure and get rid of extra fluid  You may also receive medicine to manage health conditions that may occur with CKD, such as anemia, diabetes, and heart disease  · Take your medicine as directed  Contact your healthcare provider if you think your medicine is not helping or if you have side effects  Tell him or her if you are allergic to any medicine  Keep a list of the medicines, vitamins, and herbs you take  Include the amounts, and when and why you take them  Bring the list or the pill bottles to follow-up visits  Carry your medicine list with you in case of an emergency      What you can do to manage CKD: Management may include making some lifestyle changes  Tell your healthcare provider if you have any concerns about being able to make changes  He or she can help you find solutions, including working with specialists  Ask for help creating a plan to break large goals into smaller steps  Your plan may include any of the following:  · Manage other health conditions  Your healthcare provider will work with you to make a care plan that meets your needs  You will be checked regularly for heart disease or other conditions that can make CKD worse, such as diabetes  Your blood pressure will be closely monitored  You will also get a target blood pressure and help making a plan to reach your target  This may include taking your blood pressure at home  · Maintain a healthy weight  Your weight and body mass index (BMI) will be checked regularly  BMI helps find if your weight is healthy for your height  Your healthcare provider will use other tests to check your muscle and protein levels  Extra weight can strain your kidneys  A low weight or low muscle mass can make you feel more tired  You may have trouble doing your daily activities  Ask your provider what a healthy weight is for you  He or she can help you create a plan to lose or gain weight safely, if needed  The plan may include keeping a food diary  This is a list of foods and liquids you have each day  Your provider will use the diary to help you make changes, if needed  Changes are based on your health and any other conditions you have, such as diabetes  · Create an exercise plan  Regular exercise can help you manage CKD, high blood pressure, and diabetes  Exercise also helps control weight  Your provider can help you create exercise goals and a plan to reach those goals  For example, your goal may be to exercise for 30 minutes in a day  Your plan can include breaking exercise into 10 minute sessions, 3 times during the day           · Create a healthy eating plan  Your provider may tell you to eat food low in potassium, phosphorus, or protein  Your provider may also recommend vitamin or mineral supplements  Do not take any supplements without talking to your provider  A dietitian can help you plan meals if needed  Ask how much liquid to drink each day and which liquids are best for you  · Limit sodium (salt) as directed  You may need to limit sodium to less than 2,300 milligrams (mg) each day  Ask your dietitian or healthcare provider how much sodium you can have each day  The amount depends on your stage of kidney disease  Table salt, canned foods, soups, salted snacks, and processed meats, like deli meats and sausage, are high in sodium  Your provider or a dietitian can show you how to read food labels for sodium  · Limit alcohol as directed  Alcohol can cause fluid retention and can affect your kidneys  Ask how much alcohol is safe for you  A drink of alcohol is 12 ounces of beer, 5 ounces of wine, or 1½ ounces of liquor  · Do not smoke  Nicotine and other chemicals in cigarettes and cigars can cause kidney damage  Ask your provider for information if you currently smoke and need help to quit  E-cigarettes or smokeless tobacco still contain nicotine  Talk to your provider before you use these products  · Ask about over-the-counter medicines  Medicines such as NSAIDs and laxatives may harm your kidneys  Some cough and cold medicines can raise your blood pressure  Always ask if a medicine is safe before you take it  · Ask about vaccines you may need  CKD can increase your risk for infections such as pneumonia, influenza, and hepatitis  Vaccines lower your risk for infection  Your healthcare provider will tell you which vaccines you need and when to get them  Follow up with your doctor or nephrologist as directed: You will need to return for tests to monitor your kidney and nerve function, and your parathyroid hormone level   Your medicines may be changed, based on certain test results  Write down your questions so you remember to ask them during your visits  © Copyright [address] 2022 Information is for End User's use only and may not be sold, redistributed or otherwise used for commercial purposes  All illustrations and images included in CareNotes® are the copyrighted property of [employer]  or [employer]   The above information is an  only  It is not intended as medical advice for individual conditions or treatments  Talk to your doctor, nurse or pharmacist before following any medical regimen to see if it is safe and effective for you  Medicare Preventive Visit Patient Instructions  Thank you for completing your Welcome to Medicare Visit or Medicare Annual Wellness Visit today  Your next wellness visit will be due in one year (9/16/2023)  The screening/preventive services that you may require over the next 5-10 years are detailed below  Some tests may not apply to you based off risk factors and/or age  Screening tests ordered at today's visit but not completed yet may show as past due  Also, please note that scanned in results may not display below  Preventive Screenings:  Service Recommendations Previous Testing/Comments   Colorectal Cancer Screening  · Colonoscopy    · Fecal Occult Blood Test (FOBT)/Fecal Immunochemical Test (FIT)  · Fecal DNA/Cologuard Test  · Flexible Sigmoidoscopy Age: 39-70 years old   Colonoscopy: every 10 years (May be performed more frequently if at higher risk)  OR  FOBT/FIT: every 1 year  OR  Cologuard: every 3 years  OR  Sigmoidoscopy: every 5 years  Screening may be recommended earlier than age 39 if at higher risk for colorectal cancer  Also, an individualized decision between you and your healthcare provider will decide whether screening between the ages of 74-80 would be appropriate   Colonoscopy: 06/22/2022  FOBT/FIT: 06/20/2022  Cologuard: Not on file  Sigmoidoscopy: Not on file    Screening Current     Prostate Cancer Screening Individualized decision between patient and health care provider in men between ages of 53-78   Medicare will cover every 12 months beginning on the day after your 50th birthday PSA: 0 2 ng/mL; 0 2 ng/mL     Screening Not Indicated     Hepatitis C Screening Once for adults born between 1945 and 1965  More frequently in patients at high risk for Hepatitis C Hep C Antibody: 12/06/2019    Screening Current   Diabetes Screening 1-2 times per year if you're at risk for diabetes or have pre-diabetes Fasting glucose: 205 mg/dL (7/27/2022)  A1C: 7 1 % (7/25/2022)  Screening Not Indicated  History Diabetes   Cholesterol Screening Once every 5 years if you don't have a lipid disorder  May order more often based on risk factors  Lipid panel: 04/04/2022  Screening Not Indicated  History Lipid Disorder      Other Preventive Screenings Covered by Medicare:  1  Abdominal Aortic Aneurysm (AAA) Screening: covered once if your at risk  You're considered to be at risk if you have a family history of AAA or a male between the age of 73-68 who smoking at least 100 cigarettes in your lifetime  2  Lung Cancer Screening: covers low dose CT scan once per year if you meet all of the following conditions: (1) Age 50-69; (2) No signs or symptoms of lung cancer; (3) Current smoker or have quit smoking within the last 15 years; (4) You have a tobacco smoking history of at least 20 pack years (packs per day x number of years you smoked); (5) You get a written order from a healthcare provider  3  Glaucoma Screening: covered annually if you're considered high risk: (1) You have diabetes OR (2) Family history of glaucoma OR (3)  aged 48 and older OR (3)  American aged 72 and older  3   Osteoporosis Screening: covered every 2 years if you meet one of the following conditions: (1) Have a vertebral abnormality; (2) On glucocorticoid therapy for more than 3 months; (3) Have primary hyperparathyroidism; (4) On osteoporosis medications and need to assess response to drug therapy  5  HIV Screening: covered annually if you're between the age of 12-76  Also covered annually if you are younger than 13 and older than 72 with risk factors for HIV infection  For pregnant patients, it is covered up to 3 times per pregnancy  Immunizations:  Immunization Recommendations   Influenza Vaccine Annual influenza vaccination during flu season is recommended for all persons aged >= 6 months who do not have contraindications   Pneumococcal Vaccine   * Pneumococcal conjugate vaccine = PCV13 (Prevnar 13), PCV15 (Vaxneuvance), PCV20 (Prevnar 20)  * Pneumococcal polysaccharide vaccine = PPSV23 (Pneumovax) Adults 25-60 years old: 1-3 doses may be recommended based on certain risk factors  Adults 72 years old: 1-2 doses may be recommended based off what pneumonia vaccine you previously received   Hepatitis B Vaccine 3 dose series if at intermediate or high risk (ex: diabetes, end stage renal disease, liver disease)   Tetanus (Td) Vaccine - COST NOT COVERED BY MEDICARE PART B Following completion of primary series, a booster dose should be given every 10 years to maintain immunity against tetanus  Td may also be given as tetanus wound prophylaxis  Tdap Vaccine - COST NOT COVERED BY MEDICARE PART B Recommended at least once for all adults  For pregnant patients, recommended with each pregnancy  Shingles Vaccine (Shingrix) - COST NOT COVERED BY MEDICARE PART B  2 shot series recommended in those aged 48 and above     Health Maintenance Due:      Topic Date Due    Colorectal Cancer Screening  06/21/2025    Hepatitis C Screening  Completed     Immunizations Due:      Topic Date Due    Influenza Vaccine (1) 09/01/2022     Advance Directives   What are advance directives? Advance directives are legal documents that state your wishes and plans for medical care   These plans are made ahead of time in case you lose your ability to make decisions for yourself  Advance directives can apply to any medical decision, such as the treatments you want, and if you want to donate organs  What are the types of advance directives? There are many types of advance directives, and each state has rules about how to use them  You may choose a combination of any of the following:  · Living will: This is a written record of the treatment you want  You can also choose which treatments you do not want, which to limit, and which to stop at a certain time  This includes surgery, medicine, IV fluid, and tube feedings  · Durable power of  for healthcare [facility]): This is a written record that states who you want to make healthcare choices for you when you are unable to make them for yourself  This person, called a proxy, is usually a family member or a friend  You may choose more than 1 proxy  · Do not resuscitate (DNR) order:  A DNR order is used in case your heart stops beating or you stop breathing  It is a request not to have certain forms of treatment, such as CPR  A DNR order may be included in other types of advance directives  · Medical directive: This covers the care that you want if you are in a coma, near death, or unable to make decisions for yourself  You can list the treatments you want for each condition  Treatment may include pain medicine, surgery, blood transfusions, dialysis, IV or tube feedings, and a ventilator (breathing machine)  · Values history: This document has questions about your views, beliefs, and how you feel and think about life  This information can help others choose the care that you would choose  Why are advance directives important? An advance directive helps you control your care  Although spoken wishes may be used, it is better to have your wishes written down  Spoken wishes can be misunderstood, or not followed  Treatments may be given even if you do not want them   An advance directive may make it easier for your family to make difficult choices about your care  Fall Prevention    Fall prevention  includes ways to make your home and other areas safer  It also includes ways you can move more carefully to prevent a fall  Health conditions that cause changes in your blood pressure, vision, or muscle strength and coordination may increase your risk for falls  Medicines may also increase your risk for falls if they make you dizzy, weak, or sleepy  Fall prevention tips:   · Stand or sit up slowly  · Use assistive devices as directed  · Wear shoes that fit well and have soles that   · Wear a personal alarm  · Stay active  · Manage your medical conditions  Home Safety Tips:  · Add items to prevent falls in the bathroom  · Keep paths clear  · Install bright lights in your home  · Keep items you use often on shelves within reach  · Paint or place reflective tape on the edges of your stairs  Weight Management   Why it is important to manage your weight:  Being overweight increases your risk of health conditions such as heart disease, high blood pressure, type 2 diabetes, and certain types of cancer  It can also increase your risk for osteoarthritis, sleep apnea, and other respiratory problems  Aim for a slow, steady weight loss  Even a small amount of weight loss can lower your risk of health problems  How to lose weight safely:  A safe and healthy way to lose weight is to eat fewer calories and get regular exercise  You can lose up about 1 pound a week by decreasing the number of calories you eat by 500 calories each day  Healthy meal plan for weight management:  A healthy meal plan includes a variety of foods, contains fewer calories, and helps you stay healthy  A healthy meal plan includes the following:  · Eat whole-grain foods more often  A healthy meal plan should contain fiber   Fiber is the part of grains, fruits, and vegetables that is not broken down by your body  Whole-grain foods are healthy and provide extra fiber in your diet  Some examples of whole-grain foods are whole-wheat breads and pastas, oatmeal, brown rice, and bulgur  · Eat a variety of vegetables every day  Include dark, leafy greens such as spinach, kale, saturnino greens, and mustard greens  Eat yellow and orange vegetables such as carrots, sweet potatoes, and winter squash  · Eat a variety of fruits every day  Choose fresh or canned fruit (canned in its own juice or light syrup) instead of juice  Fruit juice has very little or no fiber  · Eat low-fat dairy foods  Drink fat-free (skim) milk or 1% milk  Eat fat-free yogurt and low-fat cottage cheese  Try low-fat cheeses such as mozzarella and other reduced-fat cheeses  · Choose meat and other protein foods that are low in fat  Choose beans or other legumes such as split peas or lentils  Choose fish, skinless poultry (chicken or turkey), or lean cuts of red meat (beef or pork)  Before you cook meat or poultry, cut off any visible fat  · Use less fat and oil  Try baking foods instead of frying them  Add less fat, such as margarine, sour cream, regular salad dressing and mayonnaise to foods  Eat fewer high-fat foods  Some examples of high-fat foods include french fries, doughnuts, ice cream, and cakes  · Eat fewer sweets  Limit foods and drinks that are high in sugar  This includes candy, cookies, regular soda, and sweetened drinks  Exercise:  Exercise at least 30 minutes per day on most days of the week  Some examples of exercise include walking, biking, dancing, and swimming  You can also fit in more physical activity by taking the stairs instead of the elevator or parking farther away from stores  Ask your healthcare provider about the best exercise plan for you  Alcohol Use and Your Health    Drinking too much can harm your health    Excessive alcohol use leads to about 88,000 death in the [facility] each year, and shortens the life of those who diet by almost 30 years  Further, excessive drinking cost the economy $249 billion in 2010  Most excessive drinkers are not alcohol dependent  Excessive alcohol use has immediate effects that increase the risk of many harmful health conditions  These are most often the result of binge drinking  Over time, excessive alcohol use can lead to the development of chronic diseases and other series health problems  What is considered a "drink"? Excessive alcohol use includes:  · Binge Drinking: For women, 4 or more drinks consumed on one occasion  For men, 5 or more drinks consumed on one occasion  · Heavy Drinking: For women, 8 or more drinks per week  For men, 15 or more drinks per week  · Any alcohol used by pregnant women  · Any alcohol used by those under the age of 21 years    If you choose to drink, do so in moderation:  · Do not drink at all if you are under the age of 24, or if you are or may be pregnant, or have health problems that could be made worse by drinking    · For women, up to 1 drink per day  · For men, up to 2 drinks a day    No one should begin drinking or drink more frequently based on potential health benefits    Short-Term Health Risks:  · Injuries: motor vehicle crashes, falls, drownings, burns  · Violence: homicide, suicide, sexual assault, intimate partner violence  · Alcohol poisoning  · Reproductive health: risky sexual behaviors, unintended prengnacy, sexually transmitted diseases, miscarriage, stillbirth, fetal alcohol syndrome    Long-Term Health Risks:  · Chronic diseases: high blood pressure, heart disease, stroke, liver disease, digestive problems  · Cancers: breast, mouth and throat, liver, colon  · Learning and memory problems: dementia, poor school performance  · Mental health: depression, anxiety, insomnia  · Social problems: lost productivity, family problems, unemployment  · Alcohol dependence    For support and more information:  · Substance Abuse and [address] [address]  Web Address: https://[employer]/    · Alcoholics Anonymous        Web Address: http://www [employer] info/    https://www cdc gov/alcohol/fact-sheets/alcohol-use htm     © [address] [facility] 2018 Information is for End User's use only and may not be sold, redistributed or otherwise used for commercial purposes   All illustrations and images included in CareNotes® are the copyrighted property of [employer]  or [address] [place] [address]

## 2022-09-15 RX ORDER — PANTOPRAZOLE SODIUM 40 MG/1
40 TABLET, DELAYED RELEASE ORAL DAILY
Qty: 60 TABLET | Refills: 0 | Status: SHIPPED | OUTPATIENT
Start: 2022-09-15 | End: 2022-11-14

## 2022-09-23 DIAGNOSIS — N13.8 BPH WITH OBSTRUCTION/LOWER URINARY TRACT SYMPTOMS: ICD-10-CM

## 2022-09-23 DIAGNOSIS — N40.1 BPH WITH OBSTRUCTION/LOWER URINARY TRACT SYMPTOMS: ICD-10-CM

## 2022-09-23 RX ORDER — TAMSULOSIN HYDROCHLORIDE 0.4 MG/1
CAPSULE ORAL
Qty: 180 CAPSULE | Refills: 0 | Status: SHIPPED | OUTPATIENT
Start: 2022-09-23

## 2022-10-12 PROBLEM — A04.72 CLOSTRIDIUM DIFFICILE DIARRHEA: Status: RESOLVED | Noted: 2021-10-19 | Resolved: 2022-10-12

## 2022-10-12 PROBLEM — N39.0 UTI (URINARY TRACT INFECTION): Status: RESOLVED | Noted: 2021-10-19 | Resolved: 2022-10-12

## 2022-10-14 DIAGNOSIS — E11.65 UNCONTROLLED TYPE 2 DIABETES MELLITUS WITH HYPERGLYCEMIA (HCC): ICD-10-CM

## 2022-10-14 RX ORDER — LIRAGLUTIDE 6 MG/ML
INJECTION SUBCUTANEOUS
Qty: 6 ML | Refills: 1 | Status: SHIPPED | OUTPATIENT
Start: 2022-10-14

## 2022-10-24 ENCOUNTER — APPOINTMENT (OUTPATIENT)
Dept: LAB | Facility: HOSPITAL | Age: 77
End: 2022-10-24
Payer: COMMERCIAL

## 2022-10-24 DIAGNOSIS — Z79.4 TYPE 2 DIABETES MELLITUS WITH STAGE 3B CHRONIC KIDNEY DISEASE, WITH LONG-TERM CURRENT USE OF INSULIN (HCC): ICD-10-CM

## 2022-10-24 DIAGNOSIS — E78.2 MIXED HYPERLIPIDEMIA: ICD-10-CM

## 2022-10-24 DIAGNOSIS — E11.22 TYPE 2 DIABETES MELLITUS WITH STAGE 3B CHRONIC KIDNEY DISEASE, WITH LONG-TERM CURRENT USE OF INSULIN (HCC): ICD-10-CM

## 2022-10-24 DIAGNOSIS — K25.4 GASTROINTESTINAL HEMORRHAGE ASSOCIATED WITH GASTRIC ULCER: ICD-10-CM

## 2022-10-24 DIAGNOSIS — N18.32 TYPE 2 DIABETES MELLITUS WITH STAGE 3B CHRONIC KIDNEY DISEASE, WITH LONG-TERM CURRENT USE OF INSULIN (HCC): ICD-10-CM

## 2022-10-24 LAB
ANION GAP SERPL CALCULATED.3IONS-SCNC: 6 MMOL/L (ref 4–13)
BASOPHILS # BLD AUTO: 0.04 THOUSANDS/ÂΜL (ref 0–0.1)
BASOPHILS NFR BLD AUTO: 1 % (ref 0–1)
BUN SERPL-MCNC: 47 MG/DL (ref 5–25)
CALCIUM SERPL-MCNC: 9 MG/DL (ref 8.4–10.2)
CHLORIDE SERPL-SCNC: 105 MMOL/L (ref 96–108)
CHOLEST SERPL-MCNC: 111 MG/DL
CO2 SERPL-SCNC: 27 MMOL/L (ref 21–32)
CREAT SERPL-MCNC: 2.39 MG/DL (ref 0.6–1.3)
EOSINOPHIL # BLD AUTO: 0.29 THOUSAND/ÂΜL (ref 0–0.61)
EOSINOPHIL NFR BLD AUTO: 4 % (ref 0–6)
ERYTHROCYTE [DISTWIDTH] IN BLOOD BY AUTOMATED COUNT: 15 % (ref 11.6–15.1)
EST. AVERAGE GLUCOSE BLD GHB EST-MCNC: 189 MG/DL
GFR SERPL CREATININE-BSD FRML MDRD: 25 ML/MIN/1.73SQ M
GLUCOSE P FAST SERPL-MCNC: 146 MG/DL (ref 65–99)
HBA1C MFR BLD: 8.2 %
HCT VFR BLD AUTO: 33.9 % (ref 36.5–49.3)
HDLC SERPL-MCNC: 47 MG/DL
HGB BLD-MCNC: 10.7 G/DL (ref 12–17)
IMM GRANULOCYTES # BLD AUTO: 0.03 THOUSAND/UL (ref 0–0.2)
IMM GRANULOCYTES NFR BLD AUTO: 0 % (ref 0–2)
LDLC SERPL CALC-MCNC: 50 MG/DL (ref 0–100)
LYMPHOCYTES # BLD AUTO: 1.01 THOUSANDS/ÂΜL (ref 0.6–4.47)
LYMPHOCYTES NFR BLD AUTO: 13 % (ref 14–44)
MCH RBC QN AUTO: 29.4 PG (ref 26.8–34.3)
MCHC RBC AUTO-ENTMCNC: 31.6 G/DL (ref 31.4–37.4)
MCV RBC AUTO: 93 FL (ref 82–98)
MONOCYTES # BLD AUTO: 0.54 THOUSAND/ÂΜL (ref 0.17–1.22)
MONOCYTES NFR BLD AUTO: 7 % (ref 4–12)
NEUTROPHILS # BLD AUTO: 5.64 THOUSANDS/ÂΜL (ref 1.85–7.62)
NEUTS SEG NFR BLD AUTO: 75 % (ref 43–75)
NRBC BLD AUTO-RTO: 0 /100 WBCS
PLATELET # BLD AUTO: 228 THOUSANDS/UL (ref 149–390)
PMV BLD AUTO: 11.6 FL (ref 8.9–12.7)
POTASSIUM SERPL-SCNC: 4.3 MMOL/L (ref 3.5–5.3)
RBC # BLD AUTO: 3.64 MILLION/UL (ref 3.88–5.62)
SODIUM SERPL-SCNC: 138 MMOL/L (ref 135–147)
TRIGL SERPL-MCNC: 70 MG/DL
WBC # BLD AUTO: 7.55 THOUSAND/UL (ref 4.31–10.16)

## 2022-10-24 PROCEDURE — 36415 COLL VENOUS BLD VENIPUNCTURE: CPT

## 2022-10-24 PROCEDURE — 85025 COMPLETE CBC W/AUTO DIFF WBC: CPT

## 2022-10-24 PROCEDURE — 80061 LIPID PANEL: CPT

## 2022-10-24 PROCEDURE — 80048 BASIC METABOLIC PNL TOTAL CA: CPT

## 2022-10-24 PROCEDURE — 83036 HEMOGLOBIN GLYCOSYLATED A1C: CPT

## 2022-10-27 NOTE — PROGRESS NOTES
NEPHROLOGY OFFICE PROGRESS NOTE   Sai Doyle  77 y o  male MRN: [id number]  DATE: 10/27/22  Reason for visit: Continued evaluation of CKD    ASSESSMENT & PLAN:  1  Chronic kidney disease, stage IV  · Johan's baseline creatinine is around 2 3 to 2 6    · CKD etiology is diabetic nephropathy + sequelae from post op ATN from CABG  · Stable renal function  Creatinine 2 39   · He completed CKD education  · Treatment: good BP and glycemic control  3  Hypertension  · BP is above goal (<130/80)  · Current regimen: Torsemide 20 mg MWF, Metoprolol 50 mg BID  · Add Telmisartan 20 mg OD and check BMP next month  4  Bilateral leg edema  · Stable with Torsemide 20 mg MWF  · Continue low salt diet  5  Mineral bone disease  · PTH is above goal - 203 7 in July 2022  · Start with low phos diet - refer to dietitian  · Ca is at goal    · Vitamin D level is at goal - 35 6 in July 2022  Continue Vit D 1000 units daily  5  Proteinuria:  · Recheck UPC  · Start Telmisartan  6  Obesity, weight gain:  · Improved with being unable to eat out since he is not driving  7  Anemia:   · Check CBC and iron studies  8  Diabetes mellitus:   · HbA1c is worse  · Diabetic management is deferred to endocrinology or PCP  · Recommendations include targeting a HbA1c in the range of <6 5% to <8%  HbA1c goals are tailored for each patient at the discretion of endocrinology or PCP  9  Hyperlipidemia:  · Lipid management is deferred to PCP  Patient Instructions   You have chronic kidney disease (CKD) and your kidney function is stable  Please start Telmisartan 20 mg daily  Check BMP next month - you can see the scripts from endocrine  Please see our dietitian for a low phosphorus diet  Follow up in 4 months - please obtain blood work 1-2 weeks prior to the appointment  SUBJECTIVE / INTERVAL HISTORY:  Brittany Weinberg was last seen in the office in July 2022     Since his last office visit, there have been no recent hospitalizations or ER visits  His home weight is roughly between 225 and 230 lb  He misses Metoprolol doses regularly  His weight is much lower now compared to last year since he is not allowed to drive  Because of this, he has not been eating out and his diet is more controlled  He is with his significant other today  He has mild edema  No SOB  PMH/PSH: HTN, DM, HLP, CKD, CAD s/p CABG, HARJIT, BPH, obesity, DDD s/p fusion surgery, cholecystectomy, hernia repair, tonsillectomy, urinary retention    Previous work up:   6/3/19 Renal US: R 10 6 cm, L 11 cm,  cc    ALLERGIES: No Known Allergies    REVIEW OF SYSTEMS:  Review of Systems   Constitutional: Negative for appetite change, chills, fatigue and fever  Respiratory: Negative for cough and shortness of breath  Cardiovascular: Positive for leg swelling  Negative for chest pain  Gastrointestinal: Negative for abdominal pain, diarrhea, nausea and vomiting  Genitourinary: Negative for hematuria  Musculoskeletal: Negative for arthralgias and back pain  Neurological: Negative for dizziness and light-headedness  OBJECTIVE:  /72   Ht 5' 11" (1 803 m)   Wt 108 kg (238 lb 6 4 oz)   BMI 33 25 kg/m²   Current Weight:   Body mass index is 33 25 kg/m²  Physical Exam  Constitutional:       General: He is not in acute distress  Appearance: Normal appearance  He is well-developed  He is obese  He is not ill-appearing or diaphoretic  HENT:      Head: Normocephalic and atraumatic  Eyes:      General: No scleral icterus  Conjunctiva/sclera: Conjunctivae normal    Neck:      Vascular: No JVD  Cardiovascular:      Rate and Rhythm: Normal rate and regular rhythm  Heart sounds: Normal heart sounds  Pulmonary:      Effort: Pulmonary effort is normal  No respiratory distress  Breath sounds: Normal breath sounds  Abdominal:      General: Bowel sounds are normal       Palpations: Abdomen is soft  Musculoskeletal:      Cervical back: Neck supple  Comments: (+) mild bilateral LE edema  Skin:     General: Skin is warm and dry  Neurological:      Mental Status: He is alert and oriented to person, place, and time     Psychiatric:         Behavior: Behavior normal      Medications:  Current Outpatient Medications:   •  Ascorbic Acid (VITAMIN C) 1000 MG tablet, Take 1,000 mg by mouth daily, Disp: , Rfl:   •  aspirin (ECOTRIN LOW STRENGTH) 81 mg EC tablet, Take 1 tablet (81 mg total) by mouth daily, Disp: 60 tablet, Rfl: 8  •  atorvastatin (LIPITOR) 80 mg tablet, Take 1 tablet (80 mg total) by mouth daily with dinner, Disp: 90 tablet, Rfl: 3  •  B-D ULTRAFINE III SHORT PEN 31G X 8 MM MISC, INJECT INTO THE SKIN 4 TIMES DAILY, Disp: 400 each, Rfl: 1  •  cholecalciferol (VITAMIN D3) 1,000 units tablet, Take 1 tablet (1,000 Units total) by mouth daily, Disp: 1 tablet, Rfl: 1  •  Continuous Blood Gluc  (FreeStyle Celia 2 [place]) MAMI, Use to check blood sugar daily, Disp: 1 each, Rfl: 0  •  Continuous Blood Gluc Sensor (FreeStyle Celia 2 Sensor) MISC, CHANGE EVERY 14 DAYS, Disp: 2 each, Rfl: 5  •  cyanocobalamin (VITAMIN B-12) 500 mcg tablet, Take 500 mcg by mouth daily, Disp: , Rfl:   •  finasteride (PROSCAR) 5 mg tablet, Take 1 tablet (5 mg total) by mouth daily, Disp: 90 tablet, Rfl: 0  •  glucose blood test strip, Check 4 times a day, Disp: 400 each, Rfl: 1  •  insulin glargine (Toujeo Max SoloStar) 300 units/mL CONCENTRATED U-300 injection pen (2-unit dial), INJECT 42 UNITS UNDER THE SKIN EVERY BEDTIME, Disp: 6 mL, Rfl:   •  insulin lispro (HumaLOG KwikPen) 100 units/mL injection pen, Inject 12 units with breakfast and 14 units with dinner +scale, Disp: 15 mL, Rfl: 1  •  latanoprost (XALATAN) 0 005 % ophthalmic solution, 1 drop daily at bedtime, Disp: , Rfl:   •  metoprolol tartrate (LOPRESSOR) 50 mg tablet, TAKE 1 TABLET BY MOUTH TWICE A DAY, Disp: 180 tablet, Rfl: 1  •  Microlet Lancets MISC, USE 3 TIMES DAY, Disp: 300 each, Rfl: 1  •  pantoprazole (PROTONIX) 40 mg tablet, Take 1 tablet (40 mg total) by mouth daily, Disp: 60 tablet, Rfl: 0  •  tamsulosin (FLOMAX) 0 4 mg, TAKE 2 CAPSULES BY MOUTH DAILY WITH DINNER   , Disp: 180 capsule, Rfl: 0  •  torsemide (DEMADEX) 20 mg tablet, Take 1 tablet (20 mg total) by mouth 3 (three) times a week Take every Monday, Wednesday, Friday  Take additional doses as needed if weight increases by 2-3 lb in 1 day, Disp: 360 tablet, Rfl: 0  •  Victoza injection, INJECT 1 8 MG UNDER THE SKIN ONCE DAILY, Disp: 6 mL, Rfl: 1    Laboratory Results:  Results for orders placed or performed in visit on 10/24/22   HEMOGLOBIN A1C W/ EAG ESTIMATION   Result Value Ref Range    Hemoglobin A1C 8 2 (H) Normal 3 8-5 6%; PreDiabetic 5 7-6 4%;  Diabetic >=6 5%; Glycemic control for adults with diabetes <7 0% %     mg/dl   Basic metabolic panel   Result Value Ref Range    Sodium 138 135 - 147 mmol/L    Potassium 4 3 3 5 - 5 3 mmol/L    Chloride 105 96 - 108 mmol/L    CO2 27 21 - 32 mmol/L    ANION GAP 6 4 - 13 mmol/L    BUN 47 (H) 5 - 25 mg/dL    Creatinine 2 39 (H) 0 60 - 1 30 mg/dL    Glucose, Fasting 146 (H) 65 - 99 mg/dL    Calcium 9 0 8 4 - 10 2 mg/dL    eGFR 25 ml/min/1 73sq m   CBC and differential   Result Value Ref Range    WBC 7 55 4 31 - 10 16 Thousand/uL    RBC 3 64 (L) 3 88 - 5 62 Million/uL    Hemoglobin 10 7 (L) 12 0 - 17 0 g/dL    Hematocrit 33 9 (L) 36 5 - 49 3 %    MCV 93 82 - 98 fL    MCH 29 4 26 8 - 34 3 pg    MCHC 31 6 31 4 - 37 4 g/dL    RDW 15 0 11 6 - 15 1 %    MPV 11 6 8 9 - 12 7 fL    Platelets 609 606 - 475 Thousands/uL    nRBC 0 /100 WBCs    Neutrophils Relative 75 43 - 75 %    Immat GRANS % 0 0 - 2 %    Lymphocytes Relative 13 (L) 14 - 44 %    Monocytes Relative 7 4 - 12 %    Eosinophils Relative 4 0 - 6 %    Basophils Relative 1 0 - 1 %    Neutrophils Absolute 5 64 1 85 - 7 62 Thousands/µL    Immature Grans Absolute 0 03 0 00 - 0 20 Thousand/uL Lymphocytes Absolute 1 01 0 60 - 4 47 Thousands/µL    Monocytes Absolute 0 54 0 17 - 1 22 Thousand/µL    Eosinophils Absolute 0 29 0 00 - 0 61 Thousand/µL    Basophils Absolute 0 04 0 00 - 0 10 Thousands/µL   Lipid Panel with Direct LDL reflex   Result Value Ref Range    Cholesterol 111 See Comment mg/dL    Triglycerides 70 See Comment mg/dL    HDL, Direct 47 >=40 mg/dL    LDL Calculated 50 0 - 100 mg/dL

## 2022-10-28 ENCOUNTER — OFFICE VISIT (OUTPATIENT)
Dept: NEPHROLOGY | Facility: CLINIC | Age: 77
End: 2022-10-28

## 2022-10-28 VITALS
HEIGHT: 71 IN | DIASTOLIC BLOOD PRESSURE: 72 MMHG | BODY MASS INDEX: 33.38 KG/M2 | WEIGHT: 238.4 LBS | SYSTOLIC BLOOD PRESSURE: 138 MMHG

## 2022-10-28 DIAGNOSIS — E83.9 CHRONIC KIDNEY DISEASE-MINERAL AND BONE DISORDER: ICD-10-CM

## 2022-10-28 DIAGNOSIS — E55.9 VITAMIN D DEFICIENCY: ICD-10-CM

## 2022-10-28 DIAGNOSIS — N28.9 HYPERVOLEMIA ASSOCIATED WITH RENAL INSUFFICIENCY: ICD-10-CM

## 2022-10-28 DIAGNOSIS — E87.70 HYPERVOLEMIA ASSOCIATED WITH RENAL INSUFFICIENCY: ICD-10-CM

## 2022-10-28 DIAGNOSIS — R80.9 NON-NEPHROTIC RANGE PROTEINURIA: ICD-10-CM

## 2022-10-28 DIAGNOSIS — I12.9 BENIGN HYPERTENSION WITH CHRONIC KIDNEY DISEASE, STAGE IV (HCC): ICD-10-CM

## 2022-10-28 DIAGNOSIS — N18.9 CHRONIC KIDNEY DISEASE-MINERAL AND BONE DISORDER: ICD-10-CM

## 2022-10-28 DIAGNOSIS — D50.0 IRON DEFICIENCY ANEMIA DUE TO CHRONIC BLOOD LOSS: ICD-10-CM

## 2022-10-28 DIAGNOSIS — N18.4 CKD (CHRONIC KIDNEY DISEASE), STAGE IV (HCC): Primary | ICD-10-CM

## 2022-10-28 DIAGNOSIS — M89.9 CHRONIC KIDNEY DISEASE-MINERAL AND BONE DISORDER: ICD-10-CM

## 2022-10-28 DIAGNOSIS — N18.4 BENIGN HYPERTENSION WITH CHRONIC KIDNEY DISEASE, STAGE IV (HCC): ICD-10-CM

## 2022-10-28 DIAGNOSIS — N18.4 ANEMIA OF CHRONIC KIDNEY FAILURE, STAGE 4 (SEVERE) (HCC): ICD-10-CM

## 2022-10-28 DIAGNOSIS — D63.1 ANEMIA OF CHRONIC KIDNEY FAILURE, STAGE 4 (SEVERE) (HCC): ICD-10-CM

## 2022-10-28 RX ORDER — TELMISARTAN 20 MG/1
20 TABLET ORAL DAILY
Qty: 30 TABLET | Refills: 2 | Status: SHIPPED | OUTPATIENT
Start: 2022-10-28

## 2022-10-28 NOTE — PATIENT INSTRUCTIONS
You have chronic kidney disease (CKD) and your kidney function is stable  Please start Telmisartan 20 mg daily  Check BMP next month - you can see the scripts from endocrine  Please see our dietitian for a low phosphorus diet  Follow up in 4 months - please obtain blood work 1-2 weeks prior to the appointment

## 2022-10-28 NOTE — LETTER
October 28, 2022     Kwan Iyer DO  [address] [facility]    Patient: Jacquelyn Garzon  YOB: 1945   Date of Visit: 10/28/2022       Dear Dr Block Severe: Thank you for referring Lidya Yoon to me for evaluation  Below are my notes for this consultation  If you have questions, please do not hesitate to call me  I look forward to following your patient along with you  Sincerely,        Sumit Calle MD        CC: No Recipients  Sumit Calle MD  10/28/2022  1:45 PM  Sign when Signing Visit  NEPHROLOGY OFFICE PROGRESS NOTE   Jacquelyn Garzon  77 y o  male MRN: [id number]  DATE: 10/27/22  Reason for visit: Continued evaluation of CKD    ASSESSMENT & PLAN:  1  Chronic kidney disease, stage IV  · Johan's baseline creatinine is around 2 3 to 2 6    · CKD etiology is diabetic nephropathy + sequelae from post op ATN from CABG  · Stable renal function  Creatinine 2 39   · He completed CKD education  · Treatment: good BP and glycemic control  3  Hypertension  · BP is above goal (<130/80)  · Current regimen: Torsemide 20 mg MWF, Metoprolol 50 mg BID  · Add Telmisartan 20 mg OD and check BMP next month  4  Bilateral leg edema  · Stable with Torsemide 20 mg MWF  · Continue low salt diet  5  Mineral bone disease  · PTH is above goal - 203 7 in July 2022  · Start with low phos diet - refer to dietitian  · Ca is at goal    · Vitamin D level is at goal - 35 6 in July 2022  Continue Vit D 1000 units daily  5  Proteinuria:  · Recheck UPC  · Start Telmisartan  6  Obesity, weight gain:  · Improved with being unable to eat out since he is not driving  7  Anemia:   · Check CBC and iron studies  8  Diabetes mellitus:   · HbA1c is worse  · Diabetic management is deferred to endocrinology or PCP  · Recommendations include targeting a HbA1c in the range of <6 5% to <8%   HbA1c goals are tailored for each patient at the discretion of endocrinology or PCP  9  Hyperlipidemia:  · Lipid management is deferred to PCP  Patient Instructions   You have chronic kidney disease (CKD) and your kidney function is stable  Please start Telmisartan 20 mg daily  Check BMP next month - you can see the scripts from endocrine  Please see our dietitian for a low phosphorus diet  Follow up in 4 months - please obtain blood work 1-2 weeks prior to the appointment  SUBJECTIVE / INTERVAL HISTORY:  Denzel Singh was last seen in the office in July 2022  Since his last office visit, there have been no recent hospitalizations or ER visits  His home weight is roughly between 225 and 230 lb  He misses Metoprolol doses regularly  His weight is much lower now compared to last year since he is not allowed to drive  Because of this, he has not been eating out and his diet is more controlled  He is with his significant other today  He has mild edema  No SOB  PMH/PSH: HTN, DM, HLP, CKD, CAD s/p CABG, HARJIT, BPH, obesity, DDD s/p fusion surgery, cholecystectomy, hernia repair, tonsillectomy, urinary retention    Previous work up:   6/3/19 Renal US: R 10 6 cm, L 11 cm,  cc    ALLERGIES: No Known Allergies    REVIEW OF SYSTEMS:  Review of Systems   Constitutional: Negative for appetite change, chills, fatigue and fever  Respiratory: Negative for cough and shortness of breath  Cardiovascular: Positive for leg swelling  Negative for chest pain  Gastrointestinal: Negative for abdominal pain, diarrhea, nausea and vomiting  Genitourinary: Negative for hematuria  Musculoskeletal: Negative for arthralgias and back pain  Neurological: Negative for dizziness and light-headedness  OBJECTIVE:  /72   Ht 5' 11" (1 803 m)   Wt 108 kg (238 lb 6 4 oz)   BMI 33 25 kg/m²   Current Weight:   Body mass index is 33 25 kg/m²  Physical Exam  Constitutional:       General: He is not in acute distress  Appearance: Normal appearance   He is well-developed  He is obese  He is not ill-appearing or diaphoretic  HENT:      Head: Normocephalic and atraumatic  Eyes:      General: No scleral icterus  Conjunctiva/sclera: Conjunctivae normal    Neck:      Vascular: No JVD  Cardiovascular:      Rate and Rhythm: Normal rate and regular rhythm  Heart sounds: Normal heart sounds  Pulmonary:      Effort: Pulmonary effort is normal  No respiratory distress  Breath sounds: Normal breath sounds  Abdominal:      General: Bowel sounds are normal       Palpations: Abdomen is soft  Musculoskeletal:      Cervical back: Neck supple  Comments: (+) mild bilateral LE edema  Skin:     General: Skin is warm and dry  Neurological:      Mental Status: He is alert and oriented to person, place, and time     Psychiatric:         Behavior: Behavior normal      Medications:  Current Outpatient Medications:   •  Ascorbic Acid (VITAMIN C) 1000 MG tablet, Take 1,000 mg by mouth daily, Disp: , Rfl:   •  aspirin (ECOTRIN LOW STRENGTH) 81 mg EC tablet, Take 1 tablet (81 mg total) by mouth daily, Disp: 60 tablet, Rfl: 8  •  atorvastatin (LIPITOR) 80 mg tablet, Take 1 tablet (80 mg total) by mouth daily with dinner, Disp: 90 tablet, Rfl: 3  •  B-D ULTRAFINE III SHORT PEN 31G X 8 MM MISC, INJECT INTO THE SKIN 4 TIMES DAILY, Disp: 400 each, Rfl: 1  •  cholecalciferol (VITAMIN D3) 1,000 units tablet, Take 1 tablet (1,000 Units total) by mouth daily, Disp: 1 tablet, Rfl: 1  •  Continuous Blood Gluc  (FreeStyle Celia 2 [place]) MAMI, Use to check blood sugar daily, Disp: 1 each, Rfl: 0  •  Continuous Blood Gluc Sensor (FreeStyle Celia 2 Sensor) MISC, CHANGE EVERY 14 DAYS, Disp: 2 each, Rfl: 5  •  cyanocobalamin (VITAMIN B-12) 500 mcg tablet, Take 500 mcg by mouth daily, Disp: , Rfl:   •  finasteride (PROSCAR) 5 mg tablet, Take 1 tablet (5 mg total) by mouth daily, Disp: 90 tablet, Rfl: 0  •  glucose blood test strip, Check 4 times a day, Disp: 400 each, Rfl: 1  •  insulin glargine (Toujeo Max SoloStar) 300 units/mL CONCENTRATED U-300 injection pen (2-unit dial), INJECT 42 UNITS UNDER THE SKIN EVERY BEDTIME, Disp: 6 mL, Rfl:   •  insulin lispro (HumaLOG KwikPen) 100 units/mL injection pen, Inject 12 units with breakfast and 14 units with dinner +scale, Disp: 15 mL, Rfl: 1  •  latanoprost (XALATAN) 0 005 % ophthalmic solution, 1 drop daily at bedtime, Disp: , Rfl:   •  metoprolol tartrate (LOPRESSOR) 50 mg tablet, TAKE 1 TABLET BY MOUTH TWICE A DAY, Disp: 180 tablet, Rfl: 1  •  Microlet Lancets MISC, USE 3 TIMES DAY, Disp: 300 each, Rfl: 1  •  pantoprazole (PROTONIX) 40 mg tablet, Take 1 tablet (40 mg total) by mouth daily, Disp: 60 tablet, Rfl: 0  •  tamsulosin (FLOMAX) 0 4 mg, TAKE 2 CAPSULES BY MOUTH DAILY WITH DINNER   , Disp: 180 capsule, Rfl: 0  •  torsemide (DEMADEX) 20 mg tablet, Take 1 tablet (20 mg total) by mouth 3 (three) times a week Take every Monday, Wednesday, Friday  Take additional doses as needed if weight increases by 2-3 lb in 1 day, Disp: 360 tablet, Rfl: 0  •  Victoza injection, INJECT 1 8 MG UNDER THE SKIN ONCE DAILY, Disp: 6 mL, Rfl: 1    Laboratory Results:  Results for orders placed or performed in visit on 10/24/22   HEMOGLOBIN A1C W/ EAG ESTIMATION   Result Value Ref Range    Hemoglobin A1C 8 2 (H) Normal 3 8-5 6%; PreDiabetic 5 7-6 4%;  Diabetic >=6 5%; Glycemic control for adults with diabetes <7 0% %     mg/dl   Basic metabolic panel   Result Value Ref Range    Sodium 138 135 - 147 mmol/L    Potassium 4 3 3 5 - 5 3 mmol/L    Chloride 105 96 - 108 mmol/L    CO2 27 21 - 32 mmol/L    ANION GAP 6 4 - 13 mmol/L    BUN 47 (H) 5 - 25 mg/dL    Creatinine 2 39 (H) 0 60 - 1 30 mg/dL    Glucose, Fasting 146 (H) 65 - 99 mg/dL    Calcium 9 0 8 4 - 10 2 mg/dL    eGFR 25 ml/min/1 73sq m   CBC and differential   Result Value Ref Range    WBC 7 55 4 31 - 10 16 Thousand/uL    RBC 3 64 (L) 3 88 - 5 62 Million/uL    Hemoglobin 10 7 (L) 12 0 - 17 0 g/dL    Hematocrit 33 9 (L) 36 5 - 49 3 %    MCV 93 82 - 98 fL    MCH 29 4 26 8 - 34 3 pg    MCHC 31 6 31 4 - 37 4 g/dL    RDW 15 0 11 6 - 15 1 %    MPV 11 6 8 9 - 12 7 fL    Platelets 529 554 - 181 Thousands/uL    nRBC 0 /100 WBCs    Neutrophils Relative 75 43 - 75 %    Immat GRANS % 0 0 - 2 %    Lymphocytes Relative 13 (L) 14 - 44 %    Monocytes Relative 7 4 - 12 %    Eosinophils Relative 4 0 - 6 %    Basophils Relative 1 0 - 1 %    Neutrophils Absolute 5 64 1 85 - 7 62 Thousands/µL    Immature Grans Absolute 0 03 0 00 - 0 20 Thousand/uL    Lymphocytes Absolute 1 01 0 60 - 4 47 Thousands/µL    Monocytes Absolute 0 54 0 17 - 1 22 Thousand/µL    Eosinophils Absolute 0 29 0 00 - 0 61 Thousand/µL    Basophils Absolute 0 04 0 00 - 0 10 Thousands/µL   Lipid Panel with Direct LDL reflex   Result Value Ref Range    Cholesterol 111 See Comment mg/dL    Triglycerides 70 See Comment mg/dL    HDL, Direct 47 >=40 mg/dL    LDL Calculated 50 0 - 100 mg/dL

## 2022-11-04 DIAGNOSIS — N13.8 BPH WITH OBSTRUCTION/LOWER URINARY TRACT SYMPTOMS: ICD-10-CM

## 2022-11-04 DIAGNOSIS — N40.1 BPH WITH OBSTRUCTION/LOWER URINARY TRACT SYMPTOMS: ICD-10-CM

## 2022-11-04 RX ORDER — TAMSULOSIN HYDROCHLORIDE 0.4 MG/1
CAPSULE ORAL
Qty: 180 CAPSULE | Refills: 0 | Status: SHIPPED | OUTPATIENT
Start: 2022-11-04

## 2022-11-18 ENCOUNTER — TELEPHONE (OUTPATIENT)
Dept: ENDOCRINOLOGY | Facility: CLINIC | Age: 77
End: 2022-11-18

## 2022-11-18 NOTE — TELEPHONE ENCOUNTER
Received  forms called wife due to needing podiatry note called Dr Estelle Rowe [phone] and they are closed for the weekend will call monday

## 2022-11-20 DIAGNOSIS — I12.9 BENIGN HYPERTENSION WITH CHRONIC KIDNEY DISEASE, STAGE IV (HCC): ICD-10-CM

## 2022-11-20 DIAGNOSIS — N18.4 BENIGN HYPERTENSION WITH CHRONIC KIDNEY DISEASE, STAGE IV (HCC): ICD-10-CM

## 2022-11-20 RX ORDER — TELMISARTAN 20 MG/1
TABLET ORAL
Qty: 90 TABLET | Refills: 1 | Status: SHIPPED | OUTPATIENT
Start: 2022-11-20

## 2022-11-23 ENCOUNTER — TELEPHONE (OUTPATIENT)
Dept: ENDOCRINOLOGY | Facility: CLINIC | Age: 77
End: 2022-11-23

## 2022-11-23 ENCOUNTER — LAB (OUTPATIENT)
Dept: LAB | Facility: HOSPITAL | Age: 77
End: 2022-11-23
Attending: INTERNAL MEDICINE

## 2022-11-23 DIAGNOSIS — E11.22 TYPE 2 DIABETES MELLITUS WITH STAGE 4 CHRONIC KIDNEY DISEASE, WITH LONG-TERM CURRENT USE OF INSULIN (HCC): ICD-10-CM

## 2022-11-23 DIAGNOSIS — Z79.4 TYPE 2 DIABETES MELLITUS WITH STAGE 4 CHRONIC KIDNEY DISEASE, WITH LONG-TERM CURRENT USE OF INSULIN (HCC): ICD-10-CM

## 2022-11-23 DIAGNOSIS — N18.4 TYPE 2 DIABETES MELLITUS WITH STAGE 4 CHRONIC KIDNEY DISEASE, WITH LONG-TERM CURRENT USE OF INSULIN (HCC): ICD-10-CM

## 2022-11-23 LAB
ANION GAP SERPL CALCULATED.3IONS-SCNC: 6 MMOL/L (ref 4–13)
BUN SERPL-MCNC: 53 MG/DL (ref 5–25)
CALCIUM SERPL-MCNC: 9.1 MG/DL (ref 8.4–10.2)
CHLORIDE SERPL-SCNC: 108 MMOL/L (ref 96–108)
CO2 SERPL-SCNC: 26 MMOL/L (ref 21–32)
CREAT SERPL-MCNC: 2.36 MG/DL (ref 0.6–1.3)
EST. AVERAGE GLUCOSE BLD GHB EST-MCNC: 166 MG/DL
GFR SERPL CREATININE-BSD FRML MDRD: 25 ML/MIN/1.73SQ M
GLUCOSE P FAST SERPL-MCNC: 77 MG/DL (ref 65–99)
HBA1C MFR BLD: 7.4 %
POTASSIUM SERPL-SCNC: 4.6 MMOL/L (ref 3.5–5.3)
SODIUM SERPL-SCNC: 140 MMOL/L (ref 135–147)
T4 FREE SERPL-MCNC: 0.76 NG/DL (ref 0.76–1.46)
TSH SERPL DL<=0.05 MIU/L-ACNC: 4.97 UIU/ML (ref 0.45–4.5)

## 2022-11-23 NOTE — TELEPHONE ENCOUNTER
Corinne from Dr Cheryl Jennings office called and said she did find that Harjeet Diaz is a patient of Dr Cheryl Jennings and she faxed over diabetic foot exam and anne-marie received

## 2022-11-25 ENCOUNTER — TELEPHONE (OUTPATIENT)
Dept: NEPHROLOGY | Facility: CLINIC | Age: 77
End: 2022-11-25

## 2022-11-25 NOTE — TELEPHONE ENCOUNTER
Pt wife advised that kidney function is stable (labs of 11/23/22) per Dr Saad Holland     ----- Message from Kristopher Elias MD sent at 11/23/2022  6:25 PM EST -----  Please inform patient that kidney function is stable based on latest lab tests (11/23/22)

## 2022-11-28 ENCOUNTER — OFFICE VISIT (OUTPATIENT)
Dept: ENDOCRINOLOGY | Facility: CLINIC | Age: 77
End: 2022-11-28

## 2022-11-28 VITALS
WEIGHT: 250 LBS | BODY MASS INDEX: 34.87 KG/M2 | DIASTOLIC BLOOD PRESSURE: 70 MMHG | HEART RATE: 61 BPM | SYSTOLIC BLOOD PRESSURE: 142 MMHG

## 2022-11-28 DIAGNOSIS — Z79.4 TYPE 2 DIABETES MELLITUS WITH STAGE 4 CHRONIC KIDNEY DISEASE, WITH LONG-TERM CURRENT USE OF INSULIN (HCC): Primary | ICD-10-CM

## 2022-11-28 DIAGNOSIS — E11.22 TYPE 2 DIABETES MELLITUS WITH STAGE 4 CHRONIC KIDNEY DISEASE, WITH LONG-TERM CURRENT USE OF INSULIN (HCC): Primary | ICD-10-CM

## 2022-11-28 DIAGNOSIS — I10 ESSENTIAL HYPERTENSION: ICD-10-CM

## 2022-11-28 DIAGNOSIS — E78.2 MIXED HYPERLIPIDEMIA: ICD-10-CM

## 2022-11-28 DIAGNOSIS — N18.4 TYPE 2 DIABETES MELLITUS WITH STAGE 4 CHRONIC KIDNEY DISEASE, WITH LONG-TERM CURRENT USE OF INSULIN (HCC): Primary | ICD-10-CM

## 2022-11-28 RX ORDER — INSULIN LISPRO 100 [IU]/ML
INJECTION, SOLUTION INTRAVENOUS; SUBCUTANEOUS
Qty: 15 ML | Refills: 1
Start: 2022-11-28

## 2022-11-28 NOTE — PROGRESS NOTES
Patient Progress Note      CC: Dm      Referring Provider  No referring provider defined for this encounter  History of Present Illness:   She Guevara  is a 77 y o  male with a history of type 2 diabetes with long term use of insulin  Diabetes course has been stable  Complications of DM: CAD, CKD  Denies any issues with his current regimen  Home glucose monitoring: are performed regularly  Average glucose 140 mg/dl  In target range 84%, above range 14%, below range 2%     Current regimen: Toujeo 42 units QHS, Humalog 12-12-14 units BID with meals, Victoza 1 8 mg daily  Compliant most of the time, denies any side effects from medications  Injects in: abdomen, thigh  Rotates sites: Yes  Hypoglycemic episodes: Yes, occasional  H/o of hypoglycemia causing hospitalization or Intervention such as glucagon injection or ambulance call : Yes  Hypoglycemia symptoms: sweaty, jittery  Treatment of hypoglycemia: glucose tablets     Medic alert tag: recommended: Yes     Diabetes education: Yes  Diet: 2 meals per day, 2 snacks per day  Timing of meals is predictable  Diabetic diet compliance: compliant some of the time  Activity: Daily activity is predictable: Yes  He tries to walk once a day  Ophthamology: sees every 3 months per patient; Dr Pipo Currie  Podiatry: sees podiatrist routinely  Dr Vasquez Collins  Has HTN: on telmisartan  Has hyperlipidemia: on statin - tolerating well, no myalgias  compliant most of the time, denies any side effects from medications  Thyroid disorders: History of mildly elevated TSH   Repeat labs were normal  Antibodies were negative  History of pancreatitis: No       Patient Active Problem List   Diagnosis   • CKD (chronic kidney disease), stage IV (HCC)   • Benign hypertension with chronic kidney disease, stage IV (HCC)   • Chronic kidney disease-mineral and bone disorder   • Type 2 diabetes mellitus with stage 4 chronic kidney disease, with long-term current use of insulin ([place] [address] )   • Sleep apnea   • Coronary artery disease involving native coronary artery of native heart without angina pectoris   • Mixed hyperlipidemia   • S/P CABG (coronary artery bypass graft)   • Acute postoperative pulmonary insufficiency (HCC)   • Iron deficiency anemia due to chronic blood loss   • Non-nephrotic range proteinuria   • Esophageal dysphagia   • History of colonic polyps   • Lumbar back pain   • Lumbar discogenic pain syndrome   • Pain of lower extremity   • Trigger finger of left hand   • Spondylolisthesis   • Spinal stenosis   • Vitamin D deficiency   • Lymphadenopathy   • Stenosis of left internal carotid artery   • Neuropathy   • Diabetic polyneuropathy associated with type 2 diabetes mellitus ([place] [address] )   • Encounter for  medical examination (CDME)   • 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     Dr Reilly Patel - stage 3   • Colon polyp    • CPAP (continuous positive airway pressure) dependence    • Diabetes mellitus ([place] [address] )    • History of transfusion    • Hyperlipidemia    • Hypertension    • Myocardial infarction ([place] [address] ) 06/2019    Open heart surgery with quad bypass   • Obesity    • Renal disorder    • Sleep apnea    • Visual impairment 1991    Dr Krysta Lilly      Past Surgical History:   Procedure Laterality Date   • APPENDECTOMY  1977    Done in conjunction with cholecystectomy   • BACK SURGERY     • CHOLECYSTECTOMY  1977   • COLONOSCOPY     • CORONARY ARTERY BYPASS GRAFT  2019    quad   • EGD     • GALLBLADDER SURGERY     • HERNIA REPAIR     • RI CABG, ARTERY-VEIN, FOUR N/A 2019    Procedure: CORONARY ARTERY BYPASS GRAFT (CABG) 4 VESSELS WITH SVG TO PDA, OM, DIAGONAL AND LIMA TO LAD; RIGHT LEG EVH;  Surgeon: Candi Middleton MD;  Location: [facility];  Service: Cardiac Surgery   • RECTAL SURGERY     • SPINE SURGERY  2012    Fusion L3-L5? • TONSILLECTOMY        Family History   Problem Relation Age of Onset   • Cancer Mother    • Alcohol abuse Mother    • Cancer Father    • Alcohol abuse Father    • Diabetes Maternal Grandmother    • Diabetes Paternal Aunt      Social History     Tobacco Use   • Smoking status: Former     Packs/day: 3 00     Years: 35 00     Pack years: 105 00     Types: Cigarettes, Cigars     Start date: 1959     Quit date:      Years since quittin 9   • Smokeless tobacco: Never   • Tobacco comments:     Quit many times   Substance Use Topics   • Alcohol use: Not Currently     Alcohol/week: 10 0 standard drinks     Types: 10 Shots of liquor per week     Comment: 1 drink every 6 months       No Known Allergies      Current Outpatient Medications:   •  Ascorbic Acid (VITAMIN C) 1000 MG tablet, Take 1,000 mg by mouth daily, Disp: , Rfl:   •  aspirin (ECOTRIN LOW STRENGTH) 81 mg EC tablet, Take 1 tablet (81 mg total) by mouth daily, Disp: 60 tablet, Rfl: 8  •  atorvastatin (LIPITOR) 80 mg tablet, Take 1 tablet (80 mg total) by mouth daily with dinner, Disp: 90 tablet, Rfl: 3  •  B-D ULTRAFINE III SHORT PEN 31G X 8 MM MISC, INJECT INTO THE SKIN 4 TIMES DAILY, Disp: 400 each, Rfl: 1  •  cholecalciferol (VITAMIN D3) 1,000 units tablet, Take 1 tablet (1,000 Units total) by mouth daily, Disp: 1 tablet, Rfl: 1  •  Continuous Blood Gluc  (FreeStyle Celia 2 Str[place]) MAMI, Use to check blood sugar daily, Disp: 1 each, Rfl: 0  •  Continuous Blood Gluc Sensor (FreeStyle Celia 2 Sensor) MISC, CHANGE EVERY 14 DAYS, Disp: 2 each, Rfl: 5  •  cyanocobalamin (VITAMIN B-12) 500 mcg tablet, Take 500 mcg by mouth daily, Disp: , Rfl:   •  finasteride (PROSCAR) 5 mg tablet, Take 1 tablet (5 mg total) by mouth daily, Disp: 90 tablet, Rfl: 0  •  glucose blood test strip, Check 4 times a day, Disp: 400 each, Rfl: 1  •  insulin glargine (Toujeo Max SoloStar) 300 units/mL CONCENTRATED U-300 injection pen (2-unit dial), INJECT 42 UNITS UNDER THE SKIN EVERY BEDTIME, Disp: 6 mL, Rfl:   •  insulin lispro (HumaLOG KwikPen) 100 units/mL injection pen, Inject 12-12-14 units TID with meals +scale, Disp: 15 mL, Rfl: 1  •  latanoprost (XALATAN) 0 005 % ophthalmic solution, 1 drop daily at bedtime, Disp: , Rfl:   •  metoprolol tartrate (LOPRESSOR) 50 mg tablet, TAKE 1 TABLET BY MOUTH TWICE A DAY, Disp: 180 tablet, Rfl: 1  •  Microlet Lancets MISC, USE 3 TIMES DAY, Disp: 300 each, Rfl: 1  •  tamsulosin (FLOMAX) 0 4 mg, TAKE 2 CAPSULES BY MOUTH DAILY WITH DINNER   , Disp: 180 capsule, Rfl: 0  •  telmisartan (MICARDIS) 20 MG tablet, TAKE 1 TABLET BY MOUTH EVERY DAY, Disp: 90 tablet, Rfl: 1  •  torsemide (DEMADEX) 20 mg tablet, Take 1 tablet (20 mg total) by mouth 3 (three) times a week Take every Monday, Wednesday, Friday  Take additional doses as needed if weight increases by 2-3 lb in 1 day, Disp: 360 tablet, Rfl: 0  •  Victoza injection, INJECT 1 8 MG UNDER THE SKIN ONCE DAILY, Disp: 6 mL, Rfl: 1  •  pantoprazole (PROTONIX) 40 mg tablet, Take 1 tablet (40 mg total) by mouth daily (Patient not taking: Reported on 11/28/2022), Disp: 60 tablet, Rfl: 0  Review of Systems   Constitutional: Negative for activity change, appetite change, fatigue and unexpected weight change  HENT: Negative for trouble swallowing  Eyes: Positive for visual disturbance (mild)  Respiratory: Negative for shortness of breath  Cardiovascular: Negative for chest pain and palpitations  Gastrointestinal: Negative for constipation and diarrhea     Endocrine: Negative for polydipsia and polyuria  Musculoskeletal: Negative  Neurological: Negative for numbness  Psychiatric/Behavioral: Negative  Physical Exam:  Body mass index is 34 87 kg/m²  /70   Pulse 61   Wt 113 kg (250 lb)   BMI 34 87 kg/m²    Wt Readings from Last 3 Encounters:   11/28/22 113 kg (250 lb)   10/28/22 108 kg (238 lb 6 4 oz)   09/13/22 107 kg (235 lb 6 4 oz)       Physical Exam  Vitals and nursing note reviewed  Constitutional:       Appearance: He is well-developed and well-nourished  HENT:      Head: Normocephalic  Eyes:      General: No scleral icterus  Extraocular Movements: EOM normal       Pupils: Pupils are equal, round, and reactive to light  Neck:      Thyroid: No thyromegaly  Cardiovascular:      Rate and Rhythm: Normal rate and regular rhythm  Pulses:           Radial pulses are 2+ on the right side and 2+ on the left side  Heart sounds: No murmur heard  Pulmonary:      Effort: Pulmonary effort is normal  No respiratory distress  Breath sounds: Normal breath sounds  No wheezing  Musculoskeletal:      Cervical back: Neck supple  Skin:     General: Skin is warm and dry  Neurological:      Mental Status: He is alert  Psychiatric:         Mood and Affect: Mood and affect normal        Patient's shoes and socks were not removed            Labs:   Component      Latest Ref Rng & Units 2/28/2022 4/4/2022 10/24/2022   Sodium      135 - 147 mmol/L 146 (H)  138   Potassium      3 5 - 5 3 mmol/L 4 4  4 3   Chloride      96 - 108 mmol/L 110 (H)  105   CO2      21 - 32 mmol/L 29  27   Anion Gap      4 - 13 mmol/L 7  6   BUN      5 - 25 mg/dL 51 (H)  47 (H)   Creatinine      0 60 - 1 30 mg/dL 2 36 (H)  2 39 (H)   GLUCOSE FASTING      65 - 99 mg/dL 132 (H)  146 (H)   Calcium      8 4 - 10 2 mg/dL 9 0  9 0   eGFR      ml/min/1 73sq m 25  25   Cholesterol      See Comment mg/dL  87    Triglycerides      See Comment mg/dL  98    HDL      >=40 mg/dL  33 (L)    LDL Calculated 0 - 100 mg/dL  34    Hemoglobin A1C      Normal 3 8-5 6%; PreDiabetic 5 7-6 4%; Diabetic >=6 5%; Glycemic control for adults with diabetes <7 0% % 5 9 (H)  8 2 (H)   eAG, EST AVG Glucose      mg/dl 123  189   Free T4      0 76 - 1 46 ng/dL      TSH 3RD GENERATON      0 450 - 4 500 uIU/mL        Component      Latest Ref Rng & Units 11/23/2022   Sodium      135 - 147 mmol/L 140   Potassium      3 5 - 5 3 mmol/L 4 6   Chloride      96 - 108 mmol/L 108   CO2      21 - 32 mmol/L 26   Anion Gap      4 - 13 mmol/L 6   BUN      5 - 25 mg/dL 53 (H)   Creatinine      0 60 - 1 30 mg/dL 2 36 (H)   GLUCOSE FASTING      65 - 99 mg/dL 77   Calcium      8 4 - 10 2 mg/dL 9 1   eGFR      ml/min/1 73sq m 25   Cholesterol      See Comment mg/dL    Triglycerides      See Comment mg/dL    HDL      >=40 mg/dL    LDL Calculated      0 - 100 mg/dL    Hemoglobin A1C      Normal 3 8-5 6%; PreDiabetic 5 7-6 4%; Diabetic >=6 5%; Glycemic control for adults with diabetes <7 0% % 7 4 (H)   eAG, EST AVG Glucose      mg/dl 166   Free T4      0 76 - 1 46 ng/dL 0 76   TSH 3RD GENERATON      0 450 - 4 500 uIU/mL 4 967 (H)       Plan:    Diagnoses and all orders for this visit:    Type 2 diabetes mellitus with stage 4 chronic kidney disease, with long-term current use of insulin ([facility])  HGA1C 7 4%  Improved  Treatment regimen: BG levels sometimes drop mid-day because he takes 12 units of Humalog at lunch but does not always eat a meal  Reminded patient he should only take Humalog if eating a meal  If skipping meal, then skip dose of Humalog  Patient understand and is agreeable to not taking insulin without eating adequate carbs (minimum 30 g per meal)  Discussed intensive insulin regimen does increase risk for hypoglycemia  Episodes of hypoglycemia can lead to permanent disability and death  Discussed risks/complications associated with uncontrolled diabetes      Advised to adhere to diabetic diet, and recommended staying active/exercising routinely as tolerated  Keep carbohydrates consistent to limit blood glucose fluctuations  Advised to call if blood sugars less than 70 mg/dl or over 300 mg/dl  Check blood glucose 3+ times a day  Discussed symptoms and treatment of hypoglycemia  Discussed use of CGM to collect additional blood glucose data to reveal trends and patterns that can be used to optimize treatment plan  Recommended routine follow-up with podiatry and ophthalmology  Send log in 1-2 weeks  Ordered blood work to complete prior to next visit  -     Hemoglobin A1C; Future  -     Basic metabolic panel; Future  -     insulin lispro (HumaLOG KwikPen) 100 units/mL injection pen; Inject 12-12-14 units TID with meals +scale    Essential hypertension  Blood pressure adequately controlled, continue current treatment   -     Basic metabolic panel; Future    Mixed hyperlipidemia  LDL previously 34  Continue statin therapy  Managed by PCP          Discussed with the patient diagnosis and treatment and all questions fully answered  He will call me if any problems arise  Counseled patient on diagnostic results, prognosis, risk and benefit of treatment options, instruction for management, importance of treatment compliance, risk factor reduction and impressions        Farnaz Crooks PA-C

## 2022-11-28 NOTE — PATIENT INSTRUCTIONS
Hypoglycemia instructions   Izabeledyta Masonjulia   11/28/2022  [id number]    Low Blood Sugar    Steps to treat low blood sugar  1  Test blood sugar if you have symptoms of low blood sugar:   Low Blood Sugar Symptoms:  o Sweaty  o Dizzy  o Rapid heartbeat  o Shaky  o Bad mood  o Hungry      2  Treat blood sugar less than 70 with 15 grams of fast-acting carbohydrate:   Examples of 15 grams Fast-Acting Carbohydrate:  o 4 oz juice  o 4 oz regular soda  o 3-4 glucose tablets (chew)  o 3-4 hard candies (chew)          3  Wait 15 minutes and test your blood sugar again     4   If blood sugar is less than 100, repeat steps 2-3     5  When your blood sugar is 100 or more, eat a snack if it will be longer than one hour until your next meal  The snack should be 15 grams of carbohydrate and a protein:   Examples of snacks:  o ½ sandwich  o 6 crackers with cheese  o Piece of fruit with cheese or peanut butter  o 6 crackers with peanut butter

## 2022-12-03 DIAGNOSIS — E11.22 TYPE 2 DIABETES MELLITUS WITH STAGE 3A CHRONIC KIDNEY DISEASE, WITH LONG-TERM CURRENT USE OF INSULIN (HCC): ICD-10-CM

## 2022-12-03 DIAGNOSIS — N18.31 TYPE 2 DIABETES MELLITUS WITH STAGE 3A CHRONIC KIDNEY DISEASE, WITH LONG-TERM CURRENT USE OF INSULIN (HCC): ICD-10-CM

## 2022-12-03 DIAGNOSIS — Z79.4 TYPE 2 DIABETES MELLITUS WITH STAGE 3A CHRONIC KIDNEY DISEASE, WITH LONG-TERM CURRENT USE OF INSULIN (HCC): ICD-10-CM

## 2022-12-05 RX ORDER — FLASH GLUCOSE SENSOR
KIT MISCELLANEOUS
Qty: 6 EACH | Refills: 1 | Status: SHIPPED | OUTPATIENT
Start: 2022-12-05

## 2022-12-08 ENCOUNTER — TELEPHONE (OUTPATIENT)
Dept: FAMILY MEDICINE CLINIC | Facility: OTHER | Age: 77
End: 2022-12-08

## 2022-12-08 DIAGNOSIS — K92.2 GI BLEED: ICD-10-CM

## 2022-12-08 DIAGNOSIS — R33.9 URINARY RETENTION: ICD-10-CM

## 2022-12-08 RX ORDER — FINASTERIDE 5 MG/1
5 TABLET, FILM COATED ORAL DAILY
Qty: 90 TABLET | Refills: 0 | Status: SHIPPED | OUTPATIENT
Start: 2022-12-08

## 2022-12-08 RX ORDER — PANTOPRAZOLE SODIUM 40 MG/1
40 TABLET, DELAYED RELEASE ORAL DAILY
Qty: 60 TABLET | Refills: 0 | Status: SHIPPED | OUTPATIENT
Start: 2022-12-08 | End: 2023-02-06

## 2022-12-08 NOTE — TELEPHONE ENCOUNTER
----- Message from Wilner Hassan  sent at 2022  8:42 PM EST -----  Regarding: Prescription refills  Contact: [phone]  I am Eluterio Runner (: 1945) and have been seeing Dr Marleen Alicia at your practice  [facility] pharmacy has attempted to get a couple of my prescriptions refilled by him and have not heard back  The medicines I need are Finasteride and Protonix  If you need to speak with me, please call my wife's cell  She has all the information  Thank you

## 2022-12-12 LAB
LEFT EYE DIABETIC RETINOPATHY: NORMAL
RIGHT EYE DIABETIC RETINOPATHY: NORMAL
SEVERITY (EYE EXAM): NORMAL

## 2022-12-13 ENCOUNTER — CLINICAL SUPPORT (OUTPATIENT)
Dept: NEPHROLOGY | Facility: CLINIC | Age: 77
End: 2022-12-13

## 2022-12-13 DIAGNOSIS — N18.4 CKD (CHRONIC KIDNEY DISEASE), STAGE IV (HCC): ICD-10-CM

## 2022-12-13 NOTE — PROGRESS NOTES
Initial Nutrition Assessment Form    Patient Name: Dipika Degroot  YOB: 1945    Sex: Male     Assessment Date: 12/13/2022  Start Time:  9:55 Stop Time:  10:25 Total Minutes:   30     Data:  Present at session: self and spouse Mine   Parent Concerns:    Medical Dx/Reason for Referral:   CKD4, low phosphorus diet   Past Medical History:   Diagnosis Date   • CHF (congestive heart failure) ([facility] [address] )    • Chronic kidney disease 18 - 24 months?     Dr Laureano Carlisle - stage 3   • Colon polyp    • CPAP (continuous positive airway pressure) dependence    • Diabetes mellitus (HCC)    • History of transfusion    • Hyperlipidemia    • Hypertension    • [facility]    Open heart surgery with quad bypass   • Obesity    • Renal disorder    • Sleep apnea    • Visual impairment 1991    Dr Jess Mitchell       Current Outpatient Medications   Medication Sig Dispense Refill   • Ascorbic Acid (VITAMIN C) 1000 MG tablet Take 1,000 mg by mouth daily     • aspirin (ECOTRIN LOW STRENGTH) 81 mg EC tablet Take 1 tablet (81 mg total) by mouth daily 60 tablet 8   • atorvastatin (LIPITOR) 80 mg tablet Take 1 tablet (80 mg total) by mouth daily with dinner 90 tablet 3   • B-D ULTRAFINE III SHORT PEN 31G X 8 MM MISC INJECT INTO THE SKIN 4 TIMES DAILY 400 each 1   • cholecalciferol (VITAMIN D3) 1,000 units tablet Take 1 tablet (1,000 Units total) by mouth daily 1 tablet 1   • Continuous Blood Gluc  (FreeStyle Celia 2 [place]) MAMI Use to check blood sugar daily 1 each 0   • Continuous Blood Gluc Sensor (FreeStyle Celia 14 Day Sensor) MISC CHANGE EVERY 14 DAYS 6 each 1   • cyanocobalamin (VITAMIN B-12) 500 mcg tablet Take 500 mcg by mouth daily     • finasteride (PROSCAR) 5 mg tablet Take 1 tablet (5 mg total) by mouth daily 90 tablet 0   • glucose blood test strip Check 4 times a day 400 each 1   • insulin glargine (Toujeo Max SoloStar) 300 units/mL CONCENTRATED U-300 injection pen (2-unit dial) INJECT 42 UNITS UNDER THE SKIN EVERY BEDTIME 6 mL    • insulin lispro (HumaLOG KwikPen) 100 units/mL injection pen Inject 12-12-14 units TID with meals +scale 15 mL 1   • latanoprost (XALATAN) 0 005 % ophthalmic solution 1 drop daily at bedtime     • metoprolol tartrate (LOPRESSOR) 50 mg tablet TAKE 1 TABLET BY MOUTH TWICE A  tablet 1   • Microlet Lancets MISC USE 3 TIMES  each 1   • pantoprazole (PROTONIX) 40 mg tablet Take 1 tablet (40 mg total) by mouth daily 60 tablet 0   • tamsulosin (FLOMAX) 0 4 mg TAKE 2 CAPSULES BY MOUTH DAILY WITH DINNER    180 capsule 0   • telmisartan (MICARDIS) 20 MG tablet TAKE 1 TABLET BY MOUTH EVERY DAY 90 tablet 1   • torsemide (DEMADEX) 20 mg tablet Take 1 tablet (20 mg total) by mouth 3 (three) times a week Take every Monday, Wednesday, Friday  Take additional doses as needed if weight increases by 2-3 lb in 1 day 360 tablet 0   • Victoza injection INJECT 1 8 MG UNDER THE SKIN ONCE DAILY 6 mL 1     No current facility-administered medications for this visit  Additional Meds/Supplements:    Special Learning Needs:    Height:   HC Readings from Last 3 Encounters:   No data found for [facility]       Weight: Wt Readings from Last 12 Encounters:   11/28/22 113 kg (250 lb)   10/28/22 108 kg (238 lb 6 4 oz)   09/13/22 107 kg (235 lb 6 4 oz)   08/29/22 106 kg (234 lb)   08/09/22 106 kg (233 lb)   08/03/22 105 kg (231 lb 6 4 oz)   07/28/22 106 kg (234 lb)   07/26/22 105 kg (231 lb)   07/05/22 110 kg (243 lb)   06/23/22 111 kg (244 lb 14 9 oz)   03/16/22 109 kg (240 lb)   03/02/22 110 kg (243 lb)     Estimated body mass index is 34 87 kg/m² as calculated from the following:    Height as of 10/28/22: 5' 11" (1 803 m)  Weight as of 11/28/22: 113 kg (250 lb)  Usual Weight: #  Ideal Body Weight: #   Recent Weight Change: ?Yes     ? No  Amount:       Energy Needs: No calculation needed   No Known Allergies    Social History     Substance and Sexual Activity   Alcohol Use Not Currently   • Alcohol/week: 10 0 standard drinks   • Types: 10 Shots of liquor per week    Comment: 1 drink every 6 months  Social History     Tobacco Use   Smoking Status Former   • Packs/day: 3 00   • Years: 35 00   • Pack years: 105 00   • Types: Cigarettes, Cigars   • Start date: 1959   • Quit date:    • Years since quittin 9   Smokeless Tobacco Never   Tobacco Comments    Quit many times       Who shops? patient and spouse   Who cooks? spouse   Exercise: Ambulatory  Completes ADLs independently   Prior Counseling? ? Yes     When:      Why:   [facility]    Dietitian for Diabetes, starting insulin        Diet Hx:  Breakfast: 8-10 a m  Dines out 2x per week on Saturday,    Lunch: 12  p m  Drive through [address]: 6-7  p m  Enjoys [employer] meals  Soup, salad, entree, dessert at times       Snacks: Afternoon snack  Occasionally has an evening snack        Nutrition Diagnosis:   Food and nutrition related knowledge deficit  related to  CKD4   as evidenced by No prior knowledge of need for food and nutrition related recommendations       Medical Nutrition Therapy Intervention:  ?Individualized Meal Plan    Met with Ankit Booker and spouse Palmiramaxx Marley at the [facility] Nephrology office  Provided written handouts:  NDKEP - eating right for kidney health, sodium, potassium, protein, phosphorus; 5 days of menus adjusted for renal diet restrictions; and phosphorus content of foods  Ankit Jhony appears resistant to diet education, he reports that the doctor told him to come see the dietitian  He was unaware of elevated Phosphorus level  Reviewed high phosphorus foods to limit / choose the right portion sizes  Ankit Nixonsean enjoys bottled iced tea / lemonade drinks, encouraged to have spouse prepare homemade iced tea with tea bags and lemon / lemonade in effort to lower phosphorus intake  Ankit Booker and Shelby Roach enjoy eating out at diners     Educated about large portions sizes, increased sodium and fat content, looking over the menu for the healthier meal selections, and bringing large portions home to eat at another time  Wendie Thurman declared that he is not going to the diner anymore and his wife can go by herself  Offered encouragement to make small dietary modifications due to CKD4, elevated phosphorus, DM  Wendie Thurman appears overwhelmed, frustrated with information  Gerard Davies will make an effort to prepare some meals at home  And reports that their daughter will be conscientious of diet needs when preparing holiday meals  Comprehension:   ?Very Good -  Mine  ? Fair   Manuel Gloria   Receptivity:   ?Very Good - Mine  ? Poor  Betzaida Longoria   Expected Compliance:   ?Fair         Goals:  1  No follow up expected  2    3        No follow-ups on file    Labs:  CMP  Lab Results   Component Value Date    K 4 6 11/23/2022     11/23/2022    CO2 26 11/23/2022    BUN 53 (H) 11/23/2022    CREATININE 2 36 (H) 11/23/2022    GLUCOSE 141 (H) 06/21/2019    GLUF 77 11/23/2022    CALCIUM 9 1 11/23/2022    CORRECTEDCA 9 4 10/20/2021    AST 12 (L) 06/20/2022    ALT 16 06/20/2022    ALKPHOS 99 06/20/2022    EGFR 25 11/23/2022       BMP  Lab Results   Component Value Date    GLUCOSE 141 (H) 06/21/2019    CALCIUM 9 1 11/23/2022    K 4 6 11/23/2022    CO2 26 11/23/2022     11/23/2022    BUN 53 (H) 11/23/2022    CREATININE 2 36 (H) 11/23/2022       Lipids  No results found for: CHOL  Lab Results   Component Value Date    HDL 47 10/24/2022    HDL 33 (L) 04/04/2022    HDL 42 06/18/2021     Lab Results   Component Value Date    LDLCALC 50 10/24/2022    LDLCALC 34 04/04/2022    LDLCALC 75 06/18/2021     Lab Results   Component Value Date    TRIG 70 10/24/2022    TRIG 98 04/04/2022    TRIG 160 (H) 06/18/2021     No results found for: CHOLHDL    Hemoglobin A1C  Lab Results   Component Value Date    HGBA1C 7 4 (H) 11/23/2022       Fasting Glucose  Lab Results   Component Value Date    GLUF 77 11/23/2022       Insulin     Thyroid  No results found for: TSH, I6YSVJQ, A1SQKRT, THYROIDAB    Hepatic Function Panel  Lab Results   Component Value Date    ALT 16 06/20/2022    AST 12 (L) 06/20/2022    ALKPHOS 99 06/20/2022       Celiac Disease Antibody Panel  No results found for: ENDOMYSIAL IGA, GLIADIN IGA, GLIADIN IGG, IGA, TISSUE TRANSGLUT AB, TTG IGA   Iron  Lab Results   Component Value Date    IRON 70 03/05/2020    TIBC 246 (L) 03/05/2020    FERRITIN 205 03/05/2020       Vitamins  No results found for: VITAMIN B2   No results found for: NICOTINAMIDE, NICOTINIC ACID   No results found for: VITAMINB6  No results found for: [id number]  No results found for: VITB5  No results found for: H5LIMVWS  No results found for: THYROGLB  No results found for: VITAMIN K   No results found for: 25-HYDROXY VIT D   No components found for: Leoncio Blake, RD, LDN  [facility] [address] [address]

## 2022-12-14 ENCOUNTER — TELEPHONE (OUTPATIENT)
Dept: ENDOCRINOLOGY | Facility: CLINIC | Age: 77
End: 2022-12-14

## 2022-12-15 ENCOUNTER — TELEPHONE (OUTPATIENT)
Dept: ADMINISTRATIVE | Facility: OTHER | Age: 77
End: 2022-12-15

## 2022-12-15 NOTE — TELEPHONE ENCOUNTER
Upon review of the In Basket request we were able to locate, review, and update the patient chart as requested for Diabetic Eye Exam     Any additional questions or concerns should be emailed to the Practice Liaisons via the appropriate education email address, please do not reply via In Basket      Thank you  Jeanna Escobar MA

## 2022-12-15 NOTE — TELEPHONE ENCOUNTER
----- Message from Abbye Noyola sent at 12/13/2022 12:43 PM EST -----  Regarding: care gap request  12/13/22 12:43 PM    Hello, our patient attached above has had Diabetic Eye Exam completed/performed  Please assist in updating the patient chart by pulling the document from the Media Tab  The date of service is 12/13/22       Thank you,  Shannan STEVENS

## 2022-12-18 DIAGNOSIS — E11.65 UNCONTROLLED TYPE 2 DIABETES MELLITUS WITH HYPERGLYCEMIA (HCC): ICD-10-CM

## 2022-12-19 RX ORDER — LIRAGLUTIDE 6 MG/ML
INJECTION SUBCUTANEOUS
Qty: 6 ML | Refills: 1 | Status: SHIPPED | OUTPATIENT
Start: 2022-12-19

## 2023-01-10 ENCOUNTER — TELEPHONE (OUTPATIENT)
Dept: ADMINISTRATIVE | Facility: OTHER | Age: 78
End: 2023-01-10

## 2023-01-10 NOTE — TELEPHONE ENCOUNTER
----- Message from Karlie Franco sent at 1/9/2023  9:19 AM EST -----  Regarding: care gap request  01/09/23 9:19 AM    Hello, our patient attached above has had Diabetic Eye Exam completed/performed  Please assist in updating the patient chart by pulling the document from the Media Tab  The date of service is 12/15/22       Thank you,  Shannan STEVENS

## 2023-01-10 NOTE — TELEPHONE ENCOUNTER
Upon review of the In Basket request we were able to note that no further action is required  The patient chart is up to date as a result of a previous request       Any additional questions or concerns should be emailed to the Practice Liaisons via the appropriate education email address, please do not reply via In Basket      Thank you  Concepcion Roland

## 2023-01-23 ENCOUNTER — HOSPITAL ENCOUNTER (OUTPATIENT)
Dept: RADIOLOGY | Facility: MEDICAL CENTER | Age: 78
Discharge: HOME | End: 2023-01-23

## 2023-01-23 DIAGNOSIS — Z13.820 SCREENING FOR OSTEOPOROSIS: ICD-10-CM

## 2023-01-31 DIAGNOSIS — K92.2 GI BLEED: ICD-10-CM

## 2023-01-31 RX ORDER — PANTOPRAZOLE SODIUM 40 MG/1
TABLET, DELAYED RELEASE ORAL
Qty: 90 TABLET | Refills: 1 | Status: SHIPPED | OUTPATIENT
Start: 2023-01-31

## 2023-02-07 ENCOUNTER — TELEPHONE (OUTPATIENT)
Dept: NEUROLOGY | Facility: CLINIC | Age: 78
End: 2023-02-07

## 2023-02-12 DIAGNOSIS — N13.8 BPH WITH OBSTRUCTION/LOWER URINARY TRACT SYMPTOMS: ICD-10-CM

## 2023-02-12 DIAGNOSIS — N40.1 BPH WITH OBSTRUCTION/LOWER URINARY TRACT SYMPTOMS: ICD-10-CM

## 2023-02-14 ENCOUNTER — OFFICE VISIT (OUTPATIENT)
Dept: NEUROLOGY | Facility: CLINIC | Age: 78
End: 2023-02-14

## 2023-02-14 VITALS
HEART RATE: 67 BPM | WEIGHT: 235 LBS | RESPIRATION RATE: 16 BRPM | SYSTOLIC BLOOD PRESSURE: 137 MMHG | HEIGHT: 69 IN | TEMPERATURE: 97.4 F | BODY MASS INDEX: 34.8 KG/M2 | DIASTOLIC BLOOD PRESSURE: 63 MMHG

## 2023-02-14 DIAGNOSIS — G47.33 OSA (OBSTRUCTIVE SLEEP APNEA): ICD-10-CM

## 2023-02-14 DIAGNOSIS — E11.42 DIABETIC POLYNEUROPATHY ASSOCIATED WITH TYPE 2 DIABETES MELLITUS (HCC): Primary | ICD-10-CM

## 2023-02-14 DIAGNOSIS — E74.04 MCARDLE'S DISEASE (HCC): ICD-10-CM

## 2023-02-14 RX ORDER — TAMSULOSIN HYDROCHLORIDE 0.4 MG/1
CAPSULE ORAL
Qty: 180 CAPSULE | Refills: 0 | Status: SHIPPED | OUTPATIENT
Start: 2023-02-14
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
## 2023-02-19 PROBLEM — G47.33 OSA (OBSTRUCTIVE SLEEP APNEA): Status: ACTIVE | Noted: 2023-02-19

## 2023-02-20 ENCOUNTER — APPOINTMENT (OUTPATIENT)
Dept: LAB | Facility: HOSPITAL | Age: 78
End: 2023-02-20
Attending: INTERNAL MEDICINE

## 2023-02-20 DIAGNOSIS — N18.4 CKD (CHRONIC KIDNEY DISEASE), STAGE IV (HCC): ICD-10-CM

## 2023-02-20 DIAGNOSIS — D63.1 ANEMIA OF CHRONIC KIDNEY FAILURE, STAGE 4 (SEVERE) (HCC): ICD-10-CM

## 2023-02-20 DIAGNOSIS — R33.9 URINARY RETENTION: ICD-10-CM

## 2023-02-20 DIAGNOSIS — N18.4 ANEMIA OF CHRONIC KIDNEY FAILURE, STAGE 4 (SEVERE) (HCC): ICD-10-CM

## 2023-02-20 DIAGNOSIS — I10 PRIMARY HYPERTENSION: ICD-10-CM

## 2023-02-20 LAB
ALBUMIN SERPL BCP-MCNC: 3.4 G/DL (ref 3.5–5)
ANION GAP SERPL CALCULATED.3IONS-SCNC: 9 MMOL/L (ref 4–13)
BUN SERPL-MCNC: 76 MG/DL (ref 5–25)
CALCIUM ALBUM COR SERPL-MCNC: 9.1 MG/DL (ref 8.3–10.1)
CALCIUM SERPL-MCNC: 8.6 MG/DL (ref 8.4–10.2)
CHLORIDE SERPL-SCNC: 106 MMOL/L (ref 96–108)
CO2 SERPL-SCNC: 23 MMOL/L (ref 21–32)
CREAT SERPL-MCNC: 2.99 MG/DL (ref 0.6–1.3)
CREAT UR-MCNC: 55.7 MG/DL
ERYTHROCYTE [DISTWIDTH] IN BLOOD BY AUTOMATED COUNT: 14.3 % (ref 11.6–15.1)
FERRITIN SERPL-MCNC: 118 NG/ML (ref 8–388)
GFR SERPL CREATININE-BSD FRML MDRD: 19 ML/MIN/1.73SQ M
GLUCOSE P FAST SERPL-MCNC: 160 MG/DL (ref 65–99)
HCT VFR BLD AUTO: 32.8 % (ref 36.5–49.3)
HGB BLD-MCNC: 10.2 G/DL (ref 12–17)
IRON SATN MFR SERPL: 16 % (ref 20–50)
IRON SERPL-MCNC: 29 UG/DL (ref 65–175)
MAGNESIUM SERPL-MCNC: 2.1 MG/DL (ref 1.9–2.7)
MCH RBC QN AUTO: 29.7 PG (ref 26.8–34.3)
MCHC RBC AUTO-ENTMCNC: 31.1 G/DL (ref 31.4–37.4)
MCV RBC AUTO: 96 FL (ref 82–98)
PHOSPHATE SERPL-MCNC: 5.5 MG/DL (ref 2.3–4.1)
PLATELET # BLD AUTO: 204 THOUSANDS/UL (ref 149–390)
PMV BLD AUTO: 11.1 FL (ref 8.9–12.7)
POTASSIUM SERPL-SCNC: 5 MMOL/L (ref 3.5–5.3)
PROT UR-MCNC: 228 MG/DL
PROT/CREAT UR: 4.09 MG/G{CREAT} (ref 0–0.1)
PTH-INTACT SERPL-MCNC: 234.1 PG/ML (ref 18.4–80.1)
RBC # BLD AUTO: 3.43 MILLION/UL (ref 3.88–5.62)
SODIUM SERPL-SCNC: 138 MMOL/L (ref 135–147)
TIBC SERPL-MCNC: 176 UG/DL (ref 250–450)
WBC # BLD AUTO: 6.15 THOUSAND/UL (ref 4.31–10.16)

## 2023-02-20 RX ORDER — METOPROLOL TARTRATE 50 MG/1
50 TABLET, FILM COATED ORAL 2 TIMES DAILY
Qty: 180 TABLET | Refills: 1 | Status: SHIPPED | OUTPATIENT
Start: 2023-02-20

## 2023-02-20 RX ORDER — FINASTERIDE 5 MG/1
5 TABLET, FILM COATED ORAL DAILY
Qty: 90 TABLET | Refills: 0 | Status: SHIPPED | OUTPATIENT
Start: 2023-02-20

## 2023-02-22 ENCOUNTER — OFFICE VISIT (OUTPATIENT)
Dept: NEPHROLOGY | Facility: CLINIC | Age: 78
End: 2023-02-22

## 2023-02-22 VITALS
HEART RATE: 60 BPM | DIASTOLIC BLOOD PRESSURE: 50 MMHG | BODY MASS INDEX: 35.07 KG/M2 | WEIGHT: 236.8 LBS | SYSTOLIC BLOOD PRESSURE: 102 MMHG | HEIGHT: 69 IN

## 2023-02-22 DIAGNOSIS — N18.4 CKD (CHRONIC KIDNEY DISEASE), STAGE IV (HCC): Primary | ICD-10-CM

## 2023-02-22 DIAGNOSIS — R80.9 NON-NEPHROTIC RANGE PROTEINURIA: ICD-10-CM

## 2023-02-22 DIAGNOSIS — E83.39 HYPERPHOSPHATEMIA: ICD-10-CM

## 2023-02-22 DIAGNOSIS — I12.9 BENIGN HYPERTENSION WITH CHRONIC KIDNEY DISEASE, STAGE IV (HCC): ICD-10-CM

## 2023-02-22 DIAGNOSIS — N18.4 BENIGN HYPERTENSION WITH CHRONIC KIDNEY DISEASE, STAGE IV (HCC): ICD-10-CM

## 2023-02-22 DIAGNOSIS — R79.89 ELEVATED SERUM CREATININE: ICD-10-CM

## 2023-02-22 DIAGNOSIS — N25.81 SECONDARY HYPERPARATHYROIDISM OF RENAL ORIGIN (HCC): ICD-10-CM

## 2023-02-22 DIAGNOSIS — D50.9 IRON DEFICIENCY ANEMIA, UNSPECIFIED IRON DEFICIENCY ANEMIA TYPE: ICD-10-CM

## 2023-02-22 DIAGNOSIS — I50.32 CHRONIC DIASTOLIC (CONGESTIVE) HEART FAILURE (HCC): ICD-10-CM

## 2023-02-22 RX ORDER — CALCITRIOL 0.25 UG/1
0.25 CAPSULE, LIQUID FILLED ORAL 2 TIMES WEEKLY
Qty: 10 CAPSULE | Refills: 3 | Status: SHIPPED | OUTPATIENT
Start: 2023-02-23 | End: 2023-02-24 | Stop reason: SDUPTHER

## 2023-02-22 NOTE — PATIENT INSTRUCTIONS
Chronic Kidney Disease stage 4- Baseline creatinine is 2 3-2 6  Suspected etiology is due to diabetic nephropathy + sequelae from post op ATN from CABG  Kidney Smart: completed  Dialysis: not interested   Comprehensive Kidney Care: declined at this time    Elevated creatinine- Creatinine from 2 days ago is up to 3  Repeat BMP in 10 days  Hypertension- Avoid salt  Avoid NSAIDs  Stay active  Monitor your blood pressures at home  Antihypertensive regimen includes Torsemide 20 mg MWF, Metoprolol 50 mg twice a day, and Telmisartan 20 mg daily  Edema- Continue torsemide  Secondary Hyperparathyroidism, renal- PTH rising  Start calcitriol 0 25mcg twice a week  Continue to monitor PTH  Hyperphosphatemia- Follow a low phosphorus diet  Anemia- HGB stable in the low 10s  Recommend an oral iron supplement due to low iron saturation  Proteinuria- UPC ratio significantly elevated  Will repeat  He is on telmisartan  Diabetes mellitus type 2- Management per PCP/endocrinology  Follow up with Dr Herb Aguayo or an AP in 4 months  Please call the office with any questions or concerns

## 2023-02-22 NOTE — PROGRESS NOTES
Assessment and Plan:    Matthew Nair was seen today for follow-up  Diagnoses and all orders for this visit:    CKD (chronic kidney disease), stage IV ([facility] [address] )  -     Basic metabolic panel; Future  -     CBC; Future  -     Magnesium; Future  -     Phosphorus; Future  -     Protein / creatinine ratio, urine; Future  -     PTH, intact; Future  -     Vitamin D 25 hydroxy; Future  -     calcitriol (ROCALTROL) 0 25 mcg capsule; Take 1 capsule (0 25 mcg total) by mouth 2 (two) times a week  -     Basic metabolic panel; Future    Benign hypertension with chronic kidney disease, stage IV ([facility])    Elevated serum creatinine  -     UA/M w/rflx Culture, Comp; Future    Secondary hyperpar[facility] of [facility] )    Hyperphosphatemia    Non-nephrotic range proteinuria    Iron deficiency anemia, unspecified iron deficiency anemia type    Chronic diastolic (congestive) heart failure (HCC)      Chronic Kidney Disease stage 4- Baseline creatinine is 2 3-2 6  Suspected etiology is due to diabetic nephropathy + sequelae from post op ATN from CABG  Kidney Smart: completed  Dialysis: not interested   Comprehensive Kidney Care: declined at this time    Elevated creatinine- Creatinine from 2 days ago is up to 3  They were away in [place] for a long weekend and per wife, he did not drink much while there  Repeat BMP in 10 days  Hypertension- Avoid salt  Avoid NSAIDs  Stay active  Monitor your blood pressures at home  Antihypertensive regimen includes Torsemide 20 mg MWF, Metoprolol 50 mg twice a day, and Telmisartan 20 mg daily  Edema- Continue torsemide  Secondary Hyperparathyroidism, renal- PTH rising  Start calcitriol 0 25mcg twice a week  Continue to monitor PTH  Hyperphosphatemia- Follow a low phosphorus diet  Anemia- HGB stable in the low 10s  Recommend an oral iron supplement due to low iron saturation  Proteinuria- UPC ratio significantly elevated  Will repeat  He is on telmisartan  Diabetes mellitus type 2- Management per PCP/endocrinology  Follow up with Dr Ingris Owens or an AP in 4 months  Please call the office with any questions or concerns  Reason for Visit: Follow-up (CKD4)    HPI: Mimi Ken  is a 69 y o  male who is here for follow up of chronic kidney disease and was last seen in October by Dr Ingris Owens  He returned on Monday from a long weekend in [place] for his wife's mothers 80th birthday  He denies SOB  He denies worsening LE edema  He does complain of foul smelling urine  Per his wife, he did not drink much over the weekend while in [place]  He is eating well  ROS: A complete review of systems was performed and was negative unless otherwise noted in the history of present illness  Allergies:   Patient has no known allergies      Medications:     Current Outpatient Medications:   •  Ascorbic Acid (VITAMIN C) 1000 MG tablet, Take 1,000 mg by mouth daily, Disp: , Rfl:   •  aspirin (ECOTRIN LOW STRENGTH) 81 mg EC tablet, Take 1 tablet (81 mg total) by mouth daily, Disp: 60 tablet, Rfl: 8  •  atorvastatin (LIPITOR) 80 mg tablet, Take 1 tablet (80 mg total) by mouth daily with dinner, Disp: 90 tablet, Rfl: 3  •  B-D ULTRAFINE III SHORT PEN 31G X 8 MM MISC, INJECT INTO THE SKIN 4 TIMES DAILY, Disp: 400 each, Rfl: 1  •  [START ON 2/23/2023] calcitriol (ROCALTROL) 0 25 mcg capsule, Take 1 capsule (0 25 mcg total) by mouth 2 (two) times a week, Disp: 10 capsule, Rfl: 3  •  cholecalciferol (VITAMIN D3) 1,000 units tablet, Take 1 tablet (1,000 Units total) by mouth daily, Disp: 1 tablet, Rfl: 1  •  Continuous Blood Gluc  (FreeStyle Celia 2 [place]) MAMI, Use to check blood sugar daily, Disp: 1 each, Rfl: 0  •  Continuous Blood Gluc Sensor (FreeStyle Celia 14 Day Sensor) MISC, CHANGE EVERY 14 DAYS, Disp: 6 each, Rfl: 1  •  cyanocobalamin (VITAMIN B-12) 500 mcg tablet, Take 500 mcg by mouth daily, Disp: , Rfl:   •  finasteride (PROSCAR) 5 mg tablet, Take 1 tablet (5 mg total) by mouth daily, Disp: 90 tablet, Rfl: 0  •  glucose blood test strip, Check 4 times a day, Disp: 400 each, Rfl: 1  •  insulin glargine (Toujeo Max SoloStar) 300 units/mL CONCENTRATED U-300 injection pen (2-unit dial), INJECT 42 UNITS UNDER THE SKIN DAILY, Disp: 18 mL, Rfl: 1  •  insulin lispro (HumaLOG KwikPen) 100 units/mL injection pen, Inject 12-12-14 units TID with meals +scale, Disp: 15 mL, Rfl: 1  •  latanoprost (XALATAN) 0 005 % ophthalmic solution, 1 drop daily at bedtime, Disp: , Rfl:   •  metoprolol tartrate (LOPRESSOR) 50 mg tablet, Take 1 tablet (50 mg total) by mouth 2 (two) times a day, Disp: 180 tablet, Rfl: 1  •  Microlet Lancets MISC, USE 3 TIMES DAY, Disp: 300 each, Rfl: 1  •  pantoprazole (PROTONIX) 40 mg tablet, TAKE 1 TABLET BY MOUTH EVERY DAY, Disp: 90 tablet, Rfl: 1  •  tamsulosin (FLOMAX) 0 4 mg, TAKE 2 CAPSULES BY MOUTH DAILY WITH DINNER   , Disp: 180 capsule, Rfl: 0  •  telmisartan (MICARDIS) 20 MG tablet, TAKE 1 TABLET BY MOUTH EVERY DAY, Disp: 90 tablet, Rfl: 1  •  torsemide (DEMADEX) 20 mg tablet, Take 1 tablet (20 mg total) by mouth 3 (three) times a week Take every Monday, Wednesday, Friday  Take additional doses as needed if weight increases by 2-3 lb in 1 day, Disp: 360 tablet, Rfl: 0  •  Victoza injection, INJECT 1 8 MG UNDER THE SKIN ONCE DAILY, Disp: 6 mL, Rfl: 1    Past Medical History:   Diagnosis Date   • CHF (congestive heart failure) (HCC)    • Chronic kidney disease 18 - 24 months?     Dr Kezia Olsen - stage 3   • Colon polyp    • CPAP (continuous positive airway pressure) dependence    • Diabetes mellitus [facility])    • History of transfusion    • Hyperlipidemia    • Hypertension    • Myocardial infarction ([facility] [address] ) 06/2019    Open heart surgery with quad bypass   • Obesity    • Renal disorder    • Sleep apnea    • Visual impairment 1991    Dr Keith Daniel     Past Surgical History:   Procedure Laterality Date   • APPENDECTOMY  1977    Done in conjunction with cholecystectomy   • BACK SURGERY     • CHOLECYSTECTOMY  1977   • COLONOSCOPY     • CORONARY ARTERY BYPASS GRAFT  2019    quad   • EGD     • GALLBLADDER SURGERY     • HERNIA REPAIR     • RI CORONARY ARTERY BYP W/VEIN & ARTERY GRAFT 4 VEIN N/A 06/21/2019    Procedure: CORONARY ARTERY BYPASS GRAFT (CABG) 4 VESSELS WITH SVG TO PDA, OM, DIAGONAL AND LIMA TO LAD; RIGHT LEG EVH;  Surgeon: Niharika Barfield MD;  Location: [facility];  Service: Cardiac Surgery   • RECTAL SURGERY     • SPINE SURGERY  2012    Fusion L3-L5? • TONSILLECTOMY       Family History   Problem Relation Age of Onset   • Cancer Mother    • Alcohol abuse Mother    • Cancer Father    • Alcohol abuse Father    • Diabetes Maternal Grandmother    • Diabetes Paternal Aunt       reports that he quit smoking about 31 years ago  His smoking use included cigarettes and cigars  He started smoking about 63 years ago  He has a 105 00 pack-year smoking history  He has never used smokeless tobacco  He reports that he does not currently use alcohol after a past usage of about 10 0 standard drinks per week  He reports that he does not use drugs  Physical Exam:   Vitals:    02/22/23 1024 02/22/23 1042   BP:  102/50   BP Location:  Right arm   Patient Position:  Sitting   Cuff Size:  Large   Pulse:  60   Weight: 107 kg (236 lb 12 8 oz)    Height: 5' 9" (1 753 m)      Body mass index is 34 97 kg/m²  General: NAD  Neuro: AAO  Skin: no rash  Eyes: anicteric  ENMT: mm moist  Neck: no masses  Respiratory: CTAB  Cardiovascular: RRR  Extremities: trace LE edema  Gastrointestinal: soft nt nd    Procedure:  No results found for this or any previous visit      Lab Results   Component Value Date    GLUCOSE 141 (H) 06/21/2019    CALCIUM 8 6 02/20/2023    K 5 0 02/20/2023    CO2 23 02/20/2023     02/20/2023    BUN 76 (H) 02/20/2023    CREATININE 2 99 (H) 02/20/2023       Results from last 7 days   Lab Units 02/20/23  1002   WBC Thousand/uL 6 15   HEMOGLOBIN g/dL 10 2* HEMATOCRIT % 32 8*   PLATELETS Thousands/uL 204   POTASSIUM mmol/L 5 0   CHLORIDE mmol/L 106   CO2 mmol/L 23   BUN mg/dL 76*   CREATININE mg/dL 2 99*   CALCIUM mg/dL 8 6   MAGNESIUM mg/dL 2 1   PHOSPHORUS mg/dL 5 5*     I have personally reviewed the blood work as stated above and in my note  I have personally reviewed Dr Aparna Garcia note

## 2023-02-23 ENCOUNTER — APPOINTMENT (OUTPATIENT)
Dept: LAB | Facility: HOSPITAL | Age: 78
End: 2023-02-23

## 2023-02-23 DIAGNOSIS — R79.89 ELEVATED SERUM CREATININE: ICD-10-CM

## 2023-02-23 LAB
AMORPH URATE CRY URNS QL MICRO: ABNORMAL /HPF
BACTERIA UR QL AUTO: ABNORMAL /HPF
BILIRUB UR QL STRIP: NEGATIVE
CLARITY UR: ABNORMAL
COLOR UR: YELLOW
GLUCOSE UR STRIP-MCNC: NEGATIVE MG/DL
HGB UR QL STRIP.AUTO: ABNORMAL
KETONES UR STRIP-MCNC: NEGATIVE MG/DL
LEUKOCYTE ESTERASE UR QL STRIP: ABNORMAL
NITRITE UR QL STRIP: POSITIVE
NON-SQ EPI CELLS URNS QL MICRO: ABNORMAL /HPF
PH UR STRIP.AUTO: 6 [PH]
PROT UR STRIP-MCNC: ABNORMAL MG/DL
RBC #/AREA URNS AUTO: ABNORMAL /HPF
SP GR UR STRIP.AUTO: 1.02 (ref 1–1.03)
UROBILINOGEN UR QL STRIP.AUTO: 0.2 E.U./DL
WBC #/AREA URNS AUTO: ABNORMAL /HPF

## 2023-02-24 ENCOUNTER — TELEPHONE (OUTPATIENT)
Dept: NEPHROLOGY | Facility: CLINIC | Age: 78
End: 2023-02-24

## 2023-02-24 DIAGNOSIS — N18.4 CKD (CHRONIC KIDNEY DISEASE), STAGE IV (HCC): ICD-10-CM

## 2023-02-24 RX ORDER — CALCITRIOL 0.25 UG/1
0.25 CAPSULE, LIQUID FILLED ORAL 2 TIMES WEEKLY
Qty: 26 CAPSULE | Refills: 3 | Status: SHIPPED | OUTPATIENT
Start: 2023-02-27

## 2023-02-24 NOTE — TELEPHONE ENCOUNTER
I called the patient's Ifeoma Boogie, and we spoke over the phone  Recent laboratory data were reviewed  UA with likely UTI  Urine culture with Kleb pneumoniae  Will plan to treat with abx once sensitivities back  We will call her back with the antibiotic of choice once results back  Recommend repeat BMP at least 5 days after completion of abx

## 2023-02-25 LAB — BACTERIA UR CULT: ABNORMAL

## 2023-02-27 DIAGNOSIS — N39.0 URINARY TRACT INFECTION WITHOUT HEMATURIA, SITE UNSPECIFIED: Primary | ICD-10-CM

## 2023-02-27 RX ORDER — CEPHALEXIN 500 MG/1
500 CAPSULE ORAL EVERY 12 HOURS SCHEDULED
Qty: 10 CAPSULE | Refills: 0 | Status: SHIPPED | OUTPATIENT
Start: 2023-02-27 | End: 2023-03-04

## 2023-03-10 ENCOUNTER — APPOINTMENT (OUTPATIENT)
Dept: LAB | Facility: HOSPITAL | Age: 78
End: 2023-03-10

## 2023-03-10 DIAGNOSIS — N18.4 TYPE 2 DIABETES MELLITUS WITH STAGE 4 CHRONIC KIDNEY DISEASE, WITH LONG-TERM CURRENT USE OF INSULIN (HCC): ICD-10-CM

## 2023-03-10 DIAGNOSIS — E11.22 TYPE 2 DIABETES MELLITUS WITH STAGE 4 CHRONIC KIDNEY DISEASE, WITH LONG-TERM CURRENT USE OF INSULIN (HCC): ICD-10-CM

## 2023-03-10 DIAGNOSIS — I10 ESSENTIAL HYPERTENSION: ICD-10-CM

## 2023-03-10 DIAGNOSIS — Z79.4 TYPE 2 DIABETES MELLITUS WITH STAGE 4 CHRONIC KIDNEY DISEASE, WITH LONG-TERM CURRENT USE OF INSULIN (HCC): ICD-10-CM

## 2023-03-10 DIAGNOSIS — N18.4 CKD (CHRONIC KIDNEY DISEASE), STAGE IV (HCC): ICD-10-CM

## 2023-03-10 LAB
ANION GAP SERPL CALCULATED.3IONS-SCNC: 9 MMOL/L (ref 4–13)
BUN SERPL-MCNC: 64 MG/DL (ref 5–25)
CALCIUM SERPL-MCNC: 8.6 MG/DL (ref 8.4–10.2)
CHLORIDE SERPL-SCNC: 105 MMOL/L (ref 96–108)
CO2 SERPL-SCNC: 22 MMOL/L (ref 21–32)
CREAT SERPL-MCNC: 2.94 MG/DL (ref 0.6–1.3)
GFR SERPL CREATININE-BSD FRML MDRD: 19 ML/MIN/1.73SQ M
GLUCOSE P FAST SERPL-MCNC: 136 MG/DL (ref 65–99)
POTASSIUM SERPL-SCNC: 4.5 MMOL/L (ref 3.5–5.3)
SODIUM SERPL-SCNC: 136 MMOL/L (ref 135–147)

## 2023-03-11 LAB
EST. AVERAGE GLUCOSE BLD GHB EST-MCNC: 140 MG/DL
HBA1C MFR BLD: 6.5 %

## 2023-03-13 ENCOUNTER — TELEPHONE (OUTPATIENT)
Dept: NEPHROLOGY | Facility: CLINIC | Age: 78
End: 2023-03-13

## 2023-03-13 ENCOUNTER — OFFICE VISIT (OUTPATIENT)
Dept: CARDIOLOGY CLINIC | Facility: CLINIC | Age: 78
End: 2023-03-13

## 2023-03-13 VITALS
OXYGEN SATURATION: 99 % | DIASTOLIC BLOOD PRESSURE: 70 MMHG | HEIGHT: 69 IN | SYSTOLIC BLOOD PRESSURE: 128 MMHG | WEIGHT: 237 LBS | HEART RATE: 71 BPM | BODY MASS INDEX: 35.1 KG/M2

## 2023-03-13 DIAGNOSIS — I50.32 CHRONIC DIASTOLIC (CONGESTIVE) HEART FAILURE (HCC): ICD-10-CM

## 2023-03-13 DIAGNOSIS — Z95.1 S/P CABG (CORONARY ARTERY BYPASS GRAFT): ICD-10-CM

## 2023-03-13 DIAGNOSIS — I25.10 CORONARY ARTERY DISEASE INVOLVING NATIVE CORONARY ARTERY OF NATIVE HEART WITHOUT ANGINA PECTORIS: Primary | ICD-10-CM

## 2023-03-13 DIAGNOSIS — E78.2 MIXED HYPERLIPIDEMIA: ICD-10-CM

## 2023-03-13 DIAGNOSIS — I10 ESSENTIAL HYPERTENSION: ICD-10-CM

## 2023-03-13 NOTE — TELEPHONE ENCOUNTER
I called the patient's wife and we spoke over the phone  Recent laboratory data were reviewed  Lab Results   Component Value Date    SODIUM 136 03/10/2023    K 4 5 03/10/2023     03/10/2023    CO2 22 03/10/2023    BUN 64 (H) 03/10/2023    CREATININE 2 94 (H) 03/10/2023    GLUC 201 (H) 06/23/2022    CALCIUM 8 6 03/10/2023     Creatinine is unchanged at 2 94 - was 2 99  Suspect progression of disease  [facility] reports that Konstantin Gresham has no edema  Current BP meds: Torsemide 20 mg MWF  Telmisartan 20 mg OD  Metoprolol 50 mg BID  Plan:  • No changes  • Continue same meds  • Repeat labs can be done in 3 months with next follow up

## 2023-03-13 NOTE — PROGRESS NOTES
Cardiology   Marcie Johnson  69 y o  male MRN: [id number]           Impression:  1  Diastolic heart failure - compensated at this time  Diuretics regulated by nephrology  2  CAD s/p CABG x 4 - 6/19  3  HTN - controlled  4  Dyslipidemia - on statin  5  CKD - follows with nephrology          Recommendations:  1  Continue current medications  2  Follow up in 6 months        HPI: Marcie Johnson  is a 69y o  year old male with coronary artery disease s/p CABG 6/19, hypertension, CKD, and dyslipidemia who returns for follow up   Was admitted to hospital 3/21 with CHF - echo demonstrated normal LV systolic function and mild MR   Recently admitted for GI bleed with gastric ulcer  Currently asymptomatic  No chest pain, shortness of breath, or palpitations  Edema stable  Review of Systems   Constitutional: Negative  HENT: Negative  Eyes: Negative  Respiratory: Negative for chest tightness and shortness of breath  Cardiovascular: Negative for chest pain, palpitations and leg swelling  Gastrointestinal: Negative  Endocrine: Negative  Genitourinary: Negative  Musculoskeletal: Negative  Skin: Negative  Allergic/Immunologic: Negative  Neurological: Negative  Hematological: Negative  Psychiatric/Behavioral: Negative  All other systems reviewed and are negative  Past Medical History:   Diagnosis Date   • CHF (congestive heart failure) (HCC)    • Chronic kidney disease 18 - 24 months?     Dr Amparo Duran - stage 3   • Colon polyp    • CPAP (continuous positive airway pressure) dependence    • Diabetes mellitus [facility])    • History of transfusion    • Hyperlipidemia    • Hypertension    • Myocardial infarction ([facility] [address] ) 06/2019    Open heart surgery with quad bypass   • Obesity    • Renal disorder    • Sleep apnea    • Visual impairment 1991    Dr Ale Garcia     Past Surgical History:   Procedure Laterality Date   • APPENDECTOMY  1977    Done in conjunction with cholecystectomy   • BACK SURGERY     • CHOLECYSTECTOMY     • COLONOSCOPY     • CORONARY ARTERY BYPASS GRAFT  2019    quad   • EGD     • GALLBLADDER SURGERY     • HERNIA REPAIR     • DE CORONARY ARTERY BYP W/VEIN & ARTERY GRAFT 4 VEIN N/A 2019    Procedure: CORONARY ARTERY BYPASS GRAFT (CABG) 4 VESSELS WITH SVG TO PDA, OM, DIAGONAL AND LIMA TO LAD; RIGHT LEG EVH;  Surgeon: Janay Burnett MD;  Location: [facility];  Service: Cardiac Surgery   • RECTAL SURGERY     • SPINE SURGERY      Fusion L3-L5? • TONSILLECTOMY       Social History     Substance and Sexual Activity   Alcohol Use Not Currently   • Alcohol/week: 10 0 standard drinks   • Types: 10 Shots of liquor per week    Comment: 1 drink every 6 months       Social History     Substance and Sexual Activity   Drug Use Never     Social History     Tobacco Use   Smoking Status Former   • Packs/day: 3 00   • Years: 35 00   • Pack years: 105 00   • Types: Cigarettes, Cigars   • Start date: 1959   • Quit date:    • Years since quittin 2   Smokeless Tobacco Never   Tobacco Comments    Quit many times     Family History   Problem Relation Age of Onset   • Cancer Mother    • Alcohol abuse Mother    • Cancer Father    • Alcohol abuse Father    • Diabetes Maternal Grandmother    • Diabetes Paternal Aunt        Allergies:  No Known Allergies    Medications:     Current Outpatient Medications:   •  Ascorbic Acid (VITAMIN C) 1000 MG tablet, Take 1,000 mg by mouth daily, Disp: , Rfl:   •  aspirin (ECOTRIN LOW STRENGTH) 81 mg EC tablet, Take 1 tablet (81 mg total) by mouth daily, Disp: 60 tablet, Rfl: 8  •  atorvastatin (LIPITOR) 80 mg tablet, Take 1 tablet (80 mg total) by mouth daily with dinner, Disp: 90 tablet, Rfl: 3  •  B-D ULTRAFINE III SHORT PEN 31G X 8 MM MISC, INJECT INTO THE SKIN 4 TIMES DAILY, Disp: 400 each, Rfl: 1  •  calcitriol (ROCALTROL) 0 25 mcg capsule, Take 1 capsule (0 25 mcg total) by mouth 2 (two) times a week, Disp: 26 capsule, Rfl: 3  •  cholecalciferol (VITAMIN D3) 1,000 units tablet, Take 1 tablet (1,000 Units total) by mouth daily, Disp: 1 tablet, Rfl: 1  •  Continuous Blood Gluc  (FreeStyle Celia 2 [place]) [facility], Use to check blood sugar daily, Disp: 1 each, Rfl: 0  •  Continuous Blood Gluc Sensor (FreeStyle Celia 14 Day Sensor) [facility], CHANGE EVERY 14 DAYS, Disp: 6 each, Rfl: 1  •  cyanocobalamin (VITAMIN B-12) 500 mcg tablet, Take 500 mcg by mouth daily, Disp: , Rfl:   •  finasteride (PROSCAR) 5 mg tablet, Take 1 tablet (5 mg total) by mouth daily, Disp: 90 tablet, Rfl: 0  •  glucose blood test strip, Check 4 times a day, Disp: 400 each, Rfl: 1  •  insulin glargine (Toujeo Max SoloStar) 300 units/mL CONCENTRATED U-300 injection pen (2-unit dial), INJECT 42 UNITS UNDER THE SKIN DAILY, Disp: 18 mL, Rfl: 1  •  insulin lispro (HumaLOG KwikPen) 100 units/mL injection pen, Inject 12-12-14 units TID with meals +scale, Disp: 15 mL, Rfl: 1  •  latanoprost (XALATAN) 0 005 % ophthalmic solution, 1 drop daily at bedtime, Disp: , Rfl:   •  metoprolol tartrate (LOPRESSOR) 50 mg tablet, Take 1 tablet (50 mg total) by mouth 2 (two) times a day, Disp: 180 tablet, Rfl: 1  •  Microlet Lancets MISC, USE 3 TIMES DAY, Disp: 300 each, Rfl: 1  •  pantoprazole (PROTONIX) 40 mg tablet, TAKE 1 TABLET BY MOUTH EVERY DAY, Disp: 90 tablet, Rfl: 1  •  tamsulosin (FLOMAX) 0 4 mg, TAKE 2 CAPSULES BY MOUTH DAILY WITH DINNER   , Disp: 180 capsule, Rfl: 0  •  telmisartan (MICARDIS) 20 MG tablet, TAKE 1 TABLET BY MOUTH EVERY DAY, Disp: 90 tablet, Rfl: 1  •  torsemide (DEMADEX) 20 mg tablet, Take 1 tablet (20 mg total) by mouth 3 (three) times a week Take every Monday, Wednesday, Friday    Take additional doses as needed if weight increases by 2-3 lb in 1 day, Disp: 360 tablet, Rfl: 0  •  Victoza injection, INJECT 1 8 MG UNDER THE SKIN ONCE DAILY, Disp: 6 mL, Rfl: 1      Wt Readings from Last 3 Encounters:   03/13/23 108 kg (237 lb)   02/22/23 107 kg (236 lb 12 8 oz) 02/14/23 107 kg (235 lb)     Temp Readings from Last 3 Encounters:   02/14/23 (!) 97 4 °F (36 3 °C) (Temporal)   09/13/22 98 °F (36 7 °C)   08/29/22 97 6 °F (36 4 °C) (Temporal)     BP Readings from Last 3 Encounters:   03/13/23 128/70   02/22/23 102/50   02/14/23 137/63     Pulse Readings from Last 3 Encounters:   03/13/23 71   02/22/23 60   02/14/23 67         Physical Exam  HENT:      Head: Atraumatic  Mouth/Throat:      Mouth: Mucous membranes are moist    Eyes:      Extraocular Movements: Extraocular movements intact  Cardiovascular:      Rate and Rhythm: Normal rate and regular rhythm  Heart sounds: Normal heart sounds  Pulmonary:      Effort: Pulmonary effort is normal       Breath sounds: Normal breath sounds  Abdominal:      General: Abdomen is flat  Musculoskeletal:         General: Normal range of motion  Cervical back: Normal range of motion  Skin:     General: Skin is warm  Neurological:      General: No focal deficit present  Mental Status: He is alert and oriented to person, place, and time     Psychiatric:         Mood and Affect: Mood normal          Behavior: Behavior normal            Laboratory Studies:  CMP:  Lab Results   Component Value Date    K 4 5 03/10/2023     03/10/2023    CO2 22 03/10/2023    BUN 64 (H) 03/10/2023    CREATININE 2 94 (H) 03/10/2023    GLUCOSE 141 (H) 06/21/2019    AST 12 (L) 06/20/2022    ALT 16 06/20/2022    EGFR 19 03/10/2023       Lipid Profile:   No results found for: CHOL  Lab Results   Component Value Date    HDL 47 10/24/2022     Lab Results   Component Value Date    LDLCALC 50 10/24/2022     Lab Results   Component Value Date    TRIG 70 10/24/2022       Cardiac testing:   EKG reviewed personally:   Results for orders placed during the hospital encounter of 03/23/21    Echo complete with contrast if indicated    Narrative  [address] [place] [address]  [phone]    Transthoracic Echocardiogram  2D, M-mode, Doppler, and Color Doppler    Study date:  23-Mar-2021    Patient: Priyanka Zuñiga  MR number: [id number]  Account number: [de-identified]  : 1945  Age: 77 years  Gender: Male  Status: Outpatient  Location: [address] [place] [address]  Height: 71 in  Weight: 279 4 lb  BP: 118/ 80 mmHg    Indications: Assess congestive heart failure  Diagnoses: I50 9 - Heart failure, unspecified    Sonographer:  LISA Merida  Primary Physician:  Gardenia Pope MD  Referring Physician:  ELEAZAR Santiago  Group:  [facility]  Interpreting Physician:  Curly Domínguez MD    SUMMARY    LEFT VENTRICLE:  Systolic function was normal by visual assessment  Ejection fraction was estimated to be 55 %  Although no diagnostic regional wall motion abnormality was identified, this possibility cannot be completely excluded on the basis of this study  Wall thickness was markedly increased  Doppler parameters were consistent with abnormal left ventricular relaxation (grade 1 diastolic dysfunction)  MITRAL VALVE:  There was moderate annular calcification  There was mild regurgitation  AORTIC VALVE:  Transaortic velocity was increased due to increased transvalvular flow  TRICUSPID VALVE:  There was mild regurgitation  PULMONIC VALVE:  There was trace regurgitation  AORTA:  There was mild dilatation of the ascending aorta  HISTORY: PRIOR HISTORY: Heart failure, CAD, NSTEMI s/p CABG, DMt2, Noncompliance    PROCEDURE: The study was performed in the [address] [place] [address]  This was a routine study  The transthoracic approach was used  The study included complete 2D imaging, M-mode, complete spectral Doppler, and color Doppler  The  heart rate was 75 bpm, at the start of the study  Images were obtained from the parasternal, apical, subcostal, and suprasternal notch acoustic windows  Image quality was adequate      LEFT VENTRICLE: Size was normal  Systolic function was normal by visual assessment  Ejection fraction was estimated to be 55 %  Although no diagnostic regional wall motion abnormality was identified, this possibility cannot be completely  excluded on the basis of this study  Wall thickness was markedly increased  DOPPLER: There was an increased relative contribution of atrial contraction to ventricular filling  Doppler parameters were consistent with abnormal left  ventricular relaxation (grade 1 diastolic dysfunction)  RIGHT VENTRICLE: The size was normal  Systolic function was normal  DOPPLER: Systolic pressure was within the normal range  Estimated peak pressure was 27 mmHg  LEFT ATRIUM: Size was normal     RIGHT ATRIUM: Size was normal     MITRAL VALVE: There was moderate annular calcification  Valve structure was normal  There was mild thickening  There was normal leaflet separation  DOPPLER: The transmitral velocity was within the normal range  There was no evidence for  stenosis  There was mild regurgitation  AORTIC VALVE: The valve was probably trileaflet  Leaflets exhibited normal thickness, mild to moderate calcification, and normal cuspal separation  DOPPLER: Transaortic velocity was increased due to increased transvalvular flow  There was  no evidence for stenosis  There was no regurgitation  TRICUSPID VALVE: The valve structure was normal  There was normal leaflet separation  DOPPLER: The transtricuspid velocity was within the normal range  There was no evidence for stenosis  There was mild regurgitation  PULMONIC VALVE: Leaflets exhibited normal thickness, no calcification, and normal cuspal separation  DOPPLER: The transpulmonic velocity was within the normal range  There was trace regurgitation  PERICARDIUM: There was no pericardial effusion  AORTA: The root exhibited normal size  There was mild dilatation of the ascending aorta  SYSTEMIC VEINS: IVC: The inferior vena cava was normal in size and course  Respirophasic changes were normal     SYSTEM MEASUREMENT TABLES    2D  %FS: 28 99 %  Ao Diam: 3 6 cm  Ao asc: 4 1 cm  EDV(Teich): 72 68 ml  EF Biplane: 40 %  EF(Teich): 56 22 %  ESV(Teich): 31 82 ml  HR_2Ch_Q: 75 13 BPM  HR_4Ch_Q: 58 35 BPM  IVSd: 1 58 cm  LA Area: 15 86 cm2  LA Diam: 4 2 cm  LVCO_2Ch_Q: 6 L/min  LVCO_4Ch_Q: 4 03 L/min  LVCO_BiP_Q: 5 01 L/min  LVEDV MOD A2C: 107 2 ml  LVEDV MOD A4C: 110 87 ml  LVEDV MOD BP: 110 33 ml  LVEDVInd MOD BP: 45 41 ml/m2  LVEF MOD A2C: 46 64 %  LVEF MOD A4C: 35 88 %  LVEF_2Ch_Q: 45 18 %  LVEF_4Ch_Q: 48 5 %  LVEF_BiP_Q: 47 38 %  LVESV MOD A2C: 57 21 ml  LVESV MOD A4C: 71 09 ml  LVESV MOD BP: 66 2 ml  LVESVInd MOD BP: 27 24 ml/m2  LVIDd: 4 06 cm  LVIDs: 2 89 cm  LVLd A2C: 9 94 cm  LVLd A4C: 10 15 cm  LVLd_2Ch_Q: 9 99 cm  LVLd_4Ch_Q: 10 38 cm  LVLs A2C: 8 18 cm  LVLs A4C: 9 31 cm  LVLs_2Ch_Q: 9 31 cm  LVLs_4Ch_Q: 9 63 cm  LVPWd: 1 5 cm  LVSV_2Ch_Q: 79 87 ml  LVSV_4Ch_Q: 69 ml  LVSV_BiP_Q: 76 16 ml  LVVED_2Ch_Q: 176 77 ml  LVVED_4Ch_Q: 142 25 ml  LVVED_BiP_Q: 160 75 ml  LVVES_2Ch_Q: 96 9 ml  LVVES_4Ch_Q: 73 25 ml  LVVES_BiP_Q: 84 59 ml  RA Area: 13 82 cm2  RVIDd: 3 28 cm  SV MOD A2C: 50 ml  SV MOD A4C: 39 78 ml  SV(Teich): 40 86 ml    CW  TR MaxP 49 mmHg  TR Vmax: 2 42 m/s    MM  TAPSE: 2 41 cm    PW  E' Sept: 0 06 m/s  E/E' Sept: 18 92  LVOT Env  Ti: 281 64 ms  LVOT VTI: 19 74 cm  LVOT Vmax: 1 04 m/s  LVOT Vmean: 0 7 m/s  LVOT maxP 34 mmHg  LVOT meanP 34 mmHg  MV A Nick: 1 05 m/s  MV Dec [place]: 4 32 m/s2  MV DecT: 250 42 ms  MV E Nick: 1 05 m/s  MV E/A Ratio: 1    [facility] Accredited Echocardiography Laboratory    Prepared and electronically signed by    Akilah Castillo MD  Signed 23-Mar-2021 11:06:22    No results found for this or any previous visit      Results for orders placed during the hospital encounter of 06/15/19    Cardiac catheterization    [address] [id number] [phone]    [facility]    Invasive Cardiovascular Lab Complete Report    Patient: Allison Guadarrama  MR number: [id number]  Account number: [de-identified]  Study date: 2019  Gender: Male  : 1945  Height: 70 9 in  Weight: 237 6 lb  BSA: 2 27 mï¾²    Allergies: NO KNOWN ALLERGIES    Diagnostic Cardiologist:  Jessica Huynh MD  Primary Physician:  DO SEVERINO Acosta    CORONARY CIRCULATION:  Proximal LAD: There was a diffuse 75 % stenosis  It appears amenable to percutaneous intervention  1st diagonal: There was a 80 % stenosis  Proximal circumflex: There was a 100 % stenosis  This lesion is a chronic total occlusion  Proximal RCA: There was a 100 % stenosis  There was JEFFRY grade 1 flow through the vessel (slow flow without perfusion)  This lesion is a chronic total occlusion  INDICATIONS:  --  Possible CAD: myocardial infarction without ST elevation (NSTEMI)  --  Congestive heart failure with dyspnea  PROCEDURES PERFORMED    --  Left coronary angiography  --  Right coronary angiography  --  Inpatient  --  Mod Sedation Same Physician Initial 15min  --  Coronary Catheterization (w/ LHC)  PROCEDURE: The risks and alternatives of the procedures and conscious sedation were explained to the patient and informed consent was obtained  The patient was brought to the cath lab and placed on the table  The planned puncture sites  were prepped and draped in the usual sterile fashion  --  Right radial artery access  After performing an Clint's test to verify adequate ulnar artery supply to the hand, the radial site was prepped  The puncture site was infiltrated with local anesthetic  The vessel was accessed using the  modified Seldinger technique, a wire was advanced into the vessel, and a sheath was advanced over the wire into the vessel  --  Left coronary artery angiography   A catheter was advanced over a guidewire into the aorta and positioned in the left coronary artery ostium under fluoroscopic guidance  Angiography was performed  --  Right coronary artery angiography  A catheter was advanced over a guidewire into the aorta and positioned in the right coronary artery ostium under fluoroscopic guidance  Angiography was performed  --  Inpatient  --  Mod Sedation Same Physician Initial 15min  --  Coronary Catheterization (/ [facility])  PROCEDURE COMPLETION: The patient tolerated the procedure well and was discharged from the cath lab  TIMING: Test started at 11:31  Test concluded at 11:52  HEMOSTASIS: The sheath was removed  The site was compressed with a Hemoband  device  Hemostasis was obtained  MEDICATIONS GIVEN: Fentanyl (1OOmcg/2 ml), 50 mcg, IV, at 11:37  Versed (2mg/2ml), 2 mg, IV, at 11:37  1% Lidocaine, 1 ml, subcutaneously, at 11:37  Verapamil (5mg/2ml), 2 5 mg, IV, at 11:39  Heparin 1000  units/ml, 4,000 units, IV, at 11:39  Nitroglycerin (200mcg/ml), 200 mcg, at 11:39  CONTRAST GIVEN: 30 ml Visipaque 320mgl/ml  RADIATION EXPOSURE: Fluoroscopy time: 2 4 min  CORONARY VESSELS:   --  The coronary circulation is right dominant  --  Left main: The vessel was medium to large sized and heavily calcified  Angiography showed moderate atherosclerosis  --  LAD: The vessel was medium to large sized and heavily calcified  Angiography showed the vessel to wrap around the cardiac apex and moderate atherosclerosis  There was one major diagonal branch  --  Proximal LAD: There was a diffuse 75 % stenosis  It appears amenable to percutaneous intervention  --  1st diagonal: The vessel was small to medium sized  Angiography showed moderate atherosclerosis  There was a 80 % stenosis  --  Proximal circumflex: There was a 100 % stenosis  This lesion is a chronic total occlusion  --  1st obtuse marginal: The vessel was small to medium sized  The artery was supplied by collaterals  --  RCA: The vessel was medium sized and heavily calcified   Angiography showed severe atherosclerosis  --  Proximal RCA: There was a 100 % stenosis  There was JEFFRY grade 1 flow through the vessel (slow flow without perfusion)  This lesion is a chronic total occlusion  --  Right PDA: The vessel was medium sized  The artery was supplied by collaterals  IMPRESSIONS:  There is significant triple vessel coronary artery disease  RECOMMENDATIONS  Consultation be obtained for coronary artery bypass grafting  DISPOSITION:  The patient left the catheterization laboratory in stable condition  Prepared and signed by    Delroy Jewell MD  Signed 2019 11:55:29    Study diagram    Angiographic findings  Native coronary lesions:  ï¾·Proximal LAD: Lesion 1: diffuse, 75 % stenosis  ï¾·D1: Lesion 1: 80 % stenosis  ï¾·Proximal circumflex: Lesion 1: 100 % stenosis  ï¾·Proximal RCA: Lesion 1: 100 % stenosis  Hemodynamic tables    Pressures:  Baseline  Pressures:  - HR: 53  Pressures:  - Rhythm:  Pressures:  -- Aortic Pressure (S/D/M): 111/58/71  Pressures:  -- Left Ventricle (s/edp): 97/17/--    Outputs:  Baseline  Outputs:  -- CALCULATIONS: [age]  Outputs:  -- CALCULATIONS: Body Surface Area: 2 27  Outputs:  -- CALCULATIONS: Height in cm: 180 00  Outputs:  -- CALCULATIONS: Sex: Male  Outputs:  -- CALCULATIONS: Weight in k 00    No results found for this or any previous visit

## 2023-03-14 ENCOUNTER — OFFICE VISIT (OUTPATIENT)
Dept: FAMILY MEDICINE CLINIC | Facility: OTHER | Age: 78
End: 2023-03-14

## 2023-03-14 VITALS
RESPIRATION RATE: 18 BRPM | HEART RATE: 74 BPM | TEMPERATURE: 98 F | SYSTOLIC BLOOD PRESSURE: 130 MMHG | HEIGHT: 69 IN | OXYGEN SATURATION: 98 % | DIASTOLIC BLOOD PRESSURE: 76 MMHG | WEIGHT: 235 LBS | BODY MASS INDEX: 34.8 KG/M2

## 2023-03-14 DIAGNOSIS — M85.852 OSTEOPENIA OF NECK OF LEFT FEMUR: ICD-10-CM

## 2023-03-14 DIAGNOSIS — N28.9 HYPERVOLEMIA ASSOCIATED WITH RENAL INSUFFICIENCY: ICD-10-CM

## 2023-03-14 DIAGNOSIS — I50.32 CHRONIC DIASTOLIC (CONGESTIVE) HEART FAILURE (HCC): ICD-10-CM

## 2023-03-14 DIAGNOSIS — Z79.4 TYPE 2 DIABETES MELLITUS WITH STAGE 4 CHRONIC KIDNEY DISEASE, WITH LONG-TERM CURRENT USE OF INSULIN (HCC): Primary | ICD-10-CM

## 2023-03-14 DIAGNOSIS — N18.4 TYPE 2 DIABETES MELLITUS WITH STAGE 4 CHRONIC KIDNEY DISEASE, WITH LONG-TERM CURRENT USE OF INSULIN (HCC): Primary | ICD-10-CM

## 2023-03-14 DIAGNOSIS — E11.42 DIABETIC POLYNEUROPATHY ASSOCIATED WITH TYPE 2 DIABETES MELLITUS (HCC): ICD-10-CM

## 2023-03-14 DIAGNOSIS — I10 ESSENTIAL HYPERTENSION: ICD-10-CM

## 2023-03-14 DIAGNOSIS — E11.22 TYPE 2 DIABETES MELLITUS WITH STAGE 4 CHRONIC KIDNEY DISEASE, WITH LONG-TERM CURRENT USE OF INSULIN (HCC): Primary | ICD-10-CM

## 2023-03-14 DIAGNOSIS — E87.70 HYPERVOLEMIA ASSOCIATED WITH RENAL INSUFFICIENCY: ICD-10-CM

## 2023-03-14 NOTE — PATIENT INSTRUCTIONS
Peripheral Neuropathy   AMBULATORY CARE:   Peripheral neuropathy (PN)  is a type of nerve damage that can develop when your peripheral nerves are damaged  Peripheral nerves are located outside of the brain and spinal cord  These nerves send information from your brain and spinal cord to the rest of your body  Damage to these nerves can slow or stop their ability to send signals  PN is most common in the hands and feet  It can also affect body functions, such as urination or digestion  Common signs and symptoms: Your symptoms depend on the types of nerves damaged and where they are located  The following are some of the most common signs and symptoms:  Pain or tingling in your legs, feet, arms, or hands that may feel sharp, stabbing, or burning    Trouble walking or keeping your balance    Weakness or trouble holding things    Loss of your sense of touch or numbness    Bruising easily    Trouble controlling your bladder or bowels or having sex    Seek care immediately if:   You are injured from a fall  Your legs or feet start to turn blue or black  You have severe trouble walking  You have a wound that does not heal or is red, swollen, or draining fluid  Call your doctor or neurologist if:   Your pain is severe  You cannot control your bladder  You have questions or concerns about your condition or care  Treatment:   Medicines:      NSAIDs , such as ibuprofen, help decrease swelling, pain, and fever  This medicine is available with or without a doctor's order  NSAIDs can cause stomach bleeding or kidney problems in certain people  If you take blood thinner medicine, always ask your healthcare provider if NSAIDs are safe for you  Always read the medicine label and follow directions  Seizure medicines and antidepressants  may help relieve nerve pain  These medicines change how your nerves and brain communicate pain      Topical treatments,  such as creams or patches may also help decrease pain     A physical therapist  teaches you movements and exercises to help improve movement and strength, and to decrease nerve pain  Transcutaneous electrical nerve stimulation (TENS)  stimulates your nerves and may decrease your pain  Pads are attached to your skin and a mild gentle current is given  Manage PN:   Treatment of underlying conditions is the best way to manage your PN  This may include treating conditions such as rheumatoid arthritis or an infection  This may also include healing of an injury or trauma  Check your skin daily  Look for redness and swelling, and feel for warmth  Sores can form where your skin makes contact with objects or other body parts  They also can form under splints  Be physically active at least 30 minutes, 5 days a week  Ask your healthcare provider about the best activity plan for you  Use caution when you exercise if you have decreased feeling in your feet  Limit alcohol as directed  Alcohol can cause high blood sugar levels and weight gain if you drink too much  A drink of alcohol is 12 ounces of beer, 5 ounces of wine, or 1½ ounces of liquor  Your healthcare provider can tell you how many drinks are okay to have within 24 hours and within 1 week  Prevent falls  Move with care, and stand up slowly  Wear shoes that support your feet, and do not go barefoot  Ask about walking aids, such as a cane or walker  You may want to install railings or nonslip pads in your home, especially in the bathroom  Ask for more information on how to prevent falls  Follow up with your doctor or neurologist as directed:  Write down your questions so you remember to ask them during your visits  © Copyright [employer] 2022 Information is for End User's use only and may not be sold, redistributed or otherwise used for commercial purposes  The above information is an  only  It is not intended as medical advice for individual conditions or treatments  Talk to your doctor, nurse or pharmacist before following any medical regimen to see if it is safe and effective for you

## 2023-03-14 NOTE — PROGRESS NOTES
Name: Leandra Irizarry  : 1945      MRN: [id number]  Encounter Provider: Ivy Roca DO  Encounter Date: 3/14/2023   Encounter department: [address] [place] [address]      1  Type 2 diabetes mellitus with stage 4 chronic kidney disease, with long-term current use of insulin ([facility])  Assessment & Plan:    Lab Results   Component Value Date    HGBA1C 6 5 (H) 03/10/2023     Patient currently well controlled on insulin regimen  Patient continues with necessary dietary modifications to avoid hyperglycemia  Is followed by Endocrinology  Plan  - Continue current diabetes management plan  - F/u with Endocrinology       2  Chronic diastolic (congestive) heart failure [facility])  Assessment & Plan:  Wt Readings from Last 3 Encounters:   23 107 kg (235 lb)   23 108 kg (237 lb)   23 107 kg (236 lb 12 8 oz)     Last echo showing preserved EF of 55% in   Patient without any signs/symptoms of exacerbation; no JVD, LE edema, cough, SOB, North  Patient with dry weight of around 235  Plan  - Continue Torsemide 20 mg 3x weekly  - Continue to monitor for signs of fluid overload         3  Essential hypertension  Assessment & Plan:  BP of 130/76; goal of <140/90  Plan  - Continue current medication regimen       4  Osteopenia of neck of left femur  Assessment & Plan:  As seen on recent DXA scan  2 to renal disease  Plan  - Vit D 2,000 IU daily  - Calcium 1,200 mg daily  - Recheck Vit D      5  Diabetic polyneuropathy associated with type 2 diabetes mellitus ([facility] [address] )  Assessment & Plan:    Lab Results   Component Value Date    HGBA1C 6 5 (H) 03/10/2023     Patient noted to have worsening peripheral nerve sensation despite adequate control of BG  Notable decrease in sensation of the toes outlined      Plan  - Discussed always wearing foot protection even when in the house; doubling up on socks, wearing foot wear and never going completely bare foot   - Alpha lipoic acid 600 mg QD   - Encourage regular exercise      6  Hypervolemia associated with renal insufficiency  -     torsemide (DEMADEX) 20 mg tablet; Take 1 tablet (20 mg total) by mouth 3 (three) times a week Take every Monday, Wednesday, Friday  Take additional doses as needed if weight increases by 2-3 lb in 1 day         Subjective      Robbie Tejada is a 69 yoM PMHx of T2DM currently well controlled, CKD stage 4, CHF s/p CABG on aspirin, CHF w/ EF of 55% (Echo March '21), sleep apnea presenting for check up  Patient is without any acute complaints or concerns at this time  Patient is followed by Nephrology regarding CKD, Cardiology for CHF/CAD management  Patient recently underwent DXA scan due to concerns of osteoporosis given hyperparathyroidism  Patient with signs of osteopenia  Review of Systems   Constitutional: Negative for appetite change, chills, fatigue and fever  HENT: Negative for congestion, rhinorrhea, sinus pressure and sinus pain  Eyes: Negative for visual disturbance  Respiratory: Negative for cough, chest tightness and shortness of breath  Cardiovascular: Negative for chest pain and palpitations  Gastrointestinal: Negative for blood in stool, constipation, diarrhea, nausea and vomiting  Endocrine: Negative for polydipsia and polyuria (will urinate at night 2x)  Genitourinary: Negative for dysuria and hematuria  Musculoskeletal: Negative for arthralgias  Skin: Negative for color change  Neurological: Negative for dizziness, light-headedness and headaches         Current Outpatient Medications on File Prior to Visit   Medication Sig   • Ascorbic Acid (VITAMIN C) 1000 MG tablet Take 1,000 mg by mouth daily   • aspirin (ECOTRIN LOW STRENGTH) 81 mg EC tablet Take 1 tablet (81 mg total) by mouth daily   • atorvastatin (LIPITOR) 80 mg tablet Take 1 tablet (80 mg total) by mouth daily with dinner   • B-D ULTRAFINE III SHORT PEN 31G X 8 MM MISC INJECT INTO THE SKIN 4 TIMES DAILY   • calcitriol (ROCALTROL) 0 25 mcg capsule Take 1 capsule (0 25 mcg total) by mouth 2 (two) times a week   • cholecalciferol (VITAMIN D3) 1,000 units tablet Take 1 tablet (1,000 Units total) by mouth daily   • Continuous Blood Gluc  (FreeStyle Diaz 2 [place]) MAMI Use to check blood sugar daily   • Continuous Blood Gluc Sensor ([place]Style Celia 14 Day Sensor) MISC CHANGE EVERY 14 DAYS   • cyanocobalamin (VITAMIN B-12) 500 mcg tablet Take 500 mcg by mouth daily   • finasteride (PROSCAR) 5 mg tablet Take 1 tablet (5 mg total) by mouth daily   • glucose blood test strip Check 4 times a day   • insulin glargine (Toujeo Max SoloStar) 300 units/mL CONCENTRATED U-300 injection pen (2-unit dial) INJECT 42 UNITS UNDER THE SKIN DAILY   • insulin lispro (HumaLOG KwikPen) 100 units/mL injection pen Inject 12-12-14 units TID with meals +scale   • latanoprost (XALATAN) 0 005 % ophthalmic solution 1 drop daily at bedtime   • metoprolol tartrate (LOPRESSOR) 50 mg tablet Take 1 tablet (50 mg total) by mouth 2 (two) times a day   • Microlet Lancets MISC USE 3 TIMES DAY   • pantoprazole (PROTONIX) 40 mg tablet TAKE 1 TABLET BY MOUTH EVERY DAY   • tamsulosin (FLOMAX) 0 4 mg TAKE 2 CAPSULES BY MOUTH DAILY WITH DINNER  Karrie Nissen • telmisartan (MICARDIS) 20 MG tablet TAKE 1 TABLET BY MOUTH EVERY DAY   • Victoza injection INJECT 1 8 MG UNDER THE SKIN ONCE DAILY       Objective     /76   Pulse 74   Temp 98 °F (36 7 °C)   Resp 18   Ht 5' 9" (1 753 m)   Wt 107 kg (235 lb)   SpO2 98%   BMI 34 70 kg/m²     Physical Exam  Constitutional:       General: He is not in acute distress  Appearance: Normal appearance  He is not ill-appearing  HENT:      Head: Normocephalic and atraumatic  Right Ear: External ear normal       Left Ear: External ear normal       Nose: Nose normal    Eyes:      Extraocular Movements: Extraocular movements intact        Conjunctiva/sclera: Conjunctivae normal    Cardiovascular:      Rate and Rhythm: Normal rate and regular rhythm  Pulses: Pulses are weak  Dorsalis pedis pulses are 0 on the right side and 1+ on the left side  Posterior tibial pulses are 2+ on the right side and 0 on the left side  Heart sounds: Normal heart sounds  Pulmonary:      Effort: Pulmonary effort is normal       Breath sounds: Normal breath sounds  No wheezing, rhonchi or rales  Abdominal:      General: Abdomen is flat  There is no distension  Palpations: Abdomen is soft  There is no mass  Tenderness: There is no abdominal tenderness  There is no guarding  Musculoskeletal:      Right lower leg: No edema  Left lower leg: No edema  Feet:    Feet:      Right foot:      Skin integrity: Warmth and dry skin present  No ulcer, skin breakdown, erythema or callus  Left foot:      Skin integrity: No ulcer, skin breakdown, erythema, warmth, callus or dry skin  Skin:     General: Skin is warm and dry  Neurological:      Mental Status: He is alert  Motor: No weakness  Gait: Gait normal           Patient's shoes and socks removed  Right Foot/Ankle   Right Foot Inspection  Skin Exam: skin normal, skin intact, dry skin and warmth  No callus, no erythema, no maceration, no abnormal color, no pre-ulcer, no ulcer and no callus  Toe Exam: ROM and strength within normal limits  No swelling, no tenderness, erythema and  no right toe deformity    Sensory   Vibration: absent  Proprioception: intact  Monofilament testing: diminished    Vascular  Capillary refills: < 3 seconds  The right DP pulse is 0  The right PT pulse is 2+  Left Foot/Ankle  Left Foot Inspection  Skin Exam: skin normal  Skin not intact, no dry skin, no warmth, no erythema, no maceration, normal color, no pre-ulcer, no ulcer and no callus  Toe Exam: ROM and strength within normal limits       Sensory   Vibration: intact  Proprioception: intact  Monofilament testing: diminished    Vascular  Capillary refills: < 3 seconds  The left DP pulse is 1+  The left PT pulse is 0       Assign Risk Category  No deformity present  Loss of protective sensation  Weak pulses  Risk: 2          Illona Kehr, DO

## 2023-03-23 PROBLEM — M85.852 OSTEOPENIA OF NECK OF LEFT FEMUR: Status: ACTIVE | Noted: 2023-03-23

## 2023-03-23 RX ORDER — TORSEMIDE 20 MG/1
20 TABLET ORAL 3 TIMES WEEKLY
Qty: 360 TABLET | Refills: 0 | Status: SHIPPED | OUTPATIENT
Start: 2023-03-24

## 2023-03-23 NOTE — ASSESSMENT & PLAN NOTE
Lab Results   Component Value Date    HGBA1C 6 5 (H) 03/10/2023     Patient noted to have worsening peripheral nerve sensation despite adequate control of BG  Notable decrease in sensation of the toes outlined      Plan  - Discussed always wearing foot protection even when in the house; doubling up on socks, wearing foot wear and never going completely bare foot   - Alpha lipoic acid 600 mg QD   - Encourage regular exercise

## 2023-03-23 NOTE — ASSESSMENT & PLAN NOTE
Lab Results   Component Value Date    HGBA1C 6 5 (H) 03/10/2023     Patient currently well controlled on insulin regimen  Patient continues with necessary dietary modifications to avoid hyperglycemia  Is followed by Endocrinology      Plan  - Continue current diabetes management plan  - F/u with Endocrinology

## 2023-03-23 NOTE — ASSESSMENT & PLAN NOTE
As seen on recent DXA scan  2/2 to renal disease       Plan  - Vit D 2,000 IU daily  - Calcium 1,200 mg daily  - Recheck Vit D

## 2023-03-23 NOTE — ASSESSMENT & PLAN NOTE
Wt Readings from Last 3 Encounters:   03/14/23 107 kg (235 lb)   03/13/23 108 kg (237 lb)   02/22/23 107 kg (236 lb 12 8 oz)     Last echo showing preserved EF of 55% in March '21  Patient without any signs/symptoms of exacerbation; no JVD, LE edema, cough, SOB, North  Patient with dry weight of around 235        Plan  - Continue Torsemide 20 mg 3x weekly  - Continue to monitor for signs of fluid overload

## 2023-05-01 ENCOUNTER — TELEPHONE (OUTPATIENT)
Dept: ENDOCRINOLOGY | Facility: CLINIC | Age: 78
End: 2023-05-01

## 2023-05-03 ENCOUNTER — OFFICE VISIT (OUTPATIENT)
Dept: ENDOCRINOLOGY | Facility: CLINIC | Age: 78
End: 2023-05-03

## 2023-05-03 VITALS
DIASTOLIC BLOOD PRESSURE: 76 MMHG | WEIGHT: 246.6 LBS | SYSTOLIC BLOOD PRESSURE: 142 MMHG | HEIGHT: 69 IN | HEART RATE: 46 BPM | BODY MASS INDEX: 36.53 KG/M2

## 2023-05-03 DIAGNOSIS — E11.22 TYPE 2 DIABETES MELLITUS WITH STAGE 4 CHRONIC KIDNEY DISEASE, WITH LONG-TERM CURRENT USE OF INSULIN (HCC): Primary | ICD-10-CM

## 2023-05-03 DIAGNOSIS — R79.89 ELEVATED TSH: ICD-10-CM

## 2023-05-03 DIAGNOSIS — N18.4 TYPE 2 DIABETES MELLITUS WITH STAGE 4 CHRONIC KIDNEY DISEASE, WITH LONG-TERM CURRENT USE OF INSULIN (HCC): Primary | ICD-10-CM

## 2023-05-03 DIAGNOSIS — I25.10 CORONARY ARTERY DISEASE INVOLVING NATIVE CORONARY ARTERY OF NATIVE HEART WITHOUT ANGINA PECTORIS: ICD-10-CM

## 2023-05-03 DIAGNOSIS — N18.30 BENIGN HYPERTENSION WITH CHRONIC KIDNEY DISEASE, STAGE III (HCC): ICD-10-CM

## 2023-05-03 DIAGNOSIS — I10 ESSENTIAL HYPERTENSION: ICD-10-CM

## 2023-05-03 DIAGNOSIS — I12.9 BENIGN HYPERTENSION WITH CHRONIC KIDNEY DISEASE, STAGE III (HCC): ICD-10-CM

## 2023-05-03 DIAGNOSIS — Z79.4 TYPE 2 DIABETES MELLITUS WITH STAGE 4 CHRONIC KIDNEY DISEASE, WITH LONG-TERM CURRENT USE OF INSULIN (HCC): Primary | ICD-10-CM

## 2023-05-03 RX ORDER — INSULIN GLARGINE 300 U/ML
INJECTION, SOLUTION SUBCUTANEOUS
Qty: 18 ML | Refills: 1
Start: 2023-05-03

## 2023-05-03 RX ORDER — INSULIN LISPRO 100 [IU]/ML
INJECTION, SOLUTION INTRAVENOUS; SUBCUTANEOUS
Qty: 15 ML | Refills: 1
Start: 2023-05-03

## 2023-05-03 NOTE — PATIENT INSTRUCTIONS
INSULIN DOSAGE INSTRUCTIONS    Name: Darrell Hoff  : 1945  MRN #: [id number]    Your Current Insulin  and dose is: Before Breakfast Before Lunch Before Evening Meal Bedtime     Novolog Insulin   16 units          16 units     Regular, Apidra, Humalog orNovolog Sliding Scale:   <80              151-200 + 2 +2 +2    201-250 + 4 +4 +4 +   251-300 + 6 +6 +6 +   301-350 +8 +8 +8 +   >350 +10 +10 +10 +              NPH Insulin         toujeo         42   units      Additional Instructions:   Please test your blood sugar:  _4_ Times per day  X_ Before Breakfast                _ Alternate Testing  X_ Before Lunch                _ 2 Hours After  Meal  X_ Before Evening Meal               _ 3 a m   x_ Before Bedtime Snack     Target Blood sugar range _80- 140 mg/dl __  Call if your   blood sugar is less than _60_ or greater than _400__  Today's Date: 5/3/2023      Hypoglycemia instructions   Darrell Hoff   5/3/2023  [id number]    Low Blood Sugar    Steps to treat low blood sugar  1  Test blood sugar if you have symptoms of low blood sugar:   Low Blood Sugar Symptoms:  o Sweaty  o Dizzy  o Rapid heartbeat  o Shaky    o Bad mood  o Hungry      2  Treat blood sugar less than 70 with 15 grams of fast-acting carbohydrate:   Examples of 15 grams Fast-Acting Carbohydrate:  o 4 oz juice  o 4 oz regular soda  o 3-4 glucose tablets (chew)  o 3-4 hard candies (chew)              3    Wait 15 minutes and test your blood sugar again           4   If blood sugar is less than 100, repeat steps 2-3       5  When your blood sugar is 100 or more, eat a snack if it will be longer than one hour until your next meal  The snack should be 15 grams of carbohydrate and a protein:   Examples of snacks:  o ½ sandwich  o 6 crackers with cheese  o Piece of fruit with cheese or peanut butter  o 6 crackers with peanut butter

## 2023-05-03 NOTE — PROGRESS NOTES
Barron Marquis  69 y o  male MRN: [id number]    Encounter: [id number]      Assessment/Plan     Assessment: This is a 69y o -year-old male with diabetes with hyperglycemia  Plan:    Diagnoses and all orders for this visit:    Type 2 diabetes mellitus with stage 4 chronic kidney disease, with long-term current use of insulin ([facility])    Lab Results   Component Value Date    HGBA1C 6 5 (H) 03/10/2023   A1c is 6 5%, at goal  We will increase Humalog to 16 units before brunch and 16 units before dinner plus sliding scale starting at 150 mg per DL  Continue Toujeo to 42 units in the evening  Educated about hypoglycemia symptoms and treatment  Discussed to continue the use of continuous glucose monitor sensor by [facility] as it is helping with glycemic control, adjusting insulin doses  Discussed the importance of follow-up with ophthalmology and podiatry    -     insulin lispro (HumaLOG KwikPen) 100 units/mL injection pen; Inject 16 units before brunch and  16 units before dinner  -     insulin glargine (Toujeo Max SoloStar) 300 units/mL CONCENTRATED U-300 injection pen (2-unit dial); INJECT 42 UNITS UNDER THE SKIN DAILY at dinner time  -     Basic metabolic panel; Future  -     Hemoglobin A1C; Future  -     Fructosamine- Lab Collect; Future    Essential hypertension  Blood pressure is slightly elevated today  Goal for blood pressure is less than 130/80 mmHg  Continue current management  Elevated TSH  TSH has been stable  Benign hypertension with chronic kidney disease, stage III ([address] )  Managed by nephrology  Coronary artery disease involving native coronary artery of native heart without angina pectoris  Managed by cardiology      CC: Diabetes    History of Present Illness     HPI:    Barron Marquis  is 70-year-old gentleman with medical history of type 2 diabetes, hypertension, hyperlipidemia, vitamin D deficiency is here for follow-up  His course has been stable    He denies any recent hospitalization for hyperglycemia or hypoglycemia  Denies any issues with current insulin regimen  Current regimen includes insulin Toujeo, 42 units daily, Humalog 12-12-14 units 3 times daily with meals  Educated about hypoglycemia symptoms and treatment  14 days overview  April 18 to May 4, 2023, reviewed  More than 72 hours of data reviewed  As per continuous glucose monitor sensor 37% blood sugars are in 180 to 250 mg per DL range and 19% blood sugars are more than 250 mg per DL range usually blood sugars are elevated in the morning as well as after breakfast  Average glucose 194 mg per DL, glucose variability 30 9%    For hypertension, he takes telmisartan 20 mg daily  For hyperlipidemia, he takes Lipitor 80 mg at dinnertime    Wt Readings from Last 3 Encounters:   05/03/23 112 kg (246 lb 9 6 oz)   04/18/23 112 kg (246 lb 14 6 oz)   03/14/23 107 kg (235 lb)     Lab Results   Component Value Date    LDLCALC 50 10/24/2022     Lab Results   Component Value Date    CREATININE 2 94 (H) 03/10/2023     Component      Latest Ref Rng & Units 3/10/2023   Sodium      135 - 147 mmol/L 136   Potassium      3 5 - 5 3 mmol/L 4 5   Chloride      96 - 108 mmol/L 105   CO2      21 - 32 mmol/L 22   Anion Gap      4 - 13 mmol/L 9   BUN      5 - 25 mg/dL 64 (H)   Creatinine      0 60 - 1 30 mg/dL 2 94 (H)   GLUCOSE FASTING      65 - 99 mg/dL 136 (H)   Calcium      8 4 - 10 2 mg/dL 8 6   eGFR      ml/min/1 73sq m 19   RBC, UA      None Seen, 0-1, 1-2, 2-4, 0-5 /hpf    WBC, UA      None Seen, 0-1, 1-2, 0-5, 2-4 /hpf    Epithelial Cells      None Seen, Occasional /hpf    Bacteria, UA      None Seen, Occasional /hpf    AMORPH URATES      /hpf    Hemoglobin A1C      Normal 3 8-5 6%; PreDiabetic 5 7-6 4%; Diabetic >=6 5%; Glycemic control for adults with diabetes <7 0% % 6 5 (H)   eAG, EST AVG Glucose      mg/dl 140     Review of Systems   Constitutional: Negative for activity change, diaphoresis, fatigue, fever and unexpected weight change     HENT: Negative  Eyes: Negative for visual disturbance  Respiratory: Negative for cough, chest tightness and shortness of breath  Cardiovascular: Negative for chest pain, palpitations and leg swelling  Gastrointestinal: Negative for abdominal pain, blood in stool, constipation, diarrhea, nausea and vomiting  Endocrine: Negative for cold intolerance, heat intolerance, polydipsia, polyphagia and polyuria  Genitourinary: Negative for dysuria, enuresis, frequency and urgency  Musculoskeletal: Negative for arthralgias and myalgias  Skin: Negative for pallor, rash and wound  Allergic/Immunologic: Negative  Neurological: Negative for dizziness, tremors, weakness and numbness  Hematological: Negative  Psychiatric/Behavioral: Negative  Historical Information   Past Medical History:   Diagnosis Date    CHF (congestive heart failure) (HCC)     Chronic kidney disease 18 - 24 months? Dr Viki Morales - stage 3    Colon polyp     CPAP (continuous positive airway pressure) dependence     Diabetes mellitus ([facility] [address] )     History of transfusion     Hyperlipidemia     Hypertension     Myocardial infarction ([facility] [address] ) 06/2019    Open heart surgery with quad bypass    Obesity     Renal disorder     Sleep apnea     Visual impairment 1991    Dr Joi Bates     Past Surgical History:   Procedure Laterality Date    APPENDECTOMY  1977    Done in conjunction with cholecystectomy    BACK SURGERY      CHOLECYSTECTOMY  1977    COLONOSCOPY      CORONARY ARTERY BYPASS GRAFT  2019    quad    EGD      GALLBLADDER SURGERY      HERNIA REPAIR      NM CORONARY ARTERY BYP W/VEIN & ARTERY GRAFT 4 VEIN N/A 06/21/2019    Procedure: CORONARY ARTERY BYPASS GRAFT (CABG) 4 VESSELS WITH SVG TO PDA, OM, DIAGONAL AND LIMA TO LAD; RIGHT LEG EVH;  Surgeon: Gaetano Heimlich, MD;  Location: [facility];  Service: Cardiac Surgery    RECTAL SURGERY      SPINE SURGERY  2012    Fusion L3-L5?    [address] History   Social History     Substance and Sexual Activity   Alcohol Use Not Currently    Comment: none since most recent hospitalization     Social History     Substance and Sexual Activity   Drug Use Never     Social History     Tobacco Use   Smoking Status Former    Packs/day: 3 00    Years: 30 00    Pack years: 90 00    Types: Cigarettes, Cigars    Start date: 1960   Sean Giless Quit date: 1992    Years since quittin 3   Smokeless Tobacco Never   Tobacco Comments    Started smoking as young teenager     Family History:   Family History   Problem Relation Age of Onset   Sean Nones Cancer Mother     Alcohol abuse Mother     Cancer Father     Alcohol abuse Father     Diabetes Maternal Grandmother     Diabetes type II Maternal Grandmother         Geriatric onset -  5    Diabetes Paternal Aunt     Hypertension Paternal Grandfather        Meds/Allergies   Current Outpatient Medications   Medication Sig Dispense Refill    Ascorbic Acid (VITAMIN C) 1000 MG tablet Take 1,000 mg by mouth daily      aspirin (ECOTRIN LOW STRENGTH) 81 mg EC tablet Take 1 tablet (81 mg total) by mouth daily 60 tablet 8    atorvastatin (LIPITOR) 80 mg tablet Take 1 tablet (80 mg total) by mouth daily with dinner 90 tablet 3    B-D ULTRAFINE III SHORT PEN 31G X 8 MM MISC INJECT INTO THE SKIN 4 TIMES DAILY 400 each 1    calcitriol (ROCALTROL) 0 25 mcg capsule Take 1 capsule (0 25 mcg total) by mouth 2 (two) times a week 26 capsule 3    cholecalciferol (VITAMIN D3) 1,000 units tablet Take 1 tablet (1,000 Units total) by mouth daily 1 tablet 1    Continuous Blood Gluc Sensor (FreeStyle Celia 14 Day Sensor) MISC CHANGE EVERY 14 DAYS 6 each 1    cyanocobalamin (VITAMIN B-12) 500 mcg tablet Take 500 mcg by mouth daily      finasteride (PROSCAR) 5 mg tablet Take 1 tablet (5 mg total) by mouth daily 90 tablet 0    glucose blood test strip Check 4 times a day 400 each 1    insulin glargine (Toujeo Max SoloStar) 300 units/mL "CONCENTRATED U-300 injection pen (2-unit dial) INJECT 42 UNITS UNDER THE SKIN DAILY at dinner time 18 mL 1    insulin lispro (HumaLOG KwikPen) 100 units/mL injection pen Inject 16 units before brunch and  16 units before dinner 15 mL 1    latanoprost (XALATAN) 0 005 % ophthalmic solution Administer 1 drop to both eyes daily at bedtime      metoprolol tartrate (LOPRESSOR) 50 mg tablet Take 1 tablet (50 mg total) by mouth 2 (two) times a day 180 tablet 1    Microlet Lancets MISC USE 3 TIMES  each 1    pantoprazole (PROTONIX) 40 mg tablet TAKE 1 TABLET BY MOUTH EVERY DAY 90 tablet 1    tamsulosin (FLOMAX) 0 4 mg TAKE 2 CAPSULES BY MOUTH DAILY WITH DINNER    180 capsule 0    telmisartan (MICARDIS) 20 MG tablet TAKE 1 TABLET BY MOUTH EVERY DAY 90 tablet 1    torsemide (DEMADEX) 20 mg tablet Take 1 tablet (20 mg total) by mouth 3 (three) times a week Take every Monday, Wednesday, Friday  Take additional doses as needed if weight increases by 2-3 lb in 1 day 360 tablet 0    Victoza injection INJECT 1 8 MG UNDER THE SKIN ONCE DAILY 6 mL 1    Continuous Blood Gluc  (FreeStyle Celia 2 [place]) MAMI Use to check blood sugar daily (Patient not taking: Reported on 5/3/2023) 1 each 0     No current facility-administered medications for this visit  No Known Allergies    Objective   Vitals: Blood pressure 142/76, pulse (!) 46, height 5' 9\" (1 753 m), weight 112 kg (246 lb 9 6 oz)  Physical Exam  Vitals reviewed  Constitutional:       General: He is not in acute distress  Appearance: He is well-developed  HENT:      Head: Normocephalic and atraumatic  Eyes:      Pupils: Pupils are equal, round, and reactive to light  Neck:      Thyroid: No thyromegaly  Cardiovascular:      Rate and Rhythm: Normal rate and regular rhythm  Heart sounds: Normal heart sounds  Pulmonary:      Effort: Pulmonary effort is normal  No respiratory distress  Breath sounds: Normal breath sounds   " Musculoskeletal:         General: No deformity  Normal range of motion  Cervical back: Normal range of motion and neck supple  Right lower leg: Edema present  Left lower leg: Edema present  Skin:     General: Skin is warm and dry  Findings: No erythema  Neurological:      General: No focal deficit present  Mental Status: He is alert and oriented to person, place, and time  Psychiatric:         Mood and Affect: Mood normal          Behavior: Behavior normal          The history was obtained from the review of the chart, patient      Lab Results:   Lab Results   Component Value Date/Time    Hemoglobin A1C 6 5 (H) 03/10/2023 09:14 AM    Hemoglobin A1C 7 4 (H) 11/23/2022 09:35 AM    Hemoglobin A1C 8 2 (H) 10/24/2022 09:48 AM    WBC 6 15 02/20/2023 10:02 AM    WBC 7 55 10/24/2022 09:48 AM    WBC 6 90 07/25/2022 10:42 AM    Hemoglobin 10 2 (L) 02/20/2023 10:02 AM    Hemoglobin 10 7 (L) 10/24/2022 09:48 AM    Hemoglobin 9 5 (L) 07/25/2022 10:42 AM    Hematocrit 32 8 (L) 02/20/2023 10:02 AM    Hematocrit 33 9 (L) 10/24/2022 09:48 AM    Hematocrit 29 6 (L) 07/25/2022 10:42 AM    MCV 96 02/20/2023 10:02 AM    MCV 93 10/24/2022 09:48 AM    MCV 93 07/25/2022 10:42 AM    Platelets 026 68/82/2309 10:02 AM    Platelets 559 92/11/0748 09:48 AM    Platelets 608 37/88/2070 10:42 AM    BUN 64 (H) 03/10/2023 09:14 AM    BUN 76 (H) 02/20/2023 10:02 AM    BUN 53 (H) 11/23/2022 09:35 AM    Potassium 4 5 03/10/2023 09:14 AM    Potassium 5 0 02/20/2023 10:02 AM    Potassium 4 6 11/23/2022 09:35 AM    Chloride 105 03/10/2023 09:14 AM    Chloride 106 02/20/2023 10:02 AM    Chloride 108 11/23/2022 09:35 AM    CO2 22 03/10/2023 09:14 AM    CO2 23 02/20/2023 10:02 AM    CO2 26 11/23/2022 09:35 AM    Creatinine 2 94 (H) 03/10/2023 09:14 AM    Creatinine 2 99 (H) 02/20/2023 10:02 AM    Creatinine 2 36 (H) 11/23/2022 09:35 AM    AST 12 (L) 06/20/2022 06:33 PM    ALT 16 06/20/2022 06:33 PM    Albumin 3 4 (L) "02/20/2023 10:02 AM    Albumin 3 6 07/25/2022 10:42 AM    Albumin 4 0 06/20/2022 06:33 PM    HDL, Direct 47 10/24/2022 09:48 AM    Triglycerides 70 10/24/2022 09:48 AM           Imaging Studies: I have personally reviewed pertinent reports  Portions of the record may have been created with voice recognition software  Occasional wrong word or \"sound a like\" substitutions may have occurred due to the inherent limitations of voice recognition software  Read the chart carefully and recognize, using context, where substitutions have occurred    "

## 2023-05-04 DIAGNOSIS — N39.0 URINARY TRACT INFECTION WITHOUT HEMATURIA, SITE UNSPECIFIED: Primary | ICD-10-CM

## 2023-05-05 ENCOUNTER — APPOINTMENT (OUTPATIENT)
Dept: LAB | Facility: HOSPITAL | Age: 78
End: 2023-05-05
Attending: INTERNAL MEDICINE

## 2023-05-05 DIAGNOSIS — N39.0 URINARY TRACT INFECTION WITHOUT HEMATURIA, SITE UNSPECIFIED: ICD-10-CM

## 2023-05-05 LAB
BACTERIA UR QL AUTO: ABNORMAL /HPF
BILIRUB UR QL STRIP: NEGATIVE
CLARITY UR: ABNORMAL
COLOR UR: YELLOW
GLUCOSE UR STRIP-MCNC: ABNORMAL MG/DL
HGB UR QL STRIP.AUTO: ABNORMAL
KETONES UR STRIP-MCNC: NEGATIVE MG/DL
LEUKOCYTE ESTERASE UR QL STRIP: ABNORMAL
NITRITE UR QL STRIP: NEGATIVE
NON-SQ EPI CELLS URNS QL MICRO: ABNORMAL /HPF
PH UR STRIP.AUTO: 6 [PH]
PROT UR STRIP-MCNC: ABNORMAL MG/DL
RBC #/AREA URNS AUTO: ABNORMAL /HPF
SP GR UR STRIP.AUTO: 1.02 (ref 1–1.03)
UROBILINOGEN UR QL STRIP.AUTO: 0.2 E.U./DL
WBC #/AREA URNS AUTO: ABNORMAL /HPF

## 2023-05-07 LAB — BACTERIA UR CULT: ABNORMAL

## 2023-05-08 DIAGNOSIS — N13.4 HYDROURETER ON LEFT: ICD-10-CM

## 2023-05-08 DIAGNOSIS — N30.00 ACUTE CYSTITIS WITHOUT HEMATURIA: Primary | ICD-10-CM

## 2023-05-08 RX ORDER — CEPHALEXIN 500 MG/1
500 CAPSULE ORAL EVERY 8 HOURS SCHEDULED
Qty: 21 CAPSULE | Refills: 0 | Status: SHIPPED | OUTPATIENT
Start: 2023-05-08 | End: 2023-05-15

## 2023-05-11 ENCOUNTER — OFFICE VISIT (OUTPATIENT)
Dept: UROLOGY | Facility: CLINIC | Age: 78
End: 2023-05-11

## 2023-05-11 VITALS
HEART RATE: 65 BPM | WEIGHT: 246 LBS | SYSTOLIC BLOOD PRESSURE: 120 MMHG | OXYGEN SATURATION: 98 % | DIASTOLIC BLOOD PRESSURE: 80 MMHG | BODY MASS INDEX: 36.43 KG/M2 | HEIGHT: 69 IN

## 2023-05-11 DIAGNOSIS — N39.0 RECURRENT UTI (URINARY TRACT INFECTION): Primary | ICD-10-CM

## 2023-05-11 LAB
POST-VOID RESIDUAL VOLUME, ML POC: 90 ML
SL AMB  POCT GLUCOSE, UA: 100
SL AMB LEUKOCYTE ESTERASE,UA: NORMAL
SL AMB POCT BILIRUBIN,UA: NORMAL
SL AMB POCT BLOOD,UA: NORMAL
SL AMB POCT CLARITY,UA: NORMAL
SL AMB POCT COLOR,UA: YELLOW
SL AMB POCT KETONES,UA: NORMAL
SL AMB POCT NITRITE,UA: NORMAL
SL AMB POCT PH,UA: 6
SL AMB POCT SPECIFIC GRAVITY,UA: 1020
SL AMB POCT URINE PROTEIN: NORMAL
SL AMB POCT UROBILINOGEN: 0.2

## 2023-05-11 RX ORDER — NITROFURANTOIN MACROCRYSTALS 50 MG/1
50 CAPSULE ORAL
Qty: 30 CAPSULE | Refills: 3 | Status: SHIPPED | OUTPATIENT
Start: 2023-05-11

## 2023-05-11 NOTE — PROGRESS NOTES
UROLOGY PROGRESS NOTE   Patient Identifiers: Maryann Riojas (MRN [id number])  Date of Service: 5/11/2023    Subjective:   15-year-old male with history of BPH  Not seen in several years  It was recommended at that time he have a cystoscopy and declined  He has been maintained on tamsulosin and finasteride  More recently he has been hospitalized with recurrent UTIs  His cultures have grown Klebsiella  Its lethargic with foul-smelling urine and elevated blood sugars  There is no recent imaging  Dipped urine today has blood and leukocytes  PVR 90 mL      Reason for visit: Recurrent UTI follow-up    Objective:     VITALS:    Vitals:    05/11/23 1521   BP: 120/80   Pulse: 65   SpO2: 98%     AUA SYMPTOM SCORE    Flowsheet Row Most Recent Value   AUA SYMPTOM SCORE    How often have you had a sensation of not emptying your bladder completely after you finished urinating? 4 (P)     How often have you had to urinate again less than two hours after you finished urinating? 5 (P)     How often have you found you stopped and started again several times when you urinate? 4 (P)     How often have you found it difficult to postpone urination? 4 (P)     How often have you had a weak urinary stream? 4 (P)     How often have you had to push or strain to begin urination? 2 (P)     How many times did you most typically get up to urinate from the time you went to bed at night until the time you got up in the morning? 5 (P)     Quality of Life: If you were to spend the rest of your life with your urinary condition just the way it is now, how would you feel about that? 3 (P)     AUA SYMPTOM SCORE 28 (P)             LABS:  Lab Results   Component Value Date    HGB 10 2 (L) 02/20/2023    HCT 32 8 (L) 02/20/2023    WBC 6 15 02/20/2023     02/20/2023   ]    Lab Results   Component Value Date    K 4 5 03/10/2023     03/10/2023    CO2 22 03/10/2023    BUN 64 (H) 03/10/2023    CREATININE 2 94 (H) 03/10/2023    CALCIUM 8 6 03/10/2023    GLUCOSE 141 (H) 06/21/2019   ]        INPATIENT MEDS:    Current Outpatient Medications:   •  Ascorbic Acid (VITAMIN C) 1000 MG tablet, Take 1,000 mg by mouth daily, Disp: , Rfl:   •  aspirin (ECOTRIN LOW STRENGTH) 81 mg EC tablet, Take 1 tablet (81 mg total) by mouth daily, Disp: 60 tablet, Rfl: 8  •  atorvastatin (LIPITOR) 80 mg tablet, Take 1 tablet (80 mg total) by mouth daily with dinner, Disp: 90 tablet, Rfl: 3  •  B-D ULTRAFINE III SHORT PEN 31G X 8 MM MISC, INJECT INTO THE SKIN 4 TIMES DAILY, Disp: 400 each, Rfl: 1  •  calcitriol (ROCALTROL) 0 25 mcg capsule, Take 1 capsule (0 25 mcg total) by mouth 2 (two) times a week, Disp: 26 capsule, Rfl: 3  •  cephalexin (KEFLEX) 500 mg capsule, Take 1 capsule (500 mg total) by mouth every 8 (eight) hours for 7 days, Disp: 21 capsule, Rfl: 0  •  cholecalciferol (VITAMIN D3) 1,000 units tablet, Take 1 tablet (1,000 Units total) by mouth daily, Disp: 1 tablet, Rfl: 1  •  Continuous Blood Gluc  (FreeStyle Celia 2 [place]) MAMI, Use to check blood sugar daily, Disp: 1 each, Rfl: 0  •  Continuous Blood Gluc Sensor (FreeStyle Celia 14 Day Sensor) [facility], CHANGE EVERY 14 DAYS, Disp: 6 each, Rfl: 1  •  cyanocobalamin (VITAMIN B-12) 500 mcg tablet, Take 500 mcg by mouth daily, Disp: , Rfl:   •  finasteride (PROSCAR) 5 mg tablet, Take 1 tablet (5 mg total) by mouth daily, Disp: 90 tablet, Rfl: 0  •  glucose blood test strip, Check 4 times a day, Disp: 400 each, Rfl: 1  •  insulin glargine (Toujeo Max SoloStar) 300 units/mL CONCENTRATED U-300 injection pen (2-unit dial), INJECT 42 UNITS UNDER THE SKIN DAILY at dinner time, Disp: 18 mL, Rfl: 1  •  insulin lispro (HumaLOG KwikPen) 100 units/mL injection pen, Inject 16 units before brunch and  16 units before dinner, Disp: 15 mL, Rfl: 1  •  latanoprost (XALATAN) 0 005 % ophthalmic solution, Administer 1 drop to both eyes daily at bedtime, Disp: , Rfl:   •  metoprolol tartrate (LOPRESSOR) 50 mg tablet, Take 1 "tablet (50 mg total) by mouth 2 (two) times a day, Disp: 180 tablet, Rfl: 1  •  Microlet Lancets MISC, USE 3 TIMES DAY, Disp: 300 each, Rfl: 1  •  pantoprazole (PROTONIX) 40 mg tablet, TAKE 1 TABLET BY MOUTH EVERY DAY, Disp: 90 tablet, Rfl: 1  •  tamsulosin (FLOMAX) 0 4 mg, TAKE 2 CAPSULES BY MOUTH DAILY WITH DINNER   , Disp: 180 capsule, Rfl: 0  •  telmisartan (MICARDIS) 20 MG tablet, TAKE 1 TABLET BY MOUTH EVERY DAY, Disp: 90 tablet, Rfl: 1  •  torsemide (DEMADEX) 20 mg tablet, Take 1 tablet (20 mg total) by mouth 3 (three) times a week Take every Monday, Wednesday, Friday  Take additional doses as needed if weight increases by 2-3 lb in 1 day, Disp: 360 tablet, Rfl: 0  •  Victoza injection, INJECT 1 8 MG UNDER THE SKIN ONCE DAILY, Disp: 6 mL, Rfl: 1      Physical Exam:   /80 (BP Location: Left arm, Patient Position: Sitting, Cuff Size: Standard)   Pulse 65   Ht 5' 9\" (1 753 m)   Wt 112 kg (246 lb)   SpO2 98%   BMI 36 33 kg/m²   GEN: no acute distress    RESP: breathing comfortably with no accessory muscle use    ABD: soft, non-tender, non-distended   INCISION:    EXT: no significant peripheral edema   (Male): Penis circumcised, phallus normal, meatus patent  Testicles descended into scrotum bilaterally without masses nor tenderness  No inguinal hernias bilaterally  ROHITH: Prostate is not enlarged at 30 grams  The prostate is not boggy  The prostate is not tender  No nodules noted      RADIOLOGY:   None    Assessment:   #1  Recurrent UTI  #2  BPH with incomplete bladder emptying  #3  Diabetes  #4    Chronic kidney disease    Plan:   -Recommend updating his imaging and repeating a cystoscopy  -He is unable to have contrast due to his CKD  -We will then do follow-up cystoscopy in the office  -I started him on nitrofurantoin 50 mg at bedtime          "

## 2023-05-13 DIAGNOSIS — N40.1 BPH WITH OBSTRUCTION/LOWER URINARY TRACT SYMPTOMS: ICD-10-CM

## 2023-05-13 DIAGNOSIS — N13.8 BPH WITH OBSTRUCTION/LOWER URINARY TRACT SYMPTOMS: ICD-10-CM

## 2023-05-15 RX ORDER — TAMSULOSIN HYDROCHLORIDE 0.4 MG/1
CAPSULE ORAL
Qty: 180 CAPSULE | Refills: 0 | Status: SHIPPED | OUTPATIENT
Start: 2023-05-15

## 2023-05-17 DIAGNOSIS — I12.9 BENIGN HYPERTENSION WITH CHRONIC KIDNEY DISEASE, STAGE IV (HCC): ICD-10-CM

## 2023-05-17 DIAGNOSIS — N18.4 BENIGN HYPERTENSION WITH CHRONIC KIDNEY DISEASE, STAGE IV (HCC): ICD-10-CM

## 2023-05-17 RX ORDER — TELMISARTAN 20 MG/1
TABLET ORAL
Qty: 90 TABLET | Refills: 1 | Status: SHIPPED | OUTPATIENT
Start: 2023-05-17

## 2023-05-24 ENCOUNTER — HOSPITAL ENCOUNTER (OUTPATIENT)
Dept: CT IMAGING | Facility: HOSPITAL | Age: 78
Discharge: HOME | End: 2023-05-24

## 2023-05-24 DIAGNOSIS — N39.0 RECURRENT UTI (URINARY TRACT INFECTION): ICD-10-CM

## 2023-06-02 ENCOUNTER — APPOINTMENT (OUTPATIENT)
Dept: LAB | Facility: HOSPITAL | Age: 78
End: 2023-06-02
Payer: COMMERCIAL

## 2023-06-02 DIAGNOSIS — N39.0 RECURRENT UTI (URINARY TRACT INFECTION): ICD-10-CM

## 2023-06-02 DIAGNOSIS — N18.4 CKD (CHRONIC KIDNEY DISEASE), STAGE IV (HCC): ICD-10-CM

## 2023-06-02 LAB
25(OH)D3 SERPL-MCNC: 18.5 NG/ML (ref 30–100)
ANION GAP SERPL CALCULATED.3IONS-SCNC: 6 MMOL/L (ref 4–13)
BUN SERPL-MCNC: 57 MG/DL (ref 5–25)
CALCIUM SERPL-MCNC: 8.7 MG/DL (ref 8.4–10.2)
CHLORIDE SERPL-SCNC: 112 MMOL/L (ref 96–108)
CO2 SERPL-SCNC: 25 MMOL/L (ref 21–32)
CREAT SERPL-MCNC: 3.09 MG/DL (ref 0.6–1.3)
CREAT UR-MCNC: 84.1 MG/DL
DME PARACHUTE DELIVERY DATE REQUESTED: NORMAL
DME PARACHUTE ITEM DESCRIPTION: NORMAL
DME PARACHUTE ITEM DESCRIPTION: NORMAL
DME PARACHUTE ORDER STATUS: NORMAL
DME PARACHUTE SUPPLIER NAME: NORMAL
DME PARACHUTE SUPPLIER PHONE: NORMAL
ERYTHROCYTE [DISTWIDTH] IN BLOOD BY AUTOMATED COUNT: 14.6 % (ref 11.6–15.1)
GFR SERPL CREATININE-BSD FRML MDRD: 18 ML/MIN/1.73SQ M
GLUCOSE P FAST SERPL-MCNC: 106 MG/DL (ref 65–99)
HCT VFR BLD AUTO: 30.1 % (ref 36.5–49.3)
HGB BLD-MCNC: 9.4 G/DL (ref 12–17)
MAGNESIUM SERPL-MCNC: 2 MG/DL (ref 1.9–2.7)
MCH RBC QN AUTO: 29.7 PG (ref 26.8–34.3)
MCHC RBC AUTO-ENTMCNC: 31.2 G/DL (ref 31.4–37.4)
MCV RBC AUTO: 95 FL (ref 82–98)
PHOSPHATE SERPL-MCNC: 4.7 MG/DL (ref 2.3–4.1)
PLATELET # BLD AUTO: 197 THOUSANDS/UL (ref 149–390)
PMV BLD AUTO: 12 FL (ref 8.9–12.7)
POTASSIUM SERPL-SCNC: 4.4 MMOL/L (ref 3.5–5.3)
PROT UR-MCNC: 430 MG/DL
PROT/CREAT UR: 5.11 MG/G{CREAT} (ref 0–0.1)
PSA SERPL-MCNC: 0.2 NG/ML (ref 0–4)
PTH-INTACT SERPL-MCNC: 183.4 PG/ML (ref 12–88)
RBC # BLD AUTO: 3.16 MILLION/UL (ref 3.88–5.62)
SODIUM SERPL-SCNC: 143 MMOL/L (ref 135–147)
WBC # BLD AUTO: 5.89 THOUSAND/UL (ref 4.31–10.16)

## 2023-06-02 PROCEDURE — 82570 ASSAY OF URINE CREATININE: CPT

## 2023-06-02 PROCEDURE — 84153 ASSAY OF PSA TOTAL: CPT

## 2023-06-02 PROCEDURE — 84156 ASSAY OF PROTEIN URINE: CPT

## 2023-06-02 PROCEDURE — 80048 BASIC METABOLIC PNL TOTAL CA: CPT

## 2023-06-02 PROCEDURE — 83735 ASSAY OF MAGNESIUM: CPT

## 2023-06-02 PROCEDURE — 85027 COMPLETE CBC AUTOMATED: CPT

## 2023-06-02 PROCEDURE — 36415 COLL VENOUS BLD VENIPUNCTURE: CPT

## 2023-06-02 PROCEDURE — 84100 ASSAY OF PHOSPHORUS: CPT

## 2023-06-02 PROCEDURE — 83970 ASSAY OF PARATHORMONE: CPT

## 2023-06-02 PROCEDURE — 82306 VITAMIN D 25 HYDROXY: CPT

## 2023-06-02 NOTE — PROGRESS NOTES
Freestyle Diaz 2 supplies ordered via [place] through [facility]  Spouse is aware to call back if she doesn't not hear from them

## 2023-06-06 NOTE — PROGRESS NOTES
Order update received --    Marlo Dueñas (Diabetes 1)  6/2 ? 4:19PM  @Faye Chauhan! Thank you for placing your order with Dimitri  This order is now being processed  We will confirm insurance benefits, reach out to the patient to confirm the order, and discuss insurance coverage  We will keep you informed with status updates as the order progresses  Thank you for choosing "[employer]"mihaela

## 2023-06-07 ENCOUNTER — OFFICE VISIT (OUTPATIENT)
Dept: NEPHROLOGY | Facility: CLINIC | Age: 78
End: 2023-06-07
Payer: COMMERCIAL

## 2023-06-07 VITALS
HEIGHT: 69 IN | HEART RATE: 66 BPM | SYSTOLIC BLOOD PRESSURE: 174 MMHG | BODY MASS INDEX: 36.43 KG/M2 | WEIGHT: 246 LBS | DIASTOLIC BLOOD PRESSURE: 72 MMHG

## 2023-06-07 DIAGNOSIS — N18.4 TYPE 2 DIABETES MELLITUS WITH STAGE 4 CHRONIC KIDNEY DISEASE, WITH LONG-TERM CURRENT USE OF INSULIN (HCC): ICD-10-CM

## 2023-06-07 DIAGNOSIS — I12.9 BENIGN HYPERTENSION WITH CHRONIC KIDNEY DISEASE, STAGE IV (HCC): ICD-10-CM

## 2023-06-07 DIAGNOSIS — E83.9 CHRONIC KIDNEY DISEASE-MINERAL AND BONE DISORDER: ICD-10-CM

## 2023-06-07 DIAGNOSIS — E55.9 VITAMIN D DEFICIENCY: ICD-10-CM

## 2023-06-07 DIAGNOSIS — N18.4 BENIGN HYPERTENSION WITH CHRONIC KIDNEY DISEASE, STAGE IV (HCC): ICD-10-CM

## 2023-06-07 DIAGNOSIS — E83.39 HYPERPHOSPHATEMIA: ICD-10-CM

## 2023-06-07 DIAGNOSIS — R80.9 NON-NEPHROTIC RANGE PROTEINURIA: ICD-10-CM

## 2023-06-07 DIAGNOSIS — Z79.4 TYPE 2 DIABETES MELLITUS WITH STAGE 4 CHRONIC KIDNEY DISEASE, WITH LONG-TERM CURRENT USE OF INSULIN (HCC): ICD-10-CM

## 2023-06-07 DIAGNOSIS — E66.01 OBESITY, MORBID (HCC): ICD-10-CM

## 2023-06-07 DIAGNOSIS — N28.9 HYPERVOLEMIA ASSOCIATED WITH RENAL INSUFFICIENCY: ICD-10-CM

## 2023-06-07 DIAGNOSIS — E11.22 TYPE 2 DIABETES MELLITUS WITH STAGE 4 CHRONIC KIDNEY DISEASE, WITH LONG-TERM CURRENT USE OF INSULIN (HCC): ICD-10-CM

## 2023-06-07 DIAGNOSIS — N18.4 CKD (CHRONIC KIDNEY DISEASE), STAGE IV (HCC): Primary | ICD-10-CM

## 2023-06-07 DIAGNOSIS — M89.9 CHRONIC KIDNEY DISEASE-MINERAL AND BONE DISORDER: ICD-10-CM

## 2023-06-07 DIAGNOSIS — E87.70 HYPERVOLEMIA ASSOCIATED WITH RENAL INSUFFICIENCY: ICD-10-CM

## 2023-06-07 DIAGNOSIS — N25.81 SECONDARY HYPERPARATHYROIDISM OF RENAL ORIGIN (HCC): ICD-10-CM

## 2023-06-07 DIAGNOSIS — N18.9 CHRONIC KIDNEY DISEASE-MINERAL AND BONE DISORDER: ICD-10-CM

## 2023-06-07 PROCEDURE — 99214 OFFICE O/P EST MOD 30 MIN: CPT | Performed by: INTERNAL MEDICINE

## 2023-06-07 RX ORDER — MELATONIN
2000 DAILY
Qty: 1 TABLET | Refills: 1
Start: 2023-06-07

## 2023-06-07 RX ORDER — TORSEMIDE 20 MG/1
20 TABLET ORAL DAILY
Qty: 90 TABLET | Refills: 1 | Status: SHIPPED | OUTPATIENT
Start: 2023-06-07

## 2023-06-07 NOTE — PROGRESS NOTES
"NEPHROLOGY OFFICE PROGRESS NOTE   Sandee Sung  69 y o  male MRN: [id number]  DATE: 06/07/23  Reason for visit: Continued evaluation of CKD    ASSESSMENT & PLAN:  1  Chronic kidney disease, stage IV  · Johan's baseline creatinine is around 2 3 to 2 6    · CKD etiology is diabetic nephropathy + sequelae from post op ATN from CABG  · His creatinine went up to 2 9 with initiation of telmisartan in late 2022  · His creatinine is up to 3 09   · I suspect this is due to progression of disease  Of note, his proteinuria has worsened over the past few months  · He completed CKD education  · We discussed the possibility of needing dialysis in the future  He is not sure if he wants to pursue this because he feels that his quality of life is rather poor - \"All i'm doing is taking pills  I get yelled at for everything\"  · I asked him to think about it and let us know if he makes a decision  · Treatment: good BP, proteinuria and glycemic control  3  Hypertension  · BP is above goal (<130/80)  · Not checking at home  · Current regimen: Torsemide 20 mg MWF, Metoprolol 50 mg BID, Telmisartan 20 mg OD  · We will increase torsemide to 20 mg daily as he appears volume expanded today  4  Bilateral leg edema  · This is worse  He is not following a low-sodium diet  · Increase torsemide to 20 mg daily  5  Mineral bone disease  · PTH is above goal - 183 4 in June 2023  · Ca is at goal    · Phos is above goal  Needs low phos diet  Saw dietitian in Dec 2022 but not compliant with restrictions  · Vitamin D level is below goal - 18 5 in June 2023  Increase Vit D to 2000 units daily  5  Proteinuria:  · UPC is 5 11 - this is worse  · Due to UTI or CKD? · Continue Telmisartan 20 mg daily  · Repeat UPC ratio  6  Obesity, weight gain:  · This is worse  · Gained 10 lbs since last visit  · He does not appear to be motivated to lose weight or exercise  7  Anemia:   · Hgb down to 9 4    · Monitor   " · No need for Epogen  8  Diabetes mellitus:   · Diabetic management is deferred to endocrinology or PCP  9  Hyperlipidemia:  · Lipid management is deferred to PCP  Patient Instructions   Your kidney function is slightly worse and may continue to worsen over the next year or so  Please think about dialysis - do you think you would want it if it became necessary to save your life? Please increase Torsemide to 20 mg daily  Please increase Vitamin D to 2000 units daily  Check repeat labs in 2 weeks  Follow up in 4 months  Please check your BP twice daily for 1 week and bring a log with your next visit  Please avoid taking NSAIDs (Ibuprofen, Motrin, Aleve, Advil, Naproxen, Celebrex, etc )    SUBJECTIVE / INTERVAL HISTORY:  Ya Hutton was last seen in the office in Feb 2023  No hospital stays or ER visits  He eats at home 5 days a week for brunch and 2-3 days a week for dinner  The rest of the meals are at restaurants  He is not doing his weights at home  He is not checking BP at home  He is due for a cystoscopy in August 2023 due to recurrent UTIs  He was heard by our staff yelling at his wife while in the waiting room  He is complaining that all he is doing is taking pills  However, he does not appear to be motivated to exercise and be in better shape and health  PMH/PSH: HTN, DM, HLP, CKD, CAD s/p CABG, HARJIT, BPH, obesity, DDD s/p fusion surgery, cholecystectomy, hernia repair, tonsillectomy, urinary retention    Previous work up:   6/3/19 Renal US: R 10 6 cm, L 11 cm,  cc    ALLERGIES: No Known Allergies    REVIEW OF SYSTEMS:  Review of Systems   Constitutional: Positive for fatigue  Negative for appetite change, chills and fever  Respiratory: Negative for cough and shortness of breath  Cardiovascular: Positive for leg swelling  Negative for chest pain  Gastrointestinal: Negative for abdominal pain, diarrhea, nausea and vomiting     Genitourinary: Negative for "hematuria  Musculoskeletal: Negative for arthralgias  Neurological: Negative for dizziness and light-headedness  OBJECTIVE:  BP (!) 174/72 (BP Location: Left arm, Patient Position: Sitting, Cuff Size: Large)   Pulse 66   Ht 5' 9\" (1 753 m)   Wt 112 kg (246 lb)   BMI 36 33 kg/m²   Current Weight: Weight - Scale: 112 kg (246 lb) Body mass index is 36 33 kg/m²  Physical Exam  Constitutional:       General: He is not in acute distress  Appearance: Normal appearance  He is well-developed  He is obese  He is not ill-appearing or diaphoretic  HENT:      Head: Normocephalic and atraumatic  Eyes:      General: No scleral icterus  Conjunctiva/sclera: Conjunctivae normal    Neck:      Vascular: No JVD  Cardiovascular:      Rate and Rhythm: Normal rate and regular rhythm  Heart sounds: Normal heart sounds  Pulmonary:      Effort: Pulmonary effort is normal  No respiratory distress  Breath sounds: Normal breath sounds  Abdominal:      General: Bowel sounds are normal       Palpations: Abdomen is soft  Musculoskeletal:      Cervical back: Neck supple  Comments: Bilateral lower extremity edema   Skin:     General: Skin is warm and dry  Neurological:      Mental Status: He is alert and oriented to person, place, and time  Psychiatric:      Comments: Poor insight  Agitated mood       Medications:  Current Outpatient Medications:   •  Ascorbic Acid (VITAMIN C) 1000 MG tablet, Take 1,000 mg by mouth daily, Disp: , Rfl:   •  aspirin (ECOTRIN LOW STRENGTH) 81 mg EC tablet, Take 1 tablet (81 mg total) by mouth daily, Disp: 60 tablet, Rfl: 8  •  atorvastatin (LIPITOR) 80 mg tablet, Take 1 tablet (80 mg total) by mouth daily with dinner, Disp: 90 tablet, Rfl: 3  •  calcitriol (ROCALTROL) 0 25 mcg capsule, Take 1 capsule (0 25 mcg total) by mouth 2 (two) times a week, Disp: 26 capsule, Rfl: 3  •  cholecalciferol (VITAMIN D3) 1,000 units tablet, Take 1 tablet (1,000 Units total) by mouth " daily, Disp: 1 tablet, Rfl: 1  •  cyanocobalamin (VITAMIN B-12) 500 mcg tablet, Take 500 mcg by mouth daily, Disp: , Rfl:   •  finasteride (PROSCAR) 5 mg tablet, Take 1 tablet (5 mg total) by mouth daily, Disp: 90 tablet, Rfl: 0  •  insulin glargine (Toujeo Max SoloStar) 300 units/mL CONCENTRATED U-300 injection pen (2-unit dial), INJECT 42 UNITS UNDER THE SKIN DAILY at dinner time, Disp: 18 mL, Rfl: 1  •  insulin lispro (HumaLOG KwikPen) 100 units/mL injection pen, Inject 16 units before brunch and  16 units before dinner, Disp: 15 mL, Rfl: 1  •  latanoprost (XALATAN) 0 005 % ophthalmic solution, Administer 1 drop to both eyes daily at bedtime, Disp: , Rfl:   •  metoprolol tartrate (LOPRESSOR) 50 mg tablet, Take 1 tablet (50 mg total) by mouth 2 (two) times a day, Disp: 180 tablet, Rfl: 1  •  nitrofurantoin (MACRODANTIN) 50 mg capsule, Take 1 capsule (50 mg total) by mouth daily at bedtime, Disp: 30 capsule, Rfl: 3  •  pantoprazole (PROTONIX) 40 mg tablet, TAKE 1 TABLET BY MOUTH EVERY DAY, Disp: 90 tablet, Rfl: 1  •  tamsulosin (FLOMAX) 0 4 mg, TAKE 2 CAPSULES BY MOUTH DAILY WITH DINNER   , Disp: 180 capsule, Rfl: 0  •  telmisartan (MICARDIS) 20 MG tablet, TAKE 1 TABLET BY MOUTH EVERY DAY, Disp: 90 tablet, Rfl: 1  •  torsemide (DEMADEX) 20 mg tablet, Take 1 tablet (20 mg total) by mouth 3 (three) times a week Take every Monday, Wednesday, Friday    Take additional doses as needed if weight increases by 2-3 lb in 1 day, Disp: 360 tablet, Rfl: 0  •  Victoza injection, INJECT 1 8 MG UNDER THE SKIN ONCE DAILY, Disp: 6 mL, Rfl: 1  •  B-D ULTRAFINE III SHORT PEN 31G X 8 MM MISC, INJECT INTO THE SKIN 4 TIMES DAILY, Disp: 400 each, Rfl: 1  •  Continuous Blood Gluc  (FreeStyle Celia 2 [place]) MAMI, Use to check blood sugar daily, Disp: 1 each, Rfl: 0  •  Continuous Blood Gluc Sensor (FreeStyle Celia 2 Sensor) MISC, Use daily as directed for CGM - Change every 14 days, Disp: 6 each, Rfl: 3  •  glucose blood test strip, Check 4 times a day, Disp: 400 each, Rfl: 1  •  Microlet Lancets MISC, USE 3 TIMES DAY, Disp: 300 each, Rfl: 1    Laboratory Results:  Lab Results   Component Value Date    HCT 30 1 (L) 06/02/2023    HGB 9 4 (L) 06/02/2023    MCV 95 06/02/2023     06/02/2023    WBC 5 89 06/02/2023     Lab Results   Component Value Date    BUN 57 (H) 06/02/2023    CALCIUM 8 7 06/02/2023     (H) 06/02/2023    CO2 25 06/02/2023    CREATININE 3 09 (H) 06/02/2023    GLUC 201 (H) 06/23/2022    K 4 4 06/02/2023    SODIUM 143 06/02/2023

## 2023-06-07 NOTE — PATIENT INSTRUCTIONS
Your kidney function is slightly worse and may continue to worsen over the next year or so  Please think about dialysis - do you think you would want it if it became necessary to save your life? Please increase Torsemide to 20 mg daily  Please increase Vitamin D to 2000 units daily  Check repeat labs in 2 weeks  Follow up in 4 months  Please check your BP twice daily for 1 week and bring a log with your next visit     Please avoid taking NSAIDs (Ibuprofen, Motrin, Aleve, Advil, Naproxen, Celebrex, etc )

## 2023-06-08 ENCOUNTER — TELEPHONE (OUTPATIENT)
Dept: OTHER | Facility: OTHER | Age: 78
End: 2023-06-08

## 2023-06-08 NOTE — TELEPHONE ENCOUNTER
Pt spouse called asking if the office can call [facility] at [phone] and do a verbal auth/confirm he is a patient so that they can process the freestyle for patient

## 2023-06-09 NOTE — TELEPHONE ENCOUNTER
Wilma Steele called back I read her the message from Rosalind  Wilma Steele verbalized understanding

## 2023-06-09 NOTE — TELEPHONE ENCOUNTER
Left detailed message for spouse notifying her that we have already submitted the order via [address] and I received an update from [employer] stating they were verifying pt's insurance  Notified her that there is nothing else we do at this point since they have to submit the auth to the insurance, etc  Asked that she call them back to discuss cost/coverage, etc  Asked to call back if any further questions

## 2023-06-12 DIAGNOSIS — R33.9 URINARY RETENTION: ICD-10-CM

## 2023-06-12 DIAGNOSIS — N39.0 RECURRENT UTI (URINARY TRACT INFECTION): ICD-10-CM

## 2023-06-12 RX ORDER — NITROFURANTOIN MACROCRYSTALS 50 MG/1
50 CAPSULE ORAL
Qty: 30 CAPSULE | Refills: 0 | Status: SHIPPED | OUTPATIENT
Start: 2023-06-12

## 2023-06-12 RX ORDER — FINASTERIDE 5 MG/1
5 TABLET, FILM COATED ORAL DAILY
Qty: 90 TABLET | Refills: 3 | Status: SHIPPED | OUTPATIENT
Start: 2023-06-12

## 2023-06-14 LAB
DME PARACHUTE DELIVERY DATE REQUESTED: NORMAL
DME PARACHUTE ITEM DESCRIPTION: NORMAL
DME PARACHUTE ITEM DESCRIPTION: NORMAL
DME PARACHUTE ORDER STATUS: NORMAL
DME PARACHUTE SUPPLIER NAME: NORMAL
DME PARACHUTE SUPPLIER PHONE: NORMAL

## 2023-06-15 LAB
DME PARACHUTE DELIVERY DATE ACTUAL: NORMAL
DME PARACHUTE DELIVERY DATE REQUESTED: NORMAL
DME PARACHUTE ITEM DESCRIPTION: NORMAL
DME PARACHUTE ITEM DESCRIPTION: NORMAL
DME PARACHUTE ORDER STATUS: NORMAL
DME PARACHUTE SUPPLIER NAME: NORMAL
DME PARACHUTE SUPPLIER PHONE: NORMAL

## 2023-06-21 ENCOUNTER — APPOINTMENT (OUTPATIENT)
Dept: LAB | Facility: HOSPITAL | Age: 78
End: 2023-06-21
Attending: INTERNAL MEDICINE
Payer: COMMERCIAL

## 2023-06-21 DIAGNOSIS — N18.4 CKD (CHRONIC KIDNEY DISEASE), STAGE IV (HCC): ICD-10-CM

## 2023-06-21 LAB
ANION GAP SERPL CALCULATED.3IONS-SCNC: 9 MMOL/L
BACTERIA UR QL AUTO: ABNORMAL /HPF
BILIRUB UR QL STRIP: NEGATIVE
BUN SERPL-MCNC: 81 MG/DL (ref 5–25)
CALCIUM SERPL-MCNC: 8.3 MG/DL (ref 8.4–10.2)
CHLORIDE SERPL-SCNC: 108 MMOL/L (ref 96–108)
CLARITY UR: CLEAR
CO2 SERPL-SCNC: 23 MMOL/L (ref 21–32)
COLOR UR: YELLOW
CREAT SERPL-MCNC: 3.71 MG/DL (ref 0.6–1.3)
CREAT UR-MCNC: 72.8 MG/DL
GFR SERPL CREATININE-BSD FRML MDRD: 14 ML/MIN/1.73SQ M
GLUCOSE P FAST SERPL-MCNC: 188 MG/DL (ref 65–99)
GLUCOSE UR STRIP-MCNC: ABNORMAL MG/DL
HGB UR QL STRIP.AUTO: ABNORMAL
HYALINE CASTS #/AREA URNS LPF: ABNORMAL /LPF
KETONES UR STRIP-MCNC: NEGATIVE MG/DL
LEUKOCYTE ESTERASE UR QL STRIP: NEGATIVE
MUCOUS THREADS UR QL AUTO: ABNORMAL
NITRITE UR QL STRIP: NEGATIVE
NON-SQ EPI CELLS URNS QL MICRO: ABNORMAL /HPF
PH UR STRIP.AUTO: 6 [PH]
POTASSIUM SERPL-SCNC: 4.4 MMOL/L (ref 3.5–5.3)
PROT UR STRIP-MCNC: ABNORMAL MG/DL
PROT UR-MCNC: 391 MG/DL
PROT/CREAT UR: 5.37 MG/G{CREAT} (ref 0–0.1)
RBC #/AREA URNS AUTO: ABNORMAL /HPF
SODIUM SERPL-SCNC: 140 MMOL/L (ref 135–147)
SP GR UR STRIP.AUTO: 1.02 (ref 1–1.03)
UROBILINOGEN UR QL STRIP.AUTO: 0.2 E.U./DL
WBC #/AREA URNS AUTO: ABNORMAL /HPF

## 2023-06-21 PROCEDURE — 36415 COLL VENOUS BLD VENIPUNCTURE: CPT

## 2023-06-21 PROCEDURE — 84156 ASSAY OF PROTEIN URINE: CPT

## 2023-06-21 PROCEDURE — 82570 ASSAY OF URINE CREATININE: CPT

## 2023-06-21 PROCEDURE — 81001 URINALYSIS AUTO W/SCOPE: CPT

## 2023-06-21 PROCEDURE — 80048 BASIC METABOLIC PNL TOTAL CA: CPT

## 2023-06-27 ENCOUNTER — TELEPHONE (OUTPATIENT)
Dept: NEPHROLOGY | Facility: CLINIC | Age: 78
End: 2023-06-27

## 2023-06-27 DIAGNOSIS — I12.9 BENIGN HYPERTENSION WITH CHRONIC KIDNEY DISEASE, STAGE IV (HCC): Primary | ICD-10-CM

## 2023-06-27 DIAGNOSIS — N18.4 BENIGN HYPERTENSION WITH CHRONIC KIDNEY DISEASE, STAGE IV (HCC): Primary | ICD-10-CM

## 2023-06-27 RX ORDER — AMLODIPINE BESYLATE 5 MG/1
5 TABLET ORAL DAILY
Qty: 30 TABLET | Refills: 2 | Status: SHIPPED | OUTPATIENT
Start: 2023-06-27

## 2023-06-27 NOTE — TELEPHONE ENCOUNTER
Wife advised that CR elevated, possibly due to increased Torsemide  Home Bps above goal     Per Dr Elissa Sanders, start Amlodipine 5 mg  QD and repeat BMP week of 7/3/23  Will continue to monitor Bps         ----- Message from Conner Swanson MD sent at 6/27/2023 10:33 AM EDT -----  Please inform patient that most recent labs (6/21/23) show that creatinine is higher - this could be due to the increase in Torsemide  His home BP log was reviewed and his BP is above goal (/74 /84)  Please have him:    1  Start Amlodipine 5 mg daily  2  Repeat BMP in 1 week (the week of July 3)  Orders placed

## 2023-07-06 ENCOUNTER — LAB (OUTPATIENT)
Dept: LAB | Facility: HOSPITAL | Age: 78
End: 2023-07-06
Attending: INTERNAL MEDICINE
Payer: COMMERCIAL

## 2023-07-06 DIAGNOSIS — N18.4 BENIGN HYPERTENSION WITH CHRONIC KIDNEY DISEASE, STAGE IV (HCC): ICD-10-CM

## 2023-07-06 DIAGNOSIS — N18.4 CKD (CHRONIC KIDNEY DISEASE), STAGE IV (HCC): Primary | ICD-10-CM

## 2023-07-06 DIAGNOSIS — I12.9 BENIGN HYPERTENSION WITH CHRONIC KIDNEY DISEASE, STAGE IV (HCC): ICD-10-CM

## 2023-07-06 LAB
ANION GAP SERPL CALCULATED.3IONS-SCNC: 7 MMOL/L
BUN SERPL-MCNC: 60 MG/DL (ref 5–25)
CALCIUM SERPL-MCNC: 8.6 MG/DL (ref 8.4–10.2)
CHLORIDE SERPL-SCNC: 107 MMOL/L (ref 96–108)
CO2 SERPL-SCNC: 25 MMOL/L (ref 21–32)
CREAT SERPL-MCNC: 3.23 MG/DL (ref 0.6–1.3)
GFR SERPL CREATININE-BSD FRML MDRD: 17 ML/MIN/1.73SQ M
GLUCOSE P FAST SERPL-MCNC: 219 MG/DL (ref 65–99)
POTASSIUM SERPL-SCNC: 4.6 MMOL/L (ref 3.5–5.3)
SODIUM SERPL-SCNC: 139 MMOL/L (ref 135–147)

## 2023-07-06 PROCEDURE — 36415 COLL VENOUS BLD VENIPUNCTURE: CPT

## 2023-07-06 PROCEDURE — 80048 BASIC METABOLIC PNL TOTAL CA: CPT

## 2023-07-06 NOTE — RESULT ENCOUNTER NOTE
Labs reviewed. Creatinine elevated above baseline but trending down. Stable after starting amlodipine. Repeat BMP in 2 weeks. Orders in epic. Please call patient and review recommendations.

## 2023-07-07 ENCOUNTER — TELEPHONE (OUTPATIENT)
Dept: NEPHROLOGY | Facility: CLINIC | Age: 78
End: 2023-07-07

## 2023-07-07 NOTE — TELEPHONE ENCOUNTER
----- Message from Odalys Dodson PA-C sent at 7/6/2023  4:53 PM EDT -----  Labs reviewed. Creatinine elevated above baseline but trending down. Stable after starting amlodipine. Repeat BMP in 2 weeks. Orders in epic. Please call patient and review recommendations.

## 2023-07-14 DIAGNOSIS — N13.8 BPH WITH OBSTRUCTION/LOWER URINARY TRACT SYMPTOMS: ICD-10-CM

## 2023-07-14 DIAGNOSIS — N39.0 RECURRENT UTI (URINARY TRACT INFECTION): ICD-10-CM

## 2023-07-14 DIAGNOSIS — N40.1 BPH WITH OBSTRUCTION/LOWER URINARY TRACT SYMPTOMS: ICD-10-CM

## 2023-07-14 RX ORDER — NITROFURANTOIN MACROCRYSTALS 50 MG/1
50 CAPSULE ORAL
Qty: 30 CAPSULE | Refills: 0 | OUTPATIENT
Start: 2023-07-14

## 2023-07-14 RX ORDER — TAMSULOSIN HYDROCHLORIDE 0.4 MG/1
CAPSULE ORAL
Qty: 180 CAPSULE | Refills: 0 | OUTPATIENT
Start: 2023-07-14

## 2023-07-19 ENCOUNTER — LAB (OUTPATIENT)
Dept: LAB | Facility: HOSPITAL | Age: 78
End: 2023-07-19
Attending: INTERNAL MEDICINE
Payer: COMMERCIAL

## 2023-07-19 DIAGNOSIS — N18.4 BENIGN HYPERTENSION WITH CHRONIC KIDNEY DISEASE, STAGE IV (HCC): ICD-10-CM

## 2023-07-19 DIAGNOSIS — N18.4 CKD (CHRONIC KIDNEY DISEASE), STAGE IV (HCC): ICD-10-CM

## 2023-07-19 DIAGNOSIS — N39.0 RECURRENT UTI (URINARY TRACT INFECTION): ICD-10-CM

## 2023-07-19 DIAGNOSIS — I12.9 BENIGN HYPERTENSION WITH CHRONIC KIDNEY DISEASE, STAGE IV (HCC): ICD-10-CM

## 2023-07-19 DIAGNOSIS — Z79.4 TYPE 2 DIABETES MELLITUS WITH STAGE 4 CHRONIC KIDNEY DISEASE, WITH LONG-TERM CURRENT USE OF INSULIN (HCC): ICD-10-CM

## 2023-07-19 DIAGNOSIS — E11.22 TYPE 2 DIABETES MELLITUS WITH STAGE 4 CHRONIC KIDNEY DISEASE, WITH LONG-TERM CURRENT USE OF INSULIN (HCC): ICD-10-CM

## 2023-07-19 DIAGNOSIS — N18.4 TYPE 2 DIABETES MELLITUS WITH STAGE 4 CHRONIC KIDNEY DISEASE, WITH LONG-TERM CURRENT USE OF INSULIN (HCC): ICD-10-CM

## 2023-07-19 LAB
ANION GAP SERPL CALCULATED.3IONS-SCNC: 10 MMOL/L
BUN SERPL-MCNC: 58 MG/DL (ref 5–25)
CALCIUM SERPL-MCNC: 8.9 MG/DL (ref 8.4–10.2)
CHLORIDE SERPL-SCNC: 108 MMOL/L (ref 96–108)
CO2 SERPL-SCNC: 22 MMOL/L (ref 21–32)
CREAT SERPL-MCNC: 3.27 MG/DL (ref 0.6–1.3)
GFR SERPL CREATININE-BSD FRML MDRD: 17 ML/MIN/1.73SQ M
GLUCOSE P FAST SERPL-MCNC: 167 MG/DL (ref 65–99)
POTASSIUM SERPL-SCNC: 4.3 MMOL/L (ref 3.5–5.3)
SODIUM SERPL-SCNC: 140 MMOL/L (ref 135–147)

## 2023-07-19 PROCEDURE — 36415 COLL VENOUS BLD VENIPUNCTURE: CPT

## 2023-07-19 PROCEDURE — 80048 BASIC METABOLIC PNL TOTAL CA: CPT

## 2023-07-19 RX ORDER — AMLODIPINE BESYLATE 5 MG/1
5 TABLET ORAL DAILY
Qty: 90 TABLET | Refills: 1 | Status: SHIPPED | OUTPATIENT
Start: 2023-07-19

## 2023-07-20 ENCOUNTER — TELEPHONE (OUTPATIENT)
Dept: NEPHROLOGY | Facility: CLINIC | Age: 78
End: 2023-07-20

## 2023-07-20 RX ORDER — NITROFURANTOIN MACROCRYSTALS 50 MG/1
50 CAPSULE ORAL
Qty: 30 CAPSULE | Refills: 3 | Status: SHIPPED | OUTPATIENT
Start: 2023-07-20 | End: 2023-07-21 | Stop reason: SDUPTHER

## 2023-07-20 RX ORDER — NITROFURANTOIN MACROCRYSTALS 50 MG/1
50 CAPSULE ORAL
Qty: 30 CAPSULE | Refills: 0 | OUTPATIENT
Start: 2023-07-20

## 2023-07-20 NOTE — RESULT ENCOUNTER NOTE
Labs reviewed. Repeat BMP stable. Renal function stable.   Please call patient and let him know his labs are stable

## 2023-07-20 NOTE — TELEPHONE ENCOUNTER
Pt wife advised that repeat BMP shows stable kidney function per Alfredo Sanchez.      ----- Message from Daniel Shook PA-C sent at 7/20/2023  4:09 PM EDT -----  Labs reviewed. Repeat BMP stable. Renal function stable.   Please call patient and let him know his labs are stable

## 2023-07-21 DIAGNOSIS — N39.0 RECURRENT UTI (URINARY TRACT INFECTION): ICD-10-CM

## 2023-07-21 RX ORDER — NITROFURANTOIN MACROCRYSTALS 50 MG/1
50 CAPSULE ORAL
Qty: 30 CAPSULE | Refills: 3 | Status: SHIPPED | OUTPATIENT
Start: 2023-07-21 | End: 2023-08-23 | Stop reason: SDUPTHER

## 2023-07-31 LAB
CREAT ?TM UR-SCNC: 51.6 UMOL/L
MICROALBUMIN/CREAT UR: 1938 MG/G{CREAT}

## 2023-08-22 PROBLEM — R33.9 INCOMPLETE BLADDER EMPTYING: Status: ACTIVE | Noted: 2023-08-22

## 2023-08-22 PROBLEM — Z71.2 ENCOUNTER TO DISCUSS TEST RESULTS: Status: ACTIVE | Noted: 2023-08-22

## 2023-08-22 RX ORDER — CEFTRIAXONE 1 G/1
1000 INJECTION, POWDER, FOR SOLUTION INTRAMUSCULAR; INTRAVENOUS ONCE
Status: COMPLETED | OUTPATIENT
Start: 2023-08-23 | End: 2023-08-23

## 2023-08-22 NOTE — PROGRESS NOTES
Problem List Items Addressed This Visit        Genitourinary    Recurrent UTI (urinary tract infection)    Relevant Medications    cefTRIAXone (ROCEPHIN) injection 1,000 mg    nitrofurantoin (MACRODANTIN) 50 mg capsule       Other    Encounter to discuss test results    Nocturia   Other Visit Diagnoses     Recurrent UTI    -  Primary    Relevant Medications    cefTRIAXone (ROCEPHIN) injection 1,000 mg    Other Relevant Orders    POCT Measure PVR (Completed)              Discussion:      Scarlett Parker did well with his cystoscopy today. This shows some tightness of the bladder neck but very little lateral lobe hypertrophy without much obstruction of the prostate itself. Mucosa is normal.  There are no bladder stones. There are no contributing factors to recurrent UTI at this time. If he is to require surgery in the future he appears to be a candidate for the implantable temporary nitinol device or for transurethral incision of the bladder neck with a bipolar button electrode should this become necessary. His force of stream is quite good at this time per report. He does have some nocturia twice per night, he does have sleep apnea which can cause nocturnal polyuria, he states that he does use his CPAP machine faithfully. His bother from his nocturia is not enough for him to want treatment with antidiuretic hormone at this time. We did provide him with an injection of ceftriaxone given his history of recurrent UTI. His concerns with recurrent UTI have been much better since being on prophylaxis with a low-dose of nitro) nightly. I have refilled this medication. He will see us back in 1 year    Portions of the above record have been created with voice recognition software. Occasional wrong word or "sound alike" substitution may have occurred due to the inherent limitations of voice recognition software.   Read the chart carefully and recognize, using context, where substitution may have occurred. Assessment and plan:       Please see problem oriented charting for the assessment plan of today's urological complaints      Monica Espinal MD      Chief Complaint     As listed above      History of Present Illness     Florence Quiñonez is a 69 y.o. man with incomplete bladder emptying and recurrent UTI. 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    Detailed Urologic History     - please refer to HPI    Review of Systems     Review of Systems   Constitutional: Negative. HENT: Negative. Eyes: Negative. Respiratory: Negative. Cardiovascular: Negative. Gastrointestinal: Negative. Endocrine: Negative. Genitourinary:        As per HPI   Musculoskeletal: Negative. Skin: Negative. Allergic/Immunologic: Negative. Neurological: Negative. Hematological: Negative. Psychiatric/Behavioral: Negative.         AUA SYMPTOM SCORE    Flowsheet Row Most Recent Value   AUA SYMPTOM SCORE    How often have you had a sensation of not emptying your bladder completely after you finished urinating? 1 (P)     How often have you had to urinate again less than two hours after you finished urinating? 4 (P)     How often have you found you stopped and started again several times when you urinate? 3 (P)     How often have you found it difficult to postpone urination? 4 (P)     How often have you had a weak urinary stream? 3 (P)     How often have you had to push or strain to begin urination? 1 (P)     How many times did you most typically get up to urinate from the time you went to bed at night until the time you got up in the morning? 4 (P)     Quality of Life: If you were to spend the rest of your life with your urinary condition just the way it is now, how would you feel about that? 3 (P)     AUA SYMPTOM SCORE 20 (P)             Allergies     No Known Allergies    Physical Exam     Physical Exam  Vitals reviewed. Constitutional:       General: He is not in acute distress. Appearance: Normal appearance. He is obese. He is not ill-appearing, toxic-appearing or diaphoretic. HENT:      Head: Normocephalic and atraumatic. Eyes:      General: No scleral icterus. Right eye: No discharge. Left eye: No discharge. Cardiovascular:      Pulses: Normal pulses. Pulmonary:      Effort: Pulmonary effort is normal.   Abdominal:      General: There is no distension. Musculoskeletal:         General: No swelling. Skin:     Coloration: Skin is not jaundiced. Neurological:      General: No focal deficit present. Mental Status: He is alert. Cranial Nerves: No cranial nerve deficit. Psychiatric:         Mood and Affect: Mood normal.         Behavior: Behavior normal.         Thought Content: Thought content normal.         Judgment: Judgment normal.             Vital Signs  Vitals:    08/23/23 0952   BP: 132/80   BP Location: Left arm   Patient Position: Sitting   Cuff Size: Standard   Pulse: 70   Resp: 18   SpO2: 98%   Weight: 110 kg (243 lb)   Height: 5' 9" (1.753 m)         Current Medications       Current Outpatient Medications:   •  amLODIPine (NORVASC) 5 mg tablet, TAKE 1 TABLET (5 MG TOTAL) BY MOUTH DAILY. , Disp: 90 tablet, Rfl: 1  •  Ascorbic Acid (VITAMIN C) 1000 MG tablet, Take 1,000 mg by mouth daily, Disp: , Rfl:   •  aspirin (ECOTRIN LOW STRENGTH) 81 mg EC tablet, Take 1 tablet (81 mg total) by mouth daily, Disp: 60 tablet, Rfl: 8  •  atorvastatin (LIPITOR) 80 mg tablet, Take 1 tablet (80 mg total) by mouth daily with dinner, Disp: 90 tablet, Rfl: 3  •  B-D ULTRAFINE III SHORT PEN 31G X 8 MM MISC, INJECT INTO THE SKIN 4 TIMES DAILY, Disp: 400 each, Rfl: 1  •  calcitriol (ROCALTROL) 0.25 mcg capsule, Take 1 capsule (0.25 mcg total) by mouth 2 (two) times a week, Disp: 26 capsule, Rfl: 3  •  cholecalciferol (VITAMIN D3) 1,000 units tablet, Take 2 tablets (2,000 Units total) by mouth daily, Disp: 1 tablet, Rfl: 1  •  Continuous Blood Gluc  (FreeStyle Celia 2 [place]) [facility], Use to check blood sugar daily, Disp: 1 each, Rfl: 0  •  Continuous Blood Gluc Sensor (FreeStyle Celia 2 Sensor) MISC, Use daily as directed for CGM - Change every 14 days, Disp: 6 each, Rfl: 3  •  cyanocobalamin (VITAMIN B-12) 500 mcg tablet, Take 500 mcg by mouth daily, Disp: , Rfl:   •  finasteride (PROSCAR) 5 mg tablet, Take 1 tablet (5 mg total) by mouth daily, Disp: 90 tablet, Rfl: 3  •  glucose blood test strip, Check 4 times a day, Disp: 400 each, Rfl: 1  •  insulin glargine (Toujeo Max SoloStar) 300 units/mL CONCENTRATED U-300 injection pen (2-unit dial), INJECT 42 UNITS UNDER THE SKIN DAILY at dinner time, Disp: 18 mL, Rfl: 1  •  insulin lispro (HumaLOG KwikPen) 100 units/mL injection pen, Inject 16 units before brunch and  16 units before dinner, Disp: 15 mL, Rfl: 1  •  latanoprost (XALATAN) 0.005 % ophthalmic solution, Administer 1 drop to both eyes daily at bedtime, Disp: , Rfl:   •  metoprolol tartrate (LOPRESSOR) 50 mg tablet, Take 1 tablet (50 mg total) by mouth 2 (two) times a day, Disp: 180 tablet, Rfl: 1  •  Microlet Lancets MISC, USE 3 TIMES DAY, Disp: 300 each, Rfl: 1  •  nitrofurantoin (MACRODANTIN) 50 mg capsule, Take 1 capsule (50 mg total) by mouth daily at bedtime, Disp: 90 capsule, Rfl: 3  •  pantoprazole (PROTONIX) 40 mg tablet, TAKE 1 TABLET BY MOUTH EVERY DAY, Disp: 90 tablet, Rfl: 1  •  tamsulosin (FLOMAX) 0.4 mg, TAKE 2 CAPSULES BY MOUTH DAILY WITH DINNER. ., Disp: 180 capsule, Rfl: 0  •  telmisartan (MICARDIS) 20 MG tablet, TAKE 1 TABLET BY MOUTH EVERY DAY, Disp: 90 tablet, Rfl: 1  •  torsemide (DEMADEX) 20 mg tablet, Take 1 tablet (20 mg total) by mouth daily, Disp: 90 tablet, Rfl: 1  •  Victoza injection, INJECT 1.8 MG UNDER THE SKIN ONCE DAILY, Disp: 6 mL, Rfl: 1    Current Facility-Administered Medications:   •  cefTRIAXone (ROCEPHIN) injection 1,000 mg, 1,000 mg, Intramuscular, Once, Zahida Rogers MD      Active Problems     Patient Active Problem List   Diagnosis   • CKD (chronic kidney disease), stage IV ([facility])   • Benign hypertension with chronic kidney disease, stage IV ([facility])   • Chronic kidney disease-mineral and bone disorder   • Type 2 diabetes mellitus with stage 4 chronic kidney disease, with long-term current use of insulin ([facility])   • Sleep apnea   • Coronary artery disease involving native coronary artery of native heart without angina pectoris   • Mixed hyperlipidemia   • S/P CABG (coronary artery bypass graft)   • Acute postoperative pulmonary insufficiency ([facility])   • Iron deficiency anemia   • Non-nephrotic range proteinuria   • Esophageal dysphagia   • History of colonic polyps   • Lumbar back pain   • Lumbar discogenic pain syndrome   • Pain of lower extremity   • Trigger finger of left hand   • Spondylolisthesis   • Spinal stenosis   • Vitamin D deficiency   • Lymphadenopathy   • Stenosis of left internal carotid artery   • Neuropathy   • Diabetic polyneuropathy associated with type 2 diabetes mellitus ([facility])   • Encounter for  medical examination (CDME)   • 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[address])   • Traumatic injury of skin   • Hypervolemia associated with renal insufficiency   • Elevated TSH   • Essential hypertension   • Chronic diastolic (congestive) heart failure (HCC)   • 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   • BPH (benign prostatic hyperplasia)   • Asymptomatic bilateral carotid artery stenosis   • 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 Dr Jesica Bradshaw - stage 3   • Colon polyp    • CPAP (continuous positive airway pressure) dependence    • Diabetes mellitus ([address])    • History of transfusion    • Hyperlipidemia    • Hypertension    • Myocardial infarction ([address]) 2019    Open heart surgery with quad bypass   • Obesity    • Renal disorder    • Sleep apnea    • Visual impairment     Dr Fabián Soto         Surgical History     Past Surgical History:   Procedure Laterality Date   • APPENDECTOMY      Done in conjunction with cholecystectomy   • BACK SURGERY     • CHOLECYSTECTOMY     • COLONOSCOPY     • CORONARY ARTERY BYPASS GRAFT      quad   • EGD     • GALLBLADDER SURGERY     • HERNIA REPAIR     • CT CORONARY ARTERY BYP W/VEIN & ARTERY GRAFT 4 VEIN N/A 2019    Procedure: CORONARY ARTERY BYPASS GRAFT (CABG) 4 VESSELS WITH SVG TO PDA, OM, DIAGONAL AND LIMA TO LAD; RIGHT LEG EVH;  Surgeon: Abraham Thomas MD;  Location: [facility];  Service: Cardiac Surgery   • RECTAL SURGERY     • SPINE SURGERY  2012    Fusion L3-L5?    • TONSILLECTOMY           Family History     Family History   Problem Relation Age of Onset   • Cancer Mother    • Alcohol abuse Mother    • Cancer Father    • Alcohol abuse Father    • Diabetes Maternal Grandmother    • Diabetes type II Maternal Grandmother         Geriatric onset -  5   • Diabetes Paternal Aunt    • Hypertension Paternal Grandfather          Social History     Social History     Social History     Tobacco Use   Smoking Status Former   • Packs/day: 3.00   • Years: 30.00   • Total pack years: 90.00   • Types: Cigarettes, Cigars   • Start date: 1960   • Quit date: 1992   • Years since quittin.6   Smokeless Tobacco Never   Tobacco Comments    Started smoking as young teenager         Pertinent Lab Values     Lab Results   Component Value Date    CREATININE 3.27 (H) 2023       Lab Results   Component Value Date    PSA 0.20 2023    PSA 0.2 2020    PSA 0.2 2020

## 2023-08-23 ENCOUNTER — PROCEDURE VISIT (OUTPATIENT)
Dept: UROLOGY | Facility: CLINIC | Age: 78
End: 2023-08-23
Payer: COMMERCIAL

## 2023-08-23 VITALS
HEART RATE: 70 BPM | BODY MASS INDEX: 35.99 KG/M2 | RESPIRATION RATE: 18 BRPM | SYSTOLIC BLOOD PRESSURE: 132 MMHG | HEIGHT: 69 IN | DIASTOLIC BLOOD PRESSURE: 80 MMHG | WEIGHT: 243 LBS | OXYGEN SATURATION: 98 %

## 2023-08-23 DIAGNOSIS — N39.0 RECURRENT UTI: Primary | ICD-10-CM

## 2023-08-23 DIAGNOSIS — Z71.2 ENCOUNTER TO DISCUSS TEST RESULTS: ICD-10-CM

## 2023-08-23 DIAGNOSIS — N39.0 RECURRENT UTI (URINARY TRACT INFECTION): ICD-10-CM

## 2023-08-23 DIAGNOSIS — R35.1 NOCTURIA: ICD-10-CM

## 2023-08-23 LAB — POST-VOID RESIDUAL VOLUME, ML POC: 236 ML

## 2023-08-23 PROCEDURE — 96372 THER/PROPH/DIAG INJ SC/IM: CPT

## 2023-08-23 PROCEDURE — 99214 OFFICE O/P EST MOD 30 MIN: CPT | Performed by: UROLOGY

## 2023-08-23 PROCEDURE — 52000 CYSTOURETHROSCOPY: CPT | Performed by: UROLOGY

## 2023-08-23 PROCEDURE — 51798 US URINE CAPACITY MEASURE: CPT | Performed by: UROLOGY

## 2023-08-23 RX ORDER — NITROFURANTOIN MACROCRYSTALS 50 MG/1
50 CAPSULE ORAL
Qty: 90 CAPSULE | Refills: 3 | Status: SHIPPED | OUTPATIENT
Start: 2023-08-23 | End: 2024-08-17

## 2023-08-23 RX ADMIN — CEFTRIAXONE 1000 MG: 1 INJECTION, POWDER, FOR SOLUTION INTRAMUSCULAR; INTRAVENOUS at 10:00

## 2023-08-23 NOTE — PROGRESS NOTES
Office Cystoscopy Procedure Note    Indication:    recurrent UTI    Informed consent   The risks, benefits, complications, treatment options, and expected outcomes were discussed with the patient. The patient concurred with the proposed plan and provided informed consent. Anesthesia  Lidocaine jelly 2%    Antibiotic prophylaxis   ceftriaxone IM    Procedure  The patient was placed in the supineposition, was prepped and draped in the usual manner using sterile technique, and 2% lidocaine jelly instilled into the urethra. A 17 F flexible cystoscope was then inserted into the urethra and the urethra and bladder carefully examined. The following findings were noted:    Findings:  Urethra:  Normal  Prostate:  mild lateral lobe hypertrophy, no median lobe, no lesions  Bladder:  Slight tightness at the bladder neck, mild trabeculation  Ureteral orifices:  orthotopic  Other findings:  None, retroflexed view confirms    Specimens: None                 Complications:    None; patient tolerated the procedure well           Disposition: To home            Condition: Stable    Plan: No malignant lesions, no stones, some tightness at the bladder neck, no tumors       Cystoscopy     Date/Time 8/23/2023 10:00 AM     Performed by  Heena Benitez MD   Authorized by Heena Benitez MD     Universal Protocol:  Consent: Verbal consent obtained. Written consent obtained. Risks and benefits: risks, benefits and alternatives were discussed  Consent given by: patient  Patient understanding: patient states understanding of the procedure being performed  Patient consent: the patient's understanding of the procedure matches consent given  Procedure consent: procedure consent matches procedure scheduled  Relevant documents: relevant documents present and verified  Test results: test results available and properly labeled  Site marked: the operative site was not marked  Radiology Images displayed and confirmed.  If images not available, report reviewed: imaging studies available  Required items: required blood products, implants, devices, and special equipment available  Patient identity confirmed: verbally with patient and provided demographic data        Procedure Details:  Procedure type: cystoscopy    Patient tolerance: Patient tolerated the procedure well with no immediate complications    Additional Procedure Details: Office Cystoscopy Procedure Note    Indication:    recurrent UTI    Informed consent   The risks, benefits, complications, treatment options, and expected outcomes were discussed with the patient. The patient concurred with the proposed plan and provided informed consent. Anesthesia  Lidocaine jelly 2%    Antibiotic prophylaxis   ceftriaxone IM    Procedure  The patient was placed in the supineposition, was prepped and draped in the usual manner using sterile technique, and 2% lidocaine jelly instilled into the urethra. A 17 F flexible cystoscope was then inserted into the urethra and the urethra and bladder carefully examined.   The following findings were noted:    Findings:  Urethra:  Normal  Prostate:  mild lateral lobe hypertrophy, no median lobe, no lesions  Bladder:  Slight tightness at the bladder neck, mild trabeculation  Ureteral orifices:  orthotopic  Other findings:  None, retroflexed view confirms    Specimens: None                 Complications:    None; patient tolerated the procedure well           Disposition: To home            Condition: Stable    Plan: No malignant lesions, no stones, some tightness at the bladder neck, no tumors

## 2023-08-28 ENCOUNTER — LAB (OUTPATIENT)
Dept: LAB | Facility: HOSPITAL | Age: 78
End: 2023-08-28
Attending: INTERNAL MEDICINE
Payer: COMMERCIAL

## 2023-08-28 DIAGNOSIS — Z79.4 TYPE 2 DIABETES MELLITUS WITH STAGE 4 CHRONIC KIDNEY DISEASE, WITH LONG-TERM CURRENT USE OF INSULIN (HCC): ICD-10-CM

## 2023-08-28 DIAGNOSIS — N18.4 TYPE 2 DIABETES MELLITUS WITH STAGE 4 CHRONIC KIDNEY DISEASE, WITH LONG-TERM CURRENT USE OF INSULIN (HCC): ICD-10-CM

## 2023-08-28 DIAGNOSIS — E11.22 TYPE 2 DIABETES MELLITUS WITH STAGE 4 CHRONIC KIDNEY DISEASE, WITH LONG-TERM CURRENT USE OF INSULIN (HCC): ICD-10-CM

## 2023-08-28 DIAGNOSIS — N40.1 BPH WITH OBSTRUCTION/LOWER URINARY TRACT SYMPTOMS: ICD-10-CM

## 2023-08-28 DIAGNOSIS — N13.8 BPH WITH OBSTRUCTION/LOWER URINARY TRACT SYMPTOMS: ICD-10-CM

## 2023-08-28 LAB
ANION GAP SERPL CALCULATED.3IONS-SCNC: 7 MMOL/L
BUN SERPL-MCNC: 65 MG/DL (ref 5–25)
CALCIUM SERPL-MCNC: 8.4 MG/DL (ref 8.4–10.2)
CHLORIDE SERPL-SCNC: 110 MMOL/L (ref 96–108)
CO2 SERPL-SCNC: 23 MMOL/L (ref 21–32)
CREAT SERPL-MCNC: 3.39 MG/DL (ref 0.6–1.3)
EST. AVERAGE GLUCOSE BLD GHB EST-MCNC: 192 MG/DL
GFR SERPL CREATININE-BSD FRML MDRD: 16 ML/MIN/1.73SQ M
GLUCOSE P FAST SERPL-MCNC: 162 MG/DL (ref 65–99)
HBA1C MFR BLD: 8.3 %
POTASSIUM SERPL-SCNC: 5 MMOL/L (ref 3.5–5.3)
SODIUM SERPL-SCNC: 140 MMOL/L (ref 135–147)

## 2023-08-28 PROCEDURE — 36415 COLL VENOUS BLD VENIPUNCTURE: CPT

## 2023-08-28 PROCEDURE — 83036 HEMOGLOBIN GLYCOSYLATED A1C: CPT

## 2023-08-28 PROCEDURE — 82985 ASSAY OF GLYCATED PROTEIN: CPT

## 2023-08-28 NOTE — PROGRESS NOTES
Sleep Medicine Outpatient Follow Up Note   Zainab Kurtz. 69 y.o. male MRN: [id number]  8/30/2023      Reason for Consultation:    Chief Complaint   Patient presents with   • Sleep Apnea       Assessment/Plan:    1. HARJIT (obstructive sleep apnea)  Assessment & Plan:  Currently using a DreamStation 2 set at CPAP auto titrating 10-15 cm H2O. Compliance data today shows  100% use over the past 30 days  Average use 12 hours and 43 minutes  Only 10 minutes of large leak per day  Mean pressure 13.8, 90th percentile pressure 15 residual AHI 22.7 with FAUSTINA <1    He has significant residual obstructive sleep apnea despite lack of significant mask leak. It appears that high pressures are required due to high intrinsic critical airway pressure, possibly contributed by prior palatal surgery causing more rigid airway. However, previously had not been able to tolerate higher pressures and machine pressures had to be titrated 10-15 cm H2O. Likely he would not be able to tolerate CPAP at higher pressures, and therefore, we should switch to BiPAP for adequate treatment of HARJIT. We will trial BiPAP, and we discussed options of BiPAP titration study versus starting auto BiPAP.   Patient prefers to start auto BiPAP    Prescribed auto BiPAP max IPAP 25, minimum EPAP 8, pressure support 4     follow-up BiPAP compliance 1 to 3 months after BiPAP initiation        Orders:  -     Resmed BIPAP DME        Health Maintenance  Immunization History   Administered Date(s) Administered   • COVID-19 PFIZER VACCINE 0.3 ML IM 02/15/2021, 03/08/2021, 01/03/2022   • COVID-19 Pfizer vac (Maximo-sucrose, gray cap) 12 yr+ IM 05/13/2022   • INFLUENZA 09/18/2018, 09/28/2022   • Influenza LAIV (Nasal) 10/17/2016   • Influenza Split High Dose Preservative Free IM 11/13/2017, 09/18/2018, 10/18/2019   • Influenza, high dose seasonal 0.7 mL 08/25/2020, 10/19/2021   • Pneumococcal Conjugate 13-Valent 09/05/2019, 10/18/2019   • Pneumococcal Polysaccharide PPV23 09/27/2012   • Tdap 05/29/2013, 08/25/2020   • Zoster Vaccine Recombinant 12/04/2019, 02/27/2020   • influenza, trivalent, adjuvanted 09/09/2019        Return in about 3 months (around 11/29/2023). History of Present Illness   HPI:  Emory You is a 69 y.o. male who has a past medical history of hypertension, BPH, diabetes, GERD, severe obstructive sleep apnea status post palatal christie placement, who is presenting for follow-up of HARJIT. Patient was last evaluated by Dr. Milady Skelton 7/21/23. He has a history of obstructive sleep apnea. He underwent home sleep study that showed AHI 72.7. He has been on CPAP without success. When he does make it through the night he does feel better but this is seldom. He report loud snoring, daytime somnolence, non-restorative sleep and concerns for apnea. He was advised to follow-up sleep medicine and have ongoing weight loss as his BMI is over 35 and his AHI is over 65 and he is not an inspire candidate at this time. He has been trying to use his Kale machine, which has been replaced after the recall, however has been having ongoing daytime sleepiness, residual snoring. It has been noted he uses his machine for more than 12 hours/day, but he notes that he puts it on when he is sitting in bed watching TV for hours. When he does fall asleep, he does have spontaneous awakenings overnight, min sometimes due to discomfort with the machine. He notes that he gets redness from the mask and tight head straps. He is currently okay with a nasal mask. Sleeps about 8 hours per night. He still has excessive daytime sleepiness, takes naps, and overall is still dissatisfied with sleep. [place] [address]    Historical Information   Past Medical History:   Diagnosis Date   • CHF (congestive heart failure) (HCC)    • Chronic kidney disease 18 - 24 months?     Dr Haynes Current - stage 3   • Colon polyp    • CPAP (continuous positive airway pressure) dependence    • Diabetes mellitus ([address])    • History of transfusion    • Hyperlipidemia    • Hypertension    • Myocardial infarction ([address]) 2019    Open heart surgery with quad bypass   • Obesity    • Renal disorder    • Sleep apnea    • Visual impairment     Dr Ann-Marie Vincent     Past Surgical History:   Procedure Laterality Date   • APPENDECTOMY      Done in conjunction with cholecystectomy   • BACK SURGERY     • CHOLECYSTECTOMY     • COLONOSCOPY     • CORONARY ARTERY BYPASS GRAFT      quad   • EGD     • GALLBLADDER SURGERY     • HERNIA REPAIR     • [place] CORONARY ARTERY BYP W/VEIN & ARTERY GRAFT 4 VEIN N/A 2019    Procedure: CORONARY ARTERY BYPASS GRAFT (CABG) 4 VESSELS WITH SVG TO PDA, OM, DIAGONAL AND LIMA TO LAD; RIGHT LEG EVH;  Surgeon: Maribel Jimenez MD;  Location: [facility];  Service: Cardiac Surgery   • RECTAL SURGERY     • SPINE SURGERY      Fusion L3-L5? • TONSILLECTOMY       Family History   Problem Relation Age of Onset   • Cancer Mother    • Alcohol abuse Mother    • Cancer Father    • Alcohol abuse Father    • Diabetes Maternal Grandmother    • Diabetes type II Maternal Grandmother         Geriatric onset -  5   • Diabetes Paternal Aunt    • Hypertension Paternal Grandfather          Meds/Allergies     Current Outpatient Medications:   •  amLODIPine (NORVASC) 5 mg tablet, TAKE 1 TABLET (5 MG TOTAL) BY MOUTH DAILY. , Disp: 90 tablet, Rfl: 1  •  Ascorbic Acid (VITAMIN C) 1000 MG tablet, Take 1,000 mg by mouth daily, Disp: , Rfl:   •  aspirin (ECOTRIN LOW STRENGTH) 81 mg EC tablet, Take 1 tablet (81 mg total) by mouth daily, Disp: 60 tablet, Rfl: 8  •  atorvastatin (LIPITOR) 80 mg tablet, Take 1 tablet (80 mg total) by mouth daily with dinner, Disp: 90 tablet, Rfl: 3  •  B-D ULTRAFINE III SHORT PEN 31G X 8 MM MISC, INJECT INTO THE SKIN 4 TIMES DAILY, Disp: 400 each, Rfl: 1  •  calcitriol (ROCALTROL) 0.25 mcg capsule, Take 1 capsule (0.25 mcg total) by mouth 2 (two) times a week, Disp: 26 capsule, Rfl: 3  •  cholecalciferol (VITAMIN D3) 1,000 units tablet, Take 2 tablets (2,000 Units total) by mouth daily, Disp: 1 tablet, Rfl: 1  •  Continuous Blood Gluc  (FreeStyle Celia 2 [place]) [facility], Use to check blood sugar daily, Disp: 1 each, Rfl: 0  •  Continuous Blood Gluc Sensor (FreeStyle Celia 2 Sensor) MISC, Use daily as directed for CGM - Change every 14 days, Disp: 6 each, Rfl: 3  •  cyanocobalamin (VITAMIN B-12) 500 mcg tablet, Take 500 mcg by mouth daily, Disp: , Rfl:   •  finasteride (PROSCAR) 5 mg tablet, Take 1 tablet (5 mg total) by mouth daily, Disp: 90 tablet, Rfl: 3  •  glucose blood test strip, Check 4 times a day, Disp: 400 each, Rfl: 1  •  insulin glargine (Toujeo Max SoloStar) 300 units/mL CONCENTRATED U-300 injection pen (2-unit dial), INJECT 42 UNITS UNDER THE SKIN DAILY at dinner time, Disp: 18 mL, Rfl: 1  •  insulin lispro (HumaLOG KwikPen) 100 units/mL injection pen, Inject 16 units before brunch and  16 units before dinner, Disp: 15 mL, Rfl: 1  •  latanoprost (XALATAN) 0.005 % ophthalmic solution, Administer 1 drop to both eyes daily at bedtime, Disp: , Rfl:   •  metoprolol tartrate (LOPRESSOR) 50 mg tablet, Take 1 tablet (50 mg total) by mouth 2 (two) times a day, Disp: 180 tablet, Rfl: 1  •  Microlet Lancets MISC, USE 3 TIMES DAY, Disp: 300 each, Rfl: 1  •  nitrofurantoin (MACRODANTIN) 50 mg capsule, Take 1 capsule (50 mg total) by mouth daily at bedtime, Disp: 90 capsule, Rfl: 3  •  pantoprazole (PROTONIX) 40 mg tablet, TAKE 1 TABLET BY MOUTH EVERY DAY, Disp: 90 tablet, Rfl: 1  •  tamsulosin (FLOMAX) 0.4 mg, TAKE 2 CAPSULES BY MOUTH DAILY WITH DINNER. ., Disp: 180 capsule, Rfl: 0  •  telmisartan (MICARDIS) 20 MG tablet, TAKE 1 TABLET BY MOUTH EVERY DAY, Disp: 90 tablet, Rfl: 1  •  torsemide (DEMADEX) 20 mg tablet, Take 1 tablet (20 mg total) by mouth daily, Disp: 90 tablet, Rfl: 1  •  Victoza injection, INJECT 1.8 MG UNDER THE SKIN ONCE DAILY, Disp: 6 mL, Rfl: 1  No Known Allergies    Vitals: Blood pressure 138/60, pulse (!) 46, height 5' 9" (1.753 m), weight 113 kg (249 lb 6.4 oz), SpO2 99 %. Body mass index is 36.83 kg/m². Oxygen Therapy  SpO2: 99 %    Physical Exam  Vitals and nursing note reviewed. Constitutional:       General: He is not in acute distress. Appearance: He is well-developed. He is obese. He is not ill-appearing, toxic-appearing or diaphoretic. HENT:      Head: Normocephalic and atraumatic. Eyes:      General: No scleral icterus. Extraocular Movements: Extraocular movements intact. Conjunctiva/sclera: Conjunctivae normal.   Cardiovascular:      Rate and Rhythm: Normal rate and regular rhythm. Pulmonary:      Effort: Pulmonary effort is normal. No respiratory distress. Breath sounds: No stridor. Abdominal:      Tenderness: There is no guarding. Musculoskeletal:         General: No swelling. Cervical back: Normal range of motion and neck supple. Right lower leg: No edema. Left lower leg: No edema. Skin:     General: Skin is warm and dry. Neurological:      General: No focal deficit present. Mental Status: He is alert and oriented to person, place, and time. Mental status is at baseline. Psychiatric:         Mood and Affect: Mood normal.             Labs: I have personally reviewed pertinent lab results.     ABG: No results found for: "PHART", "AST8WNP", "PO2ART", "SHA6HAB", "V8IZIOHW", "BEART", "SOURCE",   BNP:   Lab Results   Component Value Date     (H) 06/20/2022   ,   CBC:  Lab Results   Component Value Date    WBC 5.89 06/02/2023    HGB 9.4 (L) 06/02/2023    HCT 30.1 (L) 06/02/2023    MCV 95 06/02/2023     06/02/2023    EOSPCT 4 10/24/2022    EOSABS 0.29 10/24/2022    NEUTOPHILPCT 75 10/24/2022    LYMPHOPCT 13 (L) 10/24/2022   ,   CMP:   Lab Results   Component Value Date    SODIUM 140 08/28/2023    K 5.0 08/28/2023     (H) 08/28/2023    CO2 23 08/28/2023    BUN 65 (H) 08/28/2023    CREATININE 3.39 (H) 08/28/2023    GLUCOSE 141 (H) 06/21/2019    CALCIUM 8.4 08/28/2023    AST 12 (L) 06/20/2022    ALT 16 06/20/2022    ALKPHOS 99 06/20/2022    EGFR 16 08/28/2023   ,   PT/INR:   Lab Results   Component Value Date    INR 1.18 06/20/2022   ,   Ferrtin: No components found for: "FERRTIN",  Magensium: No results found for: "MAGNESIUM",      Imaging and other studies: I have personally reviewed pertinent reports. and I have personally reviewed pertinent films in PACS      Sleep Study:  6/21/23      Compliance data:  8/29/23  100% use over the past 30 days  Average use 12 hours and 43 minutes  Only 10 minutes of large leak per day  Mean pressure 13.8, 90th percentile pressure 15 residual AHI 22.7 with FAUSTINA <1    He has significant residual obstructive sleep apnea despite lack of significant mask leak. Transthoracic Echo:  3/23/21  LEFT VENTRICLE:  Systolic function was normal by visual assessment. Ejection fraction was estimated to be 55 %. Although no diagnostic regional wall motion abnormality was identified, this possibility cannot be completely excluded on the basis of this study. Wall thickness was markedly increased. Doppler parameters were consistent with abnormal left ventricular relaxation (grade 1 diastolic dysfunction). MITRAL VALVE:  There was moderate annular calcification. There was mild regurgitation. AORTIC VALVE:  Transaortic velocity was increased due to increased transvalvular flow. TRICUSPID VALVE:  There was mild regurgitation. PULMONIC VALVE:  There was trace regurgitation. Rosa M Saleem MD  Pulmonary, Critical Care and Sleep Medicine  [facility]     Portions of the record may have been created with voice recognition software. Occasional wrong word or "sound a like" substitutions may have occurred due to the inherent limitations of voice recognition software.  Please read the chart carefully and recognize, using context, where substitutions have occurred.

## 2023-08-29 ENCOUNTER — OFFICE VISIT (OUTPATIENT)
Dept: SLEEP CENTER | Facility: CLINIC | Age: 78
End: 2023-08-29
Payer: COMMERCIAL

## 2023-08-29 VITALS
OXYGEN SATURATION: 99 % | WEIGHT: 249.4 LBS | BODY MASS INDEX: 36.94 KG/M2 | HEART RATE: 46 BPM | SYSTOLIC BLOOD PRESSURE: 138 MMHG | DIASTOLIC BLOOD PRESSURE: 60 MMHG | HEIGHT: 69 IN

## 2023-08-29 DIAGNOSIS — G47.33 OSA (OBSTRUCTIVE SLEEP APNEA): Primary | ICD-10-CM

## 2023-08-29 LAB — FRUCTOSAMINE SERPL-SCNC: 272 UMOL/L (ref 0–285)

## 2023-08-29 PROCEDURE — 99213 OFFICE O/P EST LOW 20 MIN: CPT | Performed by: INTERNAL MEDICINE

## 2023-08-29 RX ORDER — TAMSULOSIN HYDROCHLORIDE 0.4 MG/1
CAPSULE ORAL
Qty: 180 CAPSULE | Refills: 0 | Status: SHIPPED | OUTPATIENT
Start: 2023-08-29

## 2023-08-29 NOTE — RESULT ENCOUNTER NOTE
This patient has upcoming appointment, no urgent lab results, will review with patient at that time. Yes

## 2023-08-29 NOTE — ASSESSMENT & PLAN NOTE
Currently using a DreamStation 2 set at CPAP auto titrating 10-15 cm H2O. Compliance data today shows  100% use over the past 30 days  Average use 12 hours and 43 minutes  Only 10 minutes of large leak per day  Mean pressure 13.8, 90th percentile pressure 15 residual AHI 22.7 with FAUSTINA <1    He has significant residual obstructive sleep apnea despite lack of significant mask leak. It appears that high pressures are required due to high intrinsic critical airway pressure, possibly contributed by prior palatal surgery causing more rigid airway. However, previously had not been able to tolerate higher pressures and machine pressures had to be titrated 10-15 cm H2O. Likely he would not be able to tolerate CPAP at higher pressures, and therefore, we should switch to BiPAP for adequate treatment of HARJIT. We will trial BiPAP, and we discussed options of BiPAP titration study versus starting auto BiPAP.   Patient prefers to start auto BiPAP    Prescribed auto BiPAP max IPAP 25, minimum EPAP 8, pressure support 4     follow-up BiPAP compliance 1 to 3 months after BiPAP initiation

## 2023-08-30 ENCOUNTER — TELEPHONE (OUTPATIENT)
Dept: SLEEP CENTER | Facility: CLINIC | Age: 78
End: 2023-08-30

## 2023-08-30 ENCOUNTER — OFFICE VISIT (OUTPATIENT)
Dept: ENDOCRINOLOGY | Facility: CLINIC | Age: 78
End: 2023-08-30
Payer: COMMERCIAL

## 2023-08-30 VITALS
BODY MASS INDEX: 36.88 KG/M2 | DIASTOLIC BLOOD PRESSURE: 68 MMHG | SYSTOLIC BLOOD PRESSURE: 142 MMHG | HEART RATE: 48 BPM | OXYGEN SATURATION: 98 % | HEIGHT: 69 IN | WEIGHT: 249 LBS

## 2023-08-30 DIAGNOSIS — E83.9 CHRONIC KIDNEY DISEASE-MINERAL AND BONE DISORDER: ICD-10-CM

## 2023-08-30 DIAGNOSIS — Z79.4 TYPE 2 DIABETES MELLITUS WITH STAGE 4 CHRONIC KIDNEY DISEASE, WITH LONG-TERM CURRENT USE OF INSULIN (HCC): ICD-10-CM

## 2023-08-30 DIAGNOSIS — Z79.4 TYPE 2 DIABETES MELLITUS WITH STAGE 3A CHRONIC KIDNEY DISEASE, WITH LONG-TERM CURRENT USE OF INSULIN (HCC): ICD-10-CM

## 2023-08-30 DIAGNOSIS — N18.31 TYPE 2 DIABETES MELLITUS WITH STAGE 3A CHRONIC KIDNEY DISEASE, WITH LONG-TERM CURRENT USE OF INSULIN (HCC): ICD-10-CM

## 2023-08-30 DIAGNOSIS — N18.30 BENIGN HYPERTENSION WITH CHRONIC KIDNEY DISEASE, STAGE III (HCC): ICD-10-CM

## 2023-08-30 DIAGNOSIS — E11.22 TYPE 2 DIABETES MELLITUS WITH STAGE 3A CHRONIC KIDNEY DISEASE, WITH LONG-TERM CURRENT USE OF INSULIN (HCC): ICD-10-CM

## 2023-08-30 DIAGNOSIS — I12.9 BENIGN HYPERTENSION WITH CHRONIC KIDNEY DISEASE, STAGE III (HCC): ICD-10-CM

## 2023-08-30 DIAGNOSIS — N18.9 CHRONIC KIDNEY DISEASE-MINERAL AND BONE DISORDER: ICD-10-CM

## 2023-08-30 DIAGNOSIS — E55.9 VITAMIN D DEFICIENCY: ICD-10-CM

## 2023-08-30 DIAGNOSIS — I10 ESSENTIAL HYPERTENSION: ICD-10-CM

## 2023-08-30 DIAGNOSIS — I25.10 CORONARY ARTERY DISEASE INVOLVING NATIVE CORONARY ARTERY OF NATIVE HEART WITHOUT ANGINA PECTORIS: ICD-10-CM

## 2023-08-30 DIAGNOSIS — M89.9 CHRONIC KIDNEY DISEASE-MINERAL AND BONE DISORDER: ICD-10-CM

## 2023-08-30 DIAGNOSIS — E11.22 TYPE 2 DIABETES MELLITUS WITH STAGE 4 CHRONIC KIDNEY DISEASE, WITH LONG-TERM CURRENT USE OF INSULIN (HCC): ICD-10-CM

## 2023-08-30 DIAGNOSIS — N18.4 TYPE 2 DIABETES MELLITUS WITH STAGE 4 CHRONIC KIDNEY DISEASE, WITH LONG-TERM CURRENT USE OF INSULIN (HCC): ICD-10-CM

## 2023-08-30 PROCEDURE — 95251 CONT GLUC MNTR ANALYSIS I&R: CPT | Performed by: INTERNAL MEDICINE

## 2023-08-30 PROCEDURE — 99214 OFFICE O/P EST MOD 30 MIN: CPT | Performed by: INTERNAL MEDICINE

## 2023-08-30 RX ORDER — INSULIN LISPRO 100 [IU]/ML
INJECTION, SOLUTION INTRAVENOUS; SUBCUTANEOUS
Qty: 15 ML | Refills: 1
Start: 2023-08-30

## 2023-08-30 NOTE — PROGRESS NOTES
Keke Benito. 69 y.o. male MRN: [id number]    Encounter: [id number]      Assessment/Plan     Assessment: This is a 69y.o.-year-old male with diabetes with hyperglycemia. Plan:    Diagnoses and all orders for this visit:    Type 2 diabetes mellitus with stage 3a chronic kidney disease, with long-term current use of insulin ([facility])    Lab Results   Component Value Date    HGBA1C 8.3 (H) 08/28/2023   A1c is 8.3%, uncontrolled. Change Humalog to 16 units before brunch and 18 units before dinner  Continue Toujeo at current dose  Discussed the importance of taking Victoza regularly  Discussed hypoglycemia symptoms and treatment    -     Basic metabolic panel; Future  -     Hemoglobin A1C; Future    Essential hypertension  Blood pressure is elevated  Goal for blood pressure is less than 130/80 mmHg. Follow-up with nephrology  Benign hypertension with chronic kidney disease, stage III ([address])  Continue to follow with nephrology  Vitamin D deficiency  Continue current dose of vitamin D3 supplementation  Coronary artery disease involving native coronary artery of native heart without angina pectoris  Follow-up with cardiology  Chronic kidney disease-mineral and bone disorder  managed by nephrology   Type 2 diabetes mellitus with stage 4 chronic kidney disease, with long-term current use of insulin ([facility])    -     insulin lispro (HumaLOG KwikPen) 100 units/mL injection pen; Inject 16 units before brunch and  18  units before dinner +scale        CC: Diabetes    History of Present Illness     HPI:    Keke Benito. is 64-year-old man with medical history of type 2 diabetes, with CKD, hypertension, vitamin D deficiency, coronary artery disease, hyperlipidemia is here for follow-up today. His course has been stable. He denies any recent hospitalization for hyperglycemia or hypoglycemia. He uses continuous glucose monitor sensor by [employer]. 14 days of review from August 15, 2023 to August 28, 2023 reviewed.   More than 72 hours of data reviewed average glucose is 187 mg per DL, glucose variability is 25%. 57% blood sugars are in target range, 0% blood sugars are low, 30% blood sugars are high, 13% blood sugars are very high    Patient has pattern of elevated blood sugars usually after dinner dose to 250 mg per DL. He denies missing doses of insulin. Current regimen is Victoza 1.8 mg once daily which he misses sometimes, Humalog 16 units before lunch and 16  units before dinner and Toujeo 42 units at dinnertime. For hypertension he takes Lopressor 50 mg twice a day, myocarditis 20 mg daily Demadex 20 mg daily  For hyperlipidemia he takes Lipitor 80 mg daily  Lab Results   Component Value Date    CALCIUM 8.4 08/28/2023    PHOS 4.7 (H) 06/02/2023          Review of Systems   Constitutional: Negative for activity change, diaphoresis, fatigue, fever and unexpected weight change. HENT: Negative. Eyes: Negative for visual disturbance. Respiratory: Negative for cough, chest tightness and shortness of breath. Cardiovascular: Negative for chest pain, palpitations and leg swelling. Gastrointestinal: Negative for abdominal pain, blood in stool, constipation, diarrhea, nausea and vomiting. Endocrine: Negative for cold intolerance, heat intolerance, polydipsia, polyphagia and polyuria. Genitourinary: Negative for dysuria, enuresis, frequency and urgency. Musculoskeletal: Negative for arthralgias and myalgias. Skin: Negative for pallor, rash and wound. Allergic/Immunologic: Negative. Neurological: Negative for dizziness, tremors, weakness and numbness. Hematological: Negative. Psychiatric/Behavioral: Negative. Historical Information   Past Medical History:   Diagnosis Date   • CHF (congestive heart failure) (HCC)    • Chronic kidney disease 18 - 24 months?     Dr Jesica Bradshaw - stage 3   • Colon polyp    • CPAP (continuous positive airway pressure) dependence    • Diabetes mellitus ([address])    • History of transfusion    • Hyperlipidemia    • Hypertension    • Myocardial infarction ([address]) 2019    Open heart surgery with quad bypass   • Obesity    • Renal disorder    • Sleep apnea    • Visual impairment     Dr Hamilton French     Past Surgical History:   Procedure Laterality Date   • APPENDECTOMY      Done in conjunction with cholecystectomy   • BACK SURGERY     • CHOLECYSTECTOMY     • COLONOSCOPY     • CORONARY ARTERY BYPASS GRAFT      quad   • EGD     • GALLBLADDER SURGERY     • HERNIA REPAIR     • [place] CORONARY ARTERY BYP W/VEIN & ARTERY GRAFT 4 VEIN N/A 2019    Procedure: CORONARY ARTERY BYPASS GRAFT (CABG) 4 VESSELS WITH SVG TO PDA, OM, DIAGONAL AND LIMA TO LAD; RIGHT LEG EVH;  Surgeon: Rose Elias MD;  Location: [facility];  Service: Cardiac Surgery   • RECTAL SURGERY     • SPINE SURGERY      Fusion L3-L5? • TONSILLECTOMY       Social History   Social History     Substance and Sexual Activity   Alcohol Use Not Currently    Comment: none since most recent hospitalization     Social History     Substance and Sexual Activity   Drug Use Never     Social History     Tobacco Use   Smoking Status Former   • Packs/day: 3.00   • Years: 35.00   • Total pack years: 105.00   • Types: Cigarettes, Cigars   • Start date: 1957   • Quit date: 1992   • Years since quittin.6   Smokeless Tobacco Never   Tobacco Comments    Started smoking as a pre-teenager     Family History:   Family History   Problem Relation Age of Onset   • Cancer Mother    • Alcohol abuse Mother    • Cancer Father    • Alcohol abuse Father    • Diabetes Maternal Grandmother    • Diabetes type II Maternal Grandmother         Geriatric onset -  5   • Diabetes Paternal Aunt    • Hypertension Paternal Grandfather        Meds/Allergies   Current Outpatient Medications   Medication Sig Dispense Refill   • amLODIPine (NORVASC) 5 mg tablet TAKE 1 TABLET (5 MG TOTAL) BY MOUTH DAILY.  90 tablet 1   • Ascorbic Acid (VITAMIN C) 1000 MG tablet Take 1,000 mg by mouth daily     • aspirin (ECOTRIN LOW STRENGTH) 81 mg EC tablet Take 1 tablet (81 mg total) by mouth daily 60 tablet 8   • atorvastatin (LIPITOR) 80 mg tablet Take 1 tablet (80 mg total) by mouth daily with dinner 90 tablet 3   • B-D ULTRAFINE III SHORT PEN 31G X 8 MM MISC INJECT INTO THE SKIN 4 TIMES DAILY 400 each 1   • calcitriol (ROCALTROL) 0.25 mcg capsule Take 1 capsule (0.25 mcg total) by mouth 2 (two) times a week 26 capsule 3   • cholecalciferol (VITAMIN D3) 1,000 units tablet Take 2 tablets (2,000 Units total) by mouth daily 1 tablet 1   • Continuous Blood Gluc  (FreeStyle Celia 2 [place]) [facility] Use to check blood sugar daily 1 each 0   • Continuous Blood Gluc Sensor (FreeStyle Celia 2 Sensor) MISC Use daily as directed for CGM - Change every 14 days 6 each 3   • cyanocobalamin (VITAMIN B-12) 500 mcg tablet Take 500 mcg by mouth daily     • finasteride (PROSCAR) 5 mg tablet Take 1 tablet (5 mg total) by mouth daily 90 tablet 3   • glucose blood test strip Check 4 times a day 400 each 1   • insulin lispro (HumaLOG KwikPen) 100 units/mL injection pen Inject 16 units before brunch and  18  units before dinner +scale 15 mL 1   • latanoprost (XALATAN) 0.005 % ophthalmic solution Administer 1 drop to both eyes daily at bedtime     • metoprolol tartrate (LOPRESSOR) 50 mg tablet Take 1 tablet (50 mg total) by mouth 2 (two) times a day 180 tablet 1   • Microlet Lancets MISC USE 3 TIMES  each 1   • nitrofurantoin (MACRODANTIN) 50 mg capsule Take 1 capsule (50 mg total) by mouth daily at bedtime 90 capsule 3   • pantoprazole (PROTONIX) 40 mg tablet TAKE 1 TABLET BY MOUTH EVERY DAY 90 tablet 1   • tamsulosin (FLOMAX) 0.4 mg TAKE 2 CAPSULES BY MOUTH DAILY WITH DINNER. . 180 capsule 0   • telmisartan (MICARDIS) 20 MG tablet TAKE 1 TABLET BY MOUTH EVERY DAY 90 tablet 1   • torsemide (DEMADEX) 20 mg tablet Take 1 tablet (20 mg total) by mouth daily 90 tablet 1 • Victoza injection INJECT 1.8 MG UNDER THE SKIN ONCE DAILY 6 mL 1   • insulin glargine (Toujeo Max SoloStar) 300 units/mL CONCENTRATED U-300 injection pen (2-unit dial) INJECT 42 UNITS UNDER THE SKIN DAILY at dinner time 18 mL 1     No current facility-administered medications for this visit. No Known Allergies    Objective   Vitals: Blood pressure 142/68, pulse (!) 48, height 5' 9" (1.753 m), weight 113 kg (249 lb), SpO2 98 %. Physical Exam  Vitals reviewed. Constitutional:       General: He is not in acute distress. Appearance: He is well-developed. HENT:      Head: Normocephalic and atraumatic. Eyes:      Pupils: Pupils are equal, round, and reactive to light. Neck:      Thyroid: No thyromegaly. Cardiovascular:      Rate and Rhythm: Normal rate and regular rhythm. Heart sounds: Normal heart sounds. Pulmonary:      Effort: Pulmonary effort is normal. No respiratory distress. Breath sounds: Normal breath sounds. Musculoskeletal:         General: No deformity. Normal range of motion. Cervical back: Normal range of motion and neck supple. Skin:     General: Skin is warm and dry. Findings: No erythema. Neurological:      Mental Status: He is alert and oriented to person, place, and time. The history was obtained from the review of the chart, patient.     Lab Results:   Lab Results   Component Value Date/Time    Hemoglobin A1C 8.3 (H) 08/28/2023 10:07 AM    Hemoglobin A1C 6.5 (H) 03/10/2023 09:14 AM    Hemoglobin A1C 7.4 (H) 11/23/2022 09:35 AM    WBC 5.89 06/02/2023 09:01 AM    WBC 6.15 02/20/2023 10:02 AM    WBC 7.55 10/24/2022 09:48 AM    Hemoglobin 9.4 (L) 06/02/2023 09:01 AM    Hemoglobin 10.2 (L) 02/20/2023 10:02 AM    Hemoglobin 10.7 (L) 10/24/2022 09:48 AM    Hematocrit 30.1 (L) 06/02/2023 09:01 AM    Hematocrit 32.8 (L) 02/20/2023 10:02 AM    Hematocrit 33.9 (L) 10/24/2022 09:48 AM    MCV 95 06/02/2023 09:01 AM    MCV 96 02/20/2023 10:02 AM    MCV 93 10/24/2022 09:48 AM    Platelets 169 90/87/8736 09:01 AM    Platelets 571 80/24/8950 10:02 AM    Platelets 445 07/30/4557 09:48 AM    BUN 65 (H) 08/28/2023 10:07 AM    BUN 58 (H) 07/19/2023 10:07 AM    BUN 60 (H) 07/06/2023 09:48 AM    Potassium 5.0 08/28/2023 10:07 AM    Potassium 4.3 07/19/2023 10:07 AM    Potassium 4.6 07/06/2023 09:48 AM    Chloride 110 (H) 08/28/2023 10:07 AM    Chloride 108 07/19/2023 10:07 AM    Chloride 107 07/06/2023 09:48 AM    CO2 23 08/28/2023 10:07 AM    CO2 22 07/19/2023 10:07 AM    CO2 25 07/06/2023 09:48 AM    Creatinine 3.39 (H) 08/28/2023 10:07 AM    Creatinine 3.27 (H) 07/19/2023 10:07 AM    Creatinine 3.23 (H) 07/06/2023 09:48 AM    Albumin 3.4 (L) 02/20/2023 10:02 AM    HDL, Direct 47 10/24/2022 09:48 AM    Triglycerides 70 10/24/2022 09:48 AM           Imaging Studies: I have personally reviewed pertinent reports. Portions of the record may have been created with voice recognition software. Occasional wrong word or "sound a like" substitutions may have occurred due to the inherent limitations of voice recognition software. Read the chart carefully and recognize, using context, where substitutions have occurred.

## 2023-08-30 NOTE — PATIENT INSTRUCTIONS
CPAP MAINTENANCE AND MANAGEMENT    1. Continue use of CPAP equipment nightly - use any time you are laying down to rest, watch TV, etc to increase use and in case you fall asleep to prevent falling asleep without it  2. If air is too hot, turn down the tubing heating setting  3. If excessive dry mouth in the morning, turn up humidifier setting and be sure to only use distilled water in your humidifier. You can also turn down the tubing heating setting  4. Change your filter regularly and wash the non-disposable filter  5. Continue to clean your equipment, as discussed, using mild soap and water. You can use unscented baby wipe on the mask. 6.  Contact the [facility] with any questions or concerns prior to your next visit, as needed  7. Schedule visit for follow-up in 3-4 months. You should see your sleep physician at least once a year. HOW TO CLEAN YOUR AUTO CPAP MACHINE    Mask: Clean the cushion of your mask daily. Dish soap and warm water. (Usually eligible for mask cushions every 3 months)  2. Headgear: Once a week. I find when you wash it daily it eats the material of the Velcro faster.  (Usually eligible for new headgear every 6 months)  3. Heated Tubing: Clean the tube every 3-7 days. One drop of dish soap run warm water through the tube then hang it over your shower curtain and let it drip dry. (Usually eligible every 3 months)  4. Humidifier: Rinse out every 1-3 days. Dish soap warm water or , top shelf. (eligible every 6 months) **DISTILLED WATER ONLY**  5. Filters:  Dark blue (reusable for 6 months)- Rinse under warm water (no soap) sit and drip dry every 2-3 weeks. (eligible for a new one every 6 months)  Light Blue (disposable) throw away every 2-4 weeks depending on how dirty it looks. (eligible for 6 every 3 months)    Check with your insurance and durable medical equipment company regarding supply replacements.      Nursing Support:  When: Monday through Friday 7A-5PM except holidays  Where: Our direct line is [phone]. If you are having a true emergency please call 911. In the event that the line is busy or it is after hours please leave a voice message and we will return your call. Please speak clearly, leaving your full name, birth date, best number to reach you and the reason for your call. Medication refills: We will need the name of the medication, the dosage, the ordering provider, whether you get a 30 or 90 day refill, and the pharmacy name and address. Medications will be ordered by the provider only. Nurses cannot call in prescriptions. Please allow 7 days for medication refills. Physician requested updates: If your provider requested that you call with an update after starting medication, please be ready to provide us the medication and dosage, what time you take your medication, the time you attempt to fall asleep, time you fall asleep, when you wake up, and what time you get out of bed. Sleep Study Results: We will contact you with sleep study results and/or next steps after the physician has reviewed your testing. Usually one of our nurses will call to talk about the results within 1-2 weeks of testing.

## 2023-08-31 LAB
DME PARACHUTE DELIVERY DATE EXPECTED: NORMAL
DME PARACHUTE DELIVERY DATE REQUESTED: NORMAL
DME PARACHUTE DELIVERY NOTE: NORMAL
DME PARACHUTE ITEM DESCRIPTION: NORMAL
DME PARACHUTE ORDER STATUS: NORMAL
DME PARACHUTE SUPPLIER NAME: NORMAL
DME PARACHUTE SUPPLIER PHONE: NORMAL

## 2023-09-07 DIAGNOSIS — K92.2 GI BLEED: ICD-10-CM

## 2023-09-07 RX ORDER — PANTOPRAZOLE SODIUM 40 MG/1
40 TABLET, DELAYED RELEASE ORAL DAILY
Qty: 90 TABLET | Refills: 1 | Status: SHIPPED | OUTPATIENT
Start: 2023-09-07

## 2023-09-12 ENCOUNTER — RA CDI HCC (OUTPATIENT)
Dept: OTHER | Facility: HOSPITAL | Age: 78
End: 2023-09-12

## 2023-09-12 NOTE — PROGRESS NOTES
[address] [facility] coding opportunities          Chart Reviewed number of suggestions sent to Provider: 3   [id number] see 12/12/22 eye exam assessment  E11.65 last A1c 8.3  I13.0    Patients Insurance     Medicare Insurance: [employer]

## 2023-09-13 ENCOUNTER — TELEPHONE (OUTPATIENT)
Dept: SLEEP CENTER | Facility: CLINIC | Age: 78
End: 2023-09-13

## 2023-09-13 NOTE — TELEPHONE ENCOUNTER
I called the pt. and LVM asking him to call us back [facility] [phone], there is an unresolved issue with his account that needs to be corrected before we can move forward and provide new equipment.

## 2023-09-15 ENCOUNTER — OFFICE VISIT (OUTPATIENT)
Dept: FAMILY MEDICINE CLINIC | Facility: OTHER | Age: 78
End: 2023-09-15
Payer: COMMERCIAL

## 2023-09-15 VITALS
OXYGEN SATURATION: 98 % | DIASTOLIC BLOOD PRESSURE: 60 MMHG | TEMPERATURE: 98 F | RESPIRATION RATE: 15 BRPM | SYSTOLIC BLOOD PRESSURE: 116 MMHG | BODY MASS INDEX: 37.47 KG/M2 | HEIGHT: 69 IN | WEIGHT: 253 LBS | HEART RATE: 56 BPM

## 2023-09-15 DIAGNOSIS — Z79.4 TYPE 2 DIABETES MELLITUS WITH STAGE 4 CHRONIC KIDNEY DISEASE, WITH LONG-TERM CURRENT USE OF INSULIN (HCC): Primary | ICD-10-CM

## 2023-09-15 DIAGNOSIS — E11.42 DIABETIC POLYNEUROPATHY ASSOCIATED WITH TYPE 2 DIABETES MELLITUS (HCC): ICD-10-CM

## 2023-09-15 DIAGNOSIS — E11.22 TYPE 2 DIABETES MELLITUS WITH STAGE 4 CHRONIC KIDNEY DISEASE, WITH LONG-TERM CURRENT USE OF INSULIN (HCC): Primary | ICD-10-CM

## 2023-09-15 DIAGNOSIS — N18.4 TYPE 2 DIABETES MELLITUS WITH STAGE 4 CHRONIC KIDNEY DISEASE, WITH LONG-TERM CURRENT USE OF INSULIN (HCC): Primary | ICD-10-CM

## 2023-09-15 PROCEDURE — 99213 OFFICE O/P EST LOW 20 MIN: CPT | Performed by: FAMILY MEDICINE

## 2023-09-15 NOTE — PATIENT INSTRUCTIONS
For healthy whole food plant based meals go to [employer] then go to Reply[employer]" then [employer]". Chef Correia's menu changes weekly and its best to place your order in over the weekend so that you can pick it up during the week. Keep in mind that her food will be sold out by Wednesday-Thursday. Check out the juan [employer]ose "[employer]" which allows you to scan the barcode of food when grocery shopping and will read the nutrition label for you allowing you to make healthier food choices.

## 2023-09-15 NOTE — PROGRESS NOTES
Assessment/Plan:    Type 2 diabetes mellitus with stage 4 chronic kidney disease, with long-term current use of insulin ([facility])    Lab Results   Component Value Date    HGBA1C 8.3 (H) 08/28/2023     - Patient followed by Endocrinology  - Last A1C of 6.5  - Patient reporting worsening dietary habits as contributing to his rise in A1C  - Wife noting that the patient is eating more junk lately  - Is not routinely checking BG     Plan  - A1C elevation in setting of CKD; will need to follow up on fructosamine for more accurate measure  - Advised patient to reduce consumption of junk food given jump in A1C  - Encouraged patient to make healthier food choices with resources provided  - Recommend regular glucose checks particular in the morning upon waking  - F/u A1C in 2 months       Diabetic polyneuropathy associated with type 2 diabetes mellitus ([address])    Lab Results   Component Value Date    HGBA1C 8.3 (H) 08/28/2023     - Patient with noted decreased sensation of his lower extremities from the ankle down  - He is without any worsening symptoms at this time or signs of infection  - Patient is followed by Podiatry    Plan  - Diabetes management  - F/u Podiatry for routine foot care  - Advised patient to wear protective footwear regularly and to never walk barefoot        Diagnoses and all orders for this visit:    Type 2 diabetes mellitus with stage 4 chronic kidney disease, with long-term current use of insulin ([address])    Diabetic polyneuropathy associated with type 2 diabetes mellitus ([address])          Subjective:      Patient ID: Sherl Libman. is a 69 y.o. male. Guille Lr is a 69 yoM PMHx of T2DM with CKD4 and polyneuropathy presenting for diabetes management. Patients last A1C 1 month prior showing an increase from 6.5 to 8.3. Patient and wife noting that he is consuming more junk food as of late. Patient is otherwise without any acute complaints or concerns.  He is without any signs/sympotoms of worsening comorbid conditions related to his diabetes at this time. He denies polydipsia, polyuria, worsening fatigue, headache, change in vision, worsening polyneuropathy, episodes of confusion, constipation or abdominal pain. He is seen by Endocrinology and Nephrology. He reports being compliant with his medication at home. He notes not checking his blood glucose regularly. The following portions of the patient's history were reviewed and updated as appropriate: allergies, current medications, past family history, past medical history, past social history, past surgical history and problem list.    Review of Systems   Constitutional: Negative for activity change, appetite change, chills and fever. HENT: Negative for ear pain and sore throat. Eyes: Negative for pain and visual disturbance. Respiratory: Negative for cough and shortness of breath. Cardiovascular: Negative for chest pain and palpitations. Gastrointestinal: Negative for abdominal pain and vomiting. Endocrine: Negative for polyuria. Genitourinary: Negative for difficulty urinating, dysuria and hematuria. Musculoskeletal: Negative for arthralgias and back pain. Skin: Negative for color change and rash. Neurological: Negative for dizziness, syncope, weakness, light-headedness and headaches. All other systems reviewed and are negative. Objective:      /60   Pulse 56   Temp 98 °F (36.7 °C)   Resp 15   Ht 5' 9" (1.753 m)   Wt 115 kg (253 lb)   SpO2 98%   BMI 37.36 kg/m²          Physical Exam  Constitutional:       General: He is not in acute distress. Appearance: Normal appearance. He is not ill-appearing. HENT:      Head: Normocephalic and atraumatic. Right Ear: External ear normal.      Left Ear: External ear normal.      Nose: Nose normal.   Eyes:      Extraocular Movements: Extraocular movements intact.       Conjunctiva/sclera: Conjunctivae normal.   Cardiovascular:      Rate and Rhythm: Normal rate and regular rhythm. Pulses: Normal pulses. Heart sounds: Normal heart sounds. Pulmonary:      Effort: Pulmonary effort is normal.      Breath sounds: Normal breath sounds. No wheezing, rhonchi or rales. Abdominal:      General: Abdomen is flat. There is no distension. Palpations: Abdomen is soft. There is no mass. Tenderness: There is no abdominal tenderness. There is no guarding. Musculoskeletal:      Right lower leg: No edema. Left lower leg: No edema. Skin:     General: Skin is warm and dry. Neurological:      Mental Status: He is alert.

## 2023-09-18 ENCOUNTER — TELEPHONE (OUTPATIENT)
Dept: SLEEP CENTER | Facility: CLINIC | Age: 78
End: 2023-09-18

## 2023-09-18 NOTE — ASSESSMENT & PLAN NOTE
Lab Results   Component Value Date    HGBA1C 8.3 (H) 08/28/2023     - Patient followed by Endocrinology  - Last A1C of 6.5  - Patient reporting worsening dietary habits as contributing to his rise in A1C  - Wife noting that the patient is eating more junk lately  - Is not routinely checking BG     Plan  - A1C elevation in setting of CKD; will need to follow up on fructosamine for more accurate measure  - Advised patient to reduce consumption of junk food given jump in A1C  - Encouraged patient to make healthier food choices with resources provided  - Recommend regular glucose checks particular in the morning upon waking  - F/u A1C in 2 months

## 2023-09-18 NOTE — TELEPHONE ENCOUNTER
I set this pt. up in [facility]- on a ResMed S10 BIPAP Aircurve 25/8cm, PS 4cm and gave the N20 (M) nasal mask.

## 2023-09-18 NOTE — ASSESSMENT & PLAN NOTE
Lab Results   Component Value Date    HGBA1C 8.3 (H) 08/28/2023     - Patient with noted decreased sensation of his lower extremities from the ankle down  - He is without any worsening symptoms at this time or signs of infection  - Patient is followed by Podiatry    Plan  - Diabetes management  - F/u Podiatry for routine foot care  - Advised patient to wear protective footwear regularly and to never walk barefoot

## 2023-09-27 DIAGNOSIS — E11.65 UNCONTROLLED TYPE 2 DIABETES MELLITUS WITH HYPERGLYCEMIA (HCC): ICD-10-CM

## 2023-09-28 ENCOUNTER — TELEPHONE (OUTPATIENT)
Dept: ENDOCRINOLOGY | Facility: CLINIC | Age: 78
End: 2023-09-28

## 2023-09-28 RX ORDER — LIRAGLUTIDE 6 MG/ML
INJECTION SUBCUTANEOUS
Qty: 6 ML | Refills: 0 | Status: SHIPPED | OUTPATIENT
Start: 2023-09-28

## 2023-10-09 ENCOUNTER — CONSULT (OUTPATIENT)
Dept: FAMILY MEDICINE CLINIC | Facility: OTHER | Age: 78
End: 2023-10-09
Payer: COMMERCIAL

## 2023-10-09 VITALS
HEART RATE: 60 BPM | RESPIRATION RATE: 16 BRPM | SYSTOLIC BLOOD PRESSURE: 146 MMHG | DIASTOLIC BLOOD PRESSURE: 52 MMHG | TEMPERATURE: 98 F | OXYGEN SATURATION: 100 % | HEIGHT: 69 IN | WEIGHT: 247 LBS | BODY MASS INDEX: 36.58 KG/M2

## 2023-10-09 DIAGNOSIS — Z01.818 PRE-OP EXAM: ICD-10-CM

## 2023-10-09 DIAGNOSIS — Z23 ENCOUNTER FOR IMMUNIZATION: Primary | ICD-10-CM

## 2023-10-09 PROCEDURE — 90471 IMMUNIZATION ADMIN: CPT

## 2023-10-09 PROCEDURE — 90662 IIV NO PRSV INCREASED AG IM: CPT

## 2023-10-09 PROCEDURE — 99213 OFFICE O/P EST LOW 20 MIN: CPT | Performed by: STUDENT IN AN ORGANIZED HEALTH CARE EDUCATION/TRAINING PROGRAM

## 2023-10-09 PROCEDURE — G0008 ADMIN INFLUENZA VIRUS VAC: HCPCS

## 2023-10-09 RX ORDER — BROMFENAC SODIUM 0.7 MG/ML
SOLUTION/ DROPS OPHTHALMIC
COMMUNITY
Start: 2023-09-22

## 2023-10-09 NOTE — PROGRESS NOTES
PRE-OPERATIVE EXAMINATION  [facility].  1945    [facility]. is a 69 y.o. male with PMHx of CAD, CABGx4, CHFpEF, T2DM, HLD, HTN, CKD4 who is planning to undergo cataract surgery of the left and right under unspecified form of anesthesia by [facility] on 10/25 and 11/8/23, respectively. The procedure is indicated for the following condition: cataracts. Patient has not had complications with anesthesia in the past.     ROS:   • Chest pain: no  • Shortness of breath: no  • Shortness of breath with exertion: yes  • Orthopnea: no  • Dizziness: no  • Unexplained weight change: no    PMH:  • CAD: yes  • HTN: yes  • CKD: yes  • DM: yes on insulin: yes  • History of CVA: no    He  reports that he quit smoking about 31 years ago. His smoking use included cigarettes and cigars. He started smoking about 66 years ago. He has a 105.00 pack-year smoking history. He has never used smokeless tobacco. He reports that he does not currently use alcohol. He reports that he does not use drugs. /52   Pulse 60   Temp 98 °F (36.7 °C)   Resp 16   Ht 5' 9" (1.753 m)   Wt 112 kg (247 lb)   SpO2 100%   BMI 36.48 kg/m²   Physical Exam  Constitutional:       General: He is not in acute distress. Appearance: Normal appearance. He is not ill-appearing. HENT:      Head: Normocephalic and atraumatic. Right Ear: External ear normal.      Left Ear: External ear normal.      Nose: Nose normal.   Eyes:      Extraocular Movements: Extraocular movements intact. Conjunctiva/sclera: Conjunctivae normal.   Cardiovascular:      Rate and Rhythm: Normal rate and regular rhythm. Pulses: Normal pulses. Heart sounds: Normal heart sounds. Pulmonary:      Effort: Pulmonary effort is normal.      Breath sounds: Normal breath sounds. Abdominal:      General: There is no distension. Tenderness: There is no abdominal tenderness. Musculoskeletal:         General: No swelling. Cervical back: Normal range of motion and neck supple. Right lower leg: Edema (2+; chronic) present. Left lower leg: Edema (1+; chronic) present. Skin:     General: Skin is warm and dry. Neurological:      Mental Status: He is alert and oriented to person, place, and time. Mental status is at baseline.        Revised Cardiac Risk Index (RCRI) for Pre-Operative Risk   (estimates risk of cardiac complications after noncardiac surgery)    · High-risk surgery: No 0 / Yes +1  · Intraperitoneal, intrathoracic, suprainguinal vascular  · History of ischemic heart disease: No 0 / Yes +1  · Hx of MI, (+) exercise test, current chest pain considered due to myocardial ischemia, use of nitrate therapy or ECG with pathological Q waves)  · History of CHF: No 0 / Yes +1  · Pulmonary edema, B/L rales or S3 gallop; SHERIFF, orthopnea, PND, CXR showing pulmonary vascular redistribution)  · History of cerebrovascular disease: No 0 / Yes +1  · Prior TIA or stroke  · Pre-operative treatment with insulin: No 0 / Yes +1  · Pre-operative creatinine >2 mg/dL: No 0 / Yes +1    RCRI Scoring:  · 0 points: Class I Risk, 3.9% 30-day risk of death, MI, or cardiac arrest  · 1 point: Class II Risk, 6.0% 30-day risk of death, MI, or cardiac arrest  · 2 points: Class III Risk, 10.1% 30-day risk of death, MI, or cardiac arrest  · 3 points: Class IV Risk, 15% 30-day risk of death, MI, or cardiac arrest  · 4 points: Class IV Risk, 15% 30-day risk of death, MI, or cardiac arrest  · 5 points: Class IV Risk, 15% 30-day risk of death, MI, or cardiac arrest  · 6 points: Class IV Risk, 15% 30-day risk of death, MI, or cardiac arrest    Lab Results   Component Value Date    CREATININE 3.39 (H) 08/28/2023       Demetra Treadwell was seen today for pre-op exam.    Diagnoses and all orders for this visit:    Encounter for immunization  -     influenza vaccine, high-dose, PF 0.7 mL (FLUZONE HIGH-DOSE)    EKG obtained in office and no significant changes noted from prior. Recommendations:  Luiz Goldsmith. is undergoing an elective Minimal risk surgery. He is RCRI class IV risk (4 points for ischemic heart disease, CHF, insulin, creatinine) with 15% 30-day risk of death, MI, or cardiac arrest. He may proceed with surgery as planned with cardiac evaluation. Pre-operative form completed and faxed today to office as requested. Discussed with Dr. Tello, who is in agreement with the plan as outlined above.

## 2023-10-10 PROCEDURE — 93000 ELECTROCARDIOGRAM COMPLETE: CPT | Performed by: STUDENT IN AN ORGANIZED HEALTH CARE EDUCATION/TRAINING PROGRAM

## 2023-10-10 NOTE — PROGRESS NOTES
Cardiology Consultation     Michelle Diaz  [id number]  1945  HEART & VASCULAR [facility] [place]  [address] [facility] [address]    1. Encounter for pre-operative cardiovascular clearance             Mr Eddie Dillard presents to our office for cardiac clearance. He will undergo Cataract surgery of the left eye on 10/25/23 and right eye 11/18/23 by Dr Sindy Benavides at [address] [place] Prim is able to walk every other day for 30 minutes without symptoms of chest pain or shortness of breath.       Medical History   Primary Cardiologist Dr Bui  CAD  CABG x 4 6/2019  Hypertension  CKD IV  Carotid stenosis   Dyslipidemia   Chronic HFpEF  Mild MR   HARJIT compliant with BIPAP   Patient Active Problem List   Diagnosis    CKD (chronic kidney disease), stage IV (HCC)    Benign hypertension with chronic kidney disease, stage IV (HCC)    Chronic kidney disease-mineral and bone disorder    Type 2 diabetes mellitus with stage 4 chronic kidney disease, with long-term current use of insulin (HCC)    Sleep apnea    Coronary artery disease involving native coronary artery of native heart without angina pectoris    Mixed hyperlipidemia    S/P CABG (coronary artery bypass graft)    Acute postoperative pulmonary insufficiency (HCC)    Iron deficiency anemia    Non-nephrotic range proteinuria    Esophageal dysphagia    History of colonic polyps    Lumbar back pain    Lumbar discogenic pain syndrome    Pain of lower extremity    Trigger finger of left hand    Spondylolisthesis    Spinal stenosis    Vitamin D deficiency    Lymphadenopathy    Stenosis of left internal carotid artery    Diabetic polyneuropathy associated with type 2 diabetes mellitus ([address])    Encounter for  medical examination (CDME)    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[address])    Traumatic injury of skin    Hypervolemia associated with renal insufficiency    Elevated TSH    Essential hypertension    Chronic diastolic (congestive) heart failure (HCC)    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    BPH (benign prostatic hyperplasia)    Asymptomatic bilateral carotid artery stenosis    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HCC)    Gait instability    GI bleed    Diverticulosis    Gastric ulcer    HARJIT (obstructive sleep apnea)    Secondary hyperparathyroidism of renal origin ([address])    Hyperphosphatemia    Osteopenia of neck of left femur    Obesity, morbid ([address])    Encounter to discuss test results    Incomplete bladder emptying    Recurrent UTI (urinary tract infection)    Nocturia     Past Medical History:   Diagnosis Date    CHF (congestive heart failure) (HCC)     Chronic kidney disease 18 - 24 months?     Dr Liban Navarro - stage 3    Colon polyp     CPAP (continuous positive airway pressure) dependence     Diabetes mellitus ([address])     History of transfusion     Hyperlipidemia     Hypertension     Myocardial infarction ([address]) 2019    Open heart surgery with quad bypass    Obesity     Renal disorder     Sleep apnea     Visual impairment     Dr Haimlton French     Social History     Socioeconomic History    Marital status: /Civil Union     Spouse name: Not on file    Number of children: Not on file    Years of education: Not on file    Highest education level: Not on file   Occupational History    Occupation: RETIRED   Tobacco Use    Smoking status: Former     Packs/day: 3.00     Years: 35.00     Total pack years: 105.00     Types: Cigarettes, Cigars     Start date: 1957     Quit date: 1992     Years since quittin.7    Smokeless tobacco: Never    Tobacco comments:     Started smoking as a pre-teenager   Vaping Use    Vaping Use: Never used   Substance and Sexual Activity    Alcohol use: Not Currently     Comment: none since most recent hospitalization    Drug use: Never    Sexual activity: Not Currently     Partners: Female     Birth control/protection: Male Sterilization, None   Other Topics Concern    Not on file   Social History Narrative    · Do you currently or have you served in the [address] [place] [address]:   No      · Were you activated, into active duty, as a member of the [employer] or as a Reservist:   No      · Caffeine intake:   Occasional      · Diet:   Regular      · Live alone or with others:   with others      · Exercise level:   Occasional  swim in summertime. walk alot. · ·Guns present in home:   No      · Seat belts used routinely:   Yes      · Advance directive:   No      · Sunscreen used routinely:   No      · Smoke alarm in home: Yes      · Legally blind in one or both eyes:   No      · Hard of hearing or deaf in one or both ears:   No      ·      Social Determinants of Health     Financial Resource Strain: Unknown (12/18/2020)    Overall Financial Resource Strain (CARDIA)     Difficulty of Paying Living Expenses: Patient refused   Food Insecurity: Unknown (12/18/2020)    Hunger Vital Sign     Worried About Running Out of Food in the Last Year: Patient refused     [address] in the Last Year: Patient refused   Transportation Needs: No Transportation Needs (12/18/2020)    PRAPARE - Transportation     Lack of Transportation (Medical): No     Lack of Transportation (Non-Medical):  No   Physical Activity: Unknown (7/27/2020)    Exercise Vital Sign     Days of Exercise per Week: Patient refused     Minutes of Exercise per Session: Patient refused   Stress: Not on file   Social Connections: Unknown (7/27/2020)    Social Connection and Isolation Panel [NHANES]     Frequency of Communication with Friends and Family: Patient refused     Frequency of Social Gatherings with Friends and Family: Patient refused     Attends L[employer] day Services: Patient refused     Active Member of Clubs or Organizations: Patient refused     Attends Club or Organization Meetings: Patient refused     Marital Status: Patient refused   Intimate Partner Violence: Not on file   Housing Stability: Not on file Family History   Problem Relation Age of Onset    Cancer Mother     Alcohol abuse Mother     Cancer Father     Alcohol abuse Father     Diabetes Maternal Grandmother     Diabetes type II Maternal Grandmother         Geriatric onset -  5    Diabetes Paternal Aunt     Hypertension Paternal Grandfather      Past Surgical History:   Procedure Laterality Date    APPENDECTOMY      Done in conjunction with cholecystectomy    [address]    COLONOSCOPY      CORONARY ARTERY BYPASS GRAFT      quad    EGD      GALLBLADDER SURGERY      HERNIA REPAIR      [place] CORONARY ARTERY BYP W/VEIN & ARTERY GRAFT 4 VEIN N/A 2019    Procedure: CORONARY ARTERY BYPASS GRAFT (CABG) 4 VESSELS WITH SVG TO PDA, OM, DIAGONAL AND LIMA TO LAD; RIGHT LEG EVH;  Surgeon: Anjana Sepulveda MD;  Location: [facility];  Service: Cardiac Surgery    RECTAL SURGERY      SPINE SURGERY      Fusion L3-L5? TONSILLECTOMY         Current Outpatient Medications:     amLODIPine (NORVASC) 5 mg tablet, TAKE 1 TABLET (5 MG TOTAL) BY MOUTH DAILY. , Disp: 90 tablet, Rfl: 1    Ascorbic Acid (VITAMIN C) 1000 MG tablet, Take 1,000 mg by mouth daily, Disp: , Rfl:     aspirin (ECOTRIN LOW STRENGTH) 81 mg EC tablet, Take 1 tablet (81 mg total) by mouth daily, Disp: 60 tablet, Rfl: 8    atorvastatin (LIPITOR) 80 mg tablet, Take 1 tablet (80 mg total) by mouth daily with dinner, Disp: 90 tablet, Rfl: 3    B-D ULTRAFINE III SHORT PEN 31G X 8 MM MISC, INJECT INTO THE SKIN 4 TIMES DAILY, Disp: 400 each, Rfl: 1    calcitriol (ROCALTROL) 0.25 mcg capsule, Take 1 capsule (0.25 mcg total) by mouth 2 (two) times a week, Disp: 26 capsule, Rfl: 3    cholecalciferol (VITAMIN D3) 1,000 units tablet, Take 2 tablets (2,000 Units total) by mouth daily, Disp: 1 tablet, Rfl: 1    Continuous Blood Gluc  (FreeStyle Celia 2 [place]) MAMI, Use to check blood sugar daily, Disp: 1 each, Rfl: 0    Continuous Blood Gluc Sensor (FreeStyle Celia 2 Sensor) MISC, Use daily as directed for CGM - Change every 14 days, Disp: 6 each, Rfl: 3    cyanocobalamin (VITAMIN B-12) 500 mcg tablet, Take 500 mcg by mouth daily, Disp: , Rfl:     finasteride (PROSCAR) 5 mg tablet, Take 1 tablet (5 mg total) by mouth daily, Disp: 90 tablet, Rfl: 3    glucose blood test strip, Check 4 times a day, Disp: 400 each, Rfl: 1    insulin glargine (Toujeo Max SoloStar) 300 units/mL CONCENTRATED U-300 injection pen (2-unit dial), INJECT 42 UNITS UNDER THE SKIN DAILY at dinner time, Disp: 18 mL, Rfl: 1    insulin lispro (HumaLOG KwikPen) 100 units/mL injection pen, Inject 16 units before brunch and  18  units before dinner +scale, Disp: 15 mL, Rfl: 1    latanoprost (XALATAN) 0.005 % ophthalmic solution, Administer 1 drop to both eyes daily at bedtime, Disp: , Rfl:     liraglutide (Victoza) injection, 1.8 mg once daily, Disp: 6 mL, Rfl: 0    metoprolol tartrate (LOPRESSOR) 50 mg tablet, Take 1 tablet (50 mg total) by mouth 2 (two) times a day, Disp: 180 tablet, Rfl: 1    Microlet Lancets MISC, USE 3 TIMES DAY, Disp: 300 each, Rfl: 1    nitrofurantoin (MACRODANTIN) 50 mg capsule, Take 1 capsule (50 mg total) by mouth daily at bedtime, Disp: 90 capsule, Rfl: 3    pantoprazole (PROTONIX) 40 mg tablet, Take 1 tablet (40 mg total) by mouth daily, Disp: 90 tablet, Rfl: 1    Prolensa 0.07 % SOLN, , Disp: , Rfl:     tamsulosin (FLOMAX) 0.4 mg, TAKE 2 CAPSULES BY MOUTH DAILY WITH DINNER. ., Disp: 180 capsule, Rfl: 0    telmisartan (MICARDIS) 20 MG tablet, TAKE 1 TABLET BY MOUTH EVERY DAY, Disp: 90 tablet, Rfl: 1    torsemide (DEMADEX) 20 mg tablet, Take 1 tablet (20 mg total) by mouth daily, Disp: 90 tablet, Rfl: 1  No Known Allergies  There were no vitals filed for this visit.     Labs:  Lab on 08/28/2023   Component Date Value    Hemoglobin A1C 08/28/2023 8.3 (H)     EAG 08/28/2023 192     Fructosamine 08/28/2023 272    Procedure visit on 08/23/2023   Component Date Value    POST-VOID RESIDUAL VOLUM* 08/23/2023 236      Imaging: No results found. Review of Systems:  Review of Systems   All other systems reviewed and are negative. Physical Exam:  Physical Exam  Constitutional:       Appearance: Normal appearance. HENT:      Head: Normocephalic. Cardiovascular:      Rate and Rhythm: Normal rate and regular rhythm. Pulses: Normal pulses. Heart sounds: Normal heart sounds. Pulmonary:      Effort: Pulmonary effort is normal.      Comments: Diminished breath sounds right base   Abdominal:      Comments: Obese    Musculoskeletal:      Cervical back: Normal range of motion and neck supple. Right lower leg: Edema present. Left lower leg: Edema present. Comments: Trace to 1+ aleksandra LE edema    Skin:     General: Skin is warm and dry. Capillary Refill: Capillary refill takes less than 2 seconds. Neurological:      General: No focal deficit present. Mental Status: He is alert and oriented to person, place, and time. Psychiatric:      Comments: Flat affect          Discussion/Summary:  # Pre operative cardiovascular clearance for Cataract surgery of the left eye on 10/25/23 and right eye 11/18/23 by Dr Blair Caceres at [address] Ermelinda Huff is able to walk every other day for 30 minutes without symptoms of chest pain or shortness of breath. He is able to perform 3-4 METS without symptoms. EKG Sinus bradycardia 51 BPM, 1st degree AVB. Apical HR in the office 59 BPM,  I have advised Ermelinda Huff to take Metoprolol tartrate 25mg in am prior to the procedure. On PE diminished breath sounds right base. Instructed to take Extra Torsemide 20mg daily for 2 days. I have advised on a 2gm sodium diet. He at low risk for the intended procedure. Nor further cardiac testing needed a this time. He is on ASA 81mg daily.

## 2023-10-11 ENCOUNTER — OFFICE VISIT (OUTPATIENT)
Dept: CARDIOLOGY CLINIC | Facility: CLINIC | Age: 78
End: 2023-10-11
Payer: COMMERCIAL

## 2023-10-11 VITALS
HEIGHT: 69 IN | WEIGHT: 247 LBS | DIASTOLIC BLOOD PRESSURE: 58 MMHG | HEART RATE: 60 BPM | SYSTOLIC BLOOD PRESSURE: 152 MMHG | BODY MASS INDEX: 36.58 KG/M2 | OXYGEN SATURATION: 98 %

## 2023-10-11 DIAGNOSIS — Z01.810 ENCOUNTER FOR PRE-OPERATIVE CARDIOVASCULAR CLEARANCE: Primary | ICD-10-CM

## 2023-10-11 PROCEDURE — 99203 OFFICE O/P NEW LOW 30 MIN: CPT | Performed by: NURSE PRACTITIONER

## 2023-10-11 NOTE — PATIENT INSTRUCTIONS
Take Metoprolol tartrate 25mg in am prior to Cataract procedure,   Take amlodipine 5mg in am prior to the procedure     Extra Torsemide 20mg daily for 2 days

## 2023-10-19 DIAGNOSIS — I12.9 BENIGN HYPERTENSION WITH CHRONIC KIDNEY DISEASE, STAGE IV (HCC): ICD-10-CM

## 2023-10-19 DIAGNOSIS — N18.4 BENIGN HYPERTENSION WITH CHRONIC KIDNEY DISEASE, STAGE IV (HCC): ICD-10-CM

## 2023-10-19 RX ORDER — TELMISARTAN 20 MG/1
TABLET ORAL
Qty: 90 TABLET | Refills: 1 | Status: SHIPPED | OUTPATIENT
Start: 2023-10-19

## 2023-10-22 PROBLEM — N39.0 RECURRENT UTI (URINARY TRACT INFECTION): Status: RESOLVED | Noted: 2023-08-23 | Resolved: 2023-10-22

## 2023-10-23 DIAGNOSIS — Z95.1 S/P CABG (CORONARY ARTERY BYPASS GRAFT): ICD-10-CM

## 2023-10-23 DIAGNOSIS — I25.10 TRIPLE VESSEL CORONARY ARTERY DISEASE: ICD-10-CM

## 2023-10-24 NOTE — TELEPHONE ENCOUNTER
Patient called in for a refill of his medication atorvastatin (LIPITOR) 80 mg. Patient stated that he is out of his medication.

## 2023-10-25 RX ORDER — ATORVASTATIN CALCIUM 80 MG/1
80 TABLET, FILM COATED ORAL
Qty: 90 TABLET | Refills: 0 | Status: SHIPPED | OUTPATIENT
Start: 2023-10-25

## 2023-10-30 ENCOUNTER — APPOINTMENT (OUTPATIENT)
Dept: LAB | Facility: HOSPITAL | Age: 78
End: 2023-10-30
Attending: INTERNAL MEDICINE
Payer: COMMERCIAL

## 2023-10-30 DIAGNOSIS — N18.4 CKD (CHRONIC KIDNEY DISEASE), STAGE IV (HCC): ICD-10-CM

## 2023-10-30 LAB
ALBUMIN SERPL BCP-MCNC: 3.6 G/DL (ref 3.5–5)
ANION GAP SERPL CALCULATED.3IONS-SCNC: 8 MMOL/L
BUN SERPL-MCNC: 62 MG/DL (ref 5–25)
CALCIUM SERPL-MCNC: 9.1 MG/DL (ref 8.4–10.2)
CHLORIDE SERPL-SCNC: 110 MMOL/L (ref 96–108)
CO2 SERPL-SCNC: 23 MMOL/L (ref 21–32)
CREAT SERPL-MCNC: 3.9 MG/DL (ref 0.6–1.3)
CREAT UR-MCNC: 64.8 MG/DL
ERYTHROCYTE [DISTWIDTH] IN BLOOD BY AUTOMATED COUNT: 14.9 % (ref 11.6–15.1)
GFR SERPL CREATININE-BSD FRML MDRD: 14 ML/MIN/1.73SQ M
GLUCOSE P FAST SERPL-MCNC: 135 MG/DL (ref 65–99)
HCT VFR BLD AUTO: 30.2 % (ref 36.5–49.3)
HGB BLD-MCNC: 9.2 G/DL (ref 12–17)
MAGNESIUM SERPL-MCNC: 2.2 MG/DL (ref 1.9–2.7)
MCH RBC QN AUTO: 29.5 PG (ref 26.8–34.3)
MCHC RBC AUTO-ENTMCNC: 30.5 G/DL (ref 31.4–37.4)
MCV RBC AUTO: 97 FL (ref 82–98)
PHOSPHATE SERPL-MCNC: 4.9 MG/DL (ref 2.3–4.1)
PLATELET # BLD AUTO: 220 THOUSANDS/UL (ref 149–390)
PMV BLD AUTO: 12 FL (ref 8.9–12.7)
POTASSIUM SERPL-SCNC: 5.1 MMOL/L (ref 3.5–5.3)
PROT UR-MCNC: 415 MG/DL
PROT/CREAT UR: 6.4 MG/G{CREAT} (ref 0–0.1)
PTH-INTACT SERPL-MCNC: 252.2 PG/ML (ref 12–88)
RBC # BLD AUTO: 3.12 MILLION/UL (ref 3.88–5.62)
SODIUM SERPL-SCNC: 141 MMOL/L (ref 135–147)
WBC # BLD AUTO: 6.31 THOUSAND/UL (ref 4.31–10.16)

## 2023-10-30 PROCEDURE — 82570 ASSAY OF URINE CREATININE: CPT

## 2023-10-30 PROCEDURE — 83735 ASSAY OF MAGNESIUM: CPT

## 2023-10-30 PROCEDURE — 80069 RENAL FUNCTION PANEL: CPT

## 2023-10-30 PROCEDURE — 85027 COMPLETE CBC AUTOMATED: CPT

## 2023-10-30 PROCEDURE — 36415 COLL VENOUS BLD VENIPUNCTURE: CPT

## 2023-10-30 PROCEDURE — 83970 ASSAY OF PARATHORMONE: CPT

## 2023-10-30 PROCEDURE — 84156 ASSAY OF PROTEIN URINE: CPT

## 2023-11-01 ENCOUNTER — OFFICE VISIT (OUTPATIENT)
Dept: NEPHROLOGY | Facility: CLINIC | Age: 78
End: 2023-11-01
Payer: COMMERCIAL

## 2023-11-01 VITALS
DIASTOLIC BLOOD PRESSURE: 64 MMHG | OXYGEN SATURATION: 99 % | SYSTOLIC BLOOD PRESSURE: 142 MMHG | WEIGHT: 243 LBS | HEIGHT: 69 IN | HEART RATE: 62 BPM | BODY MASS INDEX: 35.99 KG/M2

## 2023-11-01 DIAGNOSIS — E83.39 HYPERPHOSPHATEMIA: ICD-10-CM

## 2023-11-01 DIAGNOSIS — N28.9 HYPERVOLEMIA ASSOCIATED WITH RENAL INSUFFICIENCY: ICD-10-CM

## 2023-11-01 DIAGNOSIS — N18.4 CKD (CHRONIC KIDNEY DISEASE), STAGE IV (HCC): Primary | ICD-10-CM

## 2023-11-01 DIAGNOSIS — D63.1 ANEMIA OF CHRONIC KIDNEY FAILURE, STAGE 4 (SEVERE): ICD-10-CM

## 2023-11-01 DIAGNOSIS — N18.4 BENIGN HYPERTENSION WITH CHRONIC KIDNEY DISEASE, STAGE IV (HCC): ICD-10-CM

## 2023-11-01 DIAGNOSIS — N18.9 CHRONIC KIDNEY DISEASE-MINERAL AND BONE DISORDER: ICD-10-CM

## 2023-11-01 DIAGNOSIS — I12.9 BENIGN HYPERTENSION WITH CHRONIC KIDNEY DISEASE, STAGE IV (HCC): ICD-10-CM

## 2023-11-01 DIAGNOSIS — M89.9 CHRONIC KIDNEY DISEASE-MINERAL AND BONE DISORDER: ICD-10-CM

## 2023-11-01 DIAGNOSIS — I50.32 CHRONIC DIASTOLIC (CONGESTIVE) HEART FAILURE (HCC): ICD-10-CM

## 2023-11-01 DIAGNOSIS — N18.4 ANEMIA OF CHRONIC KIDNEY FAILURE, STAGE 4 (SEVERE): ICD-10-CM

## 2023-11-01 DIAGNOSIS — E83.9 CHRONIC KIDNEY DISEASE-MINERAL AND BONE DISORDER: ICD-10-CM

## 2023-11-01 DIAGNOSIS — N25.81 SECONDARY HYPERPARATHYROIDISM OF RENAL ORIGIN (HCC): ICD-10-CM

## 2023-11-01 DIAGNOSIS — E87.70 HYPERVOLEMIA ASSOCIATED WITH RENAL INSUFFICIENCY: ICD-10-CM

## 2023-11-01 DIAGNOSIS — R80.9 NON-NEPHROTIC RANGE PROTEINURIA: ICD-10-CM

## 2023-11-01 PROCEDURE — 99214 OFFICE O/P EST MOD 30 MIN: CPT | Performed by: INTERNAL MEDICINE

## 2023-11-01 RX ORDER — CALCIUM ACETATE 667 MG/1
667 CAPSULE ORAL 2 TIMES DAILY WITH MEALS
Qty: 180 CAPSULE | Refills: 1 | Status: SHIPPED | OUTPATIENT
Start: 2023-11-01

## 2023-11-01 RX ORDER — TORSEMIDE 20 MG/1
20 TABLET ORAL DAILY
Qty: 90 TABLET | Refills: 1
Start: 2023-11-01

## 2023-11-01 RX ORDER — CALCITRIOL 0.25 UG/1
0.25 CAPSULE, LIQUID FILLED ORAL 3 TIMES WEEKLY
Qty: 36 CAPSULE | Refills: 3 | Status: SHIPPED | OUTPATIENT
Start: 2023-11-01

## 2023-11-01 NOTE — PATIENT INSTRUCTIONS
Your kidney function is worse. You have expressed to me that you do not want dialysis even if it would be necessary to save your life. Please let me know if you change your mind. Please increase Torsemide to 40 mg on MWF and continue with 20 mg the rest of the week. Start Phoslo 1 tablet with meals. Increase Calcitriol to 0.25 mcg three times per week. Check blood work in 1 month. Follow up in 3 months.

## 2023-11-01 NOTE — PROGRESS NOTES
NEPHROLOGY OFFICE PROGRESS NOTE   Adan Patel. 69 y.o. male MRN: [id number]  DATE: 11/01/23  Reason for visit: Continued evaluation of CKD    ASSESSMENT & PLAN:  Chronic kidney disease, stage IV  Lorrie Veronica has progressive renal disease. CKD etiology is diabetic nephropathy + sequelae from post op ATN from CABG. His creatinine is now up to around 3.3 to 3.7 based on June to Aug 2023 labs. Most recent SCr is up further to 3.90. He completed CKD education. He does not want dialysis even if it were necessary to save his life. Will eventually plan for ACP in 2024. Treatment: good BP and glycemic control. Hypertension  BP is above goal (<130/80)  Not checking at home. Current regimen: Torsemide 20 mg OD, Metoprolol 50 mg BID, Telmisartan 20 mg OD, Amlodipine 5 mg OD. Increase Torsemide to 40 mg on MWF and 20 mg the rest of the week. Check BMP in 1 month. Bilateral leg edema  Stable but not resolved. Increase torsemide to 40 mg on MWF and 20 mg the rest of week. Mineral bone disease  PTH is above goal - 252.2 in Oct 2023 on Calcitriol twice a week. Increase Calcitriol to three times a week (0.25 mcg). Ca is at goal.   Phos is above goal - start Phoslo 1 tab BID with meals. Vitamin D level is below goal - 18.5 in June 2023. Continue Vit D 2000 units daily. Recheck PTH and Vitamin D in 3 months. Proteinuria:  UPC is 6.40 - worsening. Continue Telmisartan 20 mg daily. Cannot increase due to borderline hyperkalemia. Obesity, weight gain:  He does not appear to be motivated to lose weight or exercise. Anemia:   Hgb down to 9.2. Check iron studies with next visit. May need Epogen if Hgb <9.0. Diabetes mellitus:   Diabetic management is deferred to endocrinology or PCP. Hyperlipidemia:  Lipid management is deferred to PCP. Patient Instructions   Your kidney function is worse.    You have expressed to me that you do not want dialysis even if it would be necessary to save your life. Please let me know if you change your mind. Please increase Torsemide to 40 mg on MWF and continue with 20 mg the rest of the week. Start Phoslo 1 tablet with meals. Increase Calcitriol to 0.25 mcg three times per week. Check blood work in 1 month. Follow up in 3 months. SUBJECTIVE / INTERVAL HISTORY:  Hernan Rich was last seen in the office in June 2023. He had a left cataract surgery done on October 25. He is due to have his right eye operated on on November 8. There have been no recent hospitalizations or ER visits. His wife reports that he has not been checking his BP. When he does not take the Torsemide, she notes that he has "sausage fingers". On talking with him today, it does not seem that he is very engaged with his health issues. He told me today that he does not want dialysis even if it were necessary to save his life. PMH/PSH: HTN, DM, HLP, CKD, CAD s/p CABG, carotid stenosis, HARJIT, BPH, obesity, DDD s/p fusion surgery, cholecystectomy, hernia repair, tonsillectomy, urinary retention    Previous work up:   6/3/19 Renal US: R 10.6 cm, L 11 cm,  cc    ALLERGIES: No Known Allergies    REVIEW OF SYSTEMS:  Review of Systems   Constitutional:  Negative for appetite change, chills and fever. Respiratory:  Negative for cough and shortness of breath. Cardiovascular:  Positive for leg swelling. Negative for chest pain. Gastrointestinal:  Negative for diarrhea, nausea and vomiting. Genitourinary:  Negative for hematuria. Musculoskeletal:  Negative for arthralgias and back pain. Neurological:  Negative for dizziness and light-headedness. OBJECTIVE:  /64 (BP Location: Right arm, Patient Position: Sitting, Cuff Size: Large)   Pulse 62   Ht 5' 9" (1.753 m)   Wt 110 kg (243 lb)   SpO2 99%   BMI 35.88 kg/m²   Current Weight: Weight - Scale: 110 kg (243 lb) Body mass index is 35.88 kg/m².   Physical Exam  Constitutional:       General: He is not in acute distress. Appearance: Normal appearance. He is well-developed. He is obese. He is not toxic-appearing. HENT:      Head: Normocephalic and atraumatic. Eyes:      General: No scleral icterus. Conjunctiva/sclera: Conjunctivae normal.   Neck:      Vascular: No JVD. Cardiovascular:      Rate and Rhythm: Normal rate and regular rhythm. Heart sounds: Normal heart sounds. Pulmonary:      Effort: Pulmonary effort is normal. No respiratory distress. Breath sounds: Normal breath sounds. Abdominal:      General: Bowel sounds are normal.      Palpations: Abdomen is soft. Musculoskeletal:      Cervical back: Neck supple. Comments: Bilateral LE edema noted - indentation noted at level of socks. Skin:     General: Skin is warm and dry. Neurological:      Mental Status: He is alert and oriented to person, place, and time. Psychiatric:         Behavior: Behavior normal.     Medications:  Current Outpatient Medications:     amLODIPine (NORVASC) 5 mg tablet, TAKE 1 TABLET (5 MG TOTAL) BY MOUTH DAILY. , Disp: 90 tablet, Rfl: 1    Ascorbic Acid (VITAMIN C) 1000 MG tablet, Take 1,000 mg by mouth daily, Disp: , Rfl:     aspirin (ECOTRIN LOW STRENGTH) 81 mg EC tablet, Take 1 tablet (81 mg total) by mouth daily, Disp: 60 tablet, Rfl: 8    atorvastatin (LIPITOR) 80 mg tablet, Take 1 tablet (80 mg total) by mouth daily with dinner, Disp: 90 tablet, Rfl: 0    calcitriol (ROCALTROL) 0.25 mcg capsule, Take 1 capsule (0.25 mcg total) by mouth 2 (two) times a week, Disp: 26 capsule, Rfl: 3    cholecalciferol (VITAMIN D3) 1,000 units tablet, Take 2 tablets (2,000 Units total) by mouth daily, Disp: 1 tablet, Rfl: 1    cyanocobalamin (VITAMIN B-12) 500 mcg tablet, Take 500 mcg by mouth daily, Disp: , Rfl:     finasteride (PROSCAR) 5 mg tablet, Take 1 tablet (5 mg total) by mouth daily, Disp: 90 tablet, Rfl: 3    insulin glargine (Toujeo Max SoloStar) 300 units/mL CONCENTRATED U-300 injection pen (2-unit dial), INJECT 42 UNITS UNDER THE SKIN DAILY at dinner time, Disp: 18 mL, Rfl: 1    insulin lispro (HumaLOG KwikPen) 100 units/mL injection pen, Inject 16 units before brunch and  18  units before dinner +scale, Disp: 15 mL, Rfl: 1    latanoprost (XALATAN) 0.005 % ophthalmic solution, Administer 1 drop to both eyes daily at bedtime, Disp: , Rfl:     liraglutide (Victoza) injection, 1.8 mg once daily, Disp: 6 mL, Rfl: 0    metoprolol tartrate (LOPRESSOR) 50 mg tablet, Take 1 tablet (50 mg total) by mouth 2 (two) times a day, Disp: 180 tablet, Rfl: 1    nitrofurantoin (MACRODANTIN) 50 mg capsule, Take 1 capsule (50 mg total) by mouth daily at bedtime, Disp: 90 capsule, Rfl: 3    pantoprazole (PROTONIX) 40 mg tablet, Take 1 tablet (40 mg total) by mouth daily, Disp: 90 tablet, Rfl: 1    tamsulosin (FLOMAX) 0.4 mg, TAKE 2 CAPSULES BY MOUTH DAILY WITH DINNER. ., Disp: 180 capsule, Rfl: 0    telmisartan (MICARDIS) 20 MG tablet, TAKE 1 TABLET BY MOUTH EVERY DAY, Disp: 90 tablet, Rfl: 1    torsemide (DEMADEX) 20 mg tablet, Take 1 tablet (20 mg total) by mouth daily, Disp: 90 tablet, Rfl: 1    B-D ULTRAFINE III SHORT PEN 31G X 8 MM MISC, INJECT INTO THE SKIN 4 TIMES DAILY, Disp: 400 each, Rfl: 1    Continuous Blood Gluc  (FreeStyle Celia 2 [place]) MAMI, Use to check blood sugar daily, Disp: 1 each, Rfl: 0    Continuous Blood Gluc Sensor (FreeStyle Celia 2 Sensor) MISC, Use daily as directed for CGM - Change every 14 days, Disp: 6 each, Rfl: 3    glucose blood test strip, Check 4 times a day, Disp: 400 each, Rfl: 1    Microlet Lancets MISC, USE 3 TIMES DAY, Disp: 300 each, Rfl: 1    Prolensa 0.07 % SOLN, , Disp: , Rfl:     Laboratory Results:  Results for orders placed or performed in visit on 10/30/23   CBC   Result Value Ref Range    WBC 6.31 4.31 - 10.16 Thousand/uL    RBC 3.12 (L) 3.88 - 5.62 Million/uL    Hemoglobin 9.2 (L) 12.0 - 17.0 g/dL    Hematocrit 30.2 (L) 36.5 - 49.3 %    MCV 97 82 - 98 fL MCH 29.5 26.8 - 34.3 pg    MCHC 30.5 (L) 31.4 - 37.4 g/dL    RDW 14.9 11.6 - 15.1 %    Platelets 987 221 - 156 Thousands/uL    MPV 12.0 8.9 - 12.7 fL   Magnesium   Result Value Ref Range    Magnesium 2.2 1.9 - 2.7 mg/dL   Renal function panel   Result Value Ref Range    Albumin 3.6 3.5 - 5.0 g/dL    Calcium 9.1 8.4 - 10.2 mg/dL    Phosphorus 4.9 (H) 2.3 - 4.1 mg/dL    BUN 62 (H) 5 - 25 mg/dL    Creatinine 3.90 (H) 0.60 - 1.30 mg/dL    Sodium 141 135 - 147 mmol/L    Potassium 5.1 3.5 - 5.3 mmol/L    Chloride 110 (H) 96 - 108 mmol/L    CO2 23 21 - 32 mmol/L    ANION GAP 8 mmol/L    eGFR 14 ml/min/1.73sq m    Glucose, Fasting 135 (H) 65 - 99 mg/dL   PTH, intact   Result Value Ref Range    .2 (H) 12.0 - 88.0 pg/mL   Protein / creatinine ratio, urine   Result Value Ref Range    Creatinine, Ur 64.8 Reference range not established. mg/dL    Protein Urine Random 415 Reference range not established. mg/dL    Prot/Creat Ratio, Ur 6.40 (H) 0.00 - 0.10

## 2023-11-06 NOTE — TELEPHONE ENCOUNTER
He's doing 62 for toujeo and 20 for humalog  

## 2023-11-12 ENCOUNTER — NURSE TRIAGE (OUTPATIENT)
Dept: OTHER | Facility: OTHER | Age: 78
End: 2023-11-12

## 2023-11-12 DIAGNOSIS — Z79.4 TYPE 2 DIABETES MELLITUS WITH HYPERGLYCEMIA, WITH LONG-TERM CURRENT USE OF INSULIN (HCC): ICD-10-CM

## 2023-11-12 DIAGNOSIS — E11.65 TYPE 2 DIABETES MELLITUS WITH HYPERGLYCEMIA, WITH LONG-TERM CURRENT USE OF INSULIN (HCC): ICD-10-CM

## 2023-11-12 NOTE — TELEPHONE ENCOUNTER
Reason for Disposition  • Message left on unidentified voice mail. Phone number verified.     Protocols used: No Contact or Duplicate Contact Call-ADULT-

## 2023-11-12 NOTE — TELEPHONE ENCOUNTER
Regarding: medication refill  ----- Message from Benson Gomez sent at 11/12/2023 12:27 PM EST -----  Patient does not have any more diabetic needles     Medication Refill Request     Name B-D ULTRAFINE III SHORT PEN 31G X 8 MM MISC    Dose/Frequency INJECT INTO THE SKIN 4 TIMES DAILY   Quantity 400  Verified pharmacy   [x]  Verified ordering Provider   [x]  Does patient have enough for the next 3 days?  Yes [ ] No [x]

## 2023-11-13 RX ORDER — PEN NEEDLE, DIABETIC 31 GX5/16"
NEEDLE, DISPOSABLE MISCELLANEOUS
Qty: 400 EACH | Refills: 0 | OUTPATIENT
Start: 2023-11-13

## 2023-12-01 ENCOUNTER — APPOINTMENT (OUTPATIENT)
Dept: LAB | Facility: HOSPITAL | Age: 78
End: 2023-12-01
Payer: COMMERCIAL

## 2023-12-01 DIAGNOSIS — N18.4 CKD (CHRONIC KIDNEY DISEASE), STAGE IV (HCC): ICD-10-CM

## 2023-12-01 LAB
ANION GAP SERPL CALCULATED.3IONS-SCNC: 8 MMOL/L
BUN SERPL-MCNC: 76 MG/DL (ref 5–25)
CALCIUM SERPL-MCNC: 9 MG/DL (ref 8.4–10.2)
CHLORIDE SERPL-SCNC: 113 MMOL/L (ref 96–108)
CO2 SERPL-SCNC: 22 MMOL/L (ref 21–32)
CREAT SERPL-MCNC: 4.23 MG/DL (ref 0.6–1.3)
GFR SERPL CREATININE-BSD FRML MDRD: 12 ML/MIN/1.73SQ M
GLUCOSE P FAST SERPL-MCNC: 116 MG/DL (ref 65–99)
PHOSPHATE SERPL-MCNC: 5.4 MG/DL (ref 2.3–4.1)
POTASSIUM SERPL-SCNC: 5 MMOL/L (ref 3.5–5.3)
SODIUM SERPL-SCNC: 143 MMOL/L (ref 135–147)

## 2023-12-01 PROCEDURE — 80048 BASIC METABOLIC PNL TOTAL CA: CPT

## 2023-12-01 PROCEDURE — 36415 COLL VENOUS BLD VENIPUNCTURE: CPT

## 2023-12-01 PROCEDURE — 84100 ASSAY OF PHOSPHORUS: CPT

## 2023-12-04 ENCOUNTER — OFFICE VISIT (OUTPATIENT)
Age: 78
End: 2023-12-04
Payer: COMMERCIAL

## 2023-12-04 VITALS
OXYGEN SATURATION: 95 % | TEMPERATURE: 98.3 F | SYSTOLIC BLOOD PRESSURE: 142 MMHG | HEIGHT: 69 IN | DIASTOLIC BLOOD PRESSURE: 64 MMHG | BODY MASS INDEX: 36.29 KG/M2 | WEIGHT: 245 LBS | HEART RATE: 63 BPM

## 2023-12-04 DIAGNOSIS — G47.33 OSA (OBSTRUCTIVE SLEEP APNEA): Primary | ICD-10-CM

## 2023-12-04 PROCEDURE — 99214 OFFICE O/P EST MOD 30 MIN: CPT | Performed by: INTERNAL MEDICINE

## 2023-12-04 NOTE — LETTER
December 4, 2023     [place]   [address], [place]    Patient: Debbie Calle YOB: 1945   Date of Visit: 12/4/2023       Dear Dr. Altagracia Cooper: Thank you for referring Romeo Griffiths to me for evaluation. Below are my notes for this consultation. If you have questions, please do not hesitate to call me. I look forward to following your patient along with you. Sincerely,        Melanie Laboy DO        CC: No Recipients    Melanie Laboy DO  12/4/2023 12:10 PM  Attested  Progress Note - Sleep Medicine  Debbie Berry. 69 y.o. male MRN: [id number]    Impression & Plan:   Debbie Calle is a 69-year-old gentleman with PMH of severe HARJIT on auto BiPAP s/p a palatal sigrid placement surgery, EDS, CAD s/p CABG, HFpEF (LVEF 55% on 3/23/2021), asymptomatic bilateral carotid artery stenosis, IDDM complicated by CKD stage IV (complicated by anemia, no plans for HD) and neuropathy, HTN, HLD, obesity, GERD, BPH, and vitamin D deficiency, who presents to the sleep medicine clinic for follow-up for his severe HARJIT treated with auto BiPAP. Last seen on 8/29/2023 at which time he was transitioned from auto CPAP to auto BiPAP due to elevated residual AHI (22.7, FAUSTINA 0.5) despite appropriate CPAP compliance and no mask leak. Home sleep study on 6/8/2023 - AHI 72.7 without a positional component. Weight of 246 lbs at time of his last sleep study. Denies any recent change in weight. Compliant with BiPAP therapy as below without issue (besides mask leak in the setting of an old mask). Residual AHI of 13.7 (FAUSTINA 8.6) which may be falsely elevated due to patient wearing his BiPAP machine for ~2-3 hours while laying in bed in the morning. However, cannot exclude development of treatment emergent central sleep apnea which would need to be further evaluated with a BiPAP titration study. Patient not amenable to BiPAP titration study at this time.  Ordered a overnight pulse oximetry study to reevaluate the mild hypoxemia noted on his prior home sleep study. Referred patient for a mask fitting evaluation. Placed a DME PAP resupply order. Recommend weight loss in conjunction with PCP. Chronic sleep-maintenance insomnia (awakens ~3-5 time per night for nocturia) which is likely multifactorial due to hyperglycemia in the setting of IDDM, CKD stage IV, and/or severe HARJIT. EDS likely multifactorial due to CKD stage IV, anemia or chronic disease, labile blood glucose in the setting of IDDM, vitamin D deficiency, and/or severe HARJIT. Doubt further reduction in AHI would lead to a clinically significant change in his EDS. Follow-up in 1 year. Severe HARJIT on auto BiPAP  -Last seen on 8/29/2023 at which time he was transitioned from auto CPAP to auto BiPAP due to elevated residual AHI (22.7, FAUSTINA 0.5) despite appropriate CPAP compliance and no mask leak  -Home sleep study on 6/8/2023 - AHI 72.7 without a positional component. Weight of 246 lbs at time of his last sleep study  -Denies any recent change in weight  -Compliant with BiPAP therapy as below without issue (besides mask leak in the setting of an old mask)  -Residual AHI of 13.7 (FAUSTINA 8.6) which may be falsely elevated due to patient wearing his BiPAP machine for ~2-3 hours while laying in bed in the morning  -However, cannot exclude development of treatment emergent central sleep apnea which would need to be further evaluated with a BiPAP titration study. Patient not amenable to BiPAP titration study at this time  -Ordered a overnight pulse oximetry study to reevaluate the mild hypoxemia noted on his prior home sleep study  -Referred patient for a mask fitting evaluation  -Placed a DME PAP resupply order  -Recommend weight loss in conjunction with PCP.     Chronic sleep-maintenance insomnia  -Awakens ~3-5 time per night for nocturia  -Likely multifactorial due to hyperglycemia in the setting of IDDM, CKD stage IV, and/or severe HARJIT  -Management of severe HARJIT as above    EDS  -Likely multifactorial due to CKD stage IV, anemia or chronic disease, labile blood glucose in the setting of IDDM, vitamin D deficiency, and/or severe HARJIT  -Doubt further reduction in AHI would lead to a clinically significant change in his EDS    Follow-up in 1 year    Staffed and seen with Dr. Raghu Wren on 12/4/2023  ______________________________________________________________________    HPI:    Emery Cook is a 28-year-old gentleman with PMH of severe HARJIT on auto BiPAP s/p a palatal sigrid placement surgery, EDS, CAD s/p CABG, HFpEF (LVEF 55% on 3/23/2021), asymptomatic bilateral carotid artery stenosis, IDDM complicated by CKD stage IV (complicated by anemia, no plans for HD) and neuropathy, HTN, HLD, obesity, GERD, BPH, and vitamin D deficiency, who presents to the sleep medicine clinic for follow-up for his severe HARJIT treated with auto BiPAP. Accompanied in the clinic today by his wife. Last seen on 8/29/2023 at which time he was transitioned from auto CPAP to auto BiPAP due to elevated residual AHI (22.7, FAUSTINA 0.5) despite appropriate CPAP compliance and no mask leak. Home sleep study on 6/8/2023 - AHI 72.7 without a positional component. Weight of 246 lbs at time of his last sleep study. Denies any recent change in weight. Denies any significant change in PMH, PSH, medications, or allergies. On evaluation, patient is sitting in chair and in no acute distress. Sleep schedule and symptoms as below. He will wear his BiPAP from ~9:00 PM to ~12:00 PM (he will lay in bed and watch TV with his BiPAP device on). Notes mask leak over the past few months (with older nasal mask, as he has not been receiving supplies from [employer]). The mask leak causes his eyes to dry out (recently had bilateral cataract surgery). No perceivable benefit from BiPAP therapy per patient. Mild chronic bilateral peripheral edema. Denies SOB, cough, wheezing, chest pain, or palpitations.      Patient reported issues with the following symptoms:   Mask type: Nasal mask   Mask leaking: yes, causes eye dryness  Skin irritation from the mask: denies  Pain or discomfort: denies  Feeling of claustrophobia: denies  Aerophagia: denies  Pressure intolerance: denies  Nasal congestion or rhinorrhea: denies  Nasal and oral dryness: yes  Snoring with PAP: denies    Using PAP every night/day: yes  Using PAP the entire duration of sleep: yes  Benefiting from PAP: denies    Living situation: Lives with wife    Bed Time: 9:30 PM  Lights Out: 9:30 PM  Sleep Onset Latency: ~60 minutes  Frequency of Night-time Awakenings: yes, ~3-5 times due to nocturia  Fall asleep after awakenings: <15 minutes  Wake/rise Time: 10:00 AM to 12:00 PM, he will lay in bed watching TV with the BiPAP device on  Naps: yes, infrequent  Total Sleep Time: ~9-10 hours    Non-refreshing sleep: yes   EDS: yes  RLS: denies  Driving while drowsy: does not drive    [place]: 15  [place] of 14 on 2023    Sleep medications: denies  Stimulant medications: denies  Caffeine use: yes, ~1 cup of coffee in the AM  Alcohol use: denies  Tobacco use: denies, quit in   Illicit drug use: denies     Social history updates:  Social History     Tobacco Use   Smoking Status Former   • Packs/day: 3.00   • Years: 35.00   • Total pack years: 105.00   • Types: Cigarettes, Cigars   • Start date: 1957   • Quit date: 1992   • Years since quittin.9   Smokeless Tobacco Never   Tobacco Comments    Started smoking as a pre-teenager     Social History     Socioeconomic History   • Marital status: /Civil Union     Spouse name: Not on file   • Number of children: Not on file   • Years of education: Not on file   • Highest education level: Not on file   Occupational History   • Occupation: RETIRED   Tobacco Use   • Smoking status: Former     Packs/day: 3.00     Years: 35.00     Total pack years: 105.00     Types: Cigarettes, Cigars     Start date: 1957     Quit date: 1992     Years since quittin.9   • Smokeless tobacco: Never   • Tobacco comments:     Started smoking as a pre-teenager   Vaping Use   • Vaping Use: Never used   Substance and Sexual Activity   • Alcohol use: Not Currently     Comment: none since most recent hospitalization   • Drug use: Never   • Sexual activity: Not Currently     Partners: Female     Birth control/protection: Male Sterilization, None   Other Topics Concern   • Not on file   Social History Narrative    · Do you currently or have you served in the [address] [place] [address] [employer]:   No      · Were you activated, into active duty, as a member of the [email or web address] or as a Reservist:   No      · Caffeine intake:   Occasional      · Diet:   Regular      · Live alone or with others:   with others      · Exercise level:   Occasional  swim in summertime. walk alot. · ·Guns present in home:   No      · Seat belts used routinely:   Yes      · Advance directive:   No      · Sunscreen used routinely:   No      · Smoke alarm in home: Yes      · Legally blind in one or both eyes:   No      · Hard of hearing or deaf in one or both ears:   No      ·      Social Determinants of Health     Financial Resource Strain: Unknown (2020)    Overall Financial Resource Strain (CARDIA)    • Difficulty of Paying Living Expenses: Patient refused   Food Insecurity: Unknown (2020)    Hunger Vital Sign    • Worried About Running Out of Food in the Last Year: Patient refused    • [address] in the Last Year: Patient refused   Transportation Needs: No Transportation Needs (2020)    PRAPARE - Transportation    • Lack of Transportation (Medical): No    • Lack of Transportation (Non-Medical):  No   Physical Activity: Unknown (2020)    Exercise Vital Sign    • Days of Exercise per Week: Patient refused    • Minutes of Exercise per Session: Patient refused   Stress: Not on file   Social Connections: Unknown (2020)    Social Connection and Isolation Panel [NHANES]    • Frequency of Communication with Friends and Family: Patient refused    • Frequency of Social Gatherings with Friends and Family: Patient refused    • Attends [employer] Services: Patient refused    • Active Member of Clubs or Organizations: Patient refused    • Attends Club or Organization Meetings: Patient refused    • Marital Status: Patient refused   Intimate Partner Violence: Not on file   Housing Stability: Not on file     PhysicalExamination:  Vitals:   /64 (BP Location: Right arm, Patient Position: Sitting, Cuff Size: Standard)   Pulse 63   Temp 98.3 °F (36.8 °C)   Ht 5' 9" (1.753 m)   Wt 111 kg (245 lb)   SpO2 95%   BMI 36.18 kg/m²     Physical Exam:  General: Sitting in chair, awake, and alert. No acute distress  HEENT: Nares patent, no craniofacial abnormalities. Mucous membranes, moist, no oral lesions, and normal dentition. Mallampati class - IV  NECK: Trachea midline, no accessory muscle use, and no stridor   CARDIAC: Regular rate and rhythm  PULM: CTA bilaterally no wheezing, rhonchi or rales. No conversational dyspnea  EXT: No peripheral edema    NEURO: No focal neurologic deficits, moving all extremities appropriately    Diagnostic Data:  Labs:   I personally reviewed the most recent laboratory data pertinent to today's visit  Appointment on 12/01/2023   Component Date Value   • Sodium 12/01/2023 143    • Potassium 12/01/2023 5.0    • Chloride 12/01/2023 113 (H)    • CO2 12/01/2023 22    • ANION GAP 12/01/2023 8    • BUN 12/01/2023 76 (H)    • Creatinine 12/01/2023 4.23 (H)    • Glucose, Fasting 12/01/2023 116 (H)    • Calcium 12/01/2023 9.0    • eGFR 12/01/2023 12    • Phosphorus 12/01/2023 5.4 (H)    Appointment on 10/30/2023   Component Date Value   • WBC 10/30/2023 6.31    • RBC 10/30/2023 3.12 (L)    • Hemoglobin 10/30/2023 9.2 (L)    • Hematocrit 10/30/2023 30.2 (L)    • MCV 10/30/2023 97    • MCH 10/30/2023 29.5    • MCHC 10/30/2023 30.5 (L)    • RDW 10/30/2023 14.9    • Platelets 96/42/5141 220    • MPV 10/30/2023 12.0    • Magnesium 10/30/2023 2.2    • Albumin 10/30/2023 3.6    • Calcium 10/30/2023 9.1    • Phosphorus 10/30/2023 4.9 (H)    • BUN 10/30/2023 62 (H)    • Creatinine 10/30/2023 3.90 (H)    • Sodium 10/30/2023 141    • Potassium 10/30/2023 5.1    • Chloride 10/30/2023 110 (H)    • CO2 10/30/2023 23    • ANION GAP 10/30/2023 8    • eGFR 10/30/2023 14    • Glucose, Fasting 10/30/2023 135 (H)    • PTH 10/30/2023 252.2 (H)    • Creatinine, Ur 10/30/2023 64.8    • Protein Urine Random 10/30/2023 415    • Prot/Creat Ratio, Ur 10/30/2023 6.40 (H)    Lab on 08/28/2023   Component Date Value   • Hemoglobin A1C 08/28/2023 8.3 (H)    • EAG 08/28/2023 192    • Fructosamine 08/28/2023 272    Procedure visit on 08/23/2023   Component Date Value   • POST-VOID RESIDUAL VOLUM* 08/23/2023 236    Orders Only on 07/31/2023   Component Date Value   • EXT Creatinine Urine 07/31/2023 51.6    • Alb/Creat Ratio 07/31/2023 1,938.0    Lab on 07/19/2023   Component Date Value   • Sodium 08/28/2023 140    • Potassium 08/28/2023 5.0    • Chloride 08/28/2023 110 (H)    • CO2 08/28/2023 23    • ANION GAP 08/28/2023 7    • BUN 08/28/2023 65 (H)    • Creatinine 08/28/2023 3.39 (H)    • Glucose, Fasting 08/28/2023 162 (H)    • Calcium 08/28/2023 8.4    • eGFR 08/28/2023 16    • Sodium 07/19/2023 140    • Potassium 07/19/2023 4.3    • Chloride 07/19/2023 108    • CO2 07/19/2023 22    • ANION GAP 07/19/2023 10    • BUN 07/19/2023 58 (H)    • Creatinine 07/19/2023 3.27 (H)    • Glucose, Fasting 07/19/2023 167 (H)    • Calcium 07/19/2023 8.9    • eGFR 07/19/2023 17    Lab on 07/06/2023   Component Date Value   • Sodium 07/06/2023 139    • Potassium 07/06/2023 4.6    • Chloride 07/06/2023 107    • CO2 07/06/2023 25    • ANION GAP 07/06/2023 7    • BUN 07/06/2023 60 (H)    • Creatinine 07/06/2023 3.23 (H)    • Glucose, Fasting 07/06/2023 219 (H)    • Calcium 07/06/2023 8.6    • eGFR 07/06/2023 17 Appointment on 06/21/2023   Component Date Value   • Sodium 06/21/2023 140    • Potassium 06/21/2023 4.4    • Chloride 06/21/2023 108    • CO2 06/21/2023 23    • ANION GAP 06/21/2023 9    • BUN 06/21/2023 81 (H)    • Creatinine 06/21/2023 3.71 (H)    • Glucose, Fasting 06/21/2023 188 (H)    • Calcium 06/21/2023 8.3 (L)    • eGFR 06/21/2023 14    • Color, UA 06/21/2023 Yellow    • Clarity, UA 06/21/2023 Clear    • Specific Gravity, UA 06/21/2023 1.020    • pH, UA 06/21/2023 6.0    • Leukocytes, UA 06/21/2023 Negative    • Nitrite, UA 06/21/2023 Negative    • Protein, UA 06/21/2023 2+ (A)    • Glucose, UA 06/21/2023 Trace (A)    • Ketones, UA 06/21/2023 Negative    • Urobilinogen, UA 06/21/2023 0.2    • Bilirubin, UA 06/21/2023 Negative    • Occult Blood, UA 06/21/2023 Trace-Intact (A)    • RBC, UA 06/21/2023 4-10 (A)    • WBC, UA 06/21/2023 0-5    • Epithelial Cells 06/21/2023 Occasional    • Bacteria, UA 06/21/2023 Occasional    • Hyaline Casts, UA 06/21/2023 0-1 (A)    • MUCUS THREADS 06/21/2023 Occasional (A)    • Creatinine, Ur 06/21/2023 72.8    • Protein Urine Random 06/21/2023 391    • Prot/Creat Ratio, Ur 06/21/2023 5.37 (H)      I have personally reviewed pertinent lab results.   Lab Results   Component Value Date    WBC 6.31 10/30/2023    HGB 9.2 (L) 10/30/2023    HCT 30.2 (L) 10/30/2023    MCV 97 10/30/2023     10/30/2023     Lab Results   Component Value Date    GLUCOSE 141 (H) 06/21/2019    CALCIUM 9.0 12/01/2023    K 5.0 12/01/2023    CO2 22 12/01/2023     (H) 12/01/2023    BUN 76 (H) 12/01/2023    CREATININE 4.23 (H) 12/01/2023     No results found for: "IGE"  Lab Results   Component Value Date    ALT 16 06/20/2022    AST 12 (L) 06/20/2022    ALKPHOS 99 06/20/2022     Lab Results   Component Value Date    IRON 29 (L) 02/20/2023    TIBC 176 (L) 02/20/2023    FERRITIN 118 02/20/2023     No results found for: "Namon Papo"  No results found for: "FOLATE"    Sleep studies:  -Home sleep study on 6/8/2023 - AHI 72.7 without a positional component. Weight of 246 lbs at time of his last sleep study    Compliance Data from 11/2/2023 to 12/1/2023:   DME company: [address]  Mask type: Nasal mask    Type of PAP:  AirCurve 10 VAuto (maximum IPAP 25 cm H2O, minimum EPAP 8 cm H2O, and pressure support 4 cm H2O)  Percent usage: 100%, >4 hours  Average time used: 13 hours, 24 minutes  95th percentile leak: 30.6 L/minute  95th percentile EPAP pressure: 12.4 cm H2O  Residual AHI: 13.7 (FAUSTINA 8.6)                                     Tj Paulino DO  [facility] Sleep Medicine Fellow   Attestation signed by Cece Dale DO at 12/4/2023 12:29 PM:  I have personally seen and examined. I discussed the patient with the  fellow including, but not limited to, verifying findings; reviewing labs and x-rays;developing the plan of care with patient. I have reviewed the note and assessment performed by the fellow and agree with the fellow's documented findings and plan of care with the following additions. Please see my following comments for details and adjustments. Assessment/Plan  69 y.o. M with PMHx of  severe HARJIT on auto BiPAP s/p a palatal sigrid placement surgery, EDS, CAD s/p CABG, HFpEF (LVEF 55% on 3/23/2021), asymptomatic bilateral carotid artery stenosis, IDDM complicated by CKD stage IV (complicated by anemia, no plans for HD) and neuropathy, HTN, HLD, obesity, GERD, BPH, and vitamin D deficiency, who presents to the sleep medicine clinic for follow-up for his severe HARJIT treated with auto BiPAP. 1.  Severe HARJIT (AHI - 72.7) on autoBIPAP. He has excellent compliance but minimal clinical benefit. Resiaul AHI - 13.7, FAUSTINA - 8. 6. However, he is using the BIPAP on for extended periods while awake      -  Continue autoBIPAP at current pressures.         -  Check overnight pulse ox to ensure he does not require oxygen as well      -  While it would be ideal to get an in lab sleep study, it does not appear he would like to have that done at this time.        -  Supply order placed for PAP therapy. -  I also discussed in depth the risk of leaving sleep apnea untreated including hypertension, heart failure, arrhythmia, MI and stroke. 2.  Chronic sleep maintenance insomnia - he gets up 3-5 times at time for nocturia. Would avoid addition of any medications at this time    3. Excessive daytime sleepiness - multifactorial including labile glucose, uremia and medication induced    Mr. Nery River returns to the office to discuss his HARJIT and insomnia. He has been using his CPAP for several hours during the day. He has been using it for up to 13 as his wife states that he halys in bed all day. He does get up 3-5 times a night and is unsure why. He is tired through the day. He is willing to continue using the device but does not feel like moving and taking it off in the early morning and mid morning due to "laziness". He also has noted more mask leak and would like to see if a different mask is needed.       Vitals:    12/04/23 1018   BP: 142/64   Pulse: 63   Temp: 98.3 °F (36.8 °C)   SpO2: 95%   RA  General:  Patient is awake, alert, non-toxic and in no acute respiratory distress  Eyes: PERRL, no scleral icterus  Neck: No JVD  CV:  Regular, +S1 and S2, No murmurs, gallops or rubs appreciated  Lungs: Clear to auscultation bilateral without wheeze, rales or rhonci  Abdomen: Soft, +BS, Non-tender, non-distended  Extremities: No clubbing, cyanosis or edema  Neuro: No focal motor/sensory deficits  Skin: Warm, No rashes or ulcerations  Lab Results   Component Value Date    WBC 6.31 10/30/2023    HGB 9.2 (L) 10/30/2023    HCT 30.2 (L) 10/30/2023    MCV 97 10/30/2023     10/30/2023     Lab Results   Component Value Date    SODIUM 143 12/01/2023    K 5.0 12/01/2023     (H) 12/01/2023    CO2 22 12/01/2023    BUN 76 (H) 12/01/2023    CREATININE 4.23 (H) 12/01/2023    GLUC 201 (H) 06/23/2022    CALCIUM 9.0 12/01/2023     Lab Results   Component Value Date    ALT 16 06/20/2022    AST 12 (L) 06/20/2022    ALKPHOS 99 06/20/2022       Sleep studies:  -Home sleep study on 6/8/2023 - AHI 72.7 without a positional component.  Weight of 246 lbs at time of his last sleep study     Compliance Data from 11/2/2023 to 12/1/2023:   [employer] company: [address]  Mask type: Nasal mask    Type of PAP:  AirCurve 10 VAuto (maximum IPAP 25 cm H2O, minimum EPAP 8 cm H2O, and pressure support 4 cm H2O)  Percent usage: 100%, >4 hours  Average time used: 13 hours, 24 minutes  95th percentile leak: 30.6 L/minute  95th percentile EPAP pressure: 12.4 cm H2O  Residual AHI: 13.7 (FAUSTINA 8.6)

## 2023-12-04 NOTE — PROGRESS NOTES
Progress Note - Sleep Medicine  Zainab Kurtz. 69 y.o. male MRN: [id number]    Impression & Plan:   Zainab Kurtz. is a 78-year-old gentleman with PMH of severe HARJIT on auto BiPAP s/p a palatal sigrid placement surgery, EDS, CAD s/p CABG, HFpEF (LVEF 55% on 3/23/2021), asymptomatic bilateral carotid artery stenosis, IDDM complicated by CKD stage IV (complicated by anemia, no plans for HD) and neuropathy, HTN, HLD, obesity, GERD, BPH, and vitamin D deficiency, who presents to the sleep medicine clinic for follow-up for his severe HARJIT treated with auto BiPAP. Last seen on 8/29/2023 at which time he was transitioned from auto CPAP to auto BiPAP due to elevated residual AHI (22.7, FAUSTINA 0.5) despite appropriate CPAP compliance and no mask leak. Home sleep study on 6/8/2023 - AHI 72.7 without a positional component. Weight of 246 lbs at time of his last sleep study. Denies any recent change in weight. Compliant with BiPAP therapy as below without issue (besides mask leak in the setting of an old mask). Residual AHI of 13.7 (FAUSTINA 8.6) which may be falsely elevated due to patient wearing his BiPAP machine for ~2-3 hours while laying in bed in the morning. However, cannot exclude development of treatment emergent central sleep apnea which would need to be further evaluated with a BiPAP titration study. Patient not amenable to BiPAP titration study at this time. Ordered a overnight pulse oximetry study to reevaluate the mild hypoxemia noted on his prior home sleep study. Referred patient for a mask fitting evaluation. Placed a DME PAP resupply order. Recommend weight loss in conjunction with PCP.  Chronic sleep-maintenance insomnia (awakens ~3-5 time per night for nocturia) which is likely multifactorial due to hyperglycemia in the setting of IDDM, CKD stage IV, and/or severe HARJIT. EDS likely multifactorial due to CKD stage IV, anemia or chronic disease, labile blood glucose in the setting of IDDM, vitamin D deficiency, and/or severe HARJIT. Doubt further reduction in AHI would lead to a clinically significant change in his EDS. Follow-up in 1 year. Severe HARJIT on auto BiPAP  -Last seen on 8/29/2023 at which time he was transitioned from auto CPAP to auto BiPAP due to elevated residual AHI (22.7, FAUSTINA 0.5) despite appropriate CPAP compliance and no mask leak  -Home sleep study on 6/8/2023 - AHI 72.7 without a positional component. Weight of 246 lbs at time of his last sleep study  -Denies any recent change in weight  -Compliant with BiPAP therapy as below without issue (besides mask leak in the setting of an old mask)  -Residual AHI of 13.7 (FAUSTINA 8.6) which may be falsely elevated due to patient wearing his BiPAP machine for ~2-3 hours while laying in bed in the morning  -However, cannot exclude development of treatment emergent central sleep apnea which would need to be further evaluated with a BiPAP titration study. Patient not amenable to BiPAP titration study at this time  -Ordered a overnight pulse oximetry study to reevaluate the mild hypoxemia noted on his prior home sleep study  -Referred patient for a mask fitting evaluation  -Placed a DME PAP resupply order  -Recommend weight loss in conjunction with PCP.     Chronic sleep-maintenance insomnia  -Awakens ~3-5 time per night for nocturia  -Likely multifactorial due to hyperglycemia in the setting of IDDM, CKD stage IV, and/or severe HARJIT  -Management of severe HARJIT as above    EDS  -Likely multifactorial due to CKD stage IV, anemia or chronic disease, labile blood glucose in the setting of IDDM, vitamin D deficiency, and/or severe HARJIT  -Doubt further reduction in AHI would lead to a clinically significant change in his EDS    Follow-up in 1 year    Staffed and seen with Dr. Ronn Rowe on 12/4/2023  ______________________________________________________________________    HPI:    Saran Prasad. is a 80-year-old gentleman with PMH of severe HARJIT on auto BiPAP s/p a palatal sigrid placement surgery, EDS, CAD s/p CABG, HFpEF (LVEF 55% on 3/23/2021), asymptomatic bilateral carotid artery stenosis, IDDM complicated by CKD stage IV (complicated by anemia, no plans for HD) and neuropathy, HTN, HLD, obesity, GERD, BPH, and vitamin D deficiency, who presents to the sleep medicine clinic for follow-up for his severe HARJIT treated with auto BiPAP. Accompanied in the clinic today by his wife. Last seen on 8/29/2023 at which time he was transitioned from auto CPAP to auto BiPAP due to elevated residual AHI (22.7, FAUSTINA 0.5) despite appropriate CPAP compliance and no mask leak. Home sleep study on 6/8/2023 - AHI 72.7 without a positional component. Weight of 246 lbs at time of his last sleep study. Denies any recent change in weight. Denies any significant change in PMH, PSH, medications, or allergies. On evaluation, patient is sitting in chair and in no acute distress. Sleep schedule and symptoms as below. He will wear his BiPAP from ~9:00 PM to ~12:00 PM (he will lay in bed and watch TV with his BiPAP device on). Notes mask leak over the past few months (with older nasal mask, as he has not been receiving supplies from [employer]). The mask leak causes his eyes to dry out (recently had bilateral cataract surgery). No perceivable benefit from BiPAP therapy per patient. Mild chronic bilateral peripheral edema. Denies SOB, cough, wheezing, chest pain, or palpitations.      Patient reported issues with the following symptoms:   Mask type: Nasal mask   Mask leaking: yes, causes eye dryness  Skin irritation from the mask: denies  Pain or discomfort: denies  Feeling of claustrophobia: denies  Aerophagia: denies  Pressure intolerance: denies  Nasal congestion or rhinorrhea: denies  Nasal and oral dryness: yes  Snoring with PAP: denies    Using PAP every night/day: yes  Using PAP the entire duration of sleep: yes  Benefiting from PAP: denies    Living situation: Lives with wife    Bed Time: 9:30 PM  Lights Out: 9:30 PM  Sleep Onset Latency: ~60 minutes  Frequency of Night-time Awakenings: yes, ~3-5 times due to nocturia  Fall asleep after awakenings: <15 minutes  Wake/rise Time: 10:00 AM to 12:00 PM, he will lay in bed watching TV with the BiPAP device on  Naps: yes, infrequent  Total Sleep Time: ~9-10 hours    Non-refreshing sleep: yes   EDS: yes  RLS: denies  Driving while drowsy: does not drive    [place]: 15  [place] of 14 on 2023    Sleep medications: denies  Stimulant medications: denies  Caffeine use: yes, ~1 cup of coffee in the AM  Alcohol use: denies  Tobacco use: denies, quit in   Illicit drug use: denies     Social history updates:  Social History     Tobacco Use   Smoking Status Former   • Packs/day: 3.00   • Years: 35.00   • Total pack years: 105.00   • Types: Cigarettes, Cigars   • Start date: 1957   • Quit date: 1992   • Years since quittin.9   Smokeless Tobacco Never   Tobacco Comments    Started smoking as a pre-teenager     Social History     Socioeconomic History   • Marital status: /Civil Union     Spouse name: Not on file   • Number of children: Not on file   • Years of education: Not on file   • Highest education level: Not on file   Occupational History   • Occupation: RETIRED   Tobacco Use   • Smoking status: Former     Packs/day: 3.00     Years: 35.00     Total pack years: 105.00     Types: Cigarettes, Cigars     Start date: 1957     Quit date: 1992     Years since quittin.9   • Smokeless tobacco: Never   • Tobacco comments:     Started smoking as a pre-teenager   Vaping Use   • Vaping Use: Never used   Substance and Sexual Activity   • Alcohol use: Not Currently     Comment: none since most recent hospitalization   • Drug use: Never   • Sexual activity: Not Currently     Partners: Female     Birth control/protection: Male Sterilization, None   Other Topics Concern   • Not on file   Social History Narrative    · Do you currently or have you served in the [address] [place] [id number]:   No      · Were you activated, into active duty, as a member of the [employer] and [employer] or as a Reservist:   No      · Caffeine intake:   Occasional      · Diet:   Regular      · Live alone or with others:   with others      · Exercise level:   Occasional  swim in summertime. walk alot. · ·Guns present in home:   No      · Seat belts used routinely:   Yes      · Advance directive:   No      · Sunscreen used routinely:   No      · Smoke alarm in home: Yes      · Legally blind in one or both eyes:   No      · Hard of hearing or deaf in one or both ears:   No      ·      Social Determinants of Health     Financial Resource Strain: Unknown (12/18/2020)    Overall Financial Resource Strain (CARDIA)    • Difficulty of Paying Living Expenses: Patient refused   Food Insecurity: Unknown (12/18/2020)    Hunger Vital Sign    • Worried About Running Out of Food in the Last Year: Patient refused    • [address] in the Last Year: Patient refused   Transportation Needs: No Transportation Needs (12/18/2020)    PRAPARE - Transportation    • Lack of Transportation (Medical): No    • Lack of Transportation (Non-Medical):  No   Physical Activity: Unknown (7/27/2020)    Exercise Vital Sign    • Days of Exercise per Week: Patient refused    • Minutes of Exercise per Session: Patient refused   Stress: Not on file   Social Connections: Unknown (7/27/2020)    Social Connection and Isolation Panel [NHANES]    • Frequency of Communication with Friends and Family: Patient refused    • Frequency of Social Gatherings with Friends and Family: Patient refused    • Attends Christian Services: Patient refused    • Active Member of Clubs or Organizations: Patient refused    • Attends Club or Organization Meetings: Patient refused    • Marital Status: Patient refused   Intimate Partner Violence: Not on file   Housing Stability: Not on file     PhysicalExamination:  Vitals:   /64 (BP Location: Right arm, Patient Position: Sitting, Cuff Size: Standard)   Pulse 63   Temp 98.3 °F (36.8 °C)   Ht 5' 9" (1.753 m)   Wt 111 kg (245 lb)   SpO2 95%   BMI 36.18 kg/m²     Physical Exam:  General: Sitting in chair, awake, and alert. No acute distress  HEENT: Nares patent, no craniofacial abnormalities. Mucous membranes, moist, no oral lesions, and normal dentition. Mallampati class - IV  NECK: Trachea midline, no accessory muscle use, and no stridor   CARDIAC: Regular rate and rhythm  PULM: CTA bilaterally no wheezing, rhonchi or rales. No conversational dyspnea  EXT: No peripheral edema    NEURO: No focal neurologic deficits, moving all extremities appropriately    Diagnostic Data:  Labs:   I personally reviewed the most recent laboratory data pertinent to today's visit  Appointment on 12/01/2023   Component Date Value   • Sodium 12/01/2023 143    • Potassium 12/01/2023 5.0    • Chloride 12/01/2023 113 (H)    • CO2 12/01/2023 22    • ANION GAP 12/01/2023 8    • BUN 12/01/2023 76 (H)    • Creatinine 12/01/2023 4.23 (H)    • Glucose, Fasting 12/01/2023 116 (H)    • Calcium 12/01/2023 9.0    • eGFR 12/01/2023 12    • Phosphorus 12/01/2023 5.4 (H)    Appointment on 10/30/2023   Component Date Value   • WBC 10/30/2023 6.31    • RBC 10/30/2023 3.12 (L)    • Hemoglobin 10/30/2023 9.2 (L)    • Hematocrit 10/30/2023 30.2 (L)    • MCV 10/30/2023 97    • MCH 10/30/2023 29.5    • MCHC 10/30/2023 30.5 (L)    • RDW 10/30/2023 14.9    • Platelets 39/78/9837 220    • MPV 10/30/2023 12.0    • Magnesium 10/30/2023 2.2    • Albumin 10/30/2023 3.6    • Calcium 10/30/2023 9.1    • Phosphorus 10/30/2023 4.9 (H)    • BUN 10/30/2023 62 (H)    • Creatinine 10/30/2023 3.90 (H)    • Sodium 10/30/2023 141    • Potassium 10/30/2023 5.1    • Chloride 10/30/2023 110 (H)    • CO2 10/30/2023 23    • ANION GAP 10/30/2023 8    • eGFR 10/30/2023 14    • Glucose, Fasting 10/30/2023 135 (H)    • PTH 10/30/2023 252.2 (H)    • Creatinine, Ur 10/30/2023 64.8    • Protein Urine Random 10/30/2023 415    • Prot/Creat Ratio, Ur 10/30/2023 6.40 (H)    Lab on 08/28/2023   Component Date Value   • Hemoglobin A1C 08/28/2023 8.3 (H)    • EAG 08/28/2023 192    • Fructosamine 08/28/2023 272    Procedure visit on 08/23/2023   Component Date Value   • POST-VOID RESIDUAL VOLUM* 08/23/2023 236    Orders Only on 07/31/2023   Component Date Value   • EXT Creatinine Urine 07/31/2023 51.6    • Alb/Creat Ratio 07/31/2023 1,938.0    Lab on 07/19/2023   Component Date Value   • Sodium 08/28/2023 140    • Potassium 08/28/2023 5.0    • Chloride 08/28/2023 110 (H)    • CO2 08/28/2023 23    • ANION GAP 08/28/2023 7    • BUN 08/28/2023 65 (H)    • Creatinine 08/28/2023 3.39 (H)    • Glucose, Fasting 08/28/2023 162 (H)    • Calcium 08/28/2023 8.4    • eGFR 08/28/2023 16    • Sodium 07/19/2023 140    • Potassium 07/19/2023 4.3    • Chloride 07/19/2023 108    • CO2 07/19/2023 22    • ANION GAP 07/19/2023 10    • BUN 07/19/2023 58 (H)    • Creatinine 07/19/2023 3.27 (H)    • Glucose, Fasting 07/19/2023 167 (H)    • Calcium 07/19/2023 8.9    • eGFR 07/19/2023 17    Lab on 07/06/2023   Component Date Value   • Sodium 07/06/2023 139    • Potassium 07/06/2023 4.6    • Chloride 07/06/2023 107    • CO2 07/06/2023 25    • ANION GAP 07/06/2023 7    • BUN 07/06/2023 60 (H)    • Creatinine 07/06/2023 3.23 (H)    • Glucose, Fasting 07/06/2023 219 (H)    • Calcium 07/06/2023 8.6    • eGFR 07/06/2023 17    Appointment on 06/21/2023   Component Date Value   • Sodium 06/21/2023 140    • Potassium 06/21/2023 4.4    • Chloride 06/21/2023 108    • CO2 06/21/2023 23    • ANION GAP 06/21/2023 9    • BUN 06/21/2023 81 (H)    • Creatinine 06/21/2023 3.71 (H)    • Glucose, Fasting 06/21/2023 188 (H)    • Calcium 06/21/2023 8.3 (L)    • eGFR 06/21/2023 14    • Color, UA 06/21/2023 Yellow    • Clarity, UA 06/21/2023 Clear    • Specific Gravity, UA 06/21/2023 1.020    • pH, UA 06/21/2023 6.0    • Leukocytes, UA 06/21/2023 Negative    • Nitrite, UA 06/21/2023 Negative    • Protein, UA 06/21/2023 2+ (A)    • Glucose, UA 06/21/2023 Trace (A)    • Ketones, UA 06/21/2023 Negative    • Urobilinogen, UA 06/21/2023 0.2    • Bilirubin, UA 06/21/2023 Negative    • Occult Blood, UA 06/21/2023 Trace-Intact (A)    • RBC, UA 06/21/2023 4-10 (A)    • WBC, UA 06/21/2023 0-5    • Epithelial Cells 06/21/2023 Occasional    • Bacteria, UA 06/21/2023 Occasional    • Hyaline Casts, UA 06/21/2023 0-1 (A)    • MUCUS THREADS 06/21/2023 Occasional (A)    • Creatinine, Ur 06/21/2023 72.8    • Protein Urine Random 06/21/2023 391    • Prot/Creat Ratio, Ur 06/21/2023 5.37 (H)      I have personally reviewed pertinent lab results. Lab Results   Component Value Date    WBC 6.31 10/30/2023    HGB 9.2 (L) 10/30/2023    HCT 30.2 (L) 10/30/2023    MCV 97 10/30/2023     10/30/2023     Lab Results   Component Value Date    GLUCOSE 141 (H) 06/21/2019    CALCIUM 9.0 12/01/2023    K 5.0 12/01/2023    CO2 22 12/01/2023     (H) 12/01/2023    BUN 76 (H) 12/01/2023    CREATININE 4.23 (H) 12/01/2023     No results found for: "IGE"  Lab Results   Component Value Date    ALT 16 06/20/2022    AST 12 (L) 06/20/2022    ALKPHOS 99 06/20/2022     Lab Results   Component Value Date    IRON 29 (L) 02/20/2023    TIBC 176 (L) 02/20/2023    FERRITIN 118 02/20/2023     No results found for: "[address]"  No results found for: "FOLATE"    Sleep studies:  -Home sleep study on 6/8/2023 - AHI 72.7 without a positional component.  Weight of 246 lbs at time of his last sleep study    Compliance Data from 11/2/2023 to 12/1/2023:   HONEY company: [employer]  Mask type: Nasal mask    Type of PAP:  AirCurve 10 VAuto (maximum IPAP 25 cm H2O, minimum EPAP 8 cm H2O, and pressure support 4 cm H2O)  Percent usage: 100%, >4 hours  Average time used: 13 hours, 24 minutes  95th percentile leak: 30.6 L/minute  95th percentile EPAP pressure: 12.4 cm H2O  Residual AHI: 13.7 (FAUSTINA 8.6) Reilly Mahoney [facility] Sleep Medicine Fellow

## 2023-12-04 NOTE — PATIENT INSTRUCTIONS
Nursing Support:  When: Monday through Friday 7A-5PM except holidays  Where: Our direct line is [phone]. If you are having a true emergency please call 911. In the event that the line is busy or it is after hours please leave a voice message and we will return your call. Please speak clearly, leaving your full name, birth date, best number to reach you and the reason for your call. Medication refills: We will need the name of the medication, the dosage, the ordering provider, whether you get a 30 or 90 day refill, and the pharmacy name and address. Medications will be ordered by the provider only. Nurses cannot call in prescriptions. Please allow 7 days for medication refills. Physician requested updates: If your provider requested that you call with an update after starting medication, please be ready to provide us the medication and dosage, what time you take your medication, the time you attempt to fall asleep, time you fall asleep, when you wake up, and what time you get out of bed. Sleep Study Results: We will contact you with sleep study results and/or next steps after the physician has reviewed your testing. HOW TO CLEAN YOUR AUTO CPAP MACHINE    Mask: Clean the cushion of your mask daily. Dish soap and warm water. (Usually eligible for mask cushions every 3 months)    Headgear: Once a week. I find when you wash it daily it eats the material of the Velcro faster.  (Usually eligible for new headgear every 6 months)    Heated Tubing: Clean the tube every 3-7 days. One drop of dish soap run warm water through the tube then hang it over your shower curtain and let it drip dry. (Usually eligible every 3 months)    Humidifier: Rinse out every 1-3 days. Dish soap warm water or , top shelf. (eligible every 6 months) **DISTILLED WATER ONLY**    Filters:  Dark blue (reusable for 6 months)- Rinse under warm water (no soap) sit and drip dry every 2-3 weeks.   (eligible for a new one every 6 months)    Light Blue (disposable) throw away every 2-4 weeks depending on how dirty it looks. (eligible for 6 every 3 months)    Check with your insurance and durable medical equipment company regarding supply replacements. Strategies for improving sleep:    Keep a consistent bedtime and wake up time. This will lead to a more regular sleep schedule and avoid periods of sleep deprivation or periods of extended wakefulness during the night. Sleep in a colder environment. Bedroom temperatures may need to be below 70 degrees Fahrenheit to help with decreasing body temperature. The brain usually calms in colder temperatures. Taking a hot shower/bath before bedtime may help with decreasing internal body temperature. Avoid watching TV in bed. If needing a form of media to help with sleep - can try sleep aid podcasts such Sleep With Me - a free podcast that helps with sleep initiation    Avoid napping, especially naps lasting longer than 1 hour or naps late in the day, which will likely affect your ability to fall asleep that night. Limit caffeine/sugar, avoiding caffeine after lunch to allow it to get out of your system and not affect your ability to fall asleep or the quality of your sleep. I usually recommend avoiding caffeine/sugar at least 6 hours before bedstime    Limit alcohol, alcohol can be sedating but also activating as it metabolizes, causing you to awaken from sleep earlier than desired. It can affect the quality of your sleep by not letting you get into the more refreshing stages of sleep. Avoid nicotine, of course not smoking or vaping at all is best, but nicotine is a stimulant and should be avoided near bedtime and during the night    Exercise and daytime physical activity is encouraged, in particular 4-6 hours before bedtime, as this may help you to fall asleep more easily and quality of sleep is improved. Rigorous exercise within 3 hours of bedtime is discouraged.     Keep the sleep environment quiet and dark - Noise and light exposure during the night can disrupt sleep. White noise or ear plugs are often recommended to reduce noise. Using black out shades or an eye mask is commonly recommended to reduce light. This also includes avoiding exposure to television or technology near bedtime, as this can have an impact on circadian rhythms by shifting sleep time later. Bedroom clock - Avoid checking the time at night  This includes alarm clocks and other time pieces such as watches and phones. Checking the time increases cognitive arousal and prolongs wakefulness. Evening eating - Avoid eating close to bedtime which can cause acid reflux and unwanted weight gain, but don't go to bed hungry. Eat a healthy and filling meal in the evening without over-eating and avoid late night snacks. Be mindful of your medications - some medications if taken closer to bedtime can cause insomnia. These medications include Effexor, Cymbalta, Wellbutrin, Metoprolol, Albuterol and others. Of course, medications are important for other medical issues so a discussion needs to be had with your prescribing physician before changing medications on your own. Avoid excessive time awake in bed:  Before going to bed, decreasing stressful thoughts is key to quieting the brain. That's easier than done. Some strategies to decrease these thoughts include a typical wind down routine (reading a book, hot shower/bath, calming TV show in the living room etc.), specified worry time (writing down your worries in a journal 2-3 hours before bed), progressive muscle relaxation ([employer] has some good tutorials on insomnia muscle relaxation), meditation. With great difficulty falling asleep or with difficulty falling back asleep, laying in bed for a prolonged period time is not ideal.  This can increase worry and rumination (having racing thoughts, not able to "turn off your brain".   During the first part of the night - avoid going to bed unless you are drowsy. Sometimes we go to bed because we feel like it's the "right time" but our brains aren't ready for sleeping. This may paradoxically lead to more insomnia as you stay awake in bed waiting to go to sleep. If you are able to fall asleep under 30 minutes of closing your eyes with the lights off, that is reasonable. If not, then maybe you need to get out of bed. After what feels like 30 minutes, if you feel wide awake, worried, anxious, or can't clear your brain, it is better to leave the bedroom to do something relaxing , The typical recommendation is to read a boring book in dim light. In general, if you leave the room, you want to do something that is relaxing, not stimulating. Avoid bright screens, doing work, doing chores, or anything that requires a lot of mental effort. Return to bed when you feel more calm, sleepy, or mentally clear. It is common in insomnia to look at the time at night to see what time it is, how long you have been awake, etc.  However, this can negatively affect sleep. I strongly recommend avoiding looking at the time at night. Here are some ways to do this  1) Turn the clock around so you cannot see the time at night  2) Avoid wearing a watch to bed. 3) Leave your phone on the other side of the room so you cannot look at it at night  4) Set an alarm if you need to wake up in he morning. If you have not heard the alarm, assume it is still time to sleep. If you practice this consistently, sleep quality and anxiety regarding sleep may improve. There are some on-line resources that do require a fee that can be of help. Some of which will require a fee. https://mata.info/. [place].gov/apps/insomnia/index.html#dashboard (FREE)  Http://[employer]/cbt-online-insomnia-treatment.html  [employer]The[employer].si    Some apps that have helped people sleep: Insomnia  (FREE)  Calm  CBT-I   Go!  To Sleep by the [facility]     It is very important to avoid driving while drowsy, this can be very dangerous or even cause serious injury or death. If sleepy, it is not safe to get behind the wheel. If you are driving and feels sleepy, it is very important to pull over right away. Even losing control of the car for a split second can be deadly. If you feel you cannot control when sleepiness occurs and cannot prevented, it is important to not drive at all until this improves. Please let me know if you experience this as it is very important.

## 2023-12-05 ENCOUNTER — TELEPHONE (OUTPATIENT)
Dept: SLEEP CENTER | Facility: CLINIC | Age: 78
End: 2023-12-05

## 2023-12-05 NOTE — TELEPHONE ENCOUNTER
RX for PAP resupply, REGINALD and office note sent to [employer] via [employer]. Emailed [facility] to see if mask fitting was completed. I saw Ivette Keith 1945 yesterday my self   [facility]   Clinical Liaison   [facility] Equipment  was completed.

## 2023-12-06 ENCOUNTER — OFFICE VISIT (OUTPATIENT)
Dept: NEUROLOGY | Facility: CLINIC | Age: 78
End: 2023-12-06
Payer: COMMERCIAL

## 2023-12-06 VITALS
HEIGHT: 69 IN | OXYGEN SATURATION: 96 % | SYSTOLIC BLOOD PRESSURE: 158 MMHG | TEMPERATURE: 98.7 F | DIASTOLIC BLOOD PRESSURE: 64 MMHG | HEART RATE: 61 BPM | WEIGHT: 252.2 LBS | BODY MASS INDEX: 37.36 KG/M2

## 2023-12-06 DIAGNOSIS — I65.22 STENOSIS OF LEFT INTERNAL CAROTID ARTERY: ICD-10-CM

## 2023-12-06 DIAGNOSIS — E74.04 MCARDLE'S DISEASE (HCC): ICD-10-CM

## 2023-12-06 DIAGNOSIS — E11.42 DIABETIC POLYNEUROPATHY ASSOCIATED WITH TYPE 2 DIABETES MELLITUS (HCC): Primary | ICD-10-CM

## 2023-12-06 DIAGNOSIS — G47.33 OSA (OBSTRUCTIVE SLEEP APNEA): ICD-10-CM

## 2023-12-06 DIAGNOSIS — N18.4 CKD (CHRONIC KIDNEY DISEASE), STAGE IV (HCC): ICD-10-CM

## 2023-12-06 LAB

## 2023-12-06 PROCEDURE — 99214 OFFICE O/P EST MOD 30 MIN: CPT | Performed by: PHYSICIAN ASSISTANT

## 2023-12-06 NOTE — PROGRESS NOTES
Patient ID: Michael Reynolds Jr. is a 78 y.o. male.    Assessment/Plan:       Diagnoses and all orders for this visit:    Diabetic polyneuropathy associated with type 2 diabetes mellitus (HCC)    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HCC)    HARJIT (obstructive sleep apnea)    Stenosis of left internal carotid artery    CKD (chronic kidney disease), stage IV ([facility])         Mr. Michael Reynolds Jr. is stable neurologically.  No changes since last seen.    The patient is not bothered by any neuropathic pain although he does have severe neuropathy per EMG. Last A1c 8.3 in August.  Lifestyle modifications discussed today, including continuing to eat healthy, drinking adequate amount of water, and exercise.  He does have a stationary bike at home but he prefers to go outside and walk short distances.  He can only do short bursts of exercise due to possible McArdle's disease.     Encouraged to continue to stay active from a social and cognitive perspective.    Continue baby aspirin and atorvastatin daily (bilateral asymptomatic carotid stenosis, mild).    He is following with his family physician for low GFR with increasing creatinine, recommend nephrology consultation, possible dialysis.    The patient should not hesitate to call me prior to his follow up with any questions or concerns.      Subjective:    HPI    Mr. Michael Reynolds Jr. is here for neurological follow-up.  His wife accompanies him and provides the history when needed.  No progression of imbalance of neuropathy sxs per their hx. No falls.  The pt tries to stay active with walking outside in his yard. Unfortunately he no longer has his license which makes social interaction difficult for him.  No change in neurological sxs since I last saw him.    He follows cardiology for diastolic HF, compensated now, CAD s/p CABGx4 6/19, HTN, dyslipidemia and CKD. Also sees nephrology fo management of CKD on diuretics. He sees endocrinology, last a1c 7.1% 7/25/22 on insulin. 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     He was hospitalized 06/26/2023 due to GI bleed/ BRBPR/ melena and required EGD, with ulcer in the antrum, now on protonix, and diverticulosis on fiber suppl. GI told him to stop ALA, which was recommended for neuropathy/ numbness previously. He was told by GI that he could continue the EC asa daily. He has not had further bleeding or GI complaints since, thankfully.    Labs:  12/1/2023: BMP with chloride 113, creatinine 4.23, GFR 12, BUN 76, fasting glucose 116.  Phosphorus 5.4.  8/28/2023: A1c 8.3    Diagnostics:  Recent head CT 10/1921 with “microangiopathic changes and “but no acute disease.     EMG on 5/12/2020:  “This is a severely abnormal study. There is electrophysiologic evidence consistent with a moderate to severe, generalized, active and chronic, axonal with secondary demyelination, motor and sensory peripheral polyneuropathy. Given the severity of the polyneuropathy it is very difficult to determine if there is a superimposed entrapment neuropathy. There was no electrophysiologic evidence of superimposed cervical radiculopathy, lumbar radiculopathy or other entrapment neuropathy in the right upper or lower extremity.\"    The following portions of the patient's history were reviewed and updated as appropriate: He  has a past medical history of CHF (congestive heart failure) (HCC), Chronic kidney disease (18 - 24 months?), Colon polyp, CPAP (continuous positive airway pressure) dependence, Diabetes mellitus (HCC), History of transfusion, Hyperlipidemia, Hypertension, Myocardial infarction (HCC) (06/2019), Obesity, Renal disorder, Sleep apnea, and Visual impairment (1991).  He   Patient Active Problem List    Diagnosis Date Noted    Depression 12/27/2023    Anemia of chronic kidney failure, stage 4 (severe)  11/01/2023    Nocturia 08/23/2023    Encounter to discuss test results 08/22/2023    Incomplete bladder emptying 08/22/2023    Obesity, morbid " (HCC) 06/07/2023    Osteopenia of neck of left femur 03/23/2023    Secondary hyperparathyroidism of renal origin (HCC) 02/22/2023    Hyperphosphatemia 02/22/2023    HARJIT (obstructive sleep apnea) 02/19/2023    Diverticulosis 07/09/2022    Gastric ulcer 07/09/2022    GI bleed 06/20/2022    Gait instability 01/20/2022    Asymptomatic bilateral carotid artery stenosis 01/18/2022    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HCC) 01/18/2022    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 09/29/2021    BPH with obstruction/lower urinary tract symptoms 09/29/2021    Primary hypertension 09/21/2021    Elevated TSH 08/23/2021    Hypervolemia associated with renal insufficiency 03/17/2021    Chronic diastolic (congestive) heart failure ([facility]) 03/11/2021    Traumatic injury of skin 01/21/2021    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[facility]) 07/21/2020    Encounter for  medical examination (CDME) 06/03/2020    Diabetic polyneuropathy associated with type 2 diabetes mellitus ([facility])     Stenosis of left internal carotid artery 04/30/2020    Lymphadenopathy 10/22/2019    Vitamin D deficiency 10/21/2019    Iron deficiency anemia 07/09/2019    S/P CABG (coronary artery bypass graft) 06/21/2019    Acute postoperative pulmonary insufficiency ([facility]) 06/21/2019    Coronary artery disease involving native coronary artery of native heart without angina pectoris 06/17/2019    Mixed hyperlipidemia 06/17/2019    Type 2 diabetes mellitus with stage 4 chronic kidney disease, with long-term current use of insulin ([facility]) 06/15/2019    Sleep apnea 06/15/2019    Benign hypertension with chronic kidney disease, stage IV ([facility]) 05/30/2019    Chronic kidney disease-mineral and bone disorder 05/30/2019    Esophageal dysphagia 02/14/2019    History of colonic polyps 02/14/2019    CKD (chronic kidney disease), stage IV ([facility]) 08/18/2017    Non-nephrotic range proteinuria 08/18/2017    Trigger finger of left hand 04/05/2017    Lumbar back pain 02/07/2012    Lumbar discogenic pain syndrome  02/07/2012    Pain of lower extremity 02/07/2012    Spondylolisthesis 02/07/2012    Spinal stenosis 02/07/2012     He  has a past surgical history that includes Gallbladder surgery; Back surgery; Hernia repair; pr coronary artery byp w/vein & artery graft 4 vein (N/A, 06/21/2019); Colonoscopy; Rectal surgery; Coronary artery bypass graft (2019); Appendectomy (1977); Cholecystectomy (1977); Spine surgery (2012); EGD; Tonsillectomy; and Cataract extraction, bilateral (Bilateral).  His family history includes Alcohol abuse in his father and mother; Cancer in his father and mother; Diabetes in his maternal grandmother and paternal aunt; Diabetes type II in his maternal grandmother; Hypertension in his paternal grandfather.  He  reports that he quit smoking about 32 years ago. His smoking use included cigarettes and cigars. He started smoking about 66 years ago. He has a 105.0 pack-year smoking history. He has never used smokeless tobacco. He reports that he does not currently use alcohol. He reports that he does not use drugs.  Current Outpatient Medications   Medication Sig Dispense Refill    amLODIPine (NORVASC) 5 mg tablet TAKE 1 TABLET (5 MG TOTAL) BY MOUTH DAILY. 90 tablet 1    Ascorbic Acid (VITAMIN C) 1000 MG tablet Take 1,000 mg by mouth daily      aspirin (ECOTRIN LOW STRENGTH) 81 mg EC tablet Take 1 tablet (81 mg total) by mouth daily 60 tablet 8    atorvastatin (LIPITOR) 80 mg tablet Take 1 tablet (80 mg total) by mouth daily with dinner 90 tablet 0    calcitriol (ROCALTROL) 0.25 mcg capsule Take 1 capsule (0.25 mcg total) by mouth 3 (three) times a week 36 capsule 3    calcium acetate (PHOSLO) capsule Take 1 capsule (667 mg total) by mouth 2 (two) times a day with meals 180 capsule 1    cholecalciferol (VITAMIN D3) 1,000 units tablet Take 2 tablets (2,000 Units total) by mouth daily 1 tablet 1    Continuous Blood Gluc  (FreeStyle Celia 2 [place]) MAMI Use to check blood sugar daily 1 each 0     "Continuous Blood Gluc Sensor (FreeStyle Celia 2 Sensor) MISC Use daily as directed for CGM - Change every 14 days 6 each 3    cyanocobalamin (VITAMIN B-12) 500 mcg tablet Take 500 mcg by mouth daily      finasteride (PROSCAR) 5 mg tablet Take 1 tablet (5 mg total) by mouth daily 90 tablet 3    glucose blood test strip Check 4 times a day 400 each 1    insulin glargine (Toujeo Max SoloStar) 300 units/mL CONCENTRATED U-300 injection pen (2-unit dial) INJECT 42 UNITS UNDER THE SKIN DAILY at dinner time 18 mL 1    insulin lispro (HumaLOG KwikPen) 100 units/mL injection pen Inject 16 units before brunch and  18  units before dinner +scale 15 mL 1    Insulin Pen Needle (B-D ULTRAFINE III SHORT PEN) 31G X 8 MM MISC Inject under the skin 4 (four) times a day 400 each 0    latanoprost (XALATAN) 0.005 % ophthalmic solution Administer 1 drop to both eyes daily at bedtime      Microlet Lancets MISC USE 3 TIMES  each 1    pantoprazole (PROTONIX) 40 mg tablet Take 1 tablet (40 mg total) by mouth daily 90 tablet 1    telmisartan (MICARDIS) 20 MG tablet TAKE 1 TABLET BY MOUTH EVERY DAY 90 tablet 1    torsemide (DEMADEX) 20 mg tablet Take 1 tablet (20 mg total) by mouth daily Take an additional 20 mg on MWF for a total of 40 mg. 90 tablet 1    liraglutide (Victoza) injection 1.8 MG ONCE DAILY 9 mL 3    metoprolol tartrate (LOPRESSOR) 50 mg tablet Take 1 tablet (50 mg total) by mouth 2 (two) times a day 180 tablet 1    nitrofurantoin (MACRODANTIN) 50 mg capsule Take 1 capsule (50 mg total) by mouth daily at bedtime 90 capsule 3    tamsulosin (FLOMAX) 0.4 mg Take 2 capsules (0.8 mg total) by mouth daily with dinner 60 capsule 0     No current facility-administered medications for this visit.     He has No Known Allergies..         Objective:    Blood pressure 158/64, pulse 61, temperature 98.7 °F (37.1 °C), temperature source Temporal, height 5' 9\" (1.753 m), weight 114 kg (252 lb 3.2 oz), SpO2 96%.  Body mass index is 37.24 " kg/m².    Physical Exam    Neurological Exam  On neurologic exam, the patient is alert and oriented to time and place. Speech is fluent and articulate, and the patient follows commands appropriately. Judgment and affect appear normal. Pupils are equally round and reactive to light and extraocular muscles are intact without nystagmus. Face is symmetric, and tongue, uvula, and palate are midline. Hearing is intact. Motor examination reveals intact strength throughout.  Reflexes are 1+ in the upper extremities bilaterally, and trace in the knees and ankles bilaterally.  Gait is wide-based.     ROS:    Review of Systems   Constitutional:  Negative for appetite change, fatigue and fever.   HENT: Negative.  Negative for hearing loss, tinnitus, trouble swallowing and voice change.    Eyes: Negative.  Negative for photophobia, pain and visual disturbance.   Respiratory: Negative.  Negative for shortness of breath.    Cardiovascular: Negative.  Negative for palpitations.   Gastrointestinal: Negative.  Negative for nausea and vomiting.   Endocrine: Negative.  Negative for cold intolerance.   Genitourinary: Negative.  Negative for dysuria, frequency and urgency.   Musculoskeletal:  Negative for back pain, gait problem, myalgias and neck pain.   Skin: Negative.  Negative for rash.   Allergic/Immunologic: Negative.    Neurological:  Positive for numbness (b/l legs). Negative for dizziness, tremors, seizures, syncope, facial asymmetry, speech difficulty, weakness, light-headedness and headaches.   Hematological: Negative.  Does not bruise/bleed easily.   Psychiatric/Behavioral: Negative.  Negative for confusion, hallucinations and sleep disturbance.    All other systems reviewed and are negative.    ROS reviewed.

## 2023-12-11 NOTE — TELEPHONE ENCOUNTER
HPI:   Patel Marcelino is a 44year old female who presents for a complete physical exam. Symptoms: denies discharge, itching, burning or dysuria. Patient complains of having joint aches diagnosed with enclosing spondylitis.   Elevated liver function te Message left on the nurse line by patient's spouse Nathaniel Cano (communication consent in media) stating [employer] called patient and stated they do not provide overnight pulse oximetry testing and recommended patient go to nearest pharmacy to purchase a pulse oximeter. Per chart review REGINALD ordered 12/4/2023 following office visit with Dr. Lindalee Holter. Order placed through [address] [place] [address] to [address] [place] [address] 12/5/2023. Returned call from patient. Left message order was sent electronically 12/5/2023 and should arrive at patient's home. Advised patient to contact the nursing staff if REGINALD testing kit does not arrive at his home in next few days, so matter can be escalated to [address] [place] [address] supervisors. mouth daily. • valACYclovir HCl 1 G Oral Tab Take 2 tablets (2,000 mg total) by mouth every 12 (twelve) hours for 1 day.  4 tablet 3      Past Medical History:   Diagnosis Date   • Abnormal uterine bleeding    • Arrhythmia    • Carpal tunnel syndrome, denies nasal congestion, sinus pain or ST  LUNGS: denies shortness of breath with exertion  CARDIOVASCULAR: denies chest pain on exertion  GI: denies abdominal pain,denies heartburn  : denies dysuria, vaginal discharge or itching,periods regular   MUSCUL pH Urine 5.0 4.5 - 8.0    Protein Urine Negative Negative mg/dl    Urobilinogen Urine <2.0 0.2 - 2.0 mg/dL    Nitrite Urine Negative Negative    Leukocyte Esterase Urine Negative Negative    Microscopic Microscopic not indicated    LIPID PANEL   Result Range    WBC 8.4 4.0 - 11.0 x10(3) uL    RBC 4.83 3.80 - 5.30 x10(6)uL    HGB 15.0 12.0 - 16.0 g/dL    HCT 45.0 35.0 - 48.0 %    .0 150.0 - 450.0 10(3)uL    MCV 93.2 80.0 - 100.0 fL    MCH 31.1 26.0 - 34.0 pg    MCHC 33.3 31.0 - 37.0 g/dL    RDW 12. 1 year. F/u in 6 months.

## 2023-12-14 ENCOUNTER — RA CDI HCC (OUTPATIENT)
Dept: OTHER | Facility: HOSPITAL | Age: 78
End: 2023-12-14

## 2023-12-14 NOTE — PROGRESS NOTES
S34.2344, E11.65, I13.0  [address]  coding opportunities          Chart Reviewed number of suggestions sent to Provider: 3     Patients Insurance     Medicare Insurance: [employer]

## 2023-12-15 ENCOUNTER — TELEPHONE (OUTPATIENT)
Dept: NEPHROLOGY | Facility: CLINIC | Age: 78
End: 2023-12-15

## 2023-12-15 NOTE — TELEPHONE ENCOUNTER
I called the patient's wife and we spoke over the phone. Recent laboratory data were reviewed. Lab Results   Component Value Date    SODIUM 143 12/01/2023    K 5.0 12/01/2023     (H) 12/01/2023    CO2 22 12/01/2023    BUN 76 (H) 12/01/2023    CREATININE 4.23 (H) 12/01/2023    GLUC 201 (H) 06/23/2022    CALCIUM 9.0 12/01/2023     Renal function is worse but rise in SCr is not unexpected given recent increase in diuretics. Jessi Braun reports that patient remains unmotivated to change. No changes at this point. He has repeat labs in 2 months for Feb 2024 appt with me.

## 2023-12-22 ENCOUNTER — OFFICE VISIT (OUTPATIENT)
Dept: FAMILY MEDICINE CLINIC | Facility: OTHER | Age: 78
End: 2023-12-22
Payer: COMMERCIAL

## 2023-12-22 VITALS
BODY MASS INDEX: 37.62 KG/M2 | RESPIRATION RATE: 16 BRPM | WEIGHT: 254 LBS | SYSTOLIC BLOOD PRESSURE: 122 MMHG | HEIGHT: 69 IN | HEART RATE: 52 BPM | DIASTOLIC BLOOD PRESSURE: 62 MMHG | OXYGEN SATURATION: 99 % | TEMPERATURE: 98.2 F

## 2023-12-22 DIAGNOSIS — I50.32 CHRONIC DIASTOLIC (CONGESTIVE) HEART FAILURE (HCC): ICD-10-CM

## 2023-12-22 DIAGNOSIS — N18.4 CKD (CHRONIC KIDNEY DISEASE), STAGE IV (HCC): ICD-10-CM

## 2023-12-22 DIAGNOSIS — N39.0 RECURRENT UTI (URINARY TRACT INFECTION): ICD-10-CM

## 2023-12-22 DIAGNOSIS — N28.9 HYPERVOLEMIA ASSOCIATED WITH RENAL INSUFFICIENCY: ICD-10-CM

## 2023-12-22 DIAGNOSIS — E78.2 MIXED HYPERLIPIDEMIA: ICD-10-CM

## 2023-12-22 DIAGNOSIS — Z00.00 MEDICARE ANNUAL WELLNESS VISIT, SUBSEQUENT: Primary | ICD-10-CM

## 2023-12-22 DIAGNOSIS — N13.8 BPH WITH OBSTRUCTION/LOWER URINARY TRACT SYMPTOMS: ICD-10-CM

## 2023-12-22 DIAGNOSIS — I12.9 BENIGN HYPERTENSION WITH CHRONIC KIDNEY DISEASE, STAGE IV (HCC): ICD-10-CM

## 2023-12-22 DIAGNOSIS — N18.4 TYPE 2 DIABETES MELLITUS WITH STAGE 4 CHRONIC KIDNEY DISEASE, WITH LONG-TERM CURRENT USE OF INSULIN (HCC): ICD-10-CM

## 2023-12-22 DIAGNOSIS — E87.70 HYPERVOLEMIA ASSOCIATED WITH RENAL INSUFFICIENCY: ICD-10-CM

## 2023-12-22 DIAGNOSIS — F32.A DEPRESSION, UNSPECIFIED DEPRESSION TYPE: ICD-10-CM

## 2023-12-22 DIAGNOSIS — N40.1 BPH WITH OBSTRUCTION/LOWER URINARY TRACT SYMPTOMS: ICD-10-CM

## 2023-12-22 DIAGNOSIS — Z79.4 TYPE 2 DIABETES MELLITUS WITH STAGE 4 CHRONIC KIDNEY DISEASE, WITH LONG-TERM CURRENT USE OF INSULIN (HCC): ICD-10-CM

## 2023-12-22 DIAGNOSIS — E11.65 UNCONTROLLED TYPE 2 DIABETES MELLITUS WITH HYPERGLYCEMIA (HCC): ICD-10-CM

## 2023-12-22 DIAGNOSIS — E11.22 TYPE 2 DIABETES MELLITUS WITH STAGE 4 CHRONIC KIDNEY DISEASE, WITH LONG-TERM CURRENT USE OF INSULIN (HCC): ICD-10-CM

## 2023-12-22 DIAGNOSIS — N18.4 BENIGN HYPERTENSION WITH CHRONIC KIDNEY DISEASE, STAGE IV (HCC): ICD-10-CM

## 2023-12-22 DIAGNOSIS — E66.01 OBESITY, MORBID (HCC): ICD-10-CM

## 2023-12-22 DIAGNOSIS — I10 PRIMARY HYPERTENSION: ICD-10-CM

## 2023-12-22 PROCEDURE — G0439 PPPS, SUBSEQ VISIT: HCPCS | Performed by: FAMILY MEDICINE

## 2023-12-22 RX ORDER — LIRAGLUTIDE 6 MG/ML
INJECTION SUBCUTANEOUS
Qty: 9 ML | Refills: 3 | Status: SHIPPED | OUTPATIENT
Start: 2023-12-22

## 2023-12-22 RX ORDER — METOPROLOL TARTRATE 50 MG/1
50 TABLET, FILM COATED ORAL 2 TIMES DAILY
Qty: 180 TABLET | Refills: 1 | Status: SHIPPED | OUTPATIENT
Start: 2023-12-22

## 2023-12-22 RX ORDER — TAMSULOSIN HYDROCHLORIDE 0.4 MG/1
0.8 CAPSULE ORAL
Qty: 60 CAPSULE | Refills: 0 | Status: SHIPPED | OUTPATIENT
Start: 2023-12-22 | End: 2024-01-21

## 2023-12-22 RX ORDER — NITROFURANTOIN MACROCRYSTALS 50 MG/1
50 CAPSULE ORAL
Qty: 90 CAPSULE | Refills: 3 | Status: SHIPPED | OUTPATIENT
Start: 2023-12-22 | End: 2024-12-16

## 2023-12-22 NOTE — PROGRESS NOTES
BMI Counseling: Body mass index is 37.51 kg/m². The BMI {VB BMI Counselin}  BMI Counseling: Body mass index is 37.51 kg/m². The BMI {VB BMI Counselin}

## 2023-12-22 NOTE — PROGRESS NOTES
Assessment and Plan:     Problem List Items Addressed This Visit        Endocrine    Type 2 diabetes mellitus with stage 4 chronic kidney disease, with long-term current use of insulin (HCC)       Lab Results   Component Value Date    HGBA1C 8.3 (H) 08/28/2023     - A1C up from prior of 6.5   - Patient notes decreased exercise and poor dietary choices recently contributing  - Patient follow by endocrinology; 42 U Toujeo QHS, 16 and 18 U lispro lunch and dinner respectively   - Patient noting BG in am ranging from 130-200   - Did not want to have A1C lab collected in office as he is going to have it done by Endocrinology next month   - Concern for underlying mood disorder contributing to patients unwillingness to take more of an initiative in his health     Plan  - F/u Endocrinology  - Continue to encourage low carb food choices and at least 10 min of walking after meals   - Patient needs tighter glucose control given worsening kidney function with underlying uncontrolled diabetes            Cardiovascular and Mediastinum    Benign hypertension with chronic kidney disease, stage IV (HCC)     Lab Results   Component Value Date    EGFR 12 12/01/2023    EGFR 14 10/30/2023    EGFR 16 08/28/2023    CREATININE 4.23 (H) 12/01/2023    CREATININE 3.90 (H) 10/30/2023    CREATININE 3.39 (H) 08/28/2023     - BP of 122/62 in office today   - Currently on telmisartan 20 mg QD, metoprolol tart 50 mg BID, amlodipine 5 mg QD     Plan  - Encourage patient to keep BP log for review at office visits  - Continue current medications          Relevant Medications    metoprolol tartrate (LOPRESSOR) 50 mg tablet    Primary hypertension    Relevant Medications    metoprolol tartrate (LOPRESSOR) 50 mg tablet    Chronic diastolic (congestive) heart failure (HCC)     Wt Readings from Last 3 Encounters:   12/22/23 115 kg (254 lb)   12/06/23 114 kg (252 lb 3.2 oz)   12/04/23 111 kg (245 lb)     - Notes weight gain of 9 lbs over the last 2  weeks  - Physical examination notable for 1+ pitting edema b/l, lungs CTA b/l, no JVD  - Currently on Torsemide 20 mg QD with an additional 20 mg M/W/F    Plan  - F/u Nephrology   - Discussed doubling torsemide dose every day rather than just M/W/F for the next 1-2 weeks and to monitor weight daily   - CMP  - F/u in 1 month                Relevant Medications    metoprolol tartrate (LOPRESSOR) 50 mg tablet       Genitourinary    CKD (chronic kidney disease), stage IV ([facility])     Lab Results   Component Value Date    EGFR 12 12/01/2023    EGFR 14 10/30/2023    EGFR 16 08/28/2023    CREATININE 4.23 (H) 12/01/2023    CREATININE 3.90 (H) 10/30/2023    CREATININE 3.39 (H) 08/28/2023     - Notable increase in Creatinine   - Patient on telmisartan 20 mg QD   - Likely due to uncontrolled diabetes at this time   - Not interested in undergoing dialysis     Plan  - F/u with Nephrology   - Discussed in length with patient the possibility of death if continued deterioration of kidney function without dialysis    - Avoid NSAID's  - CMP          Hypervolemia associated with renal insufficiency    BPH with obstruction/lower urinary tract symptoms    Relevant Medications    tamsulosin (FLOMAX) 0.4 mg       Other    Mixed hyperlipidemia    Relevant Orders    Lipid panel    Comprehensive metabolic panel    Obesity, morbid (HCC)    Depression     - PHQ-9 score of 8   - Patient with notable blunted affect at times   - Not interested in starting any medication or talking with someone at this time   - Concern some level of depression is worsening patients overall health     Plan  - Continue to encourage patient to consider starting medication and/or talking with therapist         Other Visit Diagnoses     Medicare annual wellness visit, subsequent    -  Primary    Recurrent UTI (urinary tract infection)        Relevant Medications    nitrofurantoin (MACRODANTIN) 50 mg capsule          BMI Counseling: Body mass index is 37.51 kg/m². The BMI  is above normal. Nutrition recommendations include encouraging healthy choices of fruits and vegetables, consuming healthier snacks, limiting drinks that contain sugar, moderation in carbohydrate intake, increasing intake of lean protein, reducing intake of saturated and trans fat and reducing intake of cholesterol. Exercise recommendations include moderate physical activity 150 minutes/week and exercising 3-5 times per week. Rationale for BMI follow-up plan is due to patient being overweight or obese.     Depression Screening and Follow-up Plan: Patient's depression screening was positive with a PHQ-2 score of 3. Their PHQ-9 score was 8. Patient assessed for underlying major depression. Brief counseling provided and recommend additional follow-up/re-evaluation next office visit.       Preventive health issues were discussed with patient, and age appropriate screening tests were ordered as noted in patient's After Visit Summary.  Personalized health advice and appropriate referrals for health education or preventive services given if needed, as noted in patient's After Visit Summary.    Nutrition Assessment and Intervention:     Nutrition patient handout reviewed with patient      Physical Activity Assessment and Intervention:    Physical Activity patient handout reviewed with patient      Emotional and Mental Well-being, Sleep, Connectedness Assessment and Intervention:    Sleep/stress assessment performed    Depression and anxiety screening performed and reviewed    PHQ-9 or GAD7 performed for initial evaluation or follow-up      Tobacco and Toxic Substance Assessment and Intervention:     Tobacco use screening performed    Alcohol and drug use screening performed           History of Present Illness:     Patient presents for a Medicare Wellness Visit    77 yoM with PMHx of CAD, CABGx4, CHFpEF, T2DM, HLD, HTN, CKD4 presenting for AWV. Patient is without any acute complaints or concerns. Recent labs showing  uncontrolled diabetes with worsening kidney function. Patient followed by Nephrology who are encouraging him to start dialysis which he is currently not interested in. Patients A1C has increased from 6.5 to 8.3. Patient notes decreased exercise and poor food choices. He does not check his BG regularly but notes BG in the morning ranging from 130 to 200. He is to follow up with Endocrinology early next month. Patient also noted to be up 9 lbs in the last 2 weeks from prior weight check.        Patient Care Team:  Luke Glasgow DO as PCP - General (Family Medicine)  Dolly Luis MD as PCP - Endocrinology (Endocrinology)  James Patterson MD (Nephrology)  Nichole Carr DO (Internal Medicine)  [facility] (Dermatology)  ELEAZAR Sorto (Dermatology)  Alan Rodrigues MD as Surgeon (Surgical Oncology)  Kenny Lozano MD (Cardiology)  Dennis Garza MD (Sleep Medicine)  [facility] (Optometry)     Review of Systems:     Review of Systems   Constitutional:  Negative for activity change, appetite change and fatigue.   HENT:  Negative for congestion and rhinorrhea.    Eyes:  Negative for visual disturbance.   Respiratory:  Negative for cough and shortness of breath.    Cardiovascular:  Negative for chest pain and palpitations.   Gastrointestinal:  Positive for diarrhea. Negative for constipation, nausea and vomiting.   Endocrine: Negative for polyuria.   Genitourinary:  Negative for dysuria and hematuria.   Musculoskeletal:  Negative for arthralgias and myalgias.   Skin:  Negative for color change and rash.   Neurological:  Negative for dizziness, weakness, light-headedness and headaches.        Problem List:     Patient Active Problem List   Diagnosis   • CKD (chronic kidney disease), stage IV (HCC)   • Benign hypertension with chronic kidney disease, stage IV (HCC)   • Chronic kidney disease-mineral and bone disorder   • Type 2 diabetes mellitus with stage 4 chronic  kidney disease, with long-term current use of insulin ([facility])   • Sleep apnea   • Coronary artery disease involving native coronary artery of native heart without angina pectoris   • Mixed hyperlipidemia   • S/P CABG (coronary artery bypass graft)   • Acute postoperative pulmonary insufficiency ([facility])   • Iron deficiency anemia   • Non-nephrotic range proteinuria   • Esophageal dysphagia   • History of colonic polyps   • Lumbar back pain   • Lumbar discogenic pain syndrome   • Pain of lower extremity   • Trigger finger of left hand   • Spondylolisthesis   • Spinal stenosis   • Vitamin D deficiency   • Lymphadenopathy   • Stenosis of left internal carotid artery   • Diabetic polyneuropathy associated with type 2 diabetes mellitus ([facility])   • Encounter for  medical examination (CDME)   • 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[facility])   • Traumatic injury of skin   • Hypervolemia associated with renal insufficiency   • Elevated TSH   • Primary hypertension   • Chronic diastolic (congestive) heart failure ([facility])   • 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   • BPH with obstruction/lower urinary tract symptoms   • Asymptomatic bilateral carotid artery stenosis   • 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[facility])   • Gait instability   • GI bleed   • Diverticulosis   • Gastric ulcer   • HARJIT (obstructive sleep apnea)   • Secondary hyperparathyroidism of renal origin ([facility])   • Hyperphosphatemia   • Osteopenia of neck of left femur   • Obesity, morbid ([facility])   • Encounter to discuss test results   • Incomplete bladder emptying   • Nocturia   • Anemia of chronic kidney failure, stage 4 (severe)    • Depression      Past Medical and Surgical History:     Past Medical History:   Diagnosis Date   • CHF (congestive heart failure) ([facility])    • Chronic kidney disease 18 - 24 months?    Dr Patterson - stage 3   • Colon polyp    • CPAP (continuous positive airway pressure) dependence    • Diabetes mellitus ([facility])    • History of transfusion    • Hyperlipidemia    • Hypertension     • Myocardial infarction (HCC) 2019    Open heart surgery with quad bypass   • Obesity    • Renal disorder    • Sleep apnea    • Visual impairment     Dr Nickie Peters     Past Surgical History:   Procedure Laterality Date   • APPENDECTOMY      Done in conjunction with cholecystectomy   • BACK SURGERY     • CATARACT EXTRACTION, BILATERAL Bilateral     Left eye- 10/23 Right eye    • CHOLECYSTECTOMY     • COLONOSCOPY     • CORONARY ARTERY BYPASS GRAFT      quad   • EGD     • GALLBLADDER SURGERY     • HERNIA REPAIR     • IA CORONARY ARTERY BYP W/VEIN & ARTERY GRAFT 4 VEIN N/A 2019    Procedure: CORONARY ARTERY BYPASS GRAFT (CABG) 4 VESSELS WITH SVG TO PDA, OM, DIAGONAL AND LIMA TO LAD; RIGHT LEG EVH;  Surgeon: James Fang MD;  Location: [facility];  Service: Cardiac Surgery   • RECTAL SURGERY     • SPINE SURGERY      Fusion L3-L5?   • TONSILLECTOMY        Family History:     Family History   Problem Relation Age of Onset   • Cancer Mother    • Alcohol abuse Mother    • Cancer Father    • Alcohol abuse Father    • Diabetes Maternal Grandmother    • Diabetes type II Maternal Grandmother         Geriatric onset -     • Diabetes Paternal Aunt    • Hypertension Paternal Grandfather       Social History:     Social History     Socioeconomic History   • Marital status: /Civil Union     Spouse name: None   • Number of children: None   • Years of education: None   • Highest education level: None   Occupational History   • Occupation: RETIRED   Tobacco Use   • Smoking status: Former     Current packs/day: 0.00     Average packs/day: 3.0 packs/day for 35.0 years (105.0 ttl pk-yrs)     Types: Cigarettes, Cigars     Start date: 1957     Quit date: 1992     Years since quittin.0   • Smokeless tobacco: Never   • Tobacco comments:     Started smoking as a pre-teenager   Vaping Use   • Vaping status: Never Used   Substance and Sexual Activity   • Alcohol use:  Not Currently     Comment: none since most recent hospitalization   • Drug use: Never   • Sexual activity: Not Currently     Partners: Female     Birth control/protection: Male Sterilization, None   Other Topics Concern   • None   Social History Narrative    · Do you currently or have you served in the [employer]:   No      · Were you activated, into active duty, as a member of the [employer] or as a Reservist:   No      · Caffeine intake:   Occasional      · Diet:   Regular      · Live alone or with others:   with others      · Exercise level:   Occasional  swim in summertime. walk alot.     · ·Guns present in home:   No      · Seat belts used routinely:   Yes      · Advance directive:   No      · Sunscreen used routinely:   No      · Smoke alarm in home:   Yes      · Legally blind in one or both eyes:   No      · Hard of hearing or deaf in one or both ears:   No      ·      Social Determinants of Health     Financial Resource Strain: Low Risk  (12/17/2023)    Overall Financial Resource Strain (CARDIA)    • Difficulty of Paying Living Expenses: Not very hard   Food Insecurity: Unknown (12/18/2020)    Hunger Vital Sign    • Worried About Running Out of Food in the Last Year: Patient declined    • Ran Out of Food in the Last Year: Patient declined   Transportation Needs: No Transportation Needs (12/17/2023)    PRAPARE - Transportation    • Lack of Transportation (Medical): No    • Lack of Transportation (Non-Medical): No   Physical Activity: Unknown (7/27/2020)    Exercise Vital Sign    • Days of Exercise per Week: Patient declined    • Minutes of Exercise per Session: Patient declined   Stress: Not on file   Social Connections: Unknown (7/27/2020)    Social Connection and Isolation Panel [NHANES]    • Frequency of Communication with Friends and Family: Patient declined    • Frequency of Social Gatherings with Friends and Family: Patient declined    • Attends Yazidism Services: Patient declined    • Active  Member of Clubs or Organizations: Patient declined    • Attends Club or Organization Meetings: Patient declined    • Marital Status: Patient declined   Intimate Partner Violence: Not on file   Housing Stability: Not on file      Medications and Allergies:     Current Outpatient Medications   Medication Sig Dispense Refill   • amLODIPine (NORVASC) 5 mg tablet TAKE 1 TABLET (5 MG TOTAL) BY MOUTH DAILY. 90 tablet 1   • Ascorbic Acid (VITAMIN C) 1000 MG tablet Take 1,000 mg by mouth daily     • atorvastatin (LIPITOR) 80 mg tablet Take 1 tablet (80 mg total) by mouth daily with dinner 90 tablet 0   • calcitriol (ROCALTROL) 0.25 mcg capsule Take 1 capsule (0.25 mcg total) by mouth 3 (three) times a week 36 capsule 3   • calcium acetate (PHOSLO) capsule Take 1 capsule (667 mg total) by mouth 2 (two) times a day with meals 180 capsule 1   • cholecalciferol (VITAMIN D3) 1,000 units tablet Take 2 tablets (2,000 Units total) by mouth daily 1 tablet 1   • Continuous Blood Gluc  (FreeStyle Celia 2 Young Harris) MAMI Use to check blood sugar daily 1 each 0   • Continuous Blood Gluc Sensor (FreeStyle Celia 2 Sensor) MISC Use daily as directed for CGM - Change every 14 days 6 each 3   • cyanocobalamin (VITAMIN B-12) 500 mcg tablet Take 500 mcg by mouth daily     • finasteride (PROSCAR) 5 mg tablet Take 1 tablet (5 mg total) by mouth daily 90 tablet 3   • glucose blood test strip Check 4 times a day 400 each 1   • insulin glargine (Toujeo Max SoloStar) 300 units/mL CONCENTRATED U-300 injection pen (2-unit dial) INJECT 42 UNITS UNDER THE SKIN DAILY at dinner time 18 mL 1   • insulin lispro (HumaLOG KwikPen) 100 units/mL injection pen Inject 16 units before brunch and  18  units before dinner +scale 15 mL 1   • Insulin Pen Needle (B-D ULTRAFINE III SHORT PEN) 31G X 8 MM MISC Inject under the skin 4 (four) times a day 400 each 0   • latanoprost (XALATAN) 0.005 % ophthalmic solution Administer 1 drop to both eyes daily at bedtime      • liraglutide (Victoza) injection 1.8 MG ONCE DAILY 9 mL 3   • metoprolol tartrate (LOPRESSOR) 50 mg tablet Take 1 tablet (50 mg total) by mouth 2 (two) times a day 180 tablet 1   • Microlet Lancets MISC USE 3 TIMES  each 1   • nitrofurantoin (MACRODANTIN) 50 mg capsule Take 1 capsule (50 mg total) by mouth daily at bedtime 90 capsule 3   • pantoprazole (PROTONIX) 40 mg tablet Take 1 tablet (40 mg total) by mouth daily 90 tablet 1   • tamsulosin (FLOMAX) 0.4 mg Take 2 capsules (0.8 mg total) by mouth daily with dinner 60 capsule 0   • telmisartan (MICARDIS) 20 MG tablet TAKE 1 TABLET BY MOUTH EVERY DAY 90 tablet 1   • torsemide (DEMADEX) 20 mg tablet Take 1 tablet (20 mg total) by mouth daily Take an additional 20 mg on MWF for a total of 40 mg. 90 tablet 1   • aspirin (ECOTRIN LOW STRENGTH) 81 mg EC tablet Take 1 tablet (81 mg total) by mouth daily 60 tablet 8     No current facility-administered medications for this visit.     No Known Allergies   Immunizations:     Immunization History   Administered Date(s) Administered   • COVID-19 PFIZER VACCINE 0.3 ML IM 02/15/2021, 03/08/2021, 01/03/2022   • COVID-19 Pfizer vac (Maximo-sucrose, gray cap) 12 yr+ IM 05/13/2022   • INFLUENZA 09/18/2018, 09/28/2022   • Influenza LAIV (Nasal) 10/17/2016   • Influenza Split High Dose Preservative Free IM 11/13/2017, 09/18/2018, 10/18/2019   • Influenza, high dose seasonal 0.7 mL 08/25/2020, 10/19/2021, 10/09/2023   • Pneumococcal Conjugate 13-Valent 09/05/2019, 10/18/2019   • Pneumococcal Polysaccharide PPV23 09/27/2012   • Tdap 05/29/2013, 08/25/2020   • Zoster Vaccine Recombinant 12/04/2019, 02/27/2020   • influenza, trivalent, adjuvanted 09/09/2019      Health Maintenance:         Topic Date Due   • Hepatitis C Screening  Completed   • Colorectal Cancer Screening  Discontinued         Topic Date Due   • COVID-19 Vaccine (5 - 2023-24 season) 09/01/2023      Medicare Screening Tests and Risk Assessments:     Michael is here  for his Subsequent Wellness visit. Last Medicare Wellness visit information reviewed, patient interviewed and updates made to the record today.      Health Risk Assessment:   Patient rates overall health as good. Patient feels that their physical health rating is same. Patient is satisfied with their life. Eyesight was rated as same. Hearing was rated as same. Patient feels that their emotional and mental health rating is same. Patients states they are sometimes angry. Patient states they are often unusually tired/fatigued. Pain experienced in the last 7 days has been none. Patient states that he has experienced weight loss or gain in last 6 months.     Depression Screening:   PHQ-2 Score: 3  PHQ-9 Score: 8      Fall Risk Screening:   In the past year, patient has experienced: no history of falling in past year      Home Safety:  Patient does not have trouble with stairs inside or outside of their home. Patient has working smoke alarms and has working carbon monoxide detector. Home safety hazards include: none.     Nutrition:   Current diet is Regular and No Added Salt.     Medications:   Patient is currently taking over-the-counter supplements. OTC medications include: see medication list. Patient is able to manage medications.     Activities of Daily Living (ADLs)/Instrumental Activities of Daily Living (IADLs):   Walk and transfer into and out of bed and chair?: Yes  Dress and groom yourself?: Yes    Bathe or shower yourself?: Yes    Feed yourself? Yes  Do your laundry/housekeeping?: Yes  Manage your money, pay your bills and track your expenses?: No  Make your own meals?: Yes    Do your own shopping?: Yes    ADL comments: My wife pays the bills and takes me shopping. I do not do housekeeping    Durable Medical Equipment Suppliers  [employer] for Celia sensors. [employer] for BiPAP.    Previous Hospitalizations:   Any hospitalizations or ED visits within the last 12 months?: No      Advance Care Planning:   Living  "will: Yes    Durable POA for healthcare: Yes    Advanced directive: Yes      PREVENTIVE SCREENINGS      Cardiovascular Screening:    General: Screening Not Indicated and History Lipid Disorder      Diabetes Screening:     General: Screening Not Indicated and History Diabetes      Prostate Cancer Screening:    General: Screening Not Indicated      Abdominal Aortic Aneurysm (AAA) Screening:    Risk factors include: tobacco use        Lung Cancer Screening:     General: Screening Not Indicated      Hepatitis C Screening:    General: Screening Current    Screening, Brief Intervention, and Referral to Treatment (SBIRT)    Screening  Typical number of drinks in a day: 0  Typical number of drinks in a week: 0  Interpretation: Low risk drinking behavior.    AUDIT-C Screenin) How often did you have a drink containing alcohol in the past year? never  2) How many drinks did you have on a typical day when you were drinking in the past year? 0  3) How often did you have 6 or more drinks on one occasion in the past year? never    AUDIT-C Score: 0  Interpretation: Score 0-3 (male): Negative screen for alcohol misuse    Single Item Drug Screening:  How often have you used an illegal drug (including marijuana) or a prescription medication for non-medical reasons in the past year? never    Single Item Drug Screen Score: 0  Interpretation: Negative screen for possible drug use disorder    No results found.     Physical Exam:     /62   Pulse (!) 52   Temp 98.2 °F (36.8 °C)   Resp 16   Ht 5' 9\" (1.753 m)   Wt 115 kg (254 lb)   SpO2 99%   BMI 37.51 kg/m²     Physical Exam  Vitals and nursing note reviewed.   Constitutional:       General: He is not in acute distress.     Appearance: Normal appearance. He is well-developed. He is not ill-appearing.   HENT:      Head: Normocephalic and atraumatic.      Right Ear: External ear normal.      Left Ear: External ear normal.      Nose: Nose normal.   Eyes:      " Conjunctiva/sclera: Conjunctivae normal.   Cardiovascular:      Rate and Rhythm: Normal rate and regular rhythm.      Heart sounds: Murmur heard.   Pulmonary:      Effort: Pulmonary effort is normal. No respiratory distress.      Breath sounds: Normal breath sounds.   Abdominal:      Palpations: Abdomen is soft.      Tenderness: There is no abdominal tenderness.   Musculoskeletal:         General: No swelling or tenderness.      Cervical back: Neck supple.      Right lower leg: Edema (1+) present.      Left lower leg: Edema (1+) present.   Skin:     General: Skin is warm and dry.      Capillary Refill: Capillary refill takes less than 2 seconds.   Neurological:      Mental Status: He is alert.   Psychiatric:         Mood and Affect: Mood is depressed.         Speech: Speech normal.         Behavior: Behavior is withdrawn.          Luke Glasgow, DO

## 2023-12-27 PROBLEM — N40.1 BPH WITH OBSTRUCTION/LOWER URINARY TRACT SYMPTOMS: Status: ACTIVE | Noted: 2021-09-29

## 2023-12-27 PROBLEM — N13.8 BPH WITH OBSTRUCTION/LOWER URINARY TRACT SYMPTOMS: Status: ACTIVE | Noted: 2021-09-29

## 2023-12-27 PROBLEM — F32.A DEPRESSION: Status: ACTIVE | Noted: 2023-12-27

## 2023-12-28 NOTE — ASSESSMENT & PLAN NOTE
- PHQ-9 score of 8   - Patient with notable blunted affect at times   - Not interested in starting any medication or talking with someone at this time   - Concern some level of depression is worsening patients overall health     Plan  - Continue to encourage patient to consider starting medication and/or talking with therapist

## 2023-12-28 NOTE — ASSESSMENT & PLAN NOTE
Lab Results   Component Value Date    EGFR 12 12/01/2023    EGFR 14 10/30/2023    EGFR 16 08/28/2023    CREATININE 4.23 (H) 12/01/2023    CREATININE 3.90 (H) 10/30/2023    CREATININE 3.39 (H) 08/28/2023     - BP of 122/62 in office today   - Currently on telmisartan 20 mg QD, metoprolol tart 50 mg BID, amlodipine 5 mg QD     Plan  - Encourage patient to keep BP log for review at office visits  - Continue current medications

## 2023-12-28 NOTE — ASSESSMENT & PLAN NOTE
Lab Results   Component Value Date    EGFR 12 12/01/2023    EGFR 14 10/30/2023    EGFR 16 08/28/2023    CREATININE 4.23 (H) 12/01/2023    CREATININE 3.90 (H) 10/30/2023    CREATININE 3.39 (H) 08/28/2023     - Notable increase in Creatinine   - Patient on telmisartan 20 mg QD   - Likely due to uncontrolled diabetes at this time   - Not interested in undergoing dialysis     Plan  - F/u with Nephrology   - Discussed in length with patient the possibility of death if continued deterioration of kidney function without dialysis    - Avoid NSAID's  - CMP

## 2023-12-28 NOTE — ASSESSMENT & PLAN NOTE
Wt Readings from Last 3 Encounters:   12/22/23 115 kg (254 lb)   12/06/23 114 kg (252 lb 3.2 oz)   12/04/23 111 kg (245 lb)     - Notes weight gain of 9 lbs over the last 2 weeks  - Physical examination notable for 1+ pitting edema b/l, lungs CTA b/l, no JVD  - Currently on Torsemide 20 mg QD with an additional 20 mg M/W/F    Plan  - F/u Nephrology   - Discussed doubling torsemide dose every day rather than just M/W/F for the next 1-2 weeks and to monitor weight daily   - CMP  - F/u in 1 month

## 2023-12-28 NOTE — ASSESSMENT & PLAN NOTE
Lab Results   Component Value Date    HGBA1C 8.3 (H) 08/28/2023     - A1C up from prior of 6.5   - Patient notes decreased exercise and poor dietary choices recently contributing  - Patient follow by endocrinology; 42 U Toujeo QHS, 16 and 18 U lispro lunch and dinner respectively   - Patient noting BG in am ranging from 130-200   - Did not want to have A1C lab collected in office as he is going to have it done by Endocrinology next month   - Concern for underlying mood disorder contributing to patients unwillingness to take more of an initiative in his health     Plan  - F/u Endocrinology  - Continue to encourage low carb food choices and at least 10 min of walking after meals   - Patient needs tighter glucose control given worsening kidney function with underlying uncontrolled diabetes

## 2023-12-30 ENCOUNTER — LAB (OUTPATIENT)
Dept: LAB | Facility: HOSPITAL | Age: 78
End: 2023-12-30
Attending: INTERNAL MEDICINE
Payer: COMMERCIAL

## 2023-12-30 DIAGNOSIS — Z79.4 TYPE 2 DIABETES MELLITUS WITH STAGE 3A CHRONIC KIDNEY DISEASE, WITH LONG-TERM CURRENT USE OF INSULIN (HCC): ICD-10-CM

## 2023-12-30 DIAGNOSIS — E11.22 TYPE 2 DIABETES MELLITUS WITH STAGE 3A CHRONIC KIDNEY DISEASE, WITH LONG-TERM CURRENT USE OF INSULIN (HCC): ICD-10-CM

## 2023-12-30 DIAGNOSIS — E78.2 MIXED HYPERLIPIDEMIA: ICD-10-CM

## 2023-12-30 DIAGNOSIS — N18.31 TYPE 2 DIABETES MELLITUS WITH STAGE 3A CHRONIC KIDNEY DISEASE, WITH LONG-TERM CURRENT USE OF INSULIN (HCC): ICD-10-CM

## 2023-12-30 LAB
ANION GAP SERPL CALCULATED.3IONS-SCNC: 10 MMOL/L
BUN SERPL-MCNC: 64 MG/DL (ref 5–25)
CALCIUM SERPL-MCNC: 8.8 MG/DL (ref 8.4–10.2)
CHLORIDE SERPL-SCNC: 109 MMOL/L (ref 96–108)
CO2 SERPL-SCNC: 21 MMOL/L (ref 21–32)
CREAT SERPL-MCNC: 3.98 MG/DL (ref 0.6–1.3)
EST. AVERAGE GLUCOSE BLD GHB EST-MCNC: 186 MG/DL
GFR SERPL CREATININE-BSD FRML MDRD: 13 ML/MIN/1.73SQ M
GLUCOSE P FAST SERPL-MCNC: 124 MG/DL (ref 65–99)
HBA1C MFR BLD: 8.1 %
POTASSIUM SERPL-SCNC: 4.5 MMOL/L (ref 3.5–5.3)
SODIUM SERPL-SCNC: 140 MMOL/L (ref 135–147)

## 2023-12-30 PROCEDURE — 80048 BASIC METABOLIC PNL TOTAL CA: CPT

## 2023-12-30 PROCEDURE — 83036 HEMOGLOBIN GLYCOSYLATED A1C: CPT

## 2023-12-30 PROCEDURE — 36415 COLL VENOUS BLD VENIPUNCTURE: CPT

## 2024-01-01 ENCOUNTER — HOSPITAL ENCOUNTER (EMERGENCY)
Facility: HOSPITAL | Age: 79
End: 2024-04-08
Attending: EMERGENCY MEDICINE
Payer: COMMERCIAL

## 2024-01-01 DIAGNOSIS — I46.9 CARDIAC ARREST (HCC): Primary | ICD-10-CM

## 2024-01-01 PROCEDURE — 99285 EMERGENCY DEPT VISIT HI MDM: CPT

## 2024-01-01 RX ORDER — EPINEPHRINE 0.1 MG/ML
INJECTION INTRAVENOUS CODE/TRAUMA/SEDATION MEDICATION
Status: COMPLETED | OUTPATIENT
Start: 2024-01-01 | End: 2024-01-01

## 2024-01-01 RX ADMIN — EPINEPHRINE 1 MG: 0.1 INJECTION INTRAVENOUS at 22:15

## 2024-01-11 ENCOUNTER — APPOINTMENT (OUTPATIENT)
Dept: RADIOLOGY | Facility: HOSPITAL | Age: 79
DRG: 312 | End: 2024-01-11
Payer: COMMERCIAL

## 2024-01-11 ENCOUNTER — OFFICE VISIT (OUTPATIENT)
Dept: ENDOCRINOLOGY | Facility: CLINIC | Age: 79
End: 2024-01-11
Payer: COMMERCIAL

## 2024-01-11 ENCOUNTER — HOSPITAL ENCOUNTER (INPATIENT)
Facility: HOSPITAL | Age: 79
LOS: 3 days | Discharge: NURSING, REHAB OR LONG-TERM CARE | DRG: 312 | End: 2024-01-15
Attending: EMERGENCY MEDICINE | Admitting: INTERNAL MEDICINE
Payer: COMMERCIAL

## 2024-01-11 VITALS
BODY MASS INDEX: 38.21 KG/M2 | DIASTOLIC BLOOD PRESSURE: 56 MMHG | SYSTOLIC BLOOD PRESSURE: 110 MMHG | OXYGEN SATURATION: 99 % | WEIGHT: 258 LBS | HEIGHT: 69 IN | HEART RATE: 69 BPM

## 2024-01-11 DIAGNOSIS — N18.4 TYPE 2 DIABETES MELLITUS WITH STAGE 4 CHRONIC KIDNEY DISEASE, WITH LONG-TERM CURRENT USE OF INSULIN (HCC): Primary | ICD-10-CM

## 2024-01-11 DIAGNOSIS — I10 PRIMARY HYPERTENSION: ICD-10-CM

## 2024-01-11 DIAGNOSIS — R55 SYNCOPE: Primary | ICD-10-CM

## 2024-01-11 DIAGNOSIS — I12.9 BENIGN HYPERTENSION WITH CHRONIC KIDNEY DISEASE, STAGE IV (HCC): ICD-10-CM

## 2024-01-11 DIAGNOSIS — N25.81 SECONDARY HYPERPARATHYROIDISM OF RENAL ORIGIN (HCC): ICD-10-CM

## 2024-01-11 DIAGNOSIS — E78.2 MIXED HYPERLIPIDEMIA: ICD-10-CM

## 2024-01-11 DIAGNOSIS — N18.4 CKD (CHRONIC KIDNEY DISEASE), STAGE IV (HCC): ICD-10-CM

## 2024-01-11 DIAGNOSIS — E11.42 DIABETIC POLYNEUROPATHY ASSOCIATED WITH TYPE 2 DIABETES MELLITUS (HCC): ICD-10-CM

## 2024-01-11 DIAGNOSIS — Z79.4 TYPE 2 DIABETES MELLITUS WITH STAGE 3A CHRONIC KIDNEY DISEASE, WITH LONG-TERM CURRENT USE OF INSULIN (HCC): ICD-10-CM

## 2024-01-11 DIAGNOSIS — E11.65 TYPE 2 DIABETES MELLITUS WITH HYPERGLYCEMIA, WITH LONG-TERM CURRENT USE OF INSULIN (HCC): ICD-10-CM

## 2024-01-11 DIAGNOSIS — N18.4 BENIGN HYPERTENSION WITH CHRONIC KIDNEY DISEASE, STAGE IV (HCC): ICD-10-CM

## 2024-01-11 DIAGNOSIS — I50.32 CHRONIC DIASTOLIC (CONGESTIVE) HEART FAILURE (HCC): ICD-10-CM

## 2024-01-11 DIAGNOSIS — Z79.4 TYPE 2 DIABETES MELLITUS WITH HYPERGLYCEMIA, WITH LONG-TERM CURRENT USE OF INSULIN (HCC): ICD-10-CM

## 2024-01-11 DIAGNOSIS — E11.22 TYPE 2 DIABETES MELLITUS WITH STAGE 4 CHRONIC KIDNEY DISEASE, WITH LONG-TERM CURRENT USE OF INSULIN (HCC): Primary | ICD-10-CM

## 2024-01-11 DIAGNOSIS — E11.22 TYPE 2 DIABETES MELLITUS WITH STAGE 3A CHRONIC KIDNEY DISEASE, WITH LONG-TERM CURRENT USE OF INSULIN (HCC): ICD-10-CM

## 2024-01-11 DIAGNOSIS — N18.31 TYPE 2 DIABETES MELLITUS WITH STAGE 3A CHRONIC KIDNEY DISEASE, WITH LONG-TERM CURRENT USE OF INSULIN (HCC): ICD-10-CM

## 2024-01-11 DIAGNOSIS — E66.01 OBESITY, MORBID (HCC): ICD-10-CM

## 2024-01-11 DIAGNOSIS — E74.04 MCARDLE'S DISEASE (HCC): ICD-10-CM

## 2024-01-11 DIAGNOSIS — Z79.4 TYPE 2 DIABETES MELLITUS WITH STAGE 4 CHRONIC KIDNEY DISEASE, WITH LONG-TERM CURRENT USE OF INSULIN (HCC): Primary | ICD-10-CM

## 2024-01-11 PROBLEM — D63.8 ANEMIA OF CHRONIC DISEASE: Status: ACTIVE | Noted: 2024-01-11

## 2024-01-11 PROBLEM — R42 POSTURAL DIZZINESS WITH PRESYNCOPE: Status: ACTIVE | Noted: 2024-01-11

## 2024-01-11 LAB
2HR DELTA HS TROPONIN: -5 NG/L
4HR DELTA HS TROPONIN: -7 NG/L
ALBUMIN SERPL BCP-MCNC: 3 G/DL (ref 3.5–5)
ALP SERPL-CCNC: 91 U/L (ref 34–104)
ALT SERPL W P-5'-P-CCNC: 10 U/L (ref 7–52)
ANION GAP SERPL CALCULATED.3IONS-SCNC: 7 MMOL/L
AST SERPL W P-5'-P-CCNC: 10 U/L (ref 13–39)
BASOPHILS # BLD AUTO: 0.03 THOUSANDS/ÂΜL (ref 0–0.1)
BASOPHILS NFR BLD AUTO: 1 % (ref 0–1)
BILIRUB SERPL-MCNC: 0.41 MG/DL (ref 0.2–1)
BNP SERPL-MCNC: 470 PG/ML (ref 0–100)
BUN SERPL-MCNC: 51 MG/DL (ref 5–25)
CALCIUM ALBUM COR SERPL-MCNC: 9.2 MG/DL (ref 8.3–10.1)
CALCIUM SERPL-MCNC: 8.4 MG/DL (ref 8.4–10.2)
CARDIAC TROPONIN I PNL SERPL HS: 52 NG/L
CARDIAC TROPONIN I PNL SERPL HS: 54 NG/L
CARDIAC TROPONIN I PNL SERPL HS: 59 NG/L
CHLORIDE SERPL-SCNC: 107 MMOL/L (ref 96–108)
CO2 SERPL-SCNC: 23 MMOL/L (ref 21–32)
CREAT SERPL-MCNC: 3.66 MG/DL (ref 0.6–1.3)
EOSINOPHIL # BLD AUTO: 0.4 THOUSAND/ÂΜL (ref 0–0.61)
EOSINOPHIL NFR BLD AUTO: 7 % (ref 0–6)
ERYTHROCYTE [DISTWIDTH] IN BLOOD BY AUTOMATED COUNT: 13.8 % (ref 11.6–15.1)
GFR SERPL CREATININE-BSD FRML MDRD: 15 ML/MIN/1.73SQ M
GLUCOSE SERPL-MCNC: 158 MG/DL (ref 65–140)
GLUCOSE SERPL-MCNC: 175 MG/DL (ref 65–140)
GLUCOSE SERPL-MCNC: 179 MG/DL (ref 65–140)
GLUCOSE SERPL-MCNC: 226 MG/DL (ref 65–140)
HCT VFR BLD AUTO: 28.7 % (ref 36.5–49.3)
HGB BLD-MCNC: 9 G/DL (ref 12–17)
IMM GRANULOCYTES # BLD AUTO: 0.02 THOUSAND/UL (ref 0–0.2)
IMM GRANULOCYTES NFR BLD AUTO: 0 % (ref 0–2)
LYMPHOCYTES # BLD AUTO: 0.57 THOUSANDS/ÂΜL (ref 0.6–4.47)
LYMPHOCYTES NFR BLD AUTO: 9 % (ref 14–44)
MCH RBC QN AUTO: 29.2 PG (ref 26.8–34.3)
MCHC RBC AUTO-ENTMCNC: 31.4 G/DL (ref 31.4–37.4)
MCV RBC AUTO: 93 FL (ref 82–98)
MONOCYTES # BLD AUTO: 0.6 THOUSAND/ÂΜL (ref 0.17–1.22)
MONOCYTES NFR BLD AUTO: 10 % (ref 4–12)
NEUTROPHILS # BLD AUTO: 4.49 THOUSANDS/ÂΜL (ref 1.85–7.62)
NEUTS SEG NFR BLD AUTO: 73 % (ref 43–75)
NRBC BLD AUTO-RTO: 0 /100 WBCS
PLATELET # BLD AUTO: 260 THOUSANDS/UL (ref 149–390)
PMV BLD AUTO: 11.1 FL (ref 8.9–12.7)
POTASSIUM SERPL-SCNC: 4.4 MMOL/L (ref 3.5–5.3)
PROT SERPL-MCNC: 6.2 G/DL (ref 6.4–8.4)
RBC # BLD AUTO: 3.08 MILLION/UL (ref 3.88–5.62)
SODIUM SERPL-SCNC: 137 MMOL/L (ref 135–147)
WBC # BLD AUTO: 6.11 THOUSAND/UL (ref 4.31–10.16)

## 2024-01-11 PROCEDURE — 99285 EMERGENCY DEPT VISIT HI MDM: CPT | Performed by: EMERGENCY MEDICINE

## 2024-01-11 PROCEDURE — 85025 COMPLETE CBC W/AUTO DIFF WBC: CPT | Performed by: EMERGENCY MEDICINE

## 2024-01-11 PROCEDURE — 36415 COLL VENOUS BLD VENIPUNCTURE: CPT | Performed by: EMERGENCY MEDICINE

## 2024-01-11 PROCEDURE — 99214 OFFICE O/P EST MOD 30 MIN: CPT | Performed by: NURSE PRACTITIONER

## 2024-01-11 PROCEDURE — 93005 ELECTROCARDIOGRAM TRACING: CPT

## 2024-01-11 PROCEDURE — 82948 REAGENT STRIP/BLOOD GLUCOSE: CPT

## 2024-01-11 PROCEDURE — 84484 ASSAY OF TROPONIN QUANT: CPT | Performed by: EMERGENCY MEDICINE

## 2024-01-11 PROCEDURE — 99285 EMERGENCY DEPT VISIT HI MDM: CPT

## 2024-01-11 PROCEDURE — 83880 ASSAY OF NATRIURETIC PEPTIDE: CPT | Performed by: EMERGENCY MEDICINE

## 2024-01-11 PROCEDURE — 80053 COMPREHEN METABOLIC PANEL: CPT | Performed by: EMERGENCY MEDICINE

## 2024-01-11 PROCEDURE — 99223 1ST HOSP IP/OBS HIGH 75: CPT | Performed by: INTERNAL MEDICINE

## 2024-01-11 PROCEDURE — 71045 X-RAY EXAM CHEST 1 VIEW: CPT

## 2024-01-11 RX ORDER — CALCITRIOL 0.25 UG/1
0.25 CAPSULE, LIQUID FILLED ORAL 3 TIMES WEEKLY
Status: DISCONTINUED | OUTPATIENT
Start: 2024-01-12 | End: 2024-01-15 | Stop reason: HOSPADM

## 2024-01-11 RX ORDER — LATANOPROST 50 UG/ML
1 SOLUTION/ DROPS OPHTHALMIC
Status: DISCONTINUED | OUTPATIENT
Start: 2024-01-11 | End: 2024-01-15 | Stop reason: HOSPADM

## 2024-01-11 RX ORDER — INSULIN GLARGINE 100 [IU]/ML
20 INJECTION, SOLUTION SUBCUTANEOUS
Status: DISCONTINUED | OUTPATIENT
Start: 2024-01-11 | End: 2024-01-15 | Stop reason: HOSPADM

## 2024-01-11 RX ORDER — MELATONIN
2000
Status: DISCONTINUED | OUTPATIENT
Start: 2024-01-11 | End: 2024-01-15 | Stop reason: HOSPADM

## 2024-01-11 RX ORDER — FINASTERIDE 5 MG/1
5 TABLET, FILM COATED ORAL DAILY
Status: DISCONTINUED | OUTPATIENT
Start: 2024-01-12 | End: 2024-01-15 | Stop reason: HOSPADM

## 2024-01-11 RX ORDER — CALCIUM ACETATE 667 MG/1
667 CAPSULE ORAL 2 TIMES DAILY WITH MEALS
Status: DISCONTINUED | OUTPATIENT
Start: 2024-01-11 | End: 2024-01-15 | Stop reason: HOSPADM

## 2024-01-11 RX ORDER — ASCORBIC ACID 500 MG
1000 TABLET ORAL DAILY
Status: DISCONTINUED | OUTPATIENT
Start: 2024-01-12 | End: 2024-01-15 | Stop reason: HOSPADM

## 2024-01-11 RX ORDER — INSULIN LISPRO 100 [IU]/ML
1-6 INJECTION, SOLUTION INTRAVENOUS; SUBCUTANEOUS
Status: DISCONTINUED | OUTPATIENT
Start: 2024-01-11 | End: 2024-01-15 | Stop reason: HOSPADM

## 2024-01-11 RX ORDER — ACETAMINOPHEN 325 MG/1
650 TABLET ORAL EVERY 6 HOURS PRN
Status: DISCONTINUED | OUTPATIENT
Start: 2024-01-11 | End: 2024-01-15 | Stop reason: HOSPADM

## 2024-01-11 RX ORDER — LOSARTAN POTASSIUM 25 MG/1
25 TABLET ORAL DAILY
Status: DISCONTINUED | OUTPATIENT
Start: 2024-01-12 | End: 2024-01-15 | Stop reason: HOSPADM

## 2024-01-11 RX ORDER — TORSEMIDE 20 MG/1
20 TABLET ORAL DAILY
Status: DISCONTINUED | OUTPATIENT
Start: 2024-01-12 | End: 2024-01-15 | Stop reason: HOSPADM

## 2024-01-11 RX ORDER — METOPROLOL TARTRATE 50 MG/1
50 TABLET, FILM COATED ORAL 2 TIMES DAILY
Status: DISCONTINUED | OUTPATIENT
Start: 2024-01-11 | End: 2024-01-15 | Stop reason: HOSPADM

## 2024-01-11 RX ORDER — ATORVASTATIN CALCIUM 40 MG/1
80 TABLET, FILM COATED ORAL
Status: DISCONTINUED | OUTPATIENT
Start: 2024-01-11 | End: 2024-01-15 | Stop reason: HOSPADM

## 2024-01-11 RX ORDER — HYDRALAZINE HYDROCHLORIDE 20 MG/ML
5 INJECTION INTRAMUSCULAR; INTRAVENOUS EVERY 6 HOURS PRN
Status: DISCONTINUED | OUTPATIENT
Start: 2024-01-11 | End: 2024-01-15 | Stop reason: HOSPADM

## 2024-01-11 RX ORDER — SODIUM CHLORIDE 9 MG/ML
3 INJECTION INTRAVENOUS
Status: DISCONTINUED | OUTPATIENT
Start: 2024-01-11 | End: 2024-01-15 | Stop reason: HOSPADM

## 2024-01-11 RX ORDER — PANTOPRAZOLE SODIUM 40 MG/1
40 TABLET, DELAYED RELEASE ORAL DAILY
Status: DISCONTINUED | OUTPATIENT
Start: 2024-01-12 | End: 2024-01-15 | Stop reason: HOSPADM

## 2024-01-11 RX ORDER — HEPARIN SODIUM 5000 [USP'U]/ML
5000 INJECTION, SOLUTION INTRAVENOUS; SUBCUTANEOUS EVERY 8 HOURS SCHEDULED
Status: DISCONTINUED | OUTPATIENT
Start: 2024-01-11 | End: 2024-01-12

## 2024-01-11 RX ORDER — AMLODIPINE BESYLATE 5 MG/1
5 TABLET ORAL DAILY
Status: DISCONTINUED | OUTPATIENT
Start: 2024-01-12 | End: 2024-01-15 | Stop reason: HOSPADM

## 2024-01-11 RX ADMIN — Medication 2000 UNITS: at 21:30

## 2024-01-11 RX ADMIN — HEPARIN SODIUM 5000 UNITS: 5000 INJECTION, SOLUTION INTRAVENOUS; SUBCUTANEOUS at 21:33

## 2024-01-11 RX ADMIN — INSULIN LISPRO 2 UNITS: 100 INJECTION, SOLUTION INTRAVENOUS; SUBCUTANEOUS at 17:06

## 2024-01-11 RX ADMIN — INSULIN GLARGINE 20 UNITS: 100 INJECTION, SOLUTION SUBCUTANEOUS at 21:29

## 2024-01-11 RX ADMIN — HEPARIN SODIUM 5000 UNITS: 5000 INJECTION, SOLUTION INTRAVENOUS; SUBCUTANEOUS at 17:07

## 2024-01-11 RX ADMIN — INSULIN LISPRO 1 UNITS: 100 INJECTION, SOLUTION INTRAVENOUS; SUBCUTANEOUS at 21:28

## 2024-01-11 RX ADMIN — ATORVASTATIN CALCIUM 80 MG: 40 TABLET, FILM COATED ORAL at 17:08

## 2024-01-11 RX ADMIN — METOPROLOL TARTRATE 50 MG: 50 TABLET, FILM COATED ORAL at 17:13

## 2024-01-11 RX ADMIN — LATANOPROST 1 DROP: 50 SOLUTION OPHTHALMIC at 21:33

## 2024-01-11 NOTE — ASSESSMENT & PLAN NOTE
Lab Results   Component Value Date    EGFR 15 01/11/2024    EGFR 13 12/30/2023    EGFR 12 12/01/2023    CREATININE 3.66 (H) 01/11/2024    CREATININE 3.98 (H) 12/30/2023    CREATININE 4.23 (H) 12/01/2023     Following with nephrology.

## 2024-01-11 NOTE — PROGRESS NOTES
Established Patient Progress Note    Chief Complaint:  Diabetes follow up visit    Impression & Plan:    Problem List Items Addressed This Visit          Endocrine    Type 2 diabetes mellitus with stage 4 chronic kidney disease, with long-term current use of insulin (HCC) - Primary       Lab Results   Component Value Date    HGBA1C 8.1 (H) 12/30/2023     HGA1C above goal, possibly higher as patient has not been taking insulin regularly, skipping doses of victoza and not checking glucose levels. Discussed risks of poorly controlled diabetes mellitus in detail at today's appointment and the importance of adherence to his medication regimen.     Discussed risks/complications associated with uncontrolled diabetes including organ involvement, heart attack, stroke, death.    Advised lifestyle modifications including attention to diet including the amount and types of carbohydrates consumed and regular activity.     Call for blood sugars less than 70 mg/dl or patterns over 250 mg/dl.     Discussed use of CGM to collect additional blood glucose data to reveal patterns that can be used to improve glucose levels and adjust insulin regimen.    Discussed symptoms and treatment of hypoglycemia.  Reviewed risks associated with hypoglycemia. Always carry rapid acting carbohydrates and a glucometer (a way to check your blood sugar).    Recommendation for medical identification either bracelet, necklace.      Routine follow up for diabetic eye and foot exams.     Ordered blood work to complete prior to next visit.    Send glucose logs/CGM download in 1-2 weeks for review    Follow up in 3 months.            Relevant Medications    Continuous Blood Gluc Sensor (FreeStyle Celia 2 Sensor) MISC    Diabetic polyneuropathy associated with type 2 diabetes mellitus (HCC)     Regular diabetic foot exams.            Secondary hyperparathyroidism of renal origin (HCC)     Follows with nephrology.             Cardiovascular and Mediastinum  "   Benign hypertension with chronic kidney disease, stage IV ([facility])     Lab Results   Component Value Date    EGFR 15 01/11/2024    EGFR 13 12/30/2023    EGFR 12 12/01/2023    CREATININE 3.66 (H) 01/11/2024    CREATININE 3.98 (H) 12/30/2023    CREATININE 4.23 (H) 12/01/2023     Following with nephrology.          Primary hypertension     BP stable on current regimen.          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          Obesity, morbid (HCC)     Other Visit Diagnoses       Type 2 diabetes mellitus with hyperglycemia, with long-term current use of insulin ([facility])        Relevant Orders    Hemoglobin A1C    Type 2 diabetes mellitus with stage 3a chronic kidney disease, with long-term current use of insulin ([facility])        Relevant Medications    Continuous Blood Gluc Sensor (FreeStyle Celia 2 Sensor) MISC            Orders Placed This Encounter   Procedures    Hemoglobin A1C     Standing Status:   Future     Standing Expiration Date:   1/11/2025       Addendum: Patient completed medical visit and was awaiting schedule for follow up appointment. MR noticed that patient was struggling to get his coat on, became very pale, disoriented, and \"woozy\". He was lowered into a chair ,became nauseated but did not produce emesis. Glucose level 188 mg/dL. /62 , HR 62, Oxygen saturation 94-96% stable at time event. He was oriented to person and place, would not answer date. EMS transfer to ED, wife present.        History of Present Illness:   Michael VILLANUEVA Gail Aragon. is a 78 y.o. male with a history of type 2 diabetes with long term use of insulin stage V kidney disease not " currently on dialysis.  Denies recent illness or hospitalizations. Denies recent severe hypoglycemic or severe hyperglycemic episodes but has not been regularly testing glucose levels for over the past month. Is not currently wearing CGM device. Patient is also not taking insulin regularly reports has not been taking long acting or mealtime insulin and skips victoza 1-2 times per week.     Current regimen:   Victoza 1.8 mg daily (taking intermittently)  Humalog 16-16-18 untis + scale (not taking regularly)  Toujeo 42 units (not taking for possibly the past week at least)    Has hypertension: He is compliant with his regimen currently on ARB  Has hyperlipidemia: Taking atorvastatin, compliant and tolerating.   Thyroid disorders: Denies    Patient Active Problem List   Diagnosis    CKD (chronic kidney disease), stage IV (HCC)    Benign hypertension with chronic kidney disease, stage IV (HCC)    Chronic kidney disease-mineral and bone disorder    Type 2 diabetes mellitus with stage 4 chronic kidney disease, with long-term current use of insulin (HCC)    Sleep apnea    Coronary artery disease involving native coronary artery of native heart without angina pectoris    Mixed hyperlipidemia    S/P CABG (coronary artery bypass graft)    Acute postoperative pulmonary insufficiency (HCC)    Iron deficiency anemia    Non-nephrotic range proteinuria    Esophageal dysphagia    History of colonic polyps    Lumbar back pain    Lumbar discogenic pain syndrome    Pain of lower extremity    Trigger finger of left hand    Spondylolisthesis    Spinal stenosis    Vitamin D deficiency    Lymphadenopathy    Stenosis of left internal carotid artery    Diabetic polyneuropathy associated with type 2 diabetes mellitus (HCC)    Encounter for  medical examination (CDME)    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HCC)    Traumatic injury of skin    Hypervolemia associated with renal insufficiency    Elevated TSH    Primary hypertension    Chronic  diastolic (congestive) heart failure (HCC)    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    BPH with obstruction/lower urinary tract symptoms    Asymptomatic bilateral carotid artery stenosis    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HCC)    Gait instability    GI bleed    Diverticulosis    Gastric ulcer    HARJIT (obstructive sleep apnea)    Secondary hyperparathyroidism of renal origin (HCC)    Hyperphosphatemia    Osteopenia of neck of left femur    Obesity, morbid (HCC)    Encounter to discuss test results    Incomplete bladder emptying    Nocturia    Anemia of chronic kidney failure, stage 4 (severe)     Depression      Past Medical History:   Diagnosis Date    CHF (congestive heart failure) (HCC)     Chronic kidney disease 18 - 24 months?    Dr Patterson - stage 3    Colon polyp     CPAP (continuous positive airway pressure) dependence     Diabetes mellitus (HCC)     History of transfusion     Hyperlipidemia     Hypertension     Myocardial infarction (HCC) 06/2019    Open heart surgery with quad bypass    Obesity     Renal disorder     Sleep apnea     Visual impairment 1991    Dr Nickie Peters      Past Surgical History:   Procedure Laterality Date    APPENDECTOMY  1977    Done in conjunction with cholecystectomy    BACK SURGERY      CATARACT EXTRACTION, BILATERAL Bilateral     Left eye- 10/23 Right eye 9/23    CHOLECYSTECTOMY  1977    COLONOSCOPY      CORONARY ARTERY BYPASS GRAFT  2019    quad    EGD      GALLBLADDER SURGERY      HERNIA REPAIR      MO CORONARY ARTERY BYP W/VEIN & ARTERY GRAFT 4 VEIN N/A 06/21/2019    Procedure: CORONARY ARTERY BYPASS GRAFT (CABG) 4 VESSELS WITH SVG TO PDA, OM, DIAGONAL AND LIMA TO LAD; RIGHT LEG EVH;  Surgeon: James Fang MD;  Location: [facility];  Service: Cardiac Surgery    RECTAL SURGERY      SPINE SURGERY  2012    Fusion L3-L5?    TONSILLECTOMY        Family History   Problem Relation Age of Onset    Cancer Mother     Alcohol abuse Mother     Cancer Father     Alcohol abuse Father      Diabetes Maternal Grandmother     Diabetes type II Maternal Grandmother         Geriatric onset -      Diabetes Paternal Aunt     Hypertension Paternal Grandfather      Social History     Tobacco Use    Smoking status: Former     Current packs/day: 0.00     Average packs/day: 3.0 packs/day for 35.0 years (105.0 ttl pk-yrs)     Types: Cigarettes, Cigars     Start date: 1957     Quit date: 1992     Years since quittin.0    Smokeless tobacco: Never    Tobacco comments:     Started smoking as a pre-teenager   Substance Use Topics    Alcohol use: Not Currently     Comment: none since most recent hospitalization     No Known Allergies    No current facility-administered medications for this visit.    Current Outpatient Medications:     amLODIPine (NORVASC) 5 mg tablet, TAKE 1 TABLET (5 MG TOTAL) BY MOUTH DAILY., Disp: 90 tablet, Rfl: 1    Ascorbic Acid (VITAMIN C) 1000 MG tablet, Take 1,000 mg by mouth daily, Disp: , Rfl:     aspirin (ECOTRIN LOW STRENGTH) 81 mg EC tablet, Take 1 tablet (81 mg total) by mouth daily, Disp: 60 tablet, Rfl: 8    atorvastatin (LIPITOR) 80 mg tablet, Take 1 tablet (80 mg total) by mouth daily with dinner, Disp: 90 tablet, Rfl: 0    calcitriol (ROCALTROL) 0.25 mcg capsule, Take 1 capsule (0.25 mcg total) by mouth 3 (three) times a week, Disp: 36 capsule, Rfl: 3    calcium acetate (PHOSLO) capsule, Take 1 capsule (667 mg total) by mouth 2 (two) times a day with meals, Disp: 180 capsule, Rfl: 1    cholecalciferol (VITAMIN D3) 1,000 units tablet, Take 2 tablets (2,000 Units total) by mouth daily, Disp: 1 tablet, Rfl: 1    Continuous Blood Gluc Sensor (FreeStyle Celia 2 Sensor) MISC, Use daily as directed for CGM - Change every 14 days, Disp: 6 each, Rfl: 3    cyanocobalamin (VITAMIN B-12) 500 mcg tablet, Take 500 mcg by mouth daily, Disp: , Rfl:     finasteride (PROSCAR) 5 mg tablet, Take 1 tablet (5 mg total) by mouth daily, Disp: 90 tablet, Rfl: 3    glucose blood test  strip, Check 4 times a day, Disp: 400 each, Rfl: 1    insulin glargine (Toujeo Max SoloStar) 300 units/mL CONCENTRATED U-300 injection pen (2-unit dial), INJECT 42 UNITS UNDER THE SKIN DAILY at dinner time, Disp: 18 mL, Rfl: 1    insulin lispro (HumaLOG KwikPen) 100 units/mL injection pen, Inject 16 units before brunch and  18  units before dinner +scale, Disp: 15 mL, Rfl: 1    Insulin Pen Needle (B-D ULTRAFINE III SHORT PEN) 31G X 8 MM MISC, Inject under the skin 4 (four) times a day, Disp: 400 each, Rfl: 0    latanoprost (XALATAN) 0.005 % ophthalmic solution, Administer 1 drop to both eyes daily at bedtime, Disp: , Rfl:     liraglutide (Victoza) injection, 1.8 MG ONCE DAILY, Disp: 9 mL, Rfl: 3    metoprolol tartrate (LOPRESSOR) 50 mg tablet, Take 1 tablet (50 mg total) by mouth 2 (two) times a day, Disp: 180 tablet, Rfl: 1    Microlet Lancets MISC, USE 3 TIMES DAY, Disp: 300 each, Rfl: 1    nitrofurantoin (MACRODANTIN) 50 mg capsule, Take 1 capsule (50 mg total) by mouth daily at bedtime, Disp: 90 capsule, Rfl: 3    pantoprazole (PROTONIX) 40 mg tablet, Take 1 tablet (40 mg total) by mouth daily, Disp: 90 tablet, Rfl: 1    tamsulosin (FLOMAX) 0.4 mg, Take 2 capsules (0.8 mg total) by mouth daily with dinner, Disp: 60 capsule, Rfl: 0    telmisartan (MICARDIS) 20 MG tablet, TAKE 1 TABLET BY MOUTH EVERY DAY, Disp: 90 tablet, Rfl: 1    torsemide (DEMADEX) 20 mg tablet, Take 1 tablet (20 mg total) by mouth daily Take an additional 20 mg on MWF for a total of 40 mg., Disp: 90 tablet, Rfl: 1    Continuous Blood Gluc  (FreeStyle Celia 2 [place]) MAMI, Use to check blood sugar daily, Disp: 1 each, Rfl: 0    Facility-Administered Medications Ordered in Other Visits:     Insert peripheral IV, , , Once **AND** sodium chloride (PF) 0.9 % injection 3 mL, 3 mL, Intravenous, Q1H PRN, Neeraj Cosby DO    Review of Systems  Constitutional: Negative for activity change, appetite change, fatigue and unexpected weight  "change.   HENT: Negative for ear pain, sore throat  Eyes: Negative for visual disturbance.   Respiratory: Negative for cough and shortness of breath.    Cardiovascular: Negative for chest pain and palpitations.   Gastrointestinal: Negative for abdominal distention, abdominal pain, constipation, diarrhea and vomiting.   Endocrine: Negative for cold intolerance, heat intolerance, polydipsia and polyuria.   Musculoskeletal: Negative for arthralgias and back pain.   Skin: Negative for color change and rash.   Neurological: Negative for weakness or tremors.   All other systems reviewed and are negative.    Physical Exam:  Body mass index is 38.1 kg/m².  /56 (BP Location: Left arm, Patient Position: Sitting, Cuff Size: Large)   Pulse 69   Ht 5' 9\" (1.753 m)   Wt 117 kg (258 lb)   SpO2 99%   BMI 38.10 kg/m²    Wt Readings from Last 3 Encounters:   01/11/24 117 kg (258 lb)   12/22/23 115 kg (254 lb)   12/06/23 114 kg (252 lb 3.2 oz)        Physical Exam  Vitals reviewed.   Constitutional:       Appearance: Normal appearance. Has obesity.   Cardiovascular:      Rate and Rhythm: Normal rate and regular rhythm.      Pulses: Normal pulses.      Heart sounds: Normal heart sounds.   Pulmonary:      Effort: Pulmonary effort is normal.      Breath sounds: Normal breath sounds.   Skin:     General: Skin is warm and dry.      Capillary Refill: Capillary refill takes less than 2 seconds.   Neurological:      General: No focal deficit present.      Mental Status: He is alert and oriented to person, place, and time.   Psychiatric:         Not very talkative but would respond appropriately      Labs:   Lab Results   Component Value Date    HGBA1C 8.1 (H) 12/30/2023    HGBA1C 8.3 (H) 08/28/2023    HGBA1C 6.5 (H) 03/10/2023     Lab Results   Component Value Date    CREATININE 3.66 (H) 01/11/2024    CREATININE 3.98 (H) 12/30/2023    CREATININE 4.23 (H) 12/01/2023    BUN 51 (H) 01/11/2024    K 4.4 01/11/2024     01/11/2024 "    CO2 23 01/11/2024     eGFR   Date Value Ref Range Status   01/11/2024 15 ml/min/1.73sq m Final     Lab Results   Component Value Date    HDL 47 10/24/2022    TRIG 70 10/24/2022     Lab Results   Component Value Date    ALT 10 01/11/2024    AST 10 (L) 01/11/2024    ALKPHOS 91 01/11/2024     Lab Results   Component Value Date    MYX8BQETEZRK 4.967 (H) 11/23/2022    EUC3DWOHISXR 3.092 11/29/2021    QWU8QANGRBZQ 3.822 (H) 06/18/2021     Lab Results   Component Value Date    FREET4 0.76 11/23/2022     Discussed with the patient and all questioned fully answered. He will call me if any problems arise.    Follow-up appointment in 3 months.     Counseled patient on diagnostic results, prognosis, risk and benefit of treatment options, instruction for management, importance of treatment compliance, Risk  factor reduction and impressions    There are no Patient Instructions on file for this visit.    ELEAZAR Solis

## 2024-01-11 NOTE — ASSESSMENT & PLAN NOTE
Wt Readings from Last 3 Encounters:   01/11/24 117 kg (258 lb)   12/22/23 115 kg (254 lb)   12/06/23 114 kg (252 lb 3.2 oz)       Denies CP or SOB

## 2024-01-12 ENCOUNTER — APPOINTMENT (OUTPATIENT)
Dept: NON INVASIVE DIAGNOSTICS | Facility: HOSPITAL | Age: 79
DRG: 312 | End: 2024-01-12
Payer: COMMERCIAL

## 2024-01-12 PROBLEM — E83.42 HYPOMAGNESEMIA: Status: ACTIVE | Noted: 2024-01-12

## 2024-01-12 LAB
ANION GAP SERPL CALCULATED.3IONS-SCNC: 8 MMOL/L
AORTIC ROOT: 3.6 CM
AORTIC VALVE MEAN VELOCITY: 13 M/S
APICAL FOUR CHAMBER EJECTION FRACTION: 53 %
ASCENDING AORTA: 4 CM
ATRIAL RATE: 64 BPM
ATRIAL RATE: 66 BPM
AV AREA BY CONTINUOUS VTI: 1.6 CM2
AV AREA PEAK VELOCITY: 1.6 CM2
AV LVOT MEAN GRADIENT: 2 MMHG
AV LVOT PEAK GRADIENT: 3 MMHG
AV MEAN GRADIENT: 7 MMHG
AV PEAK GRADIENT: 12 MMHG
AV VALVE AREA: 1.6 CM2
AV VELOCITY RATIO: 0.48
BACTERIA UR QL AUTO: ABNORMAL /HPF
BASOPHILS # BLD AUTO: 0.03 THOUSANDS/ÂΜL (ref 0–0.1)
BASOPHILS NFR BLD AUTO: 0 % (ref 0–1)
BILIRUB UR QL STRIP: NEGATIVE
BSA FOR ECHO PROCEDURE: 2.13 M2
BUN SERPL-MCNC: 54 MG/DL (ref 5–25)
CALCIUM SERPL-MCNC: 8.4 MG/DL (ref 8.4–10.2)
CHLORIDE SERPL-SCNC: 108 MMOL/L (ref 96–108)
CLARITY UR: CLEAR
CO2 SERPL-SCNC: 22 MMOL/L (ref 21–32)
COLOR UR: YELLOW
CREAT SERPL-MCNC: 3.79 MG/DL (ref 0.6–1.3)
DOP CALC AO PEAK VEL: 1.8 M/S
DOP CALC AO VTI: 40.01 CM
DOP CALC LVOT AREA: 3.14 CM2
DOP CALC LVOT CARDIAC INDEX: 1.69 L/MIN/M2
DOP CALC LVOT CARDIAC OUTPUT: 3.61 L/MIN
DOP CALC LVOT DIAMETER: 2 CM
DOP CALC LVOT PEAK VEL VTI: 20.33 CM
DOP CALC LVOT PEAK VEL: 0.87 M/S
DOP CALC LVOT STROKE INDEX: 30.4 ML/M2
DOP CALC LVOT STROKE VOLUME: 63.84
E WAVE DECELERATION TIME: 302 MS
E/A RATIO: 1.06
EOSINOPHIL # BLD AUTO: 0.38 THOUSAND/ÂΜL (ref 0–0.61)
EOSINOPHIL NFR BLD AUTO: 6 % (ref 0–6)
ERYTHROCYTE [DISTWIDTH] IN BLOOD BY AUTOMATED COUNT: 13.8 % (ref 11.6–15.1)
FRACTIONAL SHORTENING: 36 (ref 28–44)
GFR SERPL CREATININE-BSD FRML MDRD: 14 ML/MIN/1.73SQ M
GLUCOSE SERPL-MCNC: 126 MG/DL (ref 65–140)
GLUCOSE SERPL-MCNC: 131 MG/DL (ref 65–140)
GLUCOSE SERPL-MCNC: 195 MG/DL (ref 65–140)
GLUCOSE SERPL-MCNC: 203 MG/DL (ref 65–140)
GLUCOSE SERPL-MCNC: 216 MG/DL (ref 65–140)
GLUCOSE UR STRIP-MCNC: ABNORMAL MG/DL
HCT VFR BLD AUTO: 28.1 % (ref 36.5–49.3)
HGB BLD-MCNC: 8.8 G/DL (ref 12–17)
HGB UR QL STRIP.AUTO: ABNORMAL
IMM GRANULOCYTES # BLD AUTO: 0.03 THOUSAND/UL (ref 0–0.2)
IMM GRANULOCYTES NFR BLD AUTO: 0 % (ref 0–2)
INTERVENTRICULAR SEPTUM IN DIASTOLE (PARASTERNAL SHORT AXIS VIEW): 1.7 CM
INTERVENTRICULAR SEPTUM: 1.7 CM (ref 0.6–1.1)
KETONES UR STRIP-MCNC: NEGATIVE MG/DL
LAAS-AP2: 35.1 CM2
LAAS-AP4: 20.5 CM2
LEFT ATRIUM SIZE: 5.1 CM
LEFT ATRIUM VOLUME (MOD BIPLANE): 105 ML
LEFT ATRIUM VOLUME INDEX (MOD BIPLANE): 49.1 ML/M2
LEFT INTERNAL DIMENSION IN SYSTOLE: 3 CM (ref 2.1–4)
LEFT VENTRICULAR INTERNAL DIMENSION IN DIASTOLE: 4.7 CM (ref 3.5–6)
LEFT VENTRICULAR POSTERIOR WALL IN END DIASTOLE: 1.4 CM
LEFT VENTRICULAR STROKE VOLUME: 65 ML
LEUKOCYTE ESTERASE UR QL STRIP: NEGATIVE
LVSV (TEICH): 65 ML
LYMPHOCYTES # BLD AUTO: 0.79 THOUSANDS/ÂΜL (ref 0.6–4.47)
LYMPHOCYTES NFR BLD AUTO: 12 % (ref 14–44)
MAGNESIUM SERPL-MCNC: 1.7 MG/DL (ref 1.9–2.7)
MCH RBC QN AUTO: 29 PG (ref 26.8–34.3)
MCHC RBC AUTO-ENTMCNC: 31.3 G/DL (ref 31.4–37.4)
MCV RBC AUTO: 93 FL (ref 82–98)
MONOCYTES # BLD AUTO: 0.62 THOUSAND/ÂΜL (ref 0.17–1.22)
MONOCYTES NFR BLD AUTO: 9 % (ref 4–12)
MUCOUS THREADS UR QL AUTO: ABNORMAL
MV E'TISSUE VEL-SEP: 5 CM/S
MV PEAK A VEL: 0.8 M/S
MV PEAK E VEL: 85 CM/S
MV STENOSIS PRESSURE HALF TIME: 87 MS
MV VALVE AREA P 1/2 METHOD: 2.53
NEUTROPHILS # BLD AUTO: 5.03 THOUSANDS/ÂΜL (ref 1.85–7.62)
NEUTS SEG NFR BLD AUTO: 73 % (ref 43–75)
NITRITE UR QL STRIP: NEGATIVE
NON-SQ EPI CELLS URNS QL MICRO: ABNORMAL /HPF
NRBC BLD AUTO-RTO: 0 /100 WBCS
P AXIS: 32 DEGREES
P AXIS: 37 DEGREES
PH UR STRIP.AUTO: 6 [PH]
PHOSPHATE SERPL-MCNC: 3.2 MG/DL (ref 2.3–4.1)
PLATELET # BLD AUTO: 267 THOUSANDS/UL (ref 149–390)
PMV BLD AUTO: 11.6 FL (ref 8.9–12.7)
POTASSIUM SERPL-SCNC: 4.4 MMOL/L (ref 3.5–5.3)
PR INTERVAL: 228 MS
PR INTERVAL: 236 MS
PROT UR STRIP-MCNC: ABNORMAL MG/DL
QRS AXIS: 10 DEGREES
QRS AXIS: 12 DEGREES
QRSD INTERVAL: 108 MS
QRSD INTERVAL: 114 MS
QT INTERVAL: 446 MS
QT INTERVAL: 450 MS
QTC INTERVAL: 460 MS
QTC INTERVAL: 471 MS
RA PRESSURE ESTIMATED: 5 MMHG
RBC # BLD AUTO: 3.03 MILLION/UL (ref 3.88–5.62)
RBC #/AREA URNS AUTO: ABNORMAL /HPF
RIGHT ATRIUM AREA SYSTOLE A4C: 10.5 CM2
RIGHT VENTRICLE ID DIMENSION: 3.3 CM
RV PSP: 45 MMHG
SL CV LEFT ATRIUM LENGTH A2C: 6.4 CM
SL CV LV EF: 60
SL CV PED ECHO LEFT VENTRICLE DIASTOLIC VOLUME (MOD BIPLANE) 2D: 100 ML
SL CV PED ECHO LEFT VENTRICLE SYSTOLIC VOLUME (MOD BIPLANE) 2D: 35 ML
SODIUM SERPL-SCNC: 138 MMOL/L (ref 135–147)
SP GR UR STRIP.AUTO: 1.02 (ref 1–1.03)
T WAVE AXIS: 27 DEGREES
T WAVE AXIS: 54 DEGREES
TR MAX PG: 40 MMHG
TR PEAK VELOCITY: 3.2 M/S
TRICUSPID ANNULAR PLANE SYSTOLIC EXCURSION: 2.1 CM
TRICUSPID VALVE PEAK REGURGITATION VELOCITY: 3.17 M/S
UROBILINOGEN UR QL STRIP.AUTO: 0.2 E.U./DL
VENTRICULAR RATE: 64 BPM
VENTRICULAR RATE: 66 BPM
WBC # BLD AUTO: 6.88 THOUSAND/UL (ref 4.31–10.16)
WBC #/AREA URNS AUTO: ABNORMAL /HPF

## 2024-01-12 PROCEDURE — 99223 1ST HOSP IP/OBS HIGH 75: CPT | Performed by: INTERNAL MEDICINE

## 2024-01-12 PROCEDURE — 97116 GAIT TRAINING THERAPY: CPT

## 2024-01-12 PROCEDURE — 80048 BASIC METABOLIC PNL TOTAL CA: CPT | Performed by: INTERNAL MEDICINE

## 2024-01-12 PROCEDURE — 97167 OT EVAL HIGH COMPLEX 60 MIN: CPT

## 2024-01-12 PROCEDURE — 83735 ASSAY OF MAGNESIUM: CPT | Performed by: INTERNAL MEDICINE

## 2024-01-12 PROCEDURE — 93010 ELECTROCARDIOGRAM REPORT: CPT | Performed by: INTERNAL MEDICINE

## 2024-01-12 PROCEDURE — 97163 PT EVAL HIGH COMPLEX 45 MIN: CPT

## 2024-01-12 PROCEDURE — 93306 TTE W/DOPPLER COMPLETE: CPT

## 2024-01-12 PROCEDURE — 82948 REAGENT STRIP/BLOOD GLUCOSE: CPT

## 2024-01-12 PROCEDURE — 84100 ASSAY OF PHOSPHORUS: CPT | Performed by: INTERNAL MEDICINE

## 2024-01-12 PROCEDURE — 85025 COMPLETE CBC W/AUTO DIFF WBC: CPT | Performed by: INTERNAL MEDICINE

## 2024-01-12 PROCEDURE — 99232 SBSQ HOSP IP/OBS MODERATE 35: CPT | Performed by: INTERNAL MEDICINE

## 2024-01-12 PROCEDURE — 93306 TTE W/DOPPLER COMPLETE: CPT | Performed by: INTERNAL MEDICINE

## 2024-01-12 PROCEDURE — 81001 URINALYSIS AUTO W/SCOPE: CPT | Performed by: INTERNAL MEDICINE

## 2024-01-12 RX ORDER — MAGNESIUM SULFATE HEPTAHYDRATE 40 MG/ML
2 INJECTION, SOLUTION INTRAVENOUS ONCE
Status: COMPLETED | OUTPATIENT
Start: 2024-01-12 | End: 2024-01-12

## 2024-01-12 RX ADMIN — HEPARIN SODIUM 5000 UNITS: 5000 INJECTION, SOLUTION INTRAVENOUS; SUBCUTANEOUS at 06:20

## 2024-01-12 RX ADMIN — CALCITRIOL CAPSULES 0.25 MCG 0.25 MCG: 0.25 CAPSULE ORAL at 08:22

## 2024-01-12 RX ADMIN — AMLODIPINE BESYLATE 5 MG: 5 TABLET ORAL at 08:22

## 2024-01-12 RX ADMIN — LOSARTAN POTASSIUM 25 MG: 25 TABLET, FILM COATED ORAL at 08:21

## 2024-01-12 RX ADMIN — INSULIN LISPRO 2 UNITS: 100 INJECTION, SOLUTION INTRAVENOUS; SUBCUTANEOUS at 16:39

## 2024-01-12 RX ADMIN — LATANOPROST 1 DROP: 50 SOLUTION OPHTHALMIC at 21:55

## 2024-01-12 RX ADMIN — INSULIN LISPRO 2 UNITS: 100 INJECTION, SOLUTION INTRAVENOUS; SUBCUTANEOUS at 12:25

## 2024-01-12 RX ADMIN — CALCIUM ACETATE 667 MG: 667 CAPSULE ORAL at 08:21

## 2024-01-12 RX ADMIN — INSULIN LISPRO 2 UNITS: 100 INJECTION, SOLUTION INTRAVENOUS; SUBCUTANEOUS at 21:48

## 2024-01-12 RX ADMIN — METOPROLOL TARTRATE 50 MG: 50 TABLET, FILM COATED ORAL at 17:00

## 2024-01-12 RX ADMIN — CALCIUM ACETATE 667 MG: 667 CAPSULE ORAL at 16:38

## 2024-01-12 RX ADMIN — Medication 2000 UNITS: at 21:49

## 2024-01-12 RX ADMIN — PANTOPRAZOLE SODIUM 40 MG: 40 TABLET, DELAYED RELEASE ORAL at 08:21

## 2024-01-12 RX ADMIN — ASPIRIN 81 MG: 81 TABLET, COATED ORAL at 08:21

## 2024-01-12 RX ADMIN — METOPROLOL TARTRATE 50 MG: 50 TABLET, FILM COATED ORAL at 08:21

## 2024-01-12 RX ADMIN — INSULIN GLARGINE 20 UNITS: 100 INJECTION, SOLUTION SUBCUTANEOUS at 21:48

## 2024-01-12 RX ADMIN — FINASTERIDE 5 MG: 5 TABLET, FILM COATED ORAL at 08:21

## 2024-01-12 RX ADMIN — ATORVASTATIN CALCIUM 80 MG: 40 TABLET, FILM COATED ORAL at 16:38

## 2024-01-12 RX ADMIN — CYANOCOBALAMIN TAB 500 MCG 1000 MCG: 500 TAB at 08:20

## 2024-01-12 RX ADMIN — OXYCODONE HYDROCHLORIDE AND ACETAMINOPHEN 1000 MG: 500 TABLET ORAL at 08:22

## 2024-01-12 RX ADMIN — MAGNESIUM SULFATE HEPTAHYDRATE 2 G: 40 INJECTION, SOLUTION INTRAVENOUS at 21:48

## 2024-01-12 RX ADMIN — TORSEMIDE 20 MG: 20 TABLET ORAL at 09:26

## 2024-01-12 NOTE — OCCUPATIONAL THERAPY NOTE
Occupational Therapy Evaluation     Patient Name: Michael Reynolds Jr.  Today's Date: 1/12/2024  Problem List  Principal Problem:    Postural dizziness with presyncope  Active Problems:    CKD (chronic kidney disease), stage IV (HCC)    Insulin dependent diabetes mellitus (HCC)    Elevated troponin    Coronary artery disease    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HCC)    Essential hypertension    BPH (benign prostatic hyperplasia)    HARJIT (obstructive sleep apnea)    Morbid obesity (HCC)    Anemia of chronic disease    Past Medical History  Past Medical History:   Diagnosis Date    CHF (congestive heart failure) (HCC)     Chronic kidney disease 18 - 24 months?    Dr Patterson - stage 3    Colon polyp     CPAP (continuous positive airway pressure) dependence     Diabetes mellitus (HCC)     History of transfusion     Hyperlipidemia     Hypertension     Myocardial infarction (HCC) 06/2019    Open heart surgery with quad bypass    Obesity     Renal disorder     Sleep apnea     Visual impairment 1991    Dr Nickie Peters     Past Surgical History  Past Surgical History:   Procedure Laterality Date    APPENDECTOMY  1977    Done in conjunction with cholecystectomy    BACK SURGERY      CATARACT EXTRACTION, BILATERAL Bilateral     Left eye- 10/23 Right eye 9/23    CHOLECYSTECTOMY  1977    COLONOSCOPY      CORONARY ARTERY BYPASS GRAFT  2019    quad    EGD      GALLBLADDER SURGERY      HERNIA REPAIR      NC CORONARY ARTERY BYP W/VEIN & ARTERY GRAFT 4 VEIN N/A 06/21/2019    Procedure: CORONARY ARTERY BYPASS GRAFT (CABG) 4 VESSELS WITH SVG TO PDA, OM, DIAGONAL AND LIMA TO LAD; RIGHT LEG EVH;  Surgeon: James Fang MD;  [facility]: [facility];  Service: Cardiac Surgery    RECTAL SURGERY      SPINE SURGERY  2012    Fusion L3-L5?    TONSILLECTOMY           01/12/24 1403   OT Last Visit   OT Visit Date 01/12/24   Note Type   Note type Evaluation   Pain Assessment   Pain Assessment Tool 0-10   Pain Score No Pain   Restrictions/Precautions   Other  Precautions Cognitive;Chair Alarm;Bed Alarm;Fall Risk   Home Living   Type of Home House   Home Layout Two level;Stairs to enter with rails   Bathroom Shower/Tub Walk-in shower   Bathroom Toilet Standard   Bathroom Equipment Shower chair   Additional Comments Pt questionable historian.   Prior Function   Level of [place] Independent with ADLs;Independent with functional mobility;Needs assistance with IADLS   Lives With Spouse   Receives Help From Family   IADLs Family/Friend/Other provides transportation;Family/Friend/Other provides meals   Vocational Retired   Subjective   Subjective Pt received in supine position. Pt agreeable to session.   ADL   Eating Assistance 7  Independent   Grooming Assistance 7  Independent   UB Bathing Assistance 4  Minimal Assistance   LB Bathing Assistance 4  Minimal Assistance   UB Dressing Assistance 5  Supervision/Setup   LB Dressing Assistance 3  Moderate Assistance   Toileting Assistance  3  Moderate Assistance   Bed Mobility   Supine to Sit 4  Minimal assistance   Additional items Assist x 1;Verbal cues;Increased time required   Additional Comments Pt remained seated in recliner by end of session.   Transfers   Sit to Stand 4  Minimal assistance   Additional items Verbal cues;Increased time required   Stand to Sit 4  Minimal assistance   Additional items Assist x 1;Verbal cues;Armrests   Stand pivot 4  Minimal assistance   Additional items Assist x 1;Verbal cues  (+ hand held assistance)   Balance   Static Sitting Fair +   Dynamic Sitting Fair   Static Standing Poor +   Dynamic Standing Poor +   Activity Tolerance   Activity Tolerance Patient tolerated treatment well   Medical Staff Made Aware HAI WANG Assessment   RUE Assessment WFL   LUE Assessment   LUE Assessment WFL   Cognition   Overall Cognitive Status Impaired   Arousal/Participation Alert   Attention Attends with cues to redirect   Orientation Level Oriented to person;Oriented to place;Oriented to situation    Memory Decreased recall of precautions;Decreased recall of recent events   Following Commands Follows one step commands with increased time or repetition   Assessment   Limitation Decreased ADL status;Decreased self-care trans   Prognosis Fair   Assessment Pt is a 78 y.o. male seen for OT evaluation at [facility], admitted 1/11/2024 w/ Postural dizziness with presyncope.  Comorbidities affecting pt's functional performance at time of assessment include: CKD, CAD, McArdle's disease, morbid obesity, s/p CABG, etc (see chart).  Prior to admission, pt was living with wife in house with steps to manage.  Pt was I w/  ADLS and required assist with IADLS, & required no DME/AD PTA. Upon evaluation, pt appears to be performing below baseline functional status. Pt currently requires min Ax 1 for bed mobility, min A x1 for functional mobility/transfers, sup-min A for UB ADLs and min-mod A for LB ADLS 2* the following deficits impacting occupational performance: weakness, decreased strength, decreased balance, decreased tolerance, impaired memory, and impaired problem solving. Full objective findings from OT assessment regarding body systems outlined above. Personal factors affecting pt at time of IE include:steps to enter environment, difficulty performing ADLS, difficulty performing IADLS , and decreased functional mobility . These impairments, as well as risk for falls  limit pt's ability to safely engage in all baseline areas of occupation and mobility. Pt to benefit from continued skilled OT tx while in the hospital to address deficits as defined above and maximize level of functional independence w ADL's and functional mobility. Occupational Performance areas to address include: bathing/shower, toilet hygiene, dressing, and functional mobility.  This evaluation required an extensive review of medical and/or therapy records and additional review of physical, cognitive and psychosocial history related to  functional performance. Based upon functional performance deficits and assessments, this evaluation has been identified as a high complexity evaluation.  At this time, OT recommendations at time of discharge are level 2 resources.   Goals   Patient Goals Pt did not verbalize any goals.   Plan   OT Frequency 3-5x/wk   Discharge Recommendation   Rehab Resource Intensity Level, OT II (Moderate Resource Intensity)   Additional Comments  The patient's raw score on the -PAC Daily Activity inpatient short form is 17, standardized score is 37.26, less than 39.4. Patients at this level are likely to benefit from DC to post-acute rehabilitation services. However please refer to therapist recommendation for discharge planning given other factors that may influence destination.   AM-PAC Daily Activity Inpatient   Lower Body Dressing 2   Bathing 2   Toileting 2   Upper Body Dressing 3   Grooming 4   Eating 4   Daily Activity Raw Score 17   Daily Activity Standardized Score (Calc for Raw Score >=11) 37.26   AM-PAC Applied Cognition Inpatient   Following a Speech/Presentation 3   Understanding Ordinary Conversation 4   Taking Medications 4   Remembering Where Things Are Placed or Put Away 4   Remembering List of 4-5 Errands 4   Taking Care of Complicated Tasks 4   Applied Cognition Raw Score 23   Applied Cognition Standardized Score 53.08   End of Consult   Education Provided Yes   Patient Position at End of Consult Bedside chair;Bed/Chair alarm activated;All needs within reach   Nurse Communication Nurse aware of consult       Pt will achieve the following goals within 10 days.    *Pt will complete UB bathing and dressing with mod I.    *Pt will complete LB bathing and dressing with mod I .    * Pt will complete toileting w/ mod I w/ G hygiene/thoroughness using DME PRN    *Pt will complete bed mobility with mod I, with bed flat and no side rail to prep for purposeful tasks    *Pt will perform functional transfers with on/off  all surfaces with mod I using DME as needed w/ G balance/safety.    *Pt will increase static/dynamic standing balance to good to improve postural stability and decrease fall risk during standing ADLS and transfers.       *Patient will verbalize 3 safety awareness/ principles to prevent falls in the home setting.     *Pt will improve functional mobility during ADL/IADL/leisure tasks to mod I using DME as needed w/ G balance/safety.       Doris Smith, OTR/L

## 2024-01-12 NOTE — ASSESSMENT & PLAN NOTE
"Presyncopal type episode in outpatient medical office while awaiting scheduling a follow-up appointment after visit  Patient reports a similar episode a few years ago while \"shopping\"   Currently denies any further episodes since presentation to the hospital -> denies current dizziness, blurry vision, or chest discomfort  Known history of CAD status post CABG  Telemetry monitoring - await cardiology input - await updated echocardiogram  Check orthostatics - hold Flomax  Limit/avoid sedating medications as possible  Consider vasovagal etiology  Vital signs, blood sugar, and electrolytes sufficient   Check urinalysis and CXR  If episode recurs, consider CT imaging of head  "

## 2024-01-12 NOTE — ASSESSMENT & PLAN NOTE
Lab Results   Component Value Date    HGBA1C 8.1 (H) 12/30/2023     Continue basal insulin with additional SSI coverage per Accu-Cheks  Carbohydrate restriction  Hypoglycemia protocol

## 2024-01-12 NOTE — PLAN OF CARE

## 2024-01-12 NOTE — ASSESSMENT & PLAN NOTE
Baseline creatinine of approximately 3.3-3.7 -> currently stable at 3.66  Monitor renal function and urine output - limit/avoid nephrotoxins and hypotension as possible  Note chronic Demadex/Cozaar use

## 2024-01-12 NOTE — UTILIZATION REVIEW
"Initial Clinical Review    OBS 1/11 UPGRADED TO INPATIENT 1/12 FOR W/U AND TX OF POSTURAL DIZZINESS WITH NEED FOR SAFE D/C PLAN    Admission: Date/Time/Statement:   Admission Orders (From admission, onward)       Ordered        01/12/24 1603  Inpatient Admission  Once            01/11/24 1322  Place in Observation  Once                          Orders Placed This Encounter   Procedures    Inpatient Admission     Standing Status:   Standing     Number of Occurrences:   1     Order Specific Question:   Level of Care     Answer:   Med Surg [16]     Order Specific Question:   Estimated length of stay     Answer:   More than 2 Midnights     Order Specific Question:   Certification     Answer:   I certify that inpatient services are medically necessary for this patient for a duration of greater than two midnights. See H&P and MD Progress Notes for additional information about the patient's course of treatment.     ED Arrival Information       Expected   -    Arrival   1/11/2024 11:19    Acuity   Urgent              Means of arrival   Ambulance    Escorted by   [facility]    Service   Hospitalist    Admission type   Emergency              Arrival complaint   syncope             Chief Complaint   Patient presents with    Syncope     Arrives via EMS following a syncopal episode during a doctor's appointment this morning; pt states he did not fall (was lowered down by staff) and did not strike head. Pt states he has not been eating much at home because they need to go grocery shopping. Prehospital IPF=147. Denies pain.       Initial Presentation: 78 y.o. male with PMHx of CAD s/p CABG, CKD IV, HARJIT, IDDM, HARJIT, HTN, BPH, chronic anemia who presents to ED from his endocrinology office where he underwent a routine f/u, however, at the end of the appointment, while in the waiting room with intention of scheduling his next op appt, pt became increasingly weak described by staff there as \"woozy\".  He was lowered to " sit in a chair and c/o some nausea without vomiting.  In the ED on further questioning, he states he had a similar type of episode a few years back while shopping, at which point he was also lowered to a chair.  He attributed it to a one-time episode and never followed up on it. Hgb 9.0, Hct 28.7. Bun 51, Cr 3.66. Albumin 3.0. Glucose 179. Trop elevated. Admitted under observation to MS unit with Postural Dizziness with Presyncope -- Telemetry monitoring. Check orthostatic bps. Hold Flomax. Check UA and CXR. Continue pta po meds, basal insulin with Accuchecks w/ ssi.       Date: 1/12   Day 2: Upgraded to Inpatient        ED Triage Vitals   Temperature Pulse Respirations Blood Pressure SpO2   01/11/24 1247 01/11/24 1130 01/11/24 1130 01/11/24 1130 01/11/24 1130   98.2 °F (36.8 °C) 64 16 165/74 98 %      Temp Source Heart Rate Source Patient Position - Orthostatic VS BP Location FiO2 (%)   01/11/24 1247 01/11/24 1130 01/11/24 1130 01/11/24 1130 --   Oral Monitor Lying Right arm       Pain Score       01/11/24 1130       No Pain          Wt Readings from Last 1 Encounters:   01/12/24 98 kg (216 lb)     Additional Vital Signs:   Date/Time Temp Pulse Resp BP MAP (mmHg) SpO2 O2 Device Patient Position - Orthostatic VS   01/12/24 1433 98.4 °F (36.9 °C) 58 18 135/67 91 98 % None (Room air) Sitting   01/12/24 0919 -- 70 -- 142/72 -- -- -- Standing for 3 minutes - Orthostatic VS   01/12/24 0916 -- 78 -- 150/64 -- -- -- Sitting - Orthostatic VS   01/12/24 0915 -- 64 -- 138/60 -- -- -- Lying - Orthostatic VS   01/12/24 0811 98.1 °F (36.7 °C) 83 20 104/50 69 98 % None (Room air) Sitting   01/12/24 0311 98 °F (36.7 °C) 69 20 154/69 111 97 % None (Room air) Lying   01/11/24 2300 98.2 °F (36.8 °C) 68 18 159/82 -- 98 % None (Room air) Lying   01/11/24 2045 -- 69 18 158/76 -- 97 % None (Room air) Lying   01/11/24 1900 -- 62 21 148/72 103 98 % -- --   01/11/24 1830 -- 58 16 148/72 103 97 % -- --   01/11/24 1700 -- 72 13 167/87 116 98  % -- --   01/11/24 1645 -- 67 15 148/66 95 97 % -- --   01/11/24 1515 -- 68 18 169/73 114 98 % -- --   01/11/24 1247 98.2 °F (36.8 °C) -- -- -- -- -- -- --   01/11/24 1230 -- 63 15 169/77 110 99 % None (Room air) --   01/11/24 1130 -- 64 16 165/74 -- 98 % None (Room air) Lying     Pertinent Labs/Diagnostic Test Results:   XR chest portable   Final Result by Trell Guzman MD (01/12 0900)      Small right pleural effusion.      Minimal right basilar airspace disease attributed to subsegmental atelectasis. Correlate with clinical findings to exclude pneumonia and/or aspiration.          Results from last 7 days   Lab Units 01/12/24  0438 01/11/24  1149   WBC Thousand/uL 6.88 6.11   HEMOGLOBIN g/dL 8.8* 9.0*   HEMATOCRIT % 28.1* 28.7*   PLATELETS Thousands/uL 267 260   NEUTROS ABS Thousands/µL 5.03 4.49     Results from last 7 days   Lab Units 01/12/24  0438 01/11/24  1149   SODIUM mmol/L 138 137   POTASSIUM mmol/L 4.4 4.4   CHLORIDE mmol/L 108 107   CO2 mmol/L 22 23   ANION GAP mmol/L 8 7   BUN mg/dL 54* 51*   CREATININE mg/dL 3.79* 3.66*   EGFR ml/min/1.73sq m 14 15   CALCIUM mg/dL 8.4 8.4   MAGNESIUM mg/dL 1.7*  --    PHOSPHORUS mg/dL 3.2  --      Results from last 7 days   Lab Units 01/11/24  1149   AST U/L 10*   ALT U/L 10   ALK PHOS U/L 91   TOTAL PROTEIN g/dL 6.2*   ALBUMIN g/dL 3.0*   TOTAL BILIRUBIN mg/dL 0.41     Results from last 7 days   Lab Units 01/12/24  1626 01/12/24  1133 01/12/24  0715 01/11/24  2126 01/11/24  1658 01/11/24  1130   POC GLUCOSE mg/dl 203* 195* 131 158* 226* 175*     Results from last 7 days   Lab Units 01/12/24  0438 01/11/24  1149   GLUCOSE RANDOM mg/dL 126 179*     Results from last 7 days   Lab Units 01/11/24  1619 01/11/24  1335 01/11/24  1149   HS TNI 0HR ng/L  --   --  59*   HS TNI 2HR ng/L  --  54*  --    HSTNI D2 ng/L  --  -5  --    HS TNI 4HR ng/L 52*  --   --    HSTNI D4 ng/L -7  --   --      Results from last 7 days   Lab Units 01/11/24  1149   BNP pg/mL 470*        Results from last 7 days   Lab Units 01/12/24  0008   CLARITY UA  Clear   COLOR UA  Yellow   SPEC GRAV UA  1.020   PH UA  6.0   GLUCOSE UA mg/dl 2+*   KETONES UA mg/dl Negative   BLOOD UA  Trace-Intact*   PROTEIN UA mg/dl 3+*   NITRITE UA  Negative   BILIRUBIN UA  Negative   UROBILINOGEN UA E.U./dl 0.2   LEUKOCYTES UA  Negative   WBC UA /hpf 0-5   RBC UA /hpf 0-5   BACTERIA UA /hpf Occasional   EPITHELIAL CELLS WET PREP /hpf Occasional   MUCUS THREADS  Occasional*     ED Treatment:   Medication Administration from 01/11/2024 1119 to 01/11/2024 2235         Date/Time Order Dose Route Action     01/11/2024 1708 EST atorvastatin (LIPITOR) tablet 80 mg 80 mg Oral Given     01/11/2024 2130 EST cholecalciferol (VITAMIN D3) tablet 2,000 Units 2,000 Units Oral Given     01/11/2024 2129 EST insulin glargine (LANTUS) subcutaneous injection 20 Units 0.2 mL 20 Units Subcutaneous Given     01/11/2024 2128 EST insulin lispro (HumaLOG) 100 units/mL subcutaneous injection 1-6 Units 1 Units Subcutaneous Given     01/11/2024 1706 EST insulin lispro (HumaLOG) 100 units/mL subcutaneous injection 1-6 Units 2 Units Subcutaneous Given     01/11/2024 2133 EST latanoprost (XALATAN) 0.005 % ophthalmic solution 1 drop 1 drop Both Eyes Given     01/11/2024 1713 EST metoprolol tartrate (LOPRESSOR) tablet 50 mg 50 mg Oral Given     01/11/2024 2133 EST heparin (porcine) subcutaneous injection 5,000 Units 5,000 Units Subcutaneous Given     01/11/2024 1707 EST heparin (porcine) subcutaneous injection 5,000 Units 5,000 Units Subcutaneous Given       Past Medical History:   Diagnosis Date    CHF (congestive heart failure) (HCC)     Chronic kidney disease 18 - 24 months?    Dr Patterson - stage 3    Colon polyp     CPAP (continuous positive airway pressure) dependence     Diabetes mellitus (HCC)     History of transfusion     Hyperlipidemia     Hypertension     Myocardial infarction (HCC) 06/2019    Open heart surgery with quad bypass    Obesity      Renal disorder     Sleep apnea     Visual impairment 1991    Dr Nickie Peters     Present on Admission:   CKD (chronic kidney disease), stage IV (HCC)   Coronary artery disease   Elevated troponin   Morbid obesity (HCC)   HARJIT (obstructive sleep apnea)   Essential hypertension   BPH (benign prostatic hyperplasia)   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HCC)      Admitting Diagnosis: Syncope [R55]  Age/Sex: 78 y.o. male  Admission Orders:  Scheduled Medications:  amLODIPine, 5 mg, Oral, Daily  vitamin C, 1,000 mg, Oral, Daily  atorvastatin, 80 mg, Oral, Daily With Dinner  calcitriol, 0.25 mcg, Oral, Once per day on Monday Wednesday Friday  calcium acetate, 667 mg, Oral, BID With Meals  cholecalciferol, 2,000 Units, Oral, HS  vitamin B-12, 1,000 mcg, Oral, Daily  finasteride, 5 mg, Oral, Daily  insulin glargine, 20 Units, Subcutaneous, HS  insulin lispro, 1-6 Units, Subcutaneous, 4x Daily (AC & HS)  latanoprost, 1 drop, Both Eyes, HS  losartan, 25 mg, Oral, Daily  metoprolol tartrate, 50 mg, Oral, BID  pantoprazole, 40 mg, Oral, Daily  torsemide, 20 mg, Oral, Daily      PRN Meds:  acetaminophen, 650 mg, Oral, Q6H PRN  hydrALAZINE, 5 mg, Intravenous, Q6H PRN  sodium chloride (PF), 3 mL, Intravenous, Q1H PRN        Network Utilization Review Department  ATTENTION: Please call with any questions or concerns to [phone] and carefully listen to the prompts so that you are directed to the right person. All voicemails are confidential.   For Discharge needs, contact Care Management DC Support Team at [phone] opt. 2  Send all requests for admission clinical reviews, approved or denied determinations and any other requests to dedicated fax number below belonging to the campus where the patient is receiving treatment. List of dedicated fax numbers for the Facilities:  FACILITY NAME UR FAX NUMBER   ADMISSION DENIALS (Administrative/Medical Necessity) [phone]   DISCHARGE SUPPORT TEAM (NETWORK) [phone]   [facility] CHILD [facility]  (Maternity/NICU/Pediatrics) [phone]   [facility] [phone]   [facility] [phone]   Critical access hospital [phone]   [facility] [phone]   [facility], [facility] [phone]   [facility] [phone]   [facility] [phone]   [facility] [phone]   [facility] [phone]   [facility], [facility] [phone]   [facility] [phone]

## 2024-01-12 NOTE — ASSESSMENT & PLAN NOTE
Initial troponin elevated at 59 with subsequent downtrend  Suspect a non-MI troponin elevation in the setting of late stage CKD  In light of presenting presyncopal episode and history of CAD, cardiology to follow

## 2024-01-12 NOTE — ED PROVIDER NOTES
History  Chief Complaint   Patient presents with    Syncope     Arrives via EMS following a syncopal episode during a doctor's appointment this morning; pt states he did not fall (was lowered down by staff) and did not strike head. Pt states he has not been eating much at home because they need to go grocery shopping. Prehospital DCY=715. Denies pain.     This is a 78-year-old male who presents to the emergency department with EMS for syncopal event.  As per EMS the patient was going to the doctor when he passed out.  The patient states he was going to see his endocrinologist and states he felt like he was going to fall.  He was lowered into the chair without falling.  The patient does not recall certain events of the morning.  At this point he denies chest pain or trouble breathing.  He denies fevers or chills.  He denies a cough.        Prior to Admission Medications   Prescriptions Last Dose Informant Patient Reported? Taking?   Ascorbic Acid (VITAMIN C) 1000 MG tablet 1/10/2024 Spouse/Significant Other, Self Yes Yes   Sig: Take 1,000 mg by mouth daily   Continuous Blood Gluc  (FreeStyle Celia 2 [place]) MAMI 1/10/2024 Spouse/Significant Other, Self No Yes   Sig: Use to check blood sugar daily   Continuous Blood Gluc Sensor (FreeStyle Celia 2 Sensor) MISC 1/10/2024  No Yes   Sig: Use daily as directed for CGM - Change every 14 days   Insulin Pen Needle (B-D ULTRAFINE III SHORT PEN) 31G X 8 MM MISC Past Month Spouse/Significant Other, Self No Yes   Sig: Inject under the skin 4 (four) times a day   Microlet Lancets MISC  Spouse/Significant Other, Self No Yes   Sig: USE 3 TIMES DAY   amLODIPine (NORVASC) 5 mg tablet 1/10/2024 Spouse/Significant Other, Self No Yes   Sig: TAKE 1 TABLET (5 MG TOTAL) BY MOUTH DAILY.   aspirin (ECOTRIN LOW STRENGTH) 81 mg EC tablet 1/10/2024 Spouse/Significant Other, Self No Yes   Sig: Take 1 tablet (81 mg total) by mouth daily   atorvastatin (LIPITOR) 80 mg tablet 1/10/2024  Spouse/Significant Other, Self No Yes   Sig: Take 1 tablet (80 mg total) by mouth daily with dinner   calcitriol (ROCALTROL) 0.25 mcg capsule 1/10/2024 Spouse/Significant Other, Self No Yes   Sig: Take 1 capsule (0.25 mcg total) by mouth 3 (three) times a week   Patient taking differently: Take 0.25 mcg by mouth 3 (three) times a week  and friday   calcium acetate (PHOSLO) capsule 1/10/2024 Spouse/Significant Other, Self No Yes   Sig: Take 1 capsule (667 mg total) by mouth 2 (two) times a day with meals   cholecalciferol (VITAMIN D3) 1,000 units tablet 1/10/2024 Spouse/Significant Other, Self No Yes   Sig: Take 2 tablets (2,000 Units total) by mouth daily   Patient taking differently: Take 2,000 Units by mouth daily at bedtime   cyanocobalamin (VITAMIN B-12) 500 mcg tablet 1/10/2024 Spouse/Significant Other, Self Yes Yes   Sig: Take 1,000 mcg by mouth daily   finasteride (PROSCAR) 5 mg tablet 1/10/2024 Spouse/Significant Other, Self No Yes   Sig: Take 1 tablet (5 mg total) by mouth daily   glucose blood test strip 1/10/2024 Spouse/Significant Other, Self No Yes   Sig: Check 4 times a day   insulin glargine (Toujeo Max SoloStar) 300 units/mL CONCENTRATED U-300 injection pen (2-unit dial) Past Month Spouse/Significant Other, Self No Yes   Sig: INJECT 42 UNITS UNDER THE SKIN DAILY at dinner time   Patient taking differently: Inject 42 Units under the skin daily at bedtime INJECT 42 UNITS UNDER THE SKIN DAILY at dinner time   insulin lispro (HumaLOG KwikPen) 100 units/mL injection pen Past Month Spouse/Significant Other, Self No Yes   Sig: Inject 16 units before brunch and  18  units before dinner +scale   latanoprost (XALATAN) 0.005 % ophthalmic solution  Spouse/Significant Other, Self Yes Yes   Sig: Administer 1 drop to both eyes daily at bedtime   liraglutide (Victoza) injection  Self, Spouse/Significant Other No No   Si.8 MG ONCE DAILY   Patient taking differently: Inject 1.8 mg under the skin  daily with lunch 1.8 mg once daily   metoprolol tartrate (LOPRESSOR) 50 mg tablet 1/10/2024  No Yes   Sig: Take 1 tablet (50 mg total) by mouth 2 (two) times a day   nitrofurantoin (MACRODANTIN) 50 mg capsule Past Week  No Yes   Sig: Take 1 capsule (50 mg total) by mouth daily at bedtime   pantoprazole (PROTONIX) 40 mg tablet 1/10/2024 Spouse/Significant Other, Self No Yes   Sig: Take 1 tablet (40 mg total) by mouth daily   tamsulosin (FLOMAX) 0.4 mg 1/10/2024  No Yes   Sig: Take 2 capsules (0.8 mg total) by mouth daily with dinner   telmisartan (MICARDIS) 20 MG tablet 1/10/2024 Spouse/Significant Other, Self No Yes   Sig: TAKE 1 TABLET BY MOUTH EVERY DAY   torsemide (DEMADEX) 20 mg tablet Past Week Spouse/Significant Other, Self No Yes   Sig: Take 1 tablet (20 mg total) by mouth daily Take an additional 20 mg on MWF for a total of 40 mg.      Facility-Administered Medications: None       Past Medical History:   Diagnosis Date    CHF (congestive heart failure) (HCC)     Chronic kidney disease 18 - 24 months?    Dr Patterson - stage 3    Colon polyp     CPAP (continuous positive airway pressure) dependence     Diabetes mellitus (HCC)     History of transfusion     Hyperlipidemia     Hypertension     Myocardial infarction (HCC) 06/2019    Open heart surgery with quad bypass    Obesity     Renal disorder     Sleep apnea     Visual impairment 1991    Dr Nickie Peters       Past Surgical History:   Procedure Laterality Date    APPENDECTOMY  1977    Done in conjunction with cholecystectomy    BACK SURGERY      CATARACT EXTRACTION, BILATERAL Bilateral     Left eye- 10/23 Right eye 9/23    CHOLECYSTECTOMY  1977    COLONOSCOPY      CORONARY ARTERY BYPASS GRAFT  2019    quad    EGD      GALLBLADDER SURGERY      HERNIA REPAIR      DE CORONARY ARTERY BYP W/VEIN & ARTERY GRAFT 4 VEIN N/A 06/21/2019    Procedure: CORONARY ARTERY BYPASS GRAFT (CABG) 4 VESSELS WITH SVG TO PDA, OM, DIAGONAL AND LIMA TO LAD; RIGHT LEG EVH;   Surgeon: James Fang MD;  Location: [facility];  Service: Cardiac Surgery    RECTAL SURGERY      SPINE SURGERY  2012    Fusion L3-L5?    TONSILLECTOMY         Family History   Problem Relation Age of Onset    Cancer Mother     Alcohol abuse Mother     Cancer Father     Alcohol abuse Father     Diabetes Maternal Grandmother     Diabetes type II Maternal Grandmother         Geriatric onset -      Diabetes Paternal Aunt     Hypertension Paternal Grandfather      I have reviewed and agree with the history as documented.    E-Cigarette/Vaping    E-Cigarette Use Never User      E-Cigarette/Vaping Substances    Nicotine No     THC No     CBD No     Flavoring No     Other No     Unknown No      Social History     Tobacco Use    Smoking status: Former     Current packs/day: 0.00     Average packs/day: 3.0 packs/day for 35.0 years (105.0 ttl pk-yrs)     Types: Cigarettes, Cigars     Start date: 1957     Quit date: 1992     Years since quittin.0    Smokeless tobacco: Never    Tobacco comments:     Started smoking as a pre-teenager   Vaping Use    Vaping status: Never Used   Substance Use Topics    Alcohol use: Not Currently     Comment: none since most recent hospitalization    Drug use: Never       Review of Systems   All other systems reviewed and are negative.      Physical Exam  Physical Exam  Constitutional:  Vital signs reviewed, patient appears nontoxic, no acute distress  Eyes: Pupils equal round reactive to light and accommodation, extraocular muscles intact  HEENT: trachea midline, no JVD, moist mucous membranes  Respiratory: lung sounds clear throughout, no rhonchi, no rales  Cardiovascular: regular rate rhythm, no murmurs or rubs  Abdomen: soft, nontender, nondistended, no rebound or guarding  Back: no CVA tenderness, normal inspection  Extremities: no edema, pulses equal in all 4 extremities  Neuro: awake, alert, oriented, no focal weakness  Skin: warm, dry, intact, no rashes  noted    Vital Signs  ED Triage Vitals   Temperature Pulse Respirations Blood Pressure SpO2   01/11/24 1247 01/11/24 1130 01/11/24 1130 01/11/24 1130 01/11/24 1130   98.2 °F (36.8 °C) 64 16 165/74 98 %      Temp Source Heart Rate Source Patient Position - Orthostatic VS BP Location FiO2 (%)   01/11/24 1247 01/11/24 1130 01/11/24 1130 01/11/24 1130 --   Oral Monitor Lying Right arm       Pain Score       01/11/24 1130       No Pain           Vitals:    01/11/24 1645 01/11/24 1700 01/11/24 1830 01/11/24 1900   BP: 148/66 167/87 148/72 148/72   Pulse: 67 72 58 62   Patient Position - Orthostatic VS:             Visual Acuity      ED Medications  Medications   sodium chloride (PF) 0.9 % injection 3 mL (has no administration in time range)   amLODIPine (NORVASC) tablet 5 mg (has no administration in time range)   ascorbic acid (VITAMIN C) tablet 1,000 mg (has no administration in time range)   aspirin (ECOTRIN LOW STRENGTH) EC tablet 81 mg (has no administration in time range)   atorvastatin (LIPITOR) tablet 80 mg (80 mg Oral Given 1/11/24 1708)   calcitriol (ROCALTROL) capsule 0.25 mcg (has no administration in time range)   calcium acetate (PHOSLO) capsule 667 mg (has no administration in time range)   cholecalciferol (VITAMIN D3) tablet 2,000 Units (has no administration in time range)   cyanocobalamin (VITAMIN B-12) tablet 1,000 mcg (has no administration in time range)   finasteride (PROSCAR) tablet 5 mg (has no administration in time range)   insulin glargine (LANTUS) subcutaneous injection 20 Units 0.2 mL (has no administration in time range)   insulin lispro (HumaLOG) 100 units/mL subcutaneous injection 1-6 Units (2 Units Subcutaneous Given 1/11/24 1706)   latanoprost (XALATAN) 0.005 % ophthalmic solution 1 drop (has no administration in time range)   metoprolol tartrate (LOPRESSOR) tablet 50 mg (50 mg Oral Given 1/11/24 1713)   pantoprazole (PROTONIX) EC tablet 40 mg (has no administration in time range)    losartan (COZAAR) tablet 25 mg (has no administration in time range)   torsemide (DEMADEX) tablet 20 mg (has no administration in time range)   heparin (porcine) subcutaneous injection 5,000 Units (5,000 Units Subcutaneous Given 1/11/24 1707)   acetaminophen (TYLENOL) tablet 650 mg (has no administration in time range)   hydrALAZINE (APRESOLINE) injection 5 mg (has no administration in time range)       Diagnostic Studies  Results Reviewed       Procedure Component Value Units Date/Time    UA (URINE) with reflex to Scope [[id number]]     Lab Status: No result Specimen: Urine     Fingerstick Glucose (POCT) [[id number]]  (Abnormal) Collected: 01/11/24 1658    Lab Status: Final result Updated: 01/11/24 1659     POC Glucose 226 mg/dl     HS Troponin I 4hr [[id number]]  (Abnormal) Collected: 01/11/24 1619    Lab Status: Final result Specimen: Blood from Arm, Left Updated: 01/11/24 1656     hs TnI 4hr 52 ng/L      Delta 4hr hsTnI -7 ng/L     HS Troponin I 2hr [[id number]]  (Abnormal) Collected: 01/11/24 1335    Lab Status: Final result Specimen: Blood from Arm, Left Updated: 01/11/24 1414     hs TnI 2hr 54 ng/L      Delta 2hr hsTnI -5 ng/L     B-Type Natriuretic Peptide(BNP) [[id number]]  (Abnormal) Collected: 01/11/24 1149    Lab Status: Final result Specimen: Blood from Arm, Left Updated: 01/11/24 1235      pg/mL     HS Troponin 0hr (reflex protocol) [[id number]]  (Abnormal) Collected: 01/11/24 1149    Lab Status: Final result Specimen: Blood from Arm, Left Updated: 01/11/24 1224     hs TnI 0hr 59 ng/L     Comprehensive metabolic panel [[id number]]  (Abnormal) Collected: 01/11/24 1149    Lab Status: Final result Specimen: Blood from Arm, Left Updated: 01/11/24 1219     Sodium 137 mmol/L      Potassium 4.4 mmol/L      Chloride 107 mmol/L      CO2 23 mmol/L      ANION GAP 7 mmol/L      BUN 51 mg/dL      Creatinine 3.66 mg/dL      Glucose 179 mg/dL      Calcium 8.4 mg/dL      Corrected Calcium 9.2 mg/dL      AST 10 U/L       ALT 10 U/L      Alkaline Phosphatase 91 U/L      Total Protein 6.2 g/dL      Albumin 3.0 g/dL      Total Bilirubin 0.41 mg/dL      eGFR 15 ml/min/1.73sq m     Narrative:      [employer] guidelines for Chronic Kidney Disease (CKD):     Stage 1 with normal or high GFR (GFR > 90 mL/min/1.73 square meters)    Stage 2 Mild CKD (GFR = 60-89 mL/min/1.73 square meters)    Stage 3A Moderate CKD (GFR = 45-59 mL/min/1.73 square meters)    Stage 3B Moderate CKD (GFR = 30-44 mL/min/1.73 square meters)    Stage 4 Severe CKD (GFR = 15-29 mL/min/1.73 square meters)    Stage 5 End Stage CKD (GFR <15 mL/min/1.73 square meters)  Note: GFR calculation is accurate only with a steady state creatinine    CBC and differential [[id number]]  (Abnormal) Collected: 01/11/24 1149    Lab Status: Final result Specimen: Blood from Arm, Left Updated: 01/11/24 1201     WBC 6.11 Thousand/uL      RBC 3.08 Million/uL      Hemoglobin 9.0 g/dL      Hematocrit 28.7 %      MCV 93 fL      MCH 29.2 pg      MCHC 31.4 g/dL      RDW 13.8 %      MPV 11.1 fL      Platelets 260 Thousands/uL      nRBC 0 /100 WBCs      Neutrophils Relative 73 %      Immat GRANS % 0 %      Lymphocytes Relative 9 %      Monocytes Relative 10 %      Eosinophils Relative 7 %      Basophils Relative 1 %      Neutrophils Absolute 4.49 Thousands/µL      Immature Grans Absolute 0.02 Thousand/uL      Lymphocytes Absolute 0.57 Thousands/µL      Monocytes Absolute 0.60 Thousand/µL      Eosinophils Absolute 0.40 Thousand/µL      Basophils Absolute 0.03 Thousands/µL     Fingerstick Glucose (POCT) [[id number]]  (Abnormal) Collected: 01/11/24 1130    Lab Status: Final result Updated: 01/11/24 1142     POC Glucose 175 mg/dl                    XR chest portable    (Results Pending)              Procedures  ECG 12 Lead Documentation Only    Date/Time: 1/11/2024 7:52 PM    Performed by: Neeraj Cosby DO  Authorized by: Neeraj Cosby DO    ECG reviewed by me, the ED  Provider: yes    Patient location:  ED  Comments:      Sinus rhythm, first-degree AV block, rate of 64, prolonged OK, normal QTc, no STEMI, T wave inversion in V6 not present on EKG dated June 20, 2022.  EKG independently interpreted by me.           ED Course  ED Course as of 01/11/24 1954   Thu Jan 11, 2024 1953 The patient had lab work that was significant for a creatinine of 3.66 which is at the patient's baseline, troponin of 59, and a white count of 6.  The patient had a nonischemic EKG with ST inversion in V6.  I discussed the patient with cardiology who recommended the patient be placed in observation for repeat troponin and further care and evaluation.                               SBIRT 22yo+      Flowsheet Row Most Recent Value   Initial Alcohol Screen: US AUDIT-C     1. How often do you have a drink containing alcohol? 0 Filed at: 01/11/2024 1140   2. How many drinks containing alcohol do you have on a typical day you are drinking?  0 Filed at: 01/11/2024 1140   3a. Male UNDER 65: How often do you have five or more drinks on one occasion? 0 Filed at: 01/11/2024 1140   3b. FEMALE Any Age, or MALE 65+: How often do you have 4 or more drinks on one occassion? 0 Filed at: 01/11/2024 1140   Audit-C Score 0 Filed at: 01/11/2024 1140   OSMAR: How many times in the past year have you...    Used an illegal drug or used a prescription medication for non-medical reasons? Never Filed at: 01/11/2024 1140                      Medical Decision Making  This is a 78-year-old male who presents to the emergency department complaining of syncope.  I considered arrhythmia, ACS, infection. These and other diagnoses were considered.         Amount and/or Complexity of Data Reviewed  Labs: ordered.    Risk  Prescription drug management.  Decision regarding hospitalization.             Disposition  Final diagnoses:   Syncope     Time reflects when diagnosis was documented in both MDM as applicable and the Disposition within  this note       Time User Action Codes Description Comment    1/11/2024  1:21 PM Neeraj Cosby Add [R55] Syncope           ED Disposition       ED Disposition   Admit    Condition   Stable    Date/Time   Thu Jan 11, 2024 1321    Comment   Case was discussed with Dr Cornell and the patient's admission status was agreed to be Admission Status: observation status to the service of Dr. Corenll .               Follow-up Information    None         Patient's Medications   Discharge Prescriptions    No medications on file       No discharge procedures on file.    PDMP Review       None            ED Provider  Electronically Signed by             Neeraj Cosby DO  01/11/24 1954

## 2024-01-12 NOTE — ED NOTES
Food tray ordered for patient.     Margarita Villa, RN  01/11/24 3168    
Ortho Static Vitals  Lying : 98 % bp 178/78  Sittin% bp 148/66  Standin% bp 141/56     Kayla Urias  24 1642    
Patient resting comfortably in bed. Call bell within reach. Instructed to ring for assistance.     Angela Quevedo RN  01/11/24 204    
posterior

## 2024-01-12 NOTE — ASSESSMENT & PLAN NOTE
Initial troponin elevated at 59 (P) with subsequent downtrend  Suspect a non-MI troponin elevation in the setting of late stage CKD  In light of presenting presyncopal episode and history of CAD, cardiology to follow

## 2024-01-12 NOTE — ASSESSMENT & PLAN NOTE
Continue Lopressor/Norvasc/Cozaar/Demadex - additional PRN IV Hydralazine on board for BP spikes  Low-sodium restriction

## 2024-01-12 NOTE — H&P
"[facility]  H&P  Name: Michael Reynolds Jr. 78 y.o. male I MRN: [id number]  Unit/Bed#: ED OVR 20 I Date of Admission: 1/11/2024   Date of Service: 1/11/2024  Hospital Day: 0      Assessment & Plan:    * Postural dizziness with presyncope  Assessment & Plan  Presyncopal type episode in outpatient medical office while awaiting scheduling a follow-up appointment after visit  Patient reports a similar episode a few years ago while \"shopping\"   Currently denies any further episodes since presentation to the hospital -> denies current dizziness, blurry vision, or chest discomfort  Known history of CAD status post CABG  Telemetry monitoring - await cardiology input - await updated echocardiogram  Check orthostatics - hold Flomax  Limit/avoid sedating medications as possible  Consider vasovagal etiology  Vital signs, blood sugar, and electrolytes sufficient   Check urinalysis and CXR  If episode recurs, consider CT imaging of head    Insulin dependent diabetes mellitus (HCC)  Assessment & Plan  Lab Results   Component Value Date    HGBA1C 8.1 (H) 12/30/2023     Continue basal insulin with additional SSI coverage per Accu-Cheks  Carbohydrate restriction  Hypoglycemia protocol    Elevated troponin  Assessment & Plan  Initial troponin elevated at 59 (P) with subsequent downtrend  Suspect a non-MI troponin elevation in the setting of late stage CKD  In light of presenting presyncopal episode and history of CAD, cardiology to follow    CKD (chronic kidney disease), stage IV (HCC)  Assessment & Plan  Baseline creatinine of approximately 3.3-3.7 -> currently stable at 3.66  Monitor renal function and urine output - limit/avoid nephrotoxins and hypotension as possible  Note chronic Demadex/Cozaar use    Coronary artery disease  Assessment & Plan  Status post CABG  Continue ASA/statin/Cozaar/Lopressor    Morbid obesity (HCC)  Assessment & Plan  BMI of 38.1  Lifestyle/diet modifications    HARJIT (obstructive " "sleep apnea)  Assessment & Plan  Maintain oxygenation  Encourage weight loss    Anemia of chronic disease  Assessment & Plan  H/H stable  Continue B12 supplementation    Essential hypertension  Assessment & Plan  Continue Lopressor/Norvasc/Cozaar/Demadex - additional PRN IV Hydralazine on board for BP spikes  Low-sodium restriction    BPH (benign prostatic hyperplasia)  Assessment & Plan  Hold Flomax in the setting of primary issue  Continue Proscar    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HCC)  Assessment & Plan  Known prior history of 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Outpatient follow-up      DVT Prophylaxis:  Heparin SC    Code Status:  Full code    Discussion with:  Patient at bedside    Anticipated Length of Stay:  Patient will be admitted on an Observation basis with an anticipated length of stay of less than 2 midnights.   Justification for Hospital Stay: Presyncopal episode requiring telemetry monitoring, and cardiac evaluation.    Total Time for Visit, including Counseling / Coordination of Care: 75 minutes. More than 50% of total time spent on counseling and coordination of care, on one or more of the following: performing physical exam; counseling and coordination of care; obtaining or reviewing history; documenting in the medical record; reviewing/ordering tests, medications or procedures; communicating with other healthcare professionals and discussing with patient's family/caregivers.      Chief Complaint:  Presyncopal episode      History of Present Illness:    Michael VILLANUEVA Gail Sanchez is a 78 y.o. male who presents from his outpatient endocrinology office where he underwent a routine follow-up, however, at the end of the appointment, while in the waiting room with intention of scheduling an outpatient appointment, became increasingly weak described by staff there as \"woozy\".  He was lowered to sit in a chair and complained of some nausea but without vomiting.  In the ED, at the time of my encounter, he is resting fairly comfortably in bed " consuming a meal.  Upon further questioning, he states he had a similar type of episode a few years back while shopping, at which point he was also lowered to a chair.  He attributed it to a one-time episode and never followed up on it.  Of note, he does have a history of coronary disease status post CABG. he currently denies any headaches, blurry vision, dizziness, chest pain, shortness of breath, any further nausea/vomiting, or other acute complaints at this time.      Review of Systems:    Review of Systems - A thorough 12 point review systems was conducted.  Pertinent positives and negatives are mentioned in the history of present illness.      Past Medical and Surgical History:     Past Medical History:   Diagnosis Date    CHF (congestive heart failure) (HCC)     Chronic kidney disease 18 - 24 months?    Dr Patterson - stage 3    Colon polyp     CPAP (continuous positive airway pressure) dependence     Diabetes mellitus (HCC)     History of transfusion     Hyperlipidemia     Hypertension     Myocardial infarction (HCC) 06/2019    Open heart surgery with quad bypass    Obesity     Renal disorder     Sleep apnea     Visual impairment 1991    Dr Nickie Peters       Past Surgical History:   Procedure Laterality Date    APPENDECTOMY  1977    Done in conjunction with cholecystectomy    BACK SURGERY      CATARACT EXTRACTION, BILATERAL Bilateral     Left eye- 10/23 Right eye 9/23    CHOLECYSTECTOMY  1977    COLONOSCOPY      CORONARY ARTERY BYPASS GRAFT  2019    quad    EGD      GALLBLADDER SURGERY      HERNIA REPAIR      GA CORONARY ARTERY BYP W/VEIN & ARTERY GRAFT 4 VEIN N/A 06/21/2019    Procedure: CORONARY ARTERY BYPASS GRAFT (CABG) 4 VESSELS WITH SVG TO PDA, OM, DIAGONAL AND LIMA TO LAD; RIGHT LEG EVH;  Surgeon: James Fang MD;  Location: [facility];  Service: Cardiac Surgery    RECTAL SURGERY      SPINE SURGERY  2012    Fusion L3-L5?    TONSILLECTOMY           Medications & Allergies:    Prior to Admission  medications    Medication Sig Start Date End Date Taking? Authorizing Provider   amLODIPine (NORVASC) 5 mg tablet TAKE 1 TABLET (5 MG TOTAL) BY MOUTH DAILY. 7/19/23  Yes James Patterson MD   Ascorbic Acid (VITAMIN C) 1000 MG tablet Take 1,000 mg by mouth daily   Yes Historical Provider, MD   aspirin (ECOTRIN LOW STRENGTH) 81 mg EC tablet Take 1 tablet (81 mg total) by mouth daily 4/14/21  Yes eKnny Lozano MD   atorvastatin (LIPITOR) 80 mg tablet Take 1 tablet (80 mg total) by mouth daily with dinner 10/25/23  Yes Luke Glasgow DO   calcitriol (ROCALTROL) 0.25 mcg capsule Take 1 capsule (0.25 mcg total) by mouth 3 (three) times a week  Patient taking differently: Take 0.25 mcg by mouth 3 (three) times a week Monday Wednesday and friday 11/1/23  Yes James Patterson MD   calcium acetate (PHOSLO) capsule Take 1 capsule (667 mg total) by mouth 2 (two) times a day with meals 11/1/23  Yes James Patterson MD   cholecalciferol (VITAMIN D3) 1,000 units tablet Take 2 tablets (2,000 Units total) by mouth daily  Patient taking differently: Take 2,000 Units by mouth daily at bedtime 6/7/23  Yes James Patterson MD   Continuous Blood Gluc  (FreeStyle Celia 2 [place]) MAMI Use to check blood sugar daily 11/22/21  Yes Farnaz Crooks PA-C   Continuous Blood Gluc Sensor (FreeStyle Celia 2 Sensor) MISC Use daily as directed for CGM - Change every 14 days 1/11/24  Yes ELEAZAR Devlin   cyanocobalamin (VITAMIN B-12) 500 mcg tablet Take 1,000 mcg by mouth daily   Yes Historical Provider, MD   finasteride (PROSCAR) 5 mg tablet Take 1 tablet (5 mg total) by mouth daily 6/12/23  Yes Terell Umaña PA-C   glucose blood test strip Check 4 times a day 11/16/20  Yes Farnaz Crooks PA-C   insulin glargine (Toujeo Max SoloStar) 300 units/mL CONCENTRATED U-300 injection pen (2-unit dial) INJECT 42 UNITS UNDER THE SKIN DAILY at dinner time  Patient taking differently: Inject 42 Units under the skin daily  at bedtime INJECT 42 UNITS UNDER THE SKIN DAILY at dinner time 5/3/23  Yes Dolly Luis MD   insulin lispro (HumaLOG KwikPen) 100 units/mL injection pen Inject 16 units before brunch and  18  units before dinner +scale 8/30/23  Yes Dolly Luis MD   Insulin Pen Needle (B-D ULTRAFINE III SHORT PEN) 31G X 8 MM MISC Inject under the skin 4 (four) times a day 11/13/23  Yes Farnaz Crooks PA-C   latanoprost (XALATAN) 0.005 % ophthalmic solution Administer 1 drop to both eyes daily at bedtime   Yes Jonathan Chavez MD   metoprolol tartrate (LOPRESSOR) 50 mg tablet Take 1 tablet (50 mg total) by mouth 2 (two) times a day 12/22/23  Yes Luke Glasgow DO   Microlet Lancets MISC USE 3 TIMES DAY 11/18/20  Yes Farnaz Crooks PA-C   nitrofurantoin (MACRODANTIN) 50 mg capsule Take 1 capsule (50 mg total) by mouth daily at bedtime 12/22/23 12/16/24 Yes Luke Glasgow DO   pantoprazole (PROTONIX) 40 mg tablet Take 1 tablet (40 mg total) by mouth daily 9/7/23  Yes Luke Glasgow DO   tamsulosin (FLOMAX) 0.4 mg Take 2 capsules (0.8 mg total) by mouth daily with dinner 12/22/23 1/21/24 Yes Luke Glasgow DO   telmisartan (MICARDIS) 20 MG tablet TAKE 1 TABLET BY MOUTH EVERY DAY 10/19/23  Yes James Patterson MD   torsemide (DEMADEX) 20 mg tablet Take 1 tablet (20 mg total) by mouth daily Take an additional 20 mg on MWF for a total of 40 mg. 11/1/23  Yes James Patterson MD   liraglutide (Victoza) injection 1.8 MG ONCE DAILY  Patient taking differently: Inject 1.8 mg under the skin daily with lunch 1.8 mg once daily 12/22/23   Farnaz Crooks PA-C   Continuous Blood Gluc Sensor (FreeStyle Celia 2 Sensor) MISC Use daily as directed for CGM - Change every 14 days 5/23/23 1/11/24  Farnaz Crooks PA-C         Allergies: No Known Allergies      Social History:    Substance Use History:   Social History     Substance and Sexual Activity   Alcohol Use Not Currently    Comment:  none since most recent hospitalization     Social History     Tobacco Use   Smoking Status Former    Current packs/day: 0.00    Average packs/day: 3.0 packs/day for 35.0 years (105.0 ttl pk-yrs)    Types: Cigarettes, Cigars    Start date: 1957    Quit date: 1992    Years since quittin.0   Smokeless Tobacco Never   Tobacco Comments    Started smoking as a pre-teenager     Social History     Substance and Sexual Activity   Drug Use Never         Family History:    Per medical chart, significant for alcohol abuse and unspecified cancers in mother and father, for hypertension in paternal grandfather, and for diabetes mellitus in paternal aunt and maternal grandmother.      Physical Exam:     Vitals:   Blood Pressure: 148/72 (24)  Pulse: 62 (24)  Temperature: 98.2 °F (36.8 °C) (24 1247)  Temp Source: Oral (24 1247)  Respirations: 21 (24)  SpO2: 98 % (24)      GENERAL Mildly weak and fatigued   HEAD   Normocephalic - atraumatic   EYES Nonicteric   MOUTH   Mucosa moist   NECK   Supple - full range of motion   CARDIAC Rate controlled - S1/S2 positive   PULMONARY    Clear breath sounds bilaterally - nonlabored respirations   ABDOMEN   Soft - nontender/nondistended - active bowel sounds   MUSCULOSKELETAL   Motor strength/range of motion mildly deconditioned   NEUROLOGIC Mild cognitive impairment present   SKIN   Chronic wrinkles/blemishes    PSYCHIATRIC   Mood/affect somewhat flat         Additional Data:     Labs & Recent Cultures:    Results from last 7 days   Lab Units 24  1149   WBC Thousand/uL 6.11   HEMOGLOBIN g/dL 9.0*   HEMATOCRIT % 28.7*   PLATELETS Thousands/uL 260   NEUTROS PCT % 73   LYMPHS PCT % 9*   MONOS PCT % 10   EOS PCT % 7*     Results from last 7 days   Lab Units 24  1149   SODIUM mmol/L 137   POTASSIUM mmol/L 4.4   CHLORIDE mmol/L 107   CO2 mmol/L 23   BUN mg/dL 51*   CREATININE mg/dL 3.66*   ANION GAP mmol/L 7   CALCIUM  mg/dL 8.4   ALBUMIN g/dL 3.0*   TOTAL BILIRUBIN mg/dL 0.41   ALK PHOS U/L 91   ALT U/L 10   AST U/L 10*   GLUCOSE RANDOM mg/dL 179*         Results from last 7 days   Lab Units 01/11/24  1658 01/11/24  1130   POC GLUCOSE mg/dl 226* 175*             Lines/Drains:  Invasive Devices       Peripheral Intravenous Line  Duration             Peripheral IV 01/11/24 Left Antecubital <1 day                              RAYMON KING MD   Hospitalist - [facility] Internal Medicine          ** Please Note: This note is constructed using a voice recognition dictation system.  An occasional wrong word/phrase or “sound-a-like” substitution may have been picked up by dictation device due to the inherent limitations of voice recognition software.  Read the chart carefully and recognize, using reasonable context, where substitutions may have occurred.**

## 2024-01-12 NOTE — CASE MANAGEMENT
Case Management Assessment & Discharge Planning Note    Patient name Michael Reynolds Jr.  Location /-01 MRN [id number]  : 1945 Date 2024       Current Admission Date: 2024  Current Admission Diagnosis:Postural dizziness with presyncope   Patient Active Problem List    Diagnosis Date Noted    Postural dizziness with presyncope 2024    Anemia of chronic disease 2024    Depression 2023    Anemia of chronic kidney failure, stage 4 (severe)  2023    Nocturia 2023    Encounter to discuss test results 2023    Incomplete bladder emptying 2023    Morbid obesity (HCC) 2023    Osteopenia of neck of left femur 2023    Secondary hyperparathyroidism of renal origin (HCC) 2023    Hyperphosphatemia 2023    HARJIT (obstructive sleep apnea) 2023    Diverticulosis 2022    Gastric ulcer 2022    GI bleed 2022    Gait instability 2022    Asymptomatic bilateral carotid artery stenosis 2022    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HCC) 2022    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    BPH (benign prostatic hyperplasia) 2021    Essential hypertension 2021    Elevated TSH 2021    Hypervolemia associated with renal insufficiency 2021    Chronic diastolic (congestive) heart failure (HCC) 2021    Traumatic injury of skin 2021    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HCC) 2020    Encounter for  medical examination (CDME) 2020    Diabetic polyneuropathy associated with type 2 diabetes mellitus (HCC)     Stenosis of left internal carotid artery 2020    Lymphadenopathy 10/22/2019    Vitamin D deficiency 10/21/2019    Iron deficiency anemia 2019    S/P CABG (coronary artery bypass graft) 2019    Acute postoperative pulmonary insufficiency (HCC) 2019    Coronary artery disease 2019    Mixed hyperlipidemia 2019    Insulin dependent diabetes  mellitus (HCC) 06/15/2019    Elevated troponin 06/15/2019    Sleep apnea 06/15/2019    Benign hypertension with chronic kidney disease, stage IV (HCC) 05/30/2019    Chronic kidney disease-mineral and bone disorder 05/30/2019    Esophageal dysphagia 02/14/2019    History of colonic polyps 02/14/2019    CKD (chronic kidney disease), stage IV (HCC) 08/18/2017    Non-nephrotic range proteinuria 08/18/2017    Trigger finger of left hand 04/05/2017    Lumbar back pain 02/07/2012    Lumbar discogenic pain syndrome 02/07/2012    Pain of lower extremity 02/07/2012    Spondylolisthesis 02/07/2012    Spinal stenosis 02/07/2012      LOS (days): 0  Geometric Mean LOS (GMLOS) (days):   Days to GMLOS:     OBJECTIVE:    Risk of Unplanned Readmission Score: 19.54         Current admission status: Inpatient       Preferred Pharmacy:   [facility]/pharmacy #[id number] - [address] [address]  Phone: [phone] Fax: [phone]    [address]  [address] [place] [address]  Phone: [phone] Fax: [phone]    Primary Care Provider: Luke Glasgow DO    Primary Insurance: ELDON SPEARS  Secondary Insurance:     ASSESSMENT:  Active Health Care Proxies       Mine Reynolds [facility] Care Representative - Spouse   Primary Phone: [phone] (Mobile)  Home Phone: [phone]                           Readmission Root Cause  30 Day Readmission: No    Patient Information  Admitted from:: Home  Mental Status: Alert  During Assessment patient was accompanied by: Not accompanied during assessment  Assessment information provided by:: Patient  Primary Caregiver: Self  Support Systems: Spouse/significant other  County of Residence: [place]  What city do you live in?: [place]  Home entry access options. Select all that apply.: Stairs  Number of steps to enter home.: 2  Do the steps have railings?: Yes  Type of  Current Residence: [facility]  Living Arrangements: Lives w/ Spouse/significant other    Activities of Daily Living Prior to Admission  Functional Status: Independent  Completes ADLs independently?: Yes  Ambulates independently?: Yes  Does patient use assisted devices?: No  Does patient currently own DME?: Yes  What DME does the patient currently own?: Shower Chair, Straight Cane  Does patient have a history of Outpatient Therapy (PT/OT)?: No  Does the patient have a history of Short-Term Rehab?: Yes ([facility])  Does patient have a history of HHC?: Yes  Does patient currently have HHC?: No         Patient Information Continued  Income Source: Pension/assisted  Does patient have prescription coverage?: Yes  Does patient receive dialysis treatments?: No  Does patient have a history of substance abuse?: Yes  Historical substance use preference: Alcohol/ETOH  History of Withdrawal Symptoms: Denies past symptoms  Is patient currently in treatment for substance abuse?: No. Patient declined treatment information.  Does patient have a history of Mental Health Diagnosis?: No         Means of Transportation  Means of Transport to [facility]:: Family transport      Housing Stability: Not on file   Food Insecurity: Unknown (12/18/2020)    Hunger Vital Sign     Worried About Running Out of Food in the Last Year: Patient declined     Ran Out of Food in the Last Year: Patient declined   Transportation Needs: No Transportation Needs (12/17/2023)    PRAPARE - Transportation     Lack of Transportation (Medical): No     Lack of Transportation (Non-Medical): No   Utilities: Not on file       DISCHARGE DETAILS:    Discharge planning discussed with:: Patient  Freedom of Choice: Yes  Comments - Freedom of Choice: Cm met with patient to review role. Cm reviewed recomendations of rehab, patient refused and is requesting HHC. per discussion with patient's wife, she is stating that she can not handle patient at home due to his noncompliance and his  limitations. Patient is depending to speak with wife. patient is now in agreement [facility] referral to rehab. Cm to send referrals. Patient will need authorization.  CM contacted family/caregiver?: Yes  Were Treatment Team discharge recommendations reviewed with patient/caregiver?: Yes  Did patient/caregiver verbalize understanding of patient care needs?: Yes  Were patient/caregiver advised of the risks associated with not following Treatment Team discharge recommendations?: Yes    Contacts  Patient Contacts: Mine Reynolds  Relationship to Patient:: Family  Contact Method: Phone  Phone Number: [phone]  Reason/Outcome: Emergency Contact, Continuity of Care, Discharge Planning

## 2024-01-12 NOTE — PLAN OF CARE
Problem: OCCUPATIONAL THERAPY ADULT  Goal: Performs self-care activities at highest level of function for planned discharge setting.  See evaluation for individualized goals.  Description:            See flowsheet documentation for full assessment, interventions and recommendations.   Note: Limitation: Decreased ADL status, Decreased self-care trans  Prognosis: Fair  Assessment: Pt is a 78 y.o. male seen for OT evaluation at [facility], admitted 1/11/2024 w/ Postural dizziness with presyncope.  Comorbidities affecting pt's functional performance at time of assessment include: CKD, CAD, McArdle's disease, morbid obesity, s/p CABG, etc (see chart).  Prior to admission, pt was living with wife in house with steps to manage.  Pt was I w/  ADLS and required assist with IADLS, & required no DME/AD PTA. Upon evaluation, pt appears to be performing below baseline functional status. Pt currently requires min Ax 1 for bed mobility, min A x1 for functional mobility/transfers, sup-min A for UB ADLs and min-mod A for LB ADLS 2* the following deficits impacting occupational performance: weakness, decreased strength, decreased balance, decreased tolerance, impaired memory, and impaired problem solving. Full objective findings from OT assessment regarding body systems outlined above. Personal factors affecting pt at time of IE include:steps to enter environment, difficulty performing ADLS, difficulty performing IADLS , and decreased functional mobility . These impairments, as well as risk for falls  limit pt's ability to safely engage in all baseline areas of occupation and mobility. Pt to benefit from continued skilled OT tx while in the hospital to address deficits as defined above and maximize level of functional independence w ADL's and functional mobility. Occupational Performance areas to address include: bathing/shower, toilet hygiene, dressing, and functional mobility.  This evaluation required an extensive  review of medical and/or therapy records and additional review of physical, cognitive and psychosocial history related to functional performance. Based upon functional performance deficits and assessments, this evaluation has been identified as a high complexity evaluation.  At this time, OT recommendations at time of discharge are level 2 resources.     Rehab Resource Intensity Level, OT: II (Moderate Resource Intensity)

## 2024-01-12 NOTE — PROGRESS NOTES
"[facility]  Progress Note  Name: Michael Sanchez I  MRN: [id number]  Unit/Bed#: -01 I Date of Admission: 1/11/2024   Date of Service: 1/12/2024 I Hospital Day: 0      Assessment & Plan:    * Postural dizziness with presyncope  Assessment & Plan  Presyncopal type episode in outpatient medical office while awaiting scheduling a follow-up appointment after visit  Patient reports a similar episode a few years ago while \"shopping\"   Currently denies any further episodes since presentation to the hospital -> denies current dizziness, blurry vision, or chest discomfort  Known history of CAD status post CABG  Telemetry monitoring - await cardiology input  Echocardiogram normal EF, without regional LV wall motion abnormalities, and only mild valvular disease  Attics negative - hold Flomax  Limit/avoid sedating medications as possible  Consider vasovagal etiology  Vital signs, blood sugar, and electrolytes sufficient   Analysis negative for infection  CXR noting some atelectasis -> remains afebrile without leukocytosis and no evidence of difficulty consuming oral meals -> less likely suspicion of aspiration/pneumonia  If episode recurs, consider CT imaging of head  Ongoing PT/OT evaluation -> anticipate discharge tomorrow if clinically stable    Insulin dependent diabetes mellitus (HCC)  Assessment & Plan  Lab Results   Component Value Date    HGBA1C 8.1 (H) 12/30/2023     Continue basal insulin with additional SSI coverage per Accu-Cheks  Carbohydrate restriction  Hypoglycemia protocol    Elevated troponin  Assessment & Plan  Initial troponin elevated at 59 with subsequent downtrend  Suspect a non-MI troponin elevation in the setting of late stage CKD  In light of presenting presyncopal episode and history of CAD, cardiology to follow    CKD (chronic kidney disease), stage IV (HCC)  Assessment & Plan  Baseline creatinine of approximately 3.3-3.7 -> currently stable at 3.79  Monitor renal " function and urine output - limit/avoid nephrotoxins and hypotension as possible  Note chronic Demadex/Cozaar use    Coronary artery disease  Assessment & Plan  Status post CABG  Continue ASA/statin/Cozaar/Lopressor    Morbid obesity (HCC)  Assessment & Plan  Lifestyle/diet modifications    HARJIT (obstructive sleep apnea)  Assessment & Plan  Maintain oxygenation  Encourage weight loss    Anemia of chronic disease  Assessment & Plan  H/H stable  Continue B12 supplementation    Essential hypertension  Assessment & Plan  Continue Lopressor/Norvasc/Cozaar/Demadex - additional PRN IV Hydralazine on board for BP spikes  Low-sodium restriction    BPH (benign prostatic hyperplasia)  Assessment & Plan  Discontinued Flomax in the setting of primary issue  Continue Proscar    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HCC)  Assessment & Plan  Known prior history of 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Outpatient follow-up      DVT Prophylaxis:  Heparin SC    AM-PAC Basic Mobility:  Basic Mobility Inpatient Raw Score: 16  -HL Achieved: 4: Move to chair/commode  -HL Goal: 5: Stand one or more mins    Patient Centered Rounds:  I have performed bedside rounds and discussed plan of care with nursing today.  Discussions with Specialists or Other Care Team Provider:  see above assessments if applicable    Education and Discussions with Family / Patient:  Patient at bedside, along with wife, over the phone today    Time Spent for Care:  35 minutes. More than 50% of total time spent on counseling and coordination of care, on one or more of the following: performing physical exam; counseling and coordination of care, obtaining or reviewing history, documenting in the medical record, reviewing/ordering tests/medications/procedures, and communicating with other healthcare professionals.    Current Length of Stay: 0 day(s)  Current Patient Status: Inpatient   Certification Statement:  Patient will continue to require additional hospital stay due to assessments as noted  "above.    Code Status: Level 1 - Full Code        Subjective:     Encountered earlier in the day.  Sitting upright in chair stating he feels a bit \"better\".  Has a relatively flat affect.  Denies any further syncopal type episodes since hospitalization.        Objective:     Vitals:   Temp (24hrs), Av.2 °F (36.8 °C), Min:98 °F (36.7 °C), Max:98.4 °F (36.9 °C)    Temp:  [98 °F (36.7 °C)-98.4 °F (36.9 °C)] 98.4 °F (36.9 °C)  HR:  [58-83] 58  Resp:  [18-21] 18  BP: (104-159)/(50-82) 135/67  SpO2:  [97 %-98 %] 98 %  Body mass index is 31.9 kg/m².     Input and Output Summary (last 24 hours):       Intake/Output Summary (Last 24 hours) at 2024 1840  Last data filed at 2024 0424  Gross per 24 hour   Intake --   Output 500 ml   Net -500 ml       Physical Exam:     GENERAL Mild weakness/fatigue persists   HEAD   Normocephalic - atraumatic   EYES   PERRL - EOMI    MOUTH   Mucosa moist   NECK   Supple - full range of motion   CARDIAC   Regular rate/rhythm - S1/S2 positive   PULMONARY    Clear breath sounds bilaterally - nonlabored respirations   ABDOMEN   Soft - nontender/nondistended - active bowel sounds   MUSCULOSKELETAL   Motor strength/range of motion mildly deconditioned   NEUROLOGIC Monitor of impairment present   SKIN   Chronic wrinkles/blemishes    PSYCHIATRIC   Mood/affect flat         Additional Data:     Labs & Recent Cultures:    Results from last 7 days   Lab Units 24  0438   WBC Thousand/uL 6.88   HEMOGLOBIN g/dL 8.8*   HEMATOCRIT % 28.1*   PLATELETS Thousands/uL 267   NEUTROS PCT % 73   LYMPHS PCT % 12*   MONOS PCT % 9   EOS PCT % 6     Results from last 7 days   Lab Units 24  0438 24  1149   POTASSIUM mmol/L 4.4 4.4   CHLORIDE mmol/L 108 107   CO2 mmol/L 22 23   BUN mg/dL 54* 51*   CREATININE mg/dL 3.79* 3.66*   CALCIUM mg/dL 8.4 8.4   ALK PHOS U/L  --  91   ALT U/L  --  10   AST U/L  --  10*         Results from last 7 days   Lab Units 24  1626 24  1133 " 01/12/24  0715 01/11/24  2126 01/11/24  1658 01/11/24  1130   POC GLUCOSE mg/dl 203* 195* 131 158* 226* 175*                         Lines/Drains:  Invasive Devices       Peripheral Intravenous Line  Duration             Peripheral IV 01/11/24 Left Antecubital 1 day                      Last 24 Hours Medication List:   Current Facility-Administered Medications   Medication Dose Route Frequency Provider Last Rate    acetaminophen  650 mg Oral Q6H PRN Malissa oCrnell MD      amLODIPine  5 mg Oral Daily Malissa Cornell MD      vitamin C  1,000 mg Oral Daily Malissa Cornell MD      atorvastatin  80 mg Oral Daily With Dinner Malissa Cornell MD      calcitriol  0.25 mcg Oral Once per day on Monday Wednesday Friday Malissa Cornell MD      calcium acetate  667 mg Oral BID With Meals Malissa Cornell MD      cholecalciferol  2,000 Units Oral HS Malissa Cornell MD      vitamin B-12  1,000 mcg Oral Daily Malissa Cornell MD      finasteride  5 mg Oral Daily Malissa Cornell MD      hydrALAZINE  5 mg Intravenous Q6H PRN Malissa Cornell MD      insulin glargine  20 Units Subcutaneous HS Malissa Cornell MD      insulin lispro  1-6 Units Subcutaneous 4x Daily (AC & HS) Malissa Cornell MD      latanoprost  1 drop Both Eyes HS Malissa Cornell MD      losartan  25 mg Oral Daily Malissa Cornell MD      magnesium sulfate  2 g Intravenous Once Malissa Cornell MD      metoprolol tartrate  50 mg Oral BID Malissa Cornell MD      pantoprazole  40 mg Oral Daily Malissa Cornell MD      sodium chloride (PF)  3 mL Intravenous Q1H PRN Neeraj Cosby DO      torsemide  20 mg Oral Daily MD MALISSA Wilson MD   Hospitalist - [facility]        ** Please Note: This note is constructed using a voice recognition dictation system.  An occasional wrong word/phrase or “sound-a-like” substitution may have been picked up by dictation device due to the inherent limitations of voice recognition software.  Read the chart carefully and recognize,  using reasonable context, where substitutions may have occurred.**

## 2024-01-12 NOTE — ASSESSMENT & PLAN NOTE
Baseline creatinine of approximately 3.3-3.7 -> currently stable at 3.79  Monitor renal function and urine output - limit/avoid nephrotoxins and hypotension as possible  Note chronic Demadex/Cozaar use

## 2024-01-12 NOTE — PLAN OF CARE
Problem: PHYSICAL THERAPY ADULT  Goal: Performs mobility at highest level of function for planned discharge setting.  See evaluation for individualized goals.  Description: Treatment/Interventions: Functional transfer training, LE strengthening/ROM, Elevations, Therapeutic exercise, Cognitive reorientation, Patient/family training, Equipment eval/education, Bed mobility, Gait training          See flowsheet documentation for full assessment, interventions and recommendations.  Outcome: Progressing  Note: Prognosis: Fair  Problem List: Decreased strength, Decreased endurance, Impaired balance, Decreased mobility, Decreased cognition, Impaired judgement, Decreased safety awareness  Assessment: Orders for PT eval and treat received. Pt's PMHx: CHF, MI, CABG x4, visual impairment, back sx. Pt exhibits physical deficits noted in problem list above.  Deficits listed contribute to functional limitations that are significant from the patient PLOF and include: difficulty with bed mobility, impaired standing balance, inability to perform safe transfers, tolerance for OOB functional activities, unsafe/inefficient ambulation, and fall risk    During today's session, formal PT evaluation performed.  Pt exhibits functional limitation due to cognitive deficits, gait deviations, LE/trunk weakness, and decreased safety awareness.  These limitations put the patient at a higher risk of falling.  Unsure of pt's actual home setup/assist level at time of eval, and question pt's report.  Pt may benefit from skilled PT to improve overall strength and be better able to carry out functional activities and safe gait.     The AM-PAC & Barthel Index outcome tools were used to assist in determining pt safety w/ mobility/self care & appropriate d/c recommendations, see above for scores. Patient's clinical presentation is evolving due to ongoing medical management needs.     Considering the patient's PLOF, co-morbidities, acute functional  limitations, functional outcome measures, and/or goal to progress functional independence; this patient would benefit from skilled Physical Therapy intervention in the acute care setting.  Barriers to Discharge: Decreased caregiver support     Rehab Resource Intensity Level, PT: II (Moderate Resource Intensity)    See flowsheet documentation for full assessment.

## 2024-01-12 NOTE — ASSESSMENT & PLAN NOTE
"Presyncopal type episode in outpatient medical office while awaiting scheduling a follow-up appointment after visit  Patient reports a similar episode a few years ago while \"shopping\"   Currently denies any further episodes since presentation to the hospital -> denies current dizziness, blurry vision, or chest discomfort  Known history of CAD status post CABG  Telemetry monitoring - await cardiology input  Echocardiogram normal EF, without regional LV wall motion abnormalities, and only mild valvular disease  Attics negative - hold Flomax  Limit/avoid sedating medications as possible  Consider vasovagal etiology  Vital signs, blood sugar, and electrolytes sufficient   Analysis negative for infection  CXR noting some atelectasis -> remains afebrile without leukocytosis and no evidence of difficulty consuming oral meals -> less likely suspicion of aspiration/pneumonia  If episode recurs, consider CT imaging of head  Ongoing PT/OT evaluation -> anticipate discharge tomorrow if clinically stable  "

## 2024-01-12 NOTE — CONSULTS
[facility]                                              Cardiology Consult  Michael Reynolds . 78 y.o. male   YOB: 1945 MRN: [id number]  Unit/Bed#: -01 Encounter: [id number]      Physician Requesting Consult: Malissa Cornell MD  Reason for Consult / Principal Problem: Near syncope / syncope    Assessments  Principal Problem:    Postural dizziness with presyncope  Active Problems:    CKD (chronic kidney disease), stage IV (HCC)    Insulin dependent diabetes mellitus (HCC)    Elevated troponin    Coronary artery disease    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HCC)    Essential hypertension    BPH (benign prostatic hyperplasia)    HARJIT (obstructive sleep apnea)    Morbid obesity (HCC)    Anemia of chronic disease    Postural dizziness/near syncope  Non-MI troponin elevation  CAD s/p CABG x 4 (6/2019)  Aortic stenosis, mild  Hypertension  Hyperlipidemia  CKD-4  Baseline Cr 3.6-4  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Obesity, Body mass index is 31.97 kg/m².     Outpatient cardiologist is Dr. Lozano    Plan  Orthostatic vital signs checked this morning and noted to be negative  Blood pressures remain mildly elevated in the 140s to 160s since arrival  Patient and patient's wife reported that he had not been eating well and did not eat anything major on the morning of this episode prior to the office visit - ?hypoglycemia related to poor oral intake v weakness related to 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ECG and telemetry since arrival have been without any significant arrhythmias  He otherwise feels well and remains free of chest pain or shortness of breath  Echocardiogram pending at the time of my evaluation and was completed later in the afternoon and reviewed by me  LVEF 60%, grade 2 diastolic dysfunction, mild aortic stenosis, mild to moderate MR, mild pulmonary hypertension  Overall, no clear cardiac etiology for presenting episode  Continue to monitor for symptoms and reevaluate with ambulation and recheck orthostatics in  AM  Telemetry free of any significant events and no further recurrence of symptoms, then he would be okay from cardiac standpoint for discharge with outpatient follow-up with his regular cardiologist  Continue current medical therapy  Avoid aggressive control of blood pressure for now and keep a goal blood pressure between 120-150/70-95      ECG: Personally reviewed.  Normal sinus rhythm with first-degree AV block, nonspecific T wave change  Telemetry: Personally reviewed. NSR    History of Present Illness   HPI: Michael Reynolds  is a 78 y.o. year old male who presents from the endocrinologist office after an episode of reported near syncope/severe dizziness.    He does not remember the events leading up to his hospitalization, and most of the history is obtained from chart. Yesterday, he was at the endocrinologist office and was in the process of checking out, when all of a sudden while being up on his feet he became unsteady on his feet. He was gradually lowered onto a chair, where he briefly passed out.  No injuries or fall. A blood pressure check was normal. Endocrinologist report indicates a normal glucose level (?on CGM), but patient does not report having had a glucose check. EMS was subsequently called and he was brought in to [facility] for evaluation. Per available EMS report, patient's wife had reported to them that patient has not been eating well recently and did not eat much in the morning before the visit. Patient denies having had any other episodes of dizziness, near syncope or syncope over the last year.  He reports having had 1 somewhat similar episode about 2 years ago.      Since arrival in the ED his blood pressure has been in normal to mildly elevated.  He has not had any further issues with dizziness or lightheadedness.  He has not had any issues with chest pain, shortness of breath, palpitations either and feels reasonably well.      Past Medical History:   Diagnosis Date    CHF  (congestive heart failure) (HCC)     Chronic kidney disease 18 - 24 months?    Dr Patterson - stage 3    Colon polyp     CPAP (continuous positive airway pressure) dependence     Diabetes mellitus (HCC)     History of transfusion     Hyperlipidemia     Hypertension     Myocardial infarction (HCC) 2019    Open heart surgery with quad bypass    Obesity     Renal disorder     Sleep apnea     Visual impairment     Dr Nickie Peters     Past Surgical History:   Procedure Laterality Date    APPENDECTOMY      Done in conjunction with cholecystectomy    BACK SURGERY      CATARACT EXTRACTION, BILATERAL Bilateral     Left eye- 10/23 Right eye     CHOLECYSTECTOMY      COLONOSCOPY      CORONARY ARTERY BYPASS GRAFT      quad    EGD      GALLBLADDER SURGERY      HERNIA REPAIR      SD CORONARY ARTERY BYP W/VEIN & ARTERY GRAFT 4 VEIN N/A 2019    Procedure: CORONARY ARTERY BYPASS GRAFT (CABG) 4 VESSELS WITH SVG TO PDA, OM, DIAGONAL AND LIMA TO LAD; RIGHT LEG EVH;  Surgeon: James Fang MD;  Location: [facility];  Service: Cardiac Surgery    RECTAL SURGERY      SPINE SURGERY      Fusion L3-L5?    TONSILLECTOMY       Family History   Problem Relation Age of Onset    Cancer Mother     Alcohol abuse Mother     Cancer Father     Alcohol abuse Father     Diabetes Maternal Grandmother     Diabetes type II Maternal Grandmother         Geriatric onset -      Diabetes Paternal Aunt     Hypertension Paternal Grandfather      Meds/Allergies   all current active meds have been reviewed and current meds:   Current Facility-Administered Medications   Medication Dose Route Frequency    acetaminophen (TYLENOL) tablet 650 mg  650 mg Oral Q6H PRN    amLODIPine (NORVASC) tablet 5 mg  5 mg Oral Daily    ascorbic acid (VITAMIN C) tablet 1,000 mg  1,000 mg Oral Daily    aspirin (ECOTRIN LOW STRENGTH) EC tablet 81 mg  81 mg Oral Daily    atorvastatin (LIPITOR) tablet 80 mg  80 mg Oral Daily With Dinner     calcitriol (ROCALTROL) capsule 0.25 mcg  0.25 mcg Oral Once per day on Monday Wednesday Friday    calcium acetate (PHOSLO) capsule 667 mg  667 mg Oral BID With Meals    cholecalciferol (VITAMIN D3) tablet 2,000 Units  2,000 Units Oral HS    cyanocobalamin (VITAMIN B-12) tablet 1,000 mcg  1,000 mcg Oral Daily    finasteride (PROSCAR) tablet 5 mg  5 mg Oral Daily    heparin (porcine) subcutaneous injection 5,000 Units  5,000 Units Subcutaneous Q8H ALLA    hydrALAZINE (APRESOLINE) injection 5 mg  5 mg Intravenous Q6H PRN    insulin glargine (LANTUS) subcutaneous injection 20 Units 0.2 mL  20 Units Subcutaneous HS    insulin lispro (HumaLOG) 100 units/mL subcutaneous injection 1-6 Units  1-6 Units Subcutaneous 4x Daily (AC & HS)    latanoprost (XALATAN) 0.005 % ophthalmic solution 1 drop  1 drop Both Eyes HS    losartan (COZAAR) tablet 25 mg  25 mg Oral Daily    metoprolol tartrate (LOPRESSOR) tablet 50 mg  50 mg Oral BID    pantoprazole (PROTONIX) EC tablet 40 mg  40 mg Oral Daily    sodium chloride (PF) 0.9 % injection 3 mL  3 mL Intravenous Q1H PRN    torsemide (DEMADEX) tablet 20 mg  20 mg Oral Daily     Medications Prior to Admission   Medication    amLODIPine (NORVASC) 5 mg tablet    Ascorbic Acid (VITAMIN C) 1000 MG tablet    aspirin (ECOTRIN LOW STRENGTH) 81 mg EC tablet    atorvastatin (LIPITOR) 80 mg tablet    calcitriol (ROCALTROL) 0.25 mcg capsule    calcium acetate (PHOSLO) capsule    cholecalciferol (VITAMIN D3) 1,000 units tablet    Continuous Blood Gluc  (FreeStyle Celia 2 Howard) MAMI    Continuous Blood Gluc Sensor (FreeStyle Celia 2 Sensor) MISC    cyanocobalamin (VITAMIN B-12) 500 mcg tablet    finasteride (PROSCAR) 5 mg tablet    glucose blood test strip    insulin glargine (Toujeo Max SoloStar) 300 units/mL CONCENTRATED U-300 injection pen (2-unit dial)    insulin lispro (HumaLOG KwikPen) 100 units/mL injection pen    Insulin Pen Needle (B-D ULTRAFINE III SHORT PEN) 31G X 8 MM MISC     latanoprost (XALATAN) 0.005 % ophthalmic solution    metoprolol tartrate (LOPRESSOR) 50 mg tablet    Microlet Lancets MISC    nitrofurantoin (MACRODANTIN) 50 mg capsule    pantoprazole (PROTONIX) 40 mg tablet    tamsulosin (FLOMAX) 0.4 mg    telmisartan (MICARDIS) 20 MG tablet    torsemide (DEMADEX) 20 mg tablet    liraglutide (Victoza) injection     No Known Allergies  Social History     Socioeconomic History    Marital status: /Civil Union     Spouse name: Not on file    Number of children: Not on file    Years of education: Not on file    Highest education level: Not on file   Occupational History    Occupation: RETIRED   Tobacco Use    Smoking status: Former     Current packs/day: 0.00     Average packs/day: 3.0 packs/day for 35.0 years (105.0 ttl pk-yrs)     Types: Cigarettes, Cigars     Start date: 1957     Quit date: 1992     Years since quittin.0    Smokeless tobacco: Never    Tobacco comments:     Started smoking as a pre-teenager   Vaping Use    Vaping status: Never Used   Substance and Sexual Activity    Alcohol use: Not Currently     Comment: none since most recent hospitalization    Drug use: Never    Sexual activity: Not Currently     Partners: Female     Birth control/protection: Male Sterilization, None   Other Topics Concern    Not on file   Social History Narrative    · Do you currently or have you served in the [employer]:   No      · Were you activated, into active duty, as a member of the [employer] or as a Reservist:   No      · Caffeine intake:   Occasional      · Diet:   Regular      · Live alone or with others:   with others      · Exercise level:   Occasional  swim in summertime. walk alot.     · ·Guns present in home:   No      · Seat belts used routinely:   Yes      · Advance directive:   No      · Sunscreen used routinely:   No      · Smoke alarm in home:   Yes      · Legally blind in one or both eyes:   No      · Hard of hearing or deaf in one or both ears:    No      ·      Social Determinants of Health     Financial Resource Strain: Low Risk  (12/17/2023)    Overall Financial Resource Strain (CARDIA)     Difficulty of Paying Living Expenses: Not very hard   Food Insecurity: Unknown (12/18/2020)    Hunger Vital Sign     Worried About Running Out of Food in the Last Year: Patient declined     Ran Out of Food in the Last Year: Patient declined   Transportation Needs: No Transportation Needs (12/17/2023)    PRAPARE - Transportation     Lack of Transportation (Medical): No     Lack of Transportation (Non-Medical): No   Physical Activity: Unknown (7/27/2020)    Exercise Vital Sign     Days of Exercise per Week: Patient declined     Minutes of Exercise per Session: Patient declined   Stress: Not on file   Social Connections: Unknown (7/27/2020)    Social Connection and Isolation Panel [NHANES]     Frequency of Communication with Friends and Family: Patient declined     Frequency of Social Gatherings with Friends and Family: Patient declined     Attends Denominational Services: Patient declined     Active Member of Clubs or Organizations: Patient declined     Attends Club or Organization Meetings: Patient declined     Marital Status: Patient declined   Intimate Partner Violence: Not on file   Housing Stability: Not on file         Review of Systems   All other systems reviewed and are negative.        Vitals:    01/12/24 0811 01/12/24 0915 01/12/24 0916 01/12/24 0919   BP: 104/50 138/60 150/64 142/72   BP Location: Left arm      Pulse: 83 64 78 70   Resp: 20      Temp: 98.1 °F (36.7 °C)      TempSrc: Oral      SpO2: 98%      Weight:       Height:         Orthostatic Blood Pressures      Flowsheet Row Most Recent Value   Blood Pressure 142/72 filed at 01/12/2024 0919   Patient Position - Orthostatic VS Standing for 3 minutes - Orthostatic VS filed at 01/12/2024 0919          Body mass index is 31.97 kg/m².  Wt Readings from Last 5 Encounters:   01/12/24 98.2 kg (216 lb 7.9 oz)    01/11/24 117 kg (258 lb)   12/22/23 115 kg (254 lb)   12/06/23 114 kg (252 lb 3.2 oz)   12/04/23 111 kg (245 lb)     I/O last 3 completed shifts:  In: -   Out: 500 [Urine:500]      Physical Exam  Vitals and nursing note reviewed.   Constitutional:       General: He is not in acute distress.     Appearance: He is well-developed. He is obese. He is not ill-appearing.   HENT:      Head: Normocephalic and atraumatic.      Nose: No congestion.   Eyes:      General: No scleral icterus.     Conjunctiva/sclera: Conjunctivae normal.   Neck:      Vascular: No carotid bruit or JVD.   Cardiovascular:      Rate and Rhythm: Normal rate and regular rhythm.      Heart sounds: Normal heart sounds. No murmur heard.     No friction rub. No gallop.   Pulmonary:      Effort: Pulmonary effort is normal. No respiratory distress.      Breath sounds: Normal breath sounds. No wheezing or rales.   Chest:      Chest wall: No tenderness.   Abdominal:      General: There is no distension.      Palpations: Abdomen is soft.      Tenderness: There is no abdominal tenderness.   Musculoskeletal:         General: No swelling, tenderness or deformity.      Cervical back: Neck supple. No muscular tenderness.      Right lower leg: No edema.      Left lower leg: No edema.   Skin:     General: Skin is warm.   Neurological:      General: No focal deficit present.      Mental Status: He is alert and oriented to person, place, and time. Mental status is at baseline.      Comments: Slowed mentation   Psychiatric:         Mood and Affect: Mood normal.         Behavior: Behavior normal.         Thought Content: Thought content normal.         Labs:  Results from last 7 days   Lab Units 01/12/24  0438 01/11/24  1149   WBC Thousand/uL 6.88 6.11   HEMOGLOBIN g/dL 8.8* 9.0*   HEMATOCRIT % 28.1* 28.7*   RDW % 13.8 13.8   PLATELETS Thousands/uL 267 260     Results from last 7 days   Lab Units 01/12/24  0438 01/11/24  1149   POTASSIUM mmol/L 4.4 4.4   CHLORIDE mmol/L  108 107   CO2 mmol/L 22 23   MAGNESIUM mg/dL 1.7*  --    BUN mg/dL 54* 51*   CREATININE mg/dL 3.79* 3.66*   CALCIUM mg/dL 8.4 8.4   AST U/L  --  10*   ALT U/L  --  10   ALK PHOS U/L  --  91                         Imaging: XR chest portable    Result Date: 2024  Narrative: CHEST INDICATION:   syncope. COMPARISON: 1 view chest 2022 EXAM PERFORMED/VIEWS:  XR CHEST PORTABLE FINDINGS: Patient tilted towards the right. Prior CABG. Cardiac silhouette is enlarged.  Aortic calcification is present. Small right pleural effusion. Right basilar airspace disease attributed to subsegmental atelectasis. No pneumothorax or pulmonary edema. Osseous structures appear within normal limits for patient age.     Impression: Small right pleural effusion. Minimal right basilar airspace disease attributed to subsegmental atelectasis. Correlate with clinical findings to exclude pneumonia and/or aspiration. Workstation performed: [id number]       Cardiac testing:   Results for orders placed during the hospital encounter of 21    Echo complete with contrast if indicated    Narrative  [facility]  [address] [place] [address]  [phone]    Transthoracic Echocardiogram  2D, M-mode, Doppler, and Color Doppler    Study date:  23-Mar-2021    Patient: BROOK COTA  MR number: [id number]  Account number: [id number]  : 1945  Age: 75 years  Gender: Male  Status: Outpatient  Location: [facility]  Height: 71 in  Weight: 279.4 lb  BP: 118/ 80 mmHg    Indications: Assess congestive heart failure.    Diagnoses: I50.9 - Heart failure, unspecified    Sonographer:  LISA Haile  Primary Physician:  Ale Coon MD  Referring Physician:  ELEAZAR Russ  Group:  [facility]  Interpreting Physician:  Lewis Martin MD    SUMMARY    LEFT VENTRICLE:  Systolic function was normal by visual assessment. Ejection fraction was estimated to be 55  %.  Although no diagnostic regional wall motion abnormality was identified, this possibility cannot be completely excluded on the basis of this study.  Wall thickness was markedly increased.  Doppler parameters were consistent with abnormal left ventricular relaxation (grade 1 diastolic dysfunction).    MITRAL VALVE:  There was moderate annular calcification.  There was mild regurgitation.    AORTIC VALVE:  Transaortic velocity was increased due to increased transvalvular flow.    TRICUSPID VALVE:  There was mild regurgitation.    PULMONIC VALVE:  There was trace regurgitation.    AORTA:  There was mild dilatation of the ascending aorta.    HISTORY: PRIOR HISTORY: Heart failure, CAD, NSTEMI s/p CABG, DMt2, Noncompliance    PROCEDURE: The study was performed in the [facility]. This was a routine study. The transthoracic approach was used. The study included complete 2D imaging, M-mode, complete spectral Doppler, and color Doppler. The  heart rate was 75 bpm, at the start of the study. Images were obtained from the parasternal, apical, subcostal, and suprasternal notch acoustic windows. Image quality was adequate.    LEFT VENTRICLE: Size was normal. Systolic function was normal by visual assessment. Ejection fraction was estimated to be 55 %. Although no diagnostic regional wall motion abnormality was identified, this possibility cannot be completely  excluded on the basis of this study. Wall thickness was markedly increased. DOPPLER: There was an increased relative contribution of atrial contraction to ventricular filling. Doppler parameters were consistent with abnormal left  ventricular relaxation (grade 1 diastolic dysfunction).    RIGHT VENTRICLE: The size was normal. Systolic function was normal. DOPPLER: Systolic pressure was within the normal range. Estimated peak pressure was 27 mmHg.    LEFT ATRIUM: Size was normal.    RIGHT ATRIUM: Size was normal.    MITRAL VALVE: There was moderate  annular calcification. Valve structure was normal. There was mild thickening. There was normal leaflet separation. DOPPLER: The transmitral velocity was within the normal range. There was no evidence for  stenosis. There was mild regurgitation.    AORTIC VALVE: The valve was probably trileaflet. Leaflets exhibited normal thickness, mild to moderate calcification, and normal cuspal separation. DOPPLER: Transaortic velocity was increased due to increased transvalvular flow. There was  no evidence for stenosis. There was no regurgitation.    TRICUSPID VALVE: The valve structure was normal. There was normal leaflet separation. DOPPLER: The transtricuspid velocity was within the normal range. There was no evidence for stenosis. There was mild regurgitation.    PULMONIC VALVE: Leaflets exhibited normal thickness, no calcification, and normal cuspal separation. DOPPLER: The transpulmonic velocity was within the normal range. There was trace regurgitation.    PERICARDIUM: There was no pericardial effusion.    AORTA: The root exhibited normal size. There was mild dilatation of the ascending aorta.    SYSTEMIC VEINS: IVC: The inferior vena cava was normal in size and course. Respirophasic changes were normal.    SYSTEM MEASUREMENT TABLES    2D  %FS: 28.99 %  Ao Diam: 3.6 cm  Ao asc: 4.1 cm  EDV(Teich): 72.68 ml  EF Biplane: 40 %  EF(Teich): 56.22 %  ESV(Teich): 31.82 ml  HR_2Ch_Q: 75.13 BPM  HR_4Ch_Q: 58.35 BPM  IVSd: 1.58 cm  LA Area: 15.86 cm2  LA Diam: 4.2 cm  LVCO_2Ch_Q: 6 L/min  LVCO_4Ch_Q: 4.03 L/min  LVCO_BiP_Q: 5.01 L/min  LVEDV MOD A2C: 107.2 ml  LVEDV MOD A4C: 110.87 ml  LVEDV MOD BP: 110.33 ml  LVEDVInd MOD BP: 45.41 ml/m2  LVEF MOD A2C: 46.64 %  LVEF MOD A4C: 35.88 %  LVEF_2Ch_Q: 45.18 %  LVEF_4Ch_Q: 48.5 %  LVEF_BiP_Q: 47.38 %  LVESV MOD A2C: 57.21 ml  LVESV MOD A4C: 71.09 ml  LVESV MOD BP: 66.2 ml  LVESVInd MOD BP: 27.24 ml/m2  LVIDd: 4.06 cm  LVIDs: 2.89 cm  LVLd A2C: 9.94 cm  LVLd A4C: 10.15 cm  LVLd_2Ch_Q:  9.99 cm  LVLd_4Ch_Q: 10.38 cm  LVLs A2C: 8.18 cm  LVLs A4C: 9.31 cm  LVLs_2Ch_Q: 9.31 cm  LVLs_4Ch_Q: 9.63 cm  LVPWd: 1.5 cm  LVSV_2Ch_Q: 79.87 ml  LVSV_4Ch_Q: 69 ml  LVSV_BiP_Q: 76.16 ml  LVVED_2Ch_Q: 176.77 ml  LVVED_4Ch_Q: 142.25 ml  LVVED_BiP_Q: 160.75 ml  LVVES_2Ch_Q: 96.9 ml  LVVES_4Ch_Q: 73.25 ml  LVVES_BiP_Q: 84.59 ml  RA Area: 13.82 cm2  RVIDd: 3.28 cm  SV MOD A2C: 50 ml  SV MOD A4C: 39.78 ml  SV(Teich): 40.86 ml    CW  TR MaxP.49 mmHg  TR Vmax: 2.42 m/s    MM  TAPSE: 2.41 cm    PW  E' Sept: 0.06 m/s  E/E' Sept: 18.92  LVOT Env.Ti: 281.64 ms  LVOT VTI: 19.74 cm  LVOT Vmax: 1.04 m/s  LVOT Vmean: 0.7 m/s  LVOT maxP.34 mmHg  LVOT meanP.34 mmHg  MV A Nick: 1.05 m/s  MV Dec [place]: 4.32 m/s2  MV DecT: 250.42 ms  MV E Nick: 1.05 m/s  MV E/A Ratio: 1    [facility] Accredited Echocardiography Laboratory    Prepared and electronically signed by    Lewis Martin MD  Signed 23-Mar-2021 11:06:22    No results found for this or any previous visit.    No results found for this or any previous visit.    No results found for this or any previous visit.

## 2024-01-12 NOTE — PHYSICAL THERAPY NOTE
PHYSICAL THERAPY Evaluation and Treatment  DATE: 01/12/24  TIME: 0930-0957    NAME:  Michael Reynolds Jr.  AGE:   78 y.o.  Mrn:   [id number]  Length Of Stay: 0    ADMIT DX:  Syncope [R55]    Past Medical History:   Diagnosis Date    CHF (congestive heart failure) (HCC)     Chronic kidney disease 18 - 24 months?    Dr Patterson - stage 3    Colon polyp     CPAP (continuous positive airway pressure) dependence     Diabetes mellitus (HCC)     History of transfusion     Hyperlipidemia     Hypertension     Myocardial infarction (HCC) 06/2019    Open heart surgery with quad bypass    Obesity     Renal disorder     Sleep apnea     Visual impairment 1991    Dr Nickie Peters     Past Surgical History:   Procedure Laterality Date    APPENDECTOMY  1977    Done in conjunction with cholecystectomy    BACK SURGERY      CATARACT EXTRACTION, BILATERAL Bilateral     Left eye- 10/23 Right eye 9/23    CHOLECYSTECTOMY  1977    COLONOSCOPY      CORONARY ARTERY BYPASS GRAFT  2019    quad    EGD      GALLBLADDER SURGERY      HERNIA REPAIR      ME CORONARY ARTERY BYP W/VEIN & ARTERY GRAFT 4 VEIN N/A 06/21/2019    Procedure: CORONARY ARTERY BYPASS GRAFT (CABG) 4 VESSELS WITH SVG TO PDA, OM, DIAGONAL AND LIMA TO LAD; RIGHT LEG EVH;  Surgeon: James Fang MD;  Location: [facility];  Service: Cardiac Surgery    RECTAL SURGERY      SPINE SURGERY  2012    Fusion L3-L5?    TONSILLECTOMY          01/12/24 0930   PT Last Visit   PT Visit Date 01/12/24   Note Type   Note type Evaluation   Pain Assessment   Pain Assessment Tool 0-10   Pain Score No Pain   Restrictions/Precautions   Weight Bearing Precautions Per Order No   Other Precautions Cognitive;Chair Alarm;Bed Alarm;Fall Risk;Pain   Home Living   Additional Comments Pt is a questionable historian.  Pt lives with wife in 2-level house+basement, 3 steps to enter with rails. Bed/bathroom: walk-in shower, standard toilet.  DME: shower chair (doesn't use), RW and cane (doesn't use).   Prior  Function   Comments Pt is a questionable historian.  Prior to admission: Pt is independent with ADLs, performs simple meal prep/chores, assist with grocery shopping.  Wife assist with most IADLs, transporation.  Pt reports no recent fall hx.  Work: retired as owner of a construction company.   General   Additional Pertinent History 77 y/o presents due to dizziness/presyncopal episodes, generalized weakness, nausea.  Upon assessment: elevated troponin, hyperglycemia.   Family/Caregiver Present No   Cognition   Overall Cognitive Status Impaired   Arousal/Participation Lethargic   Orientation Level Oriented to person;Oriented to place;Oriented to situation   Memory   (questionable memory issues)   Subjective   Subjective Pt denies any complaints and expresses he is ready to return home   RUE Assessment   RUE Assessment WFL   LUE Assessment   LUE Assessment WFL   RLE Assessment   RLE Assessment   (hip 3+/5 grossly. knee and ankle 4/5)   LLE Assessment   LLE Assessment   (hip 3+/5 grossly. knee and ankle 4/5)   Coordination   Movements are Fluid and Coordinated 0   Coordination and Movement Description Pt exhibits difficulty with mobility due to trunk weakness and difficulty sequencing.  Increased instablity and poor step coordination due to trunk/hip weakness and gait deviations.   Sensation WFL   Bed Mobility   Rolling L 4  Minimal assistance   Additional items Increased time required;Verbal cues  (flat bed)   Supine to Sit 4  Minimal assistance   Additional items Increased time required;Verbal cues;Impulsive  (HHA from flat bed)   Transfers   Sit to Stand 5  Supervision   Additional items Increased time required;Impulsive;Verbal cues  (cues for safety and close SBA)   Stand to Sit 5  Supervision   Additional items Increased time required;Impulsive;Verbal cues  (close SBA for safety)   Ambulation/Elevation   Gait Assistance 4  Minimal assist   Additional items Verbal cues   Assistive Device None   Distance 60'    Ambulation/Elevation Additional Comments Pt ambualtes with externally rotated hip positioning, wide SHANTEL, short shuffled steps, decreased hip rotation and arm swing.  Pt exhibits instabiltiy and path deviation when ambualting, displayed significant LOB when turning, requiring assist to correct.   Balance   Static Sitting Fair +   Dynamic Sitting Fair +   Static Standing Fair   Dynamic Standing Fair   Ambulatory Fair -   Activity Tolerance   Activity Tolerance Patient tolerated treatment well   Medical Staff Made Aware discussed d/c rec with CC and physican   Nurse Made Aware Nursing aware of patient's performance and positioning   Assessment   Prognosis Fair   Problem List Decreased strength;Decreased endurance;Impaired balance;Decreased mobility;Decreased cognition;Impaired judgement;Decreased safety awareness   Assessment Orders for PT eval and treat received. Pt's PMHx: CHF, MI, CABG x4, visual impairment, back sx. Pt exhibits physical deficits noted in problem list above.  Deficits listed contribute to functional limitations that are significant from the patient PLOF and include: difficulty with bed mobility, impaired standing balance, inability to perform safe transfers, tolerance for OOB functional activities, unsafe/inefficient ambulation, and fall risk    During today's session, formal PT evaluation performed.  Pt exhibits functional limitation due to cognitive deficits, gait deviations, LE/trunk weakness, and decreased safety awareness.  These limitations put the patient at a higher risk of falling.  Unsure of pt's actual home setup/assist level at time of eval, and question pt's report.  Pt may benefit from skilled PT to improve overall strength and be better able to carry out functional activities and safe gait.     The AM-PAC & Barthel Index outcome tools were used to assist in determining pt safety w/ mobility/self care & appropriate d/c recommendations, see above for scores. Patient's clinical  presentation is evolving due to ongoing medical management needs.     Considering the patient's PLOF, co-morbidities, acute functional limitations, functional outcome measures, and/or goal to progress functional independence; this patient would benefit from skilled Physical Therapy intervention in the acute care setting.   Barriers to Discharge Decreased caregiver support   Goals   Patient Goals none reported   STG Expiration Date 01/22/24   Short Term Goal #1 1: Pt will perform rolling to either side in flat bed, independently.  2: Pt will perform sit<>supine on flat bed,  mod I.  3: Pt will perform stand pivot transfer without/LRAD, mod I.  4: Pt will ambulate 150' with LRAD, mod I. 5: Pt will perform written HEP, with cues/SBA. 6: Pt will ascend/descend stairs with AD/rail as needed to return to home setup, sup A.   Plan   Treatment/Interventions Functional transfer training;LE strengthening/ROM;Elevations;Therapeutic exercise;Cognitive reorientation;Patient/family training;Equipment eval/education;Bed mobility;Gait training   PT Frequency 3-5x/wk   Discharge Recommendation   Rehab Resource Intensity Level, PT II (Moderate Resource Intensity)   AM-PAC Basic Mobility Inpatient   Turning in Flat Bed Without Bedrails 3   Lying on Back to Sitting on Edge of Flat Bed Without Bedrails 2   Moving Bed to Chair 3   Standing Up From Chair Using Arms 3   Walk in Room 3   Climb 3-5 Stairs With Railing 2   Basic Mobility Inpatient Raw Score 16   Basic Mobility Standardized Score 38.32   Highest Level Of Mobility   JH-HLM Goal 5: Stand one or more mins   JH-HLM Achieved 7: Walk 25 feet or more   Barthel Index   Feeding 10   Bathing 0   Grooming Score 0   Dressing Score 5   Bladder Score 0   Bowels Score 0   Toilet Use Score 5   Transfers (Bed/Chair) Score 5   Mobility (Level Surface) Score 0   Stairs Score 5   Barthel Index Score 30   Additional Treatment Session   Start Time 0947   End Time 0957   Treatment Assessment  Educated and instructed pt on safe transfers from flat bed.  Extra time for patient to gain standing balance/stability.  Provided gait training cues.  Poor carry over with instructions.   End of Consult   Patient Position at End of Consult Bedside chair;Bed/Chair alarm activated;All needs within reach   The patient's AM-PAC Basic Mobility Inpatient Short Form Raw Score is 16. A Raw score of greater than or equal to 16 suggests the patient may benefit from discharge to home. Please also refer to the recommendation of the Physical Therapist for safe discharge planning.    Juventino Gregory, PT, DPT  [place] Licensure #[id number]

## 2024-01-13 LAB
ANION GAP SERPL CALCULATED.3IONS-SCNC: 7 MMOL/L
BASOPHILS # BLD AUTO: 0.02 THOUSANDS/ÂΜL (ref 0–0.1)
BASOPHILS NFR BLD AUTO: 0 % (ref 0–1)
BUN SERPL-MCNC: 59 MG/DL (ref 5–25)
CALCIUM SERPL-MCNC: 8.4 MG/DL (ref 8.4–10.2)
CHLORIDE SERPL-SCNC: 107 MMOL/L (ref 96–108)
CO2 SERPL-SCNC: 22 MMOL/L (ref 21–32)
CREAT SERPL-MCNC: 3.62 MG/DL (ref 0.6–1.3)
EOSINOPHIL # BLD AUTO: 0.51 THOUSAND/ÂΜL (ref 0–0.61)
EOSINOPHIL NFR BLD AUTO: 8 % (ref 0–6)
ERYTHROCYTE [DISTWIDTH] IN BLOOD BY AUTOMATED COUNT: 13.8 % (ref 11.6–15.1)
GFR SERPL CREATININE-BSD FRML MDRD: 15 ML/MIN/1.73SQ M
GLUCOSE SERPL-MCNC: 145 MG/DL (ref 65–140)
GLUCOSE SERPL-MCNC: 169 MG/DL (ref 65–140)
GLUCOSE SERPL-MCNC: 208 MG/DL (ref 65–140)
GLUCOSE SERPL-MCNC: 229 MG/DL (ref 65–140)
GLUCOSE SERPL-MCNC: 232 MG/DL (ref 65–140)
HCT VFR BLD AUTO: 27 % (ref 36.5–49.3)
HGB BLD-MCNC: 8.6 G/DL (ref 12–17)
IMM GRANULOCYTES # BLD AUTO: 0.04 THOUSAND/UL (ref 0–0.2)
IMM GRANULOCYTES NFR BLD AUTO: 1 % (ref 0–2)
LYMPHOCYTES # BLD AUTO: 0.88 THOUSANDS/ÂΜL (ref 0.6–4.47)
LYMPHOCYTES NFR BLD AUTO: 15 % (ref 14–44)
MAGNESIUM SERPL-MCNC: 2.1 MG/DL (ref 1.9–2.7)
MCH RBC QN AUTO: 29.4 PG (ref 26.8–34.3)
MCHC RBC AUTO-ENTMCNC: 31.9 G/DL (ref 31.4–37.4)
MCV RBC AUTO: 92 FL (ref 82–98)
MONOCYTES # BLD AUTO: 0.63 THOUSAND/ÂΜL (ref 0.17–1.22)
MONOCYTES NFR BLD AUTO: 10 % (ref 4–12)
NEUTROPHILS # BLD AUTO: 3.99 THOUSANDS/ÂΜL (ref 1.85–7.62)
NEUTS SEG NFR BLD AUTO: 66 % (ref 43–75)
NRBC BLD AUTO-RTO: 0 /100 WBCS
PHOSPHATE SERPL-MCNC: 3.4 MG/DL (ref 2.3–4.1)
PLATELET # BLD AUTO: 251 THOUSANDS/UL (ref 149–390)
PMV BLD AUTO: 11.6 FL (ref 8.9–12.7)
POTASSIUM SERPL-SCNC: 4.2 MMOL/L (ref 3.5–5.3)
RBC # BLD AUTO: 2.93 MILLION/UL (ref 3.88–5.62)
SODIUM SERPL-SCNC: 136 MMOL/L (ref 135–147)
WBC # BLD AUTO: 6.07 THOUSAND/UL (ref 4.31–10.16)

## 2024-01-13 PROCEDURE — 84100 ASSAY OF PHOSPHORUS: CPT | Performed by: INTERNAL MEDICINE

## 2024-01-13 PROCEDURE — 99232 SBSQ HOSP IP/OBS MODERATE 35: CPT | Performed by: INTERNAL MEDICINE

## 2024-01-13 PROCEDURE — 83735 ASSAY OF MAGNESIUM: CPT | Performed by: INTERNAL MEDICINE

## 2024-01-13 PROCEDURE — 82948 REAGENT STRIP/BLOOD GLUCOSE: CPT

## 2024-01-13 PROCEDURE — 85025 COMPLETE CBC W/AUTO DIFF WBC: CPT | Performed by: INTERNAL MEDICINE

## 2024-01-13 PROCEDURE — 80048 BASIC METABOLIC PNL TOTAL CA: CPT | Performed by: INTERNAL MEDICINE

## 2024-01-13 RX ADMIN — AMLODIPINE BESYLATE 5 MG: 5 TABLET ORAL at 09:32

## 2024-01-13 RX ADMIN — INSULIN LISPRO 2 UNITS: 100 INJECTION, SOLUTION INTRAVENOUS; SUBCUTANEOUS at 09:33

## 2024-01-13 RX ADMIN — TORSEMIDE 20 MG: 20 TABLET ORAL at 09:32

## 2024-01-13 RX ADMIN — CALCIUM ACETATE 667 MG: 667 CAPSULE ORAL at 17:24

## 2024-01-13 RX ADMIN — INSULIN LISPRO 2 UNITS: 100 INJECTION, SOLUTION INTRAVENOUS; SUBCUTANEOUS at 21:48

## 2024-01-13 RX ADMIN — INSULIN LISPRO 1 UNITS: 100 INJECTION, SOLUTION INTRAVENOUS; SUBCUTANEOUS at 17:24

## 2024-01-13 RX ADMIN — FINASTERIDE 5 MG: 5 TABLET, FILM COATED ORAL at 09:32

## 2024-01-13 RX ADMIN — CYANOCOBALAMIN TAB 500 MCG 1000 MCG: 500 TAB at 09:32

## 2024-01-13 RX ADMIN — METOPROLOL TARTRATE 50 MG: 50 TABLET, FILM COATED ORAL at 09:32

## 2024-01-13 RX ADMIN — CALCIUM ACETATE 667 MG: 667 CAPSULE ORAL at 09:32

## 2024-01-13 RX ADMIN — ATORVASTATIN CALCIUM 80 MG: 40 TABLET, FILM COATED ORAL at 17:23

## 2024-01-13 RX ADMIN — METOPROLOL TARTRATE 50 MG: 50 TABLET, FILM COATED ORAL at 17:24

## 2024-01-13 RX ADMIN — PANTOPRAZOLE SODIUM 40 MG: 40 TABLET, DELAYED RELEASE ORAL at 09:32

## 2024-01-13 RX ADMIN — Medication 2000 UNITS: at 21:49

## 2024-01-13 RX ADMIN — LOSARTAN POTASSIUM 25 MG: 25 TABLET, FILM COATED ORAL at 09:32

## 2024-01-13 RX ADMIN — OXYCODONE HYDROCHLORIDE AND ACETAMINOPHEN 1000 MG: 500 TABLET ORAL at 09:32

## 2024-01-13 RX ADMIN — INSULIN GLARGINE 20 UNITS: 100 INJECTION, SOLUTION SUBCUTANEOUS at 21:48

## 2024-01-13 RX ADMIN — INSULIN LISPRO 3 UNITS: 100 INJECTION, SOLUTION INTRAVENOUS; SUBCUTANEOUS at 12:09

## 2024-01-13 RX ADMIN — LATANOPROST 1 DROP: 50 SOLUTION OPHTHALMIC at 21:48

## 2024-01-13 NOTE — PLAN OF CARE
Problem: Potential for Falls  Goal: Patient will remain free of falls  Description: INTERVENTIONS:  - Educate patient/family on patient safety including physical limitations  - Instruct patient to call for assistance with activity   - Consult OT/PT to assist with strengthening/mobility   - Keep Call bell within reach  - Keep bed low and locked with side rails adjusted as appropriate  - Keep care items and personal belongings within reach  - Initiate and maintain comfort rounds  - Make Fall Risk Sign visible to staff  - Offer Toileting every  Hours, in advance of need  - Initiate/Maintain alarm  - Obtain necessary fall risk management equipment:   - Apply yellow socks and bracelet for high fall risk patients  - Consider moving patient to room near nurses station  1/13/2024 1812 by Jayde Bernabe RN  Outcome: Progressing  1/13/2024 1812 by Jayde Bernabe RN  Outcome: Progressing     Problem: PAIN - ADULT  Goal: Verbalizes/displays adequate comfort level or baseline comfort level  Description: Interventions:  - Encourage patient to monitor pain and request assistance  - Assess pain using appropriate pain scale  - Administer analgesics based on type and severity of pain and evaluate response  - Implement non-pharmacological measures as appropriate and evaluate response  - Consider cultural and social influences on pain and pain management  - Notify physician/advanced practitioner if interventions unsuccessful or patient reports new pain  Outcome: Progressing     Problem: INFECTION - ADULT  Goal: Absence or prevention of progression during hospitalization  Description: INTERVENTIONS:  - Assess and monitor for signs and symptoms of infection  - Monitor lab/diagnostic results  - Monitor all insertion sites, i.e. indwelling lines, tubes, and drains  - Monitor endotracheal if appropriate and nasal secretions for changes in amount and color  - Cordell appropriate cooling/warming therapies per order  - Administer  medications as ordered  - Instruct and encourage patient and family to use good hand hygiene technique  - Identify and instruct in appropriate isolation precautions for identified infection/condition  Outcome: Progressing     Problem: SAFETY ADULT  Goal: Patient will remain free of falls  Description: INTERVENTIONS:  - Educate patient/family on patient safety including physical limitations  - Instruct patient to call for assistance with activity   - Consult OT/PT to assist with strengthening/mobility   - Keep Call bell within reach  - Keep bed low and locked with side rails adjusted as appropriate  - Keep care items and personal belongings within reach  - Initiate and maintain comfort rounds  - Make Fall Risk Sign visible to staff  - Offer Toileting every  Hours, in advance of need  - Initiate/Maintain alarm  - Obtain necessary fall risk management equipment:   - Apply yellow socks and bracelet for high fall risk patients  - Consider moving patient to room near nurses station  1/13/2024 1812 by Jayde Bernabe RN  Outcome: Progressing  1/13/2024 1812 by Jayde Bernabe RN  Outcome: Progressing

## 2024-01-13 NOTE — PLAN OF CARE
Problem: Potential for Falls  Goal: Patient will remain free of falls  Description: INTERVENTIONS:  - Educate patient/family on patient safety including physical limitations  - Instruct patient to call for assistance with activity   - Consult OT/PT to assist with strengthening/mobility   - Keep Call bell within reach  - Keep bed low and locked with side rails adjusted as appropriate  - Keep care items and personal belongings within reach  - Initiate and maintain comfort rounds  - Offer Toileting every 2 Hours, in advance of need  - Initiate/Maintain bed alarm  - Apply yellow socks and bracelet for high fall risk patients  - Consider moving patient to room near nurses station  1/13/2024 0133 by Danielle Aguilar RN  Outcome: Progressing  1/13/2024 0132 by Danielle Aguilar RN  Outcome: Progressing     Problem: SAFETY ADULT  Goal: Patient will remain free of falls  Description: INTERVENTIONS:  - Educate patient/family on patient safety including physical limitations  - Instruct patient to call for assistance with activity   - Consult OT/PT to assist with strengthening/mobility   - Keep Call bell within reach  - Keep bed low and locked with side rails adjusted as appropriate  - Keep care items and personal belongings within reach  - Initiate and maintain comfort rounds  - Apply yellow socks and bracelet for high fall risk patients  - Consider moving patient to room near nurses station  1/13/2024 0133 by Danielle Aguilar RN  Outcome: Progressing  1/13/2024 0132 by Danielle Aguilar RN  Outcome: Progressing     Problem: CARDIOVASCULAR - ADULT  Goal: Maintains optimal cardiac output and hemodynamic stability  Description: INTERVENTIONS:  - Monitor I/O, vital signs and rhythm  - Monitor for S/S and trends of decreased cardiac output  - Administer and titrate ordered vasoactive medications to optimize hemodynamic stability  - Assess quality of pulses, skin color and temperature  - Assess for signs of decreased coronary artery  perfusion  - Instruct patient to report change in severity of symptoms  Outcome: Progressing  Goal: Absence of cardiac dysrhythmias or at baseline rhythm  Description: INTERVENTIONS:  - Continuous cardiac monitoring, vital signs, obtain 12 lead EKG if ordered  - Administer antiarrhythmic and heart rate control medications as ordered  - Monitor electrolytes and administer replacement therapy as ordered  Outcome: Progressing     Problem: HEMATOLOGIC - ADULT  Goal: Maintains hematologic stability  Description: INTERVENTIONS  - Assess for signs and symptoms of bleeding or hemorrhage  - Monitor labs  - Administer supportive blood products/factors as ordered and appropriate  Outcome: Progressing

## 2024-01-13 NOTE — ASSESSMENT & PLAN NOTE
Baseline creatinine of approximately 3.3-3.7 -> currently stable at 3.62  Monitor renal function and urine output - limit/avoid nephrotoxins and hypotension as possible  Note chronic Demadex/Cozaar use

## 2024-01-13 NOTE — PROGRESS NOTES
"[facility]  Progress Note  Name: Michael Sanchez I  MRN: [id number]  Unit/Bed#: -01 I Date of Admission: 1/11/2024   Date of Service: 1/13/2024 I Hospital Day: 1      Assessment & Plan:    * Postural dizziness with presyncope  Assessment & Plan  Presyncopal type episode in outpatient medical office while awaiting scheduling a follow-up appointment after visit  Patient reports a similar episode a few years ago while \"shopping\"   Currently denies any further episodes since presentation to the hospital -> denies current dizziness, blurry vision, or chest discomfort  Known history of CAD status post CABG  Telemetry monitoring - await cardiology input  Echocardiogram normal EF, without regional LV wall motion abnormalities, and only mild valvular disease  Attics negative - hold Flomax  Limit/avoid sedating medications as possible  Consider vasovagal etiology  Vital signs, blood sugar, and electrolytes sufficient   Urinalysis negative for infection  CXR noting some atelectasis -> remains afebrile without leukocytosis and no evidence of difficulty consuming oral meals -> less likely suspicion of aspiration/pneumonia  If episode recurs, consider CT imaging of head  Appreciate PT/OT input -> patient now agreeable to skilled rehab, awaiting placement    Insulin dependent diabetes mellitus (HCC)  Assessment & Plan  Lab Results   Component Value Date    HGBA1C 8.1 (H) 12/30/2023     Continue basal insulin with additional SSI coverage per Accu-Cheks  Carbohydrate restriction  Hypoglycemia protocol    Elevated troponin  Assessment & Plan  Initial troponin elevated at 59 with subsequent downtrend  Suspect a non-MI troponin elevation in the setting of late stage CKD  In light of presenting presyncopal episode and history of CAD, cardiology to follow    CKD (chronic kidney disease), stage IV (HCC)  Assessment & Plan  Baseline creatinine of approximately 3.3-3.7 -> currently stable at 3.62  Monitor " renal function and urine output - limit/avoid nephrotoxins and hypotension as possible  Note chronic Demadex/Cozaar use    Coronary artery disease  Assessment & Plan  Status post CABG  Continue ASA/statin/Cozaar/Lopressor    Morbid obesity (HCC)  Assessment & Plan  Lifestyle/diet modifications    HARJIT (obstructive sleep apnea)  Assessment & Plan  Maintain oxygenation  Encourage weight loss    Anemia of chronic disease  Assessment & Plan  H/H stable  Continue B12 supplementation    Essential hypertension  Assessment & Plan  Continue Lopressor/Norvasc/Cozaar/Demadex - additional PRN IV Hydralazine on board for BP spikes  Low-sodium restriction    BPH (benign prostatic hyperplasia)  Assessment & Plan  Discontinued Flomax in the setting of primary issue  Continue Proscar    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HCC)  Assessment & Plan  Known prior history of 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Outpatient follow-up    Hypomagnesemia  Assessment & Plan  Monitor/replete serum potassium/magnesium as necessary      DVT Prophylaxis:  Heparin SC    AM-PAC Basic Mobility:  Basic Mobility Inpatient Raw Score: 16  -[facility] Achieved: 2: Bed activities/Dependent transfer  -[facility] Goal: 5: Stand one or more mins    Patient Centered Rounds:  I have performed bedside rounds and discussed plan of care with nursing today.  Discussions with Specialists or Other Care Team Provider:  see above assessments if applicable    Education and Discussions with Family / Patient:  Patient at bedside, along with wife, over the phone today    Time Spent for Care:  35 minutes. More than 50% of total time spent on counseling and coordination of care, on one or more of the following: performing physical exam; counseling and coordination of care, obtaining or reviewing history, documenting in the medical record, reviewing/ordering tests/medications/procedures, and communicating with other healthcare professionals.    Current Length of Stay: 1 day(s)  Current Patient Status: Inpatient    Certification Statement:  Patient will continue to require additional hospital stay due to assessments as noted above.    Code Status: Level 1 - Full Code        Subjective:     Encountered earlier in the day.  No new complaints this time.  Denies any further syncopal episodes.        Objective:     Vitals:   Temp (24hrs), Av.2 °F (36.8 °C), Min:98.1 °F (36.7 °C), Max:98.4 °F (36.9 °C)    Temp:  [98.1 °F (36.7 °C)-98.4 °F (36.9 °C)] 98.2 °F (36.8 °C)  HR:  [58-69] 62  Resp:  [18-19] 19  BP: (133-183)/(67-83) 133/83  SpO2:  [97 %-98 %] 98 %  Body mass index is 31.9 kg/m².     Input and Output Summary (last 24 hours):       Intake/Output Summary (Last 24 hours) at 2024 1135  Last data filed at 2024 0446  Gross per 24 hour   Intake 400 ml   Output 900 ml   Net -500 ml       Physical Exam:     GENERAL   Well-developed/nourished - no acute distress   HEAD   Normocephalic - atraumatic   EYES Nonicteric   MOUTH   Mucosa moist   NECK   Supple - full range of motion   CARDIAC   Regular rate/rhythm - S1/S2 positive   PULMONARY    Clear breath sounds bilaterally - nonlabored respirations   ABDOMEN   Soft - nontender/nondistended - active bowel sounds   MUSCULOSKELETAL   Motor strength/range of motion deconditioned   NEUROLOGIC Mild cognitive impairment present   SKIN   Chronic wrinkles/blemishes    PSYCHIATRIC   Mood/affect somewhat flat         Additional Data:     Labs & Recent Cultures:    Results from last 7 days   Lab Units 24  0446   WBC Thousand/uL 6.07   HEMOGLOBIN g/dL 8.6*   HEMATOCRIT % 27.0*   PLATELETS Thousands/uL 251   NEUTROS PCT % 66   LYMPHS PCT % 15   MONOS PCT % 10   EOS PCT % 8*     Results from last 7 days   Lab Units 24  0446 24  0438 24  1149   POTASSIUM mmol/L 4.2   < > 4.4   CHLORIDE mmol/L 107   < > 107   CO2 mmol/L 22   < > 23   BUN mg/dL 59*   < > 51*   CREATININE mg/dL 3.62*   < > 3.66*   CALCIUM mg/dL 8.4   < > 8.4   ALK PHOS U/L  --   --  91   ALT U/L  --    --  10   AST U/L  --   --  10*    < > = values in this interval not displayed.         Results from last 7 days   Lab Units 01/13/24  0721 01/12/24  2058 01/12/24  1626 01/12/24  1133 01/12/24  0715 01/11/24  2126 01/11/24  1658 01/11/24  1130   POC GLUCOSE mg/dl 208* 216* 203* 195* 131 158* 226* 175*                         Lines/Drains:  Invasive Devices       Peripheral Intravenous Line  Duration             Peripheral IV 01/11/24 Left Antecubital 1 day                      Last 24 Hours Medication List:   Current Facility-Administered Medications   Medication Dose Route Frequency Provider Last Rate    acetaminophen  650 mg Oral Q6H PRN Malissa Cornell MD      amLODIPine  5 mg Oral Daily Malissa Cornell MD      vitamin C  1,000 mg Oral Daily Malissa Cornell MD      atorvastatin  80 mg Oral Daily With Dinner Malissa Cornell MD      calcitriol  0.25 mcg Oral Once per day on Monday Wednesday Friday Malissa Cornell MD      calcium acetate  667 mg Oral BID With Meals Malissa Cornell MD      cholecalciferol  2,000 Units Oral HS Malissa Cornell MD      vitamin B-12  1,000 mcg Oral Daily Malissa Cornell MD      finasteride  5 mg Oral Daily Malissa Cornell MD      hydrALAZINE  5 mg Intravenous Q6H PRN Malissa Cornell MD      insulin glargine  20 Units Subcutaneous HS Malissa Cornell MD      insulin lispro  1-6 Units Subcutaneous 4x Daily (AC & HS) Malissa Cornell MD      latanoprost  1 drop Both Eyes HS Malissa Cornell MD      losartan  25 mg Oral Daily Malissa Cornell MD      metoprolol tartrate  50 mg Oral BID Malissa Cornell MD      pantoprazole  40 mg Oral Daily Malissa Cornell MD      sodium chloride (PF)  3 mL Intravenous Q1H PRN Neeraj Cosby DO      torsemide  20 mg Oral Daily MD MALISSA Wilson MD   Hospitalist - [facility]        ** Please Note: This note is constructed using a voice recognition dictation system.  An occasional wrong word/phrase or “sound-a-like” substitution may have been  picked up by dictation device due to the inherent limitations of voice recognition software.  Read the chart carefully and recognize, using reasonable context, where substitutions may have occurred.**

## 2024-01-13 NOTE — UTILIZATION REVIEW
Continued Stay Review    Date: 1-12-24                           Current Patient Class: inpatient Current Level of Care: med surg    HPI:78 y.o. male initially admitted on 1- 12-24      Assessment/Plan:     Cardiology consult completed.  Telemetry nsr.  Echo with ef 60%. Aspirin discontinued. OT evaluation completed. Moderate rehab needs identified.  spoke with wife who states she is unable to care for him at home due to his non compliance and physical limitations. Patient has refused inpatient rehab and prefers home health care.       Date: 1-13-24     Day 3: Has surpassed a 2nd midnight with active treatments and services, which include safe discharge planning, MAG, PHOS, CBC/DIFF AND BMP, telemetry, neuro checks. See initial review completed by Nae Gomez RN  on 1-12-24     .    Vital Signs:     Patient Vitals for the past 24 hrs:   BP Temp Temp src Pulse Resp SpO2   01/13/24 0847 133/83 98.2 °F (36.8 °C) Oral 62 -- 98 %   01/13/24 0500 146/77 98.1 °F (36.7 °C) Oral 60 19 --   01/12/24 2214 146/69 98.2 °F (36.8 °C) Oral 69 18 97 %   01/12/24 1924 (!) 183/83 98.3 °F (36.8 °C) Oral 67 18 98 %   01/12/24 1433 135/67 98.4 °F (36.9 °C) Oral 58 18 98 %        Pertinent Labs/Diagnostic Results:     ECHO  1-12-24       Left Ventricle: Left ventricular cavity size is normal. Wall thickness is moderately increased. There is moderate concentric hypertrophy. The left ventricular ejection fraction is 60%. Systolic function is normal. Wall motion is normal. Diastolic function is moderately abnormal, consistent with grade II (pseudonormal) relaxation.    Aortic Valve: There is mild stenosis. The aortic valve peak velocity is 1.8 m/s. The aortic valve area is 1.60 cm2. The aortic valve velocity is increased due to stenosis but lower than expected due to the presence of decreased flow.    Mitral Valve: There is moderate annular calcification. There is mild to moderate regurgitation.    Tricuspid Valve:  There is mild regurgitation. The right ventricular systolic pressure is mildly to moderately elevated. The estimated right ventricular systolic pressure is 45.00 mmHg.    Aorta: The aortic root is normal in size. The ascending aorta is mildly dilated. The aortic root is 3.60 cm. The ascending aorta is 4.0 cm (1/9 cm/m2).          Results from last 7 days   Lab Units 01/13/24  0446 01/12/24  0438 01/11/24  1149   WBC Thousand/uL 6.07 6.88 6.11   HEMOGLOBIN g/dL 8.6* 8.8* 9.0*   HEMATOCRIT % 27.0* 28.1* 28.7*   PLATELETS Thousands/uL 251 267 260   NEUTROS ABS Thousands/µL 3.99 5.03 4.49         Results from last 7 days   Lab Units 01/13/24  0446 01/12/24  0438 01/11/24  1149   SODIUM mmol/L 136 138 137   POTASSIUM mmol/L 4.2 4.4 4.4   CHLORIDE mmol/L 107 108 107   CO2 mmol/L 22 22 23   ANION GAP mmol/L 7 8 7   BUN mg/dL 59* 54* 51*   CREATININE mg/dL 3.62* 3.79* 3.66*   EGFR ml/min/1.73sq m 15 14 15   CALCIUM mg/dL 8.4 8.4 8.4   MAGNESIUM mg/dL 2.1 1.7*  --    PHOSPHORUS mg/dL 3.4 3.2  --      Results from last 7 days   Lab Units 01/11/24  1149   AST U/L 10*   ALT U/L 10   ALK PHOS U/L 91   TOTAL PROTEIN g/dL 6.2*   ALBUMIN g/dL 3.0*   TOTAL BILIRUBIN mg/dL 0.41     Results from last 7 days   Lab Units 01/13/24  0721 01/12/24 2058 01/12/24  1626 01/12/24  1133 01/12/24  0715 01/11/24  2126 01/11/24  1658 01/11/24  1130   POC GLUCOSE mg/dl 208* 216* 203* 195* 131 158* 226* 175*     Results from last 7 days   Lab Units 01/13/24  0446 01/12/24  0438 01/11/24  1149   GLUCOSE RANDOM mg/dL 145* 126 179*             BETA-HYDROXYBUTYRATE   Date Value Ref Range Status   10/19/2021 0.6 (H) <0.6 mmol/L Final            Results from last 7 days   Lab Units 01/11/24  1619 01/11/24  1335 01/11/24  1149   HS TNI 0HR ng/L  --   --  59*   HS TNI 2HR ng/L  --  54*  --    HSTNI D2 ng/L  --  -5  --    HS TNI 4HR ng/L 52*  --   --    HSTNI D4 ng/L -7  --   --        Results from last 7 days   Lab Units 01/11/24  1149   BNP pg/mL 470*      Results from last 7 days   Lab Units 01/12/24  0008   CLARITY UA  Clear   COLOR UA  Yellow   SPEC GRAV UA  1.020   PH UA  6.0   GLUCOSE UA mg/dl 2+*   KETONES UA mg/dl Negative   BLOOD UA  Trace-Intact*   PROTEIN UA mg/dl 3+*   NITRITE UA  Negative   BILIRUBIN UA  Negative   UROBILINOGEN UA E.U./dl 0.2   LEUKOCYTES UA  Negative   WBC UA /hpf 0-5   RBC UA /hpf 0-5   BACTERIA UA /hpf Occasional   EPITHELIAL CELLS WET PREP /hpf Occasional   MUCUS THREADS  Occasional*     Scheduled Medications:  amLODIPine, 5 mg, Oral, Daily  vitamin C, 1,000 mg, Oral, Daily  atorvastatin, 80 mg, Oral, Daily With Dinner  calcitriol, 0.25 mcg, Oral, Once per day on Monday Wednesday Friday  calcium acetate, 667 mg, Oral, BID With Meals  cholecalciferol, 2,000 Units, Oral, HS  vitamin B-12, 1,000 mcg, Oral, Daily  finasteride, 5 mg, Oral, Daily  insulin glargine, 20 Units, Subcutaneous, HS  insulin lispro, 1-6 Units, Subcutaneous, 4x Daily (AC & HS)  latanoprost, 1 drop, Both Eyes, HS  losartan, 25 mg, Oral, Daily  metoprolol tartrate, 50 mg, Oral, BID  pantoprazole, 40 mg, Oral, Daily  torsemide, 20 mg, Oral, Daily      Continuous IV Infusions:     PRN Meds:  acetaminophen, 650 mg, Oral, Q6H PRN  hydrALAZINE, 5 mg, Intravenous, Q6H PRN  sodium chloride (PF), 3 mL, Intravenous, Q1H PRN        Discharge Plan: to be determined.      Network Utilization Review Department  ATTENTION: Please call with any questions or concerns to [phone] and carefully listen to the prompts so that you are directed to the right person. All voicemails are confidential.   For Discharge needs, contact Care Management DC Support Team at [phone] opt. [phone]  Send all requests for admission clinical reviews, approved or denied determinations and any other requests to dedicated fax number below belonging to the campus where the patient is receiving treatment. List of dedicated fax numbers for the Facilities:  FACILITY NAME UR FAX NUMBER   ADMISSION DENIALS  (Administrative/Medical Necessity) [phone]   DISCHARGE SUPPORT TEAM (NETWORK) [phone]   PARENT CHILD HEALTH (Maternity/NICU/Pediatrics) [phone]   [facility] [phone]   [facility] [phone]   Critical access hospital [phone]   [facility] [phone]   [facility] [phone]   [facility] [phone]   [facility] [phone]   [facility] [phone]   [facility] [phone]   [facility] [phone]   [facility] [phone]

## 2024-01-13 NOTE — ASSESSMENT & PLAN NOTE
"Presyncopal type episode in outpatient medical office while awaiting scheduling a follow-up appointment after visit  Patient reports a similar episode a few years ago while \"shopping\"   Currently denies any further episodes since presentation to the hospital -> denies current dizziness, blurry vision, or chest discomfort  Known history of CAD status post CABG  Telemetry monitoring - await cardiology input  Echocardiogram normal EF, without regional LV wall motion abnormalities, and only mild valvular disease  Attics negative - hold Flomax  Limit/avoid sedating medications as possible  Consider vasovagal etiology  Vital signs, blood sugar, and electrolytes sufficient   Urinalysis negative for infection  CXR noting some atelectasis -> remains afebrile without leukocytosis and no evidence of difficulty consuming oral meals -> less likely suspicion of aspiration/pneumonia  If episode recurs, consider CT imaging of head  Appreciate PT/OT input -> patient now agreeable to skilled rehab, awaiting placement  "

## 2024-01-14 ENCOUNTER — APPOINTMENT (INPATIENT)
Dept: CT IMAGING | Facility: HOSPITAL | Age: 79
DRG: 312 | End: 2024-01-14
Payer: COMMERCIAL

## 2024-01-14 DIAGNOSIS — N40.1 BPH WITH OBSTRUCTION/LOWER URINARY TRACT SYMPTOMS: ICD-10-CM

## 2024-01-14 DIAGNOSIS — N13.8 BPH WITH OBSTRUCTION/LOWER URINARY TRACT SYMPTOMS: ICD-10-CM

## 2024-01-14 LAB
ANION GAP SERPL CALCULATED.3IONS-SCNC: 8 MMOL/L
BASOPHILS # BLD AUTO: 0.04 THOUSANDS/ÂΜL (ref 0–0.1)
BASOPHILS NFR BLD AUTO: 1 % (ref 0–1)
BUN SERPL-MCNC: 62 MG/DL (ref 5–25)
CALCIUM SERPL-MCNC: 8.5 MG/DL (ref 8.4–10.2)
CHLORIDE SERPL-SCNC: 109 MMOL/L (ref 96–108)
CO2 SERPL-SCNC: 23 MMOL/L (ref 21–32)
CREAT SERPL-MCNC: 3.83 MG/DL (ref 0.6–1.3)
EOSINOPHIL # BLD AUTO: 0.69 THOUSAND/ÂΜL (ref 0–0.61)
EOSINOPHIL NFR BLD AUTO: 10 % (ref 0–6)
ERYTHROCYTE [DISTWIDTH] IN BLOOD BY AUTOMATED COUNT: 13.8 % (ref 11.6–15.1)
GFR SERPL CREATININE-BSD FRML MDRD: 14 ML/MIN/1.73SQ M
GLUCOSE SERPL-MCNC: 100 MG/DL (ref 65–140)
GLUCOSE SERPL-MCNC: 114 MG/DL (ref 65–140)
GLUCOSE SERPL-MCNC: 176 MG/DL (ref 65–140)
GLUCOSE SERPL-MCNC: 197 MG/DL (ref 65–140)
GLUCOSE SERPL-MCNC: 214 MG/DL (ref 65–140)
HCT VFR BLD AUTO: 29.2 % (ref 36.5–49.3)
HGB BLD-MCNC: 9.2 G/DL (ref 12–17)
IMM GRANULOCYTES # BLD AUTO: 0.04 THOUSAND/UL (ref 0–0.2)
IMM GRANULOCYTES NFR BLD AUTO: 1 % (ref 0–2)
LYMPHOCYTES # BLD AUTO: 1.06 THOUSANDS/ÂΜL (ref 0.6–4.47)
LYMPHOCYTES NFR BLD AUTO: 16 % (ref 14–44)
MAGNESIUM SERPL-MCNC: 2 MG/DL (ref 1.9–2.7)
MCH RBC QN AUTO: 29.3 PG (ref 26.8–34.3)
MCHC RBC AUTO-ENTMCNC: 31.5 G/DL (ref 31.4–37.4)
MCV RBC AUTO: 93 FL (ref 82–98)
MONOCYTES # BLD AUTO: 0.67 THOUSAND/ÂΜL (ref 0.17–1.22)
MONOCYTES NFR BLD AUTO: 10 % (ref 4–12)
NEUTROPHILS # BLD AUTO: 4.21 THOUSANDS/ÂΜL (ref 1.85–7.62)
NEUTS SEG NFR BLD AUTO: 62 % (ref 43–75)
NRBC BLD AUTO-RTO: 0 /100 WBCS
PHOSPHATE SERPL-MCNC: 4.2 MG/DL (ref 2.3–4.1)
PLATELET # BLD AUTO: 265 THOUSANDS/UL (ref 149–390)
PMV BLD AUTO: 11.2 FL (ref 8.9–12.7)
POTASSIUM SERPL-SCNC: 4.2 MMOL/L (ref 3.5–5.3)
RBC # BLD AUTO: 3.14 MILLION/UL (ref 3.88–5.62)
SODIUM SERPL-SCNC: 140 MMOL/L (ref 135–147)
WBC # BLD AUTO: 6.71 THOUSAND/UL (ref 4.31–10.16)

## 2024-01-14 PROCEDURE — 82948 REAGENT STRIP/BLOOD GLUCOSE: CPT

## 2024-01-14 PROCEDURE — 84100 ASSAY OF PHOSPHORUS: CPT | Performed by: INTERNAL MEDICINE

## 2024-01-14 PROCEDURE — 85025 COMPLETE CBC W/AUTO DIFF WBC: CPT | Performed by: INTERNAL MEDICINE

## 2024-01-14 PROCEDURE — 80048 BASIC METABOLIC PNL TOTAL CA: CPT | Performed by: INTERNAL MEDICINE

## 2024-01-14 PROCEDURE — 70450 CT HEAD/BRAIN W/O DYE: CPT

## 2024-01-14 PROCEDURE — 99232 SBSQ HOSP IP/OBS MODERATE 35: CPT | Performed by: INTERNAL MEDICINE

## 2024-01-14 PROCEDURE — G1004 CDSM NDSC: HCPCS

## 2024-01-14 PROCEDURE — 83735 ASSAY OF MAGNESIUM: CPT | Performed by: INTERNAL MEDICINE

## 2024-01-14 RX ADMIN — INSULIN LISPRO 2 UNITS: 100 INJECTION, SOLUTION INTRAVENOUS; SUBCUTANEOUS at 17:10

## 2024-01-14 RX ADMIN — AMLODIPINE BESYLATE 5 MG: 5 TABLET ORAL at 09:02

## 2024-01-14 RX ADMIN — Medication 2000 UNITS: at 22:23

## 2024-01-14 RX ADMIN — FINASTERIDE 5 MG: 5 TABLET, FILM COATED ORAL at 09:01

## 2024-01-14 RX ADMIN — OXYCODONE HYDROCHLORIDE AND ACETAMINOPHEN 1000 MG: 500 TABLET ORAL at 09:02

## 2024-01-14 RX ADMIN — LOSARTAN POTASSIUM 25 MG: 25 TABLET, FILM COATED ORAL at 09:02

## 2024-01-14 RX ADMIN — PANTOPRAZOLE SODIUM 40 MG: 40 TABLET, DELAYED RELEASE ORAL at 09:01

## 2024-01-14 RX ADMIN — ATORVASTATIN CALCIUM 80 MG: 40 TABLET, FILM COATED ORAL at 17:10

## 2024-01-14 RX ADMIN — CALCIUM ACETATE 667 MG: 667 CAPSULE ORAL at 17:10

## 2024-01-14 RX ADMIN — INSULIN LISPRO 1 UNITS: 100 INJECTION, SOLUTION INTRAVENOUS; SUBCUTANEOUS at 13:02

## 2024-01-14 RX ADMIN — METOPROLOL TARTRATE 50 MG: 50 TABLET, FILM COATED ORAL at 09:01

## 2024-01-14 RX ADMIN — TORSEMIDE 20 MG: 20 TABLET ORAL at 09:02

## 2024-01-14 RX ADMIN — INSULIN GLARGINE 20 UNITS: 100 INJECTION, SOLUTION SUBCUTANEOUS at 22:23

## 2024-01-14 RX ADMIN — LATANOPROST 1 DROP: 50 SOLUTION OPHTHALMIC at 22:23

## 2024-01-14 RX ADMIN — METOPROLOL TARTRATE 50 MG: 50 TABLET, FILM COATED ORAL at 17:10

## 2024-01-14 RX ADMIN — CYANOCOBALAMIN TAB 500 MCG 1000 MCG: 500 TAB at 09:02

## 2024-01-14 RX ADMIN — HYDRALAZINE HYDROCHLORIDE 5 MG: 20 INJECTION INTRAMUSCULAR; INTRAVENOUS at 11:45

## 2024-01-14 RX ADMIN — INSULIN LISPRO 2 UNITS: 100 INJECTION, SOLUTION INTRAVENOUS; SUBCUTANEOUS at 22:23

## 2024-01-14 RX ADMIN — CALCIUM ACETATE 667 MG: 667 CAPSULE ORAL at 09:02

## 2024-01-14 NOTE — PLAN OF CARE
Problem: Potential for Falls  Goal: Patient will remain free of falls  Description: INTERVENTIONS:  - Educate patient/family on patient safety including physical limitations  - Instruct patient to call for assistance with activity   - Consult OT/PT to assist with strengthening/mobility   - Keep Call bell within reach  - Keep bed low and locked with side rails adjusted as appropriate  - Keep care items and personal belongings within reach  - Initiate and maintain comfort rounds  - Make Fall Risk Sign visible to staff  - Offer Toileting every  Hours, in advance of need  - Initiate/Maintain alarm  - Obtain necessary fall risk management equipment:   - Apply yellow socks and bracelet for high fall risk patients  - Consider moving patient to room near nurses station  Outcome: Progressing     Problem: PAIN - ADULT  Goal: Verbalizes/displays adequate comfort level or baseline comfort level  Description: Interventions:  - Encourage patient to monitor pain and request assistance  - Assess pain using appropriate pain scale  - Administer analgesics based on type and severity of pain and evaluate response  - Implement non-pharmacological measures as appropriate and evaluate response  - Consider cultural and social influences on pain and pain management  - Notify physician/advanced practitioner if interventions unsuccessful or patient reports new pain  Outcome: Progressing     Problem: INFECTION - ADULT  Goal: Absence or prevention of progression during hospitalization  Description: INTERVENTIONS:  - Assess and monitor for signs and symptoms of infection  - Monitor lab/diagnostic results  - Monitor all insertion sites, i.e. indwelling lines, tubes, and drains  - Monitor endotracheal if appropriate and nasal secretions for changes in amount and color  - Murray appropriate cooling/warming therapies per order  - Administer medications as ordered  - Instruct and encourage patient and family to use good hand hygiene technique  -  Identify and instruct in appropriate isolation precautions for identified infection/condition  Outcome: Progressing     Problem: SAFETY ADULT  Goal: Patient will remain free of falls  Description: INTERVENTIONS:  - Educate patient/family on patient safety including physical limitations  - Instruct patient to call for assistance with activity   - Consult OT/PT to assist with strengthening/mobility   - Keep Call bell within reach  - Keep bed low and locked with side rails adjusted as appropriate  - Keep care items and personal belongings within reach  - Initiate and maintain comfort rounds  - Make Fall Risk Sign visible to staff  - Offer Toileting every  Hours, in advance of need  - Initiate/Maintain alarm  - Obtain necessary fall risk management equipment:   - Apply yellow socks and bracelet for high fall risk patients  - Consider moving patient to room near nurses station  Outcome: Progressing

## 2024-01-14 NOTE — ASSESSMENT & PLAN NOTE
Baseline creatinine of approximately 3.3-3.7 -> currently stable at 3.88  Monitor renal function and urine output - limit/avoid nephrotoxins and hypotension as possible  Note chronic Demadex/Cozaar use

## 2024-01-14 NOTE — ASSESSMENT & PLAN NOTE
"Presyncopal type episode in outpatient medical office while awaiting scheduling a follow-up appointment after visit  Patient reports a similar episode a few years ago while \"shopping\"   Currently denies any further episodes since presentation to the hospital -> denies current dizziness, blurry vision, or chest discomfort  Known history of CAD status post CABG  Telemetry monitoring - await cardiology input  Echocardiogram normal EF, without regional LV wall motion abnormalities, and only mild valvular disease  Attics negative - hold Flomax  Limit/avoid sedating medications as possible  Consider vasovagal etiology  Vital signs, blood sugar, and electrolytes sufficient   Urinalysis negative for infection  CXR noting some atelectasis -> remains afebrile without leukocytosis and no evidence of difficulty consuming oral meals -> less likely suspicion of aspiration/pneumonia  CT of head negative for acute intracranial etiology  Appreciate PT/OT input -> patient now agreeable to skilled rehab, awaiting placement  "

## 2024-01-14 NOTE — PLAN OF CARE
Problem: PAIN - ADULT  Goal: Verbalizes/displays adequate comfort level or baseline comfort level  Description: Interventions:  - Encourage patient to monitor pain and request assistance  - Assess pain using appropriate pain scale  - Administer analgesics based on type and severity of pain and evaluate response  - Implement non-pharmacological measures as appropriate and evaluate response  - Consider cultural and social influences on pain and pain management  - Notify physician/advanced practitioner if interventions unsuccessful or patient reports new pain  Outcome: Progressing         Problem: Potential for Falls  Goal: Patient will remain free of falls  Description: INTERVENTIONS:  - Educate patient/family on patient safety including physical limitations  - Instruct patient to call for assistance with activity   - Consult OT/PT to assist with strengthening/mobility   - Keep Call bell within reach  - Keep bed low and locked with side rails adjusted as appropriate  - Keep care items and personal belongings within reach  - Initiate and maintain comfort rounds  - Make Fall Risk Sign visible to staff  - Offer Toileting every 2 Hours, in advance of need  - Initiate/Maintain bed alarm  - Obtain necessary fall risk management equipment:   - Apply yellow socks and bracelet for high fall risk patients  - Consider moving patient to room near nurses station  Outcome: Progressing       Problem: SAFETY ADULT  Goal: Maintain or return to baseline ADL function  Description: INTERVENTIONS:  -  Assess patient's ability to carry out ADLs; assess patient's baseline for ADL function and identify physical deficits which impact ability to perform ADLs (bathing, care of mouth/teeth, toileting, grooming, dressing, etc.)  - Assess/evaluate cause of self-care deficits   - Assess range of motion  - Assess patient's mobility; develop plan if impaired  - Assess patient's need for assistive devices and provide as appropriate  - Encourage  maximum independence but intervene and supervise when necessary  - Involve family in performance of ADLs  - Assess for home care needs following discharge   - Consider OT consult to assist with ADL evaluation and planning for discharge  - Provide patient education as appropriate  Outcome: Progressing       Problem: HEMATOLOGIC - ADULT  Goal: Maintains hematologic stability  Description: INTERVENTIONS  - Assess for signs and symptoms of bleeding or hemorrhage  - Monitor labs  - Administer supportive blood products/factors as ordered and appropriate  Outcome: Progressing

## 2024-01-14 NOTE — PROGRESS NOTES
"[facility]  Progress Note  Name: Michael Sanchez I  MRN: [id number]  Unit/Bed#: -01 I Date of Admission: 1/11/2024   Date of Service: 1/14/2024 I Hospital Day: 2      Assessment & Plan:    * Postural dizziness with presyncope  Assessment & Plan  Presyncopal type episode in outpatient medical office while awaiting scheduling a follow-up appointment after visit  Patient reports a similar episode a few years ago while \"shopping\"   Currently denies any further episodes since presentation to the hospital -> denies current dizziness, blurry vision, or chest discomfort  Known history of CAD status post CABG  Telemetry monitoring - await cardiology input  Echocardiogram normal EF, without regional LV wall motion abnormalities, and only mild valvular disease  Attics negative - hold Flomax  Limit/avoid sedating medications as possible  Consider vasovagal etiology  Vital signs, blood sugar, and electrolytes sufficient   Urinalysis negative for infection  CXR noting some atelectasis -> remains afebrile without leukocytosis and no evidence of difficulty consuming oral meals -> less likely suspicion of aspiration/pneumonia  If episode recurs, consider CT imaging of head  Appreciate PT/OT input -> patient now agreeable to skilled rehab, awaiting placement    Insulin dependent diabetes mellitus (HCC)  Assessment & Plan  Lab Results   Component Value Date    HGBA1C 8.1 (H) 12/30/2023     Continue basal insulin with additional SSI coverage per Accu-Cheks  Carbohydrate restriction  Hypoglycemia protocol    Elevated troponin  Assessment & Plan  Initial troponin elevated at 59 with subsequent downtrend  Suspect a non-MI troponin elevation in the setting of late stage CKD  In light of presenting presyncopal episode and history of CAD, cardiology to follow    CKD (chronic kidney disease), stage IV (HCC)  Assessment & Plan  Baseline creatinine of approximately 3.3-3.7 -> currently stable at 3.83  Monitor " renal function and urine output - limit/avoid nephrotoxins and hypotension as possible  Note chronic Demadex/Cozaar use    Coronary artery disease  Assessment & Plan  Status post CABG  Continue ASA/statin/Cozaar/Lopressor    Morbid obesity (HCC)  Assessment & Plan  Lifestyle/diet modifications    HARJIT (obstructive sleep apnea)  Assessment & Plan  Maintain oxygenation  Encourage weight loss    Anemia of chronic disease  Assessment & Plan  H/H stable  Continue B12 supplementation    Essential hypertension  Assessment & Plan  Continue Lopressor/Norvasc/Cozaar/Demadex - additional PRN IV Hydralazine on board for BP spikes  Low-sodium restriction    BPH (benign prostatic hyperplasia)  Assessment & Plan  Discontinued Flomax in the setting of primary issue  Continue Proscar    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HCC)  Assessment & Plan  Known prior history of 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Outpatient follow-up    Hypomagnesemia  Assessment & Plan  Monitor/replete serum potassium/magnesium as necessary      DVT Prophylaxis:  Heparin SC    AM-PAC Basic Mobility:  Basic Mobility Inpatient Raw Score: 16  JH-HLM Achieved: 3: Sit at edge of bed  JH-HLM Goal: 5: Stand one or more mins    Patient Centered Rounds:  I have performed bedside rounds and discussed plan of care with nursing today.  Discussions with Specialists or Other Care Team Provider:  see above assessments if applicable    Education and Discussions with Family / Patient:  Patient at bedside - discussed with wife yesterday, who is aware of pending insurance authorization for placement    Time Spent for Care:  35 minutes. More than 50% of total time spent on counseling and coordination of care, on one or more of the following: performing physical exam; counseling and coordination of care, obtaining or reviewing history, documenting in the medical record, reviewing/ordering tests/medications/procedures, and communicating with other healthcare professionals.    Current Length of Stay: 2 day(s)  Current  Patient Status: Inpatient   Certification Statement:  Patient will continue to require additional hospital stay due to assessments as noted above.    Code Status: Level 1 - Full Code        Subjective:     No new complaints at this time.  Some mild fatigue this morning, but otherwise in his usual state.        Objective:     Vitals:   Temp (24hrs), Av.9 °F (36.6 °C), Min:97.7 °F (36.5 °C), Max:98 °F (36.7 °C)    Temp:  [97.7 °F (36.5 °C)-98 °F (36.7 °C)] 98 °F (36.7 °C)  HR:  [54-69] 54  Resp:  [16-18] 16  BP: (139-205)/() 139/66  SpO2:  [97 %-99 %] 99 %  Body mass index is 31.9 kg/m².     Input and Output Summary (last 24 hours):       Intake/Output Summary (Last 24 hours) at 2024 1626  Last data filed at 2024 0543  Gross per 24 hour   Intake --   Output 1400 ml   Net -1400 ml       Physical Exam:     GENERAL No immediate distress at rest   HEAD   Normocephalic - atraumatic   EYES Nonicteric   MOUTH   Mucosa moist   NECK   Supple - full range of motion   CARDIAC Rate controlled - S1/S2 positive   PULMONARY Fairly clear to auscultation - nonlabored respirations   ABDOMEN   Soft - nontender/nondistended - active bowel sounds   MUSCULOSKELETAL   Motor strength/range of motion deconditioned   NEUROLOGIC Mild cognitive impairment noted   SKIN   Chronic wrinkles/blemishes    PSYCHIATRIC   Mood/affect flat         Additional Data:     Labs & Recent Cultures:    Results from last 7 days   Lab Units 24  0539   WBC Thousand/uL 6.71   HEMOGLOBIN g/dL 9.2*   HEMATOCRIT % 29.2*   PLATELETS Thousands/uL 265   NEUTROS PCT % 62   LYMPHS PCT % 16   MONOS PCT % 10   EOS PCT % 10*     Results from last 7 days   Lab Units 24  0539 24  0438 24  1149   POTASSIUM mmol/L 4.2   < > 4.4   CHLORIDE mmol/L 109*   < > 107   CO2 mmol/L 23   < > 23   BUN mg/dL 62*   < > 51*   CREATININE mg/dL 3.83*   < > 3.66*   CALCIUM mg/dL 8.5   < > 8.4   ALK PHOS U/L  --   --  91   ALT U/L  --   --  10   AST U/L   --   --  10*    < > = values in this interval not displayed.         Results from last 7 days   Lab Units 01/14/24  1549 01/14/24  1128 01/14/24  0725 01/13/24 2052 01/13/24  1624 01/13/24  1140 01/13/24  0721 01/12/24 2058 01/12/24  1626 01/12/24  1133 01/12/24  0715 01/11/24  2126   POC GLUCOSE mg/dl 197* 176* 114 229* 169* 232* 208* 216* 203* 195* 131 158*                         Lines/Drains:  Invasive Devices       Peripheral Intravenous Line  Duration             Peripheral IV 01/11/24 Left Antecubital 3 days                      Last 24 Hours Medication List:   Current Facility-Administered Medications   Medication Dose Route Frequency Provider Last Rate    acetaminophen  650 mg Oral Q6H PRN Malissa Cornell MD      amLODIPine  5 mg Oral Daily Malissa Cornell MD      vitamin C  1,000 mg Oral Daily Malissa Cornell MD      atorvastatin  80 mg Oral Daily With Dinner Malissa Cornell MD      calcitriol  0.25 mcg Oral Once per day on Monday Wednesday Friday Malissa Cornell MD      calcium acetate  667 mg Oral BID With Meals Malissa Cornell MD      cholecalciferol  2,000 Units Oral HS Malissa Cornell MD      vitamin B-12  1,000 mcg Oral Daily Malissa Cornell MD      finasteride  5 mg Oral Daily Malissa Cornell MD      hydrALAZINE  5 mg Intravenous Q6H PRN Malissa Cornell MD      insulin glargine  20 Units Subcutaneous HS Malissa Cornell MD      insulin lispro  1-6 Units Subcutaneous 4x Daily (AC & HS) Malissa Cornell MD      latanoprost  1 drop Both Eyes HS Malissa Cornell MD      losartan  25 mg Oral Daily Malissa Cornell MD      metoprolol tartrate  50 mg Oral BID Malissa Cornell MD      pantoprazole  40 mg Oral Daily Malissa Cornell MD      sodium chloride (PF)  3 mL Intravenous Q1H PRN Neeraj Cosby DO      torsemide  20 mg Oral Daily MD MALISSA Wilson MD   Hospitalist - [facility] Internal [facility]        ** Please Note: This note is constructed using a voice recognition dictation system.  An occasional wrong  word/phrase or “sound-a-like” substitution may have been picked up by dictation device due to the inherent limitations of voice recognition software.  Read the chart carefully and recognize, using reasonable context, where substitutions may have occurred.**

## 2024-01-15 VITALS
DIASTOLIC BLOOD PRESSURE: 70 MMHG | WEIGHT: 216 LBS | HEIGHT: 69 IN | TEMPERATURE: 97.7 F | OXYGEN SATURATION: 100 % | SYSTOLIC BLOOD PRESSURE: 147 MMHG | HEART RATE: 52 BPM | BODY MASS INDEX: 31.99 KG/M2 | RESPIRATION RATE: 20 BRPM

## 2024-01-15 LAB
ANION GAP SERPL CALCULATED.3IONS-SCNC: 9 MMOL/L
BASOPHILS # BLD AUTO: 0.03 THOUSANDS/ÂΜL (ref 0–0.1)
BASOPHILS NFR BLD AUTO: 0 % (ref 0–1)
BUN SERPL-MCNC: 72 MG/DL (ref 5–25)
CALCIUM SERPL-MCNC: 8.6 MG/DL (ref 8.4–10.2)
CHLORIDE SERPL-SCNC: 109 MMOL/L (ref 96–108)
CO2 SERPL-SCNC: 20 MMOL/L (ref 21–32)
CREAT SERPL-MCNC: 3.88 MG/DL (ref 0.6–1.3)
EOSINOPHIL # BLD AUTO: 0.81 THOUSAND/ÂΜL (ref 0–0.61)
EOSINOPHIL NFR BLD AUTO: 11 % (ref 0–6)
ERYTHROCYTE [DISTWIDTH] IN BLOOD BY AUTOMATED COUNT: 13.8 % (ref 11.6–15.1)
GFR SERPL CREATININE-BSD FRML MDRD: 14 ML/MIN/1.73SQ M
GLUCOSE SERPL-MCNC: 114 MG/DL (ref 65–140)
GLUCOSE SERPL-MCNC: 135 MG/DL (ref 65–140)
GLUCOSE SERPL-MCNC: 181 MG/DL (ref 65–140)
GLUCOSE SERPL-MCNC: 190 MG/DL (ref 65–140)
HCT VFR BLD AUTO: 30.6 % (ref 36.5–49.3)
HGB BLD-MCNC: 9.6 G/DL (ref 12–17)
IMM GRANULOCYTES # BLD AUTO: 0.02 THOUSAND/UL (ref 0–0.2)
IMM GRANULOCYTES NFR BLD AUTO: 0 % (ref 0–2)
LYMPHOCYTES # BLD AUTO: 0.91 THOUSANDS/ÂΜL (ref 0.6–4.47)
LYMPHOCYTES NFR BLD AUTO: 12 % (ref 14–44)
MAGNESIUM SERPL-MCNC: 2 MG/DL (ref 1.9–2.7)
MCH RBC QN AUTO: 28.9 PG (ref 26.8–34.3)
MCHC RBC AUTO-ENTMCNC: 31.4 G/DL (ref 31.4–37.4)
MCV RBC AUTO: 92 FL (ref 82–98)
MONOCYTES # BLD AUTO: 0.6 THOUSAND/ÂΜL (ref 0.17–1.22)
MONOCYTES NFR BLD AUTO: 8 % (ref 4–12)
NEUTROPHILS # BLD AUTO: 5.23 THOUSANDS/ÂΜL (ref 1.85–7.62)
NEUTS SEG NFR BLD AUTO: 69 % (ref 43–75)
NRBC BLD AUTO-RTO: 0 /100 WBCS
PHOSPHATE SERPL-MCNC: 4.8 MG/DL (ref 2.3–4.1)
PLATELET # BLD AUTO: 286 THOUSANDS/UL (ref 149–390)
PMV BLD AUTO: 10.9 FL (ref 8.9–12.7)
POTASSIUM SERPL-SCNC: 4.5 MMOL/L (ref 3.5–5.3)
RBC # BLD AUTO: 3.32 MILLION/UL (ref 3.88–5.62)
SODIUM SERPL-SCNC: 138 MMOL/L (ref 135–147)
WBC # BLD AUTO: 7.6 THOUSAND/UL (ref 4.31–10.16)

## 2024-01-15 PROCEDURE — 85025 COMPLETE CBC W/AUTO DIFF WBC: CPT | Performed by: INTERNAL MEDICINE

## 2024-01-15 PROCEDURE — 83735 ASSAY OF MAGNESIUM: CPT | Performed by: INTERNAL MEDICINE

## 2024-01-15 PROCEDURE — 97116 GAIT TRAINING THERAPY: CPT

## 2024-01-15 PROCEDURE — 97530 THERAPEUTIC ACTIVITIES: CPT

## 2024-01-15 PROCEDURE — 82948 REAGENT STRIP/BLOOD GLUCOSE: CPT

## 2024-01-15 PROCEDURE — NC001 PR NO CHARGE: Performed by: INTERNAL MEDICINE

## 2024-01-15 PROCEDURE — 84100 ASSAY OF PHOSPHORUS: CPT | Performed by: INTERNAL MEDICINE

## 2024-01-15 PROCEDURE — 99239 HOSP IP/OBS DSCHRG MGMT >30: CPT | Performed by: INTERNAL MEDICINE

## 2024-01-15 PROCEDURE — 80048 BASIC METABOLIC PNL TOTAL CA: CPT | Performed by: INTERNAL MEDICINE

## 2024-01-15 RX ORDER — SODIUM BICARBONATE 650 MG/1
650 TABLET ORAL
Status: DISCONTINUED | OUTPATIENT
Start: 2024-01-15 | End: 2024-01-15 | Stop reason: HOSPADM

## 2024-01-15 RX ORDER — SODIUM BICARBONATE 650 MG/1
650 TABLET ORAL
Start: 2024-01-15 | End: 2024-01-17

## 2024-01-15 RX ORDER — TAMSULOSIN HYDROCHLORIDE 0.4 MG/1
0.8 CAPSULE ORAL
Qty: 180 CAPSULE | Refills: 1 | Status: SHIPPED | OUTPATIENT
Start: 2024-01-15 | End: 2024-01-15

## 2024-01-15 RX ADMIN — METOPROLOL TARTRATE 50 MG: 50 TABLET, FILM COATED ORAL at 08:29

## 2024-01-15 RX ADMIN — ATORVASTATIN CALCIUM 80 MG: 40 TABLET, FILM COATED ORAL at 16:42

## 2024-01-15 RX ADMIN — CALCIUM ACETATE 667 MG: 667 CAPSULE ORAL at 08:27

## 2024-01-15 RX ADMIN — INSULIN LISPRO 1 UNITS: 100 INJECTION, SOLUTION INTRAVENOUS; SUBCUTANEOUS at 11:52

## 2024-01-15 RX ADMIN — PANTOPRAZOLE SODIUM 40 MG: 40 TABLET, DELAYED RELEASE ORAL at 08:27

## 2024-01-15 RX ADMIN — OXYCODONE HYDROCHLORIDE AND ACETAMINOPHEN 1000 MG: 500 TABLET ORAL at 08:27

## 2024-01-15 RX ADMIN — INSULIN LISPRO 2 UNITS: 100 INJECTION, SOLUTION INTRAVENOUS; SUBCUTANEOUS at 08:27

## 2024-01-15 RX ADMIN — FINASTERIDE 5 MG: 5 TABLET, FILM COATED ORAL at 08:27

## 2024-01-15 RX ADMIN — TORSEMIDE 20 MG: 20 TABLET ORAL at 08:29

## 2024-01-15 RX ADMIN — LOSARTAN POTASSIUM 25 MG: 25 TABLET, FILM COATED ORAL at 08:27

## 2024-01-15 RX ADMIN — METOPROLOL TARTRATE 50 MG: 50 TABLET, FILM COATED ORAL at 17:09

## 2024-01-15 RX ADMIN — SODIUM BICARBONATE 650 MG: 650 TABLET ORAL at 16:42

## 2024-01-15 RX ADMIN — AMLODIPINE BESYLATE 5 MG: 5 TABLET ORAL at 08:28

## 2024-01-15 RX ADMIN — SODIUM BICARBONATE 650 MG: 650 TABLET ORAL at 14:37

## 2024-01-15 RX ADMIN — CALCITRIOL CAPSULES 0.25 MCG 0.25 MCG: 0.25 CAPSULE ORAL at 08:27

## 2024-01-15 RX ADMIN — CALCIUM ACETATE 667 MG: 667 CAPSULE ORAL at 16:42

## 2024-01-15 RX ADMIN — CYANOCOBALAMIN TAB 500 MCG 1000 MCG: 500 TAB at 08:27

## 2024-01-15 NOTE — CASE MANAGEMENT
Case Management Discharge Planning Note    Patient name Michael Reynolds Jr.  Location /-01 MRN [id number]  : 1945 Date 1/15/2024       Current Admission Date: 2024  Current Admission Diagnosis:Postural dizziness with presyncope   Patient Active Problem List    Diagnosis Date Noted    Hypomagnesemia 2024    Postural dizziness with presyncope 2024    Anemia of chronic disease 2024    Depression 2023    Anemia of chronic kidney failure, stage 4 (severe)  2023    Nocturia 2023    Encounter to discuss test results 2023    Incomplete bladder emptying 2023    Osteopenia of neck of left femur 2023    Secondary hyperparathyroidism of renal origin (HCC) 2023    Hyperphosphatemia 2023    HARJIT (obstructive sleep apnea) 2023    Diverticulosis 2022    Gastric ulcer 2022    GI bleed 2022    Gait instability 2022    Asymptomatic bilateral carotid artery stenosis 2022    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HCC) 2022    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    BPH (benign prostatic hyperplasia) 2021    Essential hypertension 2021    Elevated TSH 2021    Hypervolemia associated with renal insufficiency 2021    Chronic diastolic (congestive) heart failure (HCC) 2021    Traumatic injury of skin 2021    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HCC) 2020    Encounter for  medical examination (CDME) 2020    Diabetic polyneuropathy associated with type 2 diabetes mellitus (HCC)     Stenosis of left internal carotid artery 2020    Lymphadenopathy 10/22/2019    Vitamin D deficiency 10/21/2019    Iron deficiency anemia 2019    S/P CABG (coronary artery bypass graft) 2019    Acute postoperative pulmonary insufficiency (HCC) 2019    Coronary artery disease 2019    Mixed hyperlipidemia 2019    Insulin dependent diabetes mellitus (HCC)  06/15/2019    Elevated troponin 06/15/2019    Sleep apnea 06/15/2019    Benign hypertension with chronic kidney disease, stage IV (HCC) 05/30/2019    Chronic kidney disease-mineral and bone disorder 05/30/2019    Esophageal dysphagia 02/14/2019    History of colonic polyps 02/14/2019    CKD (chronic kidney disease), stage IV (HCC) 08/18/2017    Non-nephrotic range proteinuria 08/18/2017    Trigger finger of left hand 04/05/2017    Lumbar back pain 02/07/2012    Lumbar discogenic pain syndrome 02/07/2012    Pain of lower extremity 02/07/2012    Spondylolisthesis 02/07/2012    Spinal stenosis 02/07/2012      LOS (days): 3  Geometric Mean LOS (GMLOS) (days): 2.4  Days to GMLOS:-0.6     OBJECTIVE:  Risk of Unplanned Readmission Score: 20.33         Current admission status: Inpatient   Preferred Pharmacy:   [facility]/pharmacy #[id number] [place] [id number] [address] [address]  Phone: [phone] Fax: [phone]    [address]  [address] [place] [address]  Phone: [phone] Fax: [phone]    Primary Care Provider: Luke Glasgow DO    Primary Insurance: ELDON SPEARS  Secondary Insurance:     DISCHARGE DETAILS:    Discharge planning discussed with:: patietn and wife  Freedom of Choice: Yes  Comments - Freedom of Choice: Cm informed that patient is medically stable for dc today. Cm confirmed that wife and patient in agreement with offer at [facility]. Cm confirmed with wife that she will bring the Bipap machine to the facility. Cm to arrange WCV for patient to go to rehab.  CM contacted family/caregiver?: Yes  Were Treatment Team discharge recommendations reviewed with patient/caregiver?: Yes  Did patient/caregiver verbalize understanding of patient care needs?: Yes       Contacts  Patient Contacts: Mine Reynolds  Relationship to Patient:: Family  Contact Method: Phone  Phone Number:  [phone]  Reason/Outcome: Emergency Contact, Continuity of Care, Discharge Planning    Requested Home Health Care         Is the patient interested in HHC at discharge?: No    DME Referral Provided  Referral made for DME?: No    Other Referral/Resources/Interventions Provided:  Referral Comments: bed offer at [facility]         Treatment Team Recommendation: Short Term Rehab  Discharge Destination Plan:: Short Term Rehab  Transport at Discharge : Wheelchair van  Dispatcher Contacted: Yes  Number/Name of Dispatcher: roundtrip     ETA of Transport (Date): 01/15/24  ETA of Transport (Time): 1730              IMM Given (Date):: 01/15/24  IMM Given to:: Patient      IMM reviewed with patient, patient agrees with discharge determination.     Accepting Facility Name, City & State : [facility]  Receiving Facility/Agency Phone Number: [phone]  Facility/Agency Fax Number: [phone]

## 2024-01-15 NOTE — PROGRESS NOTES
"Critical access hospital  Progress Note  Name: Michael Sanchez I  MRN: [id number]  Unit/Bed#: -01 I Date of Admission: 1/11/2024   Date of Service: 1/15/2024 I Hospital Day: 3      Assessment & Plan:    * Postural dizziness with presyncope  Assessment & Plan  Presyncopal type episode in outpatient medical office while awaiting scheduling a follow-up appointment after visit  Patient reports a similar episode a few years ago while \"shopping\"   Currently denies any further episodes since presentation to the hospital -> denies current dizziness, blurry vision, or chest discomfort  Known history of CAD status post CABG  Telemetry monitoring - await cardiology input  Echocardiogram normal EF, without regional LV wall motion abnormalities, and only mild valvular disease  Attics negative - hold Flomax  Limit/avoid sedating medications as possible  Consider vasovagal etiology  Vital signs, blood sugar, and electrolytes sufficient   Urinalysis negative for infection  CXR noting some atelectasis -> remains afebrile without leukocytosis and no evidence of difficulty consuming oral meals -> less likely suspicion of aspiration/pneumonia  CT of head negative for acute intracranial etiology  Appreciate PT/OT input -> patient now agreeable to skilled rehab, awaiting placement    Insulin dependent diabetes mellitus (HCC)  Assessment & Plan  Lab Results   Component Value Date    HGBA1C 8.1 (H) 12/30/2023     Continue basal insulin with additional SSI coverage per Accu-Cheks  Carbohydrate restriction  Hypoglycemia protocol    Elevated troponin  Assessment & Plan  Initial troponin elevated at 59 with subsequent downtrend  Suspect a non-MI troponin elevation in the setting of late stage CKD  In light of presenting presyncopal episode and history of CAD, cardiology to follow    CKD (chronic kidney disease), stage IV (HCC)  Assessment & Plan  Baseline creatinine of approximately 3.3-3.7 -> currently stable at " 3.88  Monitor renal function and urine output - limit/avoid nephrotoxins and hypotension as possible  Note chronic Demadex/Cozaar use    Coronary artery disease  Assessment & Plan  Status post CABG  Continue ASA/statin/Cozaar/Lopressor    HARJIT (obstructive sleep apnea)  Assessment & Plan  Maintain oxygenation  Encourage weight loss    Anemia of chronic disease  Assessment & Plan  H/H stable  Continue B12 supplementation    Essential hypertension  Assessment & Plan  Continue Lopressor/Norvasc/Cozaar/Demadex - additional PRN IV Hydralazine on board for BP spikes  Low-sodium restriction    BPH (benign prostatic hyperplasia)  Assessment & Plan  Discontinued Flomax in the setting of primary issue  Continue Proscar    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HCC)  Assessment & Plan  Known prior history of 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Outpatient follow-up    Hypomagnesemia  Assessment & Plan  Monitor/replete serum potassium/magnesium as necessary      DVT Prophylaxis:  Heparin SC    AM-PAC Basic Mobility:  Basic Mobility Inpatient Raw Score: 16  -[facility] Achieved: 1: Laying in bed  -HL Goal: 5: Stand one or more mins    Patient Centered Rounds:  I have performed bedside rounds and discussed plan of care with nursing today.  Discussions with Specialists or Other Care Team Provider:  see above assessments if applicable    Education and Discussions with Family / Patient:  Patient at bedside - wife aware of pending placement    Time Spent for Care:  35 minutes. More than 50% of total time spent on counseling and coordination of care, on one or more of the following: performing physical exam; counseling and coordination of care, obtaining or reviewing history, documenting in the medical record, reviewing/ordering tests/medications/procedures, and communicating with other healthcare professionals.    Current Length of Stay: 3 day(s)  Current Patient Status: Inpatient   Certification Statement:  Patient will continue to require additional hospital stay due to  Detail Level: Generalized assessments as noted above.    Code Status: Level 1 - Full Code        Subjective:     No new complaints at this time.  Resting fairly comfortably in bed.        Objective:     Vitals:   Temp (24hrs), Av.6 °F (36.4 °C), Min:97.4 °F (36.3 °C), Max:98 °F (36.7 °C)    Temp:  [97.4 °F (36.3 °C)-98 °F (36.7 °C)] 97.4 °F (36.3 °C)  HR:  [54-60] 56  Resp:  [16-18] 16  BP: (139-157)/(66-78) 157/78  SpO2:  [97 %-99 %] 99 %  Body mass index is 31.9 kg/m².     Input and Output Summary (last 24 hours):       Intake/Output Summary (Last 24 hours) at 1/15/2024 1240  Last data filed at 1/15/2024 1115  Gross per 24 hour   Intake 710 ml   Output 1950 ml   Net -1240 ml       Physical Exam:     GENERAL   Well-developed/nourished - no acute distress   HEAD   Normocephalic - atraumatic   EYES   PERRL - EOMI    MOUTH   Mucosa moist   NECK   Supple - full range of motion   CARDIAC Rate controlled - S1/S2 positive   PULMONARY Fairly clear to auscultation - nonlabored respirations at rest   ABDOMEN   Soft - nontender/nondistended - active bowel sounds   MUSCULOSKELETAL   Motor strength/range of motion remains deconditioned   NEUROLOGIC Mild cognitive impairment present   SKIN   Chronic wrinkles/blemishes    PSYCHIATRIC   Mood/affect flat         Additional Data:     Labs & Recent Cultures:    Results from last 7 days   Lab Units 01/15/24  0514   WBC Thousand/uL 7.60   HEMOGLOBIN g/dL 9.6*   HEMATOCRIT % 30.6*   PLATELETS Thousands/uL 286   NEUTROS PCT % 69   LYMPHS PCT % 12*   MONOS PCT % 8   EOS PCT % 11*     Results from last 7 days   Lab Units 01/15/24  0514 24  0438 24  1149   POTASSIUM mmol/L 4.5   < > 4.4   CHLORIDE mmol/L 109*   < > 107   CO2 mmol/L 20*   < > 23   BUN mg/dL 72*   < > 51*   CREATININE mg/dL 3.88*   < > 3.66*   CALCIUM mg/dL 8.6   < > 8.4   ALK PHOS U/L  --   --  91   ALT U/L  --   --  10   AST U/L  --   --  10*    < > = values in this interval not displayed.         Results from last 7 days   Lab Units  01/15/24  1126 01/15/24  0712 01/14/24 2037 01/14/24  1549 01/14/24  1128 01/14/24  0725 01/13/24 2052 01/13/24  1624 01/13/24  1140 01/13/24  0721 01/12/24 2058 01/12/24  1626   POC GLUCOSE mg/dl 181* 190* 214* 197* 176* 114 229* 169* 232* 208* 216* 203*                         Lines/Drains:  Invasive Devices       Peripheral Intravenous Line  Duration             Peripheral IV 01/15/24 Dorsal (posterior);Right Wrist <1 day                      Last 24 Hours Medication List:   Current Facility-Administered Medications   Medication Dose Route Frequency Provider Last Rate    acetaminophen  650 mg Oral Q6H PRN Malissa Cornell MD      amLODIPine  5 mg Oral Daily Malissa Cornell MD      vitamin C  1,000 mg Oral Daily Malissa Cornell MD      atorvastatin  80 mg Oral Daily With Dinner Malissa Cornell MD      calcitriol  0.25 mcg Oral Once per day on Monday Wednesday Friday Malissa Cornell MD      calcium acetate  667 mg Oral BID With Meals Malissa Cornell MD      cholecalciferol  2,000 Units Oral HS Malissa Cornell MD      vitamin B-12  1,000 mcg Oral Daily Malissa Cornell MD      finasteride  5 mg Oral Daily Malissa Cornell MD      hydrALAZINE  5 mg Intravenous Q6H PRN Malissa Cornell MD      insulin glargine  20 Units Subcutaneous HS Malissa Cornell MD      insulin lispro  1-6 Units Subcutaneous 4x Daily (AC & HS) Malissa Cornell MD      latanoprost  1 drop Both Eyes HS Malissa Cornell MD      losartan  25 mg Oral Daily Malissa Cornell MD      metoprolol tartrate  50 mg Oral BID Malissa Cornell MD      pantoprazole  40 mg Oral Daily Malissa Cornell MD      sodium bicarbonate  650 mg Oral TID after meals Malissa Cornell MD      sodium chloride (PF)  3 mL Intravenous Q1H PRN Neeraj Cosby DO      torsemide  20 mg Oral Daily MD MALISSA Wilson MD   Hospitalist - [facility]        ** Please Note: This note is constructed using a voice recognition dictation system.  An occasional wrong word/phrase or  “sound-a-like” substitution may have been picked up by dictation device due to the inherent limitations of voice recognition software.  Read the chart carefully and recognize, using reasonable context, where substitutions may have occurred.**      Detail Level: Detailed

## 2024-01-15 NOTE — PHYSICAL THERAPY NOTE
PHYSICAL THERAPY Treatment  DATE: 01/15/24  TIME: 9263-1659    NAME:  Michael Reynolds Jr.  AGE:   78 y.o.  Mrn:   [id number]  Length Of Stay: 3    ADMIT DX:  Syncope [R55]    Past Medical History:   Diagnosis Date    CHF (congestive heart failure) (HCC)     Chronic kidney disease 18 - 24 months?    Dr Patterson - stage 3    Colon polyp     CPAP (continuous positive airway pressure) dependence     Diabetes mellitus (HCC)     History of transfusion     Hyperlipidemia     Hypertension     Myocardial infarction (HCC) 06/2019    Open heart surgery with quad bypass    Obesity     Renal disorder     Sleep apnea     Visual impairment 1991    Dr Nickie Peters     Past Surgical History:   Procedure Laterality Date    APPENDECTOMY  1977    Done in conjunction with cholecystectomy    BACK SURGERY      CATARACT EXTRACTION, BILATERAL Bilateral     Left eye- 10/23 Right eye 9/23    CHOLECYSTECTOMY  1977    COLONOSCOPY      CORONARY ARTERY BYPASS GRAFT  2019    quad    EGD      GALLBLADDER SURGERY      HERNIA REPAIR      [place] CORONARY ARTERY BYP W/VEIN & ARTERY GRAFT 4 VEIN N/A 06/21/2019    Procedure: CORONARY ARTERY BYPASS GRAFT (CABG) 4 VESSELS WITH SVG TO PDA, OM, DIAGONAL AND LIMA TO LAD; RIGHT LEG EVH;  Surgeon: James Fang MD;  Location: [facility];  Service: Cardiac Surgery    RECTAL SURGERY      SPINE SURGERY  2012    Fusion L3-L5?    TONSILLECTOMY          01/15/24 1200   PT Last Visit   PT Visit Date 01/15/24   Note Type   Note Type Treatment   Pain Assessment   Pain Assessment Tool 0-10   Pain Score No Pain   Restrictions/Precautions   Weight Bearing Precautions Per Order No   Other Precautions Cognitive;Bed Alarm;Fall Risk   General   Chart Reviewed Yes  (no significant change)   Additional Pertinent History 77 y/o presents due to dizziness/presyncopal episodes, generalized weakness, nausea. Upon assessment: elevated troponin, hyperglycemia.   Family/Caregiver Present No   Cognition   Overall Cognitive Status  Impaired   Arousal/Participation Alert   Orientation Level Oriented to person;Oriented to place;Oriented to situation   Subjective   Subjective Pt denies any complaints and compliant with participation in therapy   Bed Mobility   Supine to Sit 3  Moderate assistance   Additional items Bedrails;Increased time required;Impulsive;Verbal cues  (flat bed.  requires assist at trunk and HHA to pull to sitting.)   Sit to Supine 5  Supervision   Additional items Increased time required;Verbal cues;Impulsive;LE management  (SBa for safety.  cues and extra time to transfer to supine in bed)   Transfers   Sit to Stand 4  Minimal assistance   Additional items Increased time required;Impulsive;Verbal cues  (Despite use of UEs, pt exhibits posterior LoB when attempting to get to standing.  Also utilizes bed against distal LEs for balance.  Extra time to gait balance)   Stand to Sit 5  Supervision   Additional items Increased time required;Verbal cues  (cues for safety)   Ambulation/Elevation   Gait Assistance 4  Minimal assist   Additional items Verbal cues   Assistive Device None   Distance 60'   Ambulation/Elevation Additional Comments Pt ambulates with short shuffled steps, wide SHANTEL, externally rotated hip positioning.  Pt displays significant LOB when stopped to look up and to right.  Pt attempts to utilize step strategy to recover, but needs therapist assist to prevent the fall   Balance   Static Sitting Fair +   Dynamic Sitting Fair   Static Standing Poor +   Dynamic Standing Fair -   Ambulatory Fair -  (one instance of POOR+ due to LOB)   Activity Tolerance   Activity Tolerance Patient tolerated treatment well   Exercises   Balance training  Pt performed balance challenge exercises consisting of: static standing with eyes closed 10 sec, feet with narrow SHANTEL static standing 10 sec. Static standing with upper quarter turns to each side, static standing with forward reaching 5 inches, static standing to bed down to touch  knees, turning in place >4 seconds, marching in place 4x minimal foot clearance, standing on one foot <1 sec.  Pt demonstrates high fall risk and would benefit from use of AD if he is able to manage safely.   Assessment   Prognosis Fair   Problem List Decreased strength;Decreased endurance;Impaired balance;Decreased mobility;Decreased cognition;Impaired judgement;Decreased safety awareness   Assessment Orders for PT eval and treat received.  Deficits listed contribute to functional limitations that are significant from the patient PLOF and include: difficulty with bed mobility, impaired standing balance, inability to perform safe transfers, unsafe/inefficient ambulation, fall risk, and unable to safely manage stairs/steps/ramp    During today's session, educated and instructed pt on transfer strategies to manage getting OOB.  Pt struggled to perform due to difficulty sequencing and trunk weakness.  Noted instability and decreased control when getting to standing from EOB, needing increased time to adjust COM/balance.  Pt exhibits gait deviations such as shuffled steps, decreased hip/trunk rotation, and decreased coordination of steps that put him at risk for falls.  Pt exhibits significant fall potential with only minimal balance challenges/distractions.     The AM-PAC & Barthel Index outcome tools were used to assist in determining pt safety w/ mobility/self care & appropriate d/c recommendations, see above for scores. Patient's clinical presentation is evolving due to ongoing medical management needs.     Considering the patient's PLOF, co-morbidities, acute functional limitations, functional outcome measures, and/or goal to progress functional independence; this patient would continue to benefit from skilled Physical Therapy intervention in the acute care setting.   Barriers to Discharge Decreased caregiver support   Goals   Patient Goals none stated   STG Expiration Date 01/22/24   Short Term Goal #1 1: Pt will  perform rolling to either side in flat bed, independently. 2: Pt will perform sit<>supine on flat bed, mod I. 3: Pt will perform stand pivot transfer without/LRAD, mod I. 4: Pt will ambulate 150' with LRAD, mod I. 5: Pt will perform written HEP, with cues/SBA. 6: Pt will ascend/descend stairs with AD/rail as needed to return to home setup, sup A.   Plan   Treatment/Interventions Functional transfer training;LE strengthening/ROM;Elevations;Therapeutic exercise;Cognitive reorientation;Patient/family training;Equipment eval/education;Bed mobility;Gait training   PT Frequency 3-5x/wk   Discharge Recommendation   Rehab Resource Intensity Level, PT II (Moderate Resource Intensity)   AM-PAC Basic Mobility Inpatient   Turning in Flat Bed Without Bedrails 3   Lying on Back to Sitting on Edge of Flat Bed Without Bedrails 2   Moving Bed to Chair 3   Standing Up From Chair Using Arms 2   Walk in Room 3   Climb 3-5 Stairs With Railing 2   Basic Mobility Inpatient Raw Score 15   Basic Mobility Standardized Score 36.97   Highest Level Of Mobility   JH-HLM Goal 4: Move to chair/commode   JH-HLM Achieved 7: Walk 25 feet or more   Education   Education Provided Mobility training;Assistive device   Patient Demonstrates acceptance/verbal understanding;Reinforcement needed   End of Consult   Patient Position at End of Consult Supine;Bed/Chair alarm activated;All needs within reach     The patient's AM-PAC Basic Mobility Inpatient Short Form Raw Score is 15. A Raw score of less than 16 suggests the patient may benefit from discharge to post-acute rehabilitation services. Please also refer to the recommendation of the Physical Therapist for safe discharge planning.    Juventino Gregory, PT, DPT  [place] Licensure #[id number]

## 2024-01-15 NOTE — DISCHARGE SUMMARY
"      Discharge Summary - [facility]  Patient: Michael Reynolds Jr. 78 y.o. male   MRN: [id number]  PCP: Luke Glasgow DO  Unit/Bed#: -01 Encounter: [id number]            Discharging Physician / Practitioner: Malissa Cornell MD  PCP: Luke Glasgow DO  Admission Date:   Admission Orders (From admission, onward)       Ordered        01/12/24 1603  Inpatient Admission  Once            01/11/24 1322  Place in Observation  Once                          Discharge Date: 01/15/24      Reason for Admission:  Presyncopal episode      Discharge Diagnoses:     Principal Problem:    Postural dizziness with presyncope    Active Problems:    Insulin dependent diabetes mellitus (HCC)    Elevated troponin    CKD (chronic kidney disease), stage IV (HCC)    Coronary artery disease    HARJIT (obstructive sleep apnea)    Anemia of chronic disease    Essential hypertension    BPH (benign prostatic hyperplasia)    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HCC)    Resolved Problems:      Hypomagnesemia      Consultations During Hospital Stay:  Cardiology      Hospital Course:     Michael Reynolds Jr. is a 78 y.o. male patient who originally presented to the hospital on 1/11/2024 due to a syncopal episode.    See progress note from earlier today.  Informed this afternoon that patient has been accepted to a skilled rehab facility with insurance authorization obtained.  Plan for discharge to facility today.      Condition at Discharge: fair       Discharge Day Visit / Exam:     Vitals:  Blood Pressure: 147/70 (01/15/24 1431)  Pulse: (!) 52 (01/15/24 1431)  Temperature: 97.7 °F (36.5 °C) (01/15/24 1431)  Temp Source: Oral (01/15/24 1431)  Respirations: 20 (01/15/24 1431)  Height: 5' 9\" (175.3 cm) (01/12/24 0919)  Weight - Scale: 98 kg (216 lb) (01/12/24 0919)  SpO2: 100 % (01/15/24 1431)      Physical exam:  I had a face-to-face encounter with the patient on day of discharge.      Discussion with Patient and/or Family:  The " patient has been advised to return to the ER immediately if any symptoms recur or worsen.       Discharge instructions/Information to Patient and/or Family:   See after visit summary for information provided to patient and/or family.        Provisions for Follow-Up Care:  See after visit summary for information related to follow-up care and any pertinent home health orders.        Disposition:   Skilled rehab facility      Discharge Medications:  See after visit summary for reconciled discharge medications provided to patient and/or family.        Discharge Statement:  I spent 38 minutes discharging the patient. This time was spent on the day of discharge. I had direct contact with the patient on the day of discharge. Greater than 50% of the total time was spent examining patient, answering all patient questions, arranging and discussing plan of care with patient as well as directly providing post-discharge instructions.  Additional time then spent on discharge activities.           RAYMON KING MD   Hospitalist - [facility]        ** Please Note: This note is constructed using a voice recognition dictation system.  An occasional wrong word/phrase or “sound-a-like” substitution may have been picked up by dictation device due to the inherent limitations of voice recognition software.  Read the chart carefully and recognize, using reasonable context, where substitutions may have occurred.**

## 2024-01-15 NOTE — OCCUPATIONAL THERAPY NOTE
Occupational Therapy Tx Note     Patient Name: Michael Reynolds Jr.  Today's Date: 1/15/2024  Problem List  Principal Problem:    Postural dizziness with presyncope  Active Problems:    CKD (chronic kidney disease), stage IV (HCC)    Insulin dependent diabetes mellitus (HCC)    Elevated troponin    Coronary artery disease    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HCC)    Essential hypertension    BPH (benign prostatic hyperplasia)    HARJIT (obstructive sleep apnea)    Anemia of chronic disease    Hypomagnesemia            01/15/24 1540   OT Last Visit   OT Visit Date 01/15/24   Note Type   Note Type Treatment for insurance authorization   Pain Assessment   Pain Assessment Tool 0-10   Pain Score No Pain   Restrictions/Precautions   Weight Bearing Precautions Per Order No   Other Precautions Cognitive;Chair Alarm;Bed Alarm;Fall Risk   ADL   Where Assessed Edge of bed   Grooming Assistance 5  Supervision/Setup   Grooming Deficit Supervision/safety;Brushing hair   Bed Mobility   Supine to Sit 3  Moderate assistance   Additional items Assist x 1;Verbal cues;Increased time required;Bedrails   Sit to Supine 4  Minimal assistance   Additional items Assist x 1;Verbal cues;LE management;Increased time required   Transfers   Sit to Stand 4  Minimal assistance   Additional items Assist x 1;Verbal cues;Bedrails   Stand to Sit 4  Minimal assistance   Additional items Assist x 1;Verbal cues;Bedrails   Stand pivot 4  Minimal assistance   Additional items Assist x 1;Verbal cues  (+ hand held assistance)   Cognition   Overall Cognitive Status Impaired   Arousal/Participation Alert   Attention Attends with cues to redirect   Orientation Level Oriented to person;Oriented to place   Memory Decreased short term memory;Decreased recall of precautions   Following Commands Follows one step commands with increased time or repetition   Activity Tolerance   Activity Tolerance Patient tolerated treatment well   Assessment   Assessment Pt seen for OT tx session with  This note was copied from a baby's chart. Met with parents to discuss feeding plan. Mother is breastfeeding and discussed that she is breastfeeding well. Baby is currently at a 3.6% weight loss, having appropriate output for his age and 24 hour bilirubin was low. The Ready, Set, Baby Booklet was discussed briefly. Addressed breast pump needs and mother discussed that she has a breast pump for home use. Parents were made aware of how to communicate with lactation and encouraged to reach out for a latch assessment, continued support and/or questions that arise. focus on functional balance, functional mobility, ADL status, and transfer safety. Patient agreeable to OT treatment session. Pt received supine in bed. Performed bed mobility with mod AX 1. Performed transfers and functional mobility with min Ax 1. Pt demonstrates some overall improvement in mobility.  Patient continues to be functioning below baseline level, occupational performance remains limited secondary to factors listed above, and pt at increased risk for falls and injury.  From OT standpoint, recommendation at time of d/c would be level 2 resources. Patient to benefit from continued Occupational Therapy treatment while in the hospital to address deficits as defined above and maximize level of functional independence with ADLs and functional mobility. Pt left with call bell in reach, tray table in reach, needs met, bed alarm activated, SCDs on.   Plan   Treatment Interventions Functional transfer training;Patient/family training;Compensatory technique education;Activityengagement;Endurance training;Cognitive reorientation   Goal Expiration Date 01/25/24   OT Treatment Day 0   OT Frequency 3-5x/wk   Discharge Recommendation   Rehab Resource Intensity Level, OT II (Moderate Resource Intensity)   Additional Comments  The patient's raw score on the AM-PAC Daily Activity inpatient short form is 16, standardized score is 35.96, less than 39.4. Patients at this level are likely to benefit from DC to post-acute rehabilitation services. Please refer to the recommendation of the Occupational Therapist for safe DC planning.   AM-PAC Daily Activity Inpatient   Lower Body Dressing 2   Bathing 2   Toileting 2   Upper Body Dressing 3   Grooming 3   Eating 4   Daily Activity Raw Score 16   Daily Activity Standardized Score (Calc for Raw Score >=11) 35.96   AM-PAC Applied Cognition Inpatient   Following a Speech/Presentation 2   Understanding Ordinary Conversation 3   Taking Medications 2   Remembering Where Things Are  Placed or Put Away 2   Remembering List of 4-5 Errands 2   Taking Care of Complicated Tasks 2   Applied Cognition Raw Score 13   Applied Cognition Standardized Score 30.46   End of Consult   Education Provided Yes   Patient Position at End of Consult Supine;Bed/Chair alarm activated;All needs within reach   Nurse Communication Nurse aware of consult             Doris Smith OTR/LUKE

## 2024-01-15 NOTE — PLAN OF CARE
Problem: Potential for Falls  Goal: Patient will remain free of falls  Description: INTERVENTIONS:  - Educate patient/family on patient safety including physical limitations  - Instruct patient to call for assistance with activity   - Consult OT/PT to assist with strengthening/mobility   - Keep Call bell within reach  - Keep bed low and locked with side rails adjusted as appropriate  - Keep care items and personal belongings within reach  - Initiate and maintain comfort rounds  - Make Fall Risk Sign visible to staff  - Offer Toileting every 2 Hours, in advance of need  - Initiate/Maintain bed alarm  - Obtain necessary fall risk management equipment:   - Apply yellow socks and bracelet for high fall risk patients  - Consider moving patient to room near nurses station  Outcome: Progressing         Problem: PAIN - ADULT  Goal: Verbalizes/displays adequate comfort level or baseline comfort level  Description: Interventions:  - Encourage patient to monitor pain and request assistance  - Assess pain using appropriate pain scale  - Administer analgesics based on type and severity of pain and evaluate response  - Implement non-pharmacological measures as appropriate and evaluate response  - Consider cultural and social influences on pain and pain management  - Notify physician/advanced practitioner if interventions unsuccessful or patient reports new pain  Outcome: Progressing      Problem: HEMATOLOGIC - ADULT  Goal: Maintains hematologic stability  Description: INTERVENTIONS  - Assess for signs and symptoms of bleeding or hemorrhage  - Monitor labs  - Administer supportive blood products/factors as ordered and appropriate  Outcome: Progressing      Problem: NEUROSENSORY - ADULT  Goal: Achieves stable or improved neurological status  Description: INTERVENTIONS  - Monitor and report changes in neurological status  - Monitor vital signs such as temperature, blood pressure, glucose, and any other labs ordered   - Initiate  measures to prevent increased intracranial pressure  - Monitor for seizure activity and implement precautions if appropriate      Outcome: Progressing  Goal: Achieves maximal functionality and self care  Description: INTERVENTIONS  - Monitor swallowing and airway patency with patient fatigue and changes in neurological status  - Encourage and assist patient to increase activity and self care.   - Encourage visually impaired, hearing impaired and aphasic patients to use assistive/communication devices  Outcome: Progressing

## 2024-01-15 NOTE — PLAN OF CARE
Problem: PHYSICAL THERAPY ADULT  Goal: Performs mobility at highest level of function for planned discharge setting.  See evaluation for individualized goals.  Description: Treatment/Interventions: Functional transfer training, LE strengthening/ROM, Elevations, Therapeutic exercise, Cognitive reorientation, Patient/family training, Equipment eval/education, Bed mobility, Gait training          See flowsheet documentation for full assessment, interventions and recommendations.  Outcome: Progressing  Note: Prognosis: Fair  Problem List: Decreased strength, Decreased endurance, Impaired balance, Decreased mobility, Decreased cognition, Impaired judgement, Decreased safety awareness  Assessment: Orders for PT eval and treat received.  Deficits listed contribute to functional limitations that are significant from the patient PLOF and include: difficulty with bed mobility, impaired standing balance, inability to perform safe transfers, unsafe/inefficient ambulation, fall risk, and unable to safely manage stairs/steps/ramp    During today's session, educated and instructed pt on transfer strategies to manage getting OOB.  Pt struggled to perform due to difficulty sequencing and trunk weakness.  Noted instability and decreased control when getting to standing from EOB, needing increased time to adjust COM/balance.  Pt exhibits gait deviations such as shuffled steps, decreased hip/trunk rotation, and decreased coordination of steps that put him at risk for falls.  Pt exhibits significant fall potential with only minimal balance challenges/distractions.     The AM-PAC & Barthel Index outcome tools were used to assist in determining pt safety w/ mobility/self care & appropriate d/c recommendations, see above for scores. Patient's clinical presentation is evolving due to ongoing medical management needs.     Considering the patient's PLOF, co-morbidities, acute functional limitations, functional outcome measures, and/or goal  to progress functional independence; this patient would continue to benefit from skilled Physical Therapy intervention in the acute care setting.  Barriers to Discharge: Decreased caregiver support     Rehab Resource Intensity Level, PT: II (Moderate Resource Intensity)    See flowsheet documentation for full assessment.

## 2024-01-15 NOTE — PLAN OF CARE
Problem: OCCUPATIONAL THERAPY ADULT  Goal: Performs self-care activities at highest level of function for planned discharge setting.  See evaluation for individualized goals.  Description: Treatment Interventions: Functional transfer training, Patient/family training, Compensatory technique education, Activityengagement, Endurance training, Cognitive reorientation          See flowsheet documentation for full assessment, interventions and recommendations.   Note: Limitation: Decreased ADL status, Decreased self-care trans  Prognosis: Fair  Assessment: Pt seen for OT tx session with focus on functional balance, functional mobility, ADL status, and transfer safety. Patient agreeable to OT treatment session. Pt received supine in bed. Performed bed mobility with mod AX 1. Performed transfers and functional mobility with min Ax 1. Pt demonstrates some overall improvement in mobility.  Patient continues to be functioning below baseline level, occupational performance remains limited secondary to factors listed above, and pt at increased risk for falls and injury.  From OT standpoint, recommendation at time of d/c would be level 2 resources. Patient to benefit from continued Occupational Therapy treatment while in the hospital to address deficits as defined above and maximize level of functional independence with ADLs and functional mobility. Pt left with call bell in reach, tray table in reach, needs met, bed alarm activated, SCDs on.     Rehab Resource Intensity Level, OT: II (Moderate Resource Intensity)

## 2024-01-15 NOTE — CASE MANAGEMENT
Case Management Progress Note    Patient name Michael Reynolds Jr.  Location /-01 MRN [id number]  : 1945 Date 1/15/2024       LOS (days): 3  Geometric Mean LOS (GMLOS) (days):   Days to GMLOS:        OBJECTIVE:        Current admission status: Inpatient  Preferred Pharmacy:   [facility]/pharmacy #[id number] - [place], [place] - [address]  [address] [place] [id number]  Phone: [phone] Fax: [phone]    [place], [place] - [address]  [address] [facility] [address]  Phone: [phone] Fax: [phone]    Primary Care Provider: Luke Glasgow DO    Primary Insurance: [employer]  REP  Secondary Insurance:     PROGRESS NOTE:    Cm provided list of rehab facilities able to offer bed to patient for review. Patient offered limited response, so wife was also provided an update. Cm explained process that occurred on Friday and the process for getting patient transferred to rehab. Wife is aware that therapy will need to re-evaluate patient and that insurance company will need to provide update. Cm will follow for patient selection in facility.

## 2024-01-15 NOTE — PLAN OF CARE
Problem: Potential for Falls  Goal: Patient will remain free of falls  Description: INTERVENTIONS:  - Educate patient/family on patient safety including physical limitations  - Instruct patient to call for assistance with activity   - Consult OT/PT to assist with strengthening/mobility   - Keep Call bell within reach  - Keep bed low and locked with side rails adjusted as appropriate  - Keep care items and personal belongings within reach  - Initiate and maintain comfort rounds  - Make Fall Risk Sign visible to staff  - Offer Toileting every  Hours, in advance of need  - Initiate/Maintain alarm  - Obtain necessary fall risk management equipment:   - Apply yellow socks and bracelet for high fall risk patients  - Consider moving patient to room near nurses station  Outcome: Progressing     Problem: PAIN - ADULT  Goal: Verbalizes/displays adequate comfort level or baseline comfort level  Description: Interventions:  - Encourage patient to monitor pain and request assistance  - Assess pain using appropriate pain scale  - Administer analgesics based on type and severity of pain and evaluate response  - Implement non-pharmacological measures as appropriate and evaluate response  - Consider cultural and social influences on pain and pain management  - Notify physician/advanced practitioner if interventions unsuccessful or patient reports new pain  Outcome: Progressing     Problem: INFECTION - ADULT  Goal: Absence or prevention of progression during hospitalization  Description: INTERVENTIONS:  - Assess and monitor for signs and symptoms of infection  - Monitor lab/diagnostic results  - Monitor all insertion sites, i.e. indwelling lines, tubes, and drains  - Monitor endotracheal if appropriate and nasal secretions for changes in amount and color  - [place] appropriate cooling/warming therapies per order  - Administer medications as ordered  - Instruct and encourage patient and family to use good hand hygiene technique  -  Identify and instruct in appropriate isolation precautions for identified infection/condition  Outcome: Progressing  Goal: Absence of fever/infection during neutropenic period  Description: INTERVENTIONS:  - Monitor WBC    Outcome: Progressing     Problem: SAFETY ADULT  Goal: Patient will remain free of falls  Description: INTERVENTIONS:  - Educate patient/family on patient safety including physical limitations  - Instruct patient to call for assistance with activity   - Consult OT/PT to assist with strengthening/mobility   - Keep Call bell within reach  - Keep bed low and locked with side rails adjusted as appropriate  - Keep care items and personal belongings within reach  - Initiate and maintain comfort rounds  - Make Fall Risk Sign visible to staff  - Offer Toileting every  Hours, in advance of need  - Initiate/Maintain alarm  - Obtain necessary fall risk management equipment:   - Apply yellow socks and bracelet for high fall risk patients  - Consider moving patient to room near nurses station  Outcome: Progressing  Goal: Maintain or return to baseline ADL function  Description: INTERVENTIONS:  -  Assess patient's ability to carry out ADLs; assess patient's baseline for ADL function and identify physical deficits which impact ability to perform ADLs (bathing, care of mouth/teeth, toileting, grooming, dressing, etc.)  - Assess/evaluate cause of self-care deficits   - Assess range of motion  - Assess patient's mobility; develop plan if impaired  - Assess patient's need for assistive devices and provide as appropriate  - Encourage maximum independence but intervene and supervise when necessary  - Involve family in performance of ADLs  - Assess for home care needs following discharge   - Consider OT consult to assist with ADL evaluation and planning for discharge  - Provide patient education as appropriate  Outcome: Progressing  Goal: Maintains/Returns to pre admission functional level  Description:  INTERVENTIONS:  - Perform AM-PAC 6 Click Basic Mobility/ Daily Activity assessment daily.  - Set and communicate daily mobility goal to care team and patient/family/caregiver.   - Collaborate with rehabilitation services on mobility goals if consulted  - Perform Range of Motion  times a day.  - Reposition patient every  hours.  - Dangle patient  times a day  - Stand patient  times a day  - Ambulate patient  times a day  - Out of bed to chair  times a day   - Out of bed for meals  times a day  - Out of bed for toileting  - Record patient progress and toleration of activity level   Outcome: Progressing     Problem: DISCHARGE PLANNING  Goal: Discharge to home or other facility with appropriate resources  Description: INTERVENTIONS:  - Identify barriers to discharge w/patient and caregiver  - Arrange for needed discharge resources and transportation as appropriate  - Identify discharge learning needs (meds, wound care, etc.)  - Arrange for interpretive services to assist at discharge as needed  - Refer to Case Management Department for coordinating discharge planning if the patient needs post-hospital services based on physician/advanced practitioner order or complex needs related to functional status, cognitive ability, or social support system  Outcome: Progressing     Problem: Knowledge Deficit  Goal: Patient/family/caregiver demonstrates understanding of disease process, treatment plan, medications, and discharge instructions  Description: Complete learning assessment and assess knowledge base.  Interventions:  - Provide teaching at level of understanding  - Provide teaching via preferred learning methods  Outcome: Progressing     Problem: Prexisting or High Potential for Compromised Skin Integrity  Goal: Skin integrity is maintained or improved  Description: INTERVENTIONS:  - Identify patients at risk for skin breakdown  - Assess and monitor skin integrity  - Assess and monitor nutrition and hydration status  -  Monitor labs   - Assess for incontinence   - Turn and reposition patient  - Assist with mobility/ambulation  - Relieve pressure over bony prominences  - Avoid friction and shearing  - Provide appropriate hygiene as needed including keeping skin clean and dry  - Evaluate need for skin moisturizer/barrier cream  - Collaborate with interdisciplinary team   - Patient/family teaching  - Consider wound care consult   Outcome: Progressing     Problem: CARDIOVASCULAR - ADULT  Goal: Maintains optimal cardiac output and hemodynamic stability  Description: INTERVENTIONS:  - Monitor I/O, vital signs and rhythm  - Monitor for S/S and trends of decreased cardiac output  - Administer and titrate ordered vasoactive medications to optimize hemodynamic stability  - Assess quality of pulses, skin color and temperature  - Assess for signs of decreased coronary artery perfusion  - Instruct patient to report change in severity of symptoms  Outcome: Progressing  Goal: Absence of cardiac dysrhythmias or at baseline rhythm  Description: INTERVENTIONS:  - Continuous cardiac monitoring, vital signs, obtain 12 lead EKG if ordered  - Administer antiarrhythmic and heart rate control medications as ordered  - Monitor electrolytes and administer replacement therapy as ordered  Outcome: Progressing

## 2024-01-16 NOTE — UTILIZATION REVIEW
NOTIFICATION OF ADMISSION DISCHARGE   This is a Notification of Discharge from [facility]. Please be advised that this patient has been discharge from our facility. Below you will find the admission and discharge date and time including the patient’s disposition.   UTILIZATION REVIEW CONTACT:  Susan Khan  Utilization   Network Utilization Review Department  Phone: [phone] x carefully listen to the prompts. All voicemails are confidential.  Email: [email or web address]     ADMISSION INFORMATION  PRESENTATION DATE: 1/11/2024 11:20 AM  OBERVATION ADMISSION DATE:   INPATIENT ADMISSION DATE: 1/12/24  4:03 PM   DISCHARGE DATE: 1/15/2024  7:00 PM   DISPOSITION:Non [facility] SNF/TCU/SNU    Network Utilization Review Department  ATTENTION: Please call with any questions or concerns to [phone] and carefully listen to the prompts so that you are directed to the right person. All voicemails are confidential.   For Discharge needs, contact Care Management DC Support Team at [phone] opt. 2  Send all requests for admission clinical reviews, approved or denied determinations and any other requests to dedicated fax number below belonging to the campus where the patient is receiving treatment. List of dedicated fax numbers for the Facilities:  FACILITY NAME UR FAX NUMBER   ADMISSION DENIALS (Administrative/Medical Necessity) [phone]   DISCHARGE SUPPORT TEAM ([facility]) [phone]   PARENT CHILD HEALTH (Maternity/NICU/Pediatrics) [phone]   [facility] [phone]   [facility] [phone]   [facility] [phone]   [facility] [phone]   [facility] [phone]   [facility] [phone]   [facility] [phone]   [facility]  [place] [phone]   [facility] [phone]   [facility] [phone]   [facility] [phone]

## 2024-01-17 ENCOUNTER — TRANSITIONAL CARE MANAGEMENT (OUTPATIENT)
Dept: FAMILY MEDICINE CLINIC | Facility: OTHER | Age: 79
End: 2024-01-17

## 2024-02-01 ENCOUNTER — TELEPHONE (OUTPATIENT)
Age: 79
End: 2024-02-01

## 2024-02-01 ENCOUNTER — DOCUMENTATION (OUTPATIENT)
Age: 79
End: 2024-02-01

## 2024-02-01 NOTE — TELEPHONE ENCOUNTER
Umm from [facility] called requesting last office visit note to be faxed back to [phone].   
[facility] XR negative - No fracture, No dislocation, No foreign body

## 2024-02-06 ENCOUNTER — TRANSITIONAL CARE MANAGEMENT (OUTPATIENT)
Dept: FAMILY MEDICINE CLINIC | Facility: OTHER | Age: 79
End: 2024-02-06

## 2024-02-08 ENCOUNTER — TELEPHONE (OUTPATIENT)
Dept: ENDOCRINOLOGY | Facility: CLINIC | Age: 79
End: 2024-02-08

## 2024-02-09 ENCOUNTER — TELEPHONE (OUTPATIENT)
Dept: ENDOCRINOLOGY | Facility: CLINIC | Age: 79
End: 2024-02-09

## 2024-02-09 NOTE — TELEPHONE ENCOUNTER
Last office note sent to [facility] jairo by fax. Confirmation received

## 2024-02-12 ENCOUNTER — OFFICE VISIT (OUTPATIENT)
Dept: FAMILY MEDICINE CLINIC | Facility: OTHER | Age: 79
End: 2024-02-12
Payer: COMMERCIAL

## 2024-02-12 VITALS
TEMPERATURE: 98 F | SYSTOLIC BLOOD PRESSURE: 128 MMHG | DIASTOLIC BLOOD PRESSURE: 62 MMHG | WEIGHT: 215.2 LBS | RESPIRATION RATE: 18 BRPM | OXYGEN SATURATION: 99 % | HEART RATE: 85 BPM | BODY MASS INDEX: 31.87 KG/M2 | HEIGHT: 69 IN

## 2024-02-12 DIAGNOSIS — N18.4 CKD (CHRONIC KIDNEY DISEASE), STAGE IV (HCC): ICD-10-CM

## 2024-02-12 DIAGNOSIS — R55 SYNCOPE, UNSPECIFIED SYNCOPE TYPE: Primary | ICD-10-CM

## 2024-02-12 PROCEDURE — 99213 OFFICE O/P EST LOW 20 MIN: CPT | Performed by: FAMILY MEDICINE

## 2024-02-12 RX ORDER — LOSARTAN POTASSIUM 25 MG/1
TABLET ORAL
COMMUNITY
Start: 2024-02-06 | End: 2024-02-22

## 2024-02-12 RX ORDER — NITROFURANTOIN MACROCRYSTALS 100 MG/1
100 CAPSULE ORAL DAILY
COMMUNITY
Start: 2024-02-05 | End: 2024-02-22

## 2024-02-12 NOTE — PROGRESS NOTES
"Assessment & Plan       No orders of the defined types were placed in this encounter.         Subjective     Transitional Care Management Review:   Michael Reynolds Jr. is a 78 y.o. male here for TCM follow up.   Patient reportedly not adhering to medications at home, changing depends    TCM encounter for hospitalization from 1/11 - 1/15.   Patients states he \"keeled over\" when trying to put his elbow into his sleeve when putting jacket on at the Endocrinologists office. Reportedly head started going down onto counter top at the time, was put in chair and EMS called. BG of 131 when checked at the time.  Hadnt been acting right the week prior to going to the Endocrinologist reportedly. Taking insulin at irregular intervals not eating properly per wife.    Was diuresed in hospital going down from 250 to 216. Echo performed in hospital noting mild AS and significantly elevated right ventricular pressure of 45 mmHg. Mild dilation of the ascending aorta at 4.0 cm.   Cardiology has not yet reached out for follow up.   Does not endorse any North, SOB, chest pain.   Wife is concerned for medication non-compliance and lack of weight checking regularly.     During the TCM phone call patient stated:  TCM Call     Date and time call was made  2/6/2024  9:33 AM    Hospital care reviewed  Records reviewed    Patient was hospitialized at  Other (comment)    Comment  [facility] nursing rehab    Date of Admission  01/15/24    Date of discharge  02/05/24    Diagnosis  Presyncopal episode    Disposition  Home    Were the patients medications reviewed and updated  Yes    Current Symptoms  None      TCM Call     Post hospital issues  None    Should patient be enrolled in anticoag monitoring?  No    Scheduled for follow up?  Yes    Patients specialists  Nephrologist    Did you obtain your prescribed medications  Yes    Do you need help managing your prescriptions or medications  No    Is transportation to your appointment needed  No    I " "have advised the patient to call PCP with any new or worsening symptoms  luz martinez    Living Arrangements  Spouse or Significiant other    Support System  Spouse    The type of support provided  Emotional; Financial; Physical    Do you have social support  Yes, as much as I need    Are you recieving any outpatient services  No    What type of services  physical therapy     Are you recieving home care services  Yes  visiting nurse, PT and OT    Types of home care services  Home PT; Nurse visit    Counseling  Family          Review of Systems   Constitutional:  Negative for activity change, appetite change, fatigue and fever.   HENT:  Negative for congestion and rhinorrhea.    Eyes:  Negative for visual disturbance.   Respiratory:  Negative for chest tightness and shortness of breath.    Cardiovascular:  Negative for chest pain and palpitations.   Gastrointestinal:  Negative for constipation, diarrhea, nausea and vomiting.   Genitourinary:  Negative for dysuria, flank pain and hematuria.   Musculoskeletal:  Negative for arthralgias and myalgias.   Skin:  Negative for color change.   Neurological:  Negative for dizziness, light-headedness and headaches.       Objective     /62   Pulse 85   Temp 98 °F (36.7 °C)   Resp 18   Ht 5' 9\" (1.753 m)   Wt 97.6 kg (215 lb 3.2 oz)   SpO2 99%   BMI 31.78 kg/m²        Physical Exam  Constitutional:       General: He is not in acute distress.     Appearance: Normal appearance. He is not ill-appearing.   HENT:      Head: Normocephalic and atraumatic.      Right Ear: External ear normal.      Left Ear: External ear normal.      Nose: Nose normal.   Eyes:      Extraocular Movements: Extraocular movements intact.      Conjunctiva/sclera: Conjunctivae normal.   Cardiovascular:      Rate and Rhythm: Normal rate and regular rhythm.      Pulses: Normal pulses.      Heart sounds: Murmur (systolic murmur) heard.   Pulmonary:      Effort: Pulmonary effort is normal.      " Breath sounds: Normal breath sounds. No wheezing, rhonchi or rales.   Abdominal:      General: Abdomen is flat. There is no distension.      Palpations: Abdomen is soft. There is no mass.      Tenderness: There is no abdominal tenderness. There is no guarding.   Musculoskeletal:      Right lower leg: No edema.      Left lower leg: No edema.   Skin:     General: Skin is warm and dry.   Neurological:      Mental Status: He is alert.      Motor: No weakness.      Gait: Gait normal.       Medications have been reviewed by provider in current encounter

## 2024-02-15 PROBLEM — R55 SYNCOPE: Status: ACTIVE | Noted: 2024-02-15

## 2024-02-15 NOTE — PROGRESS NOTES
Assessment/Plan:    Syncope  - Patient hospitalized from 1/11 - 1/15 for brief syncopal episode that occurred at Endocrinologists office   - Patient reportedly putting his jacket on to leave when he became lightheaded and nearly passed out  - Patients BG, BP and other vitals reportedly stable at time  - While hospitalized patient diuresed with weight dropping from 250 to 216. Echo performed and revealing mild aortic stenosis, significant right side hear pressure of 45 mmHg and moderate aortic dilation of 4 cm  - Patient is to follow up with Cardiology at this time; has not yet heard from office to set an appointment  - Etiology of syncopal episode not clearly defined but given improvement with diuresis episode likely multifactorial in setting of fluid overload due to ESRD, aortic stenosis and right sided heart failure with pulmonary HTN     Plan  - F/u with Cardiology  - Patient will need CT scan every 6-12 months to continue to monitor aortic aneurysm given size  - Consider vascular surgery consult; patient not interested at this time  - Encourage daily weight checks and BP checks; diuretic dose to be doubled in setting of >3 lb wt gain in 24 hours or >5lb wt gain in 1 week. Patient should follow up with office as well  - Dry weight at this time around 215 lbs   - Continue tight BP control; goal of >130 systolic and possibly >120 if tolerable      Diagnoses and all orders for this visit:    Syncope, unspecified syncope type    CKD (chronic kidney disease), stage IV (HCC)    Other orders  -     losartan (COZAAR) 25 mg tablet  -     nitrofurantoin (MACRODANTIN) 100 mg capsule          Subjective:      Patient ID: Michael Reynolds Jr. is a 78 y.o. male.    HPI    The following portions of the patient's history were reviewed and updated as appropriate: allergies, current medications, past family history, past medical history, past social history, past surgical history, and problem list.    Review of Systems   "    Objective:      /62   Pulse 85   Temp 98 °F (36.7 °C)   Resp 18   Ht 5' 9\" (1.753 m)   Wt 97.6 kg (215 lb 3.2 oz)   SpO2 99%   BMI 31.78 kg/m²          Physical Exam  Constitutional:       General: He is not in acute distress.     Appearance: Normal appearance. He is not ill-appearing.   HENT:      Head: Normocephalic and atraumatic.      Right Ear: External ear normal.      Left Ear: External ear normal.      Nose: Nose normal.   Eyes:      Extraocular Movements: Extraocular movements intact.      Conjunctiva/sclera: Conjunctivae normal.   Cardiovascular:      Rate and Rhythm: Normal rate and regular rhythm.      Pulses: Normal pulses.      Heart sounds: Murmur heard.   Pulmonary:      Effort: Pulmonary effort is normal.      Breath sounds: Normal breath sounds. No wheezing, rhonchi or rales.   Abdominal:      General: Abdomen is flat. There is no distension.      Palpations: Abdomen is soft. There is no mass.      Tenderness: There is no abdominal tenderness. There is no guarding.   Musculoskeletal:      Right lower leg: No edema.      Left lower leg: No edema.   Skin:     General: Skin is warm and dry.   Neurological:      Mental Status: He is alert.      Motor: No weakness.      Gait: Gait normal.           "

## 2024-02-15 NOTE — PROGRESS NOTES
"Name: Michael Reynolds Jr.      : 1945      MRN: [id number]  Encounter Provider: Luke Glasgow DO  Encounter Date: 2024   Encounter department: [facility]    Assessment & Plan     1. Syncope, unspecified syncope type  Assessment & Plan:  - Patient hospitalized from  - 1/15 for brief syncopal episode that occurred at Endocrinologists office   - Patient reportedly putting his jacket on to leave when he became lightheaded and nearly passed out  - Patients BG, BP and other vitals reportedly stable at time  - While hospitalized patient diuresed with weight dropping from 250 to 216. Echo performed and revealing mild aortic stenosis, significant right side hear pressure of 45 mmHg and moderate aortic dilation of 4 cm  - Patient is to follow up with Cardiology at this time; has not yet heard from office to set an appointment  - Etiology of syncopal episode not clearly defined but given improvement with diuresis episode likely multifactorial in setting of fluid overload due to ESRD, aortic stenosis and right sided heart failure with pulmonary HTN     Plan  - F/u with Cardiology  - Patient will need CT scan every 6-12 months to continue to monitor aortic aneurysm given size  - Consider vascular surgery consult; patient not interested at this time  - Encourage daily weight checks and BP checks; diuretic dose to be doubled in setting of >3 lb wt gain in 24 hours or >5lb wt gain in 1 week. Patient should follow up with office as well  - Dry weight at this time around 215 lbs   - Continue tight BP control; goal of >130 systolic and possibly >120 if tolerable       2. CKD (chronic kidney disease), stage IV (HCC)           Subjective     TCM encounter for hospitalization from  - 1/15.   Patients states he \"keeled over\" when trying to put his elbow into his sleeve when putting jacket on at the Endocrinologists office. Reportedly head started going down onto counter top at the " time, was put in chair and EMS called. BG of 131 when checked at the time.  Hadnt been acting right the week prior to going to the Endocrinologist reportedly. Taking insulin at irregular intervals not eating properly per wife.    Was diuresed in hospital going down from 250 to 216. Echo performed in hospital noting mild AS and significantly elevated right ventricular pressure of 45 mmHg. Mild dilation of the ascending aorta at 4.0 cm.   Cardiology has not yet reached out for follow up.   Does not endorse any North, SOB, chest pain.   Wife is concerned for medication non-compliance and lack of weight checking regularly.      During the TCM phone call patient stated:  TCM Call     Date and time call was made  2/6/2024  9:33 AM    Hospital care reviewed  Records reviewed    Patient was hospitialized at  Other (comment)    Comment  [facility] nursing rehab    Date of Admission  01/15/24    Date of discharge  02/05/24    Diagnosis  Presyncopal episode    Disposition  Home    Were the patients medications reviewed and updated  Yes    Current Symptoms  None     TCM Call     Post hospital issues  None    Should patient be enrolled in anticoag monitoring?  No    Scheduled for follow up?  Yes    Patients specialists  Nephrologist    Did you obtain your prescribed medications  Yes    Do you need help managing your prescriptions or medications  No    Is transportation to your appointment needed  No    I have advised the patient to call PCP with any new or worsening symptoms  luz martinez    Living Arrangements  Spouse or Significiant other    Support System  Spouse    The type of support provided  Emotional; Financial; Physical    Do you have social support  Yes, as much as I need    Are you recieving any outpatient services  No    What type of services  physical therapy     Are you recieving home care services  Yes  visiting nurse, PT and OT    Types of home care services  Home PT; Nurse visit    Counseling   Family        Review of Systems   Constitutional:  Negative for chills and fever.   HENT:  Negative for ear pain and sore throat.    Eyes:  Negative for pain and visual disturbance.   Respiratory:  Negative for cough and shortness of breath.    Cardiovascular:  Negative for chest pain and palpitations.   Gastrointestinal:  Negative for abdominal pain and vomiting.   Genitourinary:  Negative for dysuria and hematuria.   Musculoskeletal:  Negative for arthralgias and back pain.   Skin:  Negative for color change and rash.   Neurological:  Negative for dizziness, weakness, light-headedness and headaches.   All other systems reviewed and are negative.      Past Medical History:   Diagnosis Date   • CHF (congestive heart failure) (HCC)    • Chronic kidney disease 18 - 24 months?    Dr Patterson - stage 3   • Colon polyp    • CPAP (continuous positive airway pressure) dependence    • Diabetes mellitus (HCC)    • History of transfusion    • Hyperlipidemia    • Hypertension    • Myocardial infarction (HCC) 06/2019    Open heart surgery with quad bypass   • Obesity    • Renal disorder    • Sleep apnea    • Visual impairment 1991    Dr Nickie Peters     Past Surgical History:   Procedure Laterality Date   • APPENDECTOMY  1977    Done in conjunction with cholecystectomy   • BACK SURGERY     • CATARACT EXTRACTION, BILATERAL Bilateral     Left eye- 10/23 Right eye 9/23   • CHOLECYSTECTOMY  1977   • COLONOSCOPY     • CORONARY ARTERY BYPASS GRAFT  2019    quad   • EGD     • GALLBLADDER SURGERY     • HERNIA REPAIR     • TN CORONARY ARTERY BYP W/VEIN & ARTERY GRAFT 4 VEIN N/A 06/21/2019    Procedure: CORONARY ARTERY BYPASS GRAFT (CABG) 4 VESSELS WITH SVG TO PDA, OM, DIAGONAL AND LIMA TO LAD; RIGHT LEG EVH;  Surgeon: James Fang MD;  Location: [facility];  Service: Cardiac Surgery   • RECTAL SURGERY     • SPINE SURGERY  2012    Fusion L3-L5?   • TONSILLECTOMY       Family History   Problem Relation Age of Onset   • Cancer  Mother    • Alcohol abuse Mother    • Cancer Father    • Alcohol abuse Father    • Diabetes Maternal Grandmother    • Diabetes type II Maternal Grandmother         Geriatric onset -     • Diabetes Paternal Aunt    • Hypertension Paternal Grandfather      Social History     Socioeconomic History   • Marital status: /Civil Union     Spouse name: None   • Number of children: None   • Years of education: None   • Highest education level: None   Occupational History   • Occupation: RETIRED   Tobacco Use   • Smoking status: Former     Current packs/day: 0.00     Average packs/day: 3.0 packs/day for 35.0 years (105.0 ttl pk-yrs)     Types: Cigarettes, Cigars     Start date: 1957     Quit date: 1992     Years since quittin.1   • Smokeless tobacco: Never   • Tobacco comments:     Started smoking as a pre-teenager   Vaping Use   • Vaping status: Never Used   Substance and Sexual Activity   • Alcohol use: Not Currently     Comment: none since most recent hospitalization   • Drug use: Never   • Sexual activity: Not Currently     Partners: Female     Birth control/protection: Male Sterilization, None   Other Topics Concern   • None   Social History Narrative    · Do you currently or have you served in the [employer]:   No      · Were you activated, into active duty, as a member of the [employer] or as a Reservist:   No      · Caffeine intake:   Occasional      · Diet:   Regular      · Live alone or with others:   with others      · Exercise level:   Occasional  swim in summertime. walk alot.     · ·Guns present in home:   No      · Seat belts used routinely:   Yes      · Advance directive:   No      · Sunscreen used routinely:   No      · Smoke alarm in home:   Yes      · Legally blind in one or both eyes:   No      · Hard of hearing or deaf in one or both ears:   No      ·      Social Determinants of Health     Financial Resource Strain: Low Risk  (2023)    Overall Financial  Resource Strain (CARDIA)    • Difficulty of Paying Living Expenses: Not very hard   Food Insecurity: Unknown (12/18/2020)    Hunger Vital Sign    • Worried About Running Out of Food in the Last Year: Patient declined    • Ran Out of Food in the Last Year: Patient declined   Transportation Needs: No Transportation Needs (12/17/2023)    PRAPARE - Transportation    • Lack of Transportation (Medical): No    • Lack of Transportation (Non-Medical): No   Physical Activity: Unknown (7/27/2020)    Exercise Vital Sign    • Days of Exercise per Week: Patient declined    • Minutes of Exercise per Session: Patient declined   Stress: Not on file   Social Connections: Unknown (7/27/2020)    Social Connection and Isolation Panel [NHANES]    • Frequency of Communication with Friends and Family: Patient declined    • Frequency of Social Gatherings with Friends and Family: Patient declined    • Attends [employer] Services: Patient declined    • Active Member of Clubs or Organizations: Patient declined    • Attends Club or Organization Meetings: Patient declined    • Marital Status: Patient declined   Intimate Partner Violence: Not on file   Housing Stability: Not on file     Current Outpatient Medications on File Prior to Visit   Medication Sig   • amLODIPine (NORVASC) 5 mg tablet TAKE 1 TABLET (5 MG TOTAL) BY MOUTH DAILY.   • Ascorbic Acid (VITAMIN C) 1000 MG tablet Take 1,000 mg by mouth daily   • aspirin (ECOTRIN LOW STRENGTH) 81 mg EC tablet Take 1 tablet (81 mg total) by mouth daily   • atorvastatin (LIPITOR) 80 mg tablet Take 1 tablet (80 mg total) by mouth daily with dinner   • calcitriol (ROCALTROL) 0.25 mcg capsule Take 1 capsule (0.25 mcg total) by mouth 3 (three) times a week (Patient taking differently: Take 0.25 mcg by mouth 3 (three) times a week Monday Wednesday and friday)   • calcium acetate (PHOSLO) capsule Take 1 capsule (667 mg total) by mouth 2 (two) times a day with meals   • cholecalciferol (VITAMIN D3) 1,000  units tablet Take 2 tablets (2,000 Units total) by mouth daily (Patient taking differently: Take 2,000 Units by mouth daily at bedtime)   • Continuous Blood Gluc  (FreeStyle Celia 2 [place]) MAMI Use to check blood sugar daily   • Continuous Blood Gluc Sensor (FreeStyle Celia 2 Sensor) MISC Use daily as directed for CGM - Change every 14 days   • cyanocobalamin (VITAMIN B-12) 500 mcg tablet Take 1,000 mcg by mouth daily   • finasteride (PROSCAR) 5 mg tablet Take 1 tablet (5 mg total) by mouth daily   • glucose blood test strip Check 4 times a day   • insulin glargine (Toujeo Max SoloStar) 300 units/mL CONCENTRATED U-300 injection pen (2-unit dial) INJECT 42 UNITS UNDER THE SKIN DAILY at dinner time (Patient taking differently: Inject 42 Units under the skin daily at bedtime INJECT 42 UNITS UNDER THE SKIN DAILY at dinner time)   • insulin lispro (HumaLOG KwikPen) 100 units/mL injection pen Inject 16 units before brunch and  18  units before dinner +scale   • Insulin Pen Needle (B-D ULTRAFINE III SHORT PEN) 31G X 8 MM MISC Inject under the skin 4 (four) times a day   • latanoprost (XALATAN) 0.005 % ophthalmic solution Administer 1 drop to both eyes daily at bedtime   • liraglutide (Victoza) injection 1.8 MG ONCE DAILY (Patient taking differently: Inject 1.8 mg under the skin daily with lunch 1.8 mg once daily)   • losartan (COZAAR) 25 mg tablet    • metoprolol tartrate (LOPRESSOR) 50 mg tablet Take 1 tablet (50 mg total) by mouth 2 (two) times a day   • Microlet Lancets MISC USE 3 TIMES DAY   • nitrofurantoin (MACRODANTIN) 100 mg capsule    • pantoprazole (PROTONIX) 40 mg tablet Take 1 tablet (40 mg total) by mouth daily   • telmisartan (MICARDIS) 20 MG tablet TAKE 1 TABLET BY MOUTH EVERY DAY   • torsemide (DEMADEX) 20 mg tablet Take 1 tablet (20 mg total) by mouth daily Take an additional 20 mg on MWF for a total of 40 mg.     No Known Allergies  Immunization History   Administered Date(s) Administered   •  "COVID-19 PFIZER VACCINE 0.3 ML IM 02/15/2021, 03/08/2021, 01/03/2022   • COVID-19 Pfizer vac (Maximo-sucrose, gray cap) 12 yr+ IM 05/13/2022   • INFLUENZA 09/18/2018, 10/19/2021, 09/28/2022, 10/09/2023   • Influenza LAIV (Nasal) 10/17/2016   • Influenza Split High Dose Preservative Free IM 11/13/2017, 09/18/2018, 10/18/2019   • Influenza, high dose seasonal 0.7 mL 08/25/2020, 10/19/2021, 10/09/2023   • Pneumococcal Conjugate 13-Valent 09/05/2019, 10/18/2019   • Pneumococcal Polysaccharide PPV23 09/27/2012   • Tdap 05/29/2013, 08/25/2020   • Zoster Vaccine Recombinant 12/04/2019, 02/27/2020   • influenza, trivalent, adjuvanted 09/09/2019       Objective     /62   Pulse 85   Temp 98 °F (36.7 °C)   Resp 18   Ht 5' 9\" (1.753 m)   Wt 97.6 kg (215 lb 3.2 oz)   SpO2 99%   BMI 31.78 kg/m²     Physical Exam  Constitutional:       General: He is not in acute distress.     Appearance: Normal appearance. He is not ill-appearing.   HENT:      Head: Normocephalic and atraumatic.      Right Ear: External ear normal.      Left Ear: External ear normal.      Nose: Nose normal.   Eyes:      Extraocular Movements: Extraocular movements intact.      Conjunctiva/sclera: Conjunctivae normal.   Cardiovascular:      Rate and Rhythm: Normal rate and regular rhythm.      Pulses: Normal pulses.      Heart sounds: Murmur (systolic murumur heard loudest of the aortic region) heard.   Pulmonary:      Effort: Pulmonary effort is normal.      Breath sounds: Normal breath sounds. No wheezing, rhonchi or rales.   Abdominal:      General: Abdomen is flat. There is no distension.      Palpations: Abdomen is soft. There is no mass.      Tenderness: There is no abdominal tenderness. There is no guarding.   Musculoskeletal:      Right lower leg: No edema.      Left lower leg: No edema.   Skin:     General: Skin is warm and dry.   Neurological:      Mental Status: He is alert.      Motor: No weakness.      Gait: Gait normal.     Luke " Macho Glasgow, DO

## 2024-02-15 NOTE — ASSESSMENT & PLAN NOTE
- Patient hospitalized from 1/11 - 1/15 for brief syncopal episode that occurred at Endocrinologists office   - Patient reportedly putting his jacket on to leave when he became lightheaded and nearly passed out  - Patients BG, BP and other vitals reportedly stable at time  - While hospitalized patient diuresed with weight dropping from 250 to 216. Echo performed and revealing mild aortic stenosis, significant right side hear pressure of 45 mmHg and moderate aortic dilation of 4 cm  - Patient is to follow up with Cardiology at this time; has not yet heard from office to set an appointment  - Etiology of syncopal episode not clearly defined but given improvement with diuresis episode likely multifactorial in setting of fluid overload due to ESRD, aortic stenosis and right sided heart failure with pulmonary HTN     Plan  - F/u with Cardiology  - Patient will need CT scan every 6-12 months to continue to monitor aortic aneurysm given size  - Consider vascular surgery consult; patient not interested at this time  - Encourage daily weight checks and BP checks; diuretic dose to be doubled in setting of >3 lb wt gain in 24 hours or >5lb wt gain in 1 week. Patient should follow up with office as well  - Dry weight at this time around 215 lbs   - Continue tight BP control; goal of >130 systolic and possibly >120 if tolerable

## 2024-02-19 ENCOUNTER — APPOINTMENT (OUTPATIENT)
Dept: LAB | Facility: HOSPITAL | Age: 79
End: 2024-02-19
Payer: COMMERCIAL

## 2024-02-19 DIAGNOSIS — N18.4 ANEMIA OF CHRONIC KIDNEY FAILURE, STAGE 4 (SEVERE): ICD-10-CM

## 2024-02-19 DIAGNOSIS — D63.1 ANEMIA OF CHRONIC KIDNEY FAILURE, STAGE 4 (SEVERE): ICD-10-CM

## 2024-02-19 DIAGNOSIS — N25.81 SECONDARY HYPERPARATHYROIDISM OF RENAL ORIGIN (HCC): ICD-10-CM

## 2024-02-19 DIAGNOSIS — N18.4 CKD (CHRONIC KIDNEY DISEASE), STAGE IV (HCC): ICD-10-CM

## 2024-02-19 LAB
25(OH)D3 SERPL-MCNC: 23 NG/ML (ref 30–100)
ALBUMIN SERPL BCP-MCNC: 2.9 G/DL (ref 3.5–5)
ALP SERPL-CCNC: 66 U/L (ref 34–104)
ALT SERPL W P-5'-P-CCNC: 21 U/L (ref 7–52)
ANION GAP SERPL CALCULATED.3IONS-SCNC: 12 MMOL/L
AST SERPL W P-5'-P-CCNC: 14 U/L (ref 13–39)
BASOPHILS # BLD AUTO: 0.05 THOUSANDS/ÂΜL (ref 0–0.1)
BASOPHILS NFR BLD AUTO: 1 % (ref 0–1)
BILIRUB SERPL-MCNC: 0.4 MG/DL (ref 0.2–1)
BUN SERPL-MCNC: 75 MG/DL (ref 5–25)
CALCIUM ALBUM COR SERPL-MCNC: 9.7 MG/DL (ref 8.3–10.1)
CALCIUM SERPL-MCNC: 8.8 MG/DL (ref 8.4–10.2)
CHLORIDE SERPL-SCNC: 109 MMOL/L (ref 96–108)
CHOLEST SERPL-MCNC: 110 MG/DL
CO2 SERPL-SCNC: 20 MMOL/L (ref 21–32)
CREAT SERPL-MCNC: 4.32 MG/DL (ref 0.6–1.3)
CREAT UR-MCNC: 58.3 MG/DL
EOSINOPHIL # BLD AUTO: 0.55 THOUSAND/ÂΜL (ref 0–0.61)
EOSINOPHIL NFR BLD AUTO: 6 % (ref 0–6)
ERYTHROCYTE [DISTWIDTH] IN BLOOD BY AUTOMATED COUNT: 14.7 % (ref 11.6–15.1)
FERRITIN SERPL-MCNC: 378 NG/ML (ref 24–336)
GFR SERPL CREATININE-BSD FRML MDRD: 12 ML/MIN/1.73SQ M
GLUCOSE P FAST SERPL-MCNC: 168 MG/DL (ref 65–99)
HCT VFR BLD AUTO: 31.2 % (ref 36.5–49.3)
HDLC SERPL-MCNC: 35 MG/DL
HGB BLD-MCNC: 9.4 G/DL (ref 12–17)
IMM GRANULOCYTES # BLD AUTO: 0.05 THOUSAND/UL (ref 0–0.2)
IMM GRANULOCYTES NFR BLD AUTO: 1 % (ref 0–2)
IRON SATN MFR SERPL: 15 % (ref 15–50)
IRON SERPL-MCNC: 26 UG/DL (ref 50–212)
LDLC SERPL CALC-MCNC: 49 MG/DL (ref 0–100)
LYMPHOCYTES # BLD AUTO: 0.87 THOUSANDS/ÂΜL (ref 0.6–4.47)
LYMPHOCYTES NFR BLD AUTO: 9 % (ref 14–44)
MAGNESIUM SERPL-MCNC: 2 MG/DL (ref 1.9–2.7)
MCH RBC QN AUTO: 28.6 PG (ref 26.8–34.3)
MCHC RBC AUTO-ENTMCNC: 30.1 G/DL (ref 31.4–37.4)
MCV RBC AUTO: 95 FL (ref 82–98)
MICROALBUMIN UR-MCNC: 2289.6 MG/L
MICROALBUMIN/CREAT 24H UR: 3927 MG/G CREATININE (ref 0–30)
MONOCYTES # BLD AUTO: 0.58 THOUSAND/ÂΜL (ref 0.17–1.22)
MONOCYTES NFR BLD AUTO: 6 % (ref 4–12)
NEUTROPHILS # BLD AUTO: 7.22 THOUSANDS/ÂΜL (ref 1.85–7.62)
NEUTS SEG NFR BLD AUTO: 77 % (ref 43–75)
NONHDLC SERPL-MCNC: 75 MG/DL
NRBC BLD AUTO-RTO: 0 /100 WBCS
PHOSPHATE SERPL-MCNC: 4 MG/DL (ref 2.3–4.1)
PLATELET # BLD AUTO: 319 THOUSANDS/UL (ref 149–390)
PMV BLD AUTO: 10.7 FL (ref 8.9–12.7)
POTASSIUM SERPL-SCNC: 5 MMOL/L (ref 3.5–5.3)
PROT SERPL-MCNC: 7.1 G/DL (ref 6.4–8.4)
PTH-INTACT SERPL-MCNC: 160.4 PG/ML (ref 12–88)
RBC # BLD AUTO: 3.29 MILLION/UL (ref 3.88–5.62)
SODIUM SERPL-SCNC: 141 MMOL/L (ref 135–147)
TIBC SERPL-MCNC: 177 UG/DL (ref 250–450)
TRIGL SERPL-MCNC: 132 MG/DL
UIBC SERPL-MCNC: 151 UG/DL (ref 155–355)
WBC # BLD AUTO: 9.32 THOUSAND/UL (ref 4.31–10.16)

## 2024-02-19 PROCEDURE — 82043 UR ALBUMIN QUANTITATIVE: CPT

## 2024-02-19 PROCEDURE — 83735 ASSAY OF MAGNESIUM: CPT

## 2024-02-19 PROCEDURE — 83540 ASSAY OF IRON: CPT

## 2024-02-19 PROCEDURE — 85025 COMPLETE CBC W/AUTO DIFF WBC: CPT

## 2024-02-19 PROCEDURE — 83550 IRON BINDING TEST: CPT

## 2024-02-19 PROCEDURE — 82306 VITAMIN D 25 HYDROXY: CPT

## 2024-02-19 PROCEDURE — 36415 COLL VENOUS BLD VENIPUNCTURE: CPT

## 2024-02-19 PROCEDURE — 82728 ASSAY OF FERRITIN: CPT

## 2024-02-19 PROCEDURE — 82570 ASSAY OF URINE CREATININE: CPT

## 2024-02-19 PROCEDURE — 84100 ASSAY OF PHOSPHORUS: CPT

## 2024-02-19 PROCEDURE — 83970 ASSAY OF PARATHORMONE: CPT

## 2024-02-21 PROBLEM — Z02.4 ENCOUNTER FOR COMMERCIAL DRIVER MEDICAL EXAMINATION (CDME): Status: RESOLVED | Noted: 2020-06-03 | Resolved: 2024-02-21

## 2024-02-22 ENCOUNTER — OFFICE VISIT (OUTPATIENT)
Dept: NEPHROLOGY | Facility: CLINIC | Age: 79
End: 2024-02-22
Payer: COMMERCIAL

## 2024-02-22 VITALS
OXYGEN SATURATION: 98 % | WEIGHT: 215 LBS | SYSTOLIC BLOOD PRESSURE: 128 MMHG | HEART RATE: 92 BPM | BODY MASS INDEX: 31.84 KG/M2 | DIASTOLIC BLOOD PRESSURE: 60 MMHG | HEIGHT: 69 IN

## 2024-02-22 DIAGNOSIS — N18.9 CHRONIC KIDNEY DISEASE-MINERAL AND BONE DISORDER: ICD-10-CM

## 2024-02-22 DIAGNOSIS — N18.4 BENIGN HYPERTENSION WITH CHRONIC KIDNEY DISEASE, STAGE IV (HCC): ICD-10-CM

## 2024-02-22 DIAGNOSIS — E83.9 CHRONIC KIDNEY DISEASE-MINERAL AND BONE DISORDER: ICD-10-CM

## 2024-02-22 DIAGNOSIS — N25.81 SECONDARY HYPERPARATHYROIDISM OF RENAL ORIGIN (HCC): ICD-10-CM

## 2024-02-22 DIAGNOSIS — M89.9 CHRONIC KIDNEY DISEASE-MINERAL AND BONE DISORDER: ICD-10-CM

## 2024-02-22 DIAGNOSIS — E55.9 VITAMIN D DEFICIENCY: ICD-10-CM

## 2024-02-22 DIAGNOSIS — N18.4 CKD (CHRONIC KIDNEY DISEASE), STAGE IV (HCC): Primary | ICD-10-CM

## 2024-02-22 DIAGNOSIS — E87.70 HYPERVOLEMIA ASSOCIATED WITH RENAL INSUFFICIENCY: ICD-10-CM

## 2024-02-22 DIAGNOSIS — R80.9 NON-NEPHROTIC RANGE PROTEINURIA: ICD-10-CM

## 2024-02-22 DIAGNOSIS — I12.9 BENIGN HYPERTENSION WITH CHRONIC KIDNEY DISEASE, STAGE IV (HCC): ICD-10-CM

## 2024-02-22 DIAGNOSIS — N28.9 HYPERVOLEMIA ASSOCIATED WITH RENAL INSUFFICIENCY: ICD-10-CM

## 2024-02-22 PROCEDURE — 99214 OFFICE O/P EST MOD 30 MIN: CPT | Performed by: INTERNAL MEDICINE

## 2024-02-22 NOTE — PATIENT INSTRUCTIONS
If you are okay with it, please take metoprolol, aspirin and atorvastatin.   Please continue with your diabetic medications.   In the interest of keeping your medications to a minimum as you requested, you can stop the rest of the medications.   Please bring your living will with your next visit.   Follow up in 3 months.

## 2024-02-22 NOTE — PROGRESS NOTES
NEPHROLOGY OFFICE PROGRESS NOTE   Michael Reynolds Jr. 78 y.o. male MRN: [id number]  DATE: 02/22/24  Reason for visit: Continued evaluation of CKD    ASSESSMENT & PLAN:  Chronic kidney disease, stage V  His CKD is due to diabetic nephropathy + sequelae from post op ATN from CABG.   His creatinine is now up to 4.32 - he has had progression of disease.   He completed CKD education.  He does not want dialysis even if it were necessary to save his life.   Will plan for ACP visit.   See discussion below.     Hypertension  BP is at goal (<130/80)  Not checking BP at home.   Not taking medications.   Previous regimen: Torsemide 20 mg OD, Metoprolol 50 mg BID, Telmisartan 20 mg OD, Amlodipine 5 mg OD.   I suspect that his suprisingly good BP control now despite not being on meds is due to his weight loss from being in the SNF and the fact that he is not eating out as much.     Bilateral leg edema  Mild and stable.   Not taking Torsemide - okay to maintain off Torsemide.     Mineral bone disease  PTH is at goal in Feb 2024.   Vitamin D is low in Feb 2024.   Ca and phos are at goal.     Proteinuria:  Urine ACR is 3.9 gm.   Not taking Telmisartan.     Obesity, weight gain:  Lost weight while being in SNF.     Anemia:   Hgb stable at 9.4    Diabetes mellitus:   Diabetic management is deferred to endocrinology or PCP.  Not taking meds.     Hyperlipidemia:  Lipid management is deferred to PCP.   Not taking meds.     Overall, I am at a loss with what to do with Johan.  He currently has progressive renal disease and, unfortunately, does not have the motivation to take care of himself or to try to get better.  He has not taken any of his medications since February 5, 2024. His wife is exasperated with him and the whole situation. After some discussion and back and forth, he reports that he is willing to go back on some medications. In order to keep his pill burden to a minimum, I suggested to him that he go back on his cardiac and  "diabetic medications for now. I advised him to restart Metoprolol, aspirin, atorvastatin, and his diabetic medications. I decided to keep him off the \"non essential meds\" including pantoprazole, calcium acetate, calcitriol, finasteride, vitamin D, and some of his blood pressure medicines. We will arrange for ACP during his next visit.     Patient Instructions   If you are okay with it, please take metoprolol, aspirin and atorvastatin.   Please continue with your diabetic medications.   In the interest of keeping your medications to a minimum as you requested, you can stop the rest of the medications.   Please bring your living will with your next visit.   Follow up in 3 months.     SUBJECTIVE / INTERVAL HISTORY:  Johan was last seen in the office in November 2023.   During a visit with endocrinology on January 11, 2024, he had a syncopal episode after the visit and was admitted to [facility] from January 11 to January 15.    Based on the discharge paperwork, he was discharged on torsemide 20 mg daily and 40 mg on MWF, telmisartan 20 mg daily, metoprolol 50 mg twice a day, amlodipine 5 mg daily,  After the hospital stay, he required rehab for a few weeks.   While at rehab, he lost weight (30ish lbs) and his edema resolved prompting a decrease in his torsemide dosing while in rehab.   He has been home since February 5, 2024.  Since being home, he has NOT taken any of his medications.  He reports that the medications make him feel worse.  Not checking sugars or weights.   His wife is exasperated at him and they were arguing during today's visit.  He has bowel and urinary incontinence.  Not checking BP at home.  Interestingly, he has had no recurrence of edema despite not taking torsemide.    PMH/PSH: HTN, DM, HLP, CKD, CAD s/p CABG, carotid stenosis, HARJIT, BPH, obesity, DDD s/p fusion surgery, cholecystectomy, hernia repair, tonsillectomy, urinary retention    Previous work up:   6/3/19 Renal US: R 10.6 " "cm, L 11 cm,  cc    ALLERGIES: No Known Allergies    REVIEW OF SYSTEMS:  Review of Systems   Constitutional:  Positive for fatigue. Negative for appetite change, chills and fever.   Respiratory:  Negative for cough and shortness of breath.    Cardiovascular:  Negative for chest pain and leg swelling.   Gastrointestinal:  Negative for abdominal pain, nausea and vomiting.   Genitourinary:  Negative for hematuria.   Musculoskeletal:  Negative for arthralgias and back pain.   Neurological:  Negative for dizziness and light-headedness.      OBJECTIVE:  /60 (BP Location: Left arm, Patient Position: Sitting, Cuff Size: Large)   Pulse 92   Ht 5' 9\" (1.753 m)   Wt 97.5 kg (215 lb)   SpO2 98%   BMI 31.75 kg/m²   Current Weight:   Body mass index is 31.75 kg/m².  Physical Exam  Constitutional:       General: He is not in acute distress.     Appearance: He is well-developed. He is obese. He is not toxic-appearing.   HENT:      Head: Normocephalic and atraumatic.   Eyes:      General: No scleral icterus.     Conjunctiva/sclera: Conjunctivae normal.   Neck:      Vascular: No JVD.   Cardiovascular:      Rate and Rhythm: Normal rate and regular rhythm.      Heart sounds: Normal heart sounds.   Pulmonary:      Effort: Pulmonary effort is normal. No respiratory distress.      Breath sounds: Normal breath sounds.   Abdominal:      General: Bowel sounds are normal.      Palpations: Abdomen is soft.   Musculoskeletal:      Cervical back: Neck supple.      Comments: Trace to no LE edema.    Skin:     General: Skin is warm and dry.   Neurological:      Mental Status: He is alert and oriented to person, place, and time.   Psychiatric:         Behavior: Behavior normal.         Judgment: Judgment normal.     Medications:  Current Outpatient Medications:     amLODIPine (NORVASC) 5 mg tablet, TAKE 1 TABLET (5 MG TOTAL) BY MOUTH DAILY., Disp: 90 tablet, Rfl: 1    Ascorbic Acid (VITAMIN C) 1000 MG tablet, Take 1,000 mg by " mouth daily, Disp: , Rfl:     aspirin (ECOTRIN LOW STRENGTH) 81 mg EC tablet, Take 1 tablet (81 mg total) by mouth daily, Disp: 60 tablet, Rfl: 8    atorvastatin (LIPITOR) 80 mg tablet, Take 1 tablet (80 mg total) by mouth daily with dinner, Disp: 90 tablet, Rfl: 0    calcitriol (ROCALTROL) 0.25 mcg capsule, Take 1 capsule (0.25 mcg total) by mouth 3 (three) times a week (Patient taking differently: Take 0.25 mcg by mouth 3 (three) times a week Monday Wednesday and friday), Disp: 36 capsule, Rfl: 3    calcium acetate (PHOSLO) capsule, Take 1 capsule (667 mg total) by mouth 2 (two) times a day with meals, Disp: 180 capsule, Rfl: 1    cholecalciferol (VITAMIN D3) 1,000 units tablet, Take 2 tablets (2,000 Units total) by mouth daily (Patient taking differently: Take 2,000 Units by mouth daily at bedtime), Disp: 1 tablet, Rfl: 1    Continuous Blood Gluc  (FreeStyle Celia 2 [place], Use to check blood sugar daily, Disp: 1 each, Rfl: 0    Continuous Blood Gluc Sensor (FreeStyle Celia 2 Sensor) MISC, Use daily as directed for CGM - Change every 14 days, Disp: 6 each, Rfl: 3    cyanocobalamin (VITAMIN B-12) 500 mcg tablet, Take 1,000 mcg by mouth daily, Disp: , Rfl:     finasteride (PROSCAR) 5 mg tablet, Take 1 tablet (5 mg total) by mouth daily, Disp: 90 tablet, Rfl: 3    glucose blood test strip, Check 4 times a day, Disp: 400 each, Rfl: 1    insulin glargine (Toujeo Max SoloStar) 300 units/mL CONCENTRATED U-300 injection pen (2-unit dial), INJECT 42 UNITS UNDER THE SKIN DAILY at dinner time (Patient taking differently: Inject 42 Units under the skin daily at bedtime INJECT 42 UNITS UNDER THE SKIN DAILY at dinner time), Disp: 18 mL, Rfl: 1    insulin lispro (HumaLOG KwikPen) 100 units/mL injection pen, Inject 16 units before brunch and  18  units before dinner +scale, Disp: 15 mL, Rfl: 1    Insulin Pen Needle (B-D ULTRAFINE III SHORT PEN) 31G X 8 MM MISC, Inject under the skin 4 (four) times a day, Disp: 400  each, Rfl: 0    latanoprost (XALATAN) 0.005 % ophthalmic solution, Administer 1 drop to both eyes daily at bedtime, Disp: , Rfl:     liraglutide (Victoza) injection, 1.8 MG ONCE DAILY (Patient taking differently: Inject 1.8 mg under the skin daily with lunch 1.8 mg once daily), Disp: 9 mL, Rfl: 3    losartan (COZAAR) 25 mg tablet, , Disp: , Rfl:     metoprolol tartrate (LOPRESSOR) 50 mg tablet, Take 1 tablet (50 mg total) by mouth 2 (two) times a day, Disp: 180 tablet, Rfl: 1    Microlet Lancets MISC, USE 3 TIMES DAY, Disp: 300 each, Rfl: 1    nitrofurantoin (MACRODANTIN) 100 mg capsule, , Disp: , Rfl:     pantoprazole (PROTONIX) 40 mg tablet, Take 1 tablet (40 mg total) by mouth daily, Disp: 90 tablet, Rfl: 1    telmisartan (MICARDIS) 20 MG tablet, TAKE 1 TABLET BY MOUTH EVERY DAY, Disp: 90 tablet, Rfl: 1    torsemide (DEMADEX) 20 mg tablet, Take 1 tablet (20 mg total) by mouth daily Take an additional 20 mg on MWF for a total of 40 mg., Disp: 90 tablet, Rfl: 1    Laboratory Results:  Results for orders placed or performed in visit on 02/19/24   CBC and differential   Result Value Ref Range    WBC 9.32 4.31 - 10.16 Thousand/uL    RBC 3.29 (L) 3.88 - 5.62 Million/uL    Hemoglobin 9.4 (L) 12.0 - 17.0 g/dL    Hematocrit 31.2 (L) 36.5 - 49.3 %    MCV 95 82 - 98 fL    MCH 28.6 26.8 - 34.3 pg    MCHC 30.1 (L) 31.4 - 37.4 g/dL    RDW 14.7 11.6 - 15.1 %    MPV 10.7 8.9 - 12.7 fL    Platelets 319 149 - 390 Thousands/uL    nRBC 0 /100 WBCs    Neutrophils Relative 77 (H) 43 - 75 %    Immat GRANS % 1 0 - 2 %    Lymphocytes Relative 9 (L) 14 - 44 %    Monocytes Relative 6 4 - 12 %    Eosinophils Relative 6 0 - 6 %    Basophils Relative 1 0 - 1 %    Neutrophils Absolute 7.22 1.85 - 7.62 Thousands/µL    Immature Grans Absolute 0.05 0.00 - 0.20 Thousand/uL    Lymphocytes Absolute 0.87 0.60 - 4.47 Thousands/µL    Monocytes Absolute 0.58 0.17 - 1.22 Thousand/µL    Eosinophils Absolute 0.55 0.00 - 0.61 Thousand/µL    Basophils  Absolute 0.05 0.00 - 0.10 Thousands/µL   Magnesium   Result Value Ref Range    Magnesium 2.0 1.9 - 2.7 mg/dL   Albumin / creatinine urine ratio   Result Value Ref Range    Creatinine, Ur 58.3 Reference range not established. mg/dL    Albumin,U,Random 2,289.6 (H) <20.0 mg/L    Albumin Creat Ratio 3,927 (H) 0 - 30 mg/g creatinine   PTH, intact   Result Value Ref Range    .4 (H) 12.0 - 88.0 pg/mL   Vitamin D 25 hydroxy   Result Value Ref Range    Vit D, 25-Hydroxy 23.0 (L) 30.0 - 100.0 ng/mL   TIBC Panel (incl. Iron, TIBC, % Iron Saturation)   Result Value Ref Range    Iron Saturation 15 15 - 50 %    TIBC 177 (L) 250 - 450 ug/dL    Iron 26 (L) 50 - 212 ug/dL    UIBC 151 (L) 155 - 355 ug/dL   Ferritin   Result Value Ref Range    Ferritin 378 (H) 24 - 336 ng/mL   Phosphorus   Result Value Ref Range    Phosphorus 4.0 2.3 - 4.1 mg/dL

## 2024-02-26 ENCOUNTER — HOSPITAL ENCOUNTER (INPATIENT)
Facility: HOSPITAL | Age: 79
LOS: 2 days | Discharge: NURSING, REHAB OR LONG-TERM CARE | DRG: 372 | End: 2024-02-29
Attending: EMERGENCY MEDICINE | Admitting: INTERNAL MEDICINE
Payer: COMMERCIAL

## 2024-02-26 DIAGNOSIS — R62.7 FAILURE TO THRIVE IN ADULT: ICD-10-CM

## 2024-02-26 DIAGNOSIS — N18.31 TYPE 2 DIABETES MELLITUS WITH STAGE 3A CHRONIC KIDNEY DISEASE, WITH LONG-TERM CURRENT USE OF INSULIN (HCC): ICD-10-CM

## 2024-02-26 DIAGNOSIS — Z79.4 TYPE 2 DIABETES MELLITUS WITH STAGE 4 CHRONIC KIDNEY DISEASE, WITH LONG-TERM CURRENT USE OF INSULIN (HCC): ICD-10-CM

## 2024-02-26 DIAGNOSIS — N39.0 UTI (URINARY TRACT INFECTION): ICD-10-CM

## 2024-02-26 DIAGNOSIS — R19.7 DIARRHEA OF PRESUMED INFECTIOUS ORIGIN: Primary | ICD-10-CM

## 2024-02-26 DIAGNOSIS — R26.81 GAIT INSTABILITY: ICD-10-CM

## 2024-02-26 DIAGNOSIS — E11.22 TYPE 2 DIABETES MELLITUS WITH STAGE 3A CHRONIC KIDNEY DISEASE, WITH LONG-TERM CURRENT USE OF INSULIN (HCC): ICD-10-CM

## 2024-02-26 DIAGNOSIS — R19.5 STOOL CULTURE POSITIVE FOR CLOSTRIDIUM DIFFICILE: ICD-10-CM

## 2024-02-26 DIAGNOSIS — E11.22 TYPE 2 DIABETES MELLITUS WITH STAGE 4 CHRONIC KIDNEY DISEASE, WITH LONG-TERM CURRENT USE OF INSULIN (HCC): ICD-10-CM

## 2024-02-26 DIAGNOSIS — D63.8 ANEMIA OF CHRONIC DISEASE: ICD-10-CM

## 2024-02-26 DIAGNOSIS — Z79.4 TYPE 2 DIABETES MELLITUS WITH STAGE 3A CHRONIC KIDNEY DISEASE, WITH LONG-TERM CURRENT USE OF INSULIN (HCC): ICD-10-CM

## 2024-02-26 DIAGNOSIS — R33.9 URINARY RETENTION: ICD-10-CM

## 2024-02-26 DIAGNOSIS — R26.2 AMBULATORY DYSFUNCTION: ICD-10-CM

## 2024-02-26 DIAGNOSIS — N18.4 TYPE 2 DIABETES MELLITUS WITH STAGE 4 CHRONIC KIDNEY DISEASE, WITH LONG-TERM CURRENT USE OF INSULIN (HCC): ICD-10-CM

## 2024-02-26 LAB
ALBUMIN SERPL BCP-MCNC: 3 G/DL (ref 3.5–5)
ALP SERPL-CCNC: 68 U/L (ref 34–104)
ALT SERPL W P-5'-P-CCNC: 18 U/L (ref 7–52)
ANION GAP SERPL CALCULATED.3IONS-SCNC: 8 MMOL/L
AST SERPL W P-5'-P-CCNC: 10 U/L (ref 13–39)
BACTERIA UR QL AUTO: ABNORMAL /HPF
BASOPHILS # BLD AUTO: 0.04 THOUSANDS/ÂΜL (ref 0–0.1)
BASOPHILS NFR BLD AUTO: 0 % (ref 0–1)
BILIRUB SERPL-MCNC: 0.33 MG/DL (ref 0.2–1)
BILIRUB UR QL STRIP: NEGATIVE
BUN SERPL-MCNC: 76 MG/DL (ref 5–25)
CALCIUM ALBUM COR SERPL-MCNC: 9.2 MG/DL (ref 8.3–10.1)
CALCIUM SERPL-MCNC: 8.4 MG/DL (ref 8.4–10.2)
CHLORIDE SERPL-SCNC: 110 MMOL/L (ref 96–108)
CLARITY UR: ABNORMAL
CO2 SERPL-SCNC: 19 MMOL/L (ref 21–32)
COLOR UR: YELLOW
CREAT SERPL-MCNC: 4.01 MG/DL (ref 0.6–1.3)
EOSINOPHIL # BLD AUTO: 0.07 THOUSAND/ÂΜL (ref 0–0.61)
EOSINOPHIL NFR BLD AUTO: 1 % (ref 0–6)
ERYTHROCYTE [DISTWIDTH] IN BLOOD BY AUTOMATED COUNT: 15.1 % (ref 11.6–15.1)
GFR SERPL CREATININE-BSD FRML MDRD: 13 ML/MIN/1.73SQ M
GLUCOSE SERPL-MCNC: 234 MG/DL (ref 65–140)
GLUCOSE SERPL-MCNC: 263 MG/DL (ref 65–140)
GLUCOSE UR STRIP-MCNC: ABNORMAL MG/DL
HCT VFR BLD AUTO: 28.9 % (ref 36.5–49.3)
HGB BLD-MCNC: 8.8 G/DL (ref 12–17)
HGB UR QL STRIP.AUTO: ABNORMAL
IMM GRANULOCYTES # BLD AUTO: 0.05 THOUSAND/UL (ref 0–0.2)
IMM GRANULOCYTES NFR BLD AUTO: 1 % (ref 0–2)
KETONES UR STRIP-MCNC: NEGATIVE MG/DL
LEUKOCYTE ESTERASE UR QL STRIP: ABNORMAL
LYMPHOCYTES # BLD AUTO: 0.61 THOUSANDS/ÂΜL (ref 0.6–4.47)
LYMPHOCYTES NFR BLD AUTO: 7 % (ref 14–44)
MCH RBC QN AUTO: 28.6 PG (ref 26.8–34.3)
MCHC RBC AUTO-ENTMCNC: 30.4 G/DL (ref 31.4–37.4)
MCV RBC AUTO: 94 FL (ref 82–98)
MONOCYTES # BLD AUTO: 0.42 THOUSAND/ÂΜL (ref 0.17–1.22)
MONOCYTES NFR BLD AUTO: 5 % (ref 4–12)
NEUTROPHILS # BLD AUTO: 8.05 THOUSANDS/ÂΜL (ref 1.85–7.62)
NEUTS SEG NFR BLD AUTO: 86 % (ref 43–75)
NITRITE UR QL STRIP: NEGATIVE
NON-SQ EPI CELLS URNS QL MICRO: ABNORMAL /HPF
NRBC BLD AUTO-RTO: 0 /100 WBCS
PH UR STRIP.AUTO: 6 [PH]
PLATELET # BLD AUTO: 369 THOUSANDS/UL (ref 149–390)
PMV BLD AUTO: 10.4 FL (ref 8.9–12.7)
POTASSIUM SERPL-SCNC: 5.2 MMOL/L (ref 3.5–5.3)
PROT SERPL-MCNC: 6.8 G/DL (ref 6.4–8.4)
PROT UR STRIP-MCNC: ABNORMAL MG/DL
RBC # BLD AUTO: 3.08 MILLION/UL (ref 3.88–5.62)
RBC #/AREA URNS AUTO: ABNORMAL /HPF
SODIUM SERPL-SCNC: 137 MMOL/L (ref 135–147)
SP GR UR STRIP.AUTO: 1.02 (ref 1–1.03)
UROBILINOGEN UR QL STRIP.AUTO: 0.2 E.U./DL
WBC # BLD AUTO: 9.24 THOUSAND/UL (ref 4.31–10.16)
WBC #/AREA URNS AUTO: ABNORMAL /HPF

## 2024-02-26 PROCEDURE — 36415 COLL VENOUS BLD VENIPUNCTURE: CPT | Performed by: EMERGENCY MEDICINE

## 2024-02-26 PROCEDURE — 99285 EMERGENCY DEPT VISIT HI MDM: CPT

## 2024-02-26 PROCEDURE — 96365 THER/PROPH/DIAG IV INF INIT: CPT

## 2024-02-26 PROCEDURE — 81001 URINALYSIS AUTO W/SCOPE: CPT | Performed by: EMERGENCY MEDICINE

## 2024-02-26 PROCEDURE — 82948 REAGENT STRIP/BLOOD GLUCOSE: CPT

## 2024-02-26 PROCEDURE — 99285 EMERGENCY DEPT VISIT HI MDM: CPT | Performed by: EMERGENCY MEDICINE

## 2024-02-26 PROCEDURE — 80053 COMPREHEN METABOLIC PANEL: CPT | Performed by: EMERGENCY MEDICINE

## 2024-02-26 PROCEDURE — 87505 NFCT AGENT DETECTION GI: CPT | Performed by: EMERGENCY MEDICINE

## 2024-02-26 PROCEDURE — 85025 COMPLETE CBC W/AUTO DIFF WBC: CPT | Performed by: EMERGENCY MEDICINE

## 2024-02-26 PROCEDURE — 99223 1ST HOSP IP/OBS HIGH 75: CPT | Performed by: INTERNAL MEDICINE

## 2024-02-26 PROCEDURE — 81003 URINALYSIS AUTO W/O SCOPE: CPT | Performed by: EMERGENCY MEDICINE

## 2024-02-26 RX ORDER — INSULIN LISPRO 100 [IU]/ML
16 INJECTION, SOLUTION INTRAVENOUS; SUBCUTANEOUS
Status: DISCONTINUED | OUTPATIENT
Start: 2024-02-27 | End: 2024-02-27

## 2024-02-26 RX ORDER — TAMSULOSIN HYDROCHLORIDE 0.4 MG/1
0.4 CAPSULE ORAL
Status: DISCONTINUED | OUTPATIENT
Start: 2024-02-26 | End: 2024-02-29 | Stop reason: HOSPADM

## 2024-02-26 RX ORDER — CEFTRIAXONE 1 G/50ML
1000 INJECTION, SOLUTION INTRAVENOUS ONCE
Status: COMPLETED | OUTPATIENT
Start: 2024-02-26 | End: 2024-02-26

## 2024-02-26 RX ORDER — CEFTRIAXONE 1 G/50ML
1000 INJECTION, SOLUTION INTRAVENOUS EVERY 24 HOURS
Status: DISCONTINUED | OUTPATIENT
Start: 2024-02-27 | End: 2024-02-29

## 2024-02-26 RX ORDER — SODIUM CHLORIDE, SODIUM GLUCONATE, SODIUM ACETATE, POTASSIUM CHLORIDE, MAGNESIUM CHLORIDE, SODIUM PHOSPHATE, DIBASIC, AND POTASSIUM PHOSPHATE .53; .5; .37; .037; .03; .012; .00082 G/100ML; G/100ML; G/100ML; G/100ML; G/100ML; G/100ML; G/100ML
75 INJECTION, SOLUTION INTRAVENOUS CONTINUOUS
Status: DISPENSED | OUTPATIENT
Start: 2024-02-26 | End: 2024-02-27

## 2024-02-26 RX ORDER — SODIUM BICARBONATE 650 MG/1
650 TABLET ORAL
Status: DISCONTINUED | OUTPATIENT
Start: 2024-02-26 | End: 2024-02-29 | Stop reason: HOSPADM

## 2024-02-26 RX ORDER — ACETAMINOPHEN 325 MG/1
650 TABLET ORAL EVERY 6 HOURS PRN
Status: DISCONTINUED | OUTPATIENT
Start: 2024-02-26 | End: 2024-02-29 | Stop reason: HOSPADM

## 2024-02-26 RX ORDER — ATORVASTATIN CALCIUM 40 MG/1
80 TABLET, FILM COATED ORAL
Status: DISCONTINUED | OUTPATIENT
Start: 2024-02-27 | End: 2024-02-29 | Stop reason: HOSPADM

## 2024-02-26 RX ORDER — HEPARIN SODIUM 5000 [USP'U]/ML
5000 INJECTION, SOLUTION INTRAVENOUS; SUBCUTANEOUS EVERY 8 HOURS SCHEDULED
Status: DISCONTINUED | OUTPATIENT
Start: 2024-02-26 | End: 2024-02-29 | Stop reason: HOSPADM

## 2024-02-26 RX ORDER — INSULIN GLARGINE 100 [IU]/ML
42 INJECTION, SOLUTION SUBCUTANEOUS
Status: DISCONTINUED | OUTPATIENT
Start: 2024-02-26 | End: 2024-02-28

## 2024-02-26 RX ORDER — METOPROLOL TARTRATE 50 MG/1
50 TABLET, FILM COATED ORAL 2 TIMES DAILY
Status: DISCONTINUED | OUTPATIENT
Start: 2024-02-26 | End: 2024-02-29 | Stop reason: HOSPADM

## 2024-02-26 RX ORDER — ONDANSETRON 2 MG/ML
4 INJECTION INTRAMUSCULAR; INTRAVENOUS EVERY 6 HOURS PRN
Status: DISCONTINUED | OUTPATIENT
Start: 2024-02-26 | End: 2024-02-29 | Stop reason: HOSPADM

## 2024-02-26 RX ADMIN — METOPROLOL TARTRATE 50 MG: 50 TABLET, FILM COATED ORAL at 22:22

## 2024-02-26 RX ADMIN — SODIUM BICARBONATE 650 MG: 650 TABLET ORAL at 23:02

## 2024-02-26 RX ADMIN — CEFTRIAXONE 1000 MG: 1 INJECTION, SOLUTION INTRAVENOUS at 19:38

## 2024-02-26 RX ADMIN — TAMSULOSIN HYDROCHLORIDE 0.4 MG: 0.4 CAPSULE ORAL at 23:02

## 2024-02-26 RX ADMIN — HEPARIN SODIUM 5000 UNITS: 5000 INJECTION, SOLUTION INTRAVENOUS; SUBCUTANEOUS at 22:22

## 2024-02-26 RX ADMIN — SODIUM CHLORIDE, SODIUM GLUCONATE, SODIUM ACETATE, POTASSIUM CHLORIDE, MAGNESIUM CHLORIDE, SODIUM PHOSPHATE, DIBASIC, AND POTASSIUM PHOSPHATE 100 ML/HR: .53; .5; .37; .037; .03; .012; .00082 INJECTION, SOLUTION INTRAVENOUS at 22:22

## 2024-02-26 RX ADMIN — INSULIN GLARGINE 42 UNITS: 100 INJECTION, SOLUTION SUBCUTANEOUS at 22:21

## 2024-02-26 NOTE — TELEPHONE ENCOUNTER
----- Message from Ramya Cowan MD sent at 2/23/2024  2:35 PM EST -----  Regarding: FW: follow up with you after hospitalization  Contact: [phone]  Please put in an order for Celia 2 to be sent to [facility] [employer] and let patient know.   Thank you     ----- Message -----  From: Jose Mir MA  Sent: 2/23/2024   1:36 PM EST  To: ELEAZAR Devlin  Subject: follow up with you after hospitalization         Pls see pt.'s message . Thanks       ----- Message -----  From: Michael Reynolds Jr.  Sent: 2/23/2024   1:34 PM EST  To: #  Subject: follow up with you after hospitalization         We saw the PCP on the 12th, nephrologist yesterday, will be seeing Cardiology next Wednesday.  He has not been compliant since return to home on Feb 5th. Stopped taking meds and insulins - though they were making him feel worse. Not checking blood sugars (we have no Celia 2 Sensors because [employer] will not send any out because insurance never received requested blood sugar logs(?). Nephrology is encouraging him to take minimal medications (metoprolol, ASA, atorvastatin and his insulins) which he has agreed to. Can you get us the Celia 2 sensors either through [employer] or [facility]?  Thank you.

## 2024-02-26 NOTE — Clinical Note
Case was discussed with Dr. Martinez  and the patient's admission status was agreed to be Admission Status: inpatient status to the service of Dr. Martinez .

## 2024-02-27 ENCOUNTER — TELEPHONE (OUTPATIENT)
Dept: OTHER | Facility: HOSPITAL | Age: 79
End: 2024-02-27

## 2024-02-27 LAB
ALBUMIN SERPL BCP-MCNC: 2.6 G/DL (ref 3.5–5)
ALP SERPL-CCNC: 67 U/L (ref 34–104)
ALT SERPL W P-5'-P-CCNC: 16 U/L (ref 7–52)
ANION GAP SERPL CALCULATED.3IONS-SCNC: 8 MMOL/L
AST SERPL W P-5'-P-CCNC: 11 U/L (ref 13–39)
BASOPHILS # BLD AUTO: 0.04 THOUSANDS/ÂΜL (ref 0–0.1)
BASOPHILS NFR BLD AUTO: 0 % (ref 0–1)
BILIRUB SERPL-MCNC: 0.23 MG/DL (ref 0.2–1)
BUN SERPL-MCNC: 73 MG/DL (ref 5–25)
C COLI+JEJUNI TUF STL QL NAA+PROBE: NEGATIVE
CALCIUM ALBUM COR SERPL-MCNC: 9.4 MG/DL (ref 8.3–10.1)
CALCIUM SERPL-MCNC: 8.3 MG/DL (ref 8.4–10.2)
CHLORIDE SERPL-SCNC: 109 MMOL/L (ref 96–108)
CO2 SERPL-SCNC: 20 MMOL/L (ref 21–32)
CREAT SERPL-MCNC: 3.63 MG/DL (ref 0.6–1.3)
EC STX1+STX2 GENES STL QL NAA+PROBE: NEGATIVE
EOSINOPHIL # BLD AUTO: 0.29 THOUSAND/ÂΜL (ref 0–0.61)
EOSINOPHIL NFR BLD AUTO: 3 % (ref 0–6)
ERYTHROCYTE [DISTWIDTH] IN BLOOD BY AUTOMATED COUNT: 15 % (ref 11.6–15.1)
EST. AVERAGE GLUCOSE BLD GHB EST-MCNC: 180 MG/DL
GFR SERPL CREATININE-BSD FRML MDRD: 15 ML/MIN/1.73SQ M
GLUCOSE SERPL-MCNC: 121 MG/DL (ref 65–140)
GLUCOSE SERPL-MCNC: 158 MG/DL (ref 65–140)
GLUCOSE SERPL-MCNC: 176 MG/DL (ref 65–140)
GLUCOSE SERPL-MCNC: 179 MG/DL (ref 65–140)
GLUCOSE SERPL-MCNC: 196 MG/DL (ref 65–140)
GLUCOSE SERPL-MCNC: 55 MG/DL (ref 65–140)
GLUCOSE SERPL-MCNC: 58 MG/DL (ref 65–140)
GLUCOSE SERPL-MCNC: 60 MG/DL (ref 65–140)
GLUCOSE SERPL-MCNC: 61 MG/DL (ref 65–140)
HBA1C MFR BLD: 7.9 %
HCT VFR BLD AUTO: 26.8 % (ref 36.5–49.3)
HGB BLD-MCNC: 8.4 G/DL (ref 12–17)
IMM GRANULOCYTES # BLD AUTO: 0.03 THOUSAND/UL (ref 0–0.2)
IMM GRANULOCYTES NFR BLD AUTO: 0 % (ref 0–2)
LYMPHOCYTES # BLD AUTO: 0.63 THOUSANDS/ÂΜL (ref 0.6–4.47)
LYMPHOCYTES NFR BLD AUTO: 6 % (ref 14–44)
MAGNESIUM SERPL-MCNC: 1.8 MG/DL (ref 1.9–2.7)
MCH RBC QN AUTO: 29.2 PG (ref 26.8–34.3)
MCHC RBC AUTO-ENTMCNC: 31.3 G/DL (ref 31.4–37.4)
MCV RBC AUTO: 93 FL (ref 82–98)
MONOCYTES # BLD AUTO: 0.64 THOUSAND/ÂΜL (ref 0.17–1.22)
MONOCYTES NFR BLD AUTO: 6 % (ref 4–12)
NEUTROPHILS # BLD AUTO: 8.34 THOUSANDS/ÂΜL (ref 1.85–7.62)
NEUTS SEG NFR BLD AUTO: 85 % (ref 43–75)
NRBC BLD AUTO-RTO: 0 /100 WBCS
PLATELET # BLD AUTO: 351 THOUSANDS/UL (ref 149–390)
PMV BLD AUTO: 10.8 FL (ref 8.9–12.7)
POTASSIUM SERPL-SCNC: 4.8 MMOL/L (ref 3.5–5.3)
PROT SERPL-MCNC: 6.2 G/DL (ref 6.4–8.4)
RBC # BLD AUTO: 2.88 MILLION/UL (ref 3.88–5.62)
SALMONELLA SP SPAO STL QL NAA+PROBE: NEGATIVE
SHIGELLA SP+EIEC IPAH STL QL NAA+PROBE: NEGATIVE
SODIUM SERPL-SCNC: 137 MMOL/L (ref 135–147)
WBC # BLD AUTO: 9.97 THOUSAND/UL (ref 4.31–10.16)

## 2024-02-27 PROCEDURE — 97167 OT EVAL HIGH COMPLEX 60 MIN: CPT

## 2024-02-27 PROCEDURE — 83735 ASSAY OF MAGNESIUM: CPT | Performed by: INTERNAL MEDICINE

## 2024-02-27 PROCEDURE — 97163 PT EVAL HIGH COMPLEX 45 MIN: CPT

## 2024-02-27 PROCEDURE — 99233 SBSQ HOSP IP/OBS HIGH 50: CPT | Performed by: FAMILY MEDICINE

## 2024-02-27 PROCEDURE — 97530 THERAPEUTIC ACTIVITIES: CPT

## 2024-02-27 PROCEDURE — 83036 HEMOGLOBIN GLYCOSYLATED A1C: CPT | Performed by: INTERNAL MEDICINE

## 2024-02-27 PROCEDURE — 80053 COMPREHEN METABOLIC PANEL: CPT | Performed by: INTERNAL MEDICINE

## 2024-02-27 PROCEDURE — 85025 COMPLETE CBC W/AUTO DIFF WBC: CPT | Performed by: INTERNAL MEDICINE

## 2024-02-27 PROCEDURE — 82948 REAGENT STRIP/BLOOD GLUCOSE: CPT

## 2024-02-27 RX ORDER — INSULIN LISPRO 100 [IU]/ML
16 INJECTION, SOLUTION INTRAVENOUS; SUBCUTANEOUS
Status: DISCONTINUED | OUTPATIENT
Start: 2024-02-27 | End: 2024-02-28

## 2024-02-27 RX ORDER — HYDRALAZINE HYDROCHLORIDE 25 MG/1
25 TABLET, FILM COATED ORAL EVERY 8 HOURS PRN
Status: DISCONTINUED | OUTPATIENT
Start: 2024-02-27 | End: 2024-02-28

## 2024-02-27 RX ORDER — MAGNESIUM SULFATE HEPTAHYDRATE 40 MG/ML
2 INJECTION, SOLUTION INTRAVENOUS ONCE
Status: COMPLETED | OUTPATIENT
Start: 2024-02-27 | End: 2024-02-27

## 2024-02-27 RX ORDER — INSULIN LISPRO 100 [IU]/ML
1-5 INJECTION, SOLUTION INTRAVENOUS; SUBCUTANEOUS
Status: DISCONTINUED | OUTPATIENT
Start: 2024-02-27 | End: 2024-02-29 | Stop reason: HOSPADM

## 2024-02-27 RX ADMIN — METOPROLOL TARTRATE 50 MG: 50 TABLET, FILM COATED ORAL at 17:10

## 2024-02-27 RX ADMIN — ATORVASTATIN CALCIUM 80 MG: 40 TABLET, FILM COATED ORAL at 17:10

## 2024-02-27 RX ADMIN — INSULIN GLARGINE 42 UNITS: 100 INJECTION, SOLUTION SUBCUTANEOUS at 22:27

## 2024-02-27 RX ADMIN — HYDRALAZINE HYDROCHLORIDE 25 MG: 25 TABLET ORAL at 00:36

## 2024-02-27 RX ADMIN — SODIUM CHLORIDE, SODIUM GLUCONATE, SODIUM ACETATE, POTASSIUM CHLORIDE, MAGNESIUM CHLORIDE, SODIUM PHOSPHATE, DIBASIC, AND POTASSIUM PHOSPHATE 100 ML/HR: .53; .5; .37; .037; .03; .012; .00082 INJECTION, SOLUTION INTRAVENOUS at 06:32

## 2024-02-27 RX ADMIN — TAMSULOSIN HYDROCHLORIDE 0.4 MG: 0.4 CAPSULE ORAL at 17:10

## 2024-02-27 RX ADMIN — MAGNESIUM SULFATE HEPTAHYDRATE 2 G: 40 INJECTION, SOLUTION INTRAVENOUS at 09:28

## 2024-02-27 RX ADMIN — ASPIRIN 81 MG: 81 TABLET, COATED ORAL at 09:25

## 2024-02-27 RX ADMIN — SODIUM BICARBONATE 650 MG: 650 TABLET ORAL at 09:25

## 2024-02-27 RX ADMIN — HEPARIN SODIUM 5000 UNITS: 5000 INJECTION, SOLUTION INTRAVENOUS; SUBCUTANEOUS at 14:47

## 2024-02-27 RX ADMIN — CEFTRIAXONE 1000 MG: 1 INJECTION, SOLUTION INTRAVENOUS at 20:38

## 2024-02-27 RX ADMIN — HEPARIN SODIUM 5000 UNITS: 5000 INJECTION, SOLUTION INTRAVENOUS; SUBCUTANEOUS at 22:27

## 2024-02-27 RX ADMIN — METOPROLOL TARTRATE 50 MG: 50 TABLET, FILM COATED ORAL at 09:25

## 2024-02-27 RX ADMIN — INSULIN LISPRO 1 UNITS: 100 INJECTION, SOLUTION INTRAVENOUS; SUBCUTANEOUS at 09:29

## 2024-02-27 RX ADMIN — INSULIN LISPRO 1 UNITS: 100 INJECTION, SOLUTION INTRAVENOUS; SUBCUTANEOUS at 12:12

## 2024-02-27 RX ADMIN — INSULIN LISPRO 16 UNITS: 100 INJECTION, SOLUTION INTRAVENOUS; SUBCUTANEOUS at 09:29

## 2024-02-27 RX ADMIN — INSULIN LISPRO 16 UNITS: 100 INJECTION, SOLUTION INTRAVENOUS; SUBCUTANEOUS at 12:11

## 2024-02-27 RX ADMIN — SODIUM BICARBONATE 650 MG: 650 TABLET ORAL at 17:10

## 2024-02-27 RX ADMIN — HEPARIN SODIUM 5000 UNITS: 5000 INJECTION, SOLUTION INTRAVENOUS; SUBCUTANEOUS at 05:26

## 2024-02-27 NOTE — ASSESSMENT & PLAN NOTE
Lab Results   Component Value Date    HGBA1C 8.1 (H) 12/30/2023       Recent Labs     02/26/24 2152   POCGLU 263*       Blood Sugar Average: Last 72 hrs:  (P) 263    Uncontrolled.   Resume his lantus 42 units qhs, may need to further uptitrate his lantus as has been on higher dosage in the past  Continue victoza  Continue lispro 16 units with meals

## 2024-02-27 NOTE — ASSESSMENT & PLAN NOTE
Lab Results   Component Value Date    EGFR 15 02/27/2024    EGFR 13 02/26/2024    EGFR 12 02/19/2024    CREATININE 3.63 (H) 02/27/2024    CREATININE 4.01 (H) 02/26/2024    CREATININE 4.32 (H) 02/19/2024     Stable renal functions, creatinine around baseline  Patient had been told he may need dialysis but he declined this  He follows up with nephrology as an outpatient  His creatinine is currently about his recent baseline  Avoid nephrotoxins and NSAIDs  Renally dose medication  Continue sodium bicarbonate, bicarb of 20 (19)

## 2024-02-27 NOTE — ASSESSMENT & PLAN NOTE
Patient with history of C. difficile infection in 2021  Has had 2 episodes of loose stool this morning  C. difficile has not been collected yet.  Discussed with nursing.  Consider rectal tube if unable to collect stool  No fever, leukocytosis or abdominal pain.  No recent use of antibiotics  Avoid laxative or stool softeners  IV fluids reduced to 75 cc an hour secondary to underlying CKD

## 2024-02-27 NOTE — PHYSICAL THERAPY NOTE
"PHYSICAL THERAPY Evaluation and Treatment  DATE: 02/27/24  TIME: 9767-0627    NAME:  Michael Reynolds Jr.  AGE:   78 y.o.  Mrn:   [id number]  Length Of Stay: 0    ADMIT DX:  Diarrhea of presumed infectious origin [R19.7]  Diarrhea [R19.7]  Urinary retention [R33.9]  UTI (urinary tract infection) [N39.0]  Urinary incontinence [R32]  Failure to thrive in adult [R62.7]    Past Medical History:   Diagnosis Date    CHF (congestive heart failure) (HCC)     Chronic kidney disease 18 - 24 months?    Dr Patterson - stage 3    Colon polyp     CPAP (continuous positive airway pressure) dependence     Diabetes mellitus (HCC)     History of transfusion     Hyperlipidemia     Hypertension     Myocardial infarction (HCC) 06/2019    Open heart surgery with quad bypass    Obesity     Renal disorder     Sleep apnea     Visual impairment 1991    Dr Nickie Peters     Past Surgical History:   Procedure Laterality Date    APPENDECTOMY  1977    Done in conjunction with cholecystectomy    BACK SURGERY      CATARACT EXTRACTION, BILATERAL Bilateral     Left eye- 10/23 Right eye 9/23    CHOLECYSTECTOMY  1977    COLONOSCOPY      CORONARY ARTERY BYPASS GRAFT  2019    quad    EGD      GALLBLADDER SURGERY      HERNIA REPAIR      VT CORONARY ARTERY BYP W/VEIN & ARTERY GRAFT 4 VEIN N/A 06/21/2019    Procedure: CORONARY ARTERY BYPASS GRAFT (CABG) 4 VESSELS WITH SVG TO PDA, OM, DIAGONAL AND LIMA TO LAD; RIGHT LEG EVH;  Surgeon: James Fang MD;  Location: [facility];  Service: Cardiac Surgery    RECTAL SURGERY      SPINE SURGERY  2012    Fusion L3-L5?    TONSILLECTOMY          02/27/24 1132   PT Last Visit   PT Visit Date 02/27/24   Note Type   Note type Evaluation   Pain Assessment   Pain Assessment Tool 0-10   Pain Score No Pain  (Pt does not rate, but expresses discomfort and verbalizes \"ouch\" when transferring to sitting at EOB)   Pain Location/Orientation   (scrotum)   Hospital Pain Intervention(s)   (reposition)   Restrictions/Precautions "   Weight Bearing Precautions Per Order No   Other Precautions Cognitive;Bed Alarm;Chair Alarm;Fall Risk   Home Living   Additional Comments Pt is a questionable historian.  Pt lives with wife in 2-level house+basement, 3 steps to enter with rails. Bed/bathroom: walk-in shower, standard toilet.  DME: shower chair, RW and cane.   Prior Function   Comments Pt is a questionable historian.  Prior to admission: Pt is independent with ADLs, performs simple meal prep/chores, assist with grocery shopping.  Wife assist with most IADLs, transporation.  Pt reports no recent fall hx.  Work: retired as owner of a construction company.   General   Additional Pertinent History 77 y/o presents due to diarrhea and urinary retention. Upon assessment: UTI   Family/Caregiver Present Yes  (Pt's wife presents at end of session. Reporting that pt did not get follow up therapy services when returning from SNF 3 weeks prior.  That the pt has been displaying poor motivation and non-compliance with rehab therapy services and med management)   Cognition   Overall Cognitive Status Impaired   Arousal/Participation Responsive   Orientation Level Oriented to person;Oriented to place;Oriented to situation;Disoriented to time  (knows president)   Memory Decreased long term memory;Decreased recall of precautions   Following Commands Follows one step commands with increased time or repetition   Subjective   Subjective Pt denies any medical complaints initially, but also reports he continues to have diarrhea and the sheridan catheter is uncomfortable.   RLE Assessment   RLE Assessment   (all joints appear stiff and slightly limited but WFL.  hip 3+/5 grossly, knee and ankle 4/5 grossly)   LLE Assessment   LLE Assessment   (all joints appear stiff and slightly limited but WFL. hip 3+/5 grossly, knee and ankle 4/5 grossly)   Coordination   Movements are Fluid and Coordinated 0   Coordination and Movement Description Pt exhibits difficulty with repositioning  "and controlled trunk righting.   Sensation WFL   Bed Mobility   Rolling L 5  Supervision   Additional items Increased time required;Assist x 1;Bedrails;HOB elevated;Verbal cues  (cues for effective use of UEs to assist, pt required pauses and seemed to have difficulty processing technique.)   Supine to Sit 4  Minimal assistance   Additional items Assist x 1;HOB elevated;Bedrails;Increased time required;Verbal cues;Impulsive  (cues for effective use of UEs to assist, pt required pauses and seemed to have difficulty processing technique.  Decreased trunk control/coordination causing instability with transfer.  therapist provided HHA to pull to partial sitting.)   Transfers   Sit to Stand 4  Minimal assistance  (CGA for safety)   Additional items Assist x 1;Armrests;Increased time required;Impulsive;Verbal cues;Bedrails  (pt initially failing to get to standing as he attempts to pull up to a walker.  with education/cues, pt able to get to standing with close sup A from EOB and BSC, to a RW)   Stand to Sit 5  Supervision   Additional items Assist x 1;Armrests;Increased time required;Impulsive;Verbal cues  (close supervision to direct safety and positioning prior to transfer.  Pt required cues for hand placement.)   Ambulation/Elevation   Gait Assistance 5  Supervision   Additional items Assist x 1;Verbal cues   Assistive Device Rolling walker   Distance 30'+5\"   Ambulation/Elevation Additional Comments Pt ambulates with short shuffled steps.  No significant LOB noted, pt managed walker appropriately.  cues for posture and gaze correction.   Balance   Static Sitting Fair   Dynamic Sitting Fair   Static Standing Fair +  (RW)   Ambulatory Fair  (RW)   Endurance Deficit   Endurance Deficit No   Activity Tolerance   Activity Tolerance Patient tolerated treatment well   Medical Staff Made Aware collaborated with OT   Assessment   Prognosis Fair   Problem List Decreased strength;Decreased range of motion;Impaired " balance;Decreased mobility;Decreased coordination;Decreased cognition;Impaired judgement;Decreased safety awareness   Assessment Orders for PT eval and treat received. Pt's PMHx: CHF, MI, CABG x4, back sx (fusion L3-L5), C-diff, anemia. Pt exhibits physical deficits noted in problem list above.  Deficits listed contribute to functional limitations that are significant from the patient PLOF and include: difficulty with bed mobility, impaired sitting balance, impaired standing balance, unsafe/inefficient ambulation, fall risk, and unable to safely manage stairs/steps/ramp    During today's session, formal PT evaluation performed.  Pt exhibits flat affect and limited/inappropriate verbal responses to questioning regarding PLOF and subjective current status.  However, pt was agreeable to evaluation and testing, but questionable effort vs deficit at times.  Pt does seem to struggle with task sequencing, struggling to manage safe/effective bed mobility despite hospital bed features and cues.  Increased time needed to manage sitting/standing balance, as pt exhibits delayed righting reaction or insight.  Trunk and LE strength likely contributing to his mobility deficits.      The AM-PAC & Barthel Index outcome tools were used to assist in determining pt safety w/ mobility/self care & appropriate d/c recommendations, see above for scores. Patient's clinical presentation is evolving due to altered mental status, ongoing medical management needs, and complicated social/support system.     Considering the patient's PLOF, co-morbidities, acute functional limitations, functional outcome measures, and/or goal to progress functional independence; this patient would benefit from skilled Physical Therapy intervention in the acute care setting.   Barriers to Discharge Decreased caregiver support  (Pt is home alone for prolonged periods of time.)   Goals   Patient Goals none reported   [facility] Expiration Date 02/17/24   Short Term Goal #1  1: Pt will perform rolling to either side in flat bed, mod I.  2: Pt will perform sit<>supine on flat bed, mod I.  3: Pt will perform stand pivot transfer without/LRAD, sup A.  4: Pt will ambulate 150' with LRAD while managing minor balance challenges, sup A. 5: Pt will perform written HEP, with Sup A and cues. 6: Pt will ascend/descend 1 flight of stairs with handrail/AD, sup A.   Plan   Treatment/Interventions Functional transfer training;LE strengthening/ROM;Elevations;Therapeutic exercise;Endurance training;Cognitive reorientation;Patient/family training;Equipment eval/education;Bed mobility;Gait training   PT Frequency 2-3x/wk   Discharge Recommendation   Rehab Resource Intensity Level, PT III (Minimum Resource Intensity)   AM-PAC Basic Mobility Inpatient   Turning in Flat Bed Without Bedrails 3   Lying on Back to Sitting on Edge of Flat Bed Without Bedrails 3   Moving Bed to Chair 3   Standing Up From Chair Using Arms 3   Walk in Room 3   Climb 3-5 Stairs With Railing 2   Basic Mobility Inpatient Raw Score 17   Basic Mobility Standardized Score 39.67   Highest Level Of Mobility   -HLM Goal 5: Stand one or more mins   JH-HLM Achieved 7: Walk 25 feet or more   Barthel Index   Feeding 10   Bathing 0   Grooming Score 0   Dressing Score 5   Bladder Score 0   Bowels Score 0   Toilet Use Score 5   Transfers (Bed/Chair) Score 10   Mobility (Level Surface) Score 0   Stairs Score 5   Barthel Index Score 35   Additional Treatment Session   Start Time 1146   End Time 1156   Treatment Assessment Pt educated and instructed on bed mobility and transfer techniques using hospital bed features and RW.  Provided gait training corrections with RW.  Pt exhibits difficulty sequencing cues, requiring extra time. Noted carry over with use of Ues when getting to standing from sitting position.   End of Consult   Patient Position at End of Consult Bed/Chair alarm activated;All needs within reach;Bedside chair   The patient's AM-PAC  Basic Mobility Inpatient Short Form Raw Score is 17. A Raw score of greater than or equal to 16 suggests the patient may benefit from discharge to home. Please also refer to the recommendation of the Physical Therapist for safe discharge planning.    Juventino Gregory, PT, DPT  [place] Licensure #[id number]

## 2024-02-27 NOTE — UTILIZATION REVIEW
"Initial Clinical Review    OBS 2/26 UPGRADED TO INPATIENT 2/27 FOR CONTINUED TX OF     Admission: Date/Time/Statement:   Admission Orders (From admission, onward)       Ordered        02/27/24 1620  Inpatient Admission  Once            02/26/24 2051  Place in Observation  Once                          Orders Placed This Encounter   Procedures    Inpatient Admission     Standing Status:   Standing     Number of Occurrences:   1     Order Specific Question:   Level of Care     Answer:   Med Surg [16]     Order Specific Question:   Estimated length of stay     Answer:   More than 2 Midnights     Order Specific Question:   Certification     Answer:   I certify that inpatient services are medically necessary for this patient for a duration of greater than two midnights. See H&P and MD Progress Notes for additional information about the patient's course of treatment.     ED Arrival Information       Expected   -    Arrival   2/26/2024 17:54    Acuity   Urgent              Means of arrival   Walk-In    Escorted by   Spouse    Service   Hospice    Admission type   Emergency              Arrival complaint   Diarrhea,Urine Incontinence             Chief Complaint   Patient presents with    Diarrhea     Pt presents with increasing diarrhea since returning home from acute rehab. Denies abdominal pain, vomiting. Diarrhea is \"pink tinged\" per wife.    Urinary Incontinence     Reports increasing occurrences of urinary incontinence after \"removal of [place] catheter\"       Initial Presentation: 78 y.o. male to ED from home with diarrhea x 2 wks  PMHx cad s/p cabg 2019 on asa, iddm, ckd stage 4, htn, hld, anemia of chronic disease, bph, h/o c diff in 2021  Describes diarrhea as very loose and nonbloody occurring at least 3/day. Also reports increased urinary incontinence after removal of [place] cath in rehab. Reports he's been having constant urinary dribbling and lower abdominal discomfort. In ED cath''d for >1L urine. UA  showed " innumerable wbc with positive LE.  WBC wnl. Hgb 8.8, Hct 28.9. , CO2 19, BUN 76, Cr 4.01.   Admitted under observation to [facility] with Diarrhea, Urinary retention -- Obtain stool for C. Difficile. Keep sheridan in place for now. Start on Flomax. Consult urology. Continue with pto po meds.     Date: 2/27   Day 2: Upgraded to Inpatient  Pt had 2 episodes of loose stool this morning. C. difficile has not been collected yet. Consider rectal tube if unable to collect stool. Avoid laxative or stool softeners. IV fluids reduced to 75 ml/hr 2/2 underlying CKD. Continue sheridan cath, flomax. UA abnormal with negative nitrites. Follow cultures. Has been started on empiric IV Rocephin.  Hold Victoza as it may cause diarrhea. Continue lispro 16 units with meals and sliding scale insulin. Carb controlled diet     Date: 2/28    Day 3: Has surpassed a 2nd midnight with active treatments and services, which include continued w/u and tx of diarrhea, lab monitoring. Safe d/c plan with tent plan for IP SNF.        ED Triage Vitals   Temperature Pulse Respirations Blood Pressure SpO2   02/26/24 1802 02/26/24 1802 02/26/24 1802 02/26/24 1802 02/26/24 1802   97.7 °F (36.5 °C) 91 18 (!) 178/81 100 %      Temp Source Heart Rate Source Patient Position - Orthostatic VS BP Location FiO2 (%)   02/26/24 1802 02/26/24 1802 02/26/24 1802 02/26/24 1802 --   Oral Monitor Lying Right arm       Pain Score       02/26/24 2156       No Pain          Wt Readings from Last 1 Encounters:   02/26/24 92.2 kg (203 lb 4.2 oz)     Additional Vital Signs:   Date/Time Temp Pulse Resp BP MAP (mmHg) SpO2 O2 Device Patient Position - Orthostatic VS   02/27/24 1710 -- 73 -- 163/83 -- -- -- --   02/27/24 1546 99 °F (37.2 °C) 64 16 144/67 -- 99 % None (Room air) Lying   02/27/24 0925 -- 69 -- 142/69 -- -- -- --   02/27/24 0732 97.5 °F (36.4 °C) 68 16 155/72 -- 99 % -- Lying   02/26/24 2307 -- -- -- 187/82 Abnormal   -- -- -- --   BP: nurse aware at 02/26/24 2307    02/26/24 2139 98.5 °F (36.9 °C) 84 16 190/85 Abnormal   -- 99 % None (Room air) Lying   BP: nurse aware at 02/26/24 2139   02/26/24 2030 -- 78 -- 176/64 Abnormal  111 99 % -- Lying   02/26/24 2000 -- 78 16 178/81 Abnormal  116 100 % None (Room air) Lying   02/26/24 1802 97.7 °F (36.5 °C) 91 18 178/81 Abnormal  -- 100 % None (Room air) Lying     Pertinent Labs/Diagnostic Test Results:   Results from last 7 days   Lab Units 02/27/24  0502 02/26/24  1826   WBC Thousand/uL 9.97 9.24   HEMOGLOBIN g/dL 8.4* 8.8*   HEMATOCRIT % 26.8* 28.9*   PLATELETS Thousands/uL 351 369   NEUTROS ABS Thousands/µL 8.34* 8.05*     Results from last 7 days   Lab Units 02/27/24  0502 02/26/24  1826   SODIUM mmol/L 137 137   POTASSIUM mmol/L 4.8 5.2   CHLORIDE mmol/L 109* 110*   CO2 mmol/L 20* 19*   ANION GAP mmol/L 8 8   BUN mg/dL 73* 76*   CREATININE mg/dL 3.63* 4.01*   EGFR ml/min/1.73sq m 15 13   CALCIUM mg/dL 8.3* 8.4   MAGNESIUM mg/dL 1.8*  --      Results from last 7 days   Lab Units 02/27/24  0502 02/26/24  1826   AST U/L 11* 10*   ALT U/L 16 18   ALK PHOS U/L 67 68   TOTAL PROTEIN g/dL 6.2* 6.8   ALBUMIN g/dL 2.6* 3.0*   TOTAL BILIRUBIN mg/dL 0.23 0.33     Results from last 7 days   Lab Units 02/27/24  1649 02/27/24  1603 02/27/24  1555 02/27/24  1048 02/27/24  0709 02/26/24  2152   POC GLUCOSE mg/dl 60* 58* 55* 158* 176* 263*     Results from last 7 days   Lab Units 02/27/24  0502 02/26/24  1826   GLUCOSE RANDOM mg/dL 196* 234*     Results from last 7 days   Lab Units 02/27/24  0502   HEMOGLOBIN A1C % 7.9*   EAG mg/dl 180     BETA-HYDROXYBUTYRATE   Date Value Ref Range Status   10/19/2021 0.6 (H) <0.6 mmol/L Final      Results from last 7 days   Lab Units 02/26/24  1904   CLARITY UA  Cloudy*   COLOR UA  Yellow   SPEC GRAV UA  1.020   PH UA  6.0   GLUCOSE UA mg/dl 1+*   KETONES UA mg/dl Negative   BLOOD UA  2+*   PROTEIN UA mg/dl 3+*   NITRITE UA  Negative   BILIRUBIN UA  Negative   UROBILINOGEN UA E.U./dl 0.2   LEUKOCYTES UA  3+*    WBC UA /hpf Innumerable*   RBC UA /hpf Field obscured, unable to enumerate*   BACTERIA UA /hpf Field obscured, unable to enumerate*   EPITHELIAL CELLS WET PREP /hpf Field obscured, unable to enumerate*     Results from last 7 days   Lab Units 02/26/24  1918   SALMONELLA SP PCR  Negative   SHIGELLA SP/ENTEROINVASIVE E. COLI (EIEC)  Negative   CAMPYLOBACTER SP (JEJUNI AND COLI)  Negative   SHIGA TOXIN 1/SHIGA TOXIN 2  Negative     ED Treatment:   Medication Administration from 02/26/2024 1754 to 02/26/2024 2125         Date/Time Order Dose Route Action     02/26/2024 1938 EST cefTRIAXone (ROCEPHIN) IVPB (premix in dextrose) 1,000 mg 50 mL 1,000 mg Intravenous New Bag       Past Medical History:   Diagnosis Date    CHF (congestive heart failure) (HCC)     Chronic kidney disease 18 - 24 months?    Dr Patterson - stage 3    Colon polyp     CPAP (continuous positive airway pressure) dependence     Diabetes mellitus (HCC)     History of transfusion     Hyperlipidemia     Hypertension     Myocardial infarction (HCC) 06/2019    Open heart surgery with quad bypass    Obesity     Renal disorder     Sleep apnea     Visual impairment 1991    Dr Nickie Peters     Present on Admission:   Anemia of chronic kidney failure, stage 4 (severe)    Benign hypertension with chronic kidney disease, stage IV (HCC)   BPH (benign prostatic hyperplasia)   CKD (chronic kidney disease), stage IV (HCC)   Diabetic polyneuropathy associated with type 2 diabetes mellitus (HCC)   Diarrhea of presumed infectious origin   Recurrent UTI (urinary tract infection)   Mixed hyperlipidemia   Urinary retention      Admitting Diagnosis: Diarrhea of presumed infectious origin [R19.7]  Diarrhea [R19.7]  Urinary retention [R33.9]  UTI (urinary tract infection) [N39.0]  Urinary incontinence [R32]  Failure to thrive in adult [R62.7]  Age/Sex: 78 y.o. male  Admission Orders:  Scheduled Medications:  aspirin, 81 mg, Oral, Daily  atorvastatin, 80 mg, Oral, Daily  With Dinner  cefTRIAXone, 1,000 mg, Intravenous, Q24H  heparin (porcine), 5,000 Units, Subcutaneous, Q8H ALLA  insulin glargine, 42 Units, Subcutaneous, HS  insulin lispro, 1-5 Units, Subcutaneous, TID AC  insulin lispro, 16 Units, Subcutaneous, TID With Meals  metoprolol tartrate, 50 mg, Oral, BID  sodium bicarbonate, 650 mg, Oral, BID after meals  tamsulosin, 0.4 mg, Oral, Daily With Dinner    Continuous IV Infusions:  multi-electrolyte, 75 mL/hr, Intravenous, Continuous    PRN Meds:  acetaminophen, 650 mg, Oral, Q6H PRN  hydrALAZINE, 25 mg, Oral, Q8H PRN 2/27 x1  ondansetron, 4 mg, Intravenous, Q6H PRN        IP CONSULT TO CASE MANAGEMENT  IP CONSULT TO UROLOGY    Network Utilization Review Department  ATTENTION: Please call with any questions or concerns to [phone] and carefully listen to the prompts so that you are directed to the right person. All voicemails are confidential.   For Discharge needs, contact Care Management DC Support Team at [phone] opt. 2  Send all requests for admission clinical reviews, approved or denied determinations and any other requests to dedicated fax number below belonging to the campus where the patient is receiving treatment. List of dedicated fax numbers for the Facilities:  FACILITY NAME UR FAX NUMBER   ADMISSION DENIALS (Administrative/Medical Necessity) [phone]   DISCHARGE SUPPORT TEAM (NETWORK) [phone]   PARENT CHILD HEALTH (Maternity/NICU/Pediatrics) [phone]   [facility] [phone]   [facility] [phone]   [facility] [phone]   [facility] [phone]   [facility] [phone]   [facility] [phone]   [facility] [phone]   [facility] [phone]   [facility] [phone]   [facility]  [facility] [phone]   [facility] [phone]   [facility] [phone]

## 2024-02-27 NOTE — OCCUPATIONAL THERAPY NOTE
Occupational Therapy Evaluation     Patient Name: Michael Reynolds .  Today's Date: 2/27/2024  Problem List  Principal Problem:    Diarrhea of presumed infectious origin  Active Problems:    CKD (chronic kidney disease), stage IV (HCC)    Benign hypertension with chronic kidney disease, stage IV (HCC)    Mixed hyperlipidemia    S/P CABG (coronary artery bypass graft)    Diabetic polyneuropathy associated with type 2 diabetes mellitus (HCC)    BPH (benign prostatic hyperplasia)    Recurrent UTI (urinary tract infection)    Anemia of chronic kidney failure, stage 4 (severe)     Urinary retention    Past Medical History  Past Medical History:   Diagnosis Date    CHF (congestive heart failure) (HCC)     Chronic kidney disease 18 - 24 months?    Dr Patterson - stage 3    Colon polyp     CPAP (continuous positive airway pressure) dependence     Diabetes mellitus (HCC)     History of transfusion     Hyperlipidemia     Hypertension     Myocardial infarction (HCC) 06/2019    Open heart surgery with quad bypass    Obesity     Renal disorder     Sleep apnea     Visual impairment 1991    Dr Nickie Peters     Past Surgical History  Past Surgical History:   Procedure Laterality Date    APPENDECTOMY  1977    Done in conjunction with cholecystectomy    BACK SURGERY      CATARACT EXTRACTION, BILATERAL Bilateral     Left eye- 10/23 Right eye 9/23    CHOLECYSTECTOMY  1977    COLONOSCOPY      CORONARY ARTERY BYPASS GRAFT  2019    quad    EGD      GALLBLADDER SURGERY      HERNIA REPAIR      AL CORONARY ARTERY BYP W/VEIN & ARTERY GRAFT 4 VEIN N/A 06/21/2019    Procedure: CORONARY ARTERY BYPASS GRAFT (CABG) 4 VESSELS WITH SVG TO PDA, OM, DIAGONAL AND LIMA TO LAD; RIGHT LEG EVH;  Surgeon: James Fang MD;  Location: [facility];  Service: Cardiac Surgery    RECTAL SURGERY      SPINE SURGERY  2012    Fusion L3-L5?    TONSILLECTOMY             02/27/24 1210   OT Last Visit   OT Visit Date 02/27/24   Note Type   Note type Evaluation    Pain Assessment   Pain Assessment Tool 0-10   Pain Score No Pain   Restrictions/Precautions   Weight Bearing Precautions Per Order No   Other Precautions Cognitive;Bed Alarm;Fall Risk;Contact/isolation  (r/o C-Diff)   Home Living   Type of Home House   Home Layout Two level;Stairs to enter with rails   Bathroom Shower/Tub Walk-in shower   Bathroom Toilet Standard   Home Equipment Walker  (Reports that he does not use it even though he was told that he should be.)   Prior Function   Level of [place] Independent with ADLs;Independent with functional mobility;Needs assistance with IADLS   Lives With Spouse   Receives Help From Family   IADLs   (Pt reports that he prepares breakfast/lunch indepependently. Reports that he typically eats TV dinners. Reports that family)   Falls in the last 6 months   (Pt denies any falls.)   Comments Wife reports that home therapy was supposed to begin at home, however did not receive a call until recently.   Lifestyle   Reciprocal Relationships Per wife who entered towards end of session, wife endorsed that pt is not motivated to perform self-cares tasks at home. Also reports that pt has been non-compliant with medication.   Subjective   Subjective Pt received in supine position. Pt agreeeble to sudha.   ADL   Eating Assistance 7  Independent   Grooming Assistance 7  Independent   UB Bathing Assistance 5  Supervision/Setup   LB Bathing Assistance 5  Supervision/Setup   UB Dressing Assistance 5  Supervision/Setup   LB Dressing Assistance 5  Supervision/Setup   Toileting Assistance  5  Supervision/Setup   Bed Mobility   Supine to Sit 4  Minimal assistance   Additional items Assist x 1;Verbal cues;Increased time required;Bedrails;HOB elevated   Additional Comments Pt remained seated in recliner by of session.   Transfers   Sit to Stand 4  Minimal assistance   Additional items Assist x 1;Verbal cues;Armrests;Increased time required   Stand to Sit 5  Supervision   Additional items  Verbal cues;Armrests   Additional Comments Required VC for appropriate hand placement during transfers.   Functional Mobility   Functional Mobility 5  Supervision   Additional Comments RW   Balance   Static Sitting Good   Dynamic Sitting Fair +   Static Standing Fair +   Dynamic Standing Fair   Activity Tolerance   Activity Tolerance Patient tolerated treatment well   Medical Staff Made Aware PT Eric   RUE Assessment   RUE Assessment WFL   LUE Assessment   LUE Assessment WFL   Cognition   Overall Cognitive Status Impaired   Arousal/Participation Alert   Attention Within functional limits   Orientation Level Oriented to person;Oriented to place;Oriented to time;Disoriented to situation   Memory Decreased recall of precautions;Decreased recall of recent events   Following Commands Follows one step commands with increased time or repetition   Assessment   Limitation Decreased ADL status;Decreased self-care trans;Decreased high-level ADLs   Prognosis Fair   Assessment Pt is a 78 y.o. male seen for OT evaluation at [facility], admitted 2/26/2024 w/ Diarrhea of presumed infectious origin.  Comorbidities affecting pt's functional performance at time of assessment include: cad s/p cabg 2019 on asa, iddm, ckd stage 4, htn, hld, anemia of chronic disease, bph, h/o c diff in 2021.  Prior to admission, pt was living with wife in house with steps to manage.  Pt was I w/  ADLS and required assist with  IADLS, & required no DME/AD PTA. Upon evaluation, pt appears to be performing below baseline functional status. Pt currently requires min A x 1 for bed mobility, sup-min A x 1 for functional mobility/transfers, sup for UB ADLs and sup for LB ADLS 2* the following deficits impacting occupational performance: weakness, decreased strength, decreased balance, decreased tolerance, and decreased safety awareness. Full objective findings from OT assessment regarding body systems outlined above. Personal factors affecting pt  at time of IE include:steps to enter environment and decreased functional mobility . These impairments, as well as support and risk for falls  limit pt's ability to safely engage in all baseline areas of occupation and mobility. Pt to benefit from continued skilled OT tx while in the hospital to address deficits as defined above and maximize level of functional independence w ADL's and functional mobility. Occupational Performance areas to address include: bathing/shower, toilet hygiene, dressing, and functional mobility.  This evaluation required an extensive review of medical and/or therapy records and additional review of physical, cognitive and psychosocial history related to functional performance. Based upon functional performance deficits and assessments, this evaluation has been identified as a high complexity evaluation.  At this time, OT recommendations at time of discharge are level 3 low intensity resources.   Goals   Patient Goals Pt did not verbalize any goals at this time.   Plan   Treatment Interventions ADL retraining;Functional transfer training;Endurance training;Patient/family training;Compensatory technique education;Activityengagement   Goal Expiration Date 03/08/24   OT Treatment Day 0   OT Frequency 2-3x/wk   Discharge Recommendation   Rehab Resource Intensity Level, OT III (Minimum Resource Intensity)   Additional Comments  The patient's raw score on the -PAC Daily Activity inpatient short form is 21, standardized score is 44.27, greater than 39.4. Patients at this level are likely to benefit from DC to home. However please refer to therapist recommendation for discharge planning given other factors that may influence destination.   AM-PAC Daily Activity Inpatient   Lower Body Dressing 3   Bathing 3   Toileting 3   Upper Body Dressing 4   Grooming 4   Eating 4   Daily Activity Raw Score 21   Daily Activity Standardized Score (Calc for Raw Score >=11) 44.27   AM-PAC Applied Cognition  Inpatient   Following a Speech/Presentation 3   Understanding Ordinary Conversation 3   Taking Medications 3   Remembering Where Things Are Placed or Put Away 3   Remembering List of 4-5 Errands 3   Taking Care of Complicated Tasks 3   Applied Cognition Raw Score 18   Applied Cognition Standardized Score 38.07   End of Consult   Education Provided Yes   Patient Position at End of Consult Bedside chair;Bed/Chair alarm activated;All needs within reach   Nurse Communication Nurse aware of consult        Pt will achieve the following goals within 10 days.    *Pt will complete UB bathing and dressing with mod I.    *Pt will complete LB bathing and dressing with mod I .    * Pt will complete toileting w/ mod I w/ G hygiene/thoroughness using DME PRN    *Pt will complete bed mobility with mod I, with bed flat and no side rail to prep for purposeful tasks    *Pt will perform functional transfers with on/off all surfaces with mod I using DME as needed w/ G balance/safety.    *Patient will verbalize 3 safety awareness/ principles to prevent falls in the home setting.     *Pt will improve functional mobility during ADL/IADL/leisure tasks to mod I using DME as needed w/ G balance/safety.     Doris Smith, OTR/L

## 2024-02-27 NOTE — ASSESSMENT & PLAN NOTE
Lab Results   Component Value Date    EGFR 13 02/26/2024    EGFR 12 02/19/2024    EGFR 12 (L) 01/17/2024    CREATININE 4.01 (H) 02/26/2024    CREATININE 4.32 (H) 02/19/2024    CREATININE 4.79 (H) 01/17/2024     Will continue with his metoprolol 50 mg every 12 hours  Hydralazine prn for elevated BP

## 2024-02-27 NOTE — ASSESSMENT & PLAN NOTE
Lab Results   Component Value Date    EGFR 13 02/26/2024    EGFR 12 02/19/2024    EGFR 12 (L) 01/17/2024    CREATININE 4.01 (H) 02/26/2024    CREATININE 4.32 (H) 02/19/2024    CREATININE 4.79 (H) 01/17/2024     Patient had been told he may need dialysis but he declined this  He follows up with nephrology as an outpatient  His creatinine is currently about his recent baseline  Avoid nephrotoxins and NSAIDs  Renally dose medication  Added sodium bicarbonate given his met acidosis

## 2024-02-27 NOTE — ASSESSMENT & PLAN NOTE
Urinalysis positive for infection with innumerable wbc and LE positive in the setting of urinary retention  Got a dose of IV rocephin in the ED, will continue with abx but possibly change to oral if continues to be stable

## 2024-02-27 NOTE — ASSESSMENT & PLAN NOTE
No chest pain  Per chart review, CABG X 4 in 2019 (LIMA--LAD, SVG-- D1,OM1, rPDA)  Continuing home medication of Aspirin 81 mg daily, Lipitor 80 mg HS, Lopressor 50 mg b.i.d.

## 2024-02-27 NOTE — ASSESSMENT & PLAN NOTE
Patient with history of C. difficile infection in 2021  Has been having diarrhea for the last 2 weeks with loose BM  Patient denies any bloody BM  Abdominal exam is benign  No fevers or chills.  No leukocytosis  Patient noted not on any laxatives  Will need to rule out recurrent C. difficile infection  Obtain stool for C. Difficile  Keep on contact isolation

## 2024-02-27 NOTE — PLAN OF CARE
Problem: Potential for Falls  Goal: Patient will remain free of falls  Description: INTERVENTIONS:  - Educate patient/family on patient safety including physical limitations  - Instruct patient to call for assistance with activity   - Consult OT/PT to assist with strengthening/mobility   - Keep Call bell within reach  - Keep bed low and locked with side rails adjusted as appropriate  - Keep care items and personal belongings within reach  - Initiate and maintain comfort rounds  - Make Fall Risk Sign visible to staff  - Apply yellow socks and bracelet for high fall risk patients  - Consider moving patient to room near nurses station  Outcome: Progressing     Problem: Prexisting or High Potential for Compromised Skin Integrity  Goal: Skin integrity is maintained or improved  Description: INTERVENTIONS:  - Identify patients at risk for skin breakdown  - Assess and monitor skin integrity  - Assess and monitor nutrition and hydration status  - Monitor labs   - Assess for incontinence   - Turn and reposition patient  - Assist with mobility/ambulation  - Relieve pressure over bony prominences  - Avoid friction and shearing  - Provide appropriate hygiene as needed including keeping skin clean and dry  - Evaluate need for skin moisturizer/barrier cream  - Collaborate with interdisciplinary team   - Patient/family teaching  - Consider wound care consult   Outcome: Progressing     Problem: PAIN - ADULT  Goal: Verbalizes/displays adequate comfort level or baseline comfort level  Description: Interventions:  - Encourage patient to monitor pain and request assistance  - Assess pain using appropriate pain scale  - Administer analgesics based on type and severity of pain and evaluate response  - Implement non-pharmacological measures as appropriate and evaluate response  - Consider cultural and social influences on pain and pain management  - Notify physician/advanced practitioner if interventions unsuccessful or patient reports  new pain  Outcome: Progressing     Problem: INFECTION - ADULT  Goal: Absence or prevention of progression during hospitalization  Description: INTERVENTIONS:  - Assess and monitor for signs and symptoms of infection  - Monitor lab/diagnostic results  - Monitor all insertion sites, i.e. indwelling lines, tubes, and drains  - Monitor endotracheal if appropriate and nasal secretions for changes in amount and color  - [place] appropriate cooling/warming therapies per order  - Administer medications as ordered  - Instruct and encourage patient and family to use good hand hygiene technique  - Identify and instruct in appropriate isolation precautions for identified infection/condition  Outcome: Progressing     Problem: SAFETY ADULT  Goal: Patient will remain free of falls  Description: INTERVENTIONS:  - Educate patient/family on patient safety including physical limitations  - Instruct patient to call for assistance with activity   - Consult OT/PT to assist with strengthening/mobility   - Keep Call bell within reach  - Keep bed low and locked with side rails adjusted as appropriate  - Keep care items and personal belongings within reach  - Initiate and maintain comfort rounds  - Make Fall Risk Sign visible to staff  - Apply yellow socks and bracelet for high fall risk patients  - Consider moving patient to room near nurses station  Outcome: Progressing  Goal: Maintain or return to baseline ADL function  Description: INTERVENTIONS:  -  Assess patient's ability to carry out ADLs; assess patient's baseline for ADL function and identify physical deficits which impact ability to perform ADLs (bathing, care of mouth/teeth, toileting, grooming, dressing, etc.)  - Assess/evaluate cause of self-care deficits   - Assess range of motion  - Assess patient's mobility; develop plan if impaired  - Assess patient's need for assistive devices and provide as appropriate  - Encourage maximum independence but intervene and supervise when  necessary  - Involve family in performance of ADLs  - Assess for home care needs following discharge   - Consider OT consult to assist with ADL evaluation and planning for discharge  - Provide patient education as appropriate  Outcome: Progressing  Goal: Maintains/Returns to pre admission functional level  Description: INTERVENTIONS:  - Perform AM-PAC 6 Click Basic Mobility/ Daily Activity assessment daily.  - Set and communicate daily mobility goal to care team and patient/family/caregiver.   - Collaborate with rehabilitation services on mobility goals if consulted  - Out of bed for toileting  - Record patient progress and toleration of activity level   Outcome: Progressing     Problem: DISCHARGE PLANNING  Goal: Discharge to home or other facility with appropriate resources  Description: INTERVENTIONS:  - Identify barriers to discharge w/patient and caregiver  - Arrange for needed discharge resources and transportation as appropriate  - Identify discharge learning needs (meds, wound care, etc.)  - Arrange for interpretive services to assist at discharge as needed  - Refer to Case Management Department for coordinating discharge planning if the patient needs post-hospital services based on physician/advanced practitioner order or complex needs related to functional status, cognitive ability, or social support system  Outcome: Progressing     Problem: Knowledge Deficit  Goal: Patient/family/caregiver demonstrates understanding of disease process, treatment plan, medications, and discharge instructions  Description: Complete learning assessment and assess knowledge base.  Interventions:  - Provide teaching at level of understanding  - Provide teaching via preferred learning methods  Outcome: Progressing

## 2024-02-27 NOTE — TELEPHONE ENCOUNTER
Michael Reynolds is a 77 yo known to our practice admitted at [facility] due to diarrhea.  Patient developed urinary retention, Merritt catheter placed for 1 L.  Recommend Merritt catheter stay in place for 10 to 14 days.  Please schedule trial of void.  AP follow up after.

## 2024-02-27 NOTE — ASSESSMENT & PLAN NOTE
UA is abnormal with negative nitrites.  Follow cultures.  Has been started on empiric IV Rocephin.  Plan to continue  Urine in the Merritt does look turbid and has sediments

## 2024-02-27 NOTE — PROGRESS NOTES
[facility]  Progress Note  Name: Michael Sanchez I  MRN: [id number]  Unit/Bed#: [facility]-01 I Date of Admission: 2/26/2024   Date of Service: 2/27/2024 I Hospital Day: 0    Assessment/Plan   * Diarrhea of presumed infectious origin  Assessment & Plan  Patient with history of C. difficile infection in 2021  Has had 2 episodes of loose stool this morning  C. difficile has not been collected yet.  Discussed with nursing.  Consider rectal tube if unable to collect stool  No fever, leukocytosis or abdominal pain.  No recent use of antibiotics  Avoid laxative or stool softeners  IV fluids reduced to 75 cc an hour secondary to underlying CKD        Urinary retention  Assessment & Plan  Patient with known history of BPH, continue Flomax  Continue Merritt catheter and follow urology recommendations  His renal function noted with creatinine at his baseline    Recurrent UTI (urinary tract infection)  Assessment & Plan  UA is abnormal with negative nitrites.  Follow cultures.  Has been started on empiric IV Rocephin.  Plan to continue  Urine in the Merritt does look turbid and has sediments    BPH (benign prostatic hyperplasia)  Assessment & Plan  Known history of BPH  Continue with Flomax 0.4 mg nightly    Diabetic polyneuropathy associated with type 2 diabetes mellitus (HCC)  Assessment & Plan  Lab Results   Component Value Date    HGBA1C 8.1 (H) 12/30/2023       Recent Labs     02/26/24  2152 02/27/24  0709   POCGLU 263* 176*         Blood Sugar Average: Last 72 hrs:  (P) 219.5    Blood glucose 176 this morning.  Resume his lantus 42 units qhs, may need to further uptitrate his lantus as has been on higher dosage in the past  Hold Victoza as it may cause diarrhea  Continue lispro 16 units with meals and sliding scale insulin  Carb controlled diet        S/P CABG (coronary artery bypass graft)  Assessment & Plan  No chest pain  Per chart review, CABG X 4 in 2019 (LIMA--LAD, SVG-- D1,OM1,  "rPDA)  Continuing home medication of Aspirin 81 mg daily, Lipitor 80 mg HS, Lopressor 50 mg b.i.d.    Benign hypertension with chronic kidney disease, stage IV (HCC)  Assessment & Plan  Lab Results   Component Value Date    EGFR 15 02/27/2024    EGFR 13 02/26/2024    EGFR 12 02/19/2024    CREATININE 3.63 (H) 02/27/2024    CREATININE 4.01 (H) 02/26/2024    CREATININE 4.32 (H) 02/19/2024     /72 (BP Location: Right arm)   Pulse 68   Temp 97.5 °F (36.4 °C) (Tympanic)   Resp 16   Ht 5' 9\" (1.753 m)   Wt 92.2 kg (203 lb 4.2 oz)   SpO2 99%   BMI 30.02 kg/m²   Slightly elevated blood pressures.  Asymptomatic  Will continue with his metoprolol 50 mg every 12 hours  Hydralazine prn for elevated BP    CKD (chronic kidney disease), stage IV (HCC)  Assessment & Plan  Lab Results   Component Value Date    EGFR 15 02/27/2024    EGFR 13 02/26/2024    EGFR 12 02/19/2024    CREATININE 3.63 (H) 02/27/2024    CREATININE 4.01 (H) 02/26/2024    CREATININE 4.32 (H) 02/19/2024     Stable renal functions, creatinine around baseline  Patient had been told he may need dialysis but he declined this  He follows up with nephrology as an outpatient  His creatinine is currently about his recent baseline  Avoid nephrotoxins and NSAIDs  Renally dose medication  Continue sodium bicarbonate, bicarb of 20 (19)               VTE Pharmacologic Prophylaxis:    Heparin    Mobility:   Basic Mobility Inpatient Raw Score: 20  -HLM Goal: 6: Walk 10 steps or more  -HLM Achieved: 2: Bed activities/Dependent transfer  HLM Goal NOT achieved. Continue with multidisciplinary rounding and encourage appropriate mobility to improve upon HLM goals.    Patient Centered Rounds: I performed bedside rounds with nursing staff today.   Discussions with Specialists or Other Care Team Provider: Case management, PT OT    Education and Discussions with Family / Patient: Updated  (wife) via phone.  Complete discussion about the plan of care was had " with her.  Patient's wife has discussed with the patient because he is very nonambulatory at home the possibility of going to short-term rehab.  PT OT has been consulted for further recommendations    Total Time Spent on Date of Encounter in care of patient: 45 mins. This time was spent on one or more of the following: performing physical exam; counseling and coordination of care; obtaining or reviewing history; documenting in the medical record; reviewing/ordering tests, medications or procedures; communicating with other healthcare professionals and discussing with patient's family/caregivers.    Current Length of Stay: 0 day(s)  Current Patient Status: Observation   Certification Statement: The patient will continue to require additional inpatient hospital stay due to ongoing evaluation for possible C. difficile, UTI and generalized weakness with ambulatory dysfunction  Discharge Plan:  Undecided timeline, will likely need 48 to 72 hours in the hospital to determine everything that is going on and possibly placement    Code Status: Level 1 - Full Code    Subjective:   Patient seen and examined at bedside during a.m. rounds.  Patient laying comfortably in bed.  Complains that he had 2 episodes of watery mixed with stool diarrhea this morning but was unable to collect the stool sample.  He denies any abdominal pain or nausea vomiting or fever.  No chest pain or shortness of breath reported.  Denies any pain around the bladder or CVA angles    Objective:     Vitals:   Temp (24hrs), Av.9 °F (36.6 °C), Min:97.5 °F (36.4 °C), Max:98.5 °F (36.9 °C)    Temp:  [97.5 °F (36.4 °C)-98.5 °F (36.9 °C)] 97.5 °F (36.4 °C)  HR:  [68-91] 68  Resp:  [16-18] 16  BP: (155-190)/(64-85) 155/72  SpO2:  [99 %-100 %] 99 %  Body mass index is 30.02 kg/m².     Input and Output Summary (last 24 hours):     Intake/Output Summary (Last 24 hours) at 2024 0915  Last data filed at 2024 0807  Gross per 24 hour   Intake 1289.67 ml    Output 3200 ml   Net -1910.33 ml       Physical Exam:   Physical Exam General- Awake, alert and oriented x 3, looks comfortable  HEENT- Normocephalic, atraumatic, oral mucosa- moist  Neck- Supple, No carotid bruit, no JVD  CVS- Normal S1/ S2, Regular rate and rhythm, grade 2/6 systolic murmur, No edema  Respiratory system- B/L clear breath sounds, no wheezing  Abdomen- Soft, chronically obese/distended, no tenderness, Bowel sound- present 4 quads  Genitourinary- No suprapubic tenderness, No CVA tenderness  Skin- No new bruise or rash  Musculoskeletal- No gross deformity  Psych- No acute psychosis  CNS- CN II- XII grossly intact, No acute focal neurologic deficit noted      Additional Data:     Labs:  Results from last 7 days   Lab Units 02/27/24  0502   WBC Thousand/uL 9.97   HEMOGLOBIN g/dL 8.4*   HEMATOCRIT % 26.8*   PLATELETS Thousands/uL 351   NEUTROS PCT % 85*   LYMPHS PCT % 6*   MONOS PCT % 6   EOS PCT % 3     Results from last 7 days   Lab Units 02/27/24  0502   SODIUM mmol/L 137   POTASSIUM mmol/L 4.8   CHLORIDE mmol/L 109*   CO2 mmol/L 20*   BUN mg/dL 73*   CREATININE mg/dL 3.63*   ANION GAP mmol/L 8   CALCIUM mg/dL 8.3*   ALBUMIN g/dL 2.6*   TOTAL BILIRUBIN mg/dL 0.23   ALK PHOS U/L 67   ALT U/L 16   AST U/L 11*   GLUCOSE RANDOM mg/dL 196*         Results from last 7 days   Lab Units 02/27/24  0709 02/26/24  2152   POC GLUCOSE mg/dl 176* 263*               Lines/Drains:  Invasive Devices       Peripheral Intravenous Line  Duration             Peripheral IV 02/26/24 Left Antecubital <1 day              Drain  Duration             Urethral Catheter Double-lumen <1 day                  Urinary Catheter:  Goal for removal:  Continue Merritt catheter, follow urology recommendations               Imaging: No pertinent imaging reviewed.    Recent Cultures (last 7 days):         Last 24 Hours Medication List:   Current Facility-Administered Medications   Medication Dose Route Frequency Provider Last Rate     acetaminophen  650 mg Oral Q6H PRN Leidy Martinez MD      aspirin  81 mg Oral Daily Leidy Martinez MD      atorvastatin  80 mg Oral Daily With Dinner Leidy Martinez MD      cefTRIAXone  1,000 mg Intravenous Q24H Leidy Martinez MD      heparin (porcine)  5,000 Units Subcutaneous Q8H ALLA Leidy Martinez MD      hydrALAZINE  25 mg Oral Q8H PRN Leidy Martinez MD      insulin glargine  42 Units Subcutaneous HS Leidy Martinez MD      insulin lispro  1-5 Units Subcutaneous TID AC Leidy Martinez MD      insulin lispro  16 Units Subcutaneous TID With Meals Leidy Martinez MD      magnesium sulfate  2 g Intravenous Once Rony Augustin MD      metoprolol tartrate  50 mg Oral BID Leidy Martinez MD      multi-electrolyte  75 mL/hr Intravenous Continuous Rony Augustin  mL/hr (02/27/24 0632)    ondansetron  4 mg Intravenous Q6H PRN Leidy Martinez MD      sodium bicarbonate  650 mg Oral BID after meals Leidy Martinez MD      tamsulosin  0.4 mg Oral Daily With Dinner Leidy Martinez MD          Today, Patient Was Seen By: Rony Augustin MD    **Please Note: This note may have been constructed using a voice recognition system.**

## 2024-02-27 NOTE — ASSESSMENT & PLAN NOTE
Lab Results   Component Value Date    HGBA1C 8.1 (H) 12/30/2023       Recent Labs     02/26/24  2152 02/27/24  0709   POCGLU 263* 176*         Blood Sugar Average: Last 72 hrs:  (P) 219.5    Blood glucose 176 this morning.  Resume his lantus 42 units qhs, may need to further uptitrate his lantus as has been on higher dosage in the past  Hold Victoza as it may cause diarrhea  Continue lispro 16 units with meals and sliding scale insulin  Carb controlled diet

## 2024-02-27 NOTE — ASSESSMENT & PLAN NOTE
Patient with known history of BPH, continue Flomax  Continue Merritt catheter and follow urology recommendations  His renal function noted with creatinine at his baseline

## 2024-02-27 NOTE — ASSESSMENT & PLAN NOTE
"Lab Results   Component Value Date    EGFR 15 02/27/2024    EGFR 13 02/26/2024    EGFR 12 02/19/2024    CREATININE 3.63 (H) 02/27/2024    CREATININE 4.01 (H) 02/26/2024    CREATININE 4.32 (H) 02/19/2024     /72 (BP Location: Right arm)   Pulse 68   Temp 97.5 °F (36.4 °C) (Tympanic)   Resp 16   Ht 5' 9\" (1.753 m)   Wt 92.2 kg (203 lb 4.2 oz)   SpO2 99%   BMI 30.02 kg/m²   Slightly elevated blood pressures.  Asymptomatic  Will continue with his metoprolol 50 mg every 12 hours  Hydralazine prn for elevated BP  "

## 2024-02-27 NOTE — ED PROVIDER NOTES
"History  Chief Complaint   Patient presents with    Diarrhea     Pt presents with increasing diarrhea since returning home from acute rehab. Denies abdominal pain, vomiting. Diarrhea is \"pink tinged\" per wife.    Urinary Incontinence     Reports increasing occurrences of urinary incontinence after \"removal of [place] catheter\"     Patient notes about 2-week history of diarrhea.  He notes that he has pink-tinged liquid stool.  He does not have the sensation of having constipation.  He also reports that he has been having constant urinary dribbling.  The patient's wife reports patient has been noncompliant with medications since he was just discharged from rehab facility.  He recently saw his nephrologist who started him on a few medicines, but patient has not been taking any medicines regularly.  Patient notes that he does have end-stage kidney disease, but is not interested in dialysis even to save his life.  He denies any back pain.  He denies any leg numbness.          Prior to Admission Medications   Prescriptions Last Dose Informant Patient Reported? Taking?   Continuous Blood Gluc  (FreeStyle Celia 2 [place]) MAMI  Spouse/Significant Other, Self No No   Sig: Use to check blood sugar daily   Continuous Blood Gluc Sensor (FreeStyle Celia 2 Sensor) [facility] [place]   No No   Sig: Use daily as directed for CGM - Change every 14 days   Insulin Pen Needle (B-D ULTRAFINE III SHORT PEN) 31G X 8 MM MISC  Spouse/Significant Other, Self No No   Sig: Inject under the skin 4 (four) times a day   Microlet Lancets MISC  Spouse/Significant Other, Self No No   Sig: USE 3 TIMES DAY   aspirin (ECOTRIN LOW STRENGTH) 81 mg EC tablet  Spouse/Significant Other, Self No No   Sig: Take 1 tablet (81 mg total) by mouth daily   atorvastatin (LIPITOR) 80 mg tablet  Spouse/Significant Other, Self No No   Sig: Take 1 tablet (80 mg total) by mouth daily with dinner   glucose blood test strip  Spouse/Significant Other, Self No No   Sig: Check 4 times " a day   insulin glargine (Toujeo Max SoloStar) 300 units/mL CONCENTRATED U-300 injection pen (2-unit dial)  Spouse/Significant Other, Self No No   Sig: INJECT 42 UNITS UNDER THE SKIN DAILY at dinner time   Patient taking differently: Inject 42 Units under the skin daily at bedtime INJECT 42 UNITS UNDER THE SKIN DAILY at dinner time   insulin lispro (HumaLOG KwikPen) 100 units/mL injection pen  Spouse/Significant Other, Self No No   Sig: Inject 16 units before brunch and  18  units before dinner +scale   liraglutide (Victoza) injection  Self, Spouse/Significant Other No No   Si.8 MG ONCE DAILY   Patient taking differently: Inject 1.8 mg under the skin daily with lunch 1.8 mg once daily   metoprolol tartrate (LOPRESSOR) 50 mg tablet  Self No No   Sig: Take 1 tablet (50 mg total) by mouth 2 (two) times a day      Facility-Administered Medications: None       Past Medical History:   Diagnosis Date    CHF (congestive heart failure) (HCC)     Chronic kidney disease 18 - 24 months?    Dr Patterson - stage 3    Colon polyp     CPAP (continuous positive airway pressure) dependence     Diabetes mellitus (HCC)     History of transfusion     Hyperlipidemia     Hypertension     Myocardial infarction (HCC) 2019    Open heart surgery with quad bypass    Obesity     Renal disorder     Sleep apnea     Visual impairment     Dr Nickie Peters       Past Surgical History:   Procedure Laterality Date    APPENDECTOMY      Done in conjunction with cholecystectomy    BACK SURGERY      CATARACT EXTRACTION, BILATERAL Bilateral     Left eye- 10/23 Right eye     CHOLECYSTECTOMY      COLONOSCOPY      CORONARY ARTERY BYPASS GRAFT      quad    EGD      GALLBLADDER SURGERY      HERNIA REPAIR      [place] CORONARY ARTERY BYP W/VEIN & ARTERY GRAFT 4 VEIN N/A 2019    Procedure: CORONARY ARTERY BYPASS GRAFT (CABG) 4 VESSELS WITH SVG TO PDA, OM, DIAGONAL AND LIMA TO LAD; RIGHT LEG EVH;  Surgeon: James Fang MD;   Location: [facility];  Service: Cardiac Surgery    RECTAL SURGERY      SPINE SURGERY  2012    Fusion L3-L5?    TONSILLECTOMY         Family History   Problem Relation Age of Onset    Cancer Mother     Alcohol abuse Mother     Cancer Father     Alcohol abuse Father     Diabetes Maternal Grandmother     Diabetes type II Maternal Grandmother         Geriatric onset -      Diabetes Paternal Aunt     Hypertension Paternal Grandfather      I have reviewed and agree with the history as documented.    E-Cigarette/Vaping    E-Cigarette Use Never User      E-Cigarette/Vaping Substances    Nicotine No     THC No     CBD No     Flavoring No     Other No     Unknown No      Social History     Tobacco Use    Smoking status: Former     Current packs/day: 0.00     Average packs/day: 3.0 packs/day for 35.0 years (105.0 ttl pk-yrs)     Types: Cigarettes, Cigars     Start date: 1957     Quit date: 1992     Years since quittin.1    Smokeless tobacco: Never    Tobacco comments:     Started smoking as a pre-teenager   Vaping Use    Vaping status: Never Used   Substance Use Topics    Alcohol use: Not Currently     Comment: none since most recent hospitalization    Drug use: Never       Review of Systems   All other systems reviewed and are negative.      Physical Exam  Physical Exam  Vitals and nursing note reviewed.   Constitutional:       General: He is not in acute distress.     Appearance: He is well-developed.   HENT:      Head: Normocephalic and atraumatic.   Eyes:      Conjunctiva/sclera: Conjunctivae normal.   Cardiovascular:      Rate and Rhythm: Normal rate and regular rhythm.      Heart sounds: No murmur heard.  Pulmonary:      Effort: Pulmonary effort is normal. No respiratory distress.      Breath sounds: Normal breath sounds.   Abdominal:      Palpations: Abdomen is soft.      Tenderness: There is no abdominal tenderness. There is no guarding.   Genitourinary:     Comments: Rectal exam performed: No  fecal impaction  Musculoskeletal:         General: No swelling.      Cervical back: Neck supple.   Skin:     General: Skin is warm and dry.      Capillary Refill: Capillary refill takes less than 2 seconds.   Neurological:      General: No focal deficit present.      Mental Status: He is alert.   Psychiatric:         Mood and Affect: Mood normal.         Vital Signs  ED Triage Vitals [02/26/24 1802]   Temperature Pulse Respirations Blood Pressure SpO2   97.7 °F (36.5 °C) 91 18 (!) 178/81 100 %      Temp Source Heart Rate Source Patient Position - Orthostatic VS BP Location FiO2 (%)   Oral Monitor Lying Right arm --      Pain Score       --           Vitals:    02/26/24 1802 02/26/24 2000 02/26/24 2030   BP: (!) 178/81 (!) 178/81 (!) 176/64   Pulse: 91 78 78   Patient Position - Orthostatic VS: Lying Lying Lying         Visual Acuity      ED Medications  Medications   cefTRIAXone (ROCEPHIN) IVPB (premix in dextrose) 1,000 mg 50 mL (0 mg Intravenous Stopped 2/26/24 2008)       Diagnostic Studies  Results Reviewed       Procedure Component Value Units Date/Time    Urine Microscopic [[id number]]  (Abnormal) Collected: 02/26/24 1904    Lab Status: Final result Specimen: Urine, Indwelling Merritt Catheter Updated: 02/26/24 1928     RBC, UA       Field obscured, unable to enumerate     /hpf     WBC, UA Innumerable /hpf      Epithelial Cells       Field obscured, unable to enumerate     /hpf     Bacteria, UA       Field obscured, unable to enumerate     /hpf    Stool Enteric Bacterial Panel by PCR [[id number]] Collected: 02/26/24 1918    Lab Status: In process Specimen: Stool from Rectum Updated: 02/26/24 1920    UA (URINE) with reflex to Scope [[id number]]  (Abnormal) Collected: 02/26/24 1904    Lab Status: Final result Specimen: Urine, Indwelling Merritt Catheter Updated: 02/26/24 1910     Color, UA Yellow     Clarity, UA Cloudy     Specific Gravity, UA 1.020     pH, UA 6.0     Leukocytes, UA 3+     Nitrite, UA Negative      Protein, UA 3+ mg/dl      Glucose, UA 1+ mg/dl      Ketones, UA Negative mg/dl      Urobilinogen, UA 0.2 E.U./dl      Bilirubin, UA Negative     Occult Blood, UA 2+    Comprehensive metabolic panel [[id number]]  (Abnormal) Collected: 02/26/24 1826    Lab Status: Final result Specimen: Blood from Arm, Left Updated: 02/26/24 1845     Sodium 137 mmol/L      Potassium 5.2 mmol/L      Chloride 110 mmol/L      CO2 19 mmol/L      ANION GAP 8 mmol/L      BUN 76 mg/dL      Creatinine 4.01 mg/dL      Glucose 234 mg/dL      Calcium 8.4 mg/dL      Corrected Calcium 9.2 mg/dL      AST 10 U/L      ALT 18 U/L      Alkaline Phosphatase 68 U/L      Total Protein 6.8 g/dL      Albumin 3.0 g/dL      Total Bilirubin 0.33 mg/dL      eGFR 13 ml/min/1.73sq m     Narrative:      National Kidney Disease Foundation guidelines for Chronic Kidney Disease (CKD):     Stage 1 with normal or high GFR (GFR > 90 mL/min/1.73 square meters)    Stage 2 Mild CKD (GFR = 60-89 mL/min/1.73 square meters)    Stage 3A Moderate CKD (GFR = 45-59 mL/min/1.73 square meters)    Stage 3B Moderate CKD (GFR = 30-44 mL/min/1.73 square meters)    Stage 4 Severe CKD (GFR = 15-29 mL/min/1.73 square meters)    Stage 5 End Stage CKD (GFR <15 mL/min/1.73 square meters)  Note: GFR calculation is accurate only with a steady state creatinine    CBC and differential [[id number]]  (Abnormal) Collected: 02/26/24 1826    Lab Status: Final result Specimen: Blood from Arm, Left Updated: 02/26/24 1831     WBC 9.24 Thousand/uL      RBC 3.08 Million/uL      Hemoglobin 8.8 g/dL      Hematocrit 28.9 %      MCV 94 fL      MCH 28.6 pg      MCHC 30.4 g/dL      RDW 15.1 %      MPV 10.4 fL      Platelets 369 Thousands/uL      nRBC 0 /100 WBCs      Neutrophils Relative 86 %      Immat GRANS % 1 %      Lymphocytes Relative 7 %      Monocytes Relative 5 %      Eosinophils Relative 1 %      Basophils Relative 0 %      Neutrophils Absolute 8.05 Thousands/µL      Immature Grans Absolute 0.05  Thousand/uL      Lymphocytes Absolute 0.61 Thousands/µL      Monocytes Absolute 0.42 Thousand/µL      Eosinophils Absolute 0.07 Thousand/µL      Basophils Absolute 0.04 Thousands/µL     Clostridium difficile toxin by PCR with EIA [[id number]]     Lab Status: No result Specimen: Stool from Per Rectum                    No orders to display              Procedures  Procedures         ED Course  ED Course as of 02/26/24 2054   Mon Feb 26, 2024 1832 Hemoglobin(!): 8.8  Within range of patient's baseline anemia.   1839 Bladder scan >999ml.  Merritt catheter ordered.  Of note, patient recently stopped taking his prostate medications, although he still has them at home.  I advised of need to resume these medications.   1849 Creatinine(!): 4.01  Similar to prior.  Of note, patient refused dialysis.   1849 Carbon Dioxide(!): 19  Similar to prior.   1931 WBC, UA(!): Innumerable  Will treat for UTI.   1947 Patient provided loose stool but not enough for c.diff testing per RN.     2051 Patient notes that he has not been caring for himself at home.  He admits that he needs to be placed in a nursing home and request admission for nursing home placement.  Wife reports she cannot care for him at home.  Case discussed with , who accepts admission.                               SBIRT 20yo+      Flowsheet Row Most Recent Value   Initial Alcohol Screen: US AUDIT-C     1. How often do you have a drink containing alcohol? 0 Filed at: 02/26/2024 1801   2. How many drinks containing alcohol do you have on a typical day you are drinking?  0 Filed at: 02/26/2024 1801   3a. Male UNDER 65: How often do you have five or more drinks on one occasion? 0 Filed at: 02/26/2024 1801   3b. FEMALE Any Age, or MALE 65+: How often do you have 4 or more drinks on one occassion? 0 Filed at: 02/26/2024 1801   Audit-C Score 0 Filed at: 02/26/2024 1801   OSMAR: How many times in the past year have you...    Used an illegal drug or used a prescription  medication for non-medical reasons? Never Filed at: 02/26/2024 1801                      Medical Decision Making  I eval the patient.  No back pain to suggest cauda equina syndrome.  Will obtain bladder scan to rule out urinary retention.    Amount and/or Complexity of Data Reviewed  Independent Historian: spouse  Labs: ordered. Decision-making details documented in ED Course.    Risk  Prescription drug management.  Decision regarding hospitalization.  Diagnosis or treatment significantly limited by social determinants of health.             Disposition  Final diagnoses:   Diarrhea of presumed infectious origin   UTI (urinary tract infection)   Urinary retention   Failure to thrive in adult     Time reflects when diagnosis was documented in both MDM as applicable and the Disposition within this note       Time User Action Codes Description Comment    2/26/2024  8:50 PM Neeraj Ochoa [R19.7] Diarrhea of presumed infectious origin     2/26/2024  8:50 PM Neeraj Ochoa [N39.0] UTI (urinary tract infection)     2/26/2024  8:50 PM Neeraj Ochoa [R33.9] Urinary retention     2/26/2024  8:50 PM Neeraj Ochoa Add [R62.51] Failure to thrive (child)     2/26/2024  8:50 PM Neeraj Ochoa Remove [R62.51] Failure to thrive (child)     2/26/2024  8:50 PM Neeraj Ochoa Add [R62.7] Failure to thrive in adult           ED Disposition       ED Disposition   Admit    Condition   Stable    Date/Time   Mon Feb 26, 2024 2050    Comment   Case was discussed with Dr. Martinez  and the patient's admission status was agreed to be Admission Status: observation status to the service of Dr. Martinez .               Follow-up Information    None         Patient's Medications   Discharge Prescriptions    No medications on file       No discharge procedures on file.    PDMP Review       None            ED Provider  Electronically Signed by             Neeraj Ochoa MD  02/26/24 2055

## 2024-02-27 NOTE — ASSESSMENT & PLAN NOTE
Per chart review, CABG X 4 in 2019 (LIMA--LAD, SVG-- D1,OM1, rPDA)  Pt with no cardiac symptoms.   Continuing home medication of Aspirin 81 mg daily, Lipitor 80 mg HS, Lopressor 50 mg b.i.d.

## 2024-02-27 NOTE — PLAN OF CARE
Problem: Potential for Falls  Goal: Patient will remain free of falls  Description: INTERVENTIONS:  - Educate patient/family on patient safety including physical limitations  - Instruct patient to call for assistance with activity   - Consult OT/PT to assist with strengthening/mobility   - Keep Call bell within reach  - Keep bed low and locked with side rails adjusted as appropriate  - Keep care items and personal belongings within reach  - Initiate and maintain comfort rounds  - Make Fall Risk Sign visible to staff  - Offer Toileting every 2 Hours, in advance of need  - Initiate/Maintain bed alarm  - Obtain necessary fall risk management equipment:   - Apply yellow socks and bracelet for high fall risk patients  - Consider moving patient to room near nurses station  Outcome: Progressing     Problem: Prexisting or High Potential for Compromised Skin Integrity  Goal: Skin integrity is maintained or improved  Description: INTERVENTIONS:  - Identify patients at risk for skin breakdown  - Assess and monitor skin integrity  - Assess and monitor nutrition and hydration status  - Monitor labs   - Assess for incontinence   - Turn and reposition patient  - Assist with mobility/ambulation  - Relieve pressure over bony prominences  - Avoid friction and shearing  - Provide appropriate hygiene as needed including keeping skin clean and dry  - Evaluate need for skin moisturizer/barrier cream  - Collaborate with interdisciplinary team   - Patient/family teaching  - Consider wound care consult   Outcome: Progressing     Problem: PAIN - ADULT  Goal: Verbalizes/displays adequate comfort level or baseline comfort level  Description: Interventions:  - Encourage patient to monitor pain and request assistance  - Assess pain using appropriate pain scale  - Administer analgesics based on type and severity of pain and evaluate response  - Implement non-pharmacological measures as appropriate and evaluate response  - Consider cultural and  social influences on pain and pain management  - Notify physician/advanced practitioner if interventions unsuccessful or patient reports new pain  Outcome: Progressing     Problem: INFECTION - ADULT  Goal: Absence or prevention of progression during hospitalization  Description: INTERVENTIONS:  - Assess and monitor for signs and symptoms of infection  - Monitor lab/diagnostic results  - Monitor all insertion sites, i.e. indwelling lines, tubes, and drains  - Monitor endotracheal if appropriate and nasal secretions for changes in amount and color  - Ranger appropriate cooling/warming therapies per order  - Administer medications as ordered  - Instruct and encourage patient and family to use good hand hygiene technique  - Identify and instruct in appropriate isolation precautions for identified infection/condition  Outcome: Progressing  Goal: Absence of fever/infection during neutropenic period  Description: INTERVENTIONS:  - Monitor WBC    Outcome: Progressing     Problem: SAFETY ADULT  Goal: Patient will remain free of falls  Description: INTERVENTIONS:  - Educate patient/family on patient safety including physical limitations  - Instruct patient to call for assistance with activity   - Consult OT/PT to assist with strengthening/mobility   - Keep Call bell within reach  - Keep bed low and locked with side rails adjusted as appropriate  - Keep care items and personal belongings within reach  - Initiate and maintain comfort rounds  - Make Fall Risk Sign visible to staff  - Offer Toileting every 2 Hours, in advance of need  - Initiate/Maintain bed alarm  - Obtain necessary fall risk management equipment:   - Apply yellow socks and bracelet for high fall risk patients  - Consider moving patient to room near nurses station  Outcome: Progressing  Goal: Maintain or return to baseline ADL function  Description: INTERVENTIONS:  -  Assess patient's ability to carry out ADLs; assess patient's baseline for ADL function and  identify physical deficits which impact ability to perform ADLs (bathing, care of mouth/teeth, toileting, grooming, dressing, etc.)  - Assess/evaluate cause of self-care deficits   - Assess range of motion  - Assess patient's mobility; develop plan if impaired  - Assess patient's need for assistive devices and provide as appropriate  - Encourage maximum independence but intervene and supervise when necessary  - Involve family in performance of ADLs  - Assess for home care needs following discharge   - Consider OT consult to assist with ADL evaluation and planning for discharge  - Provide patient education as appropriate  Outcome: Progressing  Goal: Maintains/Returns to pre admission functional level  Description: INTERVENTIONS:  - Perform AM-PAC 6 Click Basic Mobility/ Daily Activity assessment daily.  - Set and communicate daily mobility goal to care team and patient/family/caregiver.   - Collaborate with rehabilitation services on mobility goals if consulted  - Perform Range of Motion 3 times a day.  - Reposition patient every 2 hours.  - Dangle patient 3 times a day  - Stand patient 3 times a day  - Ambulate patient 3 times a day  - Out of bed to chair 3 times a day   - Out of bed for meals 3 times a day  - Out of bed for toileting  - Record patient progress and toleration of activity level   Outcome: Progressing     Problem: DISCHARGE PLANNING  Goal: Discharge to home or other facility with appropriate resources  Description: INTERVENTIONS:  - Identify barriers to discharge w/patient and caregiver  - Arrange for needed discharge resources and transportation as appropriate  - Identify discharge learning needs (meds, wound care, etc.)  - Arrange for interpretive services to assist at discharge as needed  - Refer to Case Management Department for coordinating discharge planning if the patient needs post-hospital services based on physician/advanced practitioner order or complex needs related to functional status,  cognitive ability, or social support system  Outcome: Progressing     Problem: Knowledge Deficit  Goal: Patient/family/caregiver demonstrates understanding of disease process, treatment plan, medications, and discharge instructions  Description: Complete learning assessment and assess knowledge base.  Interventions:  - Provide teaching at level of understanding  - Provide teaching via preferred learning methods  Outcome: Progressing

## 2024-02-27 NOTE — PLAN OF CARE
Problem: OCCUPATIONAL THERAPY ADULT  Goal: Performs self-care activities at highest level of function for planned discharge setting.  See evaluation for individualized goals.  Description: Treatment Interventions: ADL retraining, Functional transfer training, Endurance training, Patient/family training, Compensatory technique education, Activityengagement          See flowsheet documentation for full assessment, interventions and recommendations.   Note: Limitation: Decreased ADL status, Decreased self-care trans, Decreased high-level ADLs  Prognosis: Fair  Assessment: Pt is a 78 y.o. male seen for OT evaluation at [facility], admitted 2/26/2024 w/ Diarrhea of presumed infectious origin.  Comorbidities affecting pt's functional performance at time of assessment include: cad s/p cabg 2019 on asa, iddm, ckd stage 4, htn, hld, anemia of chronic disease, bph, h/o c diff in 2021.  Prior to admission, pt was living with wife in house with steps to manage.  Pt was I w/  ADLS and required assist with  IADLS, & required no DME/AD PTA. Upon evaluation, pt appears to be performing below baseline functional status. Pt currently requires min A x 1 for bed mobility, sup-min A x 1 for functional mobility/transfers, sup for UB ADLs and sup for LB ADLS 2* the following deficits impacting occupational performance: weakness, decreased strength, decreased balance, decreased tolerance, and decreased safety awareness. Full objective findings from OT assessment regarding body systems outlined above. Personal factors affecting pt at time of IE include:steps to enter environment and decreased functional mobility . These impairments, as well as support and risk for falls  limit pt's ability to safely engage in all baseline areas of occupation and mobility. Pt to benefit from continued skilled OT tx while in the hospital to address deficits as defined above and maximize level of functional independence w ADL's and functional  mobility. Occupational Performance areas to address include: bathing/shower, toilet hygiene, dressing, and functional mobility.  This evaluation required an extensive review of medical and/or therapy records and additional review of physical, cognitive and psychosocial history related to functional performance. Based upon functional performance deficits and assessments, this evaluation has been identified as a high complexity evaluation.  At this time, OT recommendations at time of discharge are level 3 low intensity resources.     Rehab Resource Intensity Level, OT: III (Minimum Resource Intensity)

## 2024-02-27 NOTE — ED NOTES
Patient able to pass small amount of loose stool in commode, unable to obtain both Cdiff and C and S, sheets changed, pads placed.     Juventino Wilson RN  02/26/24 1953

## 2024-02-27 NOTE — CASE MANAGEMENT
Case Management Assessment & Discharge Planning Note    Patient name Michael Reynolds Jr.  Location /-01 MRN [id number]  : 1945 Date 2024       Current Admission Date: 2024  Current Admission Diagnosis:Diarrhea of presumed infectious origin   Patient Active Problem List    Diagnosis Date Noted    Diarrhea of presumed infectious origin 2024    Urinary retention 2024    Syncope 02/15/2024    Hypomagnesemia 2024    Postural dizziness with presyncope 2024    Anemia of chronic disease 2024    Depression 2023    Anemia of chronic kidney failure, stage 4 (severe)  2023    Recurrent UTI (urinary tract infection) 2023    Nocturia 2023    Encounter to discuss test results 2023    Incomplete bladder emptying 2023    Osteopenia of neck of left femur 2023    Secondary hyperparathyroidism of renal origin (HCC) 2023    Hyperphosphatemia 2023    HARJIT (obstructive sleep apnea) 2023    Diverticulosis 2022    Gastric ulcer 2022    GI bleed 2022    Gait instability 2022    Asymptomatic bilateral carotid artery stenosis 2022    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HCC) 2022    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    BPH (benign prostatic hyperplasia) 2021    Essential hypertension 2021    Elevated TSH 2021    Hypervolemia associated with renal insufficiency 2021    Chronic diastolic (congestive) heart failure (HCC) 2021    Traumatic injury of skin 2021    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HCC) 2020    Diabetic polyneuropathy associated with type 2 diabetes mellitus (HCC)     Stenosis of left internal carotid artery 2020    Lymphadenopathy 10/22/2019    Vitamin D deficiency 10/21/2019    Iron deficiency anemia 2019    S/P CABG (coronary artery bypass graft) 2019    Acute postoperative pulmonary insufficiency (HCC) 2019    Coronary  artery disease 06/17/2019    Mixed hyperlipidemia 06/17/2019    Insulin dependent diabetes mellitus (HCC) 06/15/2019    Elevated troponin 06/15/2019    Sleep apnea 06/15/2019    Benign hypertension with chronic kidney disease, stage IV (HCC) 05/30/2019    Chronic kidney disease-mineral and bone disorder 05/30/2019    Esophageal dysphagia 02/14/2019    History of colonic polyps 02/14/2019    CKD (chronic kidney disease), stage IV (HCC) 08/18/2017    Non-nephrotic range proteinuria 08/18/2017    Trigger finger of left hand 04/05/2017    Lumbar back pain 02/07/2012    Lumbar discogenic pain syndrome 02/07/2012    Pain of lower extremity 02/07/2012    Spondylolisthesis 02/07/2012    Spinal stenosis 02/07/2012      LOS (days): 0  Geometric Mean LOS (GMLOS) (days):   Days to GMLOS:     OBJECTIVE:        Current admission status: Observation    Preferred Pharmacy:   [facility]/pharmacy #[id number] - [place] [id number] [address]  Phone: [phone] Fax: [phone]    [place] [address]  Phone: [phone] Fax: [phone]    Primary Care Provider: Luke Glasgow DO    Primary Insurance: ELDON SPEARS  Secondary Insurance:     ASSESSMENT:  Active Health Care Proxies       Mine Reynolds Health Care Representative - Spouse   Primary Phone: [phone] (Mobile)  Home Phone: [phone]            Readmission Root Cause  30 Day Readmission: No    Patient Information  Admitted from:: Home  Mental Status: Alert  During Assessment patient was accompanied by: Not accompanied during assessment  Assessment information provided by:: Patient  Primary Caregiver: Self  Support Systems: Spouse/significant other, Self, Family members  County of Residence: [place]  What city do you live in?: [place]  Home entry access options. Select all that apply.: Stairs  Number of steps to enter home.:  2  Do the steps have railings?: Yes  Type of Current Residence: [facility]  Living Arrangements: Lives w/ Spouse/significant other  Is patient a ?: No    Activities of Daily Living Prior to Admission  Functional Status: Independent  Completes ADLs independently?: Yes  Ambulates independently?: Yes  Does patient use assisted devices?: Yes  Assisted Devices (DME) used: Shower Chair, Straight Cane  Does patient currently own DME?: Yes  What DME does the patient currently own?: Shower Chair, Straight Cane  Does patient have a history of Outpatient Therapy (PT/OT)?: No  Does the patient have a history of Short-Term Rehab?: Yes (@ [facility])  Does patient have a history of HHC?: Yes  Does patient currently have HHC?: No (Patient was recently discharged from [facility] with HHC services, however patient refused SOC.)    Patient Information Continued  Income Source: Pension/FDC  Does patient have prescription coverage?: Yes  Does patient receive dialysis treatments?: No  Does patient have a history of substance abuse?: No  Does patient have a history of Mental Health Diagnosis?: No    PHQ 2/9 Screening   Reviewed PHQ 2/9 Depression Screening Score?: No    Means of Transportation  Means of Transport to Appts:: Family transport      Social Determinants of Health (SDOH)      Flowsheet Row Most Recent Value   Housing Stability    In the last 12 months, was there a time when you were not able to pay the mortgage or rent on time? N   In the last 12 months, how many places have you lived? 1   In the last 12 months, was there a time when you did not have a steady place to sleep or slept in a shelter (including now)? N   Transportation Needs    In the past 12 months, has lack of transportation kept you from medical appointments or from getting medications? no   In the past 12 months, has lack of transportation kept you from meetings, work, or from getting things needed for daily living? No   Food Insecurity     Within the past 12 months, you worried that your food would run out before you got the money to buy more. Never true   Within the past 12 months, the food you bought just didn't last and you didn't have money to get more. Never true   Utilities    In the past 12 months has the electric, gas, oil, or water company threatened to shut off services in your home? No          DISCHARGE DETAILS:    Discharge planning discussed with:: Patient and spouse  Freedom of Choice: Yes  Comments - Freedom of Choice: Choice is for referral to [facility].  CM contacted family/caregiver?: Yes  Were Treatment Team discharge recommendations reviewed with patient/caregiver?: Yes  Did patient/caregiver verbalize understanding of patient care needs?: N/A- going to facility  Were patient/caregiver advised of the risks associated with not following Treatment Team discharge recommendations?: Yes    Contacts  Patient Contacts: Mine Reynolds  Relationship to Patient:: Family  Contact Method: Phone  Phone Number: [phone]  Reason/Outcome: Emergency Contact, Discharge Planning    Requested Home Health Care         Is the patient interested in HHC at discharge?: No    DME Referral Provided  Referral made for DME?: No    Other Referral/Resources/Interventions Provided:  Interventions: Short Term Rehab  Referral Comments: Referral opened to [facility] via [facility].    Would you like to participate in our [facility] service program?  : No - Declined    Treatment Team Recommendation: Short Term Rehab, Home with Home Health Care (PENDING EVALS)  Discharge Destination Plan:: Home with Home Health Care, Short Term Rehab (PENDING EVALS)     Additional Comments: . CM s/w patient's spouse by phone to discuss DCP. Wife reports that patient was sent home from [facility] earlier this month with HHC services, however refused SOC so did not have HHC services. Wife reports that their daughter called [facility] asking for patient to  return to [facility] due to failing at home. Wife reports that family's choice was for patient to return to [facility] if able - CM confirmed with patient the same, patient confirmed same preference/choice to return to [facility].     CM confirmed with admissions at [facility] that family did call, however [facility] was unable to admit from home due to no PT/OT documentation available for authorization.    Referral pending with [facility].

## 2024-02-27 NOTE — H&P
[facility]  H&P  Name: Michael Reynolds Jr. 78 y.o. male I MRN: [id number]  Unit/Bed#: -01 I Date of Admission: 2/26/2024   Date of Service: 2/27/2024 I Hospital Day: 0      Assessment/Plan   * Diarrhea of presumed infectious origin  Assessment & Plan  Patient with history of C. difficile infection in 2021  Has been having diarrhea for the last 2 weeks with loose BM  Patient denies any bloody BM  Abdominal exam is benign  No fevers or chills.  No leukocytosis  Patient noted not on any laxatives  Will need to rule out recurrent C. difficile infection  Obtain stool for C. Difficile  Keep on contact isolation      Urinary retention  Assessment & Plan  Patient with known history of BPH  Had been on Flomax in the past but recently held  He had a Merritt catheter placed in the ED with urine output greater than 1 L  Will keep Merritt in place for now  We will start him on Flomax 0.4 mg nightly tonight  Will consult urology  His renal function noted with creatinine at his baseline    BPH (benign prostatic hyperplasia)  Assessment & Plan  Known history of BPH  Continue with Flomax 0.4 mg nightly    CKD (chronic kidney disease), stage IV (HCC)  Assessment & Plan  Lab Results   Component Value Date    EGFR 13 02/26/2024    EGFR 12 02/19/2024    EGFR 12 (L) 01/17/2024    CREATININE 4.01 (H) 02/26/2024    CREATININE 4.32 (H) 02/19/2024    CREATININE 4.79 (H) 01/17/2024     Patient had been told he may need dialysis but he declined this  He follows up with nephrology as an outpatient  His creatinine is currently about his recent baseline  Avoid nephrotoxins and NSAIDs  Renally dose medication  Added sodium bicarbonate given his met acidosis    Benign hypertension with chronic kidney disease, stage IV (HCC)  Assessment & Plan  Lab Results   Component Value Date    EGFR 13 02/26/2024    EGFR 12 02/19/2024    EGFR 12 (L) 01/17/2024    CREATININE 4.01 (H) 02/26/2024    CREATININE 4.32 (H) 02/19/2024     CREATININE 4.79 (H) 01/17/2024     Will continue with his metoprolol 50 mg every 12 hours  Hydralazine prn for elevated BP    Mixed hyperlipidemia  Assessment & Plan  Continue with Lipitor 80 mg qhs    S/P CABG (coronary artery bypass graft)  Assessment & Plan  Per chart review, CABG X 4 in 2019 (LIMA--LAD, SVG-- D1,OM1, rPDA)  Pt with no cardiac symptoms.   Continuing home medication of Aspirin 81 mg daily, Lipitor 80 mg HS, Lopressor 50 mg b.i.d.    Diabetic polyneuropathy associated with type 2 diabetes mellitus (HCC)  Assessment & Plan  Lab Results   Component Value Date    HGBA1C 8.1 (H) 12/30/2023       Recent Labs     02/26/24 2152   POCGLU 263*       Blood Sugar Average: Last 72 hrs:  (P) 263    Uncontrolled.   Resume his lantus 42 units qhs, may need to further uptitrate his lantus as has been on higher dosage in the past  Continue victoza  Continue lispro 16 units with meals        Recurrent UTI (urinary tract infection)  Assessment & Plan  Urinalysis positive for infection with innumerable wbc and LE positive in the setting of urinary retention  Got a dose of IV rocephin in the ED, will continue with abx but possibly change to oral if continues to be stable    Anemia of chronic kidney failure, stage 4 (severe)   Assessment & Plan  Hemoglobin stable at his baseline.   He runs between 8-9         History of Present Illness     HPI:  Michael VILLANUEVA Gail Sanchez is a 78 y.o. male with past medical history of cad s/p cabg 2019 on asa, iddm, ckd stage 4, htn, hld, anemia of chronic disease, bph, h/o c diff in 2021, who presents to the hospital today with ongoing diarrhea in the last 2 weeks.  He stated it got to the point that the wife could not take care of him much again.  Diarrhea is very loose and nonbloody, occurring at least 3 times per day.  He denies any abdominal pain.  No fevers or chills.  No associated nausea or vomiting.  No constipation patient states diarrhea is somewhat different from when he had his C.  Difficile.  Patient however also just recently returned from acute rehab.  His other complaints is increasing urinary incontinence after removal of [place] catheter in rehab.  Patient reports he has been having constant urinary dribbling and lower abdominal discomfort.  He has a history of BPH but had either stopped taking his medication or it was discontinued. Pt unsure of his meds. He was recently admitted and at that time his flomax was held due to concern for postural near syncope. He was found to be in acute urinary retention and had an output of >1L after insertion of sheridan catheter. His urinalysis showed innumerable wbc with positive LE. His WBC was normal. His vitals were stable. His creatinine at about his recent baseline of 4      Historical Information   Past Medical History:   Diagnosis Date    CHF (congestive heart failure) (HCC)     Chronic kidney disease 18 - 24 months?    Dr Patterson - stage 3    Colon polyp     CPAP (continuous positive airway pressure) dependence     Diabetes mellitus (HCC)     History of transfusion     Hyperlipidemia     Hypertension     Myocardial infarction (HCC) 06/2019    Open heart surgery with quad bypass    Obesity     Renal disorder     Sleep apnea     Visual impairment 1991    Dr Nickie Peters     Patient Active Problem List   Diagnosis    CKD (chronic kidney disease), stage IV (HCC)    Benign hypertension with chronic kidney disease, stage IV (HCC)    Chronic kidney disease-mineral and bone disorder    Insulin dependent diabetes mellitus (HCC)    Elevated troponin    Sleep apnea    Coronary artery disease    Mixed hyperlipidemia    S/P CABG (coronary artery bypass graft)    Acute postoperative pulmonary insufficiency (HCC)    Iron deficiency anemia    Non-nephrotic range proteinuria    Esophageal dysphagia    History of colonic polyps    Lumbar back pain    Lumbar discogenic pain syndrome    Pain of lower extremity    Trigger finger of left hand    Spondylolisthesis     Spinal stenosis    Vitamin D deficiency    Lymphadenopathy    Stenosis of left internal carotid artery    Diabetic polyneuropathy associated with type 2 diabetes mellitus (HCC)    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HCC)    Traumatic injury of skin    Hypervolemia associated with renal insufficiency    Elevated TSH    Essential hypertension    Chronic diastolic (congestive) heart failure (HCC)    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    BPH (benign prostatic hyperplasia)    Asymptomatic bilateral carotid artery stenosis    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HCC)    Gait instability    GI bleed    Diverticulosis    Gastric ulcer    HARJIT (obstructive sleep apnea)    Secondary hyperparathyroidism of renal origin (HCC)    Hyperphosphatemia    Osteopenia of neck of left femur    Encounter to discuss test results    Incomplete bladder emptying    Recurrent UTI (urinary tract infection)    Nocturia    Anemia of chronic kidney failure, stage 4 (severe)     Depression    Postural dizziness with presyncope    Anemia of chronic disease    Hypomagnesemia    Syncope    Diarrhea of presumed infectious origin    Urinary retention     Past Surgical History:   Procedure Laterality Date    APPENDECTOMY  1977    Done in conjunction with cholecystectomy    BACK SURGERY      CATARACT EXTRACTION, BILATERAL Bilateral     Left eye- 10/23 Right eye 9/23    CHOLECYSTECTOMY  1977    COLONOSCOPY      CORONARY ARTERY BYPASS GRAFT  2019    quad    EGD      GALLBLADDER SURGERY      HERNIA REPAIR      FL CORONARY ARTERY BYP W/VEIN & ARTERY GRAFT 4 VEIN N/A 06/21/2019    Procedure: CORONARY ARTERY BYPASS GRAFT (CABG) 4 VESSELS WITH SVG TO PDA, OM, DIAGONAL AND LIMA TO LAD; RIGHT LEG EVH;  Surgeon: James Fang MD;  Location: [facility];  Service: Cardiac Surgery    RECTAL SURGERY      SPINE SURGERY  2012    Fusion L3-L5?    TONSILLECTOMY         Social History   Social History     Substance and Sexual Activity   Alcohol Use Not Currently    Comment: none since most recent  hospitalization     Social History     Substance and Sexual Activity   Drug Use Never     Social History     Tobacco Use   Smoking Status Former    Current packs/day: 0.00    Average packs/day: 3.0 packs/day for 35.0 years (105.0 ttl pk-yrs)    Types: Cigarettes, Cigars    Start date: 1957    Quit date: 1992    Years since quittin.1   Smokeless Tobacco Never   Tobacco Comments    Started smoking as a pre-teenager       Family History:   Family History   Problem Relation Age of Onset    Cancer Mother     Alcohol abuse Mother     Cancer Father     Alcohol abuse Father     Diabetes Maternal Grandmother     Diabetes type II Maternal Grandmother         Geriatric onset -      Diabetes Paternal Aunt     Hypertension Paternal Grandfather        Meds/Allergies       Current Facility-Administered Medications:     acetaminophen (TYLENOL) tablet 650 mg, 650 mg, Oral, Q6H PRN, Leidy Martinez MD    aspirin (ECOTRIN LOW STRENGTH) EC tablet 81 mg, 81 mg, Oral, Daily, Leidy Martinez MD    atorvastatin (LIPITOR) tablet 80 mg, 80 mg, Oral, Daily With Dinner, Leidy Martinez MD    cefTRIAXone (ROCEPHIN) IVPB (premix in dextrose) 1,000 mg 50 mL, 1,000 mg, Intravenous, Q24H, Leidy Martinez MD    heparin (porcine) subcutaneous injection 5,000 Units, 5,000 Units, Subcutaneous, Q8H ALLA, 5,000 Units at 24 **AND** Platelet count, , , Once, Leidy Martinez MD    hydrALAZINE (APRESOLINE) tablet 25 mg, 25 mg, Oral, Q8H PRN, Leidy Martinez MD    insulin glargine (LANTUS) subcutaneous injection 42 Units 0.42 mL, 42 Units, Subcutaneous, HS, Leidy Martinez MD, 42 Units at 24    insulin lispro (HumaLOG) 100 units/mL subcutaneous injection 1-5 Units, 1-5 Units, Subcutaneous, TID AC **AND** Fingerstick Glucose (POCT), , , TID AC, Leidy Martinez MD    insulin lispro (HumaLOG) 100 units/mL subcutaneous injection 16 Units, 16 Units, Subcutaneous, TID With Meals, Leidy  "JOSE Martinez MD    liraglutide (VICTOZA) injection 1.8 mg, 1.8 mg, Subcutaneous, Daily With Lunch, Leidy Martinez MD    metoprolol tartrate (LOPRESSOR) tablet 50 mg, 50 mg, Oral, BID, Leidy Martinez MD, 50 mg at 02/26/24 2222    multi-electrolyte (PLASMALYTE-A/ISOLYTE-S PH 7.4) IV solution, 100 mL/hr, Intravenous, Continuous, Leidy Martinez MD, Last Rate: 100 mL/hr at 02/26/24 2222, 100 mL/hr at 02/26/24 2222    ondansetron (ZOFRAN) injection 4 mg, 4 mg, Intravenous, Q6H PRN, Leidy Martinez MD    sodium bicarbonate tablet 650 mg, 650 mg, Oral, BID after meals, Leidy Martinez MD, 650 mg at 02/26/24 2302    tamsulosin (FLOMAX) capsule 0.4 mg, 0.4 mg, Oral, Daily With Dinner, Leidy Martinez MD, 0.4 mg at 02/26/24 2302    No Known Allergies    Review of Systems  A detailed 12 point review of systems was conducted and is negative apart from those mentioned in the HPI.        Objective   Vitals: Blood pressure (!) 187/82, pulse 84, temperature 98.5 °F (36.9 °C), temperature source Oral, resp. rate 16, height 5' 9\" (1.753 m), weight 92.2 kg (203 lb 4.2 oz), SpO2 99%.    Physical Exam   General- Awake and alert, NAD  HEENT: PERRLA, EOMI, sclera anicteric, moist mucous membranes, tongue mucosa without lesions.  Neck: supple, no JVD, lymphadenopathy, thyromegaly.  Heart: Regular rate and rhythm, S1S2 present.  No murmur, rub or gallop.    Lungs; Clear to auscultation bilaterally.  No wheezing, crackles or rhonchi.  No accessory muscle use or respiratory distress.  Abdomen: soft, non-tender, non-distended, NABS.  No guarding or rebound. No peritoneal sound or mass. Has sheridan catheter in place  Extremities: no clubbing, cyanosis, or edema.  2+ pedal pulses bilaterally.  Full range of motion.  Neurologic:  Cranial nerves II-XII intact.  Strength and sensation globally intact. Speech fluent and goal directed.  Awake, alert and oriented x 3.  Skin: warm and dry. No petechiae, purpura or rash.    Lab " "Results:   Results from last 7 days   Lab Units 02/26/24  1826   WBC Thousand/uL 9.24   HEMOGLOBIN g/dL 8.8*   HEMATOCRIT % 28.9*   PLATELETS Thousands/uL 369     Results from last 7 days   Lab Units 02/26/24  1826   POTASSIUM mmol/L 5.2   CHLORIDE mmol/L 110*   CO2 mmol/L 19*   BUN mg/dL 76*   CREATININE mg/dL 4.01*   CALCIUM mg/dL 8.4         Imaging:  No orders to display          Code Status: Level 1 - Full Code      Counseling / Coordination of Care  Total floor / unit time spent today 70 minutes.  Greater than 50% of total time was spent with the patient and / or family counseling and / or coordination of care.      Portions of the record may have been created with voice recognition software. Occasional wrong word or \"sound a like\" substitutions may have occurred due to the inherent limitations of voice recognition software. Read the chart carefully and recognize, using context, where substitutions have occurred.  "

## 2024-02-27 NOTE — ASSESSMENT & PLAN NOTE
Patient with known history of BPH  Had been on Flomax in the past but recently held  He had a Merritt catheter placed in the ED with urine output greater than 1 L  Will keep Merritt in place for now  We will start him on Flomax 0.4 mg nightly tonight  Will consult urology  His renal function noted with creatinine at his baseline

## 2024-02-27 NOTE — PLAN OF CARE
Problem: PHYSICAL THERAPY ADULT  Goal: Performs mobility at highest level of function for planned discharge setting.  See evaluation for individualized goals.  Description: Treatment/Interventions: Functional transfer training, LE strengthening/ROM, Elevations, Therapeutic exercise, Endurance training, Cognitive reorientation, Patient/family training, Equipment eval/education, Bed mobility, Gait training          See flowsheet documentation for full assessment, interventions and recommendations.  Outcome: Progressing  Note: Prognosis: Fair  Problem List: Decreased strength, Decreased range of motion, Impaired balance, Decreased mobility, Decreased coordination, Decreased cognition, Impaired judgement, Decreased safety awareness  Assessment: Orders for PT eval and treat received. Pt's PMHx: CHF, MI, CABG x4, back sx (fusion L3-L5), C-diff, anemia. Pt exhibits physical deficits noted in problem list above.  Deficits listed contribute to functional limitations that are significant from the patient PLOF and include: difficulty with bed mobility, impaired sitting balance, impaired standing balance, unsafe/inefficient ambulation, fall risk, and unable to safely manage stairs/steps/ramp    During today's session, formal PT evaluation performed.  Pt exhibits flat affect and limited/inappropriate verbal responses to questioning regarding PLOF and subjective current status.  However, pt was agreeable to evaluation and testing, but questionable effort vs deficit at times.  Pt does seem to struggle with task sequencing, struggling to manage safe/effective bed mobility despite hospital bed features and cues.  Increased time needed to manage sitting/standing balance, as pt exhibits delayed righting reaction or insight.  Trunk and LE strength likely contributing to his mobility deficits.      The AM-PAC & Barthel Index outcome tools were used to assist in determining pt safety w/ mobility/self care & appropriate d/c  recommendations, see above for scores. Patient's clinical presentation is evolving due to altered mental status, ongoing medical management needs, and complicated social/support system.     Considering the patient's PLOF, co-morbidities, acute functional limitations, functional outcome measures, and/or goal to progress functional independence; this patient would benefit from skilled Physical Therapy intervention in the acute care setting.  Barriers to Discharge: Decreased caregiver support (Pt is home alone for prolonged periods of time.)     Rehab Resource Intensity Level, PT: III (Minimum Resource Intensity)    See flowsheet documentation for full assessment.

## 2024-02-28 PROBLEM — R26.2 AMBULATORY DYSFUNCTION: Status: ACTIVE | Noted: 2024-02-28

## 2024-02-28 PROBLEM — Z86.19 HISTORY OF CLOSTRIDIUM DIFFICILE INFECTION: Status: ACTIVE | Noted: 2024-02-28

## 2024-02-28 LAB
GLUCOSE SERPL-MCNC: 140 MG/DL (ref 65–140)
GLUCOSE SERPL-MCNC: 79 MG/DL (ref 65–140)
GLUCOSE SERPL-MCNC: 84 MG/DL (ref 65–140)
GLUCOSE SERPL-MCNC: 88 MG/DL (ref 65–140)

## 2024-02-28 PROCEDURE — 82948 REAGENT STRIP/BLOOD GLUCOSE: CPT

## 2024-02-28 PROCEDURE — 99232 SBSQ HOSP IP/OBS MODERATE 35: CPT | Performed by: PHYSICIAN ASSISTANT

## 2024-02-28 PROCEDURE — 87493 C DIFF AMPLIFIED PROBE: CPT | Performed by: INTERNAL MEDICINE

## 2024-02-28 RX ORDER — INSULIN GLARGINE 100 [IU]/ML
30 INJECTION, SOLUTION SUBCUTANEOUS
Status: DISCONTINUED | OUTPATIENT
Start: 2024-02-28 | End: 2024-02-29 | Stop reason: HOSPADM

## 2024-02-28 RX ORDER — VANCOMYCIN HYDROCHLORIDE 125 MG/1
125 CAPSULE ORAL DAILY
Status: DISCONTINUED | OUTPATIENT
Start: 2024-02-28 | End: 2024-02-29

## 2024-02-28 RX ORDER — INSULIN LISPRO 100 [IU]/ML
5 INJECTION, SOLUTION INTRAVENOUS; SUBCUTANEOUS
Status: DISCONTINUED | OUTPATIENT
Start: 2024-02-28 | End: 2024-02-29 | Stop reason: HOSPADM

## 2024-02-28 RX ORDER — INSULIN LISPRO 100 [IU]/ML
1-5 INJECTION, SOLUTION INTRAVENOUS; SUBCUTANEOUS
Status: DISCONTINUED | OUTPATIENT
Start: 2024-02-28 | End: 2024-02-29 | Stop reason: HOSPADM

## 2024-02-28 RX ADMIN — CEFTRIAXONE 1000 MG: 1 INJECTION, SOLUTION INTRAVENOUS at 19:22

## 2024-02-28 RX ADMIN — HEPARIN SODIUM 5000 UNITS: 5000 INJECTION, SOLUTION INTRAVENOUS; SUBCUTANEOUS at 06:02

## 2024-02-28 RX ADMIN — SODIUM BICARBONATE 650 MG: 650 TABLET ORAL at 08:45

## 2024-02-28 RX ADMIN — HEPARIN SODIUM 5000 UNITS: 5000 INJECTION, SOLUTION INTRAVENOUS; SUBCUTANEOUS at 21:08

## 2024-02-28 RX ADMIN — INSULIN LISPRO 16 UNITS: 100 INJECTION, SOLUTION INTRAVENOUS; SUBCUTANEOUS at 12:37

## 2024-02-28 RX ADMIN — HEPARIN SODIUM 5000 UNITS: 5000 INJECTION, SOLUTION INTRAVENOUS; SUBCUTANEOUS at 14:09

## 2024-02-28 RX ADMIN — ASPIRIN 81 MG: 81 TABLET, COATED ORAL at 08:45

## 2024-02-28 RX ADMIN — METOPROLOL TARTRATE 50 MG: 50 TABLET, FILM COATED ORAL at 08:45

## 2024-02-28 RX ADMIN — INSULIN LISPRO 16 UNITS: 100 INJECTION, SOLUTION INTRAVENOUS; SUBCUTANEOUS at 08:46

## 2024-02-28 RX ADMIN — VANCOMYCIN HYDROCHLORIDE 125 MG: 125 CAPSULE ORAL at 14:09

## 2024-02-28 RX ADMIN — INSULIN GLARGINE 30 UNITS: 100 INJECTION, SOLUTION SUBCUTANEOUS at 21:08

## 2024-02-28 RX ADMIN — METOPROLOL TARTRATE 50 MG: 50 TABLET, FILM COATED ORAL at 17:28

## 2024-02-28 RX ADMIN — SODIUM BICARBONATE 650 MG: 650 TABLET ORAL at 17:28

## 2024-02-28 RX ADMIN — TAMSULOSIN HYDROCHLORIDE 0.4 MG: 0.4 CAPSULE ORAL at 17:28

## 2024-02-28 RX ADMIN — ATORVASTATIN CALCIUM 80 MG: 40 TABLET, FILM COATED ORAL at 17:28

## 2024-02-28 NOTE — ASSESSMENT & PLAN NOTE
UA is abnormal with immunerable WBCs. Was negative for nitrites however.  No culture obtained.  On day #3 ceftriaxone. Will continue ABX for 5 days total.

## 2024-02-28 NOTE — ASSESSMENT & PLAN NOTE
Seen by physical therapy and Occupational Therapy and recommended for minimal resource intensity however patient's family concerned about his ability to care for himself at home especially with a new catheter in place.    Authorization has been requested from rehab.  Awaiting response

## 2024-02-28 NOTE — ASSESSMENT & PLAN NOTE
Patient with known history of BPH, continue Flomax  Continue Merritt catheter - 1100 drained after placement  Outpt void trial discussed with urology

## 2024-02-28 NOTE — ASSESSMENT & PLAN NOTE
In 2021  Given on IV ceftriaxone, will start oral prophylactic vancomycin daily  Follow-up on C. difficile study

## 2024-02-28 NOTE — ASSESSMENT & PLAN NOTE
Lab Results   Component Value Date    HGBA1C 7.9 (H) 02/27/2024       Recent Labs     02/27/24  1823 02/27/24  2206 02/28/24  0723 02/28/24  1113   POCGLU 121 179* 79 88         Blood Sugar Average: Last 72 hrs:  (P) 118    Hypoglycemic yesterday  Was not compliant with meds at home  Reduce lantus from 42 to 30 units QHS  Reduce mealtime from 16 units to 5 units TID with meals  Continue SSI - add QHS

## 2024-02-28 NOTE — ASSESSMENT & PLAN NOTE
CABG X 4 in 2019 (LIMA--LAD, SVG-- D1,OM1, rPDA)  Continue home medication of Aspirin 81 mg daily, Lipitor 80 mg HS, Lopressor 50 mg b.i.d.

## 2024-02-28 NOTE — ASSESSMENT & PLAN NOTE
Now improving since 2/27  History of C. difficile infection in 2021  Had 1 episode of diarrhea this AM which was sent down for c diff study  Stool enteric panel negative  Add prophylactic vancomycin given on IV ceftriaxone at this time and history of c diff.  Discussed dosing with pharmacy.  Stop fluids  Abd exam benign

## 2024-02-28 NOTE — ASSESSMENT & PLAN NOTE
Lab Results   Component Value Date    EGFR 15 02/27/2024    EGFR 13 02/26/2024    EGFR 12 02/19/2024    CREATININE 3.63 (H) 02/27/2024    CREATININE 4.01 (H) 02/26/2024    CREATININE 4.32 (H) 02/19/2024     Renal function at baseline 3.8  Patient had been told he may need dialysis but he declined this  He follows up with nephrology as an outpatient

## 2024-02-28 NOTE — ASSESSMENT & PLAN NOTE
Hemoglobin stable at his baseline  Lab Results   Component Value Date    HGB 8.4 (L) 02/27/2024    HGB 8.8 (L) 02/26/2024    HGB 9.4 (L) 02/19/2024

## 2024-02-28 NOTE — PROGRESS NOTES
Formerly [facility], later [facility]  Progress Note  Name: Michael Sanchez I  MRN: [id number]  Unit/Bed#: -01 I Date of Admission: 2/26/2024   Date of Service: 2/28/2024 I Hospital Day: 1    Assessment/Plan   * Diarrhea of presumed infectious origin  Assessment & Plan  Now improving since 2/27  History of C. difficile infection in 2021  Had 1 episode of diarrhea this AM which was sent down for c diff study  Stool enteric panel negative  Add prophylactic vancomycin given on IV ceftriaxone at this time and history of c diff.  Discussed dosing with pharmacy.  Stop fluids  Abd exam benign    History of Clostridium difficile infection  Assessment & Plan  In 2021  Given on IV ceftriaxone, will start oral prophylactic vancomycin daily  Follow-up on C. difficile study    Recurrent UTI (urinary tract infection)  Assessment & Plan  UA is abnormal with immunerable WBCs. Was negative for nitrites however.  No culture obtained.  On day #3 ceftriaxone. Will continue ABX for 5 days total.    Urinary retention  Assessment & Plan  Patient with known history of BPH, continue Flomax  Continue Merritt catheter - 1100 drained after placement  Outpt void trial discussed with urology    BPH (benign prostatic hyperplasia)  Assessment & Plan  Continue with Flomax 0.4 mg nightly    CKD (chronic kidney disease), stage IV (formerly [facility])  Assessment & Plan  Lab Results   Component Value Date    EGFR 15 02/27/2024    EGFR 13 02/26/2024    EGFR 12 02/19/2024    CREATININE 3.63 (H) 02/27/2024    CREATININE 4.01 (H) 02/26/2024    CREATININE 4.32 (H) 02/19/2024     Renal function at baseline 3.8  Patient had been told he may need dialysis but he declined this  He follows up with nephrology as an outpatient    S/P CABG (coronary artery bypass graft)  Assessment & Plan  CABG X 4 in 2019 (LIMA--LAD, SVG-- D1,OM1, rPDA)  Continue home medication of Aspirin 81 mg daily, Lipitor 80 mg HS, Lopressor 50 mg b.i.d.    Benign hypertension with chronic kidney  disease, stage IV (HCC)  Assessment & Plan  -160s  Continue metoprolol 50 mg every 12 hours    Anemia of chronic kidney failure, stage 4 (severe)   Assessment & Plan  Hemoglobin stable at his baseline  Lab Results   Component Value Date    HGB 8.4 (L) 02/27/2024    HGB 8.8 (L) 02/26/2024    HGB 9.4 (L) 02/19/2024         Diabetic polyneuropathy associated with type 2 diabetes mellitus (HCC)  Assessment & Plan  Lab Results   Component Value Date    HGBA1C 7.9 (H) 02/27/2024       Recent Labs     02/27/24  1823 02/27/24  2206 02/28/24  0723 02/28/24  1113   POCGLU 121 179* 79 88         Blood Sugar Average: Last 72 hrs:  (P) 118    Hypoglycemic yesterday  Was not compliant with meds at home  Reduce lantus from 42 to 30 units QHS  Reduce mealtime from 16 units to 5 units TID with meals  Continue SSI - add QHS    Ambulatory dysfunction  Assessment & Plan  Seen by physical therapy and Occupational Therapy and recommended for minimal resource intensity however patient's family concerned about his ability to care for himself at home especially with a new catheter in place.    Authorization has been requested from rehab.  Awaiting response             VTE Pharmacologic Prophylaxis: VTE Score: 4 Moderate Risk (Score 3-4) - Pharmacological DVT Prophylaxis Ordered: heparin.    Mobility:   Basic Mobility Inpatient Raw Score: 17  -HLM Goal: 5: Stand one or more mins  JH-HLM Achieved: 4: Move to chair/commode  HLM Goal NOT achieved. Continue with multidisciplinary rounding and encourage appropriate mobility to improve upon HLM goals.    Patient Centered Rounds: I performed bedside rounds with nursing staff today.   Discussions with Specialists or Other Care Team Provider: nursing    Education and Discussions with Family / Patient: Updated  (wife) via phone.    Total Time Spent on Date of Encounter in care of patient:  mins. This time was spent on one or more of the following: performing physical exam;  counseling and coordination of care; obtaining or reviewing history; documenting in the medical record; reviewing/ordering tests, medications or procedures; communicating with other healthcare professionals and discussing with patient's family/caregivers.    Current Length of Stay: 1 day(s)  Current Patient Status: Inpatient   Certification Statement: The patient will continue to require additional inpatient hospital stay due to diarrhea studies, amb dysfunction  Discharge Plan: Anticipate discharge in 24-48 hrs to home. With VNA vs to [facility]    Code Status: Level 1 - Full Code    Subjective:   Feeling okay  Less diarrhea  Still feels weak  Doesn't like the cathether in place    Objective:     Vitals:   Temp (24hrs), Av.6 °F (37 °C), Min:98 °F (36.7 °C), Max:99 °F (37.2 °C)    Temp:  [98 °F (36.7 °C)-99 °F (37.2 °C)] 98 °F (36.7 °C)  HR:  [63-73] 63  Resp:  [16-18] 18  BP: (140-163)/(66-83) 140/66  SpO2:  [97 %-99 %] 97 %  Body mass index is 30.02 kg/m².     Input and Output Summary (last 24 hours):     Intake/Output Summary (Last 24 hours) at 2024 1519  Last data filed at 2024 1016  Gross per 24 hour   Intake 1173 ml   Output 2050 ml   Net -877 ml       Physical Exam:   Physical Exam  Vitals and nursing note reviewed.   Constitutional:       General: He is not in acute distress.     Appearance: Normal appearance. He is not diaphoretic.   HENT:      Head: Normocephalic and atraumatic.   Cardiovascular:      Rate and Rhythm: Normal rate and regular rhythm.      Heart sounds: No murmur heard.  Pulmonary:      Effort: Pulmonary effort is normal.      Breath sounds: Normal breath sounds. No stridor. No wheezing, rhonchi or rales.   Abdominal:      General: Bowel sounds are normal.      Palpations: Abdomen is soft. There is no mass.      Tenderness: There is no abdominal tenderness. There is no guarding.      Comments: Cath in place with yellow urine   Musculoskeletal:      Right lower leg: No edema.       Left lower leg: No edema.   Skin:     General: Skin is warm and dry.   Neurological:      Mental Status: He is alert and oriented to person, place, and time.      Cranial Nerves: No cranial nerve deficit.   Psychiatric:         Mood and Affect: Mood normal.         Behavior: Behavior normal.          Additional Data:     Labs:  Results from last 7 days   Lab Units 02/27/24  0502   WBC Thousand/uL 9.97   HEMOGLOBIN g/dL 8.4*   HEMATOCRIT % 26.8*   PLATELETS Thousands/uL 351   NEUTROS PCT % 85*   LYMPHS PCT % 6*   MONOS PCT % 6   EOS PCT % 3     Results from last 7 days   Lab Units 02/27/24  0502   SODIUM mmol/L 137   POTASSIUM mmol/L 4.8   CHLORIDE mmol/L 109*   CO2 mmol/L 20*   BUN mg/dL 73*   CREATININE mg/dL 3.63*   ANION GAP mmol/L 8   CALCIUM mg/dL 8.3*   ALBUMIN g/dL 2.6*   TOTAL BILIRUBIN mg/dL 0.23   ALK PHOS U/L 67   ALT U/L 16   AST U/L 11*   GLUCOSE RANDOM mg/dL 196*         Results from last 7 days   Lab Units 02/28/24  1113 02/28/24  0723 02/27/24  2206 02/27/24  1823 02/27/24  1655 02/27/24  1649 02/27/24  1603 02/27/24  1555 02/27/24  1048 02/27/24  0709 02/26/24  2152   POC GLUCOSE mg/dl 88 79 179* 121 61* 60* 58* 55* 158* 176* 263*     Results from last 7 days   Lab Units 02/27/24  0502   HEMOGLOBIN A1C % 7.9*           Lines/Drains:  Invasive Devices       Peripheral Intravenous Line  Duration             Peripheral IV 02/26/24 Left Antecubital 1 day              Drain  Duration             Urethral Catheter Double-lumen 1 day                  Urinary Catheter:  Goal for removal: N/A- Discharging with Merritt               Imaging: No pertinent imaging reviewed.    Recent Cultures (last 7 days):         Last 24 Hours Medication List:   Current Facility-Administered Medications   Medication Dose Route Frequency Provider Last Rate    acetaminophen  650 mg Oral Q6H PRN Leidy Martinez MD      aspirin  81 mg Oral Daily Leidy Martinez MD      atorvastatin  80 mg Oral Daily With Dinner Leidy GARCIA  MD Juan      cefTRIAXone  1,000 mg Intravenous Q24H Leidy Martinez MD 1,000 mg (02/27/24 2038)    heparin (porcine)  5,000 Units Subcutaneous Q8H [facility] Leidy Martinez MD      insulin glargine  30 Units Subcutaneous HS Yi Nelson PA-C      insulin lispro  1-5 Units Subcutaneous TID AC Leidy Martinez MD      insulin lispro  1-5 Units Subcutaneous HS Yi Nelson PA-C      insulin lispro  5 Units Subcutaneous TID With Meals Yi Nelson PA-C      metoprolol tartrate  50 mg Oral BID Leidy Martinez MD      ondansetron  4 mg Intravenous Q6H PRN Leidy Martinez MD      sodium bicarbonate  650 mg Oral BID after meals Leidy Martinez MD      tamsulosin  0.4 mg Oral Daily With Dinner Leidy Martinez MD      vancomycin oral (capsules or solution)  125 mg Oral Daily Yi Nelson PA-C          Today, Patient Was Seen By: Yi Nelson PA-C    **Please Note: This note may have been constructed using a voice recognition system.**

## 2024-02-28 NOTE — PLAN OF CARE
Problem: Potential for Falls  Goal: Patient will remain free of falls  Description: INTERVENTIONS:  - Educate patient/family on patient safety including physical limitations  - Instruct patient to call for assistance with activity   - Consult OT/PT to assist with strengthening/mobility   - Keep Call bell within reach  - Keep bed low and locked with side rails adjusted as appropriate  - Keep care items and personal belongings within reach  - Initiate and maintain comfort rounds  - Make Fall Risk Sign visible to staff  - Offer Toileting every 2 Hours, in advance of need  - Initiate/Maintain bed alarm  - Obtain necessary fall risk management equipment: walker  - Apply yellow socks and bracelet for high fall risk patients  - Consider moving patient to room near nurses station  2/28/2024 0146 by Juliana Wilson LPN  Outcome: Progressing  2/28/2024 0145 by Juliana Wilson LPN  Outcome: Progressing     Problem: Prexisting or High Potential for Compromised Skin Integrity  Goal: Skin integrity is maintained or improved  Description: INTERVENTIONS:  - Identify patients at risk for skin breakdown  - Assess and monitor skin integrity  - Assess and monitor nutrition and hydration status  - Monitor labs   - Assess for incontinence   - Turn and reposition patient  - Assist with mobility/ambulation  - Relieve pressure over bony prominences  - Avoid friction and shearing  - Provide appropriate hygiene as needed including keeping skin clean and dry  - Evaluate need for skin moisturizer/barrier cream  - Collaborate with interdisciplinary team   - Patient/family teaching  - Consider wound care consult   2/28/2024 0146 by Juliana Wilson LPN  Outcome: Progressing  2/28/2024 0145 by Juliana Wilson LPN  Outcome: Progressing     Problem: PAIN - ADULT  Goal: Verbalizes/displays adequate comfort level or baseline comfort level  Description: Interventions:  - Encourage patient to monitor pain and request assistance  - Assess pain  using appropriate pain scale  - Administer analgesics based on type and severity of pain and evaluate response  - Implement non-pharmacological measures as appropriate and evaluate response  - Consider cultural and social influences on pain and pain management  - Notify physician/advanced practitioner if interventions unsuccessful or patient reports new pain  2/28/2024 0146 by Juliana Wilson LPN  Outcome: Progressing  2/28/2024 0145 by Juliana Wilson LPN  Outcome: Progressing     Problem: INFECTION - ADULT  Goal: Absence or prevention of progression during hospitalization  Description: INTERVENTIONS:  - Assess and monitor for signs and symptoms of infection  - Monitor lab/diagnostic results  - Monitor all insertion sites, i.e. indwelling lines, tubes, and drains  - Monitor endotracheal if appropriate and nasal secretions for changes in amount and color  - [place] appropriate cooling/warming therapies per order  - Administer medications as ordered  - Instruct and encourage patient and family to use good hand hygiene technique  - Identify and instruct in appropriate isolation precautions for identified infection/condition  Outcome: Progressing     Problem: SAFETY ADULT  Goal: Patient will remain free of falls  Description: INTERVENTIONS:  - Educate patient/family on patient safety including physical limitations  - Instruct patient to call for assistance with activity   - Consult OT/PT to assist with strengthening/mobility   - Keep Call bell within reach  - Keep bed low and locked with side rails adjusted as appropriate  - Keep care items and personal belongings within reach  - Initiate and maintain comfort rounds  - Make Fall Risk Sign visible to staff  - Offer Toileting every 2 Hours, in advance of need  - Initiate/Maintain bed alarm  - Obtain necessary fall risk management equipment: walker  - Apply yellow socks and bracelet for high fall risk patients  - Consider moving patient to room near nurses  station  2/28/2024 0146 by Juliana Wilson LPN  Outcome: Progressing  2/28/2024 0145 by Juliana Wilson LPN  Outcome: Progressing     Problem: DISCHARGE PLANNING  Goal: Discharge to home or other facility with appropriate resources  Description: INTERVENTIONS:  - Identify barriers to discharge w/patient and caregiver  - Arrange for needed discharge resources and transportation as appropriate  - Identify discharge learning needs (meds, wound care, etc.)  - Arrange for interpretive services to assist at discharge as needed  - Refer to Case Management Department for coordinating discharge planning if the patient needs post-hospital services based on physician/advanced practitioner order or complex needs related to functional status, cognitive ability, or social support system  2/28/2024 0146 by Juliana Wilson LPN  Outcome: Progressing  2/28/2024 0145 by Juliana Wilson LPN  Outcome: Progressing     Problem: Knowledge Deficit  Goal: Patient/family/caregiver demonstrates understanding of disease process, treatment plan, medications, and discharge instructions  Description: Complete learning assessment and assess knowledge base.  Interventions:  - Provide teaching at level of understanding  - Provide teaching via preferred learning methods  2/28/2024 0146 by Juliana Wilson LPN  Outcome: Progressing  2/28/2024 0145 by Juliana Wilson LPN  Outcome: Progressing     Problem: Nutrition/Hydration-ADULT  Goal: Nutrient/Hydration intake appropriate for improving, restoring or maintaining nutritional needs  Description: Monitor and assess patient's nutrition/hydration status for malnutrition. Collaborate with interdisciplinary team and initiate plan and interventions as ordered.  Monitor patient's weight and dietary intake as ordered or per policy. Utilize nutrition screening tool and intervene as necessary. Determine patient's food preferences and provide high-protein, high-caloric foods as appropriate.      INTERVENTIONS:  - Monitor oral intake, urinary output, labs, and treatment plans  - Assess nutrition and hydration status and recommend course of action  - Evaluate amount of meals eaten  - Assist patient with eating if necessary   - Allow adequate time for meals  - Recommend/ encourage appropriate diets, oral nutritional supplements, and vitamin/mineral supplements  - Order, calculate, and assess calorie counts as needed  - Recommend, monitor, and adjust tube feedings and TPN/PPN based on assessed needs  - Assess need for intravenous fluids  - Provide specific nutrition/hydration education as appropriate  - Include patient/family/caregiver in decisions related to nutrition  Outcome: Progressing     Problem: GASTROINTESTINAL - ADULT  Goal: Maintains or returns to baseline bowel function  Description: INTERVENTIONS:  - Assess bowel function  - Encourage oral fluids to ensure adequate hydration  - Administer IV fluids if ordered to ensure adequate hydration  - Administer ordered medications as needed  - Encourage mobilization and activity  - Consider nutritional services referral to assist patient with adequate nutrition and appropriate food choices  Outcome: Progressing

## 2024-02-28 NOTE — PLAN OF CARE
Problem: Potential for Falls  Goal: Patient will remain free of falls  Description: INTERVENTIONS:  - Educate patient/family on patient safety including physical limitations  - Instruct patient to call for assistance with activity   - Consult OT/PT to assist with strengthening/mobility   - Keep Call bell within reach  - Keep bed low and locked with side rails adjusted as appropriate  - Keep care items and personal belongings within reach  - Initiate and maintain comfort rounds  - Make Fall Risk Sign visible to staff  - Apply yellow socks and bracelet for high fall risk patients  - Consider moving patient to room near nurses station  Outcome: Progressing     Problem: PAIN - ADULT  Goal: Verbalizes/displays adequate comfort level or baseline comfort level  Description: Interventions:  - Encourage patient to monitor pain and request assistance  - Assess pain using appropriate pain scale  - Administer analgesics based on type and severity of pain and evaluate response  - Implement non-pharmacological measures as appropriate and evaluate response  - Consider cultural and social influences on pain and pain management  - Notify physician/advanced practitioner if interventions unsuccessful or patient reports new pain  Outcome: Progressing     Problem: SAFETY ADULT  Goal: Patient will remain free of falls  Description: INTERVENTIONS:  - Educate patient/family on patient safety including physical limitations  - Instruct patient to call for assistance with activity   - Consult OT/PT to assist with strengthening/mobility   - Keep Call bell within reach  - Keep bed low and locked with side rails adjusted as appropriate  - Keep care items and personal belongings within reach  - Initiate and maintain comfort rounds  - Make Fall Risk Sign visible to staff  - Apply yellow socks and bracelet for high fall risk patients  - Consider moving patient to room near nurses station  Outcome: Progressing

## 2024-02-29 VITALS
TEMPERATURE: 98.4 F | RESPIRATION RATE: 16 BRPM | HEIGHT: 69 IN | DIASTOLIC BLOOD PRESSURE: 75 MMHG | BODY MASS INDEX: 30.11 KG/M2 | OXYGEN SATURATION: 98 % | WEIGHT: 203.26 LBS | HEART RATE: 67 BPM | SYSTOLIC BLOOD PRESSURE: 153 MMHG

## 2024-02-29 LAB
ANION GAP SERPL CALCULATED.3IONS-SCNC: 7 MMOL/L
BASOPHILS # BLD AUTO: 0.06 THOUSANDS/ÂΜL (ref 0–0.1)
BASOPHILS NFR BLD AUTO: 1 % (ref 0–1)
BUN SERPL-MCNC: 67 MG/DL (ref 5–25)
C DIFF TOX A+B STL QL IA: NEGATIVE
C DIFF TOX GENS STL QL NAA+PROBE: POSITIVE
CALCIUM SERPL-MCNC: 8 MG/DL (ref 8.4–10.2)
CHLORIDE SERPL-SCNC: 110 MMOL/L (ref 96–108)
CO2 SERPL-SCNC: 19 MMOL/L (ref 21–32)
CREAT SERPL-MCNC: 3.41 MG/DL (ref 0.6–1.3)
EOSINOPHIL # BLD AUTO: 0.5 THOUSAND/ÂΜL (ref 0–0.61)
EOSINOPHIL NFR BLD AUTO: 6 % (ref 0–6)
ERYTHROCYTE [DISTWIDTH] IN BLOOD BY AUTOMATED COUNT: 15.3 % (ref 11.6–15.1)
GFR SERPL CREATININE-BSD FRML MDRD: 16 ML/MIN/1.73SQ M
GLUCOSE SERPL-MCNC: 151 MG/DL (ref 65–140)
GLUCOSE SERPL-MCNC: 87 MG/DL (ref 65–140)
GLUCOSE SERPL-MCNC: 96 MG/DL (ref 65–140)
HCT VFR BLD AUTO: 25.2 % (ref 36.5–49.3)
HGB BLD-MCNC: 8.1 G/DL (ref 12–17)
IMM GRANULOCYTES # BLD AUTO: 0.03 THOUSAND/UL (ref 0–0.2)
IMM GRANULOCYTES NFR BLD AUTO: 0 % (ref 0–2)
LYMPHOCYTES # BLD AUTO: 0.9 THOUSANDS/ÂΜL (ref 0.6–4.47)
LYMPHOCYTES NFR BLD AUTO: 10 % (ref 14–44)
MAGNESIUM SERPL-MCNC: 2 MG/DL (ref 1.9–2.7)
MCH RBC QN AUTO: 29.9 PG (ref 26.8–34.3)
MCHC RBC AUTO-ENTMCNC: 32.1 G/DL (ref 31.4–37.4)
MCV RBC AUTO: 93 FL (ref 82–98)
MONOCYTES # BLD AUTO: 0.73 THOUSAND/ÂΜL (ref 0.17–1.22)
MONOCYTES NFR BLD AUTO: 8 % (ref 4–12)
NEUTROPHILS # BLD AUTO: 6.58 THOUSANDS/ÂΜL (ref 1.85–7.62)
NEUTS SEG NFR BLD AUTO: 75 % (ref 43–75)
NRBC BLD AUTO-RTO: 0 /100 WBCS
PLATELET # BLD AUTO: 359 THOUSANDS/UL (ref 149–390)
PMV BLD AUTO: 11 FL (ref 8.9–12.7)
POTASSIUM SERPL-SCNC: 5.3 MMOL/L (ref 3.5–5.3)
RBC # BLD AUTO: 2.71 MILLION/UL (ref 3.88–5.62)
SODIUM SERPL-SCNC: 136 MMOL/L (ref 135–147)
WBC # BLD AUTO: 8.8 THOUSAND/UL (ref 4.31–10.16)

## 2024-02-29 PROCEDURE — 99239 HOSP IP/OBS DSCHRG MGMT >30: CPT | Performed by: PHYSICIAN ASSISTANT

## 2024-02-29 PROCEDURE — 85025 COMPLETE CBC W/AUTO DIFF WBC: CPT | Performed by: PHYSICIAN ASSISTANT

## 2024-02-29 PROCEDURE — 83735 ASSAY OF MAGNESIUM: CPT | Performed by: PHYSICIAN ASSISTANT

## 2024-02-29 PROCEDURE — 80048 BASIC METABOLIC PNL TOTAL CA: CPT | Performed by: PHYSICIAN ASSISTANT

## 2024-02-29 PROCEDURE — 82948 REAGENT STRIP/BLOOD GLUCOSE: CPT

## 2024-02-29 RX ORDER — VANCOMYCIN HYDROCHLORIDE 125 MG/1
125 CAPSULE ORAL EVERY 12 HOURS SCHEDULED
Start: 2024-02-29 | End: 2024-03-03

## 2024-02-29 RX ORDER — VANCOMYCIN HYDROCHLORIDE 125 MG/1
125 CAPSULE ORAL EVERY 12 HOURS SCHEDULED
Status: DISCONTINUED | OUTPATIENT
Start: 2024-02-29 | End: 2024-02-29 | Stop reason: HOSPADM

## 2024-02-29 RX ORDER — INSULIN LISPRO 100 [IU]/ML
1-5 INJECTION, SOLUTION INTRAVENOUS; SUBCUTANEOUS
Start: 2024-02-29

## 2024-02-29 RX ORDER — INSULIN LISPRO 100 [IU]/ML
5 INJECTION, SOLUTION INTRAVENOUS; SUBCUTANEOUS
Start: 2024-02-29

## 2024-02-29 RX ORDER — ACETAMINOPHEN 325 MG/1
650 TABLET ORAL EVERY 6 HOURS PRN
Start: 2024-02-29

## 2024-02-29 RX ORDER — INSULIN GLARGINE 100 [IU]/ML
30 INJECTION, SOLUTION SUBCUTANEOUS
Start: 2024-02-29

## 2024-02-29 RX ORDER — TAMSULOSIN HYDROCHLORIDE 0.4 MG/1
0.4 CAPSULE ORAL
Start: 2024-02-29

## 2024-02-29 RX ADMIN — METOPROLOL TARTRATE 50 MG: 50 TABLET, FILM COATED ORAL at 11:00

## 2024-02-29 RX ADMIN — ASPIRIN 81 MG: 81 TABLET, COATED ORAL at 11:00

## 2024-02-29 RX ADMIN — VANCOMYCIN HYDROCHLORIDE 125 MG: 125 CAPSULE ORAL at 11:00

## 2024-02-29 RX ADMIN — INSULIN LISPRO 5 UNITS: 100 INJECTION, SOLUTION INTRAVENOUS; SUBCUTANEOUS at 13:02

## 2024-02-29 RX ADMIN — SODIUM BICARBONATE 650 MG: 650 TABLET ORAL at 11:01

## 2024-02-29 RX ADMIN — HEPARIN SODIUM 5000 UNITS: 5000 INJECTION, SOLUTION INTRAVENOUS; SUBCUTANEOUS at 05:55

## 2024-02-29 RX ADMIN — INSULIN LISPRO 1 UNITS: 100 INJECTION, SOLUTION INTRAVENOUS; SUBCUTANEOUS at 13:04

## 2024-02-29 NOTE — PROGRESS NOTES
[facility]  Inpatient Progress Note for TRAINING ONLY  Not Part of Legal Medical Record       Progress Note - Michael Reynolds Jr. 78 y.o. male MRN: [id number]    Unit/Bed#: -[facility] Encounter: [id number]      Assessment/Plan:  Diarrhea of presumed infectious origin  Patient reports having diarrhea for the last 2 weeks, denies bloody BM.  History of C. difficile infection in 2021  Now improving since 2/27  Stool enteric panel negative, c diff pending  Continue prophylactic PO vancomycin and IV ceftriaxone. Discussed dosing with pharmacy.  Continue serial abdominal exams, d/c IVF.  2. Hx of Clostridium difficile infection  History of C. difficile infection in 2021  Continue prophylactic PO vancomycin.   Stool enteric panel negative, c diff pending  3. Recurrent UTI  UA is abnormal with immunerable WBCs, no nitrites  No culture obtained.  Continue IV ceftriaxone(D3), 5 day course recommended  4. Urinary retention  Known history of BPH, continue Flomax  Continue Merritt catheter, 1100mL drained on admission  Recommend outpatient follow up with urology, void trial discussed with urology  5. BPH  Continue Flomax 0.4 mg nightly  Recommend outpatient f/u with urology 2/2 urinary retention  6. CKD stage IV  Renal function at baseline 3.41  He follows up with nephrology as an outpatient  7. Benign hypertension with CKD  /73, stable  Continue metoprolol 50 mg every 12 hours  8. Anemia of CKD  H&H stable at baseline  9. Diabetic polyneuropathy with T2D  Patient not compliant with meds at home  Continue lantus 30 units QHS  Continue humalog 5 units TID with meals  Insulin reduced due to recent hypoglycemic episode  Continue SSI ACHS  10. Ambulatory dysfunction  Evaluated by PT/OT, minimal resource intensity   Family concerned about his ability to care for himself at home especially with a new catheter in place.    Authorization has been requested from rehab, pending  11. S/p CABG  No chest pain  CABG X 4 in 2019 (LIMA--LAD,  "SVG-- D1,OM1, rPDA)  Continuing Aspirin 81 mg, Lipitor 80 mg, Lopressor 50 mg    Subjective:   Michael Reynolds is a pleasant 78 year-old male lying comfortably in bed watching TV. He is not in any acute distress and is alert and coherent. He reports no bowl movement since the last episode of diarrhea yesterday. He does report passing flatus today and reports eating breakfast this morning without any issues. He denies any fever, chills, CP, SOB, abdominal pain, and N/V/D.    Objective:     Vitals: Blood pressure 153/75, pulse 67, temperature 98.4 °F (36.9 °C), temperature source Tympanic, resp. rate 16, height 5' 9\" (1.753 m), weight 92.2 kg (203 lb 4.2 oz), SpO2 98%.,Body mass index is 30.02 kg/m².      Intake/Output Summary (Last 24 hours) at 2/29/2024 0910  Last data filed at 2/29/2024 0846  Gross per 24 hour   Intake 1186 ml   Output 1850 ml   Net -664 ml       Physical Exam: /75 (BP Location: Right arm)   Pulse 67   Temp 98.4 °F (36.9 °C) (Tympanic)   Resp 16   Ht 5' 9\" (1.753 m)   Wt 92.2 kg (203 lb 4.2 oz)   SpO2 98%   BMI 30.02 kg/m²   General appearance: alert and oriented, in no acute distress  Lungs: clear to auscultation bilaterally  Heart: regular rate and rhythm, S1, S2 normal, no murmur, click, rub or gallop  Abdomen: soft, non-tender; bowel sounds normal; no masses,  no organomegaly  Male genitalia: normal, sheridan catheter in place  Pulses: 2+ and symmetric  Skin: Skin color, texture, turgor normal. No rashes or lesions     Invasive Devices       Peripheral Intravenous Line  Duration             Peripheral IV 02/26/24 Left Antecubital 2 days              Drain  Duration             Urethral Catheter Double-lumen 2 days                    Lab, Imaging and other studies: I have personally reviewed pertinent reports.    VTE Pharmacologic Prophylaxis: Heparin  VTE Mechanical Prophylaxis: sequential compression device  {sl ip progress note specialty templates:59480}         Tito MAYFIELD  "

## 2024-02-29 NOTE — CASE MANAGEMENT
Case Management Discharge Planning Note    Patient name Michael Reynolds Jr.  Location /-01 MRN [id number]  : 1945 Date 2024       Current Admission Date: 2024  Current Admission Diagnosis:Diarrhea of presumed infectious origin   Patient Active Problem List    Diagnosis Date Noted    History of Clostridium difficile infection 2024    Ambulatory dysfunction 2024    Diarrhea of presumed infectious origin 2024    Urinary retention 2024    Syncope 02/15/2024    Hypomagnesemia 2024    Postural dizziness with presyncope 2024    Anemia of chronic disease 2024    Depression 2023    Anemia of chronic kidney failure, stage 4 (severe)  2023    Recurrent UTI (urinary tract infection) 2023    Nocturia 2023    Encounter to discuss test results 2023    Incomplete bladder emptying 2023    Osteopenia of neck of left femur 2023    Secondary hyperparathyroidism of renal origin (HCC) 2023    Hyperphosphatemia 2023    HARJIT (obstructive sleep apnea) 2023    Diverticulosis 2022    Gastric ulcer 2022    GI bleed 2022    Gait instability 2022    Asymptomatic bilateral carotid artery stenosis 2022    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HCC) 2022    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    BPH (benign prostatic hyperplasia) 2021    Essential hypertension 2021    Elevated TSH 2021    Hypervolemia associated with renal insufficiency 2021    Chronic diastolic (congestive) heart failure (HCC) 2021    Traumatic injury of skin 2021    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HCC) 2020    Diabetic polyneuropathy associated with type 2 diabetes mellitus (HCC)     Stenosis of left internal carotid artery 2020    Lymphadenopathy 10/22/2019    Vitamin D deficiency 10/21/2019    Iron deficiency anemia 2019    S/P CABG (coronary artery bypass graft) 2019     Acute postoperative pulmonary insufficiency (HCC) 06/21/2019    Coronary artery disease 06/17/2019    Mixed hyperlipidemia 06/17/2019    Insulin dependent diabetes mellitus (HCC) 06/15/2019    Elevated troponin 06/15/2019    Sleep apnea 06/15/2019    Benign hypertension with chronic kidney disease, stage IV (HCC) 05/30/2019    Chronic kidney disease-mineral and bone disorder 05/30/2019    Esophageal dysphagia 02/14/2019    History of colonic polyps 02/14/2019    CKD (chronic kidney disease), stage IV (HCC) 08/18/2017    Non-nephrotic range proteinuria 08/18/2017    Trigger finger of left hand 04/05/2017    Lumbar back pain 02/07/2012    Lumbar discogenic pain syndrome 02/07/2012    Pain of lower extremity 02/07/2012    Spondylolisthesis 02/07/2012    Spinal stenosis 02/07/2012      LOS (days): 2  Geometric Mean LOS (GMLOS) (days): 2.9  Days to GMLOS:1     OBJECTIVE:  Risk of Unplanned Readmission Score: 21.84         Current admission status: Inpatient   Preferred Pharmacy:   [facility]/pharmacy #[id number]  [place] [id number]  Phone: [phone] Fax: [phone]    [place] [address]  Phone: [phone] Fax: [phone]    Primary Care Provider: Luke Glasgow DO    [facility]  Secondary Insurance:     DISCHARGE DETAILS:    Discharge planning discussed with:: Patient and spouse  Freedom of Choice: Yes  Comments - Freedom of Choice: Confirmed choice is for D/C to [facility]   contacted family/caregiver?: Yes  Were Treatment Team discharge recommendations reviewed with patient/caregiver?: Yes  Did patient/caregiver verbalize understanding of patient care needs?: N/A- going to facility  Were patient/caregiver advised of the risks associated with not following Treatment Team discharge recommendations?: Yes    Contacts  Patient Contacts:  Mine Reynolds  Relationship to Patient:: Family  Contact Method: Phone  Phone Number: [phone]  Reason/Outcome: Emergency Contact, Discharge Planning    Requested Home Health Care         Is the patient interested in HHC at discharge?: No    DME Referral Provided  Referral made for DME?: No    Other Referral/Resources/Interventions Provided:  Interventions: Short Term Rehab  Referral Comments: Linda @ [facility] confirmed receipt of insurance auth. Patient able to admit today.    Would you like to participate in our [facility] service program?  : No - Declined    Treatment Team Recommendation: Home with Home Health Care  Discharge Destination Plan:: Short Term Rehab ([facility])  Transport at Discharge : Wheelchair van  Dispatcher Contacted: Yes  Number/Name of Dispatcher: ROUNDTRIP  Transported by (Company and Unit #): [employer]  ETA of Transport (Date): 02/29/24  ETA of Transport (Time): 1415     IMM Given (Date):: 02/29/24  IMM Given to:: Patient  IMM reviewed with patient, patient agrees with discharge determination.    Accepting Facility Name, City & State : [facility]  Receiving Facility/Agency Phone Number: [phone]  Facility/Agency Fax Number: [phone]  Facility Insurance Auth Number: AUTH OBTAINED BY [facility]

## 2024-02-29 NOTE — ASSESSMENT & PLAN NOTE
Baseline hemoglobin appears to be around 8-9  Hemoglobin stable at baseline currently  Lab Results   Component Value Date    HGB 8.1 (L) 02/29/2024    HGB 8.4 (L) 02/27/2024    HGB 8.8 (L) 02/26/2024

## 2024-02-29 NOTE — ASSESSMENT & PLAN NOTE
Lab Results   Component Value Date    EGFR 16 02/29/2024    EGFR 15 02/27/2024    EGFR 13 02/26/2024    CREATININE 3.41 (H) 02/29/2024    CREATININE 3.63 (H) 02/27/2024    CREATININE 4.01 (H) 02/26/2024     Baseline creatinine appears to be around 3.8 per chart review  Patient had been told he may need dialysis but he declined this  Cr appears to be stable at baseline  Continue outpatient follow-up with nephrology   Detail Level: Detailed Anesthesia Type: 1% lidocaine with epinephrine Consent: Written consent was obtained and risks were reviewed including but not limited to scarring, infection, bleeding, scabbing, incomplete removal, nerve damage and allergy to anesthesia. Notification Instructions: Patient will be notified of biopsy results. However, patient instructed to call the office if not contacted within 2 weeks. Lab Facility: [address] Path Notes (To The Dermatopathologist): 0.5 cm Lab: [facility] Billing Type: [employer] Bill [id number] For Specimen Handling/Conveyance To Laboratory?: no Cryotherapy Text: The wound bed was treated with cryotherapy after the biopsy was performed. Wound Care: Bacitracin X Size Of Lesion In Cm: 0 Hemostasis: Aluminum Chloride Silver Nitrate Text: The wound bed was treated with silver nitrate after the biopsy was performed. Post-Care Instructions: I reviewed with the patient in detail post-care instructions. Patient is to keep the biopsy site dry overnight, and then apply bacitracin twice daily until healed. Patient may apply hydrogen peroxide soaks to remove any crusting. Dressing: bandage Electrodesiccation And Curettage Text: The wound bed was treated with electrodesiccation and curettage after the biopsy was performed. Biopsy Method: Personna blade Biopsy Type: H and E Body Location Override (Optional - Billing Will Still Be Based On Selected Body Map Location If Applicable): left scapula Anesthesia Volume In Cc (Will Not Render If 0): 0.5 Type Of Destruction Used: Curettage Electrodesiccation Text: The wound bed was treated with electrodesiccation after the biopsy was performed. Curettage Text: The wound bed was treated with curettage after the biopsy was performed. Billing Type: Third-Party Bill Path Notes (To The Dermatopathologist): 0.4 cm Lab: [id number] Size Of Lesion In Cm: 0.4 Lab Facility: [id number]

## 2024-02-29 NOTE — ASSESSMENT & PLAN NOTE
Lab Results   Component Value Date    HGBA1C 7.9 (H) 02/27/2024       Recent Labs     02/28/24  1701 02/28/24 2037 02/29/24  0721 02/29/24  1106   POCGLU 84 140 96 151*         Blood Sugar Average: Last 72 hrs:  (P) 117.7302908812034725    Update HgbA1c 7.9, reported noncompliance with medications at home  Home regimen: Lantus 42U qHS, Humalog 16-18U TID AC.  Continue reduced Lantus 30U qHS, Humalog 5U TID AC  SSI coverage with Accu-Cheks ACHS

## 2024-02-29 NOTE — DISCHARGE SUMMARY
[facility]  Discharge- Michael Reynolds JrAlvina 1945, 78 y.o. male MRN: [id number]  Unit/Bed#: -01 Encounter: [id number]  Primary Care Provider: Luke Glasgow DO   Date and time admitted to hospital: 2/26/2024  5:58 PM    * Diarrhea of presumed infectious origin  Assessment & Plan  Patient presented with ongoing diarrhea for the past 2 weeks, having at least 3 BMs per day. History of C. difficile infection in 2021.  C. difficile PCR positive, EIA negative - probable colonization. Enteric stool panel: Negative.  Symptomatically improved, last BM 1 day ago (2/28)  Discussed with ID, recommend 3 days prophylactic PO vancomycin 125 mg BID    Recurrent UTI (urinary tract infection)  Assessment & Plan  UA is abnormal with immunerable WBCs, 3+ leukocytes but negative nitrites  No urine culture obtained from initial sample  Completed 3 days IV Rocephin, no need for further per ID    Urinary retention  Assessment & Plan  History of BPH, known to urology office outpatient  Merritt catheter placed in ED, 1100 mL output after placement  Maintain Merritt catheter at discharge for total 10-14 days, voiding trial outpatient per urology    Ambulatory dysfunction  Assessment & Plan  Patient's wife with concerns for ability to care for him at home, especially with Merritt catheter in place  PT/OT evaluated patient, recommended medical resource intensity  CM following for safe dispo planning, discharge to [facility]    Diabetic polyneuropathy associated with type 2 diabetes mellitus (HCC)  Assessment & Plan  Lab Results   Component Value Date    HGBA1C 7.9 (H) 02/27/2024       Recent Labs     02/28/24  1701 02/28/24  2037 02/29/24  0721 02/29/24  1106   POCGLU 84 140 96 151*         Blood Sugar Average: Last 72 hrs:  (P) 117.9883800363729745    Update HgbA1c 7.9, reported noncompliance with medications at home  Home regimen: Lantus 42U qHS, Humalog 16-18U TID AC.  Continue reduced Lantus  30U qHS, Humalog 5U TID AC  SSI coverage with Accu-Cheks ACHS    Anemia of chronic kidney failure, stage 4 (severe)   Assessment & Plan  Baseline hemoglobin appears to be around 8-9  Hemoglobin stable at baseline currently  Lab Results   Component Value Date    HGB 8.1 (L) 02/29/2024    HGB 8.4 (L) 02/27/2024    HGB 8.8 (L) 02/26/2024         BPH (benign prostatic hyperplasia)  Assessment & Plan  Continue with Flomax 0.4 mg qHS    S/P CABG (coronary artery bypass graft)  Assessment & Plan  S/p CABG x 4 in 2019 (LIMA--LAD, SVG-- D1,OM1, rPDA)  Continue home aspirin 81 mg daily, Lipitor 80 mg qHS, Lopressor 50 mg BID    Benign hypertension with chronic kidney disease, stage IV (HCC)  Assessment & Plan  BP reviewed, intermittently hypertensive but overall stable today  Continue home Lopressor 50 mg BID    CKD (chronic kidney disease), stage IV (HCC)  Assessment & Plan  Lab Results   Component Value Date    EGFR 16 02/29/2024    EGFR 15 02/27/2024    EGFR 13 02/26/2024    CREATININE 3.41 (H) 02/29/2024    CREATININE 3.63 (H) 02/27/2024    CREATININE 4.01 (H) 02/26/2024     Baseline creatinine appears to be around 3.8 per chart review  Patient had been told he may need dialysis but he declined this  Cr appears to be stable at baseline  Continue outpatient follow-up with nephrology      Medical Problems       Resolved Problems  Date Reviewed: 2/29/2024   None       Discharging Physician / Practitioner: Nataliia Garcia PA-C  PCP: Luke Glasgow DO  Admission Date:   Admission Orders (From admission, onward)       Ordered        02/27/24 1620  Inpatient Admission  Once            02/26/24 2051  Place in Observation  Once                          Discharge Date: 02/29/24    Consultations During Hospital Stay:  PT/OT    Procedures Performed:   None    Significant Findings / Test Results:   Enteric stool panel: Negative.  C. difficile: PCR positive, EIA negative - probable colonization.    Incidental Findings:  "  None    Test Results Pending at Discharge (will require follow up):   None     Outpatient Tests Requested:  Follow-up with PCP for management of chronic conditions  Follow-up with urology for voiding trial of Merritt catheter in 2 weeks    Complications:  None    Reason for Admission: Diarrhea    Hospital Course:   Michael Reynolds Jr. is a 78 y.o. male patient, PMH diabetic polyneuropathy, T2DM, anemia of chronic disease, BPH, CKD 4, status post CABG, HTN, who originally presented to the hospital on 2/26/2024 due to diarrhea for the past 2 weeks, noted with history of C. difficile infection in 2021. On arrival to ED, evidence of acute urinary retention with >1L output after Merritt catheter placed. UA was suggestive of possible UTI but not able to culture, treated empirically with 3 days IV Rocephin with good response.  Patient was treated with prophylactic oral vancomycin, enteric stool panel was negative and C. difficile had positive PCR but negative EIA, consistent with likely colonization.  Patient's diarrhea had significantly improved while admitted, discussed with NIK mills recommending 3-day course of oral vancomycin for prophylaxis. Terry discussion with urology recommended maintaining Merritt catheter for 10-14 days with voiding trial outpatient upon discharge. Wife was concerned about caring for patient at home, PT/OT recommending rehab placement and patient was accepted at [facility].    Please see above list of diagnoses and related plan for additional information.     Condition at Discharge: stable    Discharge Day Visit / Exam:   Subjective: Patient is seen at bedside this a.m., no BM overnight, patient denies any acute complaints otherwise and states he feels ready for discharge.  Vitals: Blood Pressure: 153/75 (02/29/24 0721)  Pulse: 67 (02/29/24 0721)  Temperature: 98.4 °F (36.9 °C) (02/29/24 0721)  Temp Source: Tympanic (02/29/24 0721)  Respirations: 16 (02/29/24 0721)  Height: 5' 9\" (175.3 " cm) (02/26/24 2307)  Weight - Scale: 92.2 kg (203 lb 4.2 oz) (02/26/24 2139)  SpO2: 98 % (02/29/24 0721)  Exam:   Physical Exam  Constitutional:       General: He is not in acute distress.     Appearance: He is not ill-appearing, toxic-appearing or diaphoretic.   Cardiovascular:      Rate and Rhythm: Normal rate and regular rhythm.      Pulses: Normal pulses.      Heart sounds: Normal heart sounds.   Pulmonary:      Effort: Pulmonary effort is normal. No respiratory distress.      Breath sounds: Normal breath sounds.   Abdominal:      General: Bowel sounds are normal. There is no distension.      Palpations: Abdomen is soft.      Tenderness: There is no abdominal tenderness.   Genitourinary:     Comments: Merritt catheter in place, moderate amount of yellow urine  Musculoskeletal:         General: No swelling or tenderness.   Skin:     General: Skin is warm.   Neurological:      General: No focal deficit present.      Mental Status: He is alert.   Psychiatric:         Mood and Affect: Mood normal.         Behavior: Behavior normal.          Discussion with Family: Updated  (wife) via phone.    Discharge instructions/Information to patient and family:   See after visit summary for information provided to patient and family.      Provisions for Follow-Up Care:  See after visit summary for information related to follow-up care and any pertinent home health orders.      Mobility at time of Discharge:   Basic Mobility Inpatient Raw Score: 18  JH-HLM Goal: 6: Walk 10 steps or more  JH-HLM Achieved: 1: Laying in bed  HLM Goal NOT achieved. Continue to encourage mobility in post discharge setting.     Disposition:   Other Skilled Nursing Facility at [facility]     Planned Readmission: None     Discharge Statement:  I spent 55 minutes discharging the patient. This time was spent on the day of discharge. I had direct contact with the patient on the day of discharge. Greater than 50% of the total time was  spent examining patient, answering all patient questions, arranging and discussing plan of care with patient as well as directly providing post-discharge instructions.  Additional time then spent on discharge activities.    Discharge Medications:  See after visit summary for reconciled discharge medications provided to patient and/or family.      **Please Note: This note may have been constructed using a voice recognition system**

## 2024-02-29 NOTE — ASSESSMENT & PLAN NOTE
Patient's wife with concerns for ability to care for him at home, especially with Merritt catheter in place  PT/OT evaluated patient, recommended medical resource intensity  CM following for safe dispo planning, discharge to [facility]

## 2024-02-29 NOTE — ASSESSMENT & PLAN NOTE
Patient presented with ongoing diarrhea for the past 2 weeks, having at least 3 BMs per day. History of C. difficile infection in 2021.  C. difficile PCR positive, EIA negative - probable colonization. Enteric stool panel: Negative.  Symptomatically improved, last BM 1 day ago (2/28)  Discussed with ID, recommend 3 days prophylactic PO vancomycin 125 mg BID

## 2024-02-29 NOTE — ASSESSMENT & PLAN NOTE
History of BPH, known to urology office outpatient  Merritt catheter placed in ED, 1100 mL output after placement  Maintain Merritt catheter at discharge for total 10-14 days, voiding trial outpatient per urology

## 2024-02-29 NOTE — PLAN OF CARE
Problem: Potential for Falls  Goal: Patient will remain free of falls  Description: INTERVENTIONS:  - Educate patient/family on patient safety including physical limitations  - Instruct patient to call for assistance with activity   - Consult OT/PT to assist with strengthening/mobility   - Keep Call bell within reach  - Keep bed low and locked with side rails adjusted as appropriate  - Keep care items and personal belongings within reach  - Initiate and maintain comfort rounds  - Make Fall Risk Sign visible to staff  - Apply yellow socks and bracelet for high fall risk patients  - Consider moving patient to room near nurses station  Outcome: Progressing     Problem: Prexisting or High Potential for Compromised Skin Integrity  Goal: Skin integrity is maintained or improved  Description: INTERVENTIONS:  - Identify patients at risk for skin breakdown  - Assess and monitor skin integrity  - Assess and monitor nutrition and hydration status  - Monitor labs   - Assess for incontinence   - Turn and reposition patient  - Assist with mobility/ambulation  - Relieve pressure over bony prominences  - Avoid friction and shearing  - Provide appropriate hygiene as needed including keeping skin clean and dry  - Evaluate need for skin moisturizer/barrier cream  - Collaborate with interdisciplinary team   - Patient/family teaching  - Consider wound care consult   Outcome: Progressing     Problem: SAFETY ADULT  Goal: Maintain or return to baseline ADL function  Description: INTERVENTIONS:  -  Assess patient's ability to carry out ADLs; assess patient's baseline for ADL function and identify physical deficits which impact ability to perform ADLs (bathing, care of mouth/teeth, toileting, grooming, dressing, etc.)  - Assess/evaluate cause of self-care deficits   - Assess range of motion  - Assess patient's mobility; develop plan if impaired  - Assess patient's need for assistive devices and provide as appropriate  - Encourage maximum  independence but intervene and supervise when necessary  - Involve family in performance of ADLs  - Assess for home care needs following discharge   - Consider OT consult to assist with ADL evaluation and planning for discharge  - Provide patient education as appropriate  Outcome: Progressing     Problem: SAFETY ADULT  Goal: Maintains/Returns to pre admission functional level  Description: INTERVENTIONS:  - Perform AM-PAC 6 Click Basic Mobility/ Daily Activity assessment daily.  - Set and communicate daily mobility goal to care team and patient/family/caregiver.   - Collaborate with rehabilitation services on mobility goals if consulted  - Out of bed for toileting  - Record patient progress and toleration of activity level   Outcome: Progressing     Problem: Nutrition/Hydration-ADULT  Goal: Nutrient/Hydration intake appropriate for improving, restoring or maintaining nutritional needs  Description: Monitor and assess patient's nutrition/hydration status for malnutrition. Collaborate with interdisciplinary team and initiate plan and interventions as ordered.  Monitor patient's weight and dietary intake as ordered or per policy. Utilize nutrition screening tool and intervene as necessary. Determine patient's food preferences and provide high-protein, high-caloric foods as appropriate.     INTERVENTIONS:  - Monitor oral intake, urinary output, labs, and treatment plans  - Assess nutrition and hydration status and recommend course of action  - Evaluate amount of meals eaten  - Assist patient with eating if necessary   - Allow adequate time for meals  - Recommend/ encourage appropriate diets, oral nutritional supplements, and vitamin/mineral supplements  - Order, calculate, and assess calorie counts as needed  - Recommend, monitor, and adjust tube feedings and TPN/PPN based on assessed needs  - Assess need for intravenous fluids  - Provide specific nutrition/hydration education as appropriate  - Include  patient/family/caregiver in decisions related to nutrition  Outcome: Progressing     Problem: GASTROINTESTINAL - ADULT  Goal: Maintains or returns to baseline bowel function  Description: INTERVENTIONS:  - Assess bowel function  - Encourage oral fluids to ensure adequate hydration  - Administer IV fluids if ordered to ensure adequate hydration  - Administer ordered medications as needed  - Encourage mobilization and activity  - Consider nutritional services referral to assist patient with adequate nutrition and appropriate food choices  Outcome: Progressing

## 2024-02-29 NOTE — ASSESSMENT & PLAN NOTE
BP reviewed, intermittently hypertensive but overall stable today  Continue home Lopressor 50 mg BID

## 2024-02-29 NOTE — ASSESSMENT & PLAN NOTE
S/p CABG x 4 in 2019 (LIMA--LAD, SVG-- D1,OM1, rPDA)  Continue home aspirin 81 mg daily, Lipitor 80 mg qHS, Lopressor 50 mg BID

## 2024-02-29 NOTE — ASSESSMENT & PLAN NOTE
UA is abnormal with immunerable WBCs, 3+ leukocytes but negative nitrites  No urine culture obtained from initial sample  Completed 3 days IV Rocephin, no need for further per ID

## 2024-02-29 NOTE — PROGRESS NOTES
Patient left unit in wheelchair with transporter in stable condition, with leg bag on and an extra urine bag. Transporter left with patient's Bipap machine, shoes, wallet and glasses.

## 2024-02-29 NOTE — PLAN OF CARE
Problem: Potential for Falls  Goal: Patient will remain free of falls  Description: INTERVENTIONS:  - Educate patient/family on patient safety including physical limitations  - Instruct patient to call for assistance with activity   - Consult OT/PT to assist with strengthening/mobility   - Keep Call bell within reach  - Keep bed low and locked with side rails adjusted as appropriate  - Keep care items and personal belongings within reach  - Initiate and maintain comfort rounds  - Make Fall Risk Sign visible to staff  - Offer Toileting every 2 Hours, in advance of need  - Initiate/Maintain bed alarm  - Obtain necessary fall risk management equipment:   - Apply yellow socks and bracelet for high fall risk patients  - Consider moving patient to room near nurses station  Outcome: Progressing     Problem: Prexisting or High Potential for Compromised Skin Integrity  Goal: Skin integrity is maintained or improved  Description: INTERVENTIONS:  - Identify patients at risk for skin breakdown  - Assess and monitor skin integrity  - Assess and monitor nutrition and hydration status  - Monitor labs   - Assess for incontinence   - Turn and reposition patient  - Assist with mobility/ambulation  - Relieve pressure over bony prominences  - Avoid friction and shearing  - Provide appropriate hygiene as needed including keeping skin clean and dry  - Evaluate need for skin moisturizer/barrier cream  - Collaborate with interdisciplinary team   - Patient/family teaching  - Consider wound care consult   Outcome: Progressing     Problem: PAIN - ADULT  Goal: Verbalizes/displays adequate comfort level or baseline comfort level  Description: Interventions:  - Encourage patient to monitor pain and request assistance  - Assess pain using appropriate pain scale  - Administer analgesics based on type and severity of pain and evaluate response  - Implement non-pharmacological measures as appropriate and evaluate response  - Consider cultural and  social influences on pain and pain management  - Notify physician/advanced practitioner if interventions unsuccessful or patient reports new pain  Outcome: Progressing     Problem: INFECTION - ADULT  Goal: Absence or prevention of progression during hospitalization  Description: INTERVENTIONS:  - Assess and monitor for signs and symptoms of infection  - Monitor lab/diagnostic results  - Monitor all insertion sites, i.e. indwelling lines, tubes, and drains  - Monitor endotracheal if appropriate and nasal secretions for changes in amount and color  - [place] appropriate cooling/warming therapies per order  - Administer medications as ordered  - Instruct and encourage patient and family to use good hand hygiene technique  - Identify and instruct in appropriate isolation precautions for identified infection/condition  Outcome: Progressing     Problem: SAFETY ADULT  Goal: Patient will remain free of falls  Description: INTERVENTIONS:  - Educate patient/family on patient safety including physical limitations  - Instruct patient to call for assistance with activity   - Consult OT/PT to assist with strengthening/mobility   - Keep Call bell within reach  - Keep bed low and locked with side rails adjusted as appropriate  - Keep care items and personal belongings within reach  - Initiate and maintain comfort rounds  - Make Fall Risk Sign visible to staff  - Offer Toileting every 2 Hours, in advance of need  - Initiate/Maintain bed alarm  - Obtain necessary fall risk management equipment:   - Apply yellow socks and bracelet for high fall risk patients  - Consider moving patient to room near nurses station  Outcome: Progressing  Goal: Maintain or return to baseline ADL function  Description: INTERVENTIONS:  - Educate patient/family on patient safety including physical limitations  - Instruct patient to call for assistance with activity   - Consult OT/PT to assist with strengthening/mobility   - Keep Call bell within reach  -  Keep bed low and locked with side rails adjusted as appropriate  - Keep care items and personal belongings within reach  - Initiate and maintain comfort rounds  - Make Fall Risk Sign visible to staff  - Offer Toileting every 2 Hours, in advance of need  - Initiate/Maintain bed alarm  - Obtain necessary fall risk management equipment:   - Apply yellow socks and bracelet for high fall risk patients  - Consider moving patient to room near nurses station  Outcome: Progressing  Goal: Maintains/Returns to pre admission functional level  Description: INTERVENTIONS:  -  Assess patient's ability to carry out ADLs; assess patient's baseline for ADL function and identify physical deficits which impact ability to perform ADLs (bathing, care of mouth/teeth, toileting, grooming, dressing, etc.)  - Assess/evaluate cause of self-care deficits   - Assess range of motion  - Assess patient's mobility; develop plan if impaired  - Assess patient's need for assistive devices and provide as appropriate  - Encourage maximum independence but intervene and supervise when necessary  - Involve family in performance of ADLs  - Assess for home care needs following discharge   - Consider OT consult to assist with ADL evaluation and planning for discharge  - Provide patient education as appropriate  Outcome: Progressing     Problem: DISCHARGE PLANNING  Goal: Discharge to home or other facility with appropriate resources  Description: INTERVENTIONS:  - Identify barriers to discharge w/patient and caregiver  - Arrange for needed discharge resources and transportation as appropriate  - Identify discharge learning needs (meds, wound care, etc.)  - Arrange for interpretive services to assist at discharge as needed  - Refer to Case Management Department for coordinating discharge planning if the patient needs post-hospital services based on physician/advanced practitioner order or complex needs related to functional status, cognitive ability, or social  support system  Outcome: Progressing     Problem: Knowledge Deficit  Goal: Patient/family/caregiver demonstrates understanding of disease process, treatment plan, medications, and discharge instructions  Description: Complete learning assessment and assess knowledge base.  Interventions:  - Provide teaching at level of understanding  - Provide teaching via preferred learning methods  Outcome: Progressing     Problem: Nutrition/Hydration-ADULT  Goal: Nutrient/Hydration intake appropriate for improving, restoring or maintaining nutritional needs  Description: Monitor and assess patient's nutrition/hydration status for malnutrition. Collaborate with interdisciplinary team and initiate plan and interventions as ordered.  Monitor patient's weight and dietary intake as ordered or per policy. Utilize nutrition screening tool and intervene as necessary. Determine patient's food preferences and provide high-protein, high-caloric foods as appropriate.     INTERVENTIONS:  - Monitor oral intake, urinary output, labs, and treatment plans  - Assess nutrition and hydration status and recommend course of action  - Evaluate amount of meals eaten  - Assist patient with eating if necessary   - Allow adequate time for meals  - Recommend/ encourage appropriate diets, oral nutritional supplements, and vitamin/mineral supplements  - Order, calculate, and assess calorie counts as needed  - Recommend, monitor, and adjust tube feedings and TPN/PPN based on assessed needs  - Assess need for intravenous fluids  - Provide specific nutrition/hydration education as appropriate  - Include patient/family/caregiver in decisions related to nutrition  Outcome: Progressing     Problem: GASTROINTESTINAL - ADULT  Goal: Maintains or returns to baseline bowel function  Description: INTERVENTIONS:  - Assess bowel function  - Encourage oral fluids to ensure adequate hydration  - Administer IV fluids if ordered to ensure adequate hydration  - Administer  ordered medications as needed  - Encourage mobilization and activity  - Consider nutritional services referral to assist patient with adequate nutrition and appropriate food choices  Outcome: Progressing

## 2024-03-01 ENCOUNTER — PATIENT OUTREACH (OUTPATIENT)
Dept: CASE MANAGEMENT | Facility: OTHER | Age: 79
End: 2024-03-01

## 2024-03-01 NOTE — UTILIZATION REVIEW
NOTIFICATION OF ADMISSION DISCHARGE   This is a Notification of Discharge from [facility]. Please be advised that this patient has been discharge from our facility. Below you will find the admission and discharge date and time including the patient’s disposition.   UTILIZATION REVIEW CONTACT:  Susan Khan  Utilization   Network Utilization Review Department  Phone: [phone] x carefully listen to the prompts. All voicemails are confidential.  Email: [email or web address]     ADMISSION INFORMATION  PRESENTATION DATE: 2/26/2024  5:58 PM  OBERVATION ADMISSION DATE:   INPATIENT ADMISSION DATE: 2/27/24  4:20 PM   DISCHARGE DATE: 2/29/2024  3:05 PM   DISPOSITION:Non [facility] SNF/TCU/SNU    Network Utilization Review Department  ATTENTION: Please call with any questions or concerns to [phone] and carefully listen to the prompts so that you are directed to the right person. All voicemails are confidential.   For Discharge needs, contact Care Management DC Support Team at [phone] opt. 2  Send all requests for admission clinical reviews, approved or denied determinations and any other requests to dedicated fax number below belonging to the campus where the patient is receiving treatment. List of dedicated fax numbers for the Facilities:  FACILITY NAME UR FAX NUMBER   ADMISSION DENIALS (Administrative/Medical Necessity) [phone]   DISCHARGE SUPPORT TEAM ([facility]) [phone]   PARENT CHILD HEALTH (Maternity/NICU/Pediatrics) [phone]   [facility] [phone]   [facility] [phone]   [facility] [phone]   [facility] [phone]   [facility] [phone]   General acute hospital [phone]   [facility] [phone]   [facility]  [place] [phone]   [facility] [phone]   [facility] [phone]   [facility] [phone]   [facility] [phone]

## 2024-03-01 NOTE — TELEPHONE ENCOUNTER
DAVID MAGDALENO AT THE NURSING FACILITY THAT APPT FOR CATH REMOVAL IS SCHEDULED FOR 3/13/24. SHE IS TO CALL BACK AND CONF APPT.

## 2024-03-01 NOTE — TELEPHONE ENCOUNTER
Sonam from [facility] and rehab calling to confirm juan jose for pt for TOV on 3/13/24

## 2024-03-01 NOTE — PROGRESS NOTES
Outpatient Care Management [facility]/SNF Pathway. Discharged 2/29/24 to [facility]. Email sent to facility to inform them the patient is on the DUANE Pathway and I will be following them during their skilled stay.  This Admin Coordinator will continue to monitor via chart review.

## 2024-03-01 NOTE — TELEPHONE ENCOUNTER
Sonam from [facility] rehab is calling to schedule a two week post hospital visit for the patient.    Patient was discharged with a sheridan.    Pt wants to be seen in [facility] office.     Attempt was made to schedule, but couldn't locate appointment within appropriate time frame.    Call back [phone] ask for Sonam.

## 2024-03-06 ENCOUNTER — PATIENT OUTREACH (OUTPATIENT)
Dept: CASE MANAGEMENT | Facility: OTHER | Age: 79
End: 2024-03-06

## 2024-03-07 ENCOUNTER — TRANSITIONAL CARE MANAGEMENT (OUTPATIENT)
Dept: FAMILY MEDICINE CLINIC | Facility: OTHER | Age: 79
End: 2024-03-07

## 2024-03-13 ENCOUNTER — PROCEDURE VISIT (OUTPATIENT)
Dept: UROLOGY | Facility: CLINIC | Age: 79
End: 2024-03-13
Payer: COMMERCIAL

## 2024-03-13 ENCOUNTER — PATIENT OUTREACH (OUTPATIENT)
Dept: CASE MANAGEMENT | Facility: OTHER | Age: 79
End: 2024-03-13

## 2024-03-13 VITALS
BODY MASS INDEX: 32.93 KG/M2 | DIASTOLIC BLOOD PRESSURE: 92 MMHG | HEART RATE: 58 BPM | OXYGEN SATURATION: 98 % | SYSTOLIC BLOOD PRESSURE: 160 MMHG | WEIGHT: 223 LBS

## 2024-03-13 DIAGNOSIS — R33.9 URINARY RETENTION: Primary | ICD-10-CM

## 2024-03-13 PROCEDURE — 99211 OFF/OP EST MAY X REQ PHY/QHP: CPT

## 2024-03-13 NOTE — TELEPHONE ENCOUNTER
Attempted to call rehab x2 to see if they could perform TOV there this morning in lieu of patient coming into office today. Was transferred to unit twice and call was then disconnected both times. Will try again in a short time

## 2024-03-13 NOTE — TELEPHONE ENCOUNTER
Attempted to call again x2 and call was transferred to [facility] unit and disconnected.

## 2024-03-13 NOTE — PROGRESS NOTES
3/13/2024    Michael Reynolds Jr.  1945  [id number]    Diagnosis      Patient presents for sheridan catheter removal managed by our office    Plan  Patient will have PVR done at nursing facility and facility will call with PVR number.    Procedure Sheridan removal/voiding trial    Sheridan catheter removed after deflation of an intact balloon. Patient tolerated well. Encouraged patient to hydrate well.      Vitals:    03/13/24 0907   BP: 160/92   BP Location: Left arm   Patient Position: Supine   Cuff Size: Adult   Pulse: 58   SpO2: 98%   Weight: 101 kg (223 lb)           Effie Teague RN

## 2024-03-13 NOTE — TELEPHONE ENCOUNTER
[facility]. Pt will come in this morning to remove sheridan and they will perform PVR in the afternoon. Orders to be faxed to [phone]                PVR ORDERS     Encourage hydration and monitoring of voiding   Return in the afternoon after approximately 4-6 hours for post void residual, utilizing bladder scanner   If PVR >250 mL please reinsert indwelling catheter size 16F with a 5-10 cc balloon.  Call office with results of void trial at [phone]

## 2024-03-13 NOTE — PROGRESS NOTES
Update obtained from [facility] the patient transitioned to LTC on 3/12/24 at [facility]. I have removed myself from the care team, updated the Care Coordination note, and closed the episode.

## 2024-04-08 NOTE — ED PROVIDER NOTES
History  Chief Complaint   Patient presents with    Cardiac Arrest     Arrives via EMS from [facility] with CPR and ACLS in progress, report of patient being found unresponsive at 2130, last seen normal by nursing home staff at 2100. Received 5 rounds of epinephrine prior to arrival to ED.     78-year-old male history of hypertension, diabetes, MI status post CABG, CHF, presents from nursing facility in cardiac arrest with ACLS in progress.  Patient reportedly was last seen normal at 9 PM and then was noted to be unresponsive and pulseless at approximately 930 at which time compressions were initiated.  EMS notes that the patient has been in asystole the entire time and they have given 5 doses of epinephrine.  No preceding symptoms to indicate illness.        Prior to Admission Medications   Prescriptions Last Dose Informant Patient Reported? Taking?   Continuous Blood Gluc  (FreeStyle Celia 2 [place]) MAMI  Spouse/Significant Other, Self No No   Sig: Use to check blood sugar daily   Continuous Blood Gluc Sensor (FreeStyle Celia 2 Sensor) [facility]   No No   Sig: Use daily as directed for CGM - Change every 14 days   Insulin Pen Needle (B-D ULTRAFINE III SHORT PEN) 31G X 8 MM MISC  Spouse/Significant Other, Self No No   Sig: Inject under the skin 4 (four) times a day   Microlet Lancets MISC  Spouse/Significant Other, Self No No   Sig: USE 3 TIMES DAY   acetaminophen (TYLENOL) 325 mg tablet   No No   Sig: Take 2 tablets (650 mg total) by mouth every 6 (six) hours as needed for mild pain, headaches or fever   aspirin (ECOTRIN LOW STRENGTH) 81 mg EC tablet  Spouse/Significant Other, Self No No   Sig: Take 1 tablet (81 mg total) by mouth daily   atorvastatin (LIPITOR) 80 mg tablet  Spouse/Significant Other, Self No No   Sig: Take 1 tablet (80 mg total) by mouth daily with dinner   glucose blood test strip  Spouse/Significant Other, Self No No   Sig: Check 4 times a day   insulin glargine (LANTUS) 100 units/mL  subcutaneous injection   No No   Sig: Inject 30 Units under the skin daily at bedtime   insulin lispro (HumALOG/ADMELOG) 100 units/mL injection   No No   Sig: Inject 5 Units under the skin 3 (three) times a day with meals   insulin lispro (HumALOG/ADMELOG) 100 units/mL injection   No No   Sig: Inject 1-5 Units under the skin 3 (three) times a day before meals Blood Glucose 150 - 209: 1 Unit of Insulin  Blood Glucose 210 - 269: 2 Units of Insulin  Blood Glucose 270 - 329: 3 Units of Insulin  Blood Glucose 330 - 389: 4 Units of Insulin  Blood Glucose greater than or equal to 390: 5 Units of Insulin   insulin lispro (HumALOG/ADMELOG) 100 units/mL injection   No No   Sig: Inject 1-5 Units under the skin daily at bedtime Blood Glucose 150 - 209: 1 Unit of Insulin  Blood Glucose 210 - 269: 2 Units of Insulin  Blood Glucose 270 - 329: 3 Units of Insulin  Blood Glucose 330 - 389: 4 Units of Insulin  Blood Glucose greater than or equal to 390: 5 Units of Insulin   metoprolol tartrate (LOPRESSOR) 50 mg tablet  Self No No   Sig: Take 1 tablet (50 mg total) by mouth 2 (two) times a day   tamsulosin (FLOMAX) 0.4 mg   No No   Sig: Take 1 capsule (0.4 mg total) by mouth daily with dinner      Facility-Administered Medications: None       Past Medical History:   Diagnosis Date    CHF (congestive heart failure) (HCC)     Chronic kidney disease 18 - 24 months?    Dr Patterson - stage 3    Colon polyp     CPAP (continuous positive airway pressure) dependence     Diabetes mellitus (HCC)     History of transfusion     Hyperlipidemia     Hypertension     Myocardial infarction (HCC) 06/2019    Open heart surgery with quad bypass    Obesity     Renal disorder     Sleep apnea     Visual impairment 1991    Dr Nickie Peters       Past Surgical History:   Procedure Laterality Date    APPENDECTOMY  1977    Done in conjunction with cholecystectomy    BACK SURGERY      CATARACT EXTRACTION, BILATERAL Bilateral     Left eye- 10/23 Right eye 9/23     CHOLECYSTECTOMY      COLONOSCOPY      CORONARY ARTERY BYPASS GRAFT  2019    quad    EGD      GALLBLADDER SURGERY      HERNIA REPAIR      [place] CORONARY ARTERY BYP W/VEIN & ARTERY GRAFT 4 VEIN N/A 2019    Procedure: CORONARY ARTERY BYPASS GRAFT (CABG) 4 VESSELS WITH SVG TO PDA, OM, DIAGONAL AND LIMA TO LAD; RIGHT LEG EVH;  Surgeon: James Fang MD;  Location: [facility];  Service: Cardiac Surgery    RECTAL SURGERY      SPINE SURGERY  2012    Fusion L3-L5?    TONSILLECTOMY         Family History   Problem Relation Age of Onset    Cancer Mother     Alcohol abuse Mother     Cancer Father     Alcohol abuse Father     Diabetes Maternal Grandmother     Diabetes type II Maternal Grandmother         Geriatric onset -      Diabetes Paternal Aunt     Hypertension Paternal Grandfather      I have reviewed and agree with the history as documented.    E-Cigarette/Vaping    E-Cigarette Use Never User      E-Cigarette/Vaping Substances    Nicotine No     THC No     CBD No     Flavoring No     Other No     Unknown No      Social History     Tobacco Use    Smoking status: Former     Current packs/day: 0.00     Average packs/day: 3.0 packs/day for 35.0 years (105.0 ttl pk-yrs)     Types: Cigarettes, Cigars     Start date: 1957     Quit date: 1992     Years since quittin.2    Smokeless tobacco: Never    Tobacco comments:     Started smoking as a pre-teenager   Vaping Use    Vaping status: Never Used   Substance Use Topics    Alcohol use: Not Currently     Comment: none since most recent hospitalization    Drug use: Never       Review of Systems   Unable to perform ROS: Patient unresponsive       Physical Exam  Physical Exam  Constitutional:       Comments: Pale, unresponsive, CPR in progress   HENT:      Mouth/Throat:      Mouth: Mucous membranes are moist.   Pulmonary:      Comments: Presents equal with BVM bilaterally  Musculoskeletal:         General: No swelling or deformity.   Skin:      General: Skin is warm and dry.      Coloration: Skin is pale.   Neurological:      Comments: Unresponsive         Vital Signs  ED Triage Vitals   Temp Pulse Respirations Blood Pressure SpO2   -- 04/07/24 2215 04/07/24 2223 04/07/24 2223 04/07/24 2223    (!) 0 (!) 0 (!) 0/0 (!) 0 %      Temp src Heart Rate Source Patient Position - Orthostatic VS BP Location FiO2 (%)   -- 04/07/24 2223 -- -- --    Monitor         Pain Score       --                  Vitals:    04/07/24 2215 04/07/24 2218 04/07/24 2223   BP:   (!) 0/0   Pulse: (!) 0 (!) 0 (!) 0         Visual Acuity      ED Medications  Medications   EPINEPHrine (ADRENALIN) injection (1 mg Intravenous Given 4/7/24 2215)       Diagnostic Studies  Results Reviewed       None                   No orders to display              Procedures  CriticalCare Time    Date/Time: 4/8/2024 2:47 AM    Performed by: Linda Oswald MD  Authorized by: Linda Oswald MD    Critical care provider statement:     Critical care time (minutes):  20    Critical care time was exclusive of:  Separately billable procedures and treating other patients    Critical care was necessary to treat or prevent imminent or life-threatening deterioration of the following conditions:  Cardiac failure and circulatory failure    Critical care was time spent personally by me on the following activities:  Obtaining history from patient or surrogate, evaluation of patient's response to treatment, examination of patient and re-evaluation of patient's condition           ED Course       78-year-old male arriving in cardiac arrest after approximately 40 minutes of ACLS.  On arrival rhythm was asystole.  1 epinephrine given an additional round of compressions.  On second pulse check patient continues to be in asystole.  Point-of-care ultrasound shows no cardiac movement.  At this time resuscitation was terminated due to futility.  Time of death 2219.   was contacted.  Patient's family was present and  was notified.                                      Medical Decision Making  Risk  Prescription drug management.             Disposition  Final diagnoses:   Cardiac arrest (HCC)     Time reflects when diagnosis was documented in both MDM as applicable and the Disposition within this note       Time User Action Codes Description Comment    2024 11:16 PM Linda Oswald [I46.9] Cardiac arrest (HCC)           ED Disposition       ED Disposition       Condition   --    Date/Time   Sun 2024 11:16 PM    Comment   --             Follow-up Information    None       Date, Time and Cause of Death    Date of Death: 24  Time of Death: 10:19 PM  Preliminary Cause of Death: Cardiac arrest (HCC)       Discharge Medication List as of 2024  2:18 AM        CONTINUE these medications which have NOT CHANGED    Details   acetaminophen (TYLENOL) 325 mg tablet Take 2 tablets (650 mg total) by mouth every 6 (six) hours as needed for mild pain, headaches or fever, Starting 2024, No Print      aspirin (ECOTRIN LOW STRENGTH) 81 mg EC tablet Take 1 tablet (81 mg total) by mouth daily, Starting 2021, Normal      atorvastatin (LIPITOR) 80 mg tablet Take 1 tablet (80 mg total) by mouth daily with dinner, Starting Wed 10/25/2023, Normal      Continuous Blood Gluc  (FreeStyle Celia 2 [place]) MAMI Use to check blood sugar daily, Normal      Continuous Blood Gluc Sensor (FreeStyle Celia 2 Sensor) MISC Use daily as directed for CGM - Change every 14 days, Normal      glucose blood test strip Check 4 times a day, Normal      insulin glargine (LANTUS) 100 units/mL subcutaneous injection Inject 30 Units under the skin daily at bedtime, Starting 2024, No Print      !! insulin lispro (HumALOG/ADMELOG) 100 units/mL injection Inject 5 Units under the skin 3 (three) times a day with meals, Starting 2024, No Print      !! insulin lispro (HumALOG/ADMELOG) 100 units/mL injection Inject 1-5  Units under the skin 3 (three) times a day before meals Blood Glucose 150 - 209: 1 Unit of Insulin  Blood Glucose 210 - 269: 2 Units of Insulin  Blood Glucose 270 - 329: 3 Units of Insulin  Blood Glucose 330 - 389: 4 Units of Insulin  Blood Gl ucose greater than or equal to 390: 5 Units of Insulin, Starting Thu 2/29/2024, No Print      !! insulin lispro (HumALOG/ADMELOG) 100 units/mL injection Inject 1-5 Units under the skin daily at bedtime Blood Glucose 150 - 209: 1 Unit of Insulin  Blood Glucose 210 - 269: 2 Units of Insulin  Blood Glucose 270 - 329: 3 Units of Insulin  Blood Glucose 330 - 389: 4 Units of Insulin  Blood Glucose greater than  or equal to 390: 5 Units of Insulin, Starting Thu 2/29/2024, No Print      Insulin Pen Needle (B-D ULTRAFINE III SHORT PEN) 31G X 8 MM MISC Inject under the skin 4 (four) times a day, Starting Mon 11/13/2023, Normal      metoprolol tartrate (LOPRESSOR) 50 mg tablet Take 1 tablet (50 mg total) by mouth 2 (two) times a day, Starting Fri 12/22/2023, Normal      Microlet Lancets MISC USE 3 TIMES DAY, Normal      tamsulosin (FLOMAX) 0.4 mg Take 1 capsule (0.4 mg total) by mouth daily with dinner, Starting Thu 2/29/2024, No Print       !! - Potential duplicate medications found. Please discuss with provider.          No discharge procedures on file.    PDMP Review       None            ED Provider  Electronically Signed by             Linda Oswald MD  04/08/24 9822

## 2024-04-22 NOTE — ED NOTES
Care Management Discharge Note    Discharge Date: 04/22/2024 at 1pm     Discharge Disposition: [facility]  [address] [place] [address]  Liaison phone: [phone]  RN to RN Report: [phone]  Liaison fax: [phone]    Discharge Services:  Therapies    Discharge DME:  n/a    Discharge Transportation: family or friend will provide    Private pay costs discussed: transportation costs-Pt opting to use family for transport to avoid transportation costs    Does the patient's insurance plan have a 3 day qualifying hospital stay waiver?  No    PAS Confirmation Code: [id number]     Patient/family educated on Medicare website which has current facility and service quality ratings:  yes    Education Provided on the Discharge Plan:  yes    Persons Notified of Discharge Plans: Pt, provider, charge RN, son Grady    Patient/Family in Agreement with the Plan:  yes    Handoff Referral Completed: Yes    Additional Information: CYDNEY saw in [facility] accepted pt for TCU stay.    CYDNEY spoke with pt at bedside about accepting facility. She reported her  is ill today so wonders if I can call her son, Grady, at [phone] to secure a ride out of here.    CYDNEY spoke with Ruby at [place]. Told her pt will accept the bed. Provided pt's Social Security number so she can get the auth from [facility]. Ruby needs to know the time pt will be arriving.    CYDNEY spoke with Grady who reported he can be here at 1pm in the [place] to  pt and bring her to TCU. CYDNEY provided the TCU's address to Grady.    CYDNEY informed charge RN of ride time, need for hard script, and need to be down in the [place] as family won't be coming up to the unit to get pt.    CYDNEY informed community health worker that pt needs clothes. CHW can retrieve some from our care management office.    CYDNEY completed preadmission screening on [employer] website; confirmation code is above.    CYDNEY sent discharge orders to  Pt ambulatory to commode to provide stool sample     Juventino Wilson RN  02/26/24 3697     facility. CYDNEY did not obtain a hard script as it appears Dr. Spencer is not prescribing any narcotics for pt upon discharge.    MAR Mooney  [address]  Phone: [phone]

## 2025-01-17 NOTE — ASSESSMENT & PLAN NOTE
F/U call to patient. Left a message & call back number with request for patient to return call to office.    [facility] Readings  1/12 - 12 1/13 - 11 1/14 - 12  1/15 - 13   Lab Results   Component Value Date    HGBA1C 8 5 (H) 11/11/2020     There are no new symptoms to report, however the patient was again counseled on the importance of regulating diet and nutrition, with avoidance of excessive simple sugars as recommended by his other providers, for the prevention of worsening neuropathy  His wife is concerned that he is going out to eat fast food, ever since he was cleared to drive again s/p cardiac surgery  Extensive counseling was provided in this regard  I offered weight management, nutritionist/dietitian but the patient declined

## (undated) DEVICE — CHLORAPREP HI-LITE 10.5ML ORANGE

## (undated) DEVICE — UMBILICAL TAPE: Brand: DEROYAL

## (undated) DEVICE — SILVER-COATED ANTIBACTERIAL BARRIER DRESSING: Brand: ACTICOAT SURGIC 10X25CM 5PK US

## (undated) DEVICE — SUT MONOCRYL PLUS 3-0 PS-2 27 IN MCP427H

## (undated) DEVICE — BLANKET HYPOTHERMIA ADULT GAYMAR

## (undated) DEVICE — THERMOFLECT BLANKET, L, 25EA                               TS THERMOFLECT BLANKET, 48" X 84", SILVER, 5/BG, 5 BG/CS NW: Brand: THERMOFLECT

## (undated) DEVICE — ANTIBACTERIAL UNDYED BRAIDED (POLYGLACTIN 910), SYNTHETIC ABSORBABLE SUTURE: Brand: COATED VICRYL

## (undated) DEVICE — SUT PROLENE 4-0 RB-1/RB-1 36 IN 8557H

## (undated) DEVICE — BONE WAX WHITE: Brand: BONE WAX WHITE

## (undated) DEVICE — LIGACLIP MCA MULTIPLE CLIP APPLIERS, 20 SMALL CLIPS: Brand: LIGACLIP

## (undated) DEVICE — ACE WRAP 6 IN UNSTERILE

## (undated) DEVICE — OASIS DRAIN, DUAL, IN-LINE, ATS COMPATIBLE: Brand: OASIS

## (undated) DEVICE — SUT PDS PLUS 1 CTB 36 IN PDPB359T

## (undated) DEVICE — GAUZE SPONGES,16 PLY: Brand: CURITY

## (undated) DEVICE — SUT SILK 0 CT-1 30 IN 424H

## (undated) DEVICE — TUBING INSUFFLATION SET ISO CONNECTOR

## (undated) DEVICE — INTENDED FOR TISSUE SEPARATION, AND OTHER PROCEDURES THAT REQUIRE A SHARP SURGICAL BLADE TO PUNCTURE OR CUT.: Brand: BARD-PARKER ® CARBON RIB-BACK BLADES

## (undated) DEVICE — SUT SILK 2 60 IN SA8H

## (undated) DEVICE — ALCON OPHTHALMIC KNIFE 15 °: Brand: ALCON

## (undated) DEVICE — INTENDED FOR TISSUE SEPARATION, AND OTHER PROCEDURES THAT REQUIRE A SHARP SURGICAL BLADE TO PUNCTURE OR CUT.: Brand: BARD-PARKER SAFETY BLADES SIZE 15, STERILE

## (undated) DEVICE — EVERGRIP INSERT SET 61MM: Brand: FOGARTY EVERGRIP

## (undated) DEVICE — LIGHT HANDLE COVER SLEEVE DISP BLUE STELLAR

## (undated) DEVICE — GLOVE SRG BIOGEL ECLIPSE 7.5

## (undated) DEVICE — PENCIL ELECTROSURG E-Z CLEAN -0035H

## (undated) DEVICE — ADHESIVE SKN CLSR HISTOACRYL FLEX 0.5ML LF

## (undated) DEVICE — TRAY FOLEY 16FR SURESTEP TEMP SENS URIMETER STAT LOK

## (undated) DEVICE — SORIN VEIN DISTENTION KIT

## (undated) DEVICE — ELECTRODE BLADE E-Z CLEAN 2.75IN 7CM -0012A

## (undated) DEVICE — HEMOCLIP CARTRIDGE LRG

## (undated) DEVICE — EVERGRIP INSERT SET 86MM: Brand: FOGARTY EVERGRIP

## (undated) DEVICE — FILTER SMOKE EVAC VIROSAFE

## (undated) DEVICE — STERNAL WIRE

## (undated) DEVICE — AORTIC PUNCH 5.2 MM DISP

## (undated) DEVICE — BLADE BEAVER MINI SZ 69

## (undated) DEVICE — SUT PROLENE 6-0 C-1/C-1 30 IN 8307H

## (undated) DEVICE — 40601 PROLONGED POSITIONING SYSTEM: Brand: 40601 PROLONGED POSITIONING SYSTEM

## (undated) DEVICE — VASOVIEW HEMOPRO 2: Brand: VASOVIEW HEMOPRO 2

## (undated) DEVICE — SUT SILK 3-0 SH CR/8 18 IN C013D

## (undated) DEVICE — PACK CABG PBDS

## (undated) DEVICE — CATH STRAIGHT RED RUBBER 20 FR

## (undated) DEVICE — AORTIC PUNCH 4.4 MM DISP

## (undated) DEVICE — 3000CC GUARDIAN II: Brand: GUARDIAN

## (undated) DEVICE — JP CHANNEL DRAIN, 24FR HUBLESS: Brand: CARDINAL HEALTH

## (undated) DEVICE — RECIP.STERNUM SAW BLADE 34/7.5/0.7MM: Brand: AESCULAP

## (undated) DEVICE — 32 FR RIGHT ANGLE – SOFT PVC CATHETER: Brand: PVC THORACIC CATHETERS

## (undated) DEVICE — SUT PROLENE 7-0 BV175-8/BV175-8 24 IN EPM8747

## (undated) DEVICE — SILVER-COATED ANTIBACTERIAL BARRIER DRESSING: Brand: ACTICOAT SURGIC 10X12CM 5PK US

## (undated) DEVICE — DRESSING ALLEVYN LIFE SACRAL 6.75 X 6.5 IN

## (undated) DEVICE — GLOVE INDICATOR PI UNDERGLOVE SZ 8 BLUE

## (undated) DEVICE — 32 FR STRAIGHT – SOFT PVC CATHETER: Brand: PVC THORACIC CATHETERS

## (undated) DEVICE — PLUMEPEN PRO 10FT

## (undated) DEVICE — SUT VICRYL 2 TP-1 27 IN J849G

## (undated) DEVICE — HEART SUPPORT

## (undated) DEVICE — DRESSING ALLEVYN LIFE HEEL 25 X 25.2CM

## (undated) DEVICE — SUCTION CATH 18 FR

## (undated) DEVICE — SUT ETHIBOND 2-0 SH-1/SH-1 30 IN X763H